# Patient Record
Sex: MALE | Race: WHITE | NOT HISPANIC OR LATINO | Employment: OTHER | URBAN - METROPOLITAN AREA
[De-identification: names, ages, dates, MRNs, and addresses within clinical notes are randomized per-mention and may not be internally consistent; named-entity substitution may affect disease eponyms.]

---

## 2023-03-24 DIAGNOSIS — K21.9 GASTROESOPHAGEAL REFLUX DISEASE WITHOUT ESOPHAGITIS: Primary | ICD-10-CM

## 2023-03-24 RX ORDER — FUROSEMIDE 20 MG/1
1 TABLET ORAL DAILY
COMMUNITY
End: 2023-08-28 | Stop reason: SDUPTHER

## 2023-03-24 RX ORDER — ASPIRIN 81 MG/1
1 TABLET ORAL DAILY
COMMUNITY
Start: 2021-02-15

## 2023-03-24 RX ORDER — FENOFIBRATE 48 MG/1
1 TABLET, FILM COATED ORAL DAILY
COMMUNITY
Start: 2016-11-08 | End: 2023-07-17

## 2023-03-24 RX ORDER — AMIODARONE HYDROCHLORIDE 200 MG/1
TABLET ORAL EVERY 24 HOURS
COMMUNITY
Start: 2022-01-25 | End: 2024-01-17 | Stop reason: SDUPTHER

## 2023-03-24 RX ORDER — WARFARIN 2.5 MG/1
TABLET ORAL
COMMUNITY
End: 2023-10-16 | Stop reason: WASHOUT

## 2023-03-24 RX ORDER — METOPROLOL SUCCINATE 50 MG/1
1 TABLET, EXTENDED RELEASE ORAL DAILY
Qty: 30 TABLET | Refills: 3 | COMMUNITY
Start: 2023-01-20 | End: 2023-12-18

## 2023-03-24 RX ORDER — PANTOPRAZOLE SODIUM 40 MG/1
1 TABLET, DELAYED RELEASE ORAL DAILY
COMMUNITY
Start: 2021-06-02 | End: 2023-05-30 | Stop reason: ALTCHOICE

## 2023-03-24 RX ORDER — FOLIC ACID 1 MG/1
1 TABLET ORAL DAILY
COMMUNITY
Start: 2022-09-15 | End: 2023-06-19

## 2023-03-24 RX ORDER — POTASSIUM CHLORIDE 20 MEQ/1
TABLET, EXTENDED RELEASE ORAL
Qty: 180 TABLET | Refills: 1 | Status: SHIPPED | OUTPATIENT
Start: 2023-03-24 | End: 2023-06-22

## 2023-03-24 RX ORDER — FLUDROCORTISONE ACETATE 0.1 MG/1
1 TABLET ORAL DAILY
COMMUNITY
Start: 2022-02-17 | End: 2023-05-30 | Stop reason: WASHOUT

## 2023-03-24 RX ORDER — GABAPENTIN 100 MG/1
1 CAPSULE ORAL NIGHTLY
COMMUNITY
Start: 2015-03-02 | End: 2023-04-23

## 2023-03-24 RX ORDER — MUPIROCIN 20 MG/G
OINTMENT TOPICAL
COMMUNITY
Start: 2022-10-25 | End: 2023-10-24 | Stop reason: WASHOUT

## 2023-03-24 RX ORDER — POTASSIUM CHLORIDE 1500 MG/1
1 TABLET, EXTENDED RELEASE ORAL DAILY
COMMUNITY
Start: 2022-04-05 | End: 2024-03-08 | Stop reason: HOSPADM

## 2023-03-24 RX ORDER — HYDROCORTISONE 25 MG/G
CREAM TOPICAL
COMMUNITY
Start: 2022-11-30 | End: 2023-10-24 | Stop reason: WASHOUT

## 2023-03-24 RX ORDER — LEVOFLOXACIN 500 MG/1
1 TABLET, FILM COATED ORAL DAILY
COMMUNITY
Start: 2022-11-16 | End: 2023-05-30 | Stop reason: ALTCHOICE

## 2023-03-24 RX ORDER — TAMSULOSIN HYDROCHLORIDE 0.4 MG/1
CAPSULE ORAL
COMMUNITY
Start: 2022-06-14 | End: 2023-05-22

## 2023-03-24 RX ORDER — ISOSORBIDE MONONITRATE 30 MG/1
TABLET, EXTENDED RELEASE ORAL
COMMUNITY
Start: 2022-02-17 | End: 2023-05-30 | Stop reason: WASHOUT

## 2023-03-24 RX ORDER — UBIDECARENONE 75 MG
1 CAPSULE ORAL DAILY
COMMUNITY
Start: 2022-01-25

## 2023-03-24 RX ORDER — FINASTERIDE 5 MG/1
5 TABLET, FILM COATED ORAL DAILY
COMMUNITY
End: 2023-10-07

## 2023-03-24 RX ORDER — CEPHALEXIN 500 MG/1
1 CAPSULE ORAL
COMMUNITY
Start: 2023-01-20 | End: 2023-05-30 | Stop reason: WASHOUT

## 2023-03-24 RX ORDER — NITROGLYCERIN 0.4 MG/1
TABLET SUBLINGUAL EVERY 5 MIN PRN
COMMUNITY
Start: 2022-02-17 | End: 2023-10-16 | Stop reason: WASHOUT

## 2023-03-24 RX ORDER — PANTOPRAZOLE SODIUM 40 MG/1
TABLET, DELAYED RELEASE ORAL
Qty: 90 TABLET | Refills: 1 | Status: SHIPPED | OUTPATIENT
Start: 2023-03-24 | End: 2023-12-15

## 2023-03-24 RX ORDER — PAROXETINE HYDROCHLORIDE 20 MG/1
1 TABLET, FILM COATED ORAL DAILY
COMMUNITY
Start: 2015-09-21 | End: 2023-06-19

## 2023-04-22 DIAGNOSIS — M19.90 ARTHRITIS: Primary | ICD-10-CM

## 2023-04-23 RX ORDER — GABAPENTIN 100 MG/1
CAPSULE ORAL
Qty: 90 CAPSULE | Refills: 1 | Status: SHIPPED | OUTPATIENT
Start: 2023-04-23 | End: 2024-01-16

## 2023-04-24 ENCOUNTER — APPOINTMENT (OUTPATIENT)
Dept: LAB | Facility: LAB | Age: 78
End: 2023-04-24
Payer: MEDICARE

## 2023-04-25 LAB
ANION GAP IN SER/PLAS: 17 MMOL/L (ref 10–20)
CALCIUM (MG/DL) IN SER/PLAS: 9.6 MG/DL (ref 8.6–10.6)
CARBON DIOXIDE, TOTAL (MMOL/L) IN SER/PLAS: 30 MMOL/L (ref 21–32)
CHLORIDE (MMOL/L) IN SER/PLAS: 103 MMOL/L (ref 98–107)
CREATININE (MG/DL) IN SER/PLAS: 2.35 MG/DL (ref 0.5–1.3)
ERYTHROCYTE DISTRIBUTION WIDTH (RATIO) BY AUTOMATED COUNT: 16.7 % (ref 11.5–14.5)
ERYTHROCYTE MEAN CORPUSCULAR HEMOGLOBIN CONCENTRATION (G/DL) BY AUTOMATED: 29.6 G/DL (ref 32–36)
ERYTHROCYTE MEAN CORPUSCULAR VOLUME (FL) BY AUTOMATED COUNT: 93 FL (ref 80–100)
ERYTHROCYTES (10*6/UL) IN BLOOD BY AUTOMATED COUNT: 4.37 X10E12/L (ref 4.5–5.9)
GFR MALE: 27 ML/MIN/1.73M2
GLUCOSE (MG/DL) IN SER/PLAS: 80 MG/DL (ref 74–99)
HEMATOCRIT (%) IN BLOOD BY AUTOMATED COUNT: 40.5 % (ref 41–52)
HEMOGLOBIN (G/DL) IN BLOOD: 12 G/DL (ref 13.5–17.5)
INR IN PPP BY COAGULATION ASSAY: NORMAL
LEUKOCYTES (10*3/UL) IN BLOOD BY AUTOMATED COUNT: 4.2 X10E9/L (ref 4.4–11.3)
NRBC (PER 100 WBCS) BY AUTOMATED COUNT: 0 /100 WBC (ref 0–0)
PLATELETS (10*3/UL) IN BLOOD AUTOMATED COUNT: 234 X10E9/L (ref 150–450)
POTASSIUM (MMOL/L) IN SER/PLAS: 4.8 MMOL/L (ref 3.5–5.3)
PROTHROMBIN TIME (PT) IN PPP BY COAGULATION ASSAY: NORMAL
SODIUM (MMOL/L) IN SER/PLAS: 145 MMOL/L (ref 136–145)
UREA NITROGEN (MG/DL) IN SER/PLAS: 38 MG/DL (ref 6–23)

## 2023-05-19 DIAGNOSIS — N40.1 BENIGN PROSTATIC HYPERPLASIA WITH LOWER URINARY TRACT SYMPTOMS, SYMPTOM DETAILS UNSPECIFIED: Primary | ICD-10-CM

## 2023-05-22 RX ORDER — LANOLIN ALCOHOL/MO/W.PET/CERES
CREAM (GRAM) TOPICAL
Qty: 180 TABLET | Refills: 1 | Status: SHIPPED | OUTPATIENT
Start: 2023-05-22 | End: 2023-08-20

## 2023-05-22 RX ORDER — TAMSULOSIN HYDROCHLORIDE 0.4 MG/1
CAPSULE ORAL
Qty: 90 CAPSULE | Refills: 0 | Status: SHIPPED | OUTPATIENT
Start: 2023-05-22 | End: 2023-08-20

## 2023-05-30 ENCOUNTER — LAB (OUTPATIENT)
Dept: LAB | Facility: LAB | Age: 78
End: 2023-05-30
Payer: MEDICARE

## 2023-05-30 ENCOUNTER — OFFICE VISIT (OUTPATIENT)
Dept: PRIMARY CARE | Facility: CLINIC | Age: 78
End: 2023-05-30
Payer: MEDICARE

## 2023-05-30 VITALS
OXYGEN SATURATION: 94 % | WEIGHT: 239 LBS | SYSTOLIC BLOOD PRESSURE: 122 MMHG | BODY MASS INDEX: 32.37 KG/M2 | DIASTOLIC BLOOD PRESSURE: 70 MMHG | HEART RATE: 60 BPM | HEIGHT: 72 IN | TEMPERATURE: 96.3 F

## 2023-05-30 DIAGNOSIS — I63.349: Primary | ICD-10-CM

## 2023-05-30 DIAGNOSIS — R30.0 DYSURIA: ICD-10-CM

## 2023-05-30 DIAGNOSIS — I25.10 ARTERIOSCLEROTIC CARDIOVASCULAR DISEASE (ASCVD): ICD-10-CM

## 2023-05-30 DIAGNOSIS — N18.32 STAGE 3B CHRONIC KIDNEY DISEASE (MULTI): ICD-10-CM

## 2023-05-30 DIAGNOSIS — I15.9 SECONDARY HYPERTENSION: ICD-10-CM

## 2023-05-30 PROBLEM — I10 HYPERTENSION: Status: ACTIVE | Noted: 2023-05-30

## 2023-05-30 PROBLEM — F32.A DEPRESSION: Status: ACTIVE | Noted: 2023-05-30

## 2023-05-30 PROBLEM — N21.0 BLADDER STONE: Status: ACTIVE | Noted: 2023-05-30

## 2023-05-30 PROBLEM — G47.33 OBSTRUCTIVE SLEEP APNEA: Status: ACTIVE | Noted: 2023-05-30

## 2023-05-30 PROBLEM — D64.9 ANEMIA: Status: ACTIVE | Noted: 2023-05-30

## 2023-05-30 PROBLEM — I63.231: Status: ACTIVE | Noted: 2023-05-30

## 2023-05-30 PROBLEM — I48.20 CHRONIC ATRIAL FIBRILLATION (MULTI): Status: ACTIVE | Noted: 2023-05-30

## 2023-05-30 PROBLEM — N40.0 BPH WITHOUT OBSTRUCTION/LOWER URINARY TRACT SYMPTOMS: Status: ACTIVE | Noted: 2023-05-30

## 2023-05-30 PROBLEM — E78.5 HYPERLIPIDEMIA: Status: ACTIVE | Noted: 2023-05-30

## 2023-05-30 PROBLEM — E55.9 VITAMIN D DEFICIENCY: Status: ACTIVE | Noted: 2023-05-30

## 2023-05-30 PROBLEM — I63.9 CEREBRAL INFARCTION, UNSPECIFIED (MULTI): Status: ACTIVE | Noted: 2023-05-30

## 2023-05-30 PROBLEM — I63.9: Status: ACTIVE | Noted: 2023-05-30

## 2023-05-30 LAB
APPEARANCE, URINE: ABNORMAL
BACTERIA, URINE: ABNORMAL /HPF
BILIRUBIN, URINE: NEGATIVE
BLOOD, URINE: ABNORMAL
COLOR, URINE: ABNORMAL
GLUCOSE, URINE: NEGATIVE MG/DL
HYALINE CASTS, URINE: ABNORMAL /LPF
KETONES, URINE: NEGATIVE MG/DL
LEUKOCYTE ESTERASE, URINE: ABNORMAL
NITRITE, URINE: NEGATIVE
PH, URINE: 8 (ref 5–8)
PROTEIN, URINE: ABNORMAL MG/DL
RBC, URINE: 5 /HPF (ref 0–5)
SPECIFIC GRAVITY, URINE: 1.01 (ref 1–1.03)
SQUAMOUS EPITHELIAL CELLS, URINE: <1 /HPF
UROBILINOGEN, URINE: <2 MG/DL (ref 0–1.9)
WBC CLUMPS, URINE: ABNORMAL /HPF
WBC, URINE: 17 /HPF (ref 0–5)

## 2023-05-30 PROCEDURE — 81001 URINALYSIS AUTO W/SCOPE: CPT

## 2023-05-30 PROCEDURE — 1159F MED LIST DOCD IN RCRD: CPT | Performed by: INTERNAL MEDICINE

## 2023-05-30 PROCEDURE — 3074F SYST BP LT 130 MM HG: CPT | Performed by: INTERNAL MEDICINE

## 2023-05-30 PROCEDURE — 87077 CULTURE AEROBIC IDENTIFY: CPT

## 2023-05-30 PROCEDURE — 1157F ADVNC CARE PLAN IN RCRD: CPT | Performed by: INTERNAL MEDICINE

## 2023-05-30 PROCEDURE — 3078F DIAST BP <80 MM HG: CPT | Performed by: INTERNAL MEDICINE

## 2023-05-30 PROCEDURE — 99213 OFFICE O/P EST LOW 20 MIN: CPT | Performed by: INTERNAL MEDICINE

## 2023-05-30 PROCEDURE — 87086 URINE CULTURE/COLONY COUNT: CPT

## 2023-05-30 PROCEDURE — 1160F RVW MEDS BY RX/DR IN RCRD: CPT | Performed by: INTERNAL MEDICINE

## 2023-05-30 ASSESSMENT — PATIENT HEALTH QUESTIONNAIRE - PHQ9
SUM OF ALL RESPONSES TO PHQ9 QUESTIONS 1 & 2: 0
1. LITTLE INTEREST OR PLEASURE IN DOING THINGS: NOT AT ALL
2. FEELING DOWN, DEPRESSED OR HOPELESS: NOT AT ALL

## 2023-05-30 ASSESSMENT — LIFESTYLE VARIABLES
SKIP TO QUESTIONS 9-10: 1
HOW OFTEN DO YOU HAVE SIX OR MORE DRINKS ON ONE OCCASION: NEVER
HOW OFTEN DO YOU HAVE A DRINK CONTAINING ALCOHOL: NEVER
HOW MANY STANDARD DRINKS CONTAINING ALCOHOL DO YOU HAVE ON A TYPICAL DAY: PATIENT DOES NOT DRINK
AUDIT-C TOTAL SCORE: 0

## 2023-05-30 ASSESSMENT — PAIN SCALES - GENERAL: PAINLEVEL: 2

## 2023-05-30 ASSESSMENT — COLUMBIA-SUICIDE SEVERITY RATING SCALE - C-SSRS: 1. IN THE PAST MONTH, HAVE YOU WISHED YOU WERE DEAD OR WISHED YOU COULD GO TO SLEEP AND NOT WAKE UP?: NO

## 2023-05-30 NOTE — PROGRESS NOTES
Subjective   Patient ID: Angel Carroll is a 78 y.o. male who presents for UTI and Flank Pain.    HPI patient presents to clinic complaining of burning on urination for the past 3 days.  His symptoms are associated with mild confusion.  He denies any fever, chills, nausea, vomiting, flank pain and diaphoresis.  He is accompanied by his family today.  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  Laboratory studies done last month showed serum creatinine of 2.35 BUN of 38 GFR of 27 mL/min, hemoglobin of 12.0 hematocrit of 40.5 and other studies have been reviewed and discussed with him.    Review of Systems   Constitutional: Negative.    HENT: Negative.     Eyes: Negative.    Respiratory: Negative.     Cardiovascular: Negative.    Gastrointestinal: Negative.    Endocrine: Negative.    Genitourinary:  Positive for dysuria.   Musculoskeletal: Negative.    Skin: Negative.    Allergic/Immunologic: Negative.    Neurological: Negative.    Hematological: Negative.    Psychiatric/Behavioral: Negative.         Objective   /70   Pulse 60   Temp 35.7 °C (96.3 °F)   Ht 1.829 m (6')   Wt 108 kg (239 lb)   SpO2 94%   BMI 32.41 kg/m²     Physical Exam  Constitutional:       Appearance: Normal appearance. He is obese.   HENT:      Right Ear: Tympanic membrane normal.      Left Ear: Tympanic membrane and ear canal normal.      Nose:  Nose normal.   Neck:      Vascular: No carotid bruit.   Cardiovascular:      Rate and Rhythm: Normal rate.   Pulmonary:      Effort: No respiratory distress.      Breath sounds: No stridor. No wheezing.   Abdominal:      Palpations: Abdomen is soft.      Tenderness: There is no abdominal tenderness. There is no guarding or rebound.   Skin:     Coloration: Skin is not jaundiced.   Neurological:      General: No focal deficit present.      Mental Status: He is alert and oriented to person, place, and time.   Psychiatric:         Mood and Affect: Mood normal.         Assessment/Plan    patient will be scheduled for urinalysis in view of his symptoms of dysuria.  He is encouraged drink fluids and will be notified about his urinalysis result.  He will continue to follow-up with neurology, cardiology and nephrology clinic regarding history of atrial fibrillation, CVA and stage IV chronic kidney disease.  He will return to clinic in 3 months for follow-up visit.

## 2023-05-30 NOTE — PROGRESS NOTES
Subjective   Patient ID: Angel Carroll is a 78 y.o. male who presents for UTI and Flank Pain.    HPI     Review of Systems    Objective   /70   Pulse 60   Temp 35.7 °C (96.3 °F)   Ht 1.829 m (6')   Wt 108 kg (239 lb)   SpO2 94%   BMI 32.41 kg/m²     Physical Exam    Assessment/Plan

## 2023-05-31 RX ORDER — CIPROFLOXACIN 250 MG/1
250 TABLET, FILM COATED ORAL 2 TIMES DAILY
Qty: 14 TABLET | Refills: 0 | Status: SHIPPED | OUTPATIENT
Start: 2023-05-31 | End: 2023-06-07

## 2023-05-31 ASSESSMENT — ENCOUNTER SYMPTOMS
GASTROINTESTINAL NEGATIVE: 1
RESPIRATORY NEGATIVE: 1
NEUROLOGICAL NEGATIVE: 1
DYSURIA: 1
MUSCULOSKELETAL NEGATIVE: 1
EYES NEGATIVE: 1
CARDIOVASCULAR NEGATIVE: 1
ENDOCRINE NEGATIVE: 1
PSYCHIATRIC NEGATIVE: 1
HEMATOLOGIC/LYMPHATIC NEGATIVE: 1
ALLERGIC/IMMUNOLOGIC NEGATIVE: 1
CONSTITUTIONAL NEGATIVE: 1

## 2023-06-01 ENCOUNTER — TELEPHONE (OUTPATIENT)
Dept: PRIMARY CARE | Facility: CLINIC | Age: 78
End: 2023-06-01
Payer: MEDICARE

## 2023-06-01 DIAGNOSIS — N30.00 ACUTE CYSTITIS WITHOUT HEMATURIA: Primary | ICD-10-CM

## 2023-06-01 RX ORDER — CEPHALEXIN 500 MG/1
500 CAPSULE ORAL 2 TIMES DAILY
Qty: 20 CAPSULE | Refills: 0 | Status: SHIPPED | OUTPATIENT
Start: 2023-06-01 | End: 2023-06-11

## 2023-06-01 NOTE — TELEPHONE ENCOUNTER
PT CURRENTLY TAKING AMIODARONE AND WAS SENT CIPROFLOXACIN YESTERDAY, PHARM CALLED TO STATE THE MEDS CONFLICT WITH EACH OTHER,, ASKING TO SEND NEW RX.... ALLERGIES IN CHART

## 2023-06-05 LAB — URINE CULTURE: ABNORMAL

## 2023-06-17 DIAGNOSIS — I25.10 ARTERIOSCLEROTIC CARDIOVASCULAR DISEASE (ASCVD): Primary | ICD-10-CM

## 2023-06-19 RX ORDER — PAROXETINE HYDROCHLORIDE 20 MG/1
TABLET, FILM COATED ORAL
Qty: 90 TABLET | Refills: 0 | Status: SHIPPED | OUTPATIENT
Start: 2023-06-19 | End: 2023-12-15

## 2023-06-19 RX ORDER — ATORVASTATIN CALCIUM 20 MG/1
TABLET, FILM COATED ORAL
Qty: 90 TABLET | Refills: 0 | Status: SHIPPED | OUTPATIENT
Start: 2023-06-19 | End: 2023-12-15

## 2023-06-19 RX ORDER — FOLIC ACID 1 MG/1
TABLET ORAL
Qty: 90 TABLET | Refills: 3 | Status: SHIPPED | OUTPATIENT
Start: 2023-06-19

## 2023-07-14 DIAGNOSIS — I25.10 ARTERIOSCLEROTIC CARDIOVASCULAR DISEASE (ASCVD): Primary | ICD-10-CM

## 2023-07-17 RX ORDER — FENOFIBRATE 48 MG/1
48 TABLET, FILM COATED ORAL DAILY
Qty: 90 TABLET | Refills: 1 | Status: SHIPPED | OUTPATIENT
Start: 2023-07-17 | End: 2024-07-16

## 2023-08-14 ENCOUNTER — TELEPHONE (OUTPATIENT)
Dept: PRIMARY CARE | Facility: CLINIC | Age: 78
End: 2023-08-14
Payer: MEDICARE

## 2023-08-28 DIAGNOSIS — I15.9 SECONDARY HYPERTENSION: Primary | ICD-10-CM

## 2023-08-28 RX ORDER — FUROSEMIDE 20 MG/1
20 TABLET ORAL DAILY
Qty: 90 TABLET | Refills: 1 | Status: SHIPPED | OUTPATIENT
Start: 2023-08-28 | End: 2024-03-08 | Stop reason: HOSPADM

## 2023-09-01 ENCOUNTER — LAB (OUTPATIENT)
Dept: LAB | Facility: LAB | Age: 78
End: 2023-09-01
Payer: MEDICARE

## 2023-09-01 DIAGNOSIS — N18.32 STAGE 3B CHRONIC KIDNEY DISEASE (MULTI): ICD-10-CM

## 2023-09-01 DIAGNOSIS — I15.9 SECONDARY HYPERTENSION: ICD-10-CM

## 2023-09-01 DIAGNOSIS — I63.349: ICD-10-CM

## 2023-09-01 LAB
ANION GAP IN SER/PLAS: 14 MMOL/L (ref 10–20)
BASOPHILS (10*3/UL) IN BLOOD BY AUTOMATED COUNT: 0.04 X10E9/L (ref 0–0.1)
BASOPHILS/100 LEUKOCYTES IN BLOOD BY AUTOMATED COUNT: 0.9 % (ref 0–2)
CALCIDIOL (25 OH VITAMIN D3) (NG/ML) IN SER/PLAS: 24 NG/ML
CALCIUM (MG/DL) IN SER/PLAS: 9.1 MG/DL (ref 8.6–10.6)
CARBON DIOXIDE, TOTAL (MMOL/L) IN SER/PLAS: 32 MMOL/L (ref 21–32)
CHLORIDE (MMOL/L) IN SER/PLAS: 106 MMOL/L (ref 98–107)
CREATININE (MG/DL) IN SER/PLAS: 2.28 MG/DL (ref 0.5–1.3)
EOSINOPHILS (10*3/UL) IN BLOOD BY AUTOMATED COUNT: 0.14 X10E9/L (ref 0–0.4)
EOSINOPHILS/100 LEUKOCYTES IN BLOOD BY AUTOMATED COUNT: 3.3 % (ref 0–6)
ERYTHROCYTE DISTRIBUTION WIDTH (RATIO) BY AUTOMATED COUNT: 15.5 % (ref 11.5–14.5)
ERYTHROCYTE MEAN CORPUSCULAR HEMOGLOBIN CONCENTRATION (G/DL) BY AUTOMATED: 29.8 G/DL (ref 32–36)
ERYTHROCYTE MEAN CORPUSCULAR VOLUME (FL) BY AUTOMATED COUNT: 96 FL (ref 80–100)
ERYTHROCYTES (10*6/UL) IN BLOOD BY AUTOMATED COUNT: 4.03 X10E12/L (ref 4.5–5.9)
GFR MALE: 28 ML/MIN/1.73M2
GLUCOSE (MG/DL) IN SER/PLAS: 98 MG/DL (ref 74–99)
HEMATOCRIT (%) IN BLOOD BY AUTOMATED COUNT: 38.6 % (ref 41–52)
HEMOGLOBIN (G/DL) IN BLOOD: 11.5 G/DL (ref 13.5–17.5)
IMMATURE GRANULOCYTES/100 LEUKOCYTES IN BLOOD BY AUTOMATED COUNT: 0.5 % (ref 0–0.9)
LEUKOCYTES (10*3/UL) IN BLOOD BY AUTOMATED COUNT: 4.2 X10E9/L (ref 4.4–11.3)
LYMPHOCYTES (10*3/UL) IN BLOOD BY AUTOMATED COUNT: 0.94 X10E9/L (ref 0.8–3)
LYMPHOCYTES/100 LEUKOCYTES IN BLOOD BY AUTOMATED COUNT: 22.2 % (ref 13–44)
MAGNESIUM (MG/DL) IN SER/PLAS: 2.38 MG/DL (ref 1.6–2.4)
MONOCYTES (10*3/UL) IN BLOOD BY AUTOMATED COUNT: 0.64 X10E9/L (ref 0.05–0.8)
MONOCYTES/100 LEUKOCYTES IN BLOOD BY AUTOMATED COUNT: 15.1 % (ref 2–10)
NEUTROPHILS (10*3/UL) IN BLOOD BY AUTOMATED COUNT: 2.46 X10E9/L (ref 1.6–5.5)
NEUTROPHILS/100 LEUKOCYTES IN BLOOD BY AUTOMATED COUNT: 58 % (ref 40–80)
NRBC (PER 100 WBCS) BY AUTOMATED COUNT: 0 /100 WBC (ref 0–0)
PLATELETS (10*3/UL) IN BLOOD AUTOMATED COUNT: 242 X10E9/L (ref 150–450)
POTASSIUM (MMOL/L) IN SER/PLAS: 4.7 MMOL/L (ref 3.5–5.3)
SODIUM (MMOL/L) IN SER/PLAS: 147 MMOL/L (ref 136–145)
UREA NITROGEN (MG/DL) IN SER/PLAS: 30 MG/DL (ref 6–23)

## 2023-09-01 PROCEDURE — 82306 VITAMIN D 25 HYDROXY: CPT

## 2023-09-01 PROCEDURE — 83735 ASSAY OF MAGNESIUM: CPT

## 2023-09-01 PROCEDURE — 80048 BASIC METABOLIC PNL TOTAL CA: CPT

## 2023-09-01 PROCEDURE — 85025 COMPLETE CBC W/AUTO DIFF WBC: CPT

## 2023-09-01 PROCEDURE — 36415 COLL VENOUS BLD VENIPUNCTURE: CPT

## 2023-09-06 ENCOUNTER — APPOINTMENT (OUTPATIENT)
Dept: PRIMARY CARE | Facility: CLINIC | Age: 78
End: 2023-09-06
Payer: MEDICARE

## 2023-09-12 PROBLEM — E04.2 GOITER, NONTOXIC, MULTINODULAR: Status: ACTIVE | Noted: 2023-09-12

## 2023-09-12 PROBLEM — R53.1 GENERALIZED WEAKNESS: Status: ACTIVE | Noted: 2023-09-12

## 2023-09-12 PROBLEM — K63.5 BENIGN COLON POLYP: Status: ACTIVE | Noted: 2023-09-12

## 2023-09-12 PROBLEM — I50.9 HEART FAILURE (MULTI): Status: ACTIVE | Noted: 2023-09-12

## 2023-09-12 PROBLEM — R29.898 TRANSIENT WEAKNESS OF LEFT LOWER EXTREMITY: Status: ACTIVE | Noted: 2023-09-12

## 2023-09-12 PROBLEM — M17.11 OSTEOARTHRITIS OF RIGHT KNEE: Status: ACTIVE | Noted: 2023-09-12

## 2023-09-12 PROBLEM — R09.02 HYPOXIA: Status: ACTIVE | Noted: 2023-09-12

## 2023-09-12 PROBLEM — I48.92 ATRIAL FLUTTER (MULTI): Status: ACTIVE | Noted: 2023-09-12

## 2023-09-12 PROBLEM — B02.9 HERPES ZOSTER: Status: ACTIVE | Noted: 2023-09-12

## 2023-09-12 PROBLEM — R97.20 ELEVATED PROSTATE SPECIFIC ANTIGEN (PSA): Status: ACTIVE | Noted: 2023-09-12

## 2023-09-12 PROBLEM — I25.10 ATHEROSCLEROSIS OF CORONARY ARTERY: Status: ACTIVE | Noted: 2023-09-12

## 2023-09-12 PROBLEM — D48.5 NEOPLASM OF UNCERTAIN BEHAVIOR OF SKIN: Status: ACTIVE | Noted: 2023-01-31

## 2023-09-12 PROBLEM — N52.9 MALE ERECTILE DISORDER OF ORGANIC ORIGIN: Status: ACTIVE | Noted: 2023-09-12

## 2023-09-12 PROBLEM — R53.1 RIGHT SIDED WEAKNESS: Status: ACTIVE | Noted: 2023-09-12

## 2023-09-12 PROBLEM — L57.0 ACTINIC KERATOSIS: Status: ACTIVE | Noted: 2023-01-31

## 2023-09-12 PROBLEM — H60.90: Status: ACTIVE | Noted: 2023-09-12

## 2023-09-12 PROBLEM — G56.00 CARPAL TUNNEL SYNDROME: Status: ACTIVE | Noted: 2023-09-12

## 2023-09-12 PROBLEM — R20.2 PARESTHESIA OF SKIN: Status: ACTIVE | Noted: 2023-01-31

## 2023-09-12 PROBLEM — N17.9 ACUTE RENAL FAILURE (CMS-HCC): Status: ACTIVE | Noted: 2023-09-12

## 2023-09-12 PROBLEM — D49.6 BRAIN TUMOR (MULTI): Status: ACTIVE | Noted: 2023-09-12

## 2023-09-12 PROBLEM — Z98.890 STATUS POST CARDIAC CATHETERIZATION: Status: ACTIVE | Noted: 2023-09-12

## 2023-09-12 PROBLEM — I51.9 LV DYSFUNCTION: Status: ACTIVE | Noted: 2023-09-12

## 2023-09-12 PROBLEM — F41.9 ANXIETY: Status: ACTIVE | Noted: 2023-09-12

## 2023-09-12 PROBLEM — N20.0 CALCULUS OF KIDNEY: Status: ACTIVE | Noted: 2023-09-12

## 2023-09-12 PROBLEM — I63.40 CEREBRAL INFARCTION DUE TO EMBOLISM OF CEREBRAL ARTERY (MULTI): Status: ACTIVE | Noted: 2023-05-30

## 2023-09-12 PROBLEM — M96.1 LUMBAR POSTLAMINECTOMY SYNDROME: Status: ACTIVE | Noted: 2023-09-12

## 2023-09-12 PROBLEM — H52.03 HYPEROPIA OF BOTH EYES: Status: ACTIVE | Noted: 2023-09-12

## 2023-09-12 PROBLEM — D18.01 HEMANGIOMA OF SKIN AND SUBCUTANEOUS TISSUE: Status: ACTIVE | Noted: 2023-01-31

## 2023-09-12 PROBLEM — L60.3 ONYCHODYSTROPHY: Status: ACTIVE | Noted: 2023-09-12

## 2023-09-12 PROBLEM — R31.0 GROSS HEMATURIA: Status: ACTIVE | Noted: 2023-09-12

## 2023-09-12 PROBLEM — F51.04 CHRONIC INSOMNIA: Status: ACTIVE | Noted: 2023-09-12

## 2023-09-12 PROBLEM — R42 ORTHOSTATIC DIZZINESS: Status: ACTIVE | Noted: 2023-09-12

## 2023-09-12 PROBLEM — R07.9 CHEST PAIN: Status: ACTIVE | Noted: 2023-09-12

## 2023-09-12 PROBLEM — Z95.0 PACEMAKER: Status: ACTIVE | Noted: 2023-09-12

## 2023-09-12 PROBLEM — R31.9 HEMATURIA: Status: ACTIVE | Noted: 2023-09-12

## 2023-09-12 PROBLEM — S09.90XA INJURY OF HEAD: Status: ACTIVE | Noted: 2023-09-12

## 2023-09-12 PROBLEM — D49.9 PRECANCEROUS LESION: Status: ACTIVE | Noted: 2023-09-12

## 2023-09-12 PROBLEM — R33.9 BLADDER RETENTION: Status: ACTIVE | Noted: 2023-09-12

## 2023-09-12 PROBLEM — H54.40 BLINDNESS OF LEFT EYE: Status: ACTIVE | Noted: 2023-09-12

## 2023-09-12 PROBLEM — E04.1 THYROID NODULE: Status: ACTIVE | Noted: 2023-09-12

## 2023-09-12 PROBLEM — M54.50 LUMBAR PAIN: Status: ACTIVE | Noted: 2023-09-12

## 2023-09-12 PROBLEM — I10 ORTHOSTATIC HYPERTENSION: Status: ACTIVE | Noted: 2023-09-12

## 2023-09-12 PROBLEM — Z96.60 HISTORY OF JOINT REPLACEMENT: Status: ACTIVE | Noted: 2023-09-12

## 2023-09-12 PROBLEM — N20.9 URINARY CALCULUS: Status: ACTIVE | Noted: 2023-09-12

## 2023-09-12 PROBLEM — G60.9 IDIOPATHIC PERIPHERAL NEUROPATHY: Status: ACTIVE | Noted: 2023-09-12

## 2023-09-12 PROBLEM — J96.11 CHRONIC HYPOXEMIC RESPIRATORY FAILURE (MULTI): Status: ACTIVE | Noted: 2023-09-12

## 2023-09-12 PROBLEM — N28.9 RENAL DISORDER: Status: ACTIVE | Noted: 2023-09-12

## 2023-09-12 PROBLEM — Z98.61 HISTORY OF PERCUTANEOUS TRANSLUMINAL CORONARY ANGIOPLASTY: Status: ACTIVE | Noted: 2023-09-12

## 2023-09-12 PROBLEM — L81.4 OTHER MELANIN HYPERPIGMENTATION: Status: ACTIVE | Noted: 2023-01-31

## 2023-09-12 PROBLEM — R21 EXANTHEM: Status: ACTIVE | Noted: 2023-09-12

## 2023-09-12 PROBLEM — R27.0 ATAXIA: Status: ACTIVE | Noted: 2023-09-12

## 2023-09-12 PROBLEM — L91.8 OTHER HYPERTROPHIC DISORDERS OF THE SKIN: Status: ACTIVE | Noted: 2023-01-31

## 2023-09-12 PROBLEM — I77.9 DISORDER OF CAROTID ARTERY (CMS-HCC): Status: ACTIVE | Noted: 2023-09-12

## 2023-09-12 PROBLEM — M25.461 EFFUSION OF RIGHT KNEE: Status: ACTIVE | Noted: 2023-09-12

## 2023-09-12 PROBLEM — H25.813 MIXED TYPE AGE-RELATED CATARACT, BOTH EYES: Status: ACTIVE | Noted: 2023-09-12

## 2023-09-12 PROBLEM — R55 NEAR SYNCOPE: Status: ACTIVE | Noted: 2023-09-12

## 2023-09-12 PROBLEM — B35.1 ONYCHOMYCOSIS OF TOENAIL: Status: ACTIVE | Noted: 2023-09-12

## 2023-09-12 PROBLEM — Z79.01 ANTICOAGULATED ON COUMADIN: Status: ACTIVE | Noted: 2023-09-12

## 2023-09-12 PROBLEM — I63.511: Status: ACTIVE | Noted: 2023-05-30

## 2023-09-12 PROBLEM — S41.119A ARM LACERATION: Status: ACTIVE | Noted: 2023-09-12

## 2023-09-12 PROBLEM — R06.09 DYSPNEA ON EXERTION: Status: ACTIVE | Noted: 2023-09-12

## 2023-09-12 PROBLEM — D22.5 MELANOCYTIC NEVI OF TRUNK: Status: ACTIVE | Noted: 2023-01-31

## 2023-09-12 PROBLEM — I69.322 DYSARTHRIA DUE TO RECENT STROKE: Status: ACTIVE | Noted: 2023-09-12

## 2023-09-12 PROBLEM — H35.82 OCULAR ISCHEMIC SYNDROME: Status: ACTIVE | Noted: 2023-09-12

## 2023-09-12 PROBLEM — H52.4 BILATERAL PRESBYOPIA: Status: ACTIVE | Noted: 2023-09-12

## 2023-09-12 PROBLEM — D50.9 IRON DEFICIENCY ANEMIA: Status: ACTIVE | Noted: 2023-09-12

## 2023-09-12 PROBLEM — D36.9 TUBULAR ADENOMA: Status: ACTIVE | Noted: 2023-09-12

## 2023-09-12 PROBLEM — F43.9 SITUATIONAL STRESS: Status: ACTIVE | Noted: 2023-09-12

## 2023-09-12 PROBLEM — R00.1 SINUS BRADYCARDIA: Status: ACTIVE | Noted: 2023-09-12

## 2023-09-12 PROBLEM — R29.898 WEAKNESS OF LEFT UPPER EXTREMITY: Status: ACTIVE | Noted: 2023-09-12

## 2023-09-12 PROBLEM — H53.9 ABNORMAL VISION: Status: ACTIVE | Noted: 2023-09-12

## 2023-09-12 PROBLEM — N20.1 CALCULUS OF URETER: Status: ACTIVE | Noted: 2023-09-12

## 2023-09-12 PROBLEM — R94.09 ABNORMAL TILT TABLE TEST: Status: ACTIVE | Noted: 2023-09-12

## 2023-09-12 PROBLEM — H25.9 SENILE CATARACT: Status: ACTIVE | Noted: 2023-09-12

## 2023-09-12 PROBLEM — H53.001 AMBLYOPIA OF RIGHT EYE: Status: ACTIVE | Noted: 2023-09-12

## 2023-09-12 PROBLEM — M54.16 LUMBAR RADICULITIS: Status: ACTIVE | Noted: 2023-09-12

## 2023-09-12 PROBLEM — R26.89 BALANCE PROBLEM: Status: ACTIVE | Noted: 2023-09-12

## 2023-09-12 PROBLEM — E66.9 OBESITY WITH BODY MASS INDEX 30 OR GREATER: Status: ACTIVE | Noted: 2023-09-12

## 2023-09-12 PROBLEM — Z97.3 WEARS GLASSES: Status: ACTIVE | Noted: 2023-09-12

## 2023-09-12 PROBLEM — R13.11 ORAL PHASE DYSPHAGIA: Status: ACTIVE | Noted: 2023-09-12

## 2023-09-12 PROBLEM — I10 BENIGN ESSENTIAL HYPERTENSION: Status: ACTIVE | Noted: 2023-09-12

## 2023-09-12 PROBLEM — R60.0 BILATERAL LEG EDEMA: Status: ACTIVE | Noted: 2023-09-12

## 2023-09-12 PROBLEM — E87.6 HYPOKALEMIA: Status: ACTIVE | Noted: 2023-09-12

## 2023-09-12 PROBLEM — Z96.651 STATUS POST TOTAL RIGHT KNEE REPLACEMENT: Status: ACTIVE | Noted: 2023-09-12

## 2023-09-12 PROBLEM — L82.0 INFLAMED SEBORRHEIC KERATOSIS: Status: ACTIVE | Noted: 2023-01-31

## 2023-09-12 PROBLEM — E29.1 HYPOGONADISM MALE: Status: ACTIVE | Noted: 2023-09-12

## 2023-09-12 PROBLEM — H53.041 AMBLYOPIA SUSPECT, RIGHT EYE: Status: ACTIVE | Noted: 2023-09-12

## 2023-09-12 PROBLEM — L60.0 INGROWN RIGHT BIG TOENAIL: Status: ACTIVE | Noted: 2023-09-12

## 2023-09-12 PROBLEM — R41.3 MEMORY DIFFICULTY: Status: ACTIVE | Noted: 2023-09-12

## 2023-09-12 PROBLEM — M48.062 LUMBAR STENOSIS WITH NEUROGENIC CLAUDICATION: Status: ACTIVE | Noted: 2023-09-12

## 2023-09-12 PROBLEM — R55 SYNCOPE AND COLLAPSE: Status: ACTIVE | Noted: 2023-09-12

## 2023-09-12 PROBLEM — M16.11 PRIMARY OSTEOARTHRITIS OF RIGHT HIP: Status: ACTIVE | Noted: 2023-09-12

## 2023-09-12 PROBLEM — M19.049 PRIMARY LOCALIZED OSTEOARTHROSIS, HAND: Status: ACTIVE | Noted: 2023-09-12

## 2023-09-12 PROBLEM — M54.9 BACKACHE: Status: ACTIVE | Noted: 2023-09-12

## 2023-09-12 PROBLEM — R41.82 ALTERED MENTAL STATUS: Status: ACTIVE | Noted: 2023-09-12

## 2023-09-12 PROBLEM — R41.89 SUBJECTIVE MEMORY COMPLAINTS: Status: ACTIVE | Noted: 2023-09-12

## 2023-09-12 PROBLEM — I95.9 HYPOTENSION: Status: ACTIVE | Noted: 2023-09-12

## 2023-09-12 PROBLEM — M17.12 PRIMARY OSTEOARTHRITIS OF LEFT KNEE: Status: ACTIVE | Noted: 2023-09-12

## 2023-09-12 PROBLEM — I25.5 ISCHEMIC CARDIOMYOPATHY: Status: ACTIVE | Noted: 2023-09-12

## 2023-09-12 RX ORDER — CALCIUM CARBONATE 300MG(750)
400 TABLET,CHEWABLE ORAL DAILY
COMMUNITY
End: 2023-09-18 | Stop reason: SDUPTHER

## 2023-09-12 RX ORDER — TAMSULOSIN HYDROCHLORIDE 0.4 MG/1
CAPSULE ORAL
COMMUNITY
Start: 2023-08-26 | End: 2023-11-08

## 2023-09-12 RX ORDER — KETOCONAZOLE 20 MG/G
CREAM TOPICAL
COMMUNITY
Start: 2019-07-30 | End: 2023-10-24 | Stop reason: WASHOUT

## 2023-09-12 RX ORDER — WARFARIN 4 MG/1
2.5 TABLET ORAL ONCE
COMMUNITY
Start: 2023-07-27 | End: 2024-05-24 | Stop reason: SDUPTHER

## 2023-09-12 RX ORDER — FLUOROURACIL 50 MG/G
CREAM TOPICAL
COMMUNITY
Start: 2019-07-30 | End: 2023-10-24 | Stop reason: WASHOUT

## 2023-09-18 ENCOUNTER — OFFICE VISIT (OUTPATIENT)
Dept: PRIMARY CARE | Facility: CLINIC | Age: 78
End: 2023-09-18
Payer: MEDICARE

## 2023-09-18 VITALS
TEMPERATURE: 97.7 F | BODY MASS INDEX: 33.59 KG/M2 | HEIGHT: 72 IN | HEART RATE: 61 BPM | WEIGHT: 248 LBS | OXYGEN SATURATION: 95 % | DIASTOLIC BLOOD PRESSURE: 72 MMHG | SYSTOLIC BLOOD PRESSURE: 150 MMHG

## 2023-09-18 DIAGNOSIS — Z95.0 PACEMAKER: ICD-10-CM

## 2023-09-18 DIAGNOSIS — I63.40 CEREBRAL INFARCTION DUE TO EMBOLISM OF CEREBRAL ARTERY (MULTI): ICD-10-CM

## 2023-09-18 DIAGNOSIS — J96.11 CHRONIC HYPOXEMIC RESPIRATORY FAILURE (MULTI): ICD-10-CM

## 2023-09-18 DIAGNOSIS — E78.49 OTHER HYPERLIPIDEMIA: ICD-10-CM

## 2023-09-18 DIAGNOSIS — G47.33 OBSTRUCTIVE SLEEP APNEA: ICD-10-CM

## 2023-09-18 DIAGNOSIS — Z12.5 SCREENING FOR PROSTATE CANCER: ICD-10-CM

## 2023-09-18 DIAGNOSIS — I48.20 CHRONIC ATRIAL FIBRILLATION (MULTI): ICD-10-CM

## 2023-09-18 DIAGNOSIS — N18.32 STAGE 3B CHRONIC KIDNEY DISEASE (MULTI): ICD-10-CM

## 2023-09-18 DIAGNOSIS — I25.5 ISCHEMIC CARDIOMYOPATHY: ICD-10-CM

## 2023-09-18 DIAGNOSIS — Z23 NEED FOR VACCINATION: ICD-10-CM

## 2023-09-18 DIAGNOSIS — N40.0 BPH WITHOUT OBSTRUCTION/LOWER URINARY TRACT SYMPTOMS: ICD-10-CM

## 2023-09-18 DIAGNOSIS — Z00.00 ROUTINE GENERAL MEDICAL EXAMINATION AT HEALTH CARE FACILITY: Primary | ICD-10-CM

## 2023-09-18 PROCEDURE — 90662 IIV NO PRSV INCREASED AG IM: CPT | Performed by: INTERNAL MEDICINE

## 2023-09-18 PROCEDURE — 3077F SYST BP >= 140 MM HG: CPT | Performed by: INTERNAL MEDICINE

## 2023-09-18 PROCEDURE — 1125F AMNT PAIN NOTED PAIN PRSNT: CPT | Performed by: INTERNAL MEDICINE

## 2023-09-18 PROCEDURE — 1036F TOBACCO NON-USER: CPT | Performed by: INTERNAL MEDICINE

## 2023-09-18 PROCEDURE — 99214 OFFICE O/P EST MOD 30 MIN: CPT | Performed by: INTERNAL MEDICINE

## 2023-09-18 PROCEDURE — G0008 ADMIN INFLUENZA VIRUS VAC: HCPCS | Performed by: INTERNAL MEDICINE

## 2023-09-18 PROCEDURE — 1170F FXNL STATUS ASSESSED: CPT | Performed by: INTERNAL MEDICINE

## 2023-09-18 PROCEDURE — 1160F RVW MEDS BY RX/DR IN RCRD: CPT | Performed by: INTERNAL MEDICINE

## 2023-09-18 PROCEDURE — 3078F DIAST BP <80 MM HG: CPT | Performed by: INTERNAL MEDICINE

## 2023-09-18 PROCEDURE — G0439 PPPS, SUBSEQ VISIT: HCPCS | Performed by: INTERNAL MEDICINE

## 2023-09-18 PROCEDURE — 1159F MED LIST DOCD IN RCRD: CPT | Performed by: INTERNAL MEDICINE

## 2023-09-18 RX ORDER — RESPIRATORY SYNCYTIAL VISUS VACCINE RECOMBINANT, ADJUVANTED 120MCG/0.5
0.5 KIT INTRAMUSCULAR ONCE
Qty: 0.5 ML | Refills: 0 | Status: SHIPPED | OUTPATIENT
Start: 2023-09-18 | End: 2023-09-18

## 2023-09-18 RX ORDER — CALCIUM CARBONATE 300MG(750)
400 TABLET,CHEWABLE ORAL 2 TIMES DAILY
Qty: 60 TABLET | Refills: 1 | Status: SHIPPED | OUTPATIENT
Start: 2023-09-18 | End: 2023-11-17

## 2023-09-18 RX ORDER — ERGOCALCIFEROL 1.25 MG/1
50000 CAPSULE ORAL
Qty: 8 CAPSULE | Refills: 0 | Status: SHIPPED | OUTPATIENT
Start: 2023-09-18 | End: 2023-11-14

## 2023-09-18 ASSESSMENT — PATIENT HEALTH QUESTIONNAIRE - PHQ9
1. LITTLE INTEREST OR PLEASURE IN DOING THINGS: NOT AT ALL
2. FEELING DOWN, DEPRESSED OR HOPELESS: NOT AT ALL
SUM OF ALL RESPONSES TO PHQ9 QUESTIONS 1 AND 2: 0
1. LITTLE INTEREST OR PLEASURE IN DOING THINGS: NOT AT ALL
2. FEELING DOWN, DEPRESSED OR HOPELESS: NOT AT ALL
SUM OF ALL RESPONSES TO PHQ9 QUESTIONS 1 AND 2: 0
SUM OF ALL RESPONSES TO PHQ9 QUESTIONS 1 AND 2: 0
1. LITTLE INTEREST OR PLEASURE IN DOING THINGS: NOT AT ALL
2. FEELING DOWN, DEPRESSED OR HOPELESS: NOT AT ALL

## 2023-09-18 ASSESSMENT — ACTIVITIES OF DAILY LIVING (ADL)
DRESSING: INDEPENDENT
GROCERY_SHOPPING: NEEDS ASSISTANCE
MANAGING_FINANCES: NEEDS ASSISTANCE
BATHING: INDEPENDENT
DOING_HOUSEWORK: NEEDS ASSISTANCE
TAKING_MEDICATION: NEEDS ASSISTANCE

## 2023-09-18 ASSESSMENT — COLUMBIA-SUICIDE SEVERITY RATING SCALE - C-SSRS: 1. IN THE PAST MONTH, HAVE YOU WISHED YOU WERE DEAD OR WISHED YOU COULD GO TO SLEEP AND NOT WAKE UP?: NO

## 2023-09-18 NOTE — PROGRESS NOTES
Subjective   Reason for Visit: Angel Carroll is an 78 y.o. male here for a Medicare Wellness visit.     Past Medical, Surgical, and Family History reviewed and updated in chart.         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  He is doing well and offers no new complaints.Laboratory studies done shows hemoglobin of 11.5, cr of 2.28,bun of 30,GFR of  28 ml/min,mg of 1.56 and other studies are unremarkable.He is also here for medicare annual wellness exam.    Patient Care Team:  Jung Tao MD as PCP - General (Internal Medicine)  Tasneem Sandoval MD (Internal Medicine)     Review of Systems   Constitutional: Negative.    HENT: Negative.     Eyes: Negative.    Respiratory: Negative.     Cardiovascular: Negative.    Gastrointestinal: Negative.    Endocrine: Negative.    Genitourinary: Negative.    Musculoskeletal: Negative.    Skin: Negative.    Allergic/Immunologic: Negative.    Neurological: Negative.    Hematological: Negative.    Psychiatric/Behavioral: Negative.         Objective   Vitals:  /72   Pulse 61   Temp 36.5 °C (97.7 °F)   Ht 1.829 m (6')   Wt 112 kg (248 lb)   SpO2 95%   BMI 33.63 kg/m²       Physical Exam  Constitutional:       Appearance: Normal appearance. He is obese.   HENT:      Right Ear: Tympanic membrane normal.      Left Ear: Tympanic membrane  and ear canal normal.      Nose: Nose normal.   Neck:      Vascular: No carotid bruit.   Cardiovascular:      Rate and Rhythm: Normal rate.   Pulmonary:      Effort: No respiratory distress.      Breath sounds: No stridor. No wheezing.   Abdominal:      Palpations: Abdomen is soft.      Tenderness: There is no guarding or rebound.   Skin:     Coloration: Skin is not jaundiced.   Neurological:      General: No focal deficit present.      Mental Status: He is alert and oriented to person, place, and time.   Psychiatric:         Mood and Affect: Mood normal.         Assessment/Plan   Problem List Items Addressed This Visit    None    Patient is advised to in crease magnesium to one tab bid in view of low magnesium level.He will given flu vaccination for health maintenance.He will continue to follow up with cardiology and nephrology clinic regarding atrial fibrillation and chronic kidney disease. He will continue current medications and will return to clinic in 3 months for follow up visit.

## 2023-09-18 NOTE — PROGRESS NOTES
Subjective   Patient ID: Angel Carroll is a 78 y.o. male who presents for Medicare Annual Wellness Visit Initial and Abrasion.    HPI     Review of Systems    Objective   /72   Pulse 61   Temp 36.5 °C (97.7 °F)   Ht 1.829 m (6')   Wt 112 kg (248 lb)   SpO2 95%   BMI 33.63 kg/m²     Physical Exam    Assessment/Plan

## 2023-09-19 ENCOUNTER — TELEPHONE (OUTPATIENT)
Dept: PRIMARY CARE | Facility: CLINIC | Age: 78
End: 2023-09-19
Payer: MEDICARE

## 2023-09-22 ASSESSMENT — ENCOUNTER SYMPTOMS
NEUROLOGICAL NEGATIVE: 1
MUSCULOSKELETAL NEGATIVE: 1
PSYCHIATRIC NEGATIVE: 1
HEMATOLOGIC/LYMPHATIC NEGATIVE: 1
ENDOCRINE NEGATIVE: 1
RESPIRATORY NEGATIVE: 1
CONSTITUTIONAL NEGATIVE: 1
GASTROINTESTINAL NEGATIVE: 1
CARDIOVASCULAR NEGATIVE: 1
EYES NEGATIVE: 1
ALLERGIC/IMMUNOLOGIC NEGATIVE: 1

## 2023-09-27 DIAGNOSIS — Z79.01 LONG TERM (CURRENT) USE OF ANTICOAGULANTS: Primary | ICD-10-CM

## 2023-09-27 DIAGNOSIS — I48.0 PAROXYSMAL ATRIAL FIBRILLATION (MULTI): ICD-10-CM

## 2023-09-27 PROBLEM — I48.91 ATRIAL FIBRILLATION (MULTI): Status: ACTIVE | Noted: 2023-09-27

## 2023-09-27 LAB
INR IN PPP BY COAGULATION ASSAY EXTERNAL: 2.1
PROTHROMBIN TIME (PT) IN PPP BY COAGULATION ASSAY EXTERNAL: NORMAL SECONDS

## 2023-10-04 ENCOUNTER — ANTICOAGULATION - WARFARIN VISIT (OUTPATIENT)
Dept: CARDIOLOGY | Facility: CLINIC | Age: 78
End: 2023-10-04
Payer: MEDICARE

## 2023-10-04 DIAGNOSIS — Z79.01 LONG TERM (CURRENT) USE OF ANTICOAGULANTS: Primary | ICD-10-CM

## 2023-10-04 DIAGNOSIS — I48.0 PAROXYSMAL ATRIAL FIBRILLATION (MULTI): ICD-10-CM

## 2023-10-04 LAB
INR IN PPP BY COAGULATION ASSAY EXTERNAL: 3.3
PROTHROMBIN TIME (PT) IN PPP BY COAGULATION ASSAY EXTERNAL: NORMAL SECONDS

## 2023-10-04 NOTE — PROGRESS NOTES
Patient identification verified with 2 identifiers.    Location: [facility] Patient Self-Testing Program [phone]    Referring Physician: Doc Martin MD  Enrollment/ Re-enrollment date:  02/24/2024  INR Goal: 2.0-3.0  INR monitoring is per [facility] protocol.  Anticoagulation Medication: warfarin  Indication: atrial fibrillation    Subjective   Bleeding signs/symptoms:      Bruising:     Major bleeding event:    Thrombosis signs/symptoms:    Thromboembolic event:    Missed doses:    Extra doses:    Medication changes:    Dietary changes:    Change in health:    Change in activity:    Alcohol:    Other concerns:      Upcoming Surgeries:  Does the Patient Have any upcoming surgeries that require interruption in anticoagulation therapy? no  Does the patient require bridging? no      Anticoagulation Summary  As of 10/4/2023      INR goal:  2.0-3.0   TTR:  --   INR used for dosing:  3.30 (10/4/2023)   Weekly warfarin total:  19.5 mg               Assessment/Plan   Supratherapeutic     1. New dose:        2. Next INR: 1 week      Education provided to patient during the visit:  Patient instructed to call in interim with questions, concerns and changes.   Patient educated on interactions between medications and warfarin.   Patient educated on dietary consistency in vitamin k consumption.   Patient educated on affects of alcohol consumption while taking warfarin.   Patient educated on signs of bleeding/clotting.   Patient educated on compliance with dosing, follow up appointments, and prescribed plan of care.

## 2023-10-06 DIAGNOSIS — N40.0 BPH WITHOUT OBSTRUCTION/LOWER URINARY TRACT SYMPTOMS: Primary | ICD-10-CM

## 2023-10-06 DIAGNOSIS — N18.32 STAGE 3B CHRONIC KIDNEY DISEASE (MULTI): ICD-10-CM

## 2023-10-07 RX ORDER — FINASTERIDE 5 MG/1
5 TABLET, FILM COATED ORAL DAILY
Qty: 90 TABLET | Refills: 10 | Status: SHIPPED | OUTPATIENT
Start: 2023-10-07

## 2023-10-07 RX ORDER — FUROSEMIDE 40 MG/1
80 TABLET ORAL DAILY
Qty: 180 TABLET | Refills: 10 | Status: SHIPPED | OUTPATIENT
Start: 2023-10-07 | End: 2023-10-16 | Stop reason: WASHOUT

## 2023-10-11 ENCOUNTER — ANTICOAGULATION - WARFARIN VISIT (OUTPATIENT)
Dept: CARDIOLOGY | Facility: CLINIC | Age: 78
End: 2023-10-11
Payer: MEDICARE

## 2023-10-11 DIAGNOSIS — Z79.01 LONG TERM (CURRENT) USE OF ANTICOAGULANTS: ICD-10-CM

## 2023-10-11 DIAGNOSIS — I48.0 PAROXYSMAL ATRIAL FIBRILLATION (MULTI): ICD-10-CM

## 2023-10-11 LAB
INR IN PPP BY COAGULATION ASSAY EXTERNAL: 3.1
PROTHROMBIN TIME (PT) IN PPP BY COAGULATION ASSAY EXTERNAL: NORMAL SECONDS

## 2023-10-11 NOTE — PROGRESS NOTES
Patient identification verified with 2 identifiers.    Location: [facility] Patient Self-Testing Program [phone]    Referring Physician: BANDAR MATTHEWS  Enrollment/ Re-enrollment date: 2/20/2024   INR Goal: 2.0-3.0  INR monitoring is per [facility] protocol.  Anticoagulation Medication: warfarin  Indication: atrial fibrillation    Subjective   Bleeding signs/symptoms: No    Bruising: No   Major bleeding event: No  Thrombosis signs/symptoms: No  Thromboembolic event: No  Missed doses: No  Extra doses: No  Medication changes: No  Dietary changes: No  Change in health: No  Change in activity: No  Alcohol: No  Other concerns: No    Upcoming Surgeries:  Does the Patient Have any upcoming surgeries that require interruption in anticoagulation therapy? no  Does the patient require bridging? no      Anticoagulation Summary  As of 10/11/2023      INR goal:  2.0-3.0   TTR:  0.0 % (4 d)   INR used for dosing:  3.10 (10/11/2023)   Weekly warfarin total:  16.5 mg               Assessment/Plan   Supratherapeutic     1. New dose: no change  WILL EAT MORE VIT K  2. Next INR: 1 week      Education provided to patient during the visit:  Patient instructed to call in interim with questions, concerns and changes.   Patient educated on interactions between medications and warfarin.   Patient educated on dietary consistency in vitamin k consumption.   Patient educated on affects of alcohol consumption while taking warfarin.   Patient educated on signs of bleeding/clotting.   Patient educated on compliance with dosing, follow up appointments, and prescribed plan of care.

## 2023-10-16 ENCOUNTER — OFFICE VISIT (OUTPATIENT)
Dept: CARDIOLOGY | Facility: CLINIC | Age: 78
End: 2023-10-16
Payer: MEDICARE

## 2023-10-16 ENCOUNTER — HOSPITAL ENCOUNTER (OUTPATIENT)
Dept: CARDIOLOGY | Facility: CLINIC | Age: 78
Discharge: HOME | End: 2023-10-16
Payer: MEDICARE

## 2023-10-16 VITALS
SYSTOLIC BLOOD PRESSURE: 110 MMHG | HEIGHT: 72 IN | DIASTOLIC BLOOD PRESSURE: 58 MMHG | BODY MASS INDEX: 33.36 KG/M2 | WEIGHT: 246.3 LBS

## 2023-10-16 DIAGNOSIS — I49.5 SICK SINUS SYNDROME (MULTI): ICD-10-CM

## 2023-10-16 DIAGNOSIS — Z95.0 PACEMAKER: ICD-10-CM

## 2023-10-16 DIAGNOSIS — I10 ORTHOSTATIC HYPERTENSION: ICD-10-CM

## 2023-10-16 DIAGNOSIS — I95.1 ORTHOSTATIC HYPOTENSION: ICD-10-CM

## 2023-10-16 DIAGNOSIS — Z79.01 ANTICOAGULATED ON COUMADIN: ICD-10-CM

## 2023-10-16 DIAGNOSIS — E78.01 FAMILIAL HYPERCHOLESTEROLEMIA: ICD-10-CM

## 2023-10-16 DIAGNOSIS — R00.1 SINUS BRADYCARDIA: ICD-10-CM

## 2023-10-16 DIAGNOSIS — Z95.0 PRESENCE OF CARDIAC PACEMAKER: ICD-10-CM

## 2023-10-16 DIAGNOSIS — Z79.01 LONG TERM (CURRENT) USE OF ANTICOAGULANTS: ICD-10-CM

## 2023-10-16 DIAGNOSIS — I25.10 ARTERIOSCLEROTIC CARDIOVASCULAR DISEASE (ASCVD): Primary | ICD-10-CM

## 2023-10-16 DIAGNOSIS — I48.20 CHRONIC ATRIAL FIBRILLATION (MULTI): ICD-10-CM

## 2023-10-16 PROCEDURE — 3074F SYST BP LT 130 MM HG: CPT | Performed by: NURSE PRACTITIONER

## 2023-10-16 PROCEDURE — 99214 OFFICE O/P EST MOD 30 MIN: CPT | Performed by: NURSE PRACTITIONER

## 2023-10-16 PROCEDURE — 3078F DIAST BP <80 MM HG: CPT | Performed by: NURSE PRACTITIONER

## 2023-10-16 PROCEDURE — 1036F TOBACCO NON-USER: CPT | Performed by: NURSE PRACTITIONER

## 2023-10-16 PROCEDURE — 1160F RVW MEDS BY RX/DR IN RCRD: CPT | Performed by: NURSE PRACTITIONER

## 2023-10-16 PROCEDURE — 1159F MED LIST DOCD IN RCRD: CPT | Performed by: NURSE PRACTITIONER

## 2023-10-16 PROCEDURE — 93294 REM INTERROG EVL PM/LDLS PM: CPT | Performed by: INTERNAL MEDICINE

## 2023-10-16 PROCEDURE — 93296 REM INTERROG EVL PM/IDS: CPT

## 2023-10-16 PROCEDURE — 1125F AMNT PAIN NOTED PAIN PRSNT: CPT | Performed by: NURSE PRACTITIONER

## 2023-10-16 PROCEDURE — 99214 OFFICE O/P EST MOD 30 MIN: CPT | Mod: PO | Performed by: NURSE PRACTITIONER

## 2023-10-16 NOTE — PROGRESS NOTES
Subjective   Angel Carroll is a 78 y.o. male.    Chief Complaint:  NONE    HPI  Mr. Carroll presents for a 6 month follow-up appointment.  He is accompanied by his wife and daughter.  Seen in collaboration with Dr. Martin.  Glen indicates that he is feeling quite well.  He continues to have an occasional lightheaded spell however nothing as significant as prior to his pacemaker placement.  He has been able to resume a lot of his activities and feels that he is doing well.  He is compliant with his medication.  He has no symptoms referable to angina.  He does have some easy bruisability likely due to his chronic anticoagulation and aging thin skin.    Review of Systems   Constitutional: Negative.   Cardiovascular: Negative.    Hematologic/Lymphatic: Bruises/bleeds easily.   Neurological:  Positive for light-headedness.   All other systems reviewed and are negative.      Objective   Constitutional:       Appearance: Healthy appearance. Not in distress.   Eyes:      Pupils: Pupils are equal, round, and reactive to light.   Neck:      Vascular: No JVR. JVD normal.   Pulmonary:      Effort: Pulmonary effort is normal.      Breath sounds: Normal breath sounds. No wheezing. No rhonchi. No rales.   Chest:      Chest wall: Not tender to palpatation.   Cardiovascular:      Normal rate. Regular rhythm. Normal S1. Normal S2.       Murmurs: There is no murmur.      No gallop.  No click. No rub.   Pulses:     Intact distal pulses.   Edema:     Peripheral edema absent.   Abdominal:      General: Bowel sounds are normal.      Palpations: Abdomen is soft.      Tenderness: There is no abdominal tenderness.   Musculoskeletal: Normal range of motion.         General: No tenderness.      Cervical back: Normal range of motion. Skin:     General: Skin is warm and dry.   Neurological:      General: No focal deficit present.      Mental Status: Alert and oriented to person, place and time.   Psychiatric:         Attention and Perception:  Attention normal.         Behavior: Behavior is cooperative.         Lab Review:   Lab Results   Component Value Date     (H) 09/01/2023    K 4.7 09/01/2023     09/01/2023    CO2 32 09/01/2023    BUN 30 (H) 09/01/2023    CREATININE 2.28 (H) 09/01/2023    GLUCOSE 98 09/01/2023    CALCIUM 9.1 09/01/2023     Lab Results   Component Value Date    WBC 4.2 (L) 09/01/2023    HGB 11.5 (L) 09/01/2023    HCT 38.6 (L) 09/01/2023    MCV 96 09/01/2023     09/01/2023     Lab Results   Component Value Date    CHOL 117 (L) 11/14/2022    TRIG 81 11/14/2022    HDL 31 (L) 11/14/2022       Assessment/Plan   In summary, Mr. Carroll is a pleasant 78-year-old male with a past medical history significant for hypertension, hyperlipidemia, coronary artery disease s/p multiple PCI a low normal LVEF of 50 to 55% by echo 11/2022 patent stents by repeat cath 12/2022, atrial fibrillation with RVR s/p ablation, PPM secondary to sick sinus syndrome 1/2023, CVA in the setting of subtherapeutic INR with a near subtotal occlusion of the right ICA and carotid endarterectomy s/p bovine patch angioplasty 3/2021.  He presents for 6-month follow-up feeling quite well.  He has only an occasional lightheaded episode.  He has resumed some previous activity.  Vital signs are stable.  He has been able to obtain and perform home INR monitoring.  Although he is having some easy bruisability he understands the need for chronic anticoagulation.  We reviewed recent labs as well as diagnostic testing.  His medications will remain unchanged.  We will plan on seeing him in another 6 months.  Both he and his family members know to call in the interim with any concerns or questions.

## 2023-10-17 ASSESSMENT — ENCOUNTER SYMPTOMS
BRUISES/BLEEDS EASILY: 1
CONSTITUTIONAL NEGATIVE: 1
LIGHT-HEADEDNESS: 1
CARDIOVASCULAR NEGATIVE: 1

## 2023-10-18 ENCOUNTER — ANTICOAGULATION - WARFARIN VISIT (OUTPATIENT)
Dept: CARDIOLOGY | Facility: CLINIC | Age: 78
End: 2023-10-18
Payer: MEDICARE

## 2023-10-18 DIAGNOSIS — Z79.01 LONG TERM (CURRENT) USE OF ANTICOAGULANTS: ICD-10-CM

## 2023-10-18 DIAGNOSIS — I48.0 PAROXYSMAL ATRIAL FIBRILLATION (MULTI): ICD-10-CM

## 2023-10-18 LAB
INR IN PPP BY COAGULATION ASSAY EXTERNAL: 2.3
PROTHROMBIN TIME (PT) IN PPP BY COAGULATION ASSAY EXTERNAL: NORMAL SECONDS

## 2023-10-18 NOTE — PROGRESS NOTES
Patient identification verified with 2 identifiers.    Location: [facility] Patient Self-Testing Program [phone]    Referring Physician: CASSIE  Enrollment/ Re-enrollment date: 24   INR Goal: 2.0-3.0  INR monitoring is per [facility] protocol.  Anticoagulation Medication: warfarin  Indication: atrial fibrillation    Subjective   Bleeding signs/symptoms: No    Bruising: No   Major bleeding event: No  Thrombosis signs/symptoms: No  Thromboembolic event: No  Missed doses: No  Extra doses: No  Medication changes: No  Dietary changes: No  Change in health: No  Change in activity: No  Alcohol: No  Other concerns: No    Upcoming Surgeries:  Does the Patient Have any upcoming surgeries that require interruption in anticoagulation therapy? no  Does the patient require bridging? no      Anticoagulation Summary  As of 10/18/2023      INR goal:  2.0-3.0   TTR:  55.7 % (1.6 wk)   INR used for dosin.30 (10/18/2023)   Weekly warfarin total:  16.5 mg               Assessment/Plan   Therapeutic     1. New dose: no change    2. Next INR: 1 week      Education provided to patient during the visit:  Patient instructed to call in interim with questions, concerns and changes.

## 2023-10-23 PROBLEM — D31.31 CHOROIDAL NEVUS, RIGHT EYE: Status: ACTIVE | Noted: 2023-10-23

## 2023-10-23 NOTE — PROGRESS NOTES
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 choroidal lesion at ST ARC - no visible on OCT (observe)  - OCT show area of INL thinning both eyes denoting previous episodes of ischemia (patient is cardiac and hypertensive on treatment)  - Observe  - RTC 6 month    Choroidal nevus, right eye  Flat pigmented lesion superior to ST ARC - no druse, OP or SRF  Stable  Observe    Amblyopia suspect, right eyeH53.041  -patient reports hx of strab sx 2 seperate times as a child (1 after trauma to the eye)  -reports his vision has been reduced OD longstanding  -BCVA overall stable OD 20/70 OS 20/25

## 2023-10-24 ENCOUNTER — OFFICE VISIT (OUTPATIENT)
Dept: OPHTHALMOLOGY | Facility: CLINIC | Age: 78
End: 2023-10-24
Payer: MEDICARE

## 2023-10-24 DIAGNOSIS — H53.001 AMBLYOPIA OF RIGHT EYE: ICD-10-CM

## 2023-10-24 DIAGNOSIS — H35.82 OCULAR ISCHEMIC SYNDROME: Primary | ICD-10-CM

## 2023-10-24 DIAGNOSIS — I25.5 ISCHEMIC CARDIOMYOPATHY: Primary | ICD-10-CM

## 2023-10-24 DIAGNOSIS — D31.31 CHOROIDAL NEVUS, RIGHT EYE: ICD-10-CM

## 2023-10-24 PROCEDURE — 92134 CPTRZ OPH DX IMG PST SGM RTA: CPT | Mod: BILATERAL PROCEDURE

## 2023-10-24 PROCEDURE — 99213 OFFICE O/P EST LOW 20 MIN: CPT

## 2023-10-24 ASSESSMENT — ENCOUNTER SYMPTOMS
GASTROINTESTINAL NEGATIVE: 0
EYES NEGATIVE: 0
PSYCHIATRIC NEGATIVE: 0
ALLERGIC/IMMUNOLOGIC NEGATIVE: 0
NEUROLOGICAL NEGATIVE: 0
MUSCULOSKELETAL NEGATIVE: 0
HEMATOLOGIC/LYMPHATIC NEGATIVE: 0
CARDIOVASCULAR NEGATIVE: 0
CONSTITUTIONAL NEGATIVE: 0
RESPIRATORY NEGATIVE: 0
ENDOCRINE NEGATIVE: 0

## 2023-10-24 ASSESSMENT — CONF VISUAL FIELD
OS_NORMAL: 1
OS_INFERIOR_TEMPORAL_RESTRICTION: 0
OD_INFERIOR_NASAL_RESTRICTION: 0
OD_INFERIOR_TEMPORAL_RESTRICTION: 0
OS_INFERIOR_NASAL_RESTRICTION: 0
OS_SUPERIOR_TEMPORAL_RESTRICTION: 0
OS_SUPERIOR_NASAL_RESTRICTION: 0
OD_SUPERIOR_NASAL_RESTRICTION: 0
OD_NORMAL: 1
OD_SUPERIOR_TEMPORAL_RESTRICTION: 0

## 2023-10-24 ASSESSMENT — EXTERNAL EXAM - LEFT EYE: OS_EXAM: NORMAL

## 2023-10-24 ASSESSMENT — VISUAL ACUITY
OS_CC: 20/30
OD_CC: 20/80
METHOD: SNELLEN - LINEAR
OD_PH_CC: 20/70

## 2023-10-24 ASSESSMENT — EXTERNAL EXAM - RIGHT EYE: OD_EXAM: NORMAL

## 2023-10-24 ASSESSMENT — SLIT LAMP EXAM - LIDS
COMMENTS: NORMAL
COMMENTS: NORMAL

## 2023-10-24 ASSESSMENT — CUP TO DISC RATIO
OS_RATIO: 0.3
OD_RATIO: 0.3

## 2023-10-24 ASSESSMENT — TONOMETRY
OS_IOP_MMHG: 7
OD_IOP_MMHG: 8
IOP_METHOD: GOLDMANN APPLANATION

## 2023-10-25 ENCOUNTER — ANTICOAGULATION - WARFARIN VISIT (OUTPATIENT)
Dept: CARDIOLOGY | Facility: CLINIC | Age: 78
End: 2023-10-25
Payer: MEDICARE

## 2023-10-25 DIAGNOSIS — I48.0 PAROXYSMAL ATRIAL FIBRILLATION (MULTI): ICD-10-CM

## 2023-10-25 DIAGNOSIS — Z79.01 LONG TERM (CURRENT) USE OF ANTICOAGULANTS: Primary | ICD-10-CM

## 2023-10-27 ENCOUNTER — ANTICOAGULATION - WARFARIN VISIT (OUTPATIENT)
Dept: CARDIOLOGY | Facility: CLINIC | Age: 78
End: 2023-10-27
Payer: MEDICARE

## 2023-10-27 DIAGNOSIS — Z79.01 LONG TERM (CURRENT) USE OF ANTICOAGULANTS: Primary | ICD-10-CM

## 2023-10-27 DIAGNOSIS — I48.0 PAROXYSMAL ATRIAL FIBRILLATION (MULTI): ICD-10-CM

## 2023-10-27 LAB
INR IN PPP BY COAGULATION ASSAY EXTERNAL: 1.7 (ref 2–3)
PROTHROMBIN TIME (PT) IN PPP BY COAGULATION ASSAY EXTERNAL: ABNORMAL SECONDS

## 2023-10-27 NOTE — PROGRESS NOTES
Patient identification verified with 2 identifiers.    Location: [facility] Patient Self-Testing Program [phone]    Referring Physician: CASSIE  Enrollment/ Re-enrollment date: 24   INR Goal: 2.0-3.0  INR monitoring is per [facility] protocol.  Anticoagulation Medication: warfarin  Indication: atrial fibrillation    Subjective   Bleeding signs/symptoms: No    Bruising: No   Major bleeding event: No  Thrombosis signs/symptoms: No  Thromboembolic event: No  Missed doses: No  Extra doses: No  Medication changes: No  Dietary changes: No  Change in health: No  Change in activity: No  Alcohol: No  Other concerns: No    Upcoming Surgeries:  Does the Patient Have any upcoming surgeries that require interruption in anticoagulation therapy? no  Does the patient require bridging? no      Anticoagulation Summary  As of 10/27/2023      INR goal:  2.0-3.0   TTR:  53.1 % (2.9 wk)   INR used for dosin.70 (10/27/2023)   Weekly warfarin total:  18 mg               Assessment/Plan   Subtherapeutic     1. New dose:  Increase total weekly dose by approximately 10%     2. Next INR: 1 week      Education provided to patient during the visit:  Patient instructed to call in interim with questions, concerns and changes.   Patient educated on dietary consistency in vitamin k consumption.   Patient educated on signs of bleeding/clotting.   Patient educated on compliance with dosing, follow up appointments, and prescribed plan of care.

## 2023-11-03 ENCOUNTER — ANTICOAGULATION - WARFARIN VISIT (OUTPATIENT)
Dept: CARDIOLOGY | Facility: CLINIC | Age: 78
End: 2023-11-03
Payer: MEDICARE

## 2023-11-03 DIAGNOSIS — Z79.01 LONG TERM (CURRENT) USE OF ANTICOAGULANTS: Primary | ICD-10-CM

## 2023-11-03 DIAGNOSIS — I48.0 PAROXYSMAL ATRIAL FIBRILLATION (MULTI): ICD-10-CM

## 2023-11-03 LAB
INR IN PPP BY COAGULATION ASSAY EXTERNAL: 2.7
PROTHROMBIN TIME (PT) IN PPP BY COAGULATION ASSAY EXTERNAL: NORMAL SECONDS

## 2023-11-03 NOTE — PROGRESS NOTES
Patient identification verified with 2 identifiers.    Location: [facility] Patient Self-Testing Program [phone]    Referring Physician: DR. BANDAR MATTHEWS   Enrollment/ Re-enrollment date: 24   INR Goal: 2.0-3.0  INR monitoring is per [facility] protocol.  Anticoagulation Medication: warfarin  Indication: atrial fibrillation    Subjective   Bleeding signs/symptoms: No    Bruising: No   Major bleeding event: No  Thrombosis signs/symptoms: No  Thromboembolic event: No  Missed doses: No  Extra doses: No  Medication changes: No  Dietary changes: No  Change in health: No  Change in activity: No  Alcohol: No  Other concerns: No    Upcoming Surgeries:  Does the Patient Have any upcoming surgeries that require interruption in anticoagulation therapy? no  Does the patient require bridging? no      Anticoagulation Summary  As of 11/3/2023      INR goal:  2.0-3.0   TTR:  57.5 % (3.9 wk)   INR used for dosin.70 (11/3/2023)   Weekly warfarin total:  18 mg               Assessment/Plan   Therapeutic     1. New dose: no change    2. Next INR: 1 week    Received faxed INR self-test results and called patient. Pt identification verified with 2 pt identifiers. Current dose schedule reviewed with patient, patient verbalized understanding. Pt instructed to call in interim with questions, concerns or changes.  TEA Valadez RN    Education provided to patient during the visit:  Patient instructed to call in interim with questions, concerns and changes.

## 2023-11-08 DIAGNOSIS — N40.0 BPH WITHOUT OBSTRUCTION/LOWER URINARY TRACT SYMPTOMS: Primary | ICD-10-CM

## 2023-11-08 RX ORDER — TAMSULOSIN HYDROCHLORIDE 0.4 MG/1
0.4 CAPSULE ORAL NIGHTLY
Qty: 90 CAPSULE | Refills: 10 | Status: SHIPPED | OUTPATIENT
Start: 2023-11-08 | End: 2024-03-18 | Stop reason: SDUPTHER

## 2023-11-10 ENCOUNTER — ANTICOAGULATION - WARFARIN VISIT (OUTPATIENT)
Dept: CARDIOLOGY | Facility: CLINIC | Age: 78
End: 2023-11-10
Payer: MEDICARE

## 2023-11-10 DIAGNOSIS — Z79.01 LONG TERM (CURRENT) USE OF ANTICOAGULANTS: Primary | ICD-10-CM

## 2023-11-10 LAB
INR IN PPP BY COAGULATION ASSAY EXTERNAL: 3.7
PROTHROMBIN TIME (PT) IN PPP BY COAGULATION ASSAY EXTERNAL: NORMAL SECONDS

## 2023-11-10 NOTE — PROGRESS NOTES
Patient identification verified with 2 identifiers.    Location: [facility] Patient Self-Testing Program [phone]    Referring Physician: DR. BANDAR MATTHEWS   Enrollment/ Re-enrollment date: 2/20/24   INR Goal: 2.0-3.0  INR monitoring is per [facility] protocol.  Anticoagulation Medication: warfarin  Indication: atrial fibrillation    Subjective   Bleeding signs/symptoms: No    Bruising: No   Major bleeding event: No  Thrombosis signs/symptoms: No  Thromboembolic event: No  Missed doses: No  Extra doses: No  Medication changes: No  Dietary changes: Yes  Change in health: No  Change in activity: No  Alcohol: No  Other concerns: No    Upcoming Surgeries:  Does the Patient Have any upcoming surgeries that require interruption in anticoagulation therapy? no  Does the patient require bridging? no      Anticoagulation Summary  As of 11/10/2023      INR goal:  2.0-3.0   TTR:  57.5 % (3.9 wk)   INR used for dosing:     Weekly warfarin total:  18 mg               Assessment/Plan   Supra Therapeutic  inr 3.7 today     1. New dose:  dose decreased , hold dose today 11/10 only  2. Next INR: 1 week    Received faxed INR self-test results and called patient. Current dose schedule reviewed with patients daughter  and read back correctly. Daughter instructed to call in interim with questions, concerns or changes.      Education provided to patient during the visit:  Patient instructed to call in interim with questions, concerns and changes.   Patient educated on dietary consistency in vitamin k consumption.   Patient educated on signs of bleeding/clotting.   Patient educated on compliance with dosing, follow up appointments, and prescribed plan of care.

## 2023-11-14 ENCOUNTER — OFFICE VISIT (OUTPATIENT)
Dept: UROLOGY | Facility: CLINIC | Age: 78
End: 2023-11-14
Payer: MEDICARE

## 2023-11-14 VITALS — HEIGHT: 72 IN | WEIGHT: 238 LBS | BODY MASS INDEX: 32.23 KG/M2 | TEMPERATURE: 97.3 F

## 2023-11-14 DIAGNOSIS — N21.0 BLADDER STONE: ICD-10-CM

## 2023-11-14 DIAGNOSIS — N21.0 BLADDER STONE: Primary | ICD-10-CM

## 2023-11-14 DIAGNOSIS — N20.0 KIDNEY STONE: Primary | ICD-10-CM

## 2023-11-14 PROCEDURE — 87086 URINE CULTURE/COLONY COUNT: CPT

## 2023-11-14 PROCEDURE — 1159F MED LIST DOCD IN RCRD: CPT | Performed by: UROLOGY

## 2023-11-14 PROCEDURE — 3074F SYST BP LT 130 MM HG: CPT | Performed by: UROLOGY

## 2023-11-14 PROCEDURE — 1036F TOBACCO NON-USER: CPT | Performed by: UROLOGY

## 2023-11-14 PROCEDURE — 1125F AMNT PAIN NOTED PAIN PRSNT: CPT | Performed by: UROLOGY

## 2023-11-14 PROCEDURE — 1160F RVW MEDS BY RX/DR IN RCRD: CPT | Performed by: UROLOGY

## 2023-11-14 PROCEDURE — 99213 OFFICE O/P EST LOW 20 MIN: CPT | Performed by: UROLOGY

## 2023-11-14 PROCEDURE — 3078F DIAST BP <80 MM HG: CPT | Performed by: UROLOGY

## 2023-11-14 ASSESSMENT — PAIN SCALES - GENERAL: PAINLEVEL: 6

## 2023-11-14 NOTE — PROGRESS NOTES
Subjective   Angel Carroll is a 78 y.o. male with history of nephrolithiasis s/p right ULLS on 02/11/2016, gross hematuria with nephrolithiasis found on workup in 03/2021, and BPH. Patient presents today due to episode of gross hematuria a couple of days ago associated with right flank pain. He notes his flank pain is well controlled with tylenol. He believes he has a stone. He denies any dysuria, nausea, vomiting, fevers or chills.     Objective   Past Medical History:   Diagnosis Date    Atherosclerotic heart disease of native coronary artery without angina pectoris 12/14/2022    Arteriosclerotic cardiovascular disease (ASCVD)    Benign neoplasm of meninges, unspecified (CMS/HCC)     Meningioma    Benign prostatic hyperplasia without lower urinary tract symptoms 02/28/2022    BPH without obstruction/lower urinary tract symptoms    Body mass index (BMI) 33.0-33.9, adult 07/02/2021    BMI 33.0-33.9,adult    Body mass index (BMI) 34.0-34.9, adult 11/09/2021    BMI 34.0-34.9,adult    Body mass index (BMI) 34.0-34.9, adult 09/08/2021    BMI 34.0-34.9,adult    Body mass index (BMI) 35.0-35.9, adult 12/29/2021    BMI 35.0-35.9,adult    Body mass index (BMI) 35.0-35.9, adult 02/28/2022    BMI 35.0-35.9,adult    Elevated blood-pressure reading, without diagnosis of hypertension     Prehypertension    Encounter for immunization 10/27/2015    Need for prophylactic vaccination and inoculation against influenza    Hyperlipidemia, unspecified 11/15/2021    Hyperlipidemia    Other conditions influencing health status     Nephrolithiasis    Personal history of other diseases of the musculoskeletal system and connective tissue     History of tendinitis    Personal history of other drug therapy     History of influenza vaccination    Personal history of other specified conditions 10/25/2022    History of bradycardia    Vitamin D deficiency, unspecified 10/08/2020    Vitamin D deficiency     Past Surgical History:   Procedure  Laterality Date    BACK SURGERY  04/18/2016    Back Surgery    COLONOSCOPY  07/03/2013    Complete Colonoscopy    CORONARY ANGIOPLASTY WITH STENT PLACEMENT  08/18/2020    Cath Stent Placement    CT ANGIO NECK  9/25/2021    CT NECK ANGIO W AND WO IV CONTRAST 9/25/2021 [facility]    CT HEAD ANGIO W AND WO IV CONTRAST  9/25/2021    CT HEAD ANGIO W AND WO IV CONTRAST 9/25/2021 [facility]    EYE SURGERY  04/03/2013    Eye Surgery    KNEE ARTHROSCOPY W/ DEBRIDEMENT  07/03/2013    Arthroscopy Knee Right    KNEE ARTHROSCOPY W/ DEBRIDEMENT  07/03/2013    Arthroscopy Knee Left    LITHOTRIPSY  07/22/2013    Renal Lithotripsy    MR HEAD ANGIO WO IV CONTRAST  3/23/2021    MR HEAD ANGIO WO IV CONTRAST [facility]    MR NECK ANGIO WO IV CONTRAST  3/23/2021    MR NECK ANGIO WO IV CONTRAST [facility]    OTHER SURGICAL HISTORY  04/03/2013    Cardiac Cath Procedure Outcome: Successful    OTHER SURGICAL HISTORY  04/03/2013    Neuroplasty With Transposition Of Ulnar Nerve - At Elbow    OTHER SURGICAL HISTORY  04/03/2013    General Surgery    OTHER SURGICAL HISTORY  06/11/2019    Knee replacement    OTHER SURGICAL HISTORY  07/22/2013    Cystoscopy With Insertion Of Ureteral Stent Bilateral    OTHER SURGICAL HISTORY  07/03/2013    Wrist Carpectomy    OTHER SURGICAL HISTORY  07/03/2013    Brain Surgery    OTHER SURGICAL HISTORY  07/03/2013    Ulnar Osteotomy    TONSILLECTOMY  04/03/2013    Tonsillectomy    TOTAL KNEE ARTHROPLASTY  08/03/2016    Total Knee Arthroplasty     Current Outpatient Medications on File Prior to Visit   Medication Sig Dispense Refill    amiodarone (Pacerone) 200 mg tablet Take by mouth once every 24 hours.      aspirin 81 mg EC tablet Take 1 tablet (81 mg) by mouth once daily.      atorvastatin (Lipitor) 20 mg tablet TAKE 1 TABLET EVERY DAY 90 tablet 0    cyanocobalamin (Vitamin B-12) 500 mcg tablet Take 1 tablet (500 mcg) by mouth once daily.      ergocalciferol (Vitamin D-2)  1.25 MG (14899 UT) capsule Take 1 capsule (50,000 Units) by mouth 1 (one) time per week. 8 capsule 0    fenofibrate (Tricor) 48 mg tablet Take 1 tablet (48 mg) by mouth once daily. 90 tablet 1    finasteride (Proscar) 5 mg tablet TAKE 1 TABLET EVERY DAY 90 tablet 10    folic acid (Folvite) 1 mg tablet TAKE 1 TABLET EVERY DAY 90 tablet 3    furosemide (Lasix) 20 mg tablet Take 1 tablet (20 mg) by mouth once daily. (Patient taking differently: Take 1 tablet (20 mg) by mouth once daily. 20MG TWICE A DAY) 90 tablet 1    gabapentin (Neurontin) 100 mg capsule TAKE 1 CAPSULE AT BEDTIME 90 capsule 1    Klor-Con M20 20 mEq ER tablet Take 1 tablet (20 mEq) by mouth once daily. 1/2 TAB TWICE A DAY      magnesium oxide (Mag-Ox) 400 mg tablet Take 1 tablet (400 mg) by mouth 2 times a day. 60 tablet 1    metoprolol succinate XL (Toprol-XL) 50 mg 24 hr tablet Take 1 tablet (50 mg) by mouth once daily. 30 tablet 3    pantoprazole (ProtoNix) 40 mg EC tablet TAKE 1 TABLET EVERY DAY 90 tablet 1    PARoxetine (Paxil) 20 mg tablet TAKE 1 TABLET EVERY DAY 90 tablet 0    tamsulosin (Flomax) 0.4 mg 24 hr capsule TAKE 1 CAPSULE AT BEDTIME 90 capsule 10    warfarin (Coumadin) 3 mg tablet       [DISCONTINUED] tamsulosin (Flomax) 0.4 mg 24 hr capsule        No current facility-administered medications on file prior to visit.     Allergies   Allergen Reactions    Other Unknown     Anti-Inflammatories    Ranolazine Unknown    Cyclobenzaprine Hallucinations, Unknown and Psychosis       Temp 36.3 °C (97.3 °F)   Ht 1.829 m (6')   Wt 108 kg (238 lb)   BMI 32.28 kg/m²       Assessment/Plan     Diagnoses and all orders for this visit:  Kidney stone  -     Creatinine, Serum; Future  -     CT kidney w and wo IV contrast; Future    History of renal stone  Right flank pain and gross hematuria    We will obtain a stat CT A&P.     Follow up virtually to review.     All questions were answered to the patient's satisfaction. Patient agrees with the plan  and wishes to proceed. Follow-up will be scheduled appropriately.     Scribed for Dr. Moe by Kathleen Barrera. I , Dr Moe, have personally reviewed and agreed with the information entered by the Virtual Scribe.

## 2023-11-15 ENCOUNTER — HOSPITAL ENCOUNTER (OUTPATIENT)
Dept: RADIOLOGY | Facility: HOSPITAL | Age: 78
Discharge: HOME | End: 2023-11-15
Payer: MEDICARE

## 2023-11-15 DIAGNOSIS — N20.0 KIDNEY STONE: ICD-10-CM

## 2023-11-15 PROCEDURE — 74176 CT ABD & PELVIS W/O CONTRAST: CPT

## 2023-11-16 LAB — BACTERIA UR CULT: ABNORMAL

## 2023-11-17 ENCOUNTER — ANTICOAGULATION - WARFARIN VISIT (OUTPATIENT)
Dept: CARDIOLOGY | Facility: CLINIC | Age: 78
End: 2023-11-17
Payer: MEDICARE

## 2023-11-17 DIAGNOSIS — Z79.01 LONG TERM (CURRENT) USE OF ANTICOAGULANTS: Primary | ICD-10-CM

## 2023-11-17 DIAGNOSIS — N21.0 BLADDER STONE: ICD-10-CM

## 2023-11-17 DIAGNOSIS — I48.0 PAROXYSMAL ATRIAL FIBRILLATION (MULTI): ICD-10-CM

## 2023-11-17 LAB
INR IN PPP BY COAGULATION ASSAY EXTERNAL: 4 (ref 2–3)
PROTHROMBIN TIME (PT) IN PPP BY COAGULATION ASSAY EXTERNAL: ABNORMAL SECONDS

## 2023-11-17 NOTE — PROGRESS NOTES
Patient identification verified with 2 identifiers.    Location: [facility] Patient Self-Testing Program [phone]    Referring Physician: DR. BANDAR MATTHEWS   Enrollment/ Re-enrollment date: 24   INR Goal: 2.0-3.0  INR monitoring is per [facility] protocol.  Anticoagulation Medication: warfarin  Indication: atrial fibrillation    Subjective   Bleeding signs/symptoms: No    Bruising: No   Major bleeding event: No  Thrombosis signs/symptoms: No  Thromboembolic event: No  Missed doses: No  Extra doses: No  Medication changes: No  Dietary changes: Yes  Change in health: No  Change in activity: No  Alcohol: No  Other concerns: No    Upcoming Surgeries:  Does the Patient Have any upcoming surgeries that require interruption in anticoagulation therapy? no  Does the patient require bridging? no      Anticoagulation Summary  As of 2023      INR goal:  2.0-3.0   TTR:  43.0 % (1.4 mo)   INR used for dosin.00 (2023)   Weekly warfarin total:  13.5 mg               Assessment/Plan   Supra Therapeutic  inr 4.0 today     1. New dose:  dose decreased , hold dose x 2 days  and .  2. Next INR:  3 days    Pt states understanding of dosing change.  Will hold x 2 days and retest on Monday.      Education provided to patient during the visit:  Patient instructed to call in interim with questions, concerns and changes.   Patient educated on dietary consistency in vitamin k consumption.   Patient educated on signs of bleeding/clotting.   Patient educated on compliance with dosing, follow up appointments, and prescribed plan of care.

## 2023-11-20 ENCOUNTER — ANTICOAGULATION - WARFARIN VISIT (OUTPATIENT)
Dept: CARDIOLOGY | Facility: CLINIC | Age: 78
End: 2023-11-20
Payer: MEDICARE

## 2023-11-20 DIAGNOSIS — Z79.01 LONG TERM (CURRENT) USE OF ANTICOAGULANTS: ICD-10-CM

## 2023-11-20 DIAGNOSIS — I48.0 PAROXYSMAL ATRIAL FIBRILLATION (MULTI): ICD-10-CM

## 2023-11-20 LAB
INR IN PPP BY COAGULATION ASSAY EXTERNAL: 2.4
PROTHROMBIN TIME (PT) IN PPP BY COAGULATION ASSAY EXTERNAL: NORMAL SECONDS

## 2023-11-20 NOTE — PROGRESS NOTES
Patient identification verified with 2 identifiers.    Location: [facility] Patient Self-Testing Program [phone]    Referring Physician: DR. MATTHEWS  Enrollment/ Re-enrollment date: 2024   INR Goal: 2.0-3.0  INR monitoring is per [facility] protocol.  Anticoagulation Medication: warfarin  Indication: Atrial Fibrillation/Atrial Flutter    Subjective   Bleeding signs/symptoms: No    Bruising: No   Major bleeding event: No  Thrombosis signs/symptoms: No  Thromboembolic event: No  Missed doses: No  Extra doses: No  Medication changes: No  Dietary changes: No  Change in health: No  Change in activity: No  Alcohol: No  Other concerns: No    Upcoming Surgeries:  Does the Patient Have any upcoming surgeries that require interruption in anticoagulation therapy? yes  Does the patient require bridging? no      Anticoagulation Summary  As of 2023      INR goal:  2.0-3.0   TTR:  42.6 % (1.5 mo)   INR used for dosin.40 (2023)   Weekly warfarin total:  12 mg               Assessment/Plan   Therapeutic     1. New dose DECREASED BY 11% DUE TO INR 4.0 AT LAST APPT  2. Next INR: 1 week      Education provided to patient during the visit:  Patient instructed to call in interim with questions, concerns and changes.   Patient educated on interactions between medications and warfarin.   Patient educated on dietary consistency in vitamin k consumption.   Patient educated on affects of alcohol consumption while taking warfarin.   Patient educated on signs of bleeding/clotting.   Patient educated on compliance with dosing, follow up appointments, and prescribed plan of care.

## 2023-11-21 ENCOUNTER — TELEMEDICINE (OUTPATIENT)
Dept: UROLOGY | Facility: CLINIC | Age: 78
End: 2023-11-21
Payer: MEDICARE

## 2023-11-21 DIAGNOSIS — N20.0 CALCULUS OF KIDNEY: Primary | ICD-10-CM

## 2023-11-21 PROCEDURE — 99214 OFFICE O/P EST MOD 30 MIN: CPT | Performed by: UROLOGY

## 2023-11-21 NOTE — PROGRESS NOTES
Subjective     This visit was completed via telemedicine. All issues as below were discussed and addressed but no physical exam was performed unless allowed by visual confirmation. If it was felt that the patient should be evaluated in clinic, then they were directed there. Patient verbally consented to visit.    Angel Carroll is a 78 y.o. male with history of nephrolithiasis s/p right ULLS on 02/11/2016, gross hematuria with nephrolithiasis found on workup in 03/2021, and BPH. During his last visit he had complaints of gross hematuria associated with right flank pain. He notes his flank pain is well controlled with tylenol. CT A&P was obtained. He presents today to review. He denies any dysuria, nausea, vomiting, fevers or chills.          Past Medical History:   Diagnosis Date    Atherosclerotic heart disease of native coronary artery without angina pectoris 12/14/2022    Arteriosclerotic cardiovascular disease (ASCVD)    Benign neoplasm of meninges, unspecified (CMS/HCC)     Meningioma    Benign prostatic hyperplasia without lower urinary tract symptoms 02/28/2022    BPH without obstruction/lower urinary tract symptoms    Body mass index (BMI) 33.0-33.9, adult 07/02/2021    BMI 33.0-33.9,adult    Body mass index (BMI) 34.0-34.9, adult 11/09/2021    BMI 34.0-34.9,adult    Body mass index (BMI) 34.0-34.9, adult 09/08/2021    BMI 34.0-34.9,adult    Body mass index (BMI) 35.0-35.9, adult 12/29/2021    BMI 35.0-35.9,adult    Body mass index (BMI) 35.0-35.9, adult 02/28/2022    BMI 35.0-35.9,adult    Elevated blood-pressure reading, without diagnosis of hypertension     Prehypertension    Encounter for immunization 10/27/2015    Need for prophylactic vaccination and inoculation against influenza    Hyperlipidemia, unspecified 11/15/2021    Hyperlipidemia    Other conditions influencing health status     Nephrolithiasis    Personal history of other diseases of the musculoskeletal system and connective tissue     History of  tendinitis    Personal history of other drug therapy     History of influenza vaccination    Personal history of other specified conditions 10/25/2022    History of bradycardia    Vitamin D deficiency, unspecified 10/08/2020    Vitamin D deficiency     Past Surgical History:   Procedure Laterality Date    BACK SURGERY  04/18/2016    Back Surgery    COLONOSCOPY  07/03/2013    Complete Colonoscopy    CORONARY ANGIOPLASTY WITH STENT PLACEMENT  08/18/2020    Cath Stent Placement    CT ANGIO NECK  9/25/2021    CT NECK ANGIO W AND WO IV CONTRAST 9/25/2021 [facility]    CT HEAD ANGIO W AND WO IV CONTRAST  9/25/2021    CT HEAD ANGIO W AND WO IV CONTRAST 9/25/2021 [facility]    EYE SURGERY  04/03/2013    Eye Surgery    KNEE ARTHROSCOPY W/ DEBRIDEMENT  07/03/2013    Arthroscopy Knee Right    KNEE ARTHROSCOPY W/ DEBRIDEMENT  07/03/2013    Arthroscopy Knee Left    LITHOTRIPSY  07/22/2013    Renal Lithotripsy    MR HEAD ANGIO WO IV CONTRAST  3/23/2021    MR HEAD ANGIO WO IV CONTRAST [facility]    MR NECK ANGIO WO IV CONTRAST  3/23/2021    MR NECK ANGIO WO IV CONTRAST [facility]    OTHER SURGICAL HISTORY  04/03/2013    Cardiac Cath Procedure Outcome: Successful    OTHER SURGICAL HISTORY  04/03/2013    Neuroplasty With Transposition Of Ulnar Nerve - At Elbow    OTHER SURGICAL HISTORY  04/03/2013    General Surgery    OTHER SURGICAL HISTORY  06/11/2019    Knee replacement    OTHER SURGICAL HISTORY  07/22/2013    Cystoscopy With Insertion Of Ureteral Stent Bilateral    OTHER SURGICAL HISTORY  07/03/2013    Wrist Carpectomy    OTHER SURGICAL HISTORY  07/03/2013    Brain Surgery    OTHER SURGICAL HISTORY  07/03/2013    Ulnar Osteotomy    TONSILLECTOMY  04/03/2013    Tonsillectomy    TOTAL KNEE ARTHROPLASTY  08/03/2016    Total Knee Arthroplasty     Family History   Problem Relation Name Age of Onset    Diabetes Mother      Emphysema Father      Heart disease Father       Current Outpatient  Medications   Medication Sig Dispense Refill    amiodarone (Pacerone) 200 mg tablet Take by mouth once every 24 hours.      aspirin 81 mg EC tablet Take 1 tablet (81 mg) by mouth once daily.      atorvastatin (Lipitor) 20 mg tablet TAKE 1 TABLET EVERY DAY 90 tablet 0    cyanocobalamin (Vitamin B-12) 500 mcg tablet Take 1 tablet (500 mcg) by mouth once daily.      fenofibrate (Tricor) 48 mg tablet Take 1 tablet (48 mg) by mouth once daily. 90 tablet 1    finasteride (Proscar) 5 mg tablet TAKE 1 TABLET EVERY DAY 90 tablet 10    folic acid (Folvite) 1 mg tablet TAKE 1 TABLET EVERY DAY 90 tablet 3    furosemide (Lasix) 20 mg tablet Take 1 tablet (20 mg) by mouth once daily. (Patient taking differently: Take 1 tablet (20 mg) by mouth once daily. 20MG TWICE A DAY) 90 tablet 1    gabapentin (Neurontin) 100 mg capsule TAKE 1 CAPSULE AT BEDTIME 90 capsule 1    Klor-Con M20 20 mEq ER tablet Take 1 tablet (20 mEq) by mouth once daily. 1/2 TAB TWICE A DAY      metoprolol succinate XL (Toprol-XL) 50 mg 24 hr tablet Take 1 tablet (50 mg) by mouth once daily. 30 tablet 3    pantoprazole (ProtoNix) 40 mg EC tablet TAKE 1 TABLET EVERY DAY 90 tablet 1    PARoxetine (Paxil) 20 mg tablet TAKE 1 TABLET EVERY DAY 90 tablet 0    tamsulosin (Flomax) 0.4 mg 24 hr capsule TAKE 1 CAPSULE AT BEDTIME 90 capsule 10    warfarin (Coumadin) 3 mg tablet        No current facility-administered medications for this visit.     Allergies   Allergen Reactions    Other Unknown     Anti-Inflammatories    Ranolazine Unknown    Cyclobenzaprine Hallucinations, Unknown and Psychosis     Social History     Socioeconomic History    Marital status:      Spouse name: Not on file    Number of children: Not on file    Years of education: Not on file    Highest education level: Not on file   Occupational History    Not on file   Tobacco Use    Smoking status: Former     Types: Cigarettes     Quit date:      Years since quittin.9    Smokeless tobacco:  "Never   Substance and Sexual Activity    Alcohol use: Not Currently    Drug use: Never    Sexual activity: Not Currently   Other Topics Concern    Not on file   Social History Narrative    Not on file     Social Determinants of Health     Financial Resource Strain: Not on file   Food Insecurity: Not on file   Transportation Needs: Not on file   Physical Activity: Not on file   Stress: Not on file   Social Connections: Not on file   Intimate Partner Violence: Not on file   Housing Stability: Not on file       Review of Systems  Pertinent items are noted in HPI.    Objective     Lab Review  Lab Results   Component Value Date    WBC 4.2 (L) 09/01/2023    RBC 4.03 (L) 09/01/2023    HGB 11.5 (L) 09/01/2023    HCT 38.6 (L) 09/01/2023     09/01/2023      Lab Results   Component Value Date    BUN 30 (H) 09/01/2023    CREATININE 2.28 (H) 09/01/2023      No results found for: \"PSA\"  Urine analysis shows negative  U/A:UA is reviewed and is normal    Assessment/Plan   There are no diagnoses linked to this encounter.    Gross hematuria   Nephrolithiasis     I reviewed CT A&P from 11/15/2023 which shows generalized wall thickening and stranding of the perivesical fat with underlying cystitis of concern. Right kidney demonstrates nonobstructing nephrolithiasis in the inferior pole measuring 9 mm. Left kidney demonstrates nonobstructing nephrolithiasis with a few  stones demonstrated largest measuring 4 mm in the midpole posteriorly. No hydronephrosis.     We will proceed with cystoscopy to evaluate gross hematuria.     The risks of cystoscopy were discussed with the patient in great detail, including risk of hematuria, UTI and discomfort. Antibiotic will be prescribed to be taken 1 hour prior to the procedure. Patient agrees to proceed.     Kathleen Barrera"

## 2023-11-27 ENCOUNTER — ANTICOAGULATION - WARFARIN VISIT (OUTPATIENT)
Dept: CARDIOLOGY | Facility: CLINIC | Age: 78
End: 2023-11-27
Payer: MEDICARE

## 2023-11-27 NOTE — PROGRESS NOTES
Patient identification verified with 2 identifiers.    Location: [facility] Patient Self-Testing Program [phone]    Referring Physician: Dr Martin  Enrollment/ Re-enrollment date: 24   INR Goal: 2.0-3.0  INR monitoring is per [facility] protocol.  Anticoagulation Medication: warfarin  Indication: Atrial Fibrillation/Atrial Flutter    Subjective   Bleeding signs/symptoms: No    Bruising: No   Major bleeding event: No  Thrombosis signs/symptoms: No  Thromboembolic event: No  Missed doses: No  Extra doses: No  Medication changes: No  Dietary changes: No  Change in health: No  Change in activity: No  Alcohol: No  Other concerns: No    Upcoming Surgeries:  Does the Patient Have any upcoming surgeries that require interruption in anticoagulation therapy? no  Does the patient require bridging? no      Anticoagulation Summary  As of 2023      INR goal:  2.0-3.0   TTR:  50.5 % (1.7 mo)   INR used for dosin.30 (2023)   Weekly warfarin total:  12 mg               Assessment/Plan   Therapeutic     1. New dose: no change    2. Next INR: 1 week      Education provided to patient during the visit:  Patient instructed to call in interim with questions, concerns and changes.   Patient educated on compliance with dosing, follow up appointments, and prescribed plan of care.

## 2023-11-29 ENCOUNTER — APPOINTMENT (OUTPATIENT)
Dept: UROLOGY | Facility: CLINIC | Age: 78
End: 2023-11-29
Payer: MEDICARE

## 2023-12-04 ENCOUNTER — ANTICOAGULATION - WARFARIN VISIT (OUTPATIENT)
Dept: CARDIOLOGY | Facility: CLINIC | Age: 78
End: 2023-12-04
Payer: MEDICARE

## 2023-12-04 DIAGNOSIS — I48.0 PAROXYSMAL ATRIAL FIBRILLATION (MULTI): ICD-10-CM

## 2023-12-04 NOTE — PROGRESS NOTES
Patient identification verified with 2 identifiers.    Location: [facility] Patient Self-Testing Program [phone]    Referring Physician: BANDAR MATTHEWS  Enrollment/ Re-enrollment date: 2/20/2024   INR Goal: 2.0-3.0  INR monitoring is per [facility] protocol.  Anticoagulation Medication: warfarin  Indication: Atrial Fibrillation/Atrial Flutter    Subjective   Bleeding signs/symptoms: No    Bruising: No   Major bleeding event: No  Thrombosis signs/symptoms: No  Thromboembolic event: No  Missed doses: No  Extra doses: No  Medication changes: No  Dietary changes: No  PT ATE LESS VIT K THIS WEEK  Change in health: No  Change in activity: No  Alcohol: No  Other concerns: No    Upcoming Surgeries:  Does the Patient Have any upcoming surgeries that require interruption in anticoagulation therapy? no  Does the patient require bridging? no      Anticoagulation Summary  As of 12/4/2023      INR goal:  2.0-3.0   TTR:  55.0 % (1.9 mo)   INR used for dosing:  3.10 (12/4/2023)   Weekly warfarin total:  12 mg               Assessment/Plan   Supratherapeutic     1. New dose: no change--PT WILL EAT SLIGHTLY MORE VIT K THIS WEEK  2. Next INR: 1 week      Education provided to patient during the visit:  Patient instructed to call in interim with questions, concerns and changes.   Patient educated on interactions between medications and warfarin.   Patient educated on dietary consistency in vitamin k consumption.   Patient educated on affects of alcohol consumption while taking warfarin.   Patient educated on signs of bleeding/clotting.   Patient educated on compliance with dosing, follow up appointments, and prescribed plan of care.

## 2023-12-11 ENCOUNTER — ANTICOAGULATION - WARFARIN VISIT (OUTPATIENT)
Dept: CARDIOLOGY | Facility: CLINIC | Age: 78
End: 2023-12-11
Payer: MEDICARE

## 2023-12-11 DIAGNOSIS — I48.0 PAROXYSMAL ATRIAL FIBRILLATION (MULTI): ICD-10-CM

## 2023-12-11 LAB
INR IN PPP BY COAGULATION ASSAY EXTERNAL: 4.8
PROTHROMBIN TIME (PT) IN PPP BY COAGULATION ASSAY EXTERNAL: NORMAL SECONDS

## 2023-12-11 NOTE — PROGRESS NOTES
Patient identification verified with 2 identifiers.    Location: [facility] Patient Self-Testing Program [phone]    Referring Physician: BANDAR MATTHEWS  Enrollment/ Re-enrollment date: 2024   INR Goal: 2.0-3.0  INR monitoring is per [facility] protocol.  Anticoagulation Medication: warfarin  Indication: Atrial Fibrillation/Atrial Flutter    Subjective   Bleeding signs/symptoms: No    Bruising: No   Major bleeding event: No  Thrombosis signs/symptoms: No  Thromboembolic event: No  Missed doses: No  Extra doses: No  Medication changes: No  Dietary changes: No  Change in health: No  Change in activity: No  Alcohol: No  Other concerns: No    Upcoming Surgeries:  Does the Patient Have any upcoming surgeries that require interruption in anticoagulation therapy? no  Does the patient require bridging? no      Anticoagulation Summary  As of 2023      INR goal:  2.0-3.0   TTR:  49.0 % (2.2 mo)   INR used for dosin.80 (2023)   Weekly warfarin total:  10.5 mg               Assessment/Plan   Supratherapeutic     1. New dose: HOLD WARFARIN TODAY AND TOMORROW AND DECREASE WEEKLY DOSE.  2. Next INR: 1 week      Education provided to patient during the visit:  Patient instructed to call in interim with questions, concerns and changes.   Patient educated on interactions between medications and warfarin.   Patient educated on dietary consistency in vitamin k consumption.   Patient educated on affects of alcohol consumption while taking warfarin.   Patient educated on signs of bleeding/clotting.   Patient educated on compliance with dosing, follow up appointments, and prescribed plan of care.

## 2023-12-13 ENCOUNTER — OFFICE VISIT (OUTPATIENT)
Dept: PRIMARY CARE | Facility: CLINIC | Age: 78
End: 2023-12-13
Payer: MEDICARE

## 2023-12-13 VITALS
HEIGHT: 72 IN | TEMPERATURE: 98.2 F | BODY MASS INDEX: 32.23 KG/M2 | DIASTOLIC BLOOD PRESSURE: 56 MMHG | WEIGHT: 238 LBS | SYSTOLIC BLOOD PRESSURE: 119 MMHG | OXYGEN SATURATION: 97 % | HEART RATE: 96 BPM

## 2023-12-13 DIAGNOSIS — I10 BENIGN ESSENTIAL HYPERTENSION: ICD-10-CM

## 2023-12-13 DIAGNOSIS — N20.0 CALCULUS OF KIDNEY: ICD-10-CM

## 2023-12-13 DIAGNOSIS — N18.32 STAGE 3B CHRONIC KIDNEY DISEASE (MULTI): ICD-10-CM

## 2023-12-13 DIAGNOSIS — Z79.01 LONG TERM (CURRENT) USE OF ANTICOAGULANTS: ICD-10-CM

## 2023-12-13 DIAGNOSIS — I48.0 PAROXYSMAL ATRIAL FIBRILLATION (MULTI): Primary | ICD-10-CM

## 2023-12-13 DIAGNOSIS — I63.511: ICD-10-CM

## 2023-12-13 PROCEDURE — 99214 OFFICE O/P EST MOD 30 MIN: CPT | Performed by: INTERNAL MEDICINE

## 2023-12-13 PROCEDURE — 1036F TOBACCO NON-USER: CPT | Performed by: INTERNAL MEDICINE

## 2023-12-13 PROCEDURE — 3078F DIAST BP <80 MM HG: CPT | Performed by: INTERNAL MEDICINE

## 2023-12-13 PROCEDURE — 1159F MED LIST DOCD IN RCRD: CPT | Performed by: INTERNAL MEDICINE

## 2023-12-13 PROCEDURE — 1160F RVW MEDS BY RX/DR IN RCRD: CPT | Performed by: INTERNAL MEDICINE

## 2023-12-13 PROCEDURE — 3074F SYST BP LT 130 MM HG: CPT | Performed by: INTERNAL MEDICINE

## 2023-12-13 PROCEDURE — 1125F AMNT PAIN NOTED PAIN PRSNT: CPT | Performed by: INTERNAL MEDICINE

## 2023-12-13 RX ORDER — ERGOCALCIFEROL 1.25 MG/1
50000 CAPSULE ORAL
Qty: 8 CAPSULE | Refills: 0 | Status: SHIPPED | OUTPATIENT
Start: 2023-12-13 | End: 2024-01-28

## 2023-12-13 ASSESSMENT — COLUMBIA-SUICIDE SEVERITY RATING SCALE - C-SSRS: 1. IN THE PAST MONTH, HAVE YOU WISHED YOU WERE DEAD OR WISHED YOU COULD GO TO SLEEP AND NOT WAKE UP?: NO

## 2023-12-13 ASSESSMENT — PATIENT HEALTH QUESTIONNAIRE - PHQ9
SUM OF ALL RESPONSES TO PHQ9 QUESTIONS 1 AND 2: 0
2. FEELING DOWN, DEPRESSED OR HOPELESS: NOT AT ALL
1. LITTLE INTEREST OR PLEASURE IN DOING THINGS: NOT AT ALL

## 2023-12-13 NOTE — PROGRESS NOTES
Subjective   Patient ID: Angel Carroll is a 78 y.o. male who presents for Follow-up.    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  He is complaining of ataxia over the past few weeks.  He is also being followed by Dr. Moe with urology clinic regarding nonobstructive renal stones and enlarged prostate.  His last INR done at home was 3.70.  He denies any abdominal pain, headache, slurring of the speech, fever chills, palpitation and swelling of legs.     Review of Systems   Constitutional: Negative.    HENT: Negative.     Eyes: Negative.    Respiratory: Negative.     Cardiovascular: Negative.    Gastrointestinal: Negative.    Endocrine: Negative.    Genitourinary: Negative.    Musculoskeletal: Negative.    Skin: Negative.    Allergic/Immunologic: Negative.    Neurological: Negative.         With ataxia   Hematological: Negative.    Psychiatric/Behavioral: Negative.         Objective   /56   Pulse 96   Temp 36.8 °C (98.2 °F)   Ht 1.829 m (6')   Wt 108 kg (238 lb)   SpO2 97%   BMI 32.28 kg/m²     Physical Exam  Constitutional:       Appearance: Normal appearance. He is normal weight.   HENT:      Right Ear: Tympanic membrane normal.      Left Ear: Tympanic membrane and ear canal normal.      Nose: Nose normal.   Neck:      Vascular: No carotid bruit.    Cardiovascular:      Rate and Rhythm: Normal rate.   Pulmonary:      Effort: No respiratory distress.      Breath sounds: No stridor. No wheezing.   Abdominal:      Palpations: Abdomen is soft.      Tenderness: There is no guarding or rebound.   Skin:     Coloration: Skin is not jaundiced.      Findings: No bruising.   Neurological:      General: No focal deficit present.      Mental Status: He is alert and oriented to person, place, and time.   Psychiatric:         Mood and Affect: Mood normal.         Assessment/Plan    patient is advised to consider CT of the head if his ataxia persist.  He will continue current medication.  He will continue to follow-up with her specialist with his other comorbid conditions.  He will return to clinic in 3 months for follow-up visit.

## 2023-12-13 NOTE — PROGRESS NOTES
Subjective   Patient ID: Angel Carroll is a 78 y.o. male who presents for Follow-up.    HPI     Review of Systems    Objective   /56   Pulse 96   Temp 36.8 °C (98.2 °F)   Ht 1.829 m (6')   Wt 108 kg (238 lb)   SpO2 97%   BMI 32.28 kg/m²     Physical Exam    Assessment/Plan

## 2023-12-15 DIAGNOSIS — I25.10 ARTERIOSCLEROTIC CARDIOVASCULAR DISEASE (ASCVD): Primary | ICD-10-CM

## 2023-12-15 DIAGNOSIS — K21.9 GASTROESOPHAGEAL REFLUX DISEASE WITHOUT ESOPHAGITIS: ICD-10-CM

## 2023-12-15 DIAGNOSIS — I25.10 ARTERIOSCLEROTIC CARDIOVASCULAR DISEASE (ASCVD): ICD-10-CM

## 2023-12-15 RX ORDER — PAROXETINE HYDROCHLORIDE 20 MG/1
TABLET, FILM COATED ORAL
Qty: 90 TABLET | Refills: 3 | Status: SHIPPED | OUTPATIENT
Start: 2023-12-15

## 2023-12-15 RX ORDER — PANTOPRAZOLE SODIUM 40 MG/1
TABLET, DELAYED RELEASE ORAL
Qty: 90 TABLET | Refills: 3 | Status: SHIPPED | OUTPATIENT
Start: 2023-12-15 | End: 2024-05-21

## 2023-12-15 RX ORDER — ATORVASTATIN CALCIUM 20 MG/1
TABLET, FILM COATED ORAL
Qty: 90 TABLET | Refills: 3 | Status: SHIPPED | OUTPATIENT
Start: 2023-12-15

## 2023-12-15 ASSESSMENT — ENCOUNTER SYMPTOMS
ENDOCRINE NEGATIVE: 1
CARDIOVASCULAR NEGATIVE: 1
PSYCHIATRIC NEGATIVE: 1
HEMATOLOGIC/LYMPHATIC NEGATIVE: 1
RESPIRATORY NEGATIVE: 1
NEUROLOGICAL NEGATIVE: 1
EYES NEGATIVE: 1
CONSTITUTIONAL NEGATIVE: 1
ALLERGIC/IMMUNOLOGIC NEGATIVE: 1
GASTROINTESTINAL NEGATIVE: 1
MUSCULOSKELETAL NEGATIVE: 1

## 2023-12-18 ENCOUNTER — APPOINTMENT (OUTPATIENT)
Dept: UROLOGY | Facility: CLINIC | Age: 78
End: 2023-12-18
Payer: MEDICARE

## 2023-12-18 ENCOUNTER — ANTICOAGULATION - WARFARIN VISIT (OUTPATIENT)
Dept: CARDIOLOGY | Facility: CLINIC | Age: 78
End: 2023-12-18

## 2023-12-18 LAB
INR IN PPP BY COAGULATION ASSAY EXTERNAL: 2.2
PROTHROMBIN TIME (PT) IN PPP BY COAGULATION ASSAY EXTERNAL: NORMAL SECONDS

## 2023-12-18 RX ORDER — METOPROLOL SUCCINATE 50 MG/1
TABLET, EXTENDED RELEASE ORAL
Qty: 90 TABLET | Refills: 3 | Status: SHIPPED | OUTPATIENT
Start: 2023-12-18 | End: 2024-03-08 | Stop reason: HOSPADM

## 2023-12-18 NOTE — PROGRESS NOTES
Patient identification verified with 2 identifiers.    Location: [facility] Patient Self-Testing Program [phone]     Referring Physician: BANDAR MATTHEWS  Enrollment/ Re-enrollment date: 2024   INR Goal: 2.0-3.0  INR monitoring is per [facility] protocol.  Anticoagulation Medication: warfarin  Indication: Atrial Fibrillation/Atrial Flutter    Subjective   Bleeding signs/symptoms: No    Bruising: No   Major bleeding event: No  Thrombosis signs/symptoms: No  Thromboembolic event: No  Missed doses: No  Extra doses: No  Medication changes: No  Dietary changes: No  Change in health: No  Change in activity: No  Alcohol: No  Other concerns: No    Upcoming Surgeries:  Does the Patient Have any upcoming surgeries that require interruption in anticoagulation therapy? no  Does the patient require bridging? no      Anticoagulation Summary  As of 2023      INR goal:  2.0-3.0   TTR:  47.3 % (2.4 mo)   INR used for dosin.20 (2023)   Weekly warfarin total:  10.5 mg               Assessment/Plan   Therapeutic     1. New dose: no change    2. Next INR: 1 week      Education provided to patient during the visit:  Patient instructed to call in interim with questions, concerns and changes.   Patient educated on compliance with dosing, follow up appointments, and prescribed plan of care.

## 2023-12-26 ENCOUNTER — ANTICOAGULATION - WARFARIN VISIT (OUTPATIENT)
Dept: CARDIOLOGY | Facility: CLINIC | Age: 78
End: 2023-12-26
Payer: MEDICARE

## 2023-12-26 DIAGNOSIS — I48.0 PAROXYSMAL ATRIAL FIBRILLATION (MULTI): ICD-10-CM

## 2023-12-26 LAB
INR IN PPP BY COAGULATION ASSAY EXTERNAL: 1.9
PROTHROMBIN TIME (PT) IN PPP BY COAGULATION ASSAY EXTERNAL: NORMAL SECONDS

## 2023-12-26 NOTE — PROGRESS NOTES
Patient identification verified with 2 identifiers.    Location: [facility] Patient Self-Testing Program [phone]     Referring Physician: BANDAR MATTHEWS  Enrollment/ Re-enrollment date: 2024   INR Goal: 2.0-3.0  INR monitoring is per [facility] protocol.  Anticoagulation Medication: warfarin  Indication: Atrial Fibrillation/Atrial Flutter    Subjective   Bleeding signs/symptoms:      Bruising:     Major bleeding event:    Thrombosis signs/symptoms:    Thromboembolic event:    Missed doses:    Extra doses:    Medication changes:    Dietary changes:    Change in health:    Change in activity:    Alcohol:    Other concerns:      Upcoming Surgeries:  Does the Patient Have any upcoming surgeries that require interruption in anticoagulation therapy? no  Does the patient require bridging? no      Anticoagulation Summary  As of 2023      INR goal:  2.0-3.0   TTR:  49.2 % (2.7 mo)   INR used for dosin.90 (2023)   Weekly warfarin total:  10.5 mg               Assessment/Plan     Received faxed INR self test results and called patient. Pt identification verified with 2 pt identifiers.  Self test from today reveals INR of 1.9 which is sub therapeutic.Pt denies complications related to ACT therapy. Pt denies changes in medications, social habits or general health. Current dose schedule reviewed with pt and read back correctly to me. Will maintain current dose and retest in 1 week. Pt instructed to call in interim with questions, concerns or changes.    Education provided to patient during the visit:  Patient instructed to call in interim with questions, concerns and changes.   Patient educated on compliance with dosing, follow up appointments, and prescribed plan of care.

## 2024-01-02 ENCOUNTER — ANTICOAGULATION - WARFARIN VISIT (OUTPATIENT)
Dept: CARDIOLOGY | Facility: CLINIC | Age: 79
End: 2024-01-02
Payer: MEDICARE

## 2024-01-02 DIAGNOSIS — I48.0 PAROXYSMAL ATRIAL FIBRILLATION (MULTI): Primary | ICD-10-CM

## 2024-01-02 LAB
INR IN PPP BY COAGULATION ASSAY EXTERNAL: 1.8
PROTHROMBIN TIME (PT) IN PPP BY COAGULATION ASSAY EXTERNAL: NORMAL SECONDS

## 2024-01-02 NOTE — PROGRESS NOTES
Patient identification verified with 2 identifiers.    Patient identification verified with 2 identifiers.    Location: [facility] Patient Self-Testing Program [phone]     Referring Physician: BANDAR MATTHEWS  Enrollment/ Re-enrollment date: 2024   INR Goal: 2.0-3.0  INR monitoring is per [facility] protocol.  Anticoagulation Medication: warfarin  Indication: Atrial Fibrillation/Atrial Flutter    Subjective   Bleeding signs/symptoms: No    Bruising: No   Major bleeding event: No  Thrombosis signs/symptoms: No  Thromboembolic event: No  Missed doses: No  Extra doses: No  Medication changes: No  Dietary changes: No  Change in health: No  Change in activity: No  Alcohol: No  Other concerns: No    Upcoming Surgeries:  Does the Patient Have any upcoming surgeries that require interruption in anticoagulation therapy? no  Does the patient require bridging? no      Anticoagulation Summary  As of 2024      INR goal:  2.0-3.0   TTR:  45.2 % (2.9 mo)   INR used for dosin.80 (2024)   Weekly warfarin total:  12 mg               Assessment/Plan     Received faxed INR self test results and called patient. Pt identification verified with 2 pt identifiers.  Self test from today reveals INR of 1.9 which is sub therapeutic.Pt denies complications related to ACT therapy. Pt denies changes in medications, social habits or general health. Current dose schedule reviewed with pt and read back correctly to me. Will maintain current dose and retest in 1 week. Pt instructed to call in interim with questions, concerns or changes.    Education provided to patient during the visit:  Patient instructed to call in interim with questions, concerns and changes.   Patient educated on compliance with dosing, follow up appointments, and prescribed plan of care.      Subjective   Bleeding signs/symptoms: No    Bruising: No   Major bleeding event: No  Thrombosis signs/symptoms: No  Thromboembolic event: No  Missed doses: No  Extra doses:  No  Medication changes: No  Dietary changes: No  Change in health: No  Change in activity: No  Alcohol: No  Other concerns: No    Upcoming Surgeries:  Does the Patient Have any upcoming surgeries that require interruption in anticoagulation therapy? no  Does the patient require bridging? no      Anticoagulation Summary  As of 2024      INR goal:  2.0-3.0   TTR:  45.2 % (2.9 mo)   INR used for dosin.80 (2024)   Weekly warfarin total:  12 mg               Assessment/Plan   Subtherapeutic     1. New dose:  Will increase weekly dose.      2. Next INR: 1 week      Education provided to patient during the visit:  Patient instructed to call in interim with questions, concerns and changes.   Patient educated on dietary consistency in vitamin k consumption.

## 2024-01-09 ENCOUNTER — ANTICOAGULATION - WARFARIN VISIT (OUTPATIENT)
Dept: CARDIOLOGY | Facility: CLINIC | Age: 79
End: 2024-01-09
Payer: MEDICARE

## 2024-01-09 DIAGNOSIS — I48.0 PAROXYSMAL ATRIAL FIBRILLATION (MULTI): ICD-10-CM

## 2024-01-09 NOTE — PROGRESS NOTES
Patient identification verified with 2 identifiers.    Patient identification verified with 2 identifiers.    Location: [facility] Patient Self-Testing Program [phone]     Referring Physician: BANDAR MATTHEWS  Enrollment/ Re-enrollment date: 2024   INR Goal: 2.0-3.0  INR monitoring is per [facility] protocol.  Anticoagulation Medication: warfarin  Indication: Atrial Fibrillation/Atrial Flutter    Subjective   Bleeding signs/symptoms: No    Bruising: No   Major bleeding event: No  Thrombosis signs/symptoms: No  Thromboembolic event: No  Missed doses: No  Extra doses: No  Medication changes: No  Dietary changes: No  Change in health: No  Change in activity: No  Alcohol: No  Other concerns: No    Upcoming Surgeries:  Does the Patient Have any upcoming surgeries that require interruption in anticoagulation therapy? no  Does the patient require bridging? no      Anticoagulation Summary  As of 2024      INR goal:  2.0-3.0   TTR:  47.7 % (3.1 mo)   INR used for dosin.70 (2024)   Weekly warfarin total:  10.5 mg               Assessment/Plan     Received faxed INR self test results and called patient. Pt identification verified with 2 pt identifiers.  Self test from today reveals INR of 1.9 which is sub therapeutic.Pt denies complications related to ACT therapy. Pt denies changes in medications, social habits or general health. Current dose schedule reviewed with pt and read back correctly to me. Will maintain current dose and retest in 1 week. Pt instructed to call in interim with questions, concerns or changes.    Education provided to patient during the visit:  Patient instructed to call in interim with questions, concerns and changes.   Patient educated on compliance with dosing, follow up appointments, and prescribed plan of care.      Subjective   Bleeding signs/symptoms: No    Bruising: No   Major bleeding event: No  Thrombosis signs/symptoms: No  Thromboembolic event: No  Missed doses: No  Extra doses:  No  Medication changes: No  Dietary changes: No  Change in health: No  Change in activity: No  Alcohol: No  Other concerns: No    Upcoming Surgeries:  Does the Patient Have any upcoming surgeries that require interruption in anticoagulation therapy? no  Does the patient require bridging? no      Anticoagulation Summary  As of 2024      INR goal:  2.0-3.0   TTR:  47.7 % (3.1 mo)   INR used for dosin.70 (2024)   Weekly warfarin total:  10.5 mg               Assessment/Plan   RECEIVED FAXED INR AND CALLED PT.  LM WITH CURRENT DOSE SCHEDULE AND INSTRUCTIONS TO TEST INR IN 2 WEEKS. DATE PROVIDED IN VM.      THERAPEUTIC  1. New dose:  Will maintain weekly dose.      2. Next INR: 1 week      Education provided to patient during the visit:  Patient instructed to call in interim with questions, concerns and changes.   Patient educated on dietary consistency in vitamin k consumption.

## 2024-01-16 DIAGNOSIS — M19.90 ARTHRITIS: ICD-10-CM

## 2024-01-16 RX ORDER — GABAPENTIN 100 MG/1
CAPSULE ORAL
Qty: 90 CAPSULE | Refills: 0 | Status: SHIPPED | OUTPATIENT
Start: 2024-01-16 | End: 2024-05-21

## 2024-01-17 DIAGNOSIS — Z79.899 ON AMIODARONE THERAPY: Primary | ICD-10-CM

## 2024-01-17 DIAGNOSIS — I48.11 LONGSTANDING PERSISTENT ATRIAL FIBRILLATION (MULTI): ICD-10-CM

## 2024-01-17 RX ORDER — AMIODARONE HYDROCHLORIDE 200 MG/1
200 TABLET ORAL DAILY
Qty: 90 TABLET | Refills: 3 | Status: SHIPPED | OUTPATIENT
Start: 2024-01-17 | End: 2025-01-16

## 2024-01-23 ENCOUNTER — ANTICOAGULATION - WARFARIN VISIT (OUTPATIENT)
Dept: CARDIOLOGY | Facility: CLINIC | Age: 79
End: 2024-01-23
Payer: MEDICARE

## 2024-01-23 DIAGNOSIS — I48.0 PAROXYSMAL ATRIAL FIBRILLATION (MULTI): ICD-10-CM

## 2024-01-23 NOTE — PROGRESS NOTES
Patient identification verified with 2 identifiers.    Patient identification verified with 2 identifiers.    Location: [facility] Patient Self-Testing Program [phone]     Referring Physician: BANDAR MATTHEWS  Enrollment/ Re-enrollment date: 2024   INR Goal: 2.0-3.0  INR monitoring is per [facility] protocol.  Anticoagulation Medication: warfarin  Indication: Atrial Fibrillation/Atrial Flutter    Subjective   Bleeding signs/symptoms: No    Bruising: No   Major bleeding event: No  Thrombosis signs/symptoms: No  Thromboembolic event: No  Missed doses: No  Extra doses: No  Medication changes: No  Dietary changes: Yes  INCREASED CONSUMPTION OF GREENS BUT DOES NOT PLAN TO CONTINUE WITH THIS  Change in health: No  Change in activity: No  Alcohol: No  Other concerns: No    Upcoming Surgeries:  Does the Patient Have any upcoming surgeries that require interruption in anticoagulation therapy? no  Does the patient require bridging? no      Anticoagulation Summary  As of 2024      INR goal:  2.0-3.0   TTR:  52.8 % (3.6 mo)   INR used for dosin.90 (2024)   Weekly warfarin total:  10.5 mg               Assessment/Plan     Received faxed INR self test results and called patient. Pt identification verified with 2 pt identifiers.  Self test from today reveals INR of 1.9 which is sub therapeutic.Pt denies complications related to ACT therapy. Pt denies changes in medications, social habits or general health. Current dose schedule reviewed with pt and read back correctly to me. Will maintain current dose and retest in 1 week. Pt instructed to call in interim with questions, concerns or changes.    Education provided to patient during the visit:  Patient instructed to call in interim with questions, concerns and changes.   Patient educated on compliance with dosing, follow up appointments, and prescribed plan of care.      Subjective   Bleeding signs/symptoms: No    Bruising: No   Major bleeding event: No  Thrombosis  signs/symptoms: No  Thromboembolic event: No  Missed doses: No  Extra doses: No  Medication changes: No  Dietary changes: Yes  INCREASED CONSUMPTION OF GREENS BUT DOES NOT PLAN TO CONTINUE WITH THIS  Change in health: No  Change in activity: No  Alcohol: No  Other concerns: No    Upcoming Surgeries:  Does the Patient Have any upcoming surgeries that require interruption in anticoagulation therapy? no  Does the patient require bridging? no      Anticoagulation Summary  As of 2024      INR goal:  2.0-3.0   TTR:  52.8 % (3.6 mo)   INR used for dosin.90 (2024)   Weekly warfarin total:  10.5 mg               Assessment/Plan   RECEIVED FAXED INR AND CALLED PT.       SUB THERAPEUTIC  1. New dose:  Will maintain weekly dose.      2. Next INR: 1 week      Education provided to patient during the visit:  Patient instructed to call in interim with questions, concerns and changes.   Patient educated on dietary consistency in vitamin k consumption.

## 2024-01-27 DIAGNOSIS — N18.32 STAGE 3B CHRONIC KIDNEY DISEASE (MULTI): ICD-10-CM

## 2024-01-28 RX ORDER — ERGOCALCIFEROL 1.25 MG/1
50000 CAPSULE ORAL
Qty: 12 CAPSULE | Refills: 1 | Status: SHIPPED | OUTPATIENT
Start: 2024-01-28 | End: 2025-01-27

## 2024-01-30 ENCOUNTER — ANTICOAGULATION - WARFARIN VISIT (OUTPATIENT)
Dept: CARDIOLOGY | Facility: CLINIC | Age: 79
End: 2024-01-30
Payer: MEDICARE

## 2024-01-30 DIAGNOSIS — I48.0 PAROXYSMAL ATRIAL FIBRILLATION (MULTI): Primary | ICD-10-CM

## 2024-01-30 NOTE — PROGRESS NOTES
Patient identification verified with 2 identifiers.    Location: [facility] Patient Self-Testing Program [phone]     Referring Physician: BANDAR MATTHEWS  Enrollment/ Re-enrollment date: 2024   INR Goal: 2.0-3.0  INR monitoring is per [facility] protocol.  Anticoagulation Medication: warfarin  Indication: Atrial Fibrillation/Atrial Flutter    Subjective   Bleeding signs/symptoms: No    Bruising: No   Major bleeding event: No  Thrombosis signs/symptoms: No  Thromboembolic event: No  Missed doses: No  Extra doses: No  Medication changes: No  Dietary changes: No  Change in health: No  Change in activity: No  Alcohol: No  Other concerns: No    Upcoming Surgeries:  Does the Patient Have any upcoming surgeries that require interruption in anticoagulation therapy? no  Does the patient require bridging? no      Anticoagulation Summary  As of 2024      INR goal:  2.0-3.0   TTR:  49.6 % (3.8 mo)   INR used for dosin.70 (2024)   Weekly warfarin total:  12 mg               Assessment/Plan   Subtherapeutic     1. New dose:  Will increase weekly dose by approximately 10% per protocol.    Called and spoke with pt who states understanding of dosing schedule.  Also called pt's daughter Rhea who prepares his pills.  2. Next INR: 1 week      Education provided to patient during the visit:  Patient instructed to call in interim with questions, concerns and changes.   Patient educated on interactions between medications and warfarin.   Patient educated on dietary consistency in vitamin k consumption.   Patient educated on signs of bleeding/clotting.   Patient educated on compliance with dosing, follow up appointments, and prescribed plan of care.

## 2024-02-06 ENCOUNTER — ANTICOAGULATION - WARFARIN VISIT (OUTPATIENT)
Dept: CARDIOLOGY | Facility: CLINIC | Age: 79
End: 2024-02-06
Payer: MEDICARE

## 2024-02-06 DIAGNOSIS — I48.0 PAROXYSMAL ATRIAL FIBRILLATION (MULTI): ICD-10-CM

## 2024-02-06 NOTE — PROGRESS NOTES
Patient identification verified with 2 identifiers.    [place] [facility] [place] [facility] [phone]     Referring Physician: BANDAR MATTEHWS  Enrollment/ Re-enrollment date: 2024   INR Goal: 2.0-3.0  INR monitoring is per [facility] protocol.  Anticoagulation Medication: warfarin  Indication: Atrial Fibrillation/Atrial Flutter    Subjective   Bleeding signs/symptoms: No    Bruising: No   Major bleeding event: No  Thrombosis signs/symptoms: No  Thromboembolic event: No  Missed doses: No  Extra doses: No  Medication changes: No  Dietary changes: No  Change in health: No  Change in activity: No  Alcohol: No  Other concerns: No    Upcoming Surgeries:  Does the Patient Have any upcoming surgeries that require interruption in anticoagulation therapy? no  Does the patient require bridging? no      Anticoagulation Summary  As of 2024      INR goal:  2.0-3.0   TTR:  46.8 % (4.1 mo)   INR used for dosin.90 (2024)   Weekly warfarin total:  13.5 mg               Assessment/Plan   Subtherapeutic     1. New dose:  Will increase weekly dose by approximately 10% per protocol.    Called and spoke with pt who states understanding of dosing schedule.  Also called pt's daughter Rhea who prepares his pills.  2. Next INR: 1 week      Education provided to patient during the visit:  Patient instructed to call in interim with questions, concerns and changes.   Patient educated on interactions between medications and warfarin.   Patient educated on dietary consistency in vitamin k consumption.   Patient educated on signs of bleeding/clotting.   Patient educated on compliance with dosing, follow up appointments, and prescribed plan of care.

## 2024-02-07 ENCOUNTER — LAB (OUTPATIENT)
Dept: LAB | Facility: LAB | Age: 79
End: 2024-02-07
Payer: MEDICARE

## 2024-02-07 ENCOUNTER — OFFICE VISIT (OUTPATIENT)
Dept: NEPHROLOGY | Facility: CLINIC | Age: 79
End: 2024-02-07
Payer: MEDICARE

## 2024-02-07 VITALS
WEIGHT: 237 LBS | BODY MASS INDEX: 32.14 KG/M2 | HEART RATE: 66 BPM | SYSTOLIC BLOOD PRESSURE: 113 MMHG | DIASTOLIC BLOOD PRESSURE: 63 MMHG | TEMPERATURE: 97.3 F

## 2024-02-07 DIAGNOSIS — N18.32 ANEMIA DUE TO STAGE 3B CHRONIC KIDNEY DISEASE (MULTI): ICD-10-CM

## 2024-02-07 DIAGNOSIS — N18.32 STAGE 3B CHRONIC KIDNEY DISEASE (MULTI): ICD-10-CM

## 2024-02-07 DIAGNOSIS — N18.32 STAGE 3B CHRONIC KIDNEY DISEASE (MULTI): Primary | ICD-10-CM

## 2024-02-07 DIAGNOSIS — D63.1 ANEMIA DUE TO STAGE 3B CHRONIC KIDNEY DISEASE (MULTI): ICD-10-CM

## 2024-02-07 LAB
25(OH)D3 SERPL-MCNC: 69 NG/ML (ref 30–100)
ALBUMIN SERPL BCP-MCNC: 3.8 G/DL (ref 3.4–5)
ANION GAP SERPL CALC-SCNC: 16 MMOL/L (ref 10–20)
BUN SERPL-MCNC: 39 MG/DL (ref 6–23)
CALCIUM SERPL-MCNC: 9.2 MG/DL (ref 8.6–10.6)
CHLORIDE SERPL-SCNC: 104 MMOL/L (ref 98–107)
CO2 SERPL-SCNC: 27 MMOL/L (ref 21–32)
CREAT SERPL-MCNC: 2.67 MG/DL (ref 0.5–1.3)
EGFRCR SERPLBLD CKD-EPI 2021: 23 ML/MIN/1.73M*2
GLUCOSE SERPL-MCNC: 88 MG/DL (ref 74–99)
PHOSPHATE SERPL-MCNC: 3.5 MG/DL (ref 2.5–4.9)
POTASSIUM SERPL-SCNC: 4.7 MMOL/L (ref 3.5–5.3)
PTH-INTACT SERPL-MCNC: 38.7 PG/ML (ref 18.5–88)
SODIUM SERPL-SCNC: 142 MMOL/L (ref 136–145)

## 2024-02-07 PROCEDURE — 99213 OFFICE O/P EST LOW 20 MIN: CPT | Performed by: INTERNAL MEDICINE

## 2024-02-07 PROCEDURE — 82306 VITAMIN D 25 HYDROXY: CPT

## 2024-02-07 PROCEDURE — 3074F SYST BP LT 130 MM HG: CPT | Performed by: INTERNAL MEDICINE

## 2024-02-07 PROCEDURE — 3078F DIAST BP <80 MM HG: CPT | Performed by: INTERNAL MEDICINE

## 2024-02-07 PROCEDURE — 1159F MED LIST DOCD IN RCRD: CPT | Performed by: INTERNAL MEDICINE

## 2024-02-07 PROCEDURE — 82043 UR ALBUMIN QUANTITATIVE: CPT

## 2024-02-07 PROCEDURE — 84156 ASSAY OF PROTEIN URINE: CPT

## 2024-02-07 PROCEDURE — 1125F AMNT PAIN NOTED PAIN PRSNT: CPT | Performed by: INTERNAL MEDICINE

## 2024-02-07 PROCEDURE — 82570 ASSAY OF URINE CREATININE: CPT

## 2024-02-07 PROCEDURE — 36415 COLL VENOUS BLD VENIPUNCTURE: CPT

## 2024-02-07 PROCEDURE — 1157F ADVNC CARE PLAN IN RCRD: CPT | Performed by: INTERNAL MEDICINE

## 2024-02-07 PROCEDURE — 82728 ASSAY OF FERRITIN: CPT

## 2024-02-07 PROCEDURE — 81001 URINALYSIS AUTO W/SCOPE: CPT

## 2024-02-07 PROCEDURE — 80069 RENAL FUNCTION PANEL: CPT

## 2024-02-07 PROCEDURE — 1160F RVW MEDS BY RX/DR IN RCRD: CPT | Performed by: INTERNAL MEDICINE

## 2024-02-07 PROCEDURE — 85027 COMPLETE CBC AUTOMATED: CPT

## 2024-02-07 PROCEDURE — 83550 IRON BINDING TEST: CPT

## 2024-02-07 PROCEDURE — 83970 ASSAY OF PARATHORMONE: CPT

## 2024-02-07 PROCEDURE — 83540 ASSAY OF IRON: CPT

## 2024-02-07 PROCEDURE — 1036F TOBACCO NON-USER: CPT | Performed by: INTERNAL MEDICINE

## 2024-02-07 NOTE — PROGRESS NOTES
Chief Complaint: Follow up CKD    Being evaluated by urology for nephrolithiasis/hematuria  Dr. Moe, cystoscopy scheduled for 2/20  Denies nausea, vomiting, change in chronic chest pain, dyspnea; props head up 30 degrees  No urinary symptoms currently, hematuria clearing; last stone 5 years ago    NAD, O2 per NC 2L  Sclera AI s inj  MMM, no sores  Deferred secondary to COVID  No edema  No tremor  No rash  Walking in with walker     CKD stage 3b  HTN well controlled  Proteinuria not recently checked     Labs today  Reached out to cards regarding SGLT2 Inhibitor  Labs and follow up in 3 months

## 2024-02-08 DIAGNOSIS — D63.1 ANEMIA DUE TO STAGE 3B CHRONIC KIDNEY DISEASE (MULTI): Primary | ICD-10-CM

## 2024-02-08 DIAGNOSIS — N18.32 ANEMIA DUE TO STAGE 3B CHRONIC KIDNEY DISEASE (MULTI): Primary | ICD-10-CM

## 2024-02-08 LAB
APPEARANCE UR: ABNORMAL
BACTERIA #/AREA URNS AUTO: ABNORMAL /HPF
BILIRUB UR STRIP.AUTO-MCNC: NEGATIVE MG/DL
COLOR UR: ABNORMAL
CREAT UR-MCNC: 106.1 MG/DL (ref 20–370)
CREAT UR-MCNC: 106.1 MG/DL (ref 20–370)
ERYTHROCYTE [DISTWIDTH] IN BLOOD BY AUTOMATED COUNT: 15.4 % (ref 11.5–14.5)
FERRITIN SERPL-MCNC: 47 NG/ML (ref 20–300)
GLUCOSE UR STRIP.AUTO-MCNC: NORMAL MG/DL
HCT VFR BLD AUTO: 32 % (ref 41–52)
HGB BLD-MCNC: 9.1 G/DL (ref 13.5–17.5)
IRON SATN MFR SERPL: ABNORMAL %
IRON SERPL-MCNC: 38 UG/DL (ref 35–150)
KETONES UR STRIP.AUTO-MCNC: ABNORMAL MG/DL
LEUKOCYTE ESTERASE UR QL STRIP.AUTO: ABNORMAL
MCH RBC QN AUTO: 25.1 PG (ref 26–34)
MCHC RBC AUTO-ENTMCNC: 28.4 G/DL (ref 32–36)
MCV RBC AUTO: 88 FL (ref 80–100)
MICROALBUMIN UR-MCNC: 561.5 MG/L
MICROALBUMIN/CREAT UR: 529.2 UG/MG CREAT
MUCOUS THREADS #/AREA URNS AUTO: ABNORMAL /LPF
NITRITE UR QL STRIP.AUTO: NEGATIVE
NRBC BLD-RTO: 0 /100 WBCS (ref 0–0)
PH UR STRIP.AUTO: 6 [PH]
PLATELET # BLD AUTO: 229 X10*3/UL (ref 150–450)
PROT UR STRIP.AUTO-MCNC: ABNORMAL MG/DL
PROT UR-ACNC: 144 MG/DL (ref 5–25)
PROT/CREAT UR: 1.36 MG/MG CREAT (ref 0–0.17)
RBC # BLD AUTO: 3.63 X10*6/UL (ref 4.5–5.9)
RBC # UR STRIP.AUTO: ABNORMAL /UL
RBC #/AREA URNS AUTO: >20 /HPF
SP GR UR STRIP.AUTO: 1.02
SQUAMOUS #/AREA URNS AUTO: ABNORMAL /HPF
TIBC SERPL-MCNC: ABNORMAL UG/DL
UIBC SERPL-MCNC: >450 UG/DL (ref 110–370)
UROBILINOGEN UR STRIP.AUTO-MCNC: ABNORMAL MG/DL
WBC # BLD AUTO: 3 X10*3/UL (ref 4.4–11.3)
WBC #/AREA URNS AUTO: >50 /HPF
WBC CLUMPS #/AREA URNS AUTO: ABNORMAL /HPF

## 2024-02-09 DIAGNOSIS — N18.4 ANEMIA DUE TO STAGE 4 CHRONIC KIDNEY DISEASE (MULTI): Primary | ICD-10-CM

## 2024-02-09 DIAGNOSIS — D63.1 ANEMIA DUE TO STAGE 4 CHRONIC KIDNEY DISEASE (MULTI): Primary | ICD-10-CM

## 2024-02-13 ENCOUNTER — ANTICOAGULATION - WARFARIN VISIT (OUTPATIENT)
Dept: CARDIOLOGY | Facility: CLINIC | Age: 79
End: 2024-02-13
Payer: MEDICARE

## 2024-02-13 DIAGNOSIS — I48.0 PAROXYSMAL ATRIAL FIBRILLATION (MULTI): Primary | ICD-10-CM

## 2024-02-13 NOTE — PROGRESS NOTES
Patient identification verified with 2 identifiers.    Location: [facility] Patient Self-Testing Program [phone]     Referring Physician: BANDAR MATTHEWS  Enrollment/ Re-enrollment date: 2024   INR Goal: 2.0-3.0  INR monitoring is per [facility] protocol.  Anticoagulation Medication: warfarin  Indication: Atrial Fibrillation/Atrial Flutter    Subjective   Bleeding signs/symptoms: No    Bruising: No   Major bleeding event: No  Thrombosis signs/symptoms: No  Thromboembolic event: No  Missed doses: No  Extra doses: No  Medication changes: No  Dietary changes: No  Change in health: No  Change in activity: No  Alcohol: No  Other concerns: No    Upcoming Procedures:  Does the Patient Have any upcoming procedures that require interruption in anticoagulation therapy? no  Does the patient require bridging? no      Anticoagulation Summary  As of 2024      INR goal:  2.0-3.0   TTR:  44.2 % (4.3 mo)   INR used for dosin.90 (2024)   Weekly warfarin total:  15 mg               Assessment/Plan   Subtherapeutic     1. New dose: Will increase dose and patient will retest in 1 week.    2. Next INR: 1 week      Education provided to patient during the visit:  Patient instructed to call in interim with questions, concerns and changes.

## 2024-02-19 ENCOUNTER — TELEPHONE (OUTPATIENT)
Dept: PRIMARY CARE | Facility: CLINIC | Age: 79
End: 2024-02-19
Payer: MEDICARE

## 2024-02-20 ENCOUNTER — PROCEDURE VISIT (OUTPATIENT)
Dept: UROLOGY | Facility: CLINIC | Age: 79
End: 2024-02-20
Payer: MEDICARE

## 2024-02-20 ENCOUNTER — ANTICOAGULATION - WARFARIN VISIT (OUTPATIENT)
Dept: CARDIOLOGY | Facility: CLINIC | Age: 79
End: 2024-02-20

## 2024-02-20 ENCOUNTER — TELEPHONE (OUTPATIENT)
Dept: NEPHROLOGY | Facility: CLINIC | Age: 79
End: 2024-02-20

## 2024-02-20 VITALS
WEIGHT: 237 LBS | HEART RATE: 60 BPM | SYSTOLIC BLOOD PRESSURE: 101 MMHG | HEIGHT: 72 IN | TEMPERATURE: 97.4 F | BODY MASS INDEX: 32.1 KG/M2 | DIASTOLIC BLOOD PRESSURE: 49 MMHG

## 2024-02-20 DIAGNOSIS — I48.0 PAROXYSMAL ATRIAL FIBRILLATION (MULTI): Primary | ICD-10-CM

## 2024-02-20 DIAGNOSIS — R31.0 GROSS HEMATURIA: Primary | ICD-10-CM

## 2024-02-20 LAB
INR IN PPP BY COAGULATION ASSAY EXTERNAL: 3.2
POC APPEARANCE, URINE: ABNORMAL
POC BILIRUBIN, URINE: NEGATIVE
POC BLOOD, URINE: NEGATIVE
POC COLOR, URINE: ABNORMAL
POC GLUCOSE, URINE: NEGATIVE MG/DL
POC KETONES, URINE: NEGATIVE MG/DL
POC LEUKOCYTES, URINE: ABNORMAL
POC NITRITE,URINE: POSITIVE
POC PH, URINE: 7 PH
POC PROTEIN, URINE: ABNORMAL MG/DL
POC SPECIFIC GRAVITY, URINE: 1.02
POC UROBILINOGEN, URINE: 1 EU/DL
PROTHROMBIN TIME (PT) IN PPP BY COAGULATION ASSAY EXTERNAL: NORMAL SECONDS

## 2024-02-20 PROCEDURE — 81002 URINALYSIS NONAUTO W/O SCOPE: CPT | Performed by: UROLOGY

## 2024-02-20 PROCEDURE — 87086 URINE CULTURE/COLONY COUNT: CPT

## 2024-02-20 PROCEDURE — 87181 SC STD AGAR DILUTION PER AGT: CPT

## 2024-02-20 PROCEDURE — 99212 OFFICE O/P EST SF 10 MIN: CPT | Performed by: UROLOGY

## 2024-02-20 RX ORDER — LEVOFLOXACIN 250 MG/1
250 TABLET ORAL DAILY
Qty: 7 TABLET | Refills: 0 | Status: SHIPPED | OUTPATIENT
Start: 2024-02-20 | End: 2024-03-08 | Stop reason: HOSPADM

## 2024-02-20 NOTE — PROGRESS NOTES
Subjective   Angel Carroll is a 78 y.o. male with history of nephrolithiasis s/p right ULLS on 02/11/2016, and BPH. He presents today for cystoscopy. Patient has complaints of urinary frequency and dysuria.       Past Medical History:   Diagnosis Date    Atherosclerotic heart disease of native coronary artery without angina pectoris 12/14/2022    Arteriosclerotic cardiovascular disease (ASCVD)    Benign neoplasm of meninges, unspecified (CMS/HCC)     Meningioma    Benign prostatic hyperplasia without lower urinary tract symptoms 02/28/2022    BPH without obstruction/lower urinary tract symptoms    Body mass index (BMI) 33.0-33.9, adult 07/02/2021    BMI 33.0-33.9,adult    Body mass index (BMI) 34.0-34.9, adult 11/09/2021    BMI 34.0-34.9,adult    Body mass index (BMI) 34.0-34.9, adult 09/08/2021    BMI 34.0-34.9,adult    Body mass index (BMI) 35.0-35.9, adult 12/29/2021    BMI 35.0-35.9,adult    Body mass index (BMI) 35.0-35.9, adult 02/28/2022    BMI 35.0-35.9,adult    Elevated blood-pressure reading, without diagnosis of hypertension     Prehypertension    Encounter for immunization 10/27/2015    Need for prophylactic vaccination and inoculation against influenza    Hyperlipidemia, unspecified 11/15/2021    Hyperlipidemia    Other conditions influencing health status     Nephrolithiasis    Personal history of other diseases of the musculoskeletal system and connective tissue     History of tendinitis    Personal history of other drug therapy     History of influenza vaccination    Personal history of other specified conditions 10/25/2022    History of bradycardia    Vitamin D deficiency, unspecified 10/08/2020    Vitamin D deficiency     Past Surgical History:   Procedure Laterality Date    BACK SURGERY  04/18/2016    Back Surgery    COLONOSCOPY  07/03/2013    Complete Colonoscopy    CORONARY ANGIOPLASTY WITH STENT PLACEMENT  08/18/2020    Cath Stent Placement    CT ANGIO NECK  9/25/2021    CT NECK ANGIO W AND WO IV  CONTRAST 9/25/2021 [facility]    CT HEAD ANGIO W AND WO IV CONTRAST  9/25/2021    CT HEAD ANGIO W AND WO IV CONTRAST 9/25/2021 [facility]    EYE SURGERY  04/03/2013    Eye Surgery    KNEE ARTHROSCOPY W/ DEBRIDEMENT  07/03/2013    Arthroscopy Knee Right    KNEE ARTHROSCOPY W/ DEBRIDEMENT  07/03/2013    Arthroscopy Knee Left    LITHOTRIPSY  07/22/2013    Renal Lithotripsy    MR HEAD ANGIO WO IV CONTRAST  3/23/2021    MR HEAD ANGIO WO IV CONTRAST [facility]    MR NECK ANGIO WO IV CONTRAST  3/23/2021    MR NECK ANGIO WO IV CONTRAST [facility]    OTHER SURGICAL HISTORY  04/03/2013    Cardiac Cath Procedure Outcome: Successful    OTHER SURGICAL HISTORY  04/03/2013    Neuroplasty With Transposition Of Ulnar Nerve - At Elbow    OTHER SURGICAL HISTORY  04/03/2013    General Surgery    OTHER SURGICAL HISTORY  06/11/2019    Knee replacement    OTHER SURGICAL HISTORY  07/22/2013    Cystoscopy With Insertion Of Ureteral Stent Bilateral    OTHER SURGICAL HISTORY  07/03/2013    Wrist Carpectomy    OTHER SURGICAL HISTORY  07/03/2013    Brain Surgery    OTHER SURGICAL HISTORY  07/03/2013    Ulnar Osteotomy    TONSILLECTOMY  04/03/2013    Tonsillectomy    TOTAL KNEE ARTHROPLASTY  08/03/2016    Total Knee Arthroplasty     Family History   Problem Relation Name Age of Onset    Diabetes Mother      Emphysema Father      Heart disease Father       Current Outpatient Medications   Medication Sig Dispense Refill    amiodarone (Pacerone) 200 mg tablet Take 1 tablet (200 mg) by mouth once daily. 90 tablet 3    aspirin 81 mg EC tablet Take 1 tablet (81 mg) by mouth once daily.      atorvastatin (Lipitor) 20 mg tablet TAKE 1 TABLET EVERY DAY 90 tablet 3    cyanocobalamin (Vitamin B-12) 500 mcg tablet Take 1 tablet (500 mcg) by mouth once daily.      ergocalciferol (Vitamin D-2) 1.25 MG (02902 UT) capsule Take 1 capsule (50,000 Units) by mouth 1 (one) time per week. 12 capsule 1    fenofibrate (Tricor) 48  mg tablet Take 1 tablet (48 mg) by mouth once daily. 90 tablet 1    ferrous sulfate 250 mg (50 mg iron) tablet extended release Take 1 tablet (250 mg) by mouth 2 times a day. 180 tablet 3    finasteride (Proscar) 5 mg tablet TAKE 1 TABLET EVERY DAY 90 tablet 10    folic acid (Folvite) 1 mg tablet TAKE 1 TABLET EVERY DAY 90 tablet 3    furosemide (Lasix) 20 mg tablet Take 1 tablet (20 mg) by mouth once daily. (Patient taking differently: Take 1 tablet (20 mg) by mouth once daily. 20MG TWICE A DAY) 90 tablet 1    gabapentin (Neurontin) 100 mg capsule TAKE 1 CAPSULE AT BEDTIME 90 capsule 0    Klor-Con M20 20 mEq ER tablet Take 1 tablet (20 mEq) by mouth once daily. 1/2 TAB TWICE A DAY      metoprolol succinate XL (Toprol-XL) 50 mg 24 hr tablet TAKE 1 TABLET EVERY DAY **DOSE CHANGE** 90 tablet 3    pantoprazole (ProtoNix) 40 mg EC tablet TAKE 1 TABLET EVERY DAY 90 tablet 3    PARoxetine (Paxil) 20 mg tablet TAKE 1 TABLET EVERY DAY 90 tablet 3    tamsulosin (Flomax) 0.4 mg 24 hr capsule TAKE 1 CAPSULE AT BEDTIME 90 capsule 10    warfarin (Coumadin) 3 mg tablet        No current facility-administered medications for this visit.     Allergies   Allergen Reactions    Other Unknown     Anti-Inflammatories    Ranolazine Unknown    Cyclobenzaprine Hallucinations, Unknown and Psychosis     Social History     Socioeconomic History    Marital status:      Spouse name: Not on file    Number of children: Not on file    Years of education: Not on file    Highest education level: Not on file   Occupational History    Not on file   Tobacco Use    Smoking status: Former     Types: Cigarettes     Quit date:      Years since quittin.1    Smokeless tobacco: Never   Substance and Sexual Activity    Alcohol use: Not Currently    Drug use: Never    Sexual activity: Not Currently   Other Topics Concern    Not on file   Social History Narrative    Not on file     Social Determinants of Health     Financial Resource Strain: Not  on file   Food Insecurity: Not on file   Transportation Needs: Not on file   Physical Activity: Not on file   Stress: Not on file   Social Connections: Not on file   Intimate Partner Violence: Not on file   Housing Stability: Not on file       Review of Systems  Pertinent items are noted in HPI.    Objective     Lab Review  Lab Results   Component Value Date    WBC 3.0 (L) 02/07/2024    RBC 3.63 (L) 02/07/2024    HGB 9.1 (L) 02/07/2024    HCT 32.0 (L) 02/07/2024     02/07/2024      Lab Results   Component Value Date    BUN 39 (H) 02/07/2024    CREATININE 2.67 (H) 02/07/2024        Urine analysis shows +leukocytes, +nitrites, +protein     Assessment/Plan   Diagnoses and all orders for this visit:  Gross hematuria  -     Cystourethroscopy  -     POCT UA (nonautomated) manually resulted    Gross hematuria   UTI    Patient is symptomatic for a UTI today and IO UA is consistent with a UTI.     We will start patient on antibiotics and send urine culture.    We will reschedule cystoscopy when UTI resolves.    All questions were answered to the patient's satisfaction. Patient agrees with the plan and wishes to proceed. Follow-up will be scheduled appropriately.     Scribed for Dr. Moe by Kathleen Barrera. I , Dr Moe, have personally reviewed and agreed with the information entered by the Virtual Scribe.

## 2024-02-20 NOTE — PROGRESS NOTES
Patient identification verified with 2 identifiers.    Location: [facility] Patient Self-Testing Program [phone]     Referring Physician: BANDAR MATTHEWS  Enrollment/ Re-enrollment date: 2/20/2024, RENEWAL SENT TO MD MATTHEWS 2/20/24  INR Goal: 2.0-3.0  INR monitoring is per [facility] protocol.  Anticoagulation Medication: warfarin  Indication: Atrial Fibrillation/Atrial Flutter    Subjective   Bleeding signs/symptoms: No    Bruising: No   Major bleeding event: No  Thrombosis signs/symptoms: No  Thromboembolic event: No  Missed doses: No  Extra doses: No  Medication changes: No  Dietary changes: No  Change in health: No  Change in activity: No  Alcohol: No  Other concerns: No    Upcoming Procedures:  Does the Patient Have any upcoming procedures that require interruption in anticoagulation therapy? no  Does the patient require bridging? no      Anticoagulation Summary  As of 2/20/2024      INR goal:  2.0-3.0   TTR:  45.9 % (4.5 mo)   INR used for dosing:  3.20 (2/20/2024)   Weekly warfarin total:  15 mg               Assessment/Plan   Supratherapeutic     1. New dose: WILL MAINTAIN TWD, BUT WILL GIVE ONE TIME TEMPORARY DOSE DECREASE FOR TODAY DUE TO ANTIBIOTICS  2. Next INR: 3 DAYS      Education provided to patient during the visit:  Patient instructed to call in interim with questions, concerns and changes.   Patient educated on interactions between medications and warfarin.   Patient educated on dietary consistency in vitamin k consumption.   Patient educated on signs of bleeding/clotting.   Patient educated on compliance with dosing, follow up appointments, and prescribed plan of care.

## 2024-02-21 PROBLEM — I48.0 PAROXYSMAL ATRIAL FIBRILLATION (MULTI): Status: ACTIVE | Noted: 2024-02-21

## 2024-02-23 ENCOUNTER — ANTICOAGULATION - WARFARIN VISIT (OUTPATIENT)
Dept: CARDIOLOGY | Facility: CLINIC | Age: 79
End: 2024-02-23
Payer: MEDICARE

## 2024-02-23 DIAGNOSIS — I48.0 PAROXYSMAL ATRIAL FIBRILLATION (MULTI): Primary | ICD-10-CM

## 2024-02-23 LAB
INR IN PPP BY COAGULATION ASSAY EXTERNAL: 2.5
PROTHROMBIN TIME (PT) IN PPP BY COAGULATION ASSAY EXTERNAL: NORMAL SECONDS

## 2024-02-23 NOTE — PROGRESS NOTES
Patient identification verified with 2 identifiers.    Location: [facility] Patient Self-Testing Program [phone]    Referring Physician: YAZ Martin MD  Enrollment/ Re-enrollment date: 2025   INR Goal: 2.0-3.0  INR monitoring is per [facility] protocol.  Anticoagulation Medication: warfarin  Indication: Atrial Fibrillation/Atrial Flutter    Subjective   Bleeding signs/symptoms: No    Bruising: No   Major bleeding event: No  Thrombosis signs/symptoms: No  Thromboembolic event: No  Missed doses: No  Extra doses: No  Medication changes: No  Levaquin for seven days finishing on 24  Dietary changes: No  Change in health: No  Change in activity: No  Alcohol: No  Other concerns: No    Upcoming Procedures:  Does the Patient Have any upcoming procedures that require interruption in anticoagulation therapy? no  Does the patient require bridging? no      Anticoagulation Summary  As of 2024      INR goal:  2.0-3.0   TTR:  46.5 % (4.6 mo)   INR used for dosin.50 (2024)   Weekly warfarin total:  15 mg               Assessment/Plan   Therapeutic     1. New dose: no change    2. Next INR:  24      Education provided to patient during the visit:  Patient instructed to call in interim with questions, concerns and changes.

## 2024-02-24 LAB — BACTERIA UR CULT: ABNORMAL

## 2024-02-26 DIAGNOSIS — N39.0 URINARY TRACT INFECTION WITHOUT HEMATURIA, SITE UNSPECIFIED: ICD-10-CM

## 2024-02-26 RX ORDER — LEVOFLOXACIN 250 MG/1
250 TABLET ORAL DAILY
Qty: 5 TABLET | Refills: 0 | Status: SHIPPED | OUTPATIENT
Start: 2024-03-15 | End: 2024-03-08 | Stop reason: HOSPADM

## 2024-02-27 ENCOUNTER — ANTICOAGULATION - WARFARIN VISIT (OUTPATIENT)
Dept: CARDIOLOGY | Facility: CLINIC | Age: 79
End: 2024-02-27
Payer: MEDICARE

## 2024-02-27 DIAGNOSIS — I48.0 PAROXYSMAL ATRIAL FIBRILLATION (MULTI): ICD-10-CM

## 2024-02-27 NOTE — PROGRESS NOTES
Patient identification verified with 2 identifiers.    Location: [facility] Patient Self-Testing Program [phone]    Referring Physician: YAZ Martin MD  Enrollment/ Re-enrollment date: 2/20/2025   INR Goal: 2.0-3.0  INR monitoring is per [facility] protocol.  Anticoagulation Medication: warfarin  Indication: Atrial Fibrillation/Atrial Flutter    Subjective   Bleeding signs/symptoms: No    Bruising: No   Major bleeding event: No  Thrombosis signs/symptoms: No  Thromboembolic event: No  Missed doses: No  Extra doses: No  Medication changes: Yes  INCREASED USE OF TYLENOL. JUST FINISHED LEVAQUIN.  Dietary changes: No  Change in health: No  Change in activity: No  Alcohol: No  Other concerns: No    Upcoming Procedures:  Does the Patient Have any upcoming procedures that require interruption in anticoagulation therapy? no  Does the patient require bridging? no      Anticoagulation Summary  As of 2/27/2024      INR goal:  2.0-3.0   TTR:  46.3 % (4.8 mo)   INR used for dosing:  3.70 (2/27/2024)   Weekly warfarin total:  13.5 mg                   1. New dose: Hold today's dose and decrease weekly dose   2. Next INR: 1 week    Education provided to patient during the visit:  Patient instructed to call in interim with questions, concerns and changes.

## 2024-02-29 ENCOUNTER — APPOINTMENT (OUTPATIENT)
Dept: RADIOLOGY | Facility: HOSPITAL | Age: 79
DRG: 378 | End: 2024-02-29
Payer: MEDICARE

## 2024-02-29 ENCOUNTER — APPOINTMENT (OUTPATIENT)
Dept: CARDIOLOGY | Facility: HOSPITAL | Age: 79
DRG: 378 | End: 2024-02-29
Payer: MEDICARE

## 2024-02-29 ENCOUNTER — HOSPITAL ENCOUNTER (INPATIENT)
Facility: HOSPITAL | Age: 79
LOS: 8 days | Discharge: NURSING, REHAB OR LONG-TERM CARE | DRG: 378 | End: 2024-03-08
Attending: INTERNAL MEDICINE | Admitting: INTERNAL MEDICINE
Payer: MEDICARE

## 2024-02-29 DIAGNOSIS — I48.20 CHRONIC ATRIAL FIBRILLATION (MULTI): ICD-10-CM

## 2024-02-29 DIAGNOSIS — Z12.11 COLON CANCER SCREENING: ICD-10-CM

## 2024-02-29 DIAGNOSIS — I48.0 PAROXYSMAL ATRIAL FIBRILLATION (MULTI): ICD-10-CM

## 2024-02-29 DIAGNOSIS — D50.0 BLOOD LOSS ANEMIA: Primary | ICD-10-CM

## 2024-02-29 DIAGNOSIS — J96.11 CHRONIC RESPIRATORY FAILURE WITH HYPOXIA (MULTI): ICD-10-CM

## 2024-02-29 DIAGNOSIS — N17.9 ACUTE RENAL FAILURE, UNSPECIFIED ACUTE RENAL FAILURE TYPE (CMS-HCC): ICD-10-CM

## 2024-02-29 DIAGNOSIS — K92.2 GI BLEED: ICD-10-CM

## 2024-02-29 DIAGNOSIS — K92.2 GASTROINTESTINAL HEMORRHAGE, UNSPECIFIED GASTROINTESTINAL HEMORRHAGE TYPE: ICD-10-CM

## 2024-02-29 DIAGNOSIS — I48.11 LONGSTANDING PERSISTENT ATRIAL FIBRILLATION (MULTI): ICD-10-CM

## 2024-02-29 DIAGNOSIS — I48.91 ATRIAL FIBRILLATION, UNSPECIFIED TYPE (MULTI): ICD-10-CM

## 2024-02-29 LAB
ABO GROUP (TYPE) IN BLOOD: NORMAL
ABO GROUP (TYPE) IN BLOOD: NORMAL
ALBUMIN SERPL-MCNC: 3.3 G/DL (ref 3.5–5)
ALP BLD-CCNC: 72 U/L (ref 35–125)
ALT SERPL-CCNC: 13 U/L (ref 5–40)
ANION GAP SERPL CALC-SCNC: >19 MMOL/L
ANTIBODY SCREEN: NORMAL
AORTIC VALVE PEAK VELOCITY: 1.25 M/S
APTT PPP: 30.8 SECONDS (ref 22–32.5)
AST SERPL-CCNC: 36 U/L (ref 5–40)
AV PEAK GRADIENT: 6.3 MMHG
AVA (PEAK VEL): 1.81 CM2
BASOPHILS # BLD AUTO: 0.01 X10*3/UL (ref 0–0.1)
BASOPHILS # BLD AUTO: 0.02 X10*3/UL (ref 0–0.1)
BASOPHILS NFR BLD AUTO: 0.1 %
BASOPHILS NFR BLD AUTO: 0.3 %
BILIRUB SERPL-MCNC: 0.5 MG/DL (ref 0.1–1.2)
BLOOD EXPIRATION DATE: NORMAL
BUN SERPL-MCNC: 62 MG/DL (ref 8–25)
CALCIUM SERPL-MCNC: 8.5 MG/DL (ref 8.5–10.4)
CHLORIDE SERPL-SCNC: 101 MMOL/L (ref 97–107)
CO2 SERPL-SCNC: 15 MMOL/L (ref 24–31)
CREAT SERPL-MCNC: 3.8 MG/DL (ref 0.4–1.6)
DISPENSE STATUS: NORMAL
EGFRCR SERPLBLD CKD-EPI 2021: 15 ML/MIN/1.73M*2
EOSINOPHIL # BLD AUTO: 0 X10*3/UL (ref 0–0.4)
EOSINOPHIL # BLD AUTO: 0 X10*3/UL (ref 0–0.4)
EOSINOPHIL NFR BLD AUTO: 0 %
EOSINOPHIL NFR BLD AUTO: 0 %
ERYTHROCYTE [DISTWIDTH] IN BLOOD BY AUTOMATED COUNT: 15.9 % (ref 11.5–14.5)
ERYTHROCYTE [DISTWIDTH] IN BLOOD BY AUTOMATED COUNT: 17 % (ref 11.5–14.5)
GLUCOSE BLD MANUAL STRIP-MCNC: 116 MG/DL (ref 74–99)
GLUCOSE BLD MANUAL STRIP-MCNC: 153 MG/DL (ref 74–99)
GLUCOSE BLD MANUAL STRIP-MCNC: 97 MG/DL (ref 74–99)
GLUCOSE SERPL-MCNC: 139 MG/DL (ref 65–99)
HCT VFR BLD AUTO: 18 % (ref 41–52)
HCT VFR BLD AUTO: 20.9 % (ref 41–52)
HGB BLD-MCNC: 4.9 G/DL (ref 13.5–17.5)
HGB BLD-MCNC: 6.4 G/DL (ref 13.5–17.5)
HGB BLD-MCNC: 7.1 G/DL (ref 13.5–17.5)
HYPOCHROMIA BLD QL SMEAR: NORMAL
IMM GRANULOCYTES # BLD AUTO: 0.1 X10*3/UL (ref 0–0.5)
IMM GRANULOCYTES # BLD AUTO: 0.2 X10*3/UL (ref 0–0.5)
IMM GRANULOCYTES NFR BLD AUTO: 1.5 % (ref 0–0.9)
IMM GRANULOCYTES NFR BLD AUTO: 2.3 % (ref 0–0.9)
INR PPP: 2.3 (ref 0.9–1.2)
INR PPP: 2.9 (ref 0.9–1.2)
LEFT ATRIUM VOLUME AREA LENGTH INDEX BSA: 34.7 ML/M2
LEFT VENTRICULAR OUTFLOW TRACT DIAMETER: 2.1 CM
LYMPHOCYTES # BLD AUTO: 0.76 X10*3/UL (ref 0.8–3)
LYMPHOCYTES # BLD AUTO: 0.94 X10*3/UL (ref 0.8–3)
LYMPHOCYTES NFR BLD AUTO: 11 %
LYMPHOCYTES NFR BLD AUTO: 11.5 %
MCH RBC QN AUTO: 24.1 PG (ref 26–34)
MCH RBC QN AUTO: 25.2 PG (ref 26–34)
MCHC RBC AUTO-ENTMCNC: 27.2 G/DL (ref 32–36)
MCHC RBC AUTO-ENTMCNC: 30.6 G/DL (ref 32–36)
MCV RBC AUTO: 82 FL (ref 80–100)
MCV RBC AUTO: 89 FL (ref 80–100)
MITRAL VALVE E/A RATIO: 2.86
MITRAL VALVE E/E' RATIO: 20.69
MONOCYTES # BLD AUTO: 0.65 X10*3/UL (ref 0.05–0.8)
MONOCYTES # BLD AUTO: 0.66 X10*3/UL (ref 0.05–0.8)
MONOCYTES NFR BLD AUTO: 10 %
MONOCYTES NFR BLD AUTO: 7.6 %
NEUTROPHILS # BLD AUTO: 5.09 X10*3/UL (ref 1.6–5.5)
NEUTROPHILS # BLD AUTO: 6.75 X10*3/UL (ref 1.6–5.5)
NEUTROPHILS NFR BLD AUTO: 76.7 %
NEUTROPHILS NFR BLD AUTO: 79 %
NRBC BLD-RTO: 0 /100 WBCS (ref 0–0)
NRBC BLD-RTO: 0.2 /100 WBCS (ref 0–0)
OVALOCYTES BLD QL SMEAR: NORMAL
PLATELET # BLD AUTO: 197 X10*3/UL (ref 150–450)
PLATELET # BLD AUTO: 256 X10*3/UL (ref 150–450)
POLYCHROMASIA BLD QL SMEAR: NORMAL
POTASSIUM SERPL-SCNC: 5.1 MMOL/L (ref 3.4–5.1)
PRODUCT BLOOD TYPE: 1700
PRODUCT BLOOD TYPE: 1700
PRODUCT BLOOD TYPE: 5100
PRODUCT CODE: NORMAL
PROT SERPL-MCNC: 5.6 G/DL (ref 5.9–7.9)
PROTHROMBIN TIME: 23.5 SECONDS (ref 9.3–12.7)
PROTHROMBIN TIME: 28.5 SECONDS (ref 9.3–12.7)
RBC # BLD AUTO: 2.03 X10*6/UL (ref 4.5–5.9)
RBC # BLD AUTO: 2.54 X10*6/UL (ref 4.5–5.9)
RBC MORPH BLD: NORMAL
RH FACTOR (ANTIGEN D): NORMAL
RH FACTOR (ANTIGEN D): NORMAL
RIGHT VENTRICLE FREE WALL PEAK S': 6.4 CM/S
RIGHT VENTRICLE PEAK SYSTOLIC PRESSURE: 39.1 MMHG
SODIUM SERPL-SCNC: 140 MMOL/L (ref 133–145)
TARGETS BLD QL SMEAR: NORMAL
TRICUSPID ANNULAR PLANE SYSTOLIC EXCURSION: 1.7 CM
UNIT ABO: NORMAL
UNIT NUMBER: NORMAL
UNIT RH: NORMAL
UNIT VOLUME: 350
WBC # BLD AUTO: 6.6 X10*3/UL (ref 4.4–11.3)
WBC # BLD AUTO: 8.6 X10*3/UL (ref 4.4–11.3)
XM INTEP: NORMAL

## 2024-02-29 PROCEDURE — 2500000002 HC RX 250 W HCPCS SELF ADMINISTERED DRUGS (ALT 637 FOR MEDICARE OP, ALT 636 FOR OP/ED): Performed by: INTERNAL MEDICINE

## 2024-02-29 PROCEDURE — 0DJ08ZZ INSPECTION OF UPPER INTESTINAL TRACT, VIA NATURAL OR ARTIFICIAL OPENING ENDOSCOPIC: ICD-10-PCS | Performed by: INTERNAL MEDICINE

## 2024-02-29 PROCEDURE — 80053 COMPREHEN METABOLIC PANEL: CPT | Performed by: EMERGENCY MEDICINE

## 2024-02-29 PROCEDURE — 99291 CRITICAL CARE FIRST HOUR: CPT

## 2024-02-29 PROCEDURE — C9113 INJ PANTOPRAZOLE SODIUM, VIA: HCPCS | Performed by: NURSE PRACTITIONER

## 2024-02-29 PROCEDURE — 36415 COLL VENOUS BLD VENIPUNCTURE: CPT | Performed by: EMERGENCY MEDICINE

## 2024-02-29 PROCEDURE — 2500000004 HC RX 250 GENERAL PHARMACY W/ HCPCS (ALT 636 FOR OP/ED): Performed by: EMERGENCY MEDICINE

## 2024-02-29 PROCEDURE — P9016 RBC LEUKOCYTES REDUCED: HCPCS

## 2024-02-29 PROCEDURE — 93010 ELECTROCARDIOGRAM REPORT: CPT | Performed by: INTERNAL MEDICINE

## 2024-02-29 PROCEDURE — 2500000004 HC RX 250 GENERAL PHARMACY W/ HCPCS (ALT 636 FOR OP/ED): Performed by: INTERNAL MEDICINE

## 2024-02-29 PROCEDURE — 82947 ASSAY GLUCOSE BLOOD QUANT: CPT

## 2024-02-29 PROCEDURE — 2060000001 HC INTERMEDIATE ICU ROOM DAILY

## 2024-02-29 PROCEDURE — 71045 X-RAY EXAM CHEST 1 VIEW: CPT | Performed by: RADIOLOGY

## 2024-02-29 PROCEDURE — 36430 TRANSFUSION BLD/BLD COMPNT: CPT

## 2024-02-29 PROCEDURE — 76770 US EXAM ABDO BACK WALL COMP: CPT

## 2024-02-29 PROCEDURE — 93306 TTE W/DOPPLER COMPLETE: CPT

## 2024-02-29 PROCEDURE — 2500000004 HC RX 250 GENERAL PHARMACY W/ HCPCS (ALT 636 FOR OP/ED): Performed by: NURSE PRACTITIONER

## 2024-02-29 PROCEDURE — 76770 US EXAM ABDO BACK WALL COMP: CPT | Performed by: RADIOLOGY

## 2024-02-29 PROCEDURE — C9113 INJ PANTOPRAZOLE SODIUM, VIA: HCPCS | Performed by: INTERNAL MEDICINE

## 2024-02-29 PROCEDURE — 86920 COMPATIBILITY TEST SPIN: CPT

## 2024-02-29 PROCEDURE — 93306 TTE W/DOPPLER COMPLETE: CPT | Performed by: INTERNAL MEDICINE

## 2024-02-29 PROCEDURE — 2500000004 HC RX 250 GENERAL PHARMACY W/ HCPCS (ALT 636 FOR OP/ED)

## 2024-02-29 PROCEDURE — 85025 COMPLETE CBC W/AUTO DIFF WBC: CPT | Performed by: INTERNAL MEDICINE

## 2024-02-29 PROCEDURE — 2500000001 HC RX 250 WO HCPCS SELF ADMINISTERED DRUGS (ALT 637 FOR MEDICARE OP): Performed by: INTERNAL MEDICINE

## 2024-02-29 PROCEDURE — 96374 THER/PROPH/DIAG INJ IV PUSH: CPT

## 2024-02-29 PROCEDURE — 73552 X-RAY EXAM OF FEMUR 2/>: CPT | Mod: LEFT SIDE | Performed by: RADIOLOGY

## 2024-02-29 PROCEDURE — 73552 X-RAY EXAM OF FEMUR 2/>: CPT | Mod: LT

## 2024-02-29 PROCEDURE — 85025 COMPLETE CBC W/AUTO DIFF WBC: CPT | Performed by: EMERGENCY MEDICINE

## 2024-02-29 PROCEDURE — 85610 PROTHROMBIN TIME: CPT | Performed by: EMERGENCY MEDICINE

## 2024-02-29 PROCEDURE — 71045 X-RAY EXAM CHEST 1 VIEW: CPT

## 2024-02-29 PROCEDURE — 85730 THROMBOPLASTIN TIME PARTIAL: CPT | Performed by: EMERGENCY MEDICINE

## 2024-02-29 PROCEDURE — 85610 PROTHROMBIN TIME: CPT | Performed by: INTERNAL MEDICINE

## 2024-02-29 PROCEDURE — 85018 HEMOGLOBIN: CPT | Performed by: INTERNAL MEDICINE

## 2024-02-29 PROCEDURE — 86901 BLOOD TYPING SEROLOGIC RH(D): CPT | Performed by: EMERGENCY MEDICINE

## 2024-02-29 PROCEDURE — 93005 ELECTROCARDIOGRAM TRACING: CPT

## 2024-02-29 RX ORDER — AMIODARONE HYDROCHLORIDE 200 MG/1
200 TABLET ORAL DAILY
Status: DISCONTINUED | OUTPATIENT
Start: 2024-02-29 | End: 2024-03-08 | Stop reason: HOSPADM

## 2024-02-29 RX ORDER — INSULIN LISPRO 100 [IU]/ML
0-10 INJECTION, SOLUTION INTRAVENOUS; SUBCUTANEOUS
Status: DISCONTINUED | OUTPATIENT
Start: 2024-02-29 | End: 2024-03-02

## 2024-02-29 RX ORDER — TAMSULOSIN HYDROCHLORIDE 0.4 MG/1
0.4 CAPSULE ORAL NIGHTLY
Status: DISCONTINUED | OUTPATIENT
Start: 2024-02-29 | End: 2024-03-08 | Stop reason: HOSPADM

## 2024-02-29 RX ORDER — ONDANSETRON HYDROCHLORIDE 2 MG/ML
4 INJECTION, SOLUTION INTRAVENOUS ONCE
Status: COMPLETED | OUTPATIENT
Start: 2024-02-29 | End: 2024-02-29

## 2024-02-29 RX ORDER — ONDANSETRON HYDROCHLORIDE 2 MG/ML
INJECTION, SOLUTION INTRAVENOUS
Status: COMPLETED
Start: 2024-02-29 | End: 2024-02-29

## 2024-02-29 RX ORDER — PAROXETINE HYDROCHLORIDE 20 MG/1
20 TABLET, FILM COATED ORAL DAILY
Status: DISCONTINUED | OUTPATIENT
Start: 2024-02-29 | End: 2024-03-08 | Stop reason: HOSPADM

## 2024-02-29 RX ORDER — DEXTROSE MONOHYDRATE 100 MG/ML
0.3 INJECTION, SOLUTION INTRAVENOUS ONCE AS NEEDED
Status: DISCONTINUED | OUTPATIENT
Start: 2024-02-29 | End: 2024-03-02

## 2024-02-29 RX ORDER — PANTOPRAZOLE SODIUM 40 MG/10ML
40 INJECTION, POWDER, LYOPHILIZED, FOR SOLUTION INTRAVENOUS 2 TIMES DAILY
Status: DISCONTINUED | OUTPATIENT
Start: 2024-02-29 | End: 2024-03-01

## 2024-02-29 RX ORDER — SODIUM CHLORIDE 9 MG/ML
125 INJECTION, SOLUTION INTRAVENOUS CONTINUOUS
Status: DISCONTINUED | OUTPATIENT
Start: 2024-02-29 | End: 2024-02-29

## 2024-02-29 RX ORDER — PANTOPRAZOLE SODIUM 40 MG/10ML
40 INJECTION, POWDER, LYOPHILIZED, FOR SOLUTION INTRAVENOUS 2 TIMES DAILY
Status: DISCONTINUED | OUTPATIENT
Start: 2024-02-29 | End: 2024-02-29

## 2024-02-29 RX ORDER — FINASTERIDE 5 MG/1
5 TABLET, FILM COATED ORAL DAILY
Status: DISCONTINUED | OUTPATIENT
Start: 2024-02-29 | End: 2024-03-08 | Stop reason: HOSPADM

## 2024-02-29 RX ORDER — METOPROLOL TARTRATE 25 MG/1
12.5 TABLET, FILM COATED ORAL EVERY 6 HOURS
Status: DISCONTINUED | OUTPATIENT
Start: 2024-02-29 | End: 2024-03-01

## 2024-02-29 RX ORDER — SODIUM CHLORIDE 9 MG/ML
75 INJECTION, SOLUTION INTRAVENOUS CONTINUOUS
Status: DISCONTINUED | OUTPATIENT
Start: 2024-02-29 | End: 2024-03-02

## 2024-02-29 RX ORDER — ONDANSETRON HYDROCHLORIDE 2 MG/ML
4 INJECTION, SOLUTION INTRAVENOUS EVERY 4 HOURS PRN
Status: DISCONTINUED | OUTPATIENT
Start: 2024-02-29 | End: 2024-03-08 | Stop reason: HOSPADM

## 2024-02-29 RX ORDER — DEXTROSE 50 % IN WATER (D50W) INTRAVENOUS SYRINGE
25
Status: DISCONTINUED | OUTPATIENT
Start: 2024-02-29 | End: 2024-03-02

## 2024-02-29 RX ORDER — ATORVASTATIN CALCIUM 20 MG/1
20 TABLET, FILM COATED ORAL NIGHTLY
Status: DISCONTINUED | OUTPATIENT
Start: 2024-02-29 | End: 2024-03-08 | Stop reason: HOSPADM

## 2024-02-29 RX ADMIN — SODIUM CHLORIDE 1000 ML: 9 INJECTION, SOLUTION INTRAVENOUS at 04:55

## 2024-02-29 RX ADMIN — PANTOPRAZOLE SODIUM 40 MG: 40 INJECTION, POWDER, FOR SOLUTION INTRAVENOUS at 21:27

## 2024-02-29 RX ADMIN — TAMSULOSIN HYDROCHLORIDE 0.4 MG: 0.4 CAPSULE ORAL at 21:27

## 2024-02-29 RX ADMIN — PAROXETINE HYDROCHLORIDE 20 MG: 20 TABLET, FILM COATED ORAL at 14:37

## 2024-02-29 RX ADMIN — ATORVASTATIN CALCIUM 20 MG: 20 TABLET, FILM COATED ORAL at 21:27

## 2024-02-29 RX ADMIN — ONDANSETRON 4 MG: 2 INJECTION INTRAMUSCULAR; INTRAVENOUS at 08:41

## 2024-02-29 RX ADMIN — DEXTROSE MONOHYDRATE 5 MG: 50 INJECTION, SOLUTION INTRAVENOUS at 07:40

## 2024-02-29 RX ADMIN — METOPROLOL TARTRATE 12.5 MG: 25 TABLET, FILM COATED ORAL at 21:26

## 2024-02-29 RX ADMIN — SODIUM CHLORIDE 75 ML/HR: 900 INJECTION, SOLUTION INTRAVENOUS at 19:09

## 2024-02-29 RX ADMIN — AMIODARONE HYDROCHLORIDE 200 MG: 200 TABLET ORAL at 08:41

## 2024-02-29 RX ADMIN — ONDANSETRON 4 MG: 2 INJECTION INTRAMUSCULAR; INTRAVENOUS at 05:08

## 2024-02-29 RX ADMIN — FINASTERIDE 5 MG: 5 TABLET, FILM COATED ORAL at 14:37

## 2024-02-29 RX ADMIN — METOPROLOL TARTRATE 12.5 MG: 25 TABLET, FILM COATED ORAL at 14:37

## 2024-02-29 RX ADMIN — ONDANSETRON HYDROCHLORIDE 4 MG: 2 INJECTION, SOLUTION INTRAVENOUS at 05:08

## 2024-02-29 RX ADMIN — PANTOPRAZOLE SODIUM 40 MG: 40 INJECTION, POWDER, FOR SOLUTION INTRAVENOUS at 09:23

## 2024-02-29 SDOH — SOCIAL STABILITY: SOCIAL INSECURITY: ARE YOU OR HAVE YOU BEEN THREATENED OR ABUSED PHYSICALLY, EMOTIONALLY, OR SEXUALLY BY ANYONE?: NO

## 2024-02-29 SDOH — SOCIAL STABILITY: SOCIAL INSECURITY: ABUSE: ADULT

## 2024-02-29 SDOH — SOCIAL STABILITY: SOCIAL INSECURITY: DOES ANYONE TRY TO KEEP YOU FROM HAVING/CONTACTING OTHER FRIENDS OR DOING THINGS OUTSIDE YOUR HOME?: NO

## 2024-02-29 SDOH — SOCIAL STABILITY: SOCIAL INSECURITY: DO YOU FEEL UNSAFE GOING BACK TO THE PLACE WHERE YOU ARE LIVING?: NO

## 2024-02-29 SDOH — SOCIAL STABILITY: SOCIAL INSECURITY: DO YOU FEEL ANYONE HAS EXPLOITED OR TAKEN ADVANTAGE OF YOU FINANCIALLY OR OF YOUR PERSONAL PROPERTY?: NO

## 2024-02-29 SDOH — SOCIAL STABILITY: SOCIAL INSECURITY: WERE YOU ABLE TO COMPLETE ALL THE BEHAVIORAL HEALTH SCREENINGS?: YES

## 2024-02-29 SDOH — SOCIAL STABILITY: SOCIAL INSECURITY: HAVE YOU HAD THOUGHTS OF HARMING ANYONE ELSE?: NO

## 2024-02-29 SDOH — SOCIAL STABILITY: SOCIAL INSECURITY: ARE THERE ANY APPARENT SIGNS OF INJURIES/BEHAVIORS THAT COULD BE RELATED TO ABUSE/NEGLECT?: NO

## 2024-02-29 SDOH — SOCIAL STABILITY: SOCIAL INSECURITY: HAS ANYONE EVER THREATENED TO HURT YOUR FAMILY OR YOUR PETS?: NO

## 2024-02-29 ASSESSMENT — ENCOUNTER SYMPTOMS
VOMITING: 0
DIARRHEA: 1
BLOOD IN STOOL: 1
CONSTIPATION: 0
WEAKNESS: 1
ABDOMINAL PAIN: 0
FATIGUE: 1
SHORTNESS OF BREATH: 1
NAUSEA: 1

## 2024-02-29 ASSESSMENT — COGNITIVE AND FUNCTIONAL STATUS - GENERAL
EATING MEALS: A LOT
MOVING TO AND FROM BED TO CHAIR: A LOT
PERSONAL GROOMING: A LOT
DAILY ACTIVITIY SCORE: 12
TURNING FROM BACK TO SIDE WHILE IN FLAT BAD: A LITTLE
HELP NEEDED FOR BATHING: A LOT
CLIMB 3 TO 5 STEPS WITH RAILING: A LOT
MOBILITY SCORE: 14
DRESSING REGULAR LOWER BODY CLOTHING: A LOT
MOVING FROM LYING ON BACK TO SITTING ON SIDE OF FLAT BED WITH BEDRAILS: A LITTLE
STANDING UP FROM CHAIR USING ARMS: A LOT
PATIENT BASELINE BEDBOUND: NO
DRESSING REGULAR UPPER BODY CLOTHING: A LOT
WALKING IN HOSPITAL ROOM: A LOT
TOILETING: A LOT

## 2024-02-29 ASSESSMENT — LIFESTYLE VARIABLES
AUDIT-C TOTAL SCORE: 0
AUDIT-C TOTAL SCORE: 0
HOW OFTEN DO YOU HAVE A DRINK CONTAINING ALCOHOL: NEVER
HOW OFTEN DO YOU HAVE 6 OR MORE DRINKS ON ONE OCCASION: NEVER
HOW MANY STANDARD DRINKS CONTAINING ALCOHOL DO YOU HAVE ON A TYPICAL DAY: PATIENT DOES NOT DRINK
SKIP TO QUESTIONS 9-10: 1

## 2024-02-29 ASSESSMENT — PAIN - FUNCTIONAL ASSESSMENT
PAIN_FUNCTIONAL_ASSESSMENT: 0-10

## 2024-02-29 ASSESSMENT — ACTIVITIES OF DAILY LIVING (ADL)
HEARING - LEFT EAR: HEARING AID
TOILETING: NEEDS ASSISTANCE
GROOMING: NEEDS ASSISTANCE
ASSISTIVE_DEVICE: WALKER
LACK_OF_TRANSPORTATION: NO
PATIENT'S MEMORY ADEQUATE TO SAFELY COMPLETE DAILY ACTIVITIES?: YES
JUDGMENT_ADEQUATE_SAFELY_COMPLETE_DAILY_ACTIVITIES: YES
BATHING: NEEDS ASSISTANCE
ADEQUATE_TO_COMPLETE_ADL: YES
HEARING - RIGHT EAR: HEARING AID
WALKS IN HOME: NEEDS ASSISTANCE
DRESSING YOURSELF: NEEDS ASSISTANCE
FEEDING YOURSELF: NEEDS ASSISTANCE

## 2024-02-29 ASSESSMENT — PAIN DESCRIPTION - PAIN TYPE: TYPE: ACUTE PAIN

## 2024-02-29 ASSESSMENT — PAIN SCALES - GENERAL
PAINLEVEL_OUTOF10: 0 - NO PAIN
PAINLEVEL_OUTOF10: 0 - NO PAIN
PAINLEVEL_OUTOF10: 5 - MODERATE PAIN
PAINLEVEL_OUTOF10: 0 - NO PAIN

## 2024-02-29 ASSESSMENT — PAIN DESCRIPTION - ORIENTATION: ORIENTATION: MID

## 2024-02-29 ASSESSMENT — PATIENT HEALTH QUESTIONNAIRE - PHQ9
1. LITTLE INTEREST OR PLEASURE IN DOING THINGS: NOT AT ALL
SUM OF ALL RESPONSES TO PHQ9 QUESTIONS 1 & 2: 0
2. FEELING DOWN, DEPRESSED OR HOPELESS: NOT AT ALL

## 2024-02-29 ASSESSMENT — PAIN DESCRIPTION - DESCRIPTORS: DESCRIPTORS: ACHING

## 2024-02-29 ASSESSMENT — PAIN DESCRIPTION - LOCATION: LOCATION: NECK

## 2024-02-29 ASSESSMENT — COLUMBIA-SUICIDE SEVERITY RATING SCALE - C-SSRS
1. IN THE PAST MONTH, HAVE YOU WISHED YOU WERE DEAD OR WISHED YOU COULD GO TO SLEEP AND NOT WAKE UP?: NO
2. HAVE YOU ACTUALLY HAD ANY THOUGHTS OF KILLING YOURSELF?: NO
6. HAVE YOU EVER DONE ANYTHING, STARTED TO DO ANYTHING, OR PREPARED TO DO ANYTHING TO END YOUR LIFE?: NO

## 2024-02-29 NOTE — ED PROVIDER NOTES
HPI   Chief Complaint   Patient presents with   • Rectal Bleeding   • Fall       HPI  78-year-old male history of atrial fibrillation on Coumadin presents with complaint of rectal bleeding.  The patient states has been bleeding for the last 4 days worse today.  He got up to go to the bathroom and felt weak.  States that he slid down to the floor he did not fall.  No chest pain or shortness of breath or dizziness or lightheadedness or nausea or vomiting.  Is been off his Coumadin for the last 2 days as his INR was 3.7 on February 27.  No fevers or chills.  No history in recent illness.  No other complaints.                  [place] Coma Scale Score: 15                     Patient History   Past Medical History:   Diagnosis Date   • Atherosclerotic heart disease of native coronary artery without angina pectoris 12/14/2022    Arteriosclerotic cardiovascular disease (ASCVD)   • Benign neoplasm of meninges, unspecified (CMS/HCC)     Meningioma   • Benign prostatic hyperplasia without lower urinary tract symptoms 02/28/2022    BPH without obstruction/lower urinary tract symptoms   • Body mass index (BMI) 33.0-33.9, adult 07/02/2021    BMI 33.0-33.9,adult   • Body mass index (BMI) 34.0-34.9, adult 11/09/2021    BMI 34.0-34.9,adult   • Body mass index (BMI) 34.0-34.9, adult 09/08/2021    BMI 34.0-34.9,adult   • Body mass index (BMI) 35.0-35.9, adult 12/29/2021    BMI 35.0-35.9,adult   • Body mass index (BMI) 35.0-35.9, adult 02/28/2022    BMI 35.0-35.9,adult   • Elevated blood-pressure reading, without diagnosis of hypertension     Prehypertension   • Encounter for immunization 10/27/2015    Need for prophylactic vaccination and inoculation against influenza   • Hyperlipidemia, unspecified 11/15/2021    Hyperlipidemia   • Other conditions influencing health status     Nephrolithiasis   • Personal history of other diseases of the musculoskeletal system and connective tissue     History of tendinitis   • Personal history of  other drug therapy     History of influenza vaccination   • Personal history of other specified conditions 10/25/2022    History of bradycardia   • Vitamin D deficiency, unspecified 10/08/2020    Vitamin D deficiency     Past Surgical History:   Procedure Laterality Date   • BACK SURGERY  04/18/2016    Back Surgery   • COLONOSCOPY  07/03/2013    Complete Colonoscopy   • CORONARY ANGIOPLASTY WITH STENT PLACEMENT  08/18/2020    Cath Stent Placement   • CT ANGIO NECK  9/25/2021    CT NECK ANGIO W AND WO IV CONTRAST 9/25/2021 [facility]   • CT HEAD ANGIO W AND WO IV CONTRAST  9/25/2021    CT HEAD ANGIO W AND WO IV CONTRAST 9/25/2021 [facility]   • EYE SURGERY  04/03/2013    Eye Surgery   • KNEE ARTHROSCOPY W/ DEBRIDEMENT  07/03/2013    Arthroscopy Knee Right   • KNEE ARTHROSCOPY W/ DEBRIDEMENT  07/03/2013    Arthroscopy Knee Left   • LITHOTRIPSY  07/22/2013    Renal Lithotripsy   • MR HEAD ANGIO WO IV CONTRAST  3/23/2021    MR HEAD ANGIO WO IV CONTRAST [facility]   • MR NECK ANGIO WO IV CONTRAST  3/23/2021    MR NECK ANGIO WO IV CONTRAST [facility]   • OTHER SURGICAL HISTORY  04/03/2013    Cardiac Cath Procedure Outcome: Successful   • OTHER SURGICAL HISTORY  04/03/2013    Neuroplasty With Transposition Of Ulnar Nerve - At Elbow   • OTHER SURGICAL HISTORY  04/03/2013    General Surgery   • OTHER SURGICAL HISTORY  06/11/2019    Knee replacement   • OTHER SURGICAL HISTORY  07/22/2013    Cystoscopy With Insertion Of Ureteral Stent Bilateral   • OTHER SURGICAL HISTORY  07/03/2013    Wrist Carpectomy   • OTHER SURGICAL HISTORY  07/03/2013    Brain Surgery   • OTHER SURGICAL HISTORY  07/03/2013    Ulnar Osteotomy   • TONSILLECTOMY  04/03/2013    Tonsillectomy   • TOTAL KNEE ARTHROPLASTY  08/03/2016    Total Knee Arthroplasty     Family History   Problem Relation Name Age of Onset   • Diabetes Mother     • Emphysema Father     • Heart disease Father       Social History     Tobacco Use    • Smoking status: Former     Types: Cigarettes     Quit date:      Years since quittin.1   • Smokeless tobacco: Never   Substance Use Topics   • Alcohol use: Not Currently   • Drug use: Never       Physical Exam   ED Triage Vitals   Temp Heart Rate Respirations BP   -- 24    60 16 (!) 99/39      SpO2 Temp src Heart Rate Source Patient Position   -- -- -- 24      Lying      BP Location FiO2 (%)     24 --     Right arm        Physical Exam  General:  Awake, alert, no acute distress.  Head: Normocephalic, Atraumatic  Neck: Supple, trachea midline, no stridor  Skin: Warm and dry, no rashes, pale  Lungs: Clear to auscultation bilaterally no acute respiratory distress, speaking in full sentences without difficulty  CV: Regular Rate Rhythm with no obvious murmurs gallops rubs noted, no jugular venous distention, no pedal edema   Abdomen: Soft, nontender, nondistended, positive bowel sounds, no peritoneal signs  Rectum: Good rectal tone, no external hemorrhoids or fissures, reddish stool  Neuro:  No gross focal neurologic deficits, NIH is 0  Musculoskeletal:  Full range of motion in all 4 extremities  Psychiatric:  Alert oriented x 3, Good insight into condition.  ED Course & MDM   ED Course as of 24   Thu 2024   0504 Patient is grossly Hemoccult positive with reddish stool. [KW]   0532 Patient was ordered 2 units packed red blood cells to be transfused over 4 hours [KW]   0546 EKG:  Paced rhythm 60 bpm QTc 596 no ectopy or acute ischemic changes noted [KW]      ED Course User Index  [KW] Alexei Fagan DO         Diagnoses as of 24   GI bleed   Gastrointestinal hemorrhage, unspecified gastrointestinal hemorrhage type       Medical Decision Making  Patient's hemoglobin is 4.9 he was typed and crossed for 2 units packed red blood cells.  His blood pressure is slightly low at 99/53 he was administered 1 L of normal saline  and 4 mg of Zofran.  His RR is 2.9 which is improved from his INR of 3.9 on February 27.  Patient obviously requires admission to the hospital.  I contacted the hospitalist for admission.    32 minutes of total critical care time includes review of laboratory data, radiology results, discussion with consultants, and monitoring for potential decompensation.  Interventions performed as documented above.  Total care time is exclusive of any separately billable procedures    Procedure  Procedures     Alexei Fagan,   02/29/24 0518       Alexei Fagan, DO  02/29/24 2152       Alexei Fagan, DO  03/08/24 2226

## 2024-02-29 NOTE — SIGNIFICANT EVENT
Clinical update to history and physical done by my partner this morning.    Patient has received 2 units of packed red blood cells, hemoglobin up to 6.4, another has been ordered.  Overall he feels improved, though not back to baseline.  EGD is planned for tomorrow; lower GI scoping possible but pending attending recommendation.    He does have a noted JASBIR on his CKD, likely worsened by the anemia and hypoperfusion, and ask for nephrology input.  In the meantime I believe the best support is the transfusions.

## 2024-02-29 NOTE — H&P
History Of Present Illness  Angel Carroll is a 78 y.o. male presenting with weakness.  This man has a history of A-fib and is on Coumadin.  He has had recent issues with UTIs and kidney stones and just ministered course of Levaquin.  He has had some black tarry stools but he is also had some bright red blood per rectum.  No vomiting no abdominal pain.  No worsening shortness of breath or chest pain.  No fevers or chills.  In the ER he has been found to have a hemoglobin of 4.9 and 2 units of packed red cells have been ordered.     Past Medical History  Past Medical History:   Diagnosis Date    Atherosclerotic heart disease of native coronary artery without angina pectoris 12/14/2022    Arteriosclerotic cardiovascular disease (ASCVD)    Benign neoplasm of meninges, unspecified (CMS/HCC)     Meningioma    Benign prostatic hyperplasia without lower urinary tract symptoms 02/28/2022    BPH without obstruction/lower urinary tract symptoms    Body mass index (BMI) 33.0-33.9, adult 07/02/2021    BMI 33.0-33.9,adult    Body mass index (BMI) 34.0-34.9, adult 11/09/2021    BMI 34.0-34.9,adult    Body mass index (BMI) 34.0-34.9, adult 09/08/2021    BMI 34.0-34.9,adult    Body mass index (BMI) 35.0-35.9, adult 12/29/2021    BMI 35.0-35.9,adult    Body mass index (BMI) 35.0-35.9, adult 02/28/2022    BMI 35.0-35.9,adult    Elevated blood-pressure reading, without diagnosis of hypertension     Prehypertension    Encounter for immunization 10/27/2015    Need for prophylactic vaccination and inoculation against influenza    Hyperlipidemia, unspecified 11/15/2021    Hyperlipidemia    Other conditions influencing health status     Nephrolithiasis    Personal history of other diseases of the musculoskeletal system and connective tissue     History of tendinitis    Personal history of other drug therapy     History of influenza vaccination    Personal history of other specified conditions 10/25/2022    History of bradycardia    Vitamin D  deficiency, unspecified 10/08/2020    Vitamin D deficiency       Surgical History  Past Surgical History:   Procedure Laterality Date    BACK SURGERY  04/18/2016    Back Surgery    COLONOSCOPY  07/03/2013    Complete Colonoscopy    CORONARY ANGIOPLASTY WITH STENT PLACEMENT  08/18/2020    Cath Stent Placement    CT ANGIO NECK  9/25/2021    CT NECK ANGIO W AND WO IV CONTRAST 9/25/2021 [facility]    CT HEAD ANGIO W AND WO IV CONTRAST  9/25/2021    CT HEAD ANGIO W AND WO IV CONTRAST 9/25/2021 [facility]    EYE SURGERY  04/03/2013    Eye Surgery    KNEE ARTHROSCOPY W/ DEBRIDEMENT  07/03/2013    Arthroscopy Knee Right    KNEE ARTHROSCOPY W/ DEBRIDEMENT  07/03/2013    Arthroscopy Knee Left    LITHOTRIPSY  07/22/2013    Renal Lithotripsy    MR HEAD ANGIO WO IV CONTRAST  3/23/2021    MR HEAD ANGIO WO IV CONTRAST [facility]    MR NECK ANGIO WO IV CONTRAST  3/23/2021    MR NECK ANGIO WO IV CONTRAST [facility]    OTHER SURGICAL HISTORY  04/03/2013    Cardiac Cath Procedure Outcome: Successful    OTHER SURGICAL HISTORY  04/03/2013    Neuroplasty With Transposition Of Ulnar Nerve - At Elbow    OTHER SURGICAL HISTORY  04/03/2013    General Surgery    OTHER SURGICAL HISTORY  06/11/2019    Knee replacement    OTHER SURGICAL HISTORY  07/22/2013    Cystoscopy With Insertion Of Ureteral Stent Bilateral    OTHER SURGICAL HISTORY  07/03/2013    Wrist Carpectomy    OTHER SURGICAL HISTORY  07/03/2013    Brain Surgery    OTHER SURGICAL HISTORY  07/03/2013    Ulnar Osteotomy    TONSILLECTOMY  04/03/2013    Tonsillectomy    TOTAL KNEE ARTHROPLASTY  08/03/2016    Total Knee Arthroplasty        Social History  He reports that he quit smoking about 30 years ago. His smoking use included cigarettes. He has never used smokeless tobacco. He reports that he does not currently use alcohol. He reports that he does not use drugs.    Family History  Family History   Problem Relation Name Age of Onset    Diabetes  Mother      Emphysema Father      Heart disease Father          Allergies  Other, Ranolazine, and Cyclobenzaprine    Review of Systems see HPI for pertinent positives and negatives     Physical Exam  Very hard of hearing but he is alert oriented nondistressed  Head atraumatic normocephalic  Pupils equal round reactive light extraocular motion intact  Mouth normal-appearing tongue and oropharynx  Neck supple without thyromegaly  Lymph nodes no cervical or axillary lymphadenopathy  Heart regular rate and rhythm  Lungs clear to auscultation  Abdomen soft nontender nondistended  Extremities 2+ pitting bilateral leg edema  Neuro cranial nerves intact with good tone strength arms and legs  Skin no rashes or ulcerations  Musculoskeletal normal passive range of motion shoulders elbows hips and knees     Last Recorded Vitals  Blood pressure 81/57, pulse 68, temperature 36.3 °C (97.4 °F), temperature source Oral, resp. rate 19, height 1.829 m (6'), weight 108 kg (237 lb), SpO2 100 %.    Relevant Results  Creatinine acutely elevated 3.8 baseline is 2.67 hemoglobin 4.9 PT/INR 2.9  Assessment/Plan   #1 GI bleed-2 units packed red cells are ordered.  Will repeat labs at noon.  PT/INR 2.9.  I am ordering vitamin K right now.  Clear liquid diet.  IV Protonix.  No more Coumadin.  GI consult.  2-A-fib and CHF-he is on home O2 and according to his daughter this is because of congestive heart failure?  Will get a chest x-ray she shows evidence of gross fluid overload but his blood pressure is low and he is got acute kidney failure so I we will for now we will hold off on diuretics.  He takes amiodarone for A-fib which I will order.  He takes metoprolol 50 mg a day.  I will order this is 12.5 mg every 6.  3-2 kidney failure.  Check renal ultrasound.  Hopefully by supporting his hemodynamics and transfusing him we can get his kidney function to return to baseline    Kwaku Ramírez MD

## 2024-02-29 NOTE — CONSULTS
CONSULT: Nephrology SERVICE    SERVICE DATE: 2/29/2024   SERVICE TIME:  4:33 PM    REASON FOR CONSULT: Acute renal failure on top of chronic kidney disease  REQUESTING PHYSICIAN: Dr. Parekh  PRIMARY CARE PHYSICIAN: Jung Tao MD    ASSESSMENT AND PLAN  78-year-old man with atrial fibrillation on anticoagulation admitted with acute on chronic renal failure in the context of profound anemia.  1.  Acute renal failure  2.  Acute metabolic acidosis  3.  Hyperkalemia  4.  Hypotension  5.  Acute blood loss anemia  6.  Chronic kidney disease stage IV  7.  Kidney stones    Acute renal failure on top of advanced chronic kidney disease stage III in the context of severe anemia requiring multiple rounds of blood transfusion.  He needs continued blood transfusion to keep his hemoglobin above 7.  I also think he needs a short course of IV fluids.  He was normal saline at 75 mL an hour for now.  He is acidotic, but I would defer bicarbonate supplementation at present.  No potassium supplements, no diuretics.  The potassium is borderline high.  Blood pressure remains slightly low, hopefully this will respond to volume in the form of crystalloids and colloids.  GI investigation is ongoing, sounds like he will get an EGD tomorrow.  Baseline creatinine had been in the low twos, he may reach a new baseline creatinine.  Kidney stones are asymptomatic at present.  Case discussed with Dr. Parekh.  Thank you for the consultation.  I will follow this patient.       SUBJECTIVE  Mr. Carroll is a 78 y.o. man with a history of atrial fibrillation, coronary artery disease, and advanced CKD admitted with rectal bleeding and weakness, consulted for acute on chronic renal failure.  This patient follows with Levine, Susan. [facility]"" nephrology.  He has a baseline serum creatinine that had been in the low twos range, but most recent outpatient reading was 2.7.  He was admitted with a creatinine well above this in the context of a  hemoglobin of 5.  He has received numerous rounds of blood transfusions.  He describes recent melenic stools, hematochezia, profound weakness.  Blood pressure has been slightly low.  He regularly maintains on furosemide.  Also takes potassium supplementation.  He is undergoing GI evaluation.  He feels a lot better today, no major complaints during my evaluation.  He is tired and weak.    PAST MEDICAL HISTORY:   Past Medical History:   Diagnosis Date    Atherosclerotic heart disease of native coronary artery without angina pectoris 12/14/2022    Arteriosclerotic cardiovascular disease (ASCVD)    Benign neoplasm of meninges, unspecified (CMS/HCC)     Meningioma    Benign prostatic hyperplasia without lower urinary tract symptoms 02/28/2022    BPH without obstruction/lower urinary tract symptoms    Body mass index (BMI) 33.0-33.9, adult 07/02/2021    BMI 33.0-33.9,adult    Body mass index (BMI) 34.0-34.9, adult 11/09/2021    BMI 34.0-34.9,adult    Body mass index (BMI) 34.0-34.9, adult 09/08/2021    BMI 34.0-34.9,adult    Body mass index (BMI) 35.0-35.9, adult 12/29/2021    BMI 35.0-35.9,adult    Body mass index (BMI) 35.0-35.9, adult 02/28/2022    BMI 35.0-35.9,adult    Elevated blood-pressure reading, without diagnosis of hypertension     Prehypertension    Encounter for immunization 10/27/2015    Need for prophylactic vaccination and inoculation against influenza    Hyperlipidemia, unspecified 11/15/2021    Hyperlipidemia    Other conditions influencing health status     Nephrolithiasis    Personal history of other diseases of the musculoskeletal system and connective tissue     History of tendinitis    Personal history of other drug therapy     History of influenza vaccination    Personal history of other specified conditions 10/25/2022    History of bradycardia    Vitamin D deficiency, unspecified 10/08/2020    Vitamin D deficiency     PAST SURGICAL HISTORY:   Past Surgical History:   Procedure Laterality Date     BACK SURGERY  2016    Back Surgery    COLONOSCOPY  2013    Complete Colonoscopy    CORONARY ANGIOPLASTY WITH STENT PLACEMENT  2020    Cath Stent Placement    CT ANGIO NECK  2021    CT NECK ANGIO W AND WO IV CONTRAST 2021 [facility]    CT HEAD ANGIO W AND WO IV CONTRAST  2021    CT HEAD ANGIO W AND WO IV CONTRAST 2021 [facility]    EYE SURGERY  2013    Eye Surgery    KNEE ARTHROSCOPY W/ DEBRIDEMENT  2013    Arthroscopy Knee Right    KNEE ARTHROSCOPY W/ DEBRIDEMENT  2013    Arthroscopy Knee Left    LITHOTRIPSY  2013    Renal Lithotripsy    MR HEAD ANGIO WO IV CONTRAST  3/23/2021    MR HEAD ANGIO WO IV CONTRAST [facility]    MR NECK ANGIO WO IV CONTRAST  3/23/2021    MR NECK ANGIO WO IV CONTRAST [facility]    OTHER SURGICAL HISTORY  2013    Cardiac Cath Procedure Outcome: Successful    OTHER SURGICAL HISTORY  2013    Neuroplasty With Transposition Of Ulnar Nerve - At Elbow    OTHER SURGICAL HISTORY  2013    General Surgery    OTHER SURGICAL HISTORY  2019    Knee replacement    OTHER SURGICAL HISTORY  2013    Cystoscopy With Insertion Of Ureteral Stent Bilateral    OTHER SURGICAL HISTORY  2013    Wrist Carpectomy    OTHER SURGICAL HISTORY  2013    Brain Surgery    OTHER SURGICAL HISTORY  2013    Ulnar Osteotomy    TONSILLECTOMY  2013    Tonsillectomy    TOTAL KNEE ARTHROPLASTY  2016    Total Knee Arthroplasty     FAMILY HISTORY:   Family History   Problem Relation Name Age of Onset    Diabetes Mother      Emphysema Father      Heart disease Father       SOCIAL HISTORY:   Social History     Tobacco Use    Smoking status: Former     Types: Cigarettes     Quit date:      Years since quittin.1    Smokeless tobacco: Never   Substance Use Topics    Alcohol use: Not Currently    Drug use: Never     MEDICATIONS:  amiodarone, 200 mg, oral, Daily  atorvastatin,  20 mg, oral, Nightly  finasteride, 5 mg, oral, Daily  insulin lispro, 0-10 Units, subcutaneous, TID with meals  metoprolol tartrate, 12.5 mg, oral, q6h  pantoprazole, 40 mg, intravenous, BID  PARoxetine, 20 mg, oral, Daily  tamsulosin, 0.4 mg, oral, Nightly       sodium chloride 0.9%, 75 mL/hr       PRN medications: dextrose 10 % in water (D10W), dextrose, glucagon, ondansetron   CURRENT ALLERGIES:   Allergies as of 02/29/2024 - Reviewed 02/29/2024   Allergen Reaction Noted    Other Unknown 09/12/2023    Ranolazine Unknown 09/12/2023    Cyclobenzaprine Hallucinations, Unknown, and Psychosis 07/30/2018       COMPLETE REVIEW OF SYSTEMS:    Full ROS was negative unless mentioned above    OBJECTIVE  PHYSICAL EXAM  Heart Rate:  []   Temp:  [35.9 °C (96.6 °F)-36.3 °C (97.4 °F)]   Resp:  [13-22]   BP: ()/(39-65)   Height:  [182.9 cm (6')]   Weight:  [108 kg (237 lb)]   SpO2:  [84 %-100 %]    Body mass index is 32.14 kg/m².  This is a chronically ill-appearing mildly toxic elderly white man  Very pale skin  Hearing intact  Phonation intact  Moist mucosa  Normal S1/normal S2  Lungs are clear to auscultation bilaterally  Abdomen is soft, nondistended, nontender, positive bowel sounds  External urinary catheter in place, no suprapubic tenderness to palpation  No extremity edema  Moves 4 limbs spontaneously  No obvious joint deformities  No lymphadenopathy    DATA:   Labs:  Results for orders placed or performed during the hospital encounter of 02/29/24 (from the past 96 hour(s))   CBC and Auto Differential   Result Value Ref Range    WBC 8.6 4.4 - 11.3 x10*3/uL    nRBC 0.2 (H) 0.0 - 0.0 /100 WBCs    RBC 2.03 (L) 4.50 - 5.90 x10*6/uL    Hemoglobin 4.9 (LL) 13.5 - 17.5 g/dL    Hematocrit 18.0 (L) 41.0 - 52.0 %    MCV 89 80 - 100 fL    MCH 24.1 (L) 26.0 - 34.0 pg    MCHC 27.2 (L) 32.0 - 36.0 g/dL    RDW 17.0 (H) 11.5 - 14.5 %    Platelets 256 150 - 450 x10*3/uL    Neutrophils % 79.0 40.0 - 80.0 %    Immature  Granulocytes %, Automated 2.3 (H) 0.0 - 0.9 %    Lymphocytes % 11.0 13.0 - 44.0 %    Monocytes % 7.6 2.0 - 10.0 %    Eosinophils % 0.0 0.0 - 6.0 %    Basophils % 0.1 0.0 - 2.0 %    Neutrophils Absolute 6.75 (H) 1.60 - 5.50 x10*3/uL    Immature Granulocytes Absolute, Automated 0.20 0.00 - 0.50 x10*3/uL    Lymphocytes Absolute 0.94 0.80 - 3.00 x10*3/uL    Monocytes Absolute 0.65 0.05 - 0.80 x10*3/uL    Eosinophils Absolute 0.00 0.00 - 0.40 x10*3/uL    Basophils Absolute 0.01 0.00 - 0.10 x10*3/uL   Comprehensive metabolic panel   Result Value Ref Range    Glucose 139 (H) 65 - 99 mg/dL    Sodium 140 133 - 145 mmol/L    Potassium 5.1 3.4 - 5.1 mmol/L    Chloride 101 97 - 107 mmol/L    Bicarbonate 15 (L) 24 - 31 mmol/L    Urea Nitrogen 62 (H) 8 - 25 mg/dL    Creatinine 3.80 (H) 0.40 - 1.60 mg/dL    eGFR 15 (L) >60 mL/min/1.73m*2    Calcium 8.5 8.5 - 10.4 mg/dL    Albumin 3.3 (L) 3.5 - 5.0 g/dL    Alkaline Phosphatase 72 35 - 125 U/L    Total Protein 5.6 (L) 5.9 - 7.9 g/dL    AST 36 5 - 40 U/L    Bilirubin, Total 0.5 0.1 - 1.2 mg/dL    ALT 13 5 - 40 U/L    Anion Gap >19 (H) <=19 mmol/L   Protime-INR   Result Value Ref Range    Protime 28.5 (H) 9.3 - 12.7 seconds    INR 2.9 (H) 0.9 - 1.2   APTT   Result Value Ref Range    aPTT 30.8 22.0 - 32.5 seconds   Type And Screen   Result Value Ref Range    ABO TYPE B     Rh TYPE POS     ANTIBODY SCREEN NEG    Morphology   Result Value Ref Range    RBC Morphology See Below     Polychromasia Mild     Hypochromia Mild     Target Cells Few     Ovalocytes Few    Prepare RBC: 2 Units   Result Value Ref Range    PRODUCT CODE S7807S37     Unit Number [id number]     Unit ABO B     Unit RH NEG     XM INTEP COMP     Dispense Status TR     Blood Expiration Date March 26, 2024 23:59 EDT     PRODUCT BLOOD TYPE 1700     UNIT VOLUME 350     PRODUCT CODE A5067R07     Unit Number [id number]     Unit ABO O     Unit RH POS     XM INTEP COMP     Dispense Status TR     Blood Expiration Date  March 26, 2024 23:59 EDT     PRODUCT BLOOD TYPE 5100     UNIT VOLUME 350    VERIFY ABO/Rh Group Test   Result Value Ref Range    ABO TYPE B     Rh TYPE POS    CBC and Auto Differential   Result Value Ref Range    WBC 6.6 4.4 - 11.3 x10*3/uL    nRBC 0.0 0.0 - 0.0 /100 WBCs    RBC 2.54 (L) 4.50 - 5.90 x10*6/uL    Hemoglobin 6.4 (LL) 13.5 - 17.5 g/dL    Hematocrit 20.9 (L) 41.0 - 52.0 %    MCV 82 80 - 100 fL    MCH 25.2 (L) 26.0 - 34.0 pg    MCHC 30.6 (L) 32.0 - 36.0 g/dL    RDW 15.9 (H) 11.5 - 14.5 %    Platelets 197 150 - 450 x10*3/uL    Neutrophils % 76.7 40.0 - 80.0 %    Immature Granulocytes %, Automated 1.5 (H) 0.0 - 0.9 %    Lymphocytes % 11.5 13.0 - 44.0 %    Monocytes % 10.0 2.0 - 10.0 %    Eosinophils % 0.0 0.0 - 6.0 %    Basophils % 0.3 0.0 - 2.0 %    Neutrophils Absolute 5.09 1.60 - 5.50 x10*3/uL    Immature Granulocytes Absolute, Automated 0.10 0.00 - 0.50 x10*3/uL    Lymphocytes Absolute 0.76 (L) 0.80 - 3.00 x10*3/uL    Monocytes Absolute 0.66 0.05 - 0.80 x10*3/uL    Eosinophils Absolute 0.00 0.00 - 0.40 x10*3/uL    Basophils Absolute 0.02 0.00 - 0.10 x10*3/uL   Protime-INR   Result Value Ref Range    Protime 23.5 (H) 9.3 - 12.7 seconds    INR 2.3 (H) 0.9 - 1.2   POCT GLUCOSE   Result Value Ref Range    POCT Glucose 116 (H) 74 - 99 mg/dL   Prepare RBC: 1 Units   Result Value Ref Range    PRODUCT CODE L1882L99     Unit Number [id number]     Unit ABO B     Unit RH NEG     XM INTEP COMP     Dispense Status IS     Blood Expiration Date March 27, 2024 23:59 EDT     PRODUCT BLOOD TYPE 1700     UNIT VOLUME 350    Transthoracic Echo (TTE) Complete   Result Value Ref Range    BSA 2.34 m2     *Note: Due to a large number of results and/or encounters for the requested time period, some results have not been displayed. A complete set of results can be found in Results Review.       SIGNATURE: Hiram Capellan MD PATIENT NAME: Angel Carroll   DATE: February 29, 2024 MRN: [id number]   TIME: 4:33 PM PAGER: [phone]

## 2024-02-29 NOTE — ED TRIAGE NOTES
Pt presents via EMS with c/o rectal bleeding and a fall. Pt states the rectal bleeding started last Friday. He saw his PCP on Monday. He was taken off his coumadin at that time. Pt states the rectal bleeding has gotten worse. Pt is a poor historian and is not sure how he fell and does not remember if he hit his head. Per EMS his wife witnessed the fall and she denied LOC.

## 2024-02-29 NOTE — CONSULTS
"Consults    Reason For Consult  GI Bleed    History Of Present Illness  Angel Carroll is a 78 y.o. male presenting with weakness. Found evidence normocytic anemia with hgb 4.9. On 2/7/24 he was 9.1. He mentions black stools starting last week but recently started iron supplement. He started having some red bloody stools on Tuesday. Takes Coumadin for A Fib. INR 2.9. He reports some intermittent epigastric discomfort and nausea. Had a prior ulcer \"a really long time ago.\" Prior colonoscopy per Dr Oakley 5 years ago      Past Medical History  He has a past medical history of Atherosclerotic heart disease of native coronary artery without angina pectoris (12/14/2022), Benign neoplasm of meninges, unspecified (CMS/HCC), Benign prostatic hyperplasia without lower urinary tract symptoms (02/28/2022), Body mass index (BMI) 33.0-33.9, adult (07/02/2021), Body mass index (BMI) 34.0-34.9, adult (11/09/2021), Body mass index (BMI) 34.0-34.9, adult (09/08/2021), Body mass index (BMI) 35.0-35.9, adult (12/29/2021), Body mass index (BMI) 35.0-35.9, adult (02/28/2022), Elevated blood-pressure reading, without diagnosis of hypertension, Encounter for immunization (10/27/2015), Hyperlipidemia, unspecified (11/15/2021), Other conditions influencing health status, Personal history of other diseases of the musculoskeletal system and connective tissue, Personal history of other drug therapy, Personal history of other specified conditions (10/25/2022), and Vitamin D deficiency, unspecified (10/08/2020).    Surgical History  He has a past surgical history that includes Other surgical history (04/03/2013); Other surgical history (04/03/2013); Other surgical history (04/03/2013); Eye surgery (04/03/2013); Tonsillectomy (04/03/2013); Total knee arthroplasty (08/03/2016); Other surgical history (06/11/2019); Back surgery (04/18/2016); Lithotripsy (07/22/2013); Other surgical history (07/22/2013); Colonoscopy (07/03/2013); Other surgical history " (07/03/2013); Knee arthroscopy w/ debridement (07/03/2013); Knee arthroscopy w/ debridement (07/03/2013); Other surgical history (07/03/2013); Other surgical history (07/03/2013); Coronary angioplasty with stent (08/18/2020); CT angio head w and wo IV contrast (9/25/2021); CT angio neck (9/25/2021); MR angio head wo IV contrast (3/23/2021); and MR angio neck wo IV contrast (3/23/2021).     Social History  He reports that he quit smoking about 30 years ago. His smoking use included cigarettes. He has never used smokeless tobacco. He reports that he does not currently use alcohol. He reports that he does not use drugs.    Family History  Family History   Problem Relation Name Age of Onset    Diabetes Mother      Emphysema Father      Heart disease Father          Allergies  Other, Ranolazine, and Cyclobenzaprine    Review of Systems   Constitutional:  Positive for fatigue.   Respiratory:  Positive for shortness of breath.    Gastrointestinal:  Positive for blood in stool, diarrhea and nausea. Negative for abdominal pain, constipation and vomiting.   Neurological:  Positive for weakness.        Physical Exam  Vitals reviewed.   Constitutional:       General: He is awake.      Appearance: Normal appearance.   HENT:      Head: Normocephalic and atraumatic.      Mouth/Throat:      Mouth: Mucous membranes are moist.   Cardiovascular:      Rate and Rhythm: Normal rate and regular rhythm.   Pulmonary:      Effort: Pulmonary effort is normal.      Breath sounds: Normal breath sounds.   Abdominal:      General: There is no distension.      Palpations: Abdomen is soft.      Tenderness: There is abdominal tenderness. There is no guarding.   Musculoskeletal:      Cervical back: Normal range of motion and neck supple.   Skin:     General: Skin is warm and dry.      Coloration: Skin is pale.   Neurological:      General: No focal deficit present.      Mental Status: He is alert and oriented to person, place, and time. Mental status  is at baseline.   Psychiatric:         Attention and Perception: Attention and perception normal.         Mood and Affect: Mood normal.         Behavior: Behavior normal.          Last Recorded Vitals  Blood pressure 99/63, pulse 90, temperature 36.3 °C (97.3 °F), temperature source Oral, resp. rate 18, height 1.829 m (6'), weight 108 kg (237 lb), SpO2 (!) 92 %.    Relevant Results  Results for orders placed or performed during the hospital encounter of 02/29/24 (from the past 24 hour(s))   CBC and Auto Differential   Result Value Ref Range    WBC 8.6 4.4 - 11.3 x10*3/uL    nRBC 0.2 (H) 0.0 - 0.0 /100 WBCs    RBC 2.03 (L) 4.50 - 5.90 x10*6/uL    Hemoglobin 4.9 (LL) 13.5 - 17.5 g/dL    Hematocrit 18.0 (L) 41.0 - 52.0 %    MCV 89 80 - 100 fL    MCH 24.1 (L) 26.0 - 34.0 pg    MCHC 27.2 (L) 32.0 - 36.0 g/dL    RDW 17.0 (H) 11.5 - 14.5 %    Platelets 256 150 - 450 x10*3/uL    Neutrophils % 79.0 40.0 - 80.0 %    Immature Granulocytes %, Automated 2.3 (H) 0.0 - 0.9 %    Lymphocytes % 11.0 13.0 - 44.0 %    Monocytes % 7.6 2.0 - 10.0 %    Eosinophils % 0.0 0.0 - 6.0 %    Basophils % 0.1 0.0 - 2.0 %    Neutrophils Absolute 6.75 (H) 1.60 - 5.50 x10*3/uL    Immature Granulocytes Absolute, Automated 0.20 0.00 - 0.50 x10*3/uL    Lymphocytes Absolute 0.94 0.80 - 3.00 x10*3/uL    Monocytes Absolute 0.65 0.05 - 0.80 x10*3/uL    Eosinophils Absolute 0.00 0.00 - 0.40 x10*3/uL    Basophils Absolute 0.01 0.00 - 0.10 x10*3/uL   Comprehensive metabolic panel   Result Value Ref Range    Glucose 139 (H) 65 - 99 mg/dL    Sodium 140 133 - 145 mmol/L    Potassium 5.1 3.4 - 5.1 mmol/L    Chloride 101 97 - 107 mmol/L    Bicarbonate 15 (L) 24 - 31 mmol/L    Urea Nitrogen 62 (H) 8 - 25 mg/dL    Creatinine 3.80 (H) 0.40 - 1.60 mg/dL    eGFR 15 (L) >60 mL/min/1.73m*2    Calcium 8.5 8.5 - 10.4 mg/dL    Albumin 3.3 (L) 3.5 - 5.0 g/dL    Alkaline Phosphatase 72 35 - 125 U/L    Total Protein 5.6 (L) 5.9 - 7.9 g/dL    AST 36 5 - 40 U/L    Bilirubin,  Total 0.5 0.1 - 1.2 mg/dL    ALT 13 5 - 40 U/L    Anion Gap >19 (H) <=19 mmol/L   Protime-INR   Result Value Ref Range    Protime 28.5 (H) 9.3 - 12.7 seconds    INR 2.9 (H) 0.9 - 1.2   APTT   Result Value Ref Range    aPTT 30.8 22.0 - 32.5 seconds   Type And Screen   Result Value Ref Range    ABO TYPE B     Rh TYPE POS     ANTIBODY SCREEN NEG    Morphology   Result Value Ref Range    RBC Morphology See Below     Polychromasia Mild     Hypochromia Mild     Target Cells Few     Ovalocytes Few    Prepare RBC: 2 Units   Result Value Ref Range    PRODUCT CODE X7560V77     Unit Number [id number]     Unit ABO B     Unit RH NEG     XM INTEP COMP     Dispense Status TR     Blood Expiration Date March 26, 2024 23:59 EDT     PRODUCT BLOOD TYPE 1700     UNIT VOLUME 350     PRODUCT CODE N8461P89     Unit Number [id number]     Unit ABO O     Unit RH POS     XM INTEP COMP     Dispense Status XM     Blood Expiration Date March 26, 2024 23:59 EDT     PRODUCT BLOOD TYPE 5100     UNIT VOLUME 350    VERIFY ABO/Rh Group Test   Result Value Ref Range    ABO TYPE B     Rh TYPE POS      *Note: Due to a large number of results and/or encounters for the requested time period, some results have not been displayed. A complete set of results can be found in Results Review.     XR chest 1 view    Result Date: 2/29/2024  Interpreted By:  Malik Alonso, STUDY: XR CHEST 1 VIEW; 2/29/2024 6:48 am   INDICATION: Signs/Symptoms:chf   COMPARISON: None   ACCESSION NUMBER(S): [id number]   ORDERING CLINICIAN: KO SINGER   FINDINGS: The study is limited due to rotation. A pacemaker is again noted, with the leads overlying the right atrium and right ventricle. The cardiac silhouette appears mildly prominent, exaggerated by the technique. Calcifications involve the aortic arch. There is no pneumothorax, confluent infiltrates or significant effusion. The osseous structures are unchanged.       Allowing for the aforementioned limitation, no acute  cardiopulmonary disease.   Signed by: Malik Alonso 2/29/2024 8:07 AM Dictation workstation:   [id number]        Assessment/Plan     Anemia, Melena (Normocytic 4.9, +IRENE)   -He had hgb 9.1 two weeks ago, new black and red bloody stools over the past week. He will need an EGD    -Plan for EGD tomorrow once INR decreases and HH increased    -PPI BID    -Full liquid diet    -NPO after midnight    -Colonoscopy vs flex sig timing per attending     Anticoagulation (Coumadin INR 2.9)    -Vit K given    -Repeat INR in AM    -Hold pending endoscopy     I spent 30 minutes in the professional and overall care of this patient.

## 2024-03-01 ENCOUNTER — APPOINTMENT (OUTPATIENT)
Dept: GASTROENTEROLOGY | Facility: HOSPITAL | Age: 79
DRG: 378 | End: 2024-03-01
Payer: MEDICARE

## 2024-03-01 ENCOUNTER — ANESTHESIA EVENT (OUTPATIENT)
Dept: GASTROENTEROLOGY | Facility: HOSPITAL | Age: 79
DRG: 378 | End: 2024-03-01
Payer: MEDICARE

## 2024-03-01 ENCOUNTER — ANESTHESIA (OUTPATIENT)
Dept: GASTROENTEROLOGY | Facility: HOSPITAL | Age: 79
DRG: 378 | End: 2024-03-01
Payer: MEDICARE

## 2024-03-01 LAB
ALBUMIN SERPL-MCNC: 3.1 G/DL (ref 3.5–5)
ANION GAP SERPL CALC-SCNC: 11 MMOL/L
APPEARANCE UR: CLEAR
BACTERIA #/AREA URNS AUTO: ABNORMAL /HPF
BILIRUB UR STRIP.AUTO-MCNC: NEGATIVE MG/DL
BLOOD EXPIRATION DATE: NORMAL
BUN SERPL-MCNC: 66 MG/DL (ref 8–25)
CALCIUM SERPL-MCNC: 8 MG/DL (ref 8.5–10.4)
CHLORIDE SERPL-SCNC: 106 MMOL/L (ref 97–107)
CO2 SERPL-SCNC: 23 MMOL/L (ref 24–31)
COLOR UR: YELLOW
CREAT SERPL-MCNC: 3.5 MG/DL (ref 0.4–1.6)
CREAT UR-MCNC: 161.5 MG/DL
DISPENSE STATUS: NORMAL
EGFRCR SERPLBLD CKD-EPI 2021: 17 ML/MIN/1.73M*2
ERYTHROCYTE [DISTWIDTH] IN BLOOD BY AUTOMATED COUNT: 16.9 % (ref 11.5–14.5)
GLUCOSE BLD MANUAL STRIP-MCNC: 106 MG/DL (ref 74–99)
GLUCOSE BLD MANUAL STRIP-MCNC: 115 MG/DL (ref 74–99)
GLUCOSE BLD MANUAL STRIP-MCNC: 140 MG/DL (ref 74–99)
GLUCOSE BLD MANUAL STRIP-MCNC: 94 MG/DL (ref 74–99)
GLUCOSE SERPL-MCNC: 93 MG/DL (ref 65–99)
GLUCOSE UR STRIP.AUTO-MCNC: NORMAL MG/DL
HCT VFR BLD AUTO: 21.3 % (ref 41–52)
HGB BLD-MCNC: 6.8 G/DL (ref 13.5–17.5)
INR PPP: 1.2 (ref 0.9–1.2)
KETONES UR STRIP.AUTO-MCNC: NEGATIVE MG/DL
LEUKOCYTE ESTERASE UR QL STRIP.AUTO: ABNORMAL
MCH RBC QN AUTO: 26.4 PG (ref 26–34)
MCHC RBC AUTO-ENTMCNC: 31.9 G/DL (ref 32–36)
MCV RBC AUTO: 83 FL (ref 80–100)
MUCOUS THREADS #/AREA URNS AUTO: ABNORMAL /LPF
NITRITE UR QL STRIP.AUTO: NEGATIVE
NRBC BLD-RTO: 0 /100 WBCS (ref 0–0)
PH UR STRIP.AUTO: 5 [PH]
PHOSPHATE SERPL-MCNC: 3.3 MG/DL (ref 2.5–4.5)
PLATELET # BLD AUTO: 187 X10*3/UL (ref 150–450)
POTASSIUM SERPL-SCNC: 4.5 MMOL/L (ref 3.4–5.1)
PRODUCT BLOOD TYPE: 1700
PRODUCT CODE: NORMAL
PROT UR STRIP.AUTO-MCNC: ABNORMAL MG/DL
PROTHROMBIN TIME: 12.7 SECONDS (ref 9.3–12.7)
RBC # BLD AUTO: 2.58 X10*6/UL (ref 4.5–5.9)
RBC # UR STRIP.AUTO: NEGATIVE /UL
RBC #/AREA URNS AUTO: ABNORMAL /HPF
SODIUM SERPL-SCNC: 140 MMOL/L (ref 133–145)
SODIUM UR-SCNC: <20 MMOL/L
SODIUM/CREAT UR-RTO: NORMAL
SP GR UR STRIP.AUTO: 1.02
UNIT ABO: NORMAL
UNIT NUMBER: NORMAL
UNIT RH: NORMAL
UNIT VOLUME: 400
UROBILINOGEN UR STRIP.AUTO-MCNC: NORMAL MG/DL
WBC # BLD AUTO: 6.1 X10*3/UL (ref 4.4–11.3)
WBC #/AREA URNS AUTO: ABNORMAL /HPF
XM INTEP: NORMAL

## 2024-03-01 PROCEDURE — 36430 TRANSFUSION BLD/BLD COMPNT: CPT

## 2024-03-01 PROCEDURE — 7100000002 HC RECOVERY ROOM TIME - EACH INCREMENTAL 1 MINUTE

## 2024-03-01 PROCEDURE — 80069 RENAL FUNCTION PANEL: CPT | Performed by: INTERNAL MEDICINE

## 2024-03-01 PROCEDURE — 85027 COMPLETE CBC AUTOMATED: CPT | Performed by: INTERNAL MEDICINE

## 2024-03-01 PROCEDURE — 85610 PROTHROMBIN TIME: CPT | Performed by: INTERNAL MEDICINE

## 2024-03-01 PROCEDURE — 7100000001 HC RECOVERY ROOM TIME - INITIAL BASE CHARGE

## 2024-03-01 PROCEDURE — 36415 COLL VENOUS BLD VENIPUNCTURE: CPT | Performed by: INTERNAL MEDICINE

## 2024-03-01 PROCEDURE — 2500000001 HC RX 250 WO HCPCS SELF ADMINISTERED DRUGS (ALT 637 FOR MEDICARE OP): Performed by: INTERNAL MEDICINE

## 2024-03-01 PROCEDURE — 3700000001 HC GENERAL ANESTHESIA TIME - INITIAL BASE CHARGE

## 2024-03-01 PROCEDURE — 43235 EGD DIAGNOSTIC BRUSH WASH: CPT | Performed by: INTERNAL MEDICINE

## 2024-03-01 PROCEDURE — A43235 PR ESOPHAGOGASTRODUODENOSCOPY TRANSORAL DIAGNOSTIC: Performed by: ANESTHESIOLOGY

## 2024-03-01 PROCEDURE — 82947 ASSAY GLUCOSE BLOOD QUANT: CPT

## 2024-03-01 PROCEDURE — 81001 URINALYSIS AUTO W/SCOPE: CPT | Performed by: INTERNAL MEDICINE

## 2024-03-01 PROCEDURE — A43235 PR ESOPHAGOGASTRODUODENOSCOPY TRANSORAL DIAGNOSTIC: Performed by: ANESTHESIOLOGIST ASSISTANT

## 2024-03-01 PROCEDURE — 2500000004 HC RX 250 GENERAL PHARMACY W/ HCPCS (ALT 636 FOR OP/ED): Performed by: ANESTHESIOLOGIST ASSISTANT

## 2024-03-01 PROCEDURE — 2060000001 HC INTERMEDIATE ICU ROOM DAILY

## 2024-03-01 PROCEDURE — 2500000002 HC RX 250 W HCPCS SELF ADMINISTERED DRUGS (ALT 637 FOR MEDICARE OP, ALT 636 FOR OP/ED): Performed by: INTERNAL MEDICINE

## 2024-03-01 PROCEDURE — 3700000002 HC GENERAL ANESTHESIA TIME - EACH INCREMENTAL 1 MINUTE

## 2024-03-01 PROCEDURE — 99100 ANES PT EXTEME AGE<1 YR&>70: CPT | Performed by: ANESTHESIOLOGY

## 2024-03-01 PROCEDURE — 97162 PT EVAL MOD COMPLEX 30 MIN: CPT | Mod: GP

## 2024-03-01 PROCEDURE — 82570 ASSAY OF URINE CREATININE: CPT | Performed by: INTERNAL MEDICINE

## 2024-03-01 PROCEDURE — P9016 RBC LEUKOCYTES REDUCED: HCPCS

## 2024-03-01 RX ORDER — LIDOCAINE HYDROCHLORIDE 10 MG/ML
0.1 INJECTION INFILTRATION; PERINEURAL ONCE
Status: DISCONTINUED | OUTPATIENT
Start: 2024-03-01 | End: 2024-03-08 | Stop reason: HOSPADM

## 2024-03-01 RX ORDER — PANTOPRAZOLE SODIUM 40 MG/1
40 TABLET, DELAYED RELEASE ORAL DAILY
Status: DISCONTINUED | OUTPATIENT
Start: 2024-03-01 | End: 2024-03-08 | Stop reason: HOSPADM

## 2024-03-01 RX ORDER — PROPOFOL 10 MG/ML
INJECTION, EMULSION INTRAVENOUS AS NEEDED
Status: DISCONTINUED | OUTPATIENT
Start: 2024-03-01 | End: 2024-03-01

## 2024-03-01 RX ORDER — ONDANSETRON HYDROCHLORIDE 2 MG/ML
4 INJECTION, SOLUTION INTRAVENOUS ONCE AS NEEDED
Status: DISCONTINUED | OUTPATIENT
Start: 2024-03-01 | End: 2024-03-08 | Stop reason: HOSPADM

## 2024-03-01 RX ORDER — LABETALOL HYDROCHLORIDE 5 MG/ML
5 INJECTION, SOLUTION INTRAVENOUS ONCE AS NEEDED
Status: DISCONTINUED | OUTPATIENT
Start: 2024-03-01 | End: 2024-03-08 | Stop reason: HOSPADM

## 2024-03-01 RX ORDER — FENTANYL CITRATE 50 UG/ML
25 INJECTION, SOLUTION INTRAMUSCULAR; INTRAVENOUS EVERY 5 MIN PRN
Status: ACTIVE | OUTPATIENT
Start: 2024-03-01 | End: 2024-03-01

## 2024-03-01 RX ORDER — SODIUM CHLORIDE, SODIUM LACTATE, POTASSIUM CHLORIDE, CALCIUM CHLORIDE 600; 310; 30; 20 MG/100ML; MG/100ML; MG/100ML; MG/100ML
100 INJECTION, SOLUTION INTRAVENOUS CONTINUOUS
Status: DISCONTINUED | OUTPATIENT
Start: 2024-03-01 | End: 2024-03-01

## 2024-03-01 RX ORDER — METOPROLOL TARTRATE 25 MG/1
12.5 TABLET, FILM COATED ORAL 2 TIMES DAILY
Status: DISCONTINUED | OUTPATIENT
Start: 2024-03-01 | End: 2024-03-08 | Stop reason: HOSPADM

## 2024-03-01 RX ORDER — OXYCODONE HYDROCHLORIDE 5 MG/1
5 TABLET ORAL EVERY 4 HOURS PRN
Status: ACTIVE | OUTPATIENT
Start: 2024-03-01 | End: 2024-03-01

## 2024-03-01 RX ORDER — FENTANYL CITRATE 50 UG/ML
50 INJECTION, SOLUTION INTRAMUSCULAR; INTRAVENOUS EVERY 5 MIN PRN
Status: ACTIVE | OUTPATIENT
Start: 2024-03-01 | End: 2024-03-01

## 2024-03-01 RX ADMIN — FINASTERIDE 5 MG: 5 TABLET, FILM COATED ORAL at 09:43

## 2024-03-01 RX ADMIN — PANTOPRAZOLE SODIUM 40 MG: 40 TABLET, DELAYED RELEASE ORAL at 09:43

## 2024-03-01 RX ADMIN — PROPOFOL 20 MG: 10 INJECTION, EMULSION INTRAVENOUS at 07:47

## 2024-03-01 RX ADMIN — AMIODARONE HYDROCHLORIDE 200 MG: 200 TABLET ORAL at 09:43

## 2024-03-01 RX ADMIN — PROPOFOL 50 MG: 10 INJECTION, EMULSION INTRAVENOUS at 07:45

## 2024-03-01 RX ADMIN — METOPROLOL TARTRATE 12.5 MG: 25 TABLET, FILM COATED ORAL at 09:46

## 2024-03-01 RX ADMIN — TAMSULOSIN HYDROCHLORIDE 0.4 MG: 0.4 CAPSULE ORAL at 20:55

## 2024-03-01 RX ADMIN — METOPROLOL TARTRATE 12.5 MG: 25 TABLET, FILM COATED ORAL at 20:55

## 2024-03-01 RX ADMIN — ATORVASTATIN CALCIUM 20 MG: 20 TABLET, FILM COATED ORAL at 20:54

## 2024-03-01 RX ADMIN — PAROXETINE HYDROCHLORIDE 20 MG: 20 TABLET, FILM COATED ORAL at 09:43

## 2024-03-01 RX ADMIN — METOPROLOL TARTRATE 12.5 MG: 25 TABLET, FILM COATED ORAL at 01:51

## 2024-03-01 SDOH — SOCIAL STABILITY: SOCIAL NETWORK: HOW OFTEN DO YOU GET TOGETHER WITH FRIENDS OR RELATIVES?: THREE TIMES A WEEK

## 2024-03-01 SDOH — HEALTH STABILITY: MENTAL HEALTH
STRESS IS WHEN SOMEONE FEELS TENSE, NERVOUS, ANXIOUS, OR CAN'T SLEEP AT NIGHT BECAUSE THEIR MIND IS TROUBLED. HOW STRESSED ARE YOU?: ONLY A LITTLE

## 2024-03-01 SDOH — ECONOMIC STABILITY: HOUSING INSECURITY: IN THE LAST 12 MONTHS, HOW MANY PLACES HAVE YOU LIVED?: 1

## 2024-03-01 SDOH — HEALTH STABILITY: PHYSICAL HEALTH: ON AVERAGE, HOW MANY MINUTES DO YOU ENGAGE IN EXERCISE AT THIS LEVEL?: 0 MIN

## 2024-03-01 SDOH — SOCIAL STABILITY: SOCIAL INSECURITY
WITHIN THE LAST YEAR, HAVE YOU BEEN KICKED, HIT, SLAPPED, OR OTHERWISE PHYSICALLY HURT BY YOUR PARTNER OR EX-PARTNER?: NO

## 2024-03-01 SDOH — HEALTH STABILITY: MENTAL HEALTH: HOW OFTEN DO YOU HAVE A DRINK CONTAINING ALCOHOL?: NEVER

## 2024-03-01 SDOH — ECONOMIC STABILITY: TRANSPORTATION INSECURITY
IN THE PAST 12 MONTHS, HAS LACK OF TRANSPORTATION KEPT YOU FROM MEETINGS, WORK, OR FROM GETTING THINGS NEEDED FOR DAILY LIVING?: NO

## 2024-03-01 SDOH — HEALTH STABILITY: PHYSICAL HEALTH: ON AVERAGE, HOW MANY DAYS PER WEEK DO YOU ENGAGE IN MODERATE TO STRENUOUS EXERCISE (LIKE A BRISK WALK)?: 0 DAYS

## 2024-03-01 SDOH — ECONOMIC STABILITY: FOOD INSECURITY: HOW HARD IS IT FOR YOU TO PAY FOR THE VERY BASICS LIKE FOOD, HOUSING, MEDICAL CARE, AND HEATING?: NOT HARD AT ALL

## 2024-03-01 SDOH — ECONOMIC STABILITY: INCOME INSECURITY: IN THE LAST 12 MONTHS, WAS THERE A TIME WHEN YOU WERE NOT ABLE TO PAY THE MORTGAGE OR RENT ON TIME?: NO

## 2024-03-01 SDOH — SOCIAL STABILITY: SOCIAL INSECURITY: ARE YOU MARRIED, WIDOWED, DIVORCED, SEPARATED, NEVER MARRIED, OR LIVING WITH A PARTNER?: MARRIED

## 2024-03-01 SDOH — ECONOMIC STABILITY: TRANSPORTATION INSECURITY
IN THE PAST 12 MONTHS, HAS THE LACK OF TRANSPORTATION KEPT YOU FROM MEDICAL APPOINTMENTS OR FROM GETTING MEDICATIONS?: NO

## 2024-03-01 SDOH — ECONOMIC STABILITY: FOOD INSECURITY: WITHIN THE PAST 12 MONTHS, YOU WORRIED THAT YOUR FOOD WOULD RUN OUT BEFORE YOU GOT MONEY TO BUY MORE.: NEVER TRUE

## 2024-03-01 SDOH — HEALTH STABILITY: MENTAL HEALTH: HOW OFTEN DO YOU HAVE SIX OR MORE DRINKS ON ONE OCCASION?: NEVER

## 2024-03-01 SDOH — ECONOMIC STABILITY: FOOD INSECURITY: WITHIN THE PAST 12 MONTHS, THE FOOD YOU BOUGHT JUST DIDN'T LAST AND YOU DIDN'T HAVE MONEY TO GET MORE.: NEVER TRUE

## 2024-03-01 SDOH — SOCIAL STABILITY: SOCIAL NETWORK: HOW OFTEN DO YOU ATTEND CHURCH OR RELIGIOUS SERVICES?: NEVER

## 2024-03-01 SDOH — SOCIAL STABILITY: SOCIAL INSECURITY
WITHIN THE LAST YEAR, HAVE TO BEEN RAPED OR FORCED TO HAVE ANY KIND OF SEXUAL ACTIVITY BY YOUR PARTNER OR EX-PARTNER?: NO

## 2024-03-01 SDOH — SOCIAL STABILITY: SOCIAL INSECURITY: WITHIN THE LAST YEAR, HAVE YOU BEEN HUMILIATED OR EMOTIONALLY ABUSED IN OTHER WAYS BY YOUR PARTNER OR EX-PARTNER?: NO

## 2024-03-01 SDOH — HEALTH STABILITY: MENTAL HEALTH
DO YOU FEEL STRESS - TENSE, RESTLESS, NERVOUS, OR ANXIOUS, OR UNABLE TO SLEEP AT NIGHT BECAUSE YOUR MIND IS TROUBLED ALL THE TIME - THESE DAYS?: ONLY A LITTLE

## 2024-03-01 SDOH — SOCIAL STABILITY: SOCIAL NETWORK
DO YOU BELONG TO ANY CLUBS OR ORGANIZATIONS SUCH AS CHURCH GROUPS UNIONS, FRATERNAL OR ATHLETIC GROUPS, OR SCHOOL GROUPS?: NO

## 2024-03-01 SDOH — SOCIAL STABILITY: SOCIAL NETWORK: IN A TYPICAL WEEK, HOW MANY TIMES DO YOU TALK ON THE PHONE WITH FAMILY, FRIENDS, OR NEIGHBORS?: THREE TIMES A WEEK

## 2024-03-01 SDOH — ECONOMIC STABILITY: INCOME INSECURITY: IN THE PAST 12 MONTHS HAS THE ELECTRIC, GAS, OIL, OR WATER COMPANY THREATENED TO SHUT OFF SERVICES IN YOUR HOME?: NO

## 2024-03-01 SDOH — SOCIAL STABILITY: SOCIAL INSECURITY: WITHIN THE LAST YEAR, HAVE YOU BEEN AFRAID OF YOUR PARTNER OR EX-PARTNER?: NO

## 2024-03-01 SDOH — SOCIAL STABILITY: SOCIAL NETWORK
DO YOU BELONG TO ANY CLUBS OR ORGANIZATIONS SUCH AS CHURCH GROUPS, UNIONS, FRATERNAL OR ATHLETIC GROUPS, OR SCHOOL GROUPS?: NO

## 2024-03-01 SDOH — SOCIAL STABILITY: SOCIAL INSECURITY
WITHIN THE LAST YEAR, HAVE YOU BEEN RAPED OR FORCED TO HAVE ANY KIND OF SEXUAL ACTIVITY BY YOUR PARTNER OR EX-PARTNER?: NO

## 2024-03-01 SDOH — ECONOMIC STABILITY: FOOD INSECURITY: WITHIN THE PAST 12 MONTHS, YOU WORRIED THAT YOUR FOOD WOULD RUN OUT BEFORE YOU GOT THE MONEY TO BUY MORE.: NEVER TRUE

## 2024-03-01 SDOH — ECONOMIC STABILITY: INCOME INSECURITY: IN THE PAST 12 MONTHS, HAS THE ELECTRIC, GAS, OIL, OR WATER COMPANY THREATENED TO SHUT OFF SERVICE IN YOUR HOME?: NO

## 2024-03-01 SDOH — ECONOMIC STABILITY: HOUSING INSECURITY
IN THE LAST 12 MONTHS, WAS THERE A TIME WHEN YOU DID NOT HAVE A STEADY PLACE TO SLEEP OR SLEPT IN A SHELTER (INCLUDING NOW)?: NO

## 2024-03-01 SDOH — SOCIAL STABILITY: SOCIAL NETWORK: ARE YOU MARRIED, WIDOWED, DIVORCED, SEPARATED, NEVER MARRIED, OR LIVING WITH A PARTNER?: MARRIED

## 2024-03-01 SDOH — HEALTH STABILITY: MENTAL HEALTH: HOW MANY DRINKS CONTAINING ALCOHOL DO YOU HAVE ON A TYPICAL DAY WHEN YOU ARE DRINKING?: PATIENT DOES NOT DRINK

## 2024-03-01 SDOH — HEALTH STABILITY: MENTAL HEALTH: HOW OFTEN DO YOU HAVE 6 OR MORE DRINKS ON ONE OCCASION?: NEVER

## 2024-03-01 SDOH — ECONOMIC STABILITY: HOUSING INSECURITY: IN THE LAST 12 MONTHS, WAS THERE A TIME WHEN YOU WERE NOT ABLE TO PAY THE MORTGAGE OR RENT ON TIME?: NO

## 2024-03-01 SDOH — ECONOMIC STABILITY: TRANSPORTATION INSECURITY: IN THE PAST 12 MONTHS, HAS LACK OF TRANSPORTATION KEPT YOU FROM MEDICAL APPOINTMENTS OR FROM GETTING MEDICATIONS?: NO

## 2024-03-01 SDOH — SOCIAL STABILITY: SOCIAL NETWORK: HOW OFTEN DO YOU ATTEND MEETINGS OF THE CLUBS OR ORGANIZATIONS YOU BELONG TO?: NEVER

## 2024-03-01 SDOH — SOCIAL STABILITY: SOCIAL NETWORK: HOW OFTEN DO YOU ATTENT MEETINGS OF THE CLUB OR ORGANIZATION YOU BELONG TO?: NEVER

## 2024-03-01 SDOH — HEALTH STABILITY: MENTAL HEALTH: HOW MANY STANDARD DRINKS CONTAINING ALCOHOL DO YOU HAVE ON A TYPICAL DAY?: PATIENT DOES NOT DRINK

## 2024-03-01 SDOH — ECONOMIC STABILITY: INCOME INSECURITY: HOW HARD IS IT FOR YOU TO PAY FOR THE VERY BASICS LIKE FOOD, HOUSING, MEDICAL CARE, AND HEATING?: NOT HARD AT ALL

## 2024-03-01 ASSESSMENT — COGNITIVE AND FUNCTIONAL STATUS - GENERAL
CLIMB 3 TO 5 STEPS WITH RAILING: A LOT
PERSONAL GROOMING: A LITTLE
WALKING IN HOSPITAL ROOM: A LOT
DRESSING REGULAR UPPER BODY CLOTHING: A LITTLE
MOVING FROM LYING ON BACK TO SITTING ON SIDE OF FLAT BED WITH BEDRAILS: A LITTLE
DRESSING REGULAR LOWER BODY CLOTHING: A LOT
EATING MEALS: A LOT
MOVING TO AND FROM BED TO CHAIR: A LITTLE
WALKING IN HOSPITAL ROOM: A LOT
DAILY ACTIVITIY SCORE: 14
TURNING FROM BACK TO SIDE WHILE IN FLAT BAD: A LITTLE
MOBILITY SCORE: 15
MOBILITY SCORE: 16
HELP NEEDED FOR BATHING: A LOT
CLIMB 3 TO 5 STEPS WITH RAILING: A LOT
TOILETING: A LOT
STANDING UP FROM CHAIR USING ARMS: A LITTLE
MOVING FROM LYING ON BACK TO SITTING ON SIDE OF FLAT BED WITH BEDRAILS: A LITTLE
STANDING UP FROM CHAIR USING ARMS: A LOT
TURNING FROM BACK TO SIDE WHILE IN FLAT BAD: A LITTLE
MOVING TO AND FROM BED TO CHAIR: A LITTLE

## 2024-03-01 ASSESSMENT — PAIN SCALES - GENERAL
PAINLEVEL_OUTOF10: 0 - NO PAIN

## 2024-03-01 ASSESSMENT — PAIN - FUNCTIONAL ASSESSMENT
PAIN_FUNCTIONAL_ASSESSMENT: CPOT (CRITICAL CARE PAIN OBSERVATION TOOL)
PAIN_FUNCTIONAL_ASSESSMENT: CPOT (CRITICAL CARE PAIN OBSERVATION TOOL)
PAIN_FUNCTIONAL_ASSESSMENT: 0-10
PAIN_FUNCTIONAL_ASSESSMENT: CPOT (CRITICAL CARE PAIN OBSERVATION TOOL)
PAIN_FUNCTIONAL_ASSESSMENT: CPOT (CRITICAL CARE PAIN OBSERVATION TOOL)
PAIN_FUNCTIONAL_ASSESSMENT: 0-10

## 2024-03-01 ASSESSMENT — ACTIVITIES OF DAILY LIVING (ADL)
LACK_OF_TRANSPORTATION: NO
ADL_ASSISTANCE: INDEPENDENT
LACK_OF_TRANSPORTATION: NO

## 2024-03-01 ASSESSMENT — LIFESTYLE VARIABLES
SKIP TO QUESTIONS 9-10: 1
AUDIT-C TOTAL SCORE: 0

## 2024-03-01 NOTE — CARE PLAN
The patient's goals for the shift include      The clinical goals for the shift include no bloody stools    Over the shift, the patient did not make progress toward the following goals. Barriers to progression include awaiting testing. Recommendations to address these barriers include waiting for testing results.

## 2024-03-01 NOTE — ANESTHESIA POSTPROCEDURE EVALUATION
Patient: Angel Carroll    Procedure Summary       Date: 03/01/24 Room / Location: [facility]    Anesthesia Start: 0740 Anesthesia Stop: 0759    Procedure: EGD Diagnosis:       GI bleed      Blood loss anemia    Scheduled Providers: Radha Traylor MD; INES Sheppard; Marvel Muñoz MD Responsible Provider: Marvel Muñoz MD    Anesthesia Type: MAC ASA Status: 3            Anesthesia Type: MAC    Vitals Value Taken Time   /50 03/01/24 0831   Temp 36.1 °C (97 °F) 03/01/24 0815   Pulse 61 03/01/24 0831   Resp 18 03/01/24 0831   SpO2 93 % 03/01/24 0831   Vitals shown include unvalidated device data.    Anesthesia Post Evaluation    Patient location during evaluation: PACU  Patient participation: complete - patient participated  Level of consciousness: awake  Pain management: adequate  Airway patency: patent  Cardiovascular status: acceptable  Respiratory status: acceptable  Hydration status: acceptable  Postoperative Nausea and Vomiting: none        There were no known notable events for this encounter.

## 2024-03-01 NOTE — NURSING NOTE
Patient resting comfortably in bed.  No confusion. VS stable.  Patient verbalized understanding that he will not get any food or drink until after the scheduled EGD today.

## 2024-03-01 NOTE — CARE PLAN
Problem: Pain  Goal: My pain/discomfort is manageable  Outcome: Met   The patient's goals for the shift include      The clinical goals for the shift include no bloody stools    Over the shift, the patient did not make progress toward the following goals. Barriers to progression include. Recommendations to address these barriers include .

## 2024-03-01 NOTE — PROGRESS NOTES
Patient has grab bars in shower, shower bench and raised toilet seat in the home. Patient has oxygen set up through [employer] 2-3L as baseline and uses a cpap at . Family reports several falls at home. States he recently fell and scrapped arm, but usually he will slide down the wall or out of bed. States that he normally falls when he is trying to move too fast. Patient has a wheelchair that he uses for convenience but can walk with his cane or rolator. Daughters transport to appointments. Patient and wife have meals on wheels set up for the home. No reports of smoking or alcohol use at this time. Patient has POA and LW established at this time. [facility] in [place] is pharmacy of choice for short term meds, [facility] mail order is used for others. Daughter Reed Beltran manages meds for patient and spouse. Patient would prefer to return home but would like to be reevaluated by therapy and make decision. If home with homecare they would like [facility] (request Richard HORVATH for PT)      03/01/24 1110   Discharge Planning   Living Arrangements Spouse/significant other   Support Systems Spouse/significant other;Children;Family members   Assistance Needed TBD   Type of Residence Private residence   Number of Stairs to Enter Residence 4   Number of Stairs Within Residence 0   Do you have animals or pets at home? No   Who is requesting discharge planning? Provider   Patient expects to be discharged to: HHC vs SNF   Does the patient need discharge transport arranged? Yes   Financial Resource Strain   How hard is it for you to pay for the very basics like food, housing, medical care, and heating? Not hard   Housing Stability   In the last 12 months, was there a time when you were not able to pay the mortgage or rent on time? N   In the last 12 months, how many places have you lived? 1   In the last 12 months, was there a time when you did not have a steady place to sleep or slept in a shelter (including now)? N    Transportation Needs   In the past 12 months, has lack of transportation kept you from medical appointments or from getting medications? no   In the past 12 months, has lack of transportation kept you from meetings, work, or from getting things needed for daily living? No

## 2024-03-01 NOTE — PROGRESS NOTES
CONSULT PROGRESS NOTES    SERVICE DATE: 3/1/2024   SERVICE TIME: 11:36 AM    CONSULTING SERVICE: Nephrology    ASSESSMENT AND PLAN   78-year-old man with atrial fibrillation on anticoagulation admitted with acute on chronic renal failure in the context of profound anemia.  1.  Acute renal failure  2.  Acute metabolic acidosis  3.  Hyperkalemia  4.  Hypotension  5.  Acute blood loss anemia  6.  Chronic kidney disease stage IV  7.  Kidney stones     Acute renal failure on top of advanced chronic kidney disease stage 4 in the context of severe anemia requiring multiple rounds of blood transfusion.  He needs continued blood transfusion to keep his hemoglobin above 7.  For now, continue IV fluids.  He has improved acidosis, but I would defer bicarbonate supplementation at present.  No potassium supplements, no diuretics.    Blood pressure improved.  GI investigation is ongoing, sounds like he will get a colonoscopy on Monday.  No objection to standard preparation for this.  Baseline creatinine had been in the low twos, he may reach a new baseline creatinine.  Kidney stones are asymptomatic at present.  Case discussed with Dr. Parekh.  Dr. Orellana is available this weekend.    SUBJECTIVE  INTERVAL HPI: He had an EGD showing some gastritis, otherwise no major overnight events.  He awaits another blood transfusion.  Numerous family members at bedside.    MEDICATIONS:  amiodarone, 200 mg, oral, Daily  atorvastatin, 20 mg, oral, Nightly  finasteride, 5 mg, oral, Daily  insulin lispro, 0-10 Units, subcutaneous, TID with meals  lidocaine, 0.1 mL, subcutaneous, Once  metoprolol tartrate, 12.5 mg, oral, BID  pantoprazole, 40 mg, oral, Daily  PARoxetine, 20 mg, oral, Daily  tamsulosin, 0.4 mg, oral, Nightly       sodium chloride 0.9%, 75 mL/hr, Last Rate: 75 mL/hr (03/01/24 0834)       PRN medications: dextrose 10 % in water (D10W), dextrose, fentaNYL PF, fentaNYL PF, glucagon, labetaloL, meperidine, ondansetron, ondansetron,  oxyCODONE, promethazine (Phenergan) 6.25 mg in sodium chloride 0.9% 50 mL IV     OBJECTIVE  PHYSICAL EXAM:   Heart Rate:  [59-66]   Temp:  [35.9 °C (96.6 °F)-36.5 °C (97.7 °F)]   Resp:  [15-21]   BP: ()/(38-58)   Weight:  [107 kg (236 lb 4.8 oz)]   SpO2:  [93 %-100 %]   Body mass index is 32.05 kg/m².  This is a chronically ill-appearing mildly toxic elderly white man  Very pale skin  Hearing intact  Phonation intact  Moist mucosa  Normal S1/normal S2  Lungs are clear to auscultation bilaterally  Abdomen is soft, nondistended, nontender, positive bowel sounds  External urinary catheter in place, no suprapubic tenderness to palpation  No extremity edema  Moves 4 limbs spontaneously  No obvious joint deformities  No lymphadenopathy    DATA:   Labs:  Results for orders placed or performed during the hospital encounter of 02/29/24 (from the past 96 hour(s))   CBC and Auto Differential   Result Value Ref Range    WBC 8.6 4.4 - 11.3 x10*3/uL    nRBC 0.2 (H) 0.0 - 0.0 /100 WBCs    RBC 2.03 (L) 4.50 - 5.90 x10*6/uL    Hemoglobin 4.9 (LL) 13.5 - 17.5 g/dL    Hematocrit 18.0 (L) 41.0 - 52.0 %    MCV 89 80 - 100 fL    MCH 24.1 (L) 26.0 - 34.0 pg    MCHC 27.2 (L) 32.0 - 36.0 g/dL    RDW 17.0 (H) 11.5 - 14.5 %    Platelets 256 150 - 450 x10*3/uL    Neutrophils % 79.0 40.0 - 80.0 %    Immature Granulocytes %, Automated 2.3 (H) 0.0 - 0.9 %    Lymphocytes % 11.0 13.0 - 44.0 %    Monocytes % 7.6 2.0 - 10.0 %    Eosinophils % 0.0 0.0 - 6.0 %    Basophils % 0.1 0.0 - 2.0 %    Neutrophils Absolute 6.75 (H) 1.60 - 5.50 x10*3/uL    Immature Granulocytes Absolute, Automated 0.20 0.00 - 0.50 x10*3/uL    Lymphocytes Absolute 0.94 0.80 - 3.00 x10*3/uL    Monocytes Absolute 0.65 0.05 - 0.80 x10*3/uL    Eosinophils Absolute 0.00 0.00 - 0.40 x10*3/uL    Basophils Absolute 0.01 0.00 - 0.10 x10*3/uL   Comprehensive metabolic panel   Result Value Ref Range    Glucose 139 (H) 65 - 99 mg/dL    Sodium 140 133 - 145 mmol/L    Potassium 5.1 3.4 -  5.1 mmol/L    Chloride 101 97 - 107 mmol/L    Bicarbonate 15 (L) 24 - 31 mmol/L    Urea Nitrogen 62 (H) 8 - 25 mg/dL    Creatinine 3.80 (H) 0.40 - 1.60 mg/dL    eGFR 15 (L) >60 mL/min/1.73m*2    Calcium 8.5 8.5 - 10.4 mg/dL    Albumin 3.3 (L) 3.5 - 5.0 g/dL    Alkaline Phosphatase 72 35 - 125 U/L    Total Protein 5.6 (L) 5.9 - 7.9 g/dL    AST 36 5 - 40 U/L    Bilirubin, Total 0.5 0.1 - 1.2 mg/dL    ALT 13 5 - 40 U/L    Anion Gap >19 (H) <=19 mmol/L   Protime-INR   Result Value Ref Range    Protime 28.5 (H) 9.3 - 12.7 seconds    INR 2.9 (H) 0.9 - 1.2   APTT   Result Value Ref Range    aPTT 30.8 22.0 - 32.5 seconds   Type And Screen   Result Value Ref Range    ABO TYPE B     Rh TYPE POS     ANTIBODY SCREEN NEG    Morphology   Result Value Ref Range    RBC Morphology See Below     Polychromasia Mild     Hypochromia Mild     Target Cells Few     Ovalocytes Few    Prepare RBC: 2 Units   Result Value Ref Range    PRODUCT CODE J3250M96     Unit Number [id number]     Unit ABO B     Unit RH NEG     XM INTEP COMP     Dispense Status TR     Blood Expiration Date March 26, 2024 23:59 EDT     PRODUCT BLOOD TYPE 1700     UNIT VOLUME 350     PRODUCT CODE M1750Q26     Unit Number [id number]     Unit ABO O     Unit RH POS     XM INTEP COMP     Dispense Status TR     Blood Expiration Date March 26, 2024 23:59 EDT     PRODUCT BLOOD TYPE 5100     UNIT VOLUME 350    VERIFY ABO/Rh Group Test   Result Value Ref Range    ABO TYPE B     Rh TYPE POS    CBC and Auto Differential   Result Value Ref Range    WBC 6.6 4.4 - 11.3 x10*3/uL    nRBC 0.0 0.0 - 0.0 /100 WBCs    RBC 2.54 (L) 4.50 - 5.90 x10*6/uL    Hemoglobin 6.4 (LL) 13.5 - 17.5 g/dL    Hematocrit 20.9 (L) 41.0 - 52.0 %    MCV 82 80 - 100 fL    MCH 25.2 (L) 26.0 - 34.0 pg    MCHC 30.6 (L) 32.0 - 36.0 g/dL    RDW 15.9 (H) 11.5 - 14.5 %    Platelets 197 150 - 450 x10*3/uL    Neutrophils % 76.7 40.0 - 80.0 %    Immature Granulocytes %, Automated 1.5 (H) 0.0 - 0.9 %     Lymphocytes % 11.5 13.0 - 44.0 %    Monocytes % 10.0 2.0 - 10.0 %    Eosinophils % 0.0 0.0 - 6.0 %    Basophils % 0.3 0.0 - 2.0 %    Neutrophils Absolute 5.09 1.60 - 5.50 x10*3/uL    Immature Granulocytes Absolute, Automated 0.10 0.00 - 0.50 x10*3/uL    Lymphocytes Absolute 0.76 (L) 0.80 - 3.00 x10*3/uL    Monocytes Absolute 0.66 0.05 - 0.80 x10*3/uL    Eosinophils Absolute 0.00 0.00 - 0.40 x10*3/uL    Basophils Absolute 0.02 0.00 - 0.10 x10*3/uL   Protime-INR   Result Value Ref Range    Protime 23.5 (H) 9.3 - 12.7 seconds    INR 2.3 (H) 0.9 - 1.2   POCT GLUCOSE   Result Value Ref Range    POCT Glucose 116 (H) 74 - 99 mg/dL   Prepare RBC: 1 Units   Result Value Ref Range    PRODUCT CODE M0713W50     Unit Number [id number]     Unit ABO B     Unit RH NEG     XM INTEP COMP     Dispense Status TR     Blood Expiration Date March 27, 2024 23:59 EDT     PRODUCT BLOOD TYPE 1700     UNIT VOLUME 350    Transthoracic Echo (TTE) Complete   Result Value Ref Range    AV pk carlos 1.25 m/s    LVOT diam 2.10 cm    MV E/A ratio 2.86     Tricuspid annular plane systolic excursion 1.7 cm    LA vol index A/L 34.7 ml/m2    MV avg E/e' ratio 20.69     RV free wall pk S' 6.40 cm/s    RVSP 39.1 mmHg    AV pk grad 6.3 mmHg    Aortic Valve Area by Continuity of Peak Velocity 1.81 cm2   Hemoglobin   Result Value Ref Range    Hemoglobin 7.1 (L) 13.5 - 17.5 g/dL   POCT GLUCOSE   Result Value Ref Range    POCT Glucose 97 74 - 99 mg/dL   POCT GLUCOSE   Result Value Ref Range    POCT Glucose 153 (H) 74 - 99 mg/dL   Urinalysis with Reflex Microscopic   Result Value Ref Range    Color, Urine Yellow Light-Yellow, Yellow, Dark-Yellow    Appearance, Urine Clear Clear    Specific Gravity, Urine 1.020 1.005 - 1.035    pH, Urine 5.0 5.0, 5.5, 6.0, 6.5, 7.0, 7.5, 8.0    Protein, Urine 20 (TRACE) NEGATIVE, 10 (TRACE), 20 (TRACE) mg/dL    Glucose, Urine Normal Normal mg/dL    Blood, Urine NEGATIVE NEGATIVE    Ketones, Urine NEGATIVE NEGATIVE mg/dL     Bilirubin, Urine NEGATIVE NEGATIVE    Urobilinogen, Urine Normal Normal mg/dL    Nitrite, Urine NEGATIVE NEGATIVE    Leukocyte Esterase, Urine 75 Ruth/µL (A) NEGATIVE   Sodium, Urine Random   Result Value Ref Range    Sodium, Urine Random <20 NOT ESTABLISHED mmol/L    Creatinine, Urine Random 161.5 NOT ESTABLISHED mg/dL    Sodium/Creatinine Ratio     Microscopic Only, Urine   Result Value Ref Range    WBC, Urine 1-5 1-5, NONE /HPF    RBC, Urine 1-2 NONE, 1-2, 3-5 /HPF    Bacteria, Urine 1+ (A) NONE SEEN /HPF    Mucus, Urine FEW Reference range not established. /LPF   POCT GLUCOSE   Result Value Ref Range    POCT Glucose 140 (H) 74 - 99 mg/dL   Renal Function Panel   Result Value Ref Range    Glucose 93 65 - 99 mg/dL    Sodium 140 133 - 145 mmol/L    Potassium 4.5 3.4 - 5.1 mmol/L    Chloride 106 97 - 107 mmol/L    Bicarbonate 23 (L) 24 - 31 mmol/L    Urea Nitrogen 66 (H) 8 - 25 mg/dL    Creatinine 3.50 (H) 0.40 - 1.60 mg/dL    eGFR 17 (L) >60 mL/min/1.73m*2    Calcium 8.0 (L) 8.5 - 10.4 mg/dL    Phosphorus 3.3 2.5 - 4.5 mg/dL    Albumin 3.1 (L) 3.5 - 5.0 g/dL    Anion Gap 11 <=19 mmol/L   CBC   Result Value Ref Range    WBC 6.1 4.4 - 11.3 x10*3/uL    nRBC 0.0 0.0 - 0.0 /100 WBCs    RBC 2.58 (L) 4.50 - 5.90 x10*6/uL    Hemoglobin 6.8 (L) 13.5 - 17.5 g/dL    Hematocrit 21.3 (L) 41.0 - 52.0 %    MCV 83 80 - 100 fL    MCH 26.4 26.0 - 34.0 pg    MCHC 31.9 (L) 32.0 - 36.0 g/dL    RDW 16.9 (H) 11.5 - 14.5 %    Platelets 187 150 - 450 x10*3/uL   Prepare RBC: 1 Units   Result Value Ref Range    PRODUCT CODE H1176C23     Unit Number [id number]     Unit ABO B     Unit RH NEG     XM INTEP COMP     Dispense Status XM     Blood Expiration Date April 02, 2024 23:59 EDT     PRODUCT BLOOD TYPE 1700     UNIT VOLUME 400      *Note: Due to a large number of results and/or encounters for the requested time period, some results have not been displayed. A complete set of results can be found in Results Review.         SIGNATURE:  Hiram Capellan MD PATIENT NAME: Angel Carroll   DATE: March 1, 2024 MRN: [id number]   TIME: 11:36 AM PAGER: [phone]

## 2024-03-01 NOTE — PROGRESS NOTES
Physical Therapy    Physical Therapy Evaluation    Patient Name: Angel Carroll  MRN: [id number]  Today's Date: 3/1/2024   Time Calculation  Start Time: 0902  Stop Time: 0932  Time Calculation (min): 30 min    Assessment/Plan   PT Assessment  PT Assessment Results: Decreased strength, Decreased endurance, Impaired balance, Decreased mobility, Impaired hearing, Obesity, Decreased skin integrity  Rehab Prognosis: Good  Evaluation/Treatment Tolerance: Patient limited by fatigue, Treatment limited secondary to medical complications (Comment) (low H&H, hypotension)  Medical Staff Made Aware: Yes  Strengths: Ability to acquire knowledge, Attitude of self, Housing layout, Support of Caregivers  Barriers to Participation: Comorbidities  End of Session Communication: Bedside nurse  Assessment Comment: Pt presents with low tolerance to activity d/t hyptension and fatigue. Therapist monitors vitals throughout session in order to maintain pt safety. Lowest BP 98/46 mmHg. Pt reports mild dizziness upon sitting edge of bed which worsens slightly in standing. D/t impairments listed above, pt will benefit from ongoing PT services in acute setting to progress toward goals of IND and may benefit from moderate intensity rehab services upon discharge to reduce fall risk and maximize function once returned home.  End of Session Patient Position: Bed, 2 rail up, Alarm off, not on at start of session (on 2.5 L O2, call light in reach, RN aware of pt status.)  IP OR SWING BED PT PLAN  Inpatient or Swing Bed: Inpatient  PT Plan  Treatment/Interventions: Bed mobility, Transfer training, Gait training, Balance training, Stair training, Neuromuscular re-education, Strengthening, Endurance training, Range of motion, Therapeutic exercise, Therapeutic activity, Home exercise program, Positioning, Postural re-education  PT Plan: Skilled PT  PT Frequency: 5 times per week  PT Discharge Recommendations: Moderate intensity level of continued  care  Equipment Recommended upon Discharge: Wheeled walker, Wheelchair  PT Recommended Transfer Status: Assist x1 (as symptoms and vitals allow. Monitor dizziness.)  PT - OK to Discharge: Yes      Subjective   General Visit Information:  General  Reason for Referral: impaired mobility  Referred By: Dr. Josafat Cole  Past Medical History Relevant to Rehab: chronic renal failure stage 3, anemia, a-fib, CHF, CGA, meningioma  Missed Visit: No  Family/Caregiver Present: Yes (wife and daughter)  Prior to Session Communication: Bedside nurse (RN clears pt for PT eval)  Patient Position Received: Bed, 2 rail up  Preferred Learning Style: verbal  General Comment:  (Pt is an 79 y/o male admitted d/t weakness. Found to have low H&H and underwent multiple blood transfusions. Pt evaluated this date by PT s/p EGD this a.m. Pt on 2.5 L wall O2.)  Home Living:  Home Living  Type of Home: House  Lives With: Spouse  Home Adaptive Equipment: Walker rolling or standard, Cane, Wheelchair-manual  Home Layout: Laundry in basement, One level  Home Access: Stairs to enter with rails  Entrance Stairs-Rails: Both  Entrance Stairs-Number of Steps: 3  Bathroom Shower/Tub: Tub only  Bathroom Toilet: Handicapped height  Bathroom Equipment: Grab bars in shower, Tub transfer bench  Prior Level of Function:  Prior Function Per Pt/Caregiver Report  Level of [place]: Independent with ADLs and functional transfers, Needs assistance with homemaking  Receives Help From: Family (daughters assist with IADL's)  ADL Assistance: Independent  Homemaking Assistance: Needs assistance  Ambulatory Assistance: Independent  Vocational: Retired  Prior Function Comments: Pt reports ambulating household distances using rollator or cane on days that he feels strong enough, however, most days he has been getting around the house using a manual w/c. History of multiple falls.  Precautions:  Precautions  Medical Precautions: Fall precautions, Oxygen therapy device  and L/min (2.5 L/min)  Vital Signs:  Vital Signs  Heart Rate: 60  Heart Rate Source: Monitor  Resp: 17  SpO2: 98 %  BP: (!) 100/44  MAP (mmHg): 60  BP Location: Right arm  BP Method: Automatic  Patient Position: Lying    Objective   Pain:  Pain Assessment  Pain Assessment: 0-10  Pain Score: 0 - No pain  Cognition:  Cognition  Overall Cognitive Status: Within Functional Limits  Orientation Level: Oriented X4    General Assessments:     Activity Tolerance  Endurance: Decreased tolerance for upright activites  Early Mobility/Exercise Safety Screen: Proceed with mobilization - No exclusion criteria met  Activity Tolerance Comments:  (standing tolerance 30 seconds d/t hypotension with dizziness)    Sensation  Sensation Comment: denies parasthesias    Strength  Strength Comments: BLE grossly 4/5, BUE AROM WNL  Strength  Strength Comments: BLE grossly 4/5, BUE AROM WNL    Coordination  Movements are Fluid and Coordinated: Yes  Coordination Comment: assessed via function    Postural Control  Posture Comment: sits with forward head and rounded shoulders    Static Sitting Balance  Static Sitting-Balance Support: No upper extremity supported, Feet supported  Static Sitting-Level of Assistance: Contact guard (d/t hypotension and reporst of mild dizziness)  Static Sitting-Comment/Number of Minutes: 5    Static Standing Balance  Static Standing-Balance Support: Bilateral upper extremity supported (FWW)  Static Standing-Level of Assistance: Minimum assistance  Static Standing-Comment/Number of Minutes: 30 seconds  Functional Assessments:    Bed Mobility  Bed Mobility: Yes  Bed Mobility 1  Bed Mobility 1: Supine to sitting  Level of Assistance 1: Minimum assistance  Bed Mobility Comments 1:  (Halle to upright trunk using bed rails, HOB elevated)  Bed Mobility 2  Bed Mobility  2: Sitting to supine  Level of Assistance 2: Contact guard  Bed Mobility Comments 2: HOB flat, increased time required to lift BLE's onto bed, CGA for  safety.    Transfers  Transfer: Yes  Transfer 1  Transfer From 1: Bed to, Stand to, Sit to  Transfer to 1: Bed, Stand  Technique 1: Stand to sit, Sit to stand  Transfer Device 1: Walker  Transfer Level of Assistance 1: Minimum assistance  Trials/Comments 1:  (Halle to elevate, pt reports mild dizziness upon standing which worsens as duration of stand progresses.)    Ambulation/Gait Training  Ambulation/Gait Training Performed: No (deferred d/t safety concerns (symptomatic hypotension and low H&H))    Stairs  Stairs: No    Outcome Measures:  [facility] Basic Mobility  Turning from your back to your side while in a flat bed without using bedrails: A little  Moving from lying on your back to sitting on the side of a flat bed without using bedrails: A little  Moving to and from bed to chair (including a wheelchair): A little  Standing up from a chair using your arms (e.g. wheelchair or bedside chair): A little  To walk in hospital room: A lot  Climbing 3-5 steps with railing: A lot  Basic Mobility - Total Score: 16    Encounter Problems       Encounter Problems (Active)       Balance       STG - Maintains static standing balance with upper extremity support mod I (Progressing)       Start:  03/01/24    Expected End:  03/29/24       INTERVENTIONS:  1. Practice standing with minimal support.  2. Educate patient about standing tolerance.  3. Educate patient about independence with gait, transfers, and ADL's.  4. Educate patient about use of assistive device.  5. Educate patient about self-directed care.            Mobility       LTG - Patient will ambulate household distance using FWW with Mod I (Progressing)       Start:  03/01/24            STG - Patient will ascend and descend four to six stairs with b/l handrails and CGA (Progressing)       Start:  03/01/24                   Education Documentation  No documentation found.  Education Comments  No comments found.

## 2024-03-01 NOTE — CARE PLAN
The patient's goals for the shift include      The clinical goals for the shift include no bloody stools    Over the shift, the patient did not make progress toward the following goals. Barriers to progression include . Recommendations to address these barriers include .

## 2024-03-01 NOTE — PROGRESS NOTES
Occupational Therapy                 Therapy Communication Note    Patient Name: Angel Carroll  MRN: [id number]  Today's Date: 3/1/2024     Discipline: Occupational Therapy    Missed Visit Reason: Missed Visit Reason: Patient in a medical procedure (colnoscopy vs. flex sig)    Missed Time: Cancel    Comment:

## 2024-03-01 NOTE — PROGRESS NOTES
EGD today per Dr Traylor with some small erosions but no source of bleeding. He is recommending colonoscopy on Monday    OK for soft diet today

## 2024-03-01 NOTE — PROGRESS NOTES
Angel Carroll is a 78 y.o. male on day 1 of admission presenting with GI bleed.      Subjective   Patient back from EGDs, sleeping, but easily awakes.  No new complaints.  Family at bedside.       Objective     Last Recorded Vitals  /50 (BP Location: Left arm, Patient Position: Lying)   Pulse 60   Temp 36.1 °C (97 °F) (Temporal)   Resp 18   Wt 107 kg (236 lb 4.8 oz)   SpO2 94%   Intake/Output last 3 Shifts:    Intake/Output Summary (Last 24 hours) at 3/1/2024 1032  Last data filed at 3/1/2024 0751  Gross per 24 hour   Intake 2512.5 ml   Output 700 ml   Net 1812.5 ml       Admission Weight  Weight: 108 kg (237 lb) (02/29/24 0441)    Daily Weight  03/01/24 : 107 kg (236 lb 4.8 oz)    Image Results  EGD  Table formatting from the original result was not included.  Impression  The upper third of the esophagus, middle third of the esophagus, lower   third of the esophagus and GE junction appeared normal.  Multiple subcentimeter fundic gland polyps in the fundus of the stomach   and body of the stomach  Mild abnormal mucosa, consistent with gastritis in the cardia, fundus of   the stomach and body of the stomach  The duodenal bulb, 1st part of the duodenum and 2nd part of the duodenum   appeared normal.    Findings  The upper third of the esophagus, middle third of the esophagus, lower   third of the esophagus and GE junction appeared normal. Z-line is 44 cm   from the incisors.  Multiple sessile fundic gland polyps measuring smaller than 5 mm in the   fundus of the stomach and body of the stomach  Mild, localized abnormal mucosa with erosion in the cardia, fundus of the   stomach and body of the stomach, consistent with gastritis;  The duodenal bulb, 1st part of the duodenum and 2nd part of the duodenum   appeared normal.    Recommendation   Avoid NSAID and anticoagulation for now.  Check PT/INR today.  Change Protonix IV to 40 mg p.o. daily.  Full liquid diet today.   Blood transfusion to keep hemoglobin above  7.5.   Colonoscopy on Monday or sooner if needed.  Bleeding seems to be   diverticular but other causes need to be excluded.       Indication  Blood loss anemia, GI bleed    Staff  Staff Role   Radha Traylor MD Proceduralist     Medications  See Anesthesia Record.     Preprocedure  A history and physical has been performed, and patient medication   allergies have been reviewed. The patient's tolerance of previous   anesthesia has been reviewed. The risks and benefits of the procedure and   the sedation options and risks were discussed with the patient. All   questions were answered and informed consent obtained.    Details of the Procedure  The patient underwent monitored anesthesia care, which was administered by   an anesthesia professional. The patient's level of consciousness, heart   rate, ETCO2, ECG, respirations, oxygen and blood pressure were monitored   throughout the procedure. The scope was introduced through the mouth and   advanced to the second part of the duodenum. Retroflexion was performed in   the cardia, fundus and incisura. Prior to the procedure, the patient's H.   Pylori status was unknown. The patient experienced no blood loss. The   procedure was not difficult. The patient tolerated the procedure well.   There were no apparent adverse events.     Events  No data recorded    Specimens    Procedure Location  [address] [id number] [phone]    Referring Provider  No referring provider defined for this encounter.    Procedure Provider  No name on file      Physical Exam  Constitutional:       General: He is not in acute distress.     Appearance: Normal appearance.   HENT:      Head: Normocephalic.      Right Ear: External ear normal.      Left Ear: External ear normal.   Eyes:      Conjunctiva/sclera: Conjunctivae normal.   Cardiovascular:      Rate and Rhythm: Normal rate.   Pulmonary:      Effort: Pulmonary  effort is normal.      Breath sounds: Normal breath sounds.   Abdominal:      General: Bowel sounds are normal.   Skin:     General: Skin is warm.   Neurological:      General: No focal deficit present.   Psychiatric:         Mood and Affect: Mood normal.         Relevant Results               Assessment/Plan                  Principal Problem:    GI bleed    Anemia  -Secondary to gastrointestinal bleed  -With CAD, and for hemoglobin of 7.5 or above.  -Transfuse 1 unit today, repeat hemoglobin after.    Gastrointestinal hemorrhage  -EGD showing some gastritis but no obvious source  -No active bleeding at this point, and there is a colonoscopy planned for after the weekend  -Continue Protonix per gastroenterology  -If evidence of continued bleeding, will contact gastroenterology sooner.    Acute kidney injury on chronic kidney disease  -Worsening likely secondary to volume depletion, and is improving after IV fluids and blood  -Appreciate input from Dr. Ramirez.     Atrial fibrillation  -Heart rate control, even slightly bradycardic.  -Will continue to hold off of the anticoagulation given the GI bleed  -Will decrease metoprolol to 12.5 mg twice a day given his borderline bradycardia.    Bacteriuria  -Urinalysis is not plainly indicative of UTI, no leukocytosis, no symptoms, patient has grown Aerococcus in the past and suspect this is asymptomatic bacteriuria.   -Monitor off of antibiotics.    Physical therapy Occupational Therapy    Discussed with family                      Josafat Cole DO

## 2024-03-01 NOTE — CARE PLAN
The patient's goals for the shift include      The clinical goals for the shift include no bloody stools    Over the shift, the patient did not make progress toward the following goals. Barriers to progression include awaiting testing. Recommendations to address these barriers include wait for test results before planning with the patient.

## 2024-03-01 NOTE — ANESTHESIA PREPROCEDURE EVALUATION
Patient: Angel Carroll    Procedure Information       Date/Time: 03/01/24 0730    Scheduled providers: Radha Traylor MD; INES Sheppard; Marvel Muñoz MD    Procedure: EGD    Location: [facility]            Relevant Problems   Cardiovascular   (+) Atherosclerosis of coronary artery   (+) Atrial fibrillation (CMS/HCC)   (+) Atrial flutter (CMS/HCC)   (+) Benign essential hypertension   (+) Chest pain   (+) Chronic atrial fibrillation (CMS/HCC)   (+) Disorder of carotid artery (CMS/HCC)   (+) Heart failure (CMS/HCC)   (+) Hyperlipidemia   (+) Hypertension   (+) Pacemaker   (+) Paroxysmal atrial fibrillation (CMS/HCC)   (+) Sinus bradycardia   (+) 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)      Endocrine   (+) Goiter, nontoxic, multinodular      GI   (+) GI bleed      /Renal   (+) Acute renal failure (CMS/HCC)   (+) Calculus of kidney   (+) Renal disorder   (+) Stage 3b chronic kidney disease (CMS/HCC)      Neuro/Psych   (+) Anxiety   (+) Carpal tunnel syndrome   (+) 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 (CMS/HCC)   (+) Depression   (+) Disorder of carotid artery (CMS/HCC)   (+) Idiopathic peripheral neuropathy   (+) Lumbar radiculitis   (+) 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)      Pulmonary   (+) Dyspnea on exertion   (+) Obstructive sleep apnea      Hematology   (+) Anemia   (+) Iron deficiency anemia   (+) Long term (current) use of anticoagulants      Musculoskeletal   (+) Carpal tunnel syndrome   (+) Lumbar stenosis with neurogenic claudication   (+) Osteoarthritis of right knee   (+) Primary localized osteoarthrosis, hand   (+) Primary osteoarthritis of left knee   (+) Primary osteoarthritis of right hip      Infectious Disease   (+) Herpes zoster   (+) Onychomycosis of toenail     EF 50% per echo 2022    Clinical information reviewed:   Tobacco  Allergies  Meds    Med Hx  Surg Hx   Fam Hx  Soc Hx        NPO Detail:  No data recorded     Physical Exam    Airway  Mallampati: III  TM distance: >3 FB  Neck ROM: full     Cardiovascular    Dental    Pulmonary    Abdominal            Anesthesia Plan    History of general anesthesia?: yes  History of complications of general anesthesia?: no    ASA 3     MAC     Anesthetic plan and risks discussed with patient.    Plan discussed with CAA.

## 2024-03-02 LAB
ALBUMIN SERPL-MCNC: 3.1 G/DL (ref 3.5–5)
ANION GAP SERPL CALC-SCNC: 9 MMOL/L
BUN SERPL-MCNC: 55 MG/DL (ref 8–25)
CALCIUM SERPL-MCNC: 8.3 MG/DL (ref 8.5–10.4)
CHLORIDE SERPL-SCNC: 107 MMOL/L (ref 97–107)
CO2 SERPL-SCNC: 23 MMOL/L (ref 24–31)
CREAT SERPL-MCNC: 2.8 MG/DL (ref 0.4–1.6)
EGFRCR SERPLBLD CKD-EPI 2021: 22 ML/MIN/1.73M*2
ERYTHROCYTE [DISTWIDTH] IN BLOOD BY AUTOMATED COUNT: 17.6 % (ref 11.5–14.5)
GLUCOSE SERPL-MCNC: 100 MG/DL (ref 65–99)
HCT VFR BLD AUTO: 22.9 % (ref 41–52)
HCT VFR BLD AUTO: 23.7 % (ref 41–52)
HGB BLD-MCNC: 7.1 G/DL (ref 13.5–17.5)
HGB BLD-MCNC: 7.3 G/DL (ref 13.5–17.5)
MCH RBC QN AUTO: 26.3 PG (ref 26–34)
MCHC RBC AUTO-ENTMCNC: 30 G/DL (ref 32–36)
MCV RBC AUTO: 88 FL (ref 80–100)
NRBC BLD-RTO: 1.1 /100 WBCS (ref 0–0)
PHOSPHATE SERPL-MCNC: 3.1 MG/DL (ref 2.5–4.5)
PLATELET # BLD AUTO: 177 X10*3/UL (ref 150–450)
POTASSIUM SERPL-SCNC: 4.5 MMOL/L (ref 3.4–5.1)
RBC # BLD AUTO: 2.7 X10*6/UL (ref 4.5–5.9)
SODIUM SERPL-SCNC: 139 MMOL/L (ref 133–145)
WBC # BLD AUTO: 5.6 X10*3/UL (ref 4.4–11.3)

## 2024-03-02 PROCEDURE — 36415 COLL VENOUS BLD VENIPUNCTURE: CPT | Performed by: STUDENT IN AN ORGANIZED HEALTH CARE EDUCATION/TRAINING PROGRAM

## 2024-03-02 PROCEDURE — 80069 RENAL FUNCTION PANEL: CPT | Performed by: INTERNAL MEDICINE

## 2024-03-02 PROCEDURE — 36415 COLL VENOUS BLD VENIPUNCTURE: CPT | Performed by: INTERNAL MEDICINE

## 2024-03-02 PROCEDURE — 2500000001 HC RX 250 WO HCPCS SELF ADMINISTERED DRUGS (ALT 637 FOR MEDICARE OP): Performed by: INTERNAL MEDICINE

## 2024-03-02 PROCEDURE — 2500000004 HC RX 250 GENERAL PHARMACY W/ HCPCS (ALT 636 FOR OP/ED): Performed by: INTERNAL MEDICINE

## 2024-03-02 PROCEDURE — 2500000002 HC RX 250 W HCPCS SELF ADMINISTERED DRUGS (ALT 637 FOR MEDICARE OP, ALT 636 FOR OP/ED): Performed by: INTERNAL MEDICINE

## 2024-03-02 PROCEDURE — 97166 OT EVAL MOD COMPLEX 45 MIN: CPT | Mod: GO

## 2024-03-02 PROCEDURE — 2060000001 HC INTERMEDIATE ICU ROOM DAILY

## 2024-03-02 PROCEDURE — 85027 COMPLETE CBC AUTOMATED: CPT | Performed by: INTERNAL MEDICINE

## 2024-03-02 PROCEDURE — 97530 THERAPEUTIC ACTIVITIES: CPT | Mod: GO

## 2024-03-02 PROCEDURE — 85014 HEMATOCRIT: CPT | Performed by: STUDENT IN AN ORGANIZED HEALTH CARE EDUCATION/TRAINING PROGRAM

## 2024-03-02 RX ADMIN — ATORVASTATIN CALCIUM 20 MG: 20 TABLET, FILM COATED ORAL at 20:22

## 2024-03-02 RX ADMIN — IRON SUCROSE 200 MG: 20 INJECTION, SOLUTION INTRAVENOUS at 17:35

## 2024-03-02 RX ADMIN — METOPROLOL TARTRATE 12.5 MG: 25 TABLET, FILM COATED ORAL at 20:22

## 2024-03-02 RX ADMIN — PANTOPRAZOLE SODIUM 40 MG: 40 TABLET, DELAYED RELEASE ORAL at 09:09

## 2024-03-02 RX ADMIN — SODIUM CHLORIDE 75 ML/HR: 900 INJECTION, SOLUTION INTRAVENOUS at 00:41

## 2024-03-02 RX ADMIN — FINASTERIDE 5 MG: 5 TABLET, FILM COATED ORAL at 09:09

## 2024-03-02 RX ADMIN — PAROXETINE HYDROCHLORIDE 20 MG: 20 TABLET, FILM COATED ORAL at 09:12

## 2024-03-02 RX ADMIN — TAMSULOSIN HYDROCHLORIDE 0.4 MG: 0.4 CAPSULE ORAL at 20:22

## 2024-03-02 RX ADMIN — METOPROLOL TARTRATE 12.5 MG: 25 TABLET, FILM COATED ORAL at 09:09

## 2024-03-02 RX ADMIN — AMIODARONE HYDROCHLORIDE 200 MG: 200 TABLET ORAL at 09:09

## 2024-03-02 ASSESSMENT — COGNITIVE AND FUNCTIONAL STATUS - GENERAL
HELP NEEDED FOR BATHING: A LOT
MOBILITY SCORE: 15
WALKING IN HOSPITAL ROOM: A LOT
TOILETING: TOTAL
DRESSING REGULAR UPPER BODY CLOTHING: A LITTLE
PERSONAL GROOMING: A LITTLE
TOILETING: TOTAL
DAILY ACTIVITIY SCORE: 15
DRESSING REGULAR LOWER BODY CLOTHING: A LOT
HELP NEEDED FOR BATHING: A LOT
DRESSING REGULAR UPPER BODY CLOTHING: A LITTLE
DAILY ACTIVITIY SCORE: 15
MOVING FROM LYING ON BACK TO SITTING ON SIDE OF FLAT BED WITH BEDRAILS: A LITTLE
STANDING UP FROM CHAIR USING ARMS: A LOT
CLIMB 3 TO 5 STEPS WITH RAILING: A LOT
MOVING TO AND FROM BED TO CHAIR: A LITTLE
TURNING FROM BACK TO SIDE WHILE IN FLAT BAD: A LITTLE
PERSONAL GROOMING: A LITTLE
DRESSING REGULAR LOWER BODY CLOTHING: A LOT

## 2024-03-02 ASSESSMENT — PAIN - FUNCTIONAL ASSESSMENT
PAIN_FUNCTIONAL_ASSESSMENT: 0-10

## 2024-03-02 ASSESSMENT — PAIN SCALES - GENERAL
PAINLEVEL_OUTOF10: 0 - NO PAIN
PAINLEVEL_OUTOF10: 0 - NO PAIN
PAINLEVEL_OUTOF10: 4
PAINLEVEL_OUTOF10: 0 - NO PAIN

## 2024-03-02 ASSESSMENT — ACTIVITIES OF DAILY LIVING (ADL)
BATHING_ASSISTANCE: MODERATE
ADL_ASSISTANCE: INDEPENDENT

## 2024-03-02 NOTE — CARE PLAN
Problem: Pain  Goal: My pain/discomfort is manageable  Outcome: Progressing     Problem: Safety  Goal: Patient will be injury free during hospitalization  Outcome: Progressing  Goal: I will remain free of falls  Outcome: Progressing     Problem: Daily Care  Goal: Daily care needs are met  Outcome: Progressing     Problem: Psychosocial Needs  Goal: Demonstrates ability to cope with hospitalization/illness  Outcome: Progressing  Goal: Collaborate with me, my family, and caregiver to identify my specific goals  Outcome: Progressing     Problem: Discharge Barriers  Goal: My discharge needs are met  Outcome: Progressing     Problem: Skin  Goal: Decreased wound size/increased tissue granulation at next dressing change  Outcome: Progressing  Goal: Participates in plan/prevention/treatment measures  Outcome: Progressing  Goal: Prevent/manage excess moisture  Outcome: Progressing  Goal: Prevent/minimize sheer/friction injuries  Outcome: Progressing  Goal: Promote/optimize nutrition  Outcome: Progressing  Goal: Promote skin healing  Outcome: Progressing   The patient's goals for the shift include      The clinical goals for the shift include no bloody stools    Over the shift, the patient did not make progress toward the following goals. Barriers to progression include . Recommendations to address these barriers include .

## 2024-03-02 NOTE — PROGRESS NOTES
Angel Carroll is a 78 y.o. male on day 2 of admission presenting with GI bleed.      Subjective   No acute events overnight.  Hemoglobin low this morning; did receive 1 unit packed red blood cells yesterday.  No acute complaints per patient.  When specifically questioned, the patient does endorse occasional lightheadedness.  His blood pressure has been stable, but on the low side.  He states he has not moved his bowels today, but yesterday was told that he was still passing blood in his stool. EGD did not show any source for his bleeding.  Colonoscopy planned for Monday.       Objective     Last Recorded Vitals  /53 (BP Location: Right arm, Patient Position: Lying)   Pulse 62   Temp 36.6 °C (97.9 °F) (Oral)   Resp 16   Wt 112 kg (246 lb 14.6 oz)   SpO2 98%   Intake/Output last 3 Shifts:    Intake/Output Summary (Last 24 hours) at 3/2/2024 1122  Last data filed at 3/2/2024 1000  Gross per 24 hour   Intake 470 ml   Output 1956 ml   Net -1486 ml       Admission Weight  Weight: 108 kg (237 lb) (02/29/24 0441)    Daily Weight  03/02/24 : 112 kg (246 lb 14.6 oz)    Image Results  EGD  Table formatting from the original result was not included.  Impression  The upper third of the esophagus, middle third of the esophagus, lower   third of the esophagus and GE junction appeared normal.  Multiple subcentimeter fundic gland polyps in the fundus of the stomach   and body of the stomach  Mild abnormal mucosa, consistent with gastritis in the cardia, fundus of   the stomach and body of the stomach  The duodenal bulb, 1st part of the duodenum and 2nd part of the duodenum   appeared normal.    Findings  The upper third of the esophagus, middle third of the esophagus, lower   third of the esophagus and GE junction appeared normal. Z-line is 44 cm   from the incisors.  Multiple sessile fundic gland polyps measuring smaller than 5 mm in the   fundus of the stomach and body of the stomach  Mild, localized abnormal mucosa with  erosion in the cardia, fundus of the   stomach and body of the stomach, consistent with gastritis;  The duodenal bulb, 1st part of the duodenum and 2nd part of the duodenum   appeared normal.    Recommendation   Avoid NSAID and anticoagulation for now.  Check PT/INR today.  Change Protonix IV to 40 mg p.o. daily.  Full liquid diet today.   Blood transfusion to keep hemoglobin above 7.5.   Colonoscopy on Monday or sooner if needed.  Bleeding seems to be   diverticular but other causes need to be excluded.       Indication  Blood loss anemia, GI bleed    Staff  Staff Role   Radha Traylor MD Proceduralist     Medications  See Anesthesia Record.     Preprocedure  A history and physical has been performed, and patient medication   allergies have been reviewed. The patient's tolerance of previous   anesthesia has been reviewed. The risks and benefits of the procedure and   the sedation options and risks were discussed with the patient. All   questions were answered and informed consent obtained.    Details of the Procedure  The patient underwent monitored anesthesia care, which was administered by   an anesthesia professional. The patient's level of consciousness, heart   rate, ETCO2, ECG, respirations, oxygen and blood pressure were monitored   throughout the procedure. The scope was introduced through the mouth and   advanced to the second part of the duodenum. Retroflexion was performed in   the cardia, fundus and incisura. Prior to the procedure, the patient's H.   Pylori status was unknown. The patient experienced no blood loss. The   procedure was not difficult. The patient tolerated the procedure well.   There were no apparent adverse events.     Events  No data recorded    Specimens    Procedure Location  [address] [address] [phone]    Referring Provider  No referring provider defined for this encounter.    Procedure  Provider  No name on file      Physical Exam  Vitals and nursing note reviewed.   Constitutional:       General: He is not in acute distress.     Appearance: Normal appearance.   HENT:      Head: Normocephalic and atraumatic.      Nose: Nose normal.      Mouth/Throat:      Mouth: Mucous membranes are moist.      Pharynx: Oropharynx is clear.   Cardiovascular:      Rate and Rhythm: Normal rate and regular rhythm.   Pulmonary:      Effort: Pulmonary effort is normal. No respiratory distress.      Breath sounds: Normal breath sounds.   Abdominal:      General: Bowel sounds are normal.      Palpations: Abdomen is soft.      Tenderness: There is abdominal tenderness in the epigastric area. There is no guarding or rebound.   Musculoskeletal:         General: No swelling, deformity or signs of injury.      Cervical back: Normal range of motion and neck supple.      Right lower leg: No edema.      Left lower leg: No edema.   Skin:     General: Skin is warm and dry.   Neurological:      General: No focal deficit present.      Mental Status: He is alert and oriented to person, place, and time. Mental status is at baseline.   Psychiatric:         Attention and Perception: Attention and perception normal.         Mood and Affect: Mood normal.         Behavior: Behavior normal.         Thought Content: Thought content normal.         Judgment: Judgment normal.         Relevant Results             Results for orders placed or performed during the hospital encounter of 02/29/24 (from the past 24 hour(s))   POCT GLUCOSE   Result Value Ref Range    POCT Glucose 94 74 - 99 mg/dL   POCT GLUCOSE   Result Value Ref Range    POCT Glucose 106 (H) 74 - 99 mg/dL   Protime-INR   Result Value Ref Range    Protime 12.7 9.3 - 12.7 seconds    INR 1.2 0.9 - 1.2   POCT GLUCOSE   Result Value Ref Range    POCT Glucose 115 (H) 74 - 99 mg/dL   Renal Function Panel   Result Value Ref Range    Glucose 100 (H) 65 - 99 mg/dL    Sodium 139 133 - 145  mmol/L    Potassium 4.5 3.4 - 5.1 mmol/L    Chloride 107 97 - 107 mmol/L    Bicarbonate 23 (L) 24 - 31 mmol/L    Urea Nitrogen 55 (H) 8 - 25 mg/dL    Creatinine 2.80 (H) 0.40 - 1.60 mg/dL    eGFR 22 (L) >60 mL/min/1.73m*2    Calcium 8.3 (L) 8.5 - 10.4 mg/dL    Phosphorus 3.1 2.5 - 4.5 mg/dL    Albumin 3.1 (L) 3.5 - 5.0 g/dL    Anion Gap 9 <=19 mmol/L   CBC   Result Value Ref Range    WBC 5.6 4.4 - 11.3 x10*3/uL    nRBC 1.1 (H) 0.0 - 0.0 /100 WBCs    RBC 2.70 (L) 4.50 - 5.90 x10*6/uL    Hemoglobin 7.1 (L) 13.5 - 17.5 g/dL    Hematocrit 23.7 (L) 41.0 - 52.0 %    MCV 88 80 - 100 fL    MCH 26.3 26.0 - 34.0 pg    MCHC 30.0 (L) 32.0 - 36.0 g/dL    RDW 17.6 (H) 11.5 - 14.5 %    Platelets 177 150 - 450 x10*3/uL     *Note: Due to a large number of results and/or encounters for the requested time period, some results have not been displayed. A complete set of results can be found in Results Review.         Assessment/Plan          Principal Problem:    GI bleed    Acute Blood Loss Anemia  -Secondary to gastrointestinal bleed  -Threshold for transfusion is less than 7.  -Status post 4 units packed red blood cells, most recent was 1 unit yesterday.  -Hemoglobin 7.1 this morning.  Will repeat this afternoon and transfuse per threshold above.     Gastrointestinal hemorrhage  -EGD showing some gastritis but no obvious source of bleeding.  -Colonoscopy planned for 3/4/24.  -Continue Protonix per gastroenterology.     Acute kidney injury on chronic kidney disease  -Improving with IV fluids and blood product transfusions.  -Nephrology has been consulted.  Patient improved with IV fluids and these will now be discontinued.  Continue to monitor.  -Appreciate input from Dr. Ramirez.      Atrial fibrillation  -Rate-controlled even with lower metoprolol dose.  -Will continue to hold off of the anticoagulation given the GI bleed.  -Continue reduced dose metoprolol 12.5 mg BID.     Asymptomatic Bacteriuria  -Continue to monitor off of  antibiotics.     Physical Therapy/Occupational Therapy                Salome Terry MD

## 2024-03-02 NOTE — PROGRESS NOTES
Angel Carroll is a 78 y.o. male on day 2 of admission presenting with GI bleed.    Subjective   Denies abdominal pain. Reports bloody stool yesterday        Objective     Physical Exam  Vitals reviewed.   Constitutional:       General: He is awake.      Appearance: Normal appearance.   HENT:      Head: Normocephalic and atraumatic.      Mouth/Throat:      Mouth: Mucous membranes are moist.   Cardiovascular:      Rate and Rhythm: Normal rate and regular rhythm.   Pulmonary:      Effort: Pulmonary effort is normal.      Breath sounds: Normal breath sounds.   Abdominal:      General: There is no distension.      Palpations: Abdomen is soft.      Tenderness: There is no abdominal tenderness. There is no guarding.   Musculoskeletal:      Cervical back: Normal range of motion and neck supple.   Skin:     General: Skin is warm and dry.   Neurological:      General: No focal deficit present.      Mental Status: He is alert and oriented to person, place, and time. Mental status is at baseline.   Psychiatric:         Attention and Perception: Attention and perception normal.         Mood and Affect: Mood normal.         Behavior: Behavior normal.         Last Recorded Vitals  Blood pressure 108/53, pulse 62, temperature 36.6 °C (97.9 °F), temperature source Oral, resp. rate 16, height 1.829 m (6'), weight 112 kg (246 lb 14.6 oz), SpO2 98 %.  Intake/Output last 3 Shifts:  I/O last 3 completed shifts:  In: 1472.5 (13.1 mL/kg) [P.O.:270; I.V.:952.5 (8.5 mL/kg); Blood:250]  Out: 2151 (19.2 mL/kg) [Urine:2150 (0.5 mL/kg/hr); Stool:1]  Weight: 112 kg     Relevant Results                Results for orders placed or performed during the hospital encounter of 02/29/24 (from the past 24 hour(s))   Prepare RBC: 1 Units   Result Value Ref Range    PRODUCT CODE A5645Z87     Unit Number [id number]     Unit ABO B     Unit RH NEG     XM INTEP COMP     Dispense Status TR     Blood Expiration Date April 02, 2024 23:59 EDT     PRODUCT BLOOD  TYPE 1700     UNIT VOLUME 400    POCT GLUCOSE   Result Value Ref Range    POCT Glucose 94 74 - 99 mg/dL   POCT GLUCOSE   Result Value Ref Range    POCT Glucose 106 (H) 74 - 99 mg/dL   Protime-INR   Result Value Ref Range    Protime 12.7 9.3 - 12.7 seconds    INR 1.2 0.9 - 1.2   POCT GLUCOSE   Result Value Ref Range    POCT Glucose 115 (H) 74 - 99 mg/dL   Renal Function Panel   Result Value Ref Range    Glucose 100 (H) 65 - 99 mg/dL    Sodium 139 133 - 145 mmol/L    Potassium 4.5 3.4 - 5.1 mmol/L    Chloride 107 97 - 107 mmol/L    Bicarbonate 23 (L) 24 - 31 mmol/L    Urea Nitrogen 55 (H) 8 - 25 mg/dL    Creatinine 2.80 (H) 0.40 - 1.60 mg/dL    eGFR 22 (L) >60 mL/min/1.73m*2    Calcium 8.3 (L) 8.5 - 10.4 mg/dL    Phosphorus 3.1 2.5 - 4.5 mg/dL    Albumin 3.1 (L) 3.5 - 5.0 g/dL    Anion Gap 9 <=19 mmol/L   CBC   Result Value Ref Range    WBC 5.6 4.4 - 11.3 x10*3/uL    nRBC 1.1 (H) 0.0 - 0.0 /100 WBCs    RBC 2.70 (L) 4.50 - 5.90 x10*6/uL    Hemoglobin 7.1 (L) 13.5 - 17.5 g/dL    Hematocrit 23.7 (L) 41.0 - 52.0 %    MCV 88 80 - 100 fL    MCH 26.3 26.0 - 34.0 pg    MCHC 30.0 (L) 32.0 - 36.0 g/dL    RDW 17.6 (H) 11.5 - 14.5 %    Platelets 177 150 - 450 x10*3/uL     *Note: Due to a large number of results and/or encounters for the requested time period, some results have not been displayed. A complete set of results can be found in Results Review.     EGD    Result Date: 3/1/2024  Table formatting from the original result was not included. Impression The upper third of the esophagus, middle third of the esophagus, lower third of the esophagus and GE junction appeared normal. Multiple subcentimeter fundic gland polyps in the fundus of the stomach and body of the stomach Mild abnormal mucosa, consistent with gastritis in the cardia, fundus of the stomach and body of the stomach The duodenal bulb, 1st part of the duodenum and 2nd part of the duodenum appeared normal. Findings The upper third of the esophagus, middle third of  the esophagus, lower third of the esophagus and GE junction appeared normal. Z-line is 44 cm from the incisors. Multiple sessile fundic gland polyps measuring smaller than 5 mm in the fundus of the stomach and body of the stomach Mild, localized abnormal mucosa with erosion in the cardia, fundus of the stomach and body of the stomach, consistent with gastritis; The duodenal bulb, 1st part of the duodenum and 2nd part of the duodenum appeared normal. Recommendation  Avoid NSAID and anticoagulation for now. Check PT/INR today. Change Protonix IV to 40 mg p.o. daily. Full liquid diet today. Blood transfusion to keep hemoglobin above 7.5.  Colonoscopy on Monday or sooner if needed.  Bleeding seems to be diverticular but other causes need to be excluded.  Indication Blood loss anemia, GI bleed Staff Staff Role Radha Traylor MD Proceduralist Medications See Anesthesia Record. Preprocedure A history and physical has been performed, and patient medication allergies have been reviewed. The patient's tolerance of previous anesthesia has been reviewed. The risks and benefits of the procedure and the sedation options and risks were discussed with the patient. All questions were answered and informed consent obtained. Details of the Procedure The patient underwent monitored anesthesia care, which was administered by an anesthesia professional. The patient's level of consciousness, heart rate, ETCO2, ECG, respirations, oxygen and blood pressure were monitored throughout the procedure. The scope was introduced through the mouth and advanced to the second part of the duodenum. Retroflexion was performed in the cardia, fundus and incisura. Prior to the procedure, the patient's H. Pylori status was unknown. The patient experienced no blood loss. The procedure was not difficult. The patient tolerated the procedure well. There were no apparent adverse events. Events No data recorded Specimens Procedure Location [facility]  [address] [id number] [phone] Referring Provider No referring provider defined for this encounter. Procedure Provider No name on file    XR femur left 2+ views    Result Date: 2/29/2024  Interpreted By:  Sohail Alonzo, STUDY: XR FEMUR LEFT 2+ VIEWS; ;  2/29/2024 6:14 pm   INDICATION: Signs/Symptoms:fall, thigh pain.   COMPARISON: None.   ACCESSION NUMBER(S): [id number]   ORDERING CLINICIAN: KO SINGER   FINDINGS: Five views of the left femur were obtained. No evidence for acute fracture or acute abnormality. The left total knee prosthesis appears intact. The left hip joint is intact without dislocation. The proximal left femur is adequately maintained. The hip joint space is adequately maintained. No acute osseous abnormality.       No evidence for acute osseous abnormality.   MACRO: None   Signed by: Sohail Alonzo 2/29/2024 7:02 PM Dictation workstation:   [id number]    Transthoracic Echo (TTE) Complete    Result Date: 2/29/2024           [place] [id number]            Phone [phone] TRANSTHORACIC ECHOCARDIOGRAM REPORT  Patient Name:     MICHEL VIANCA Sosa Physician:  [id number] Deacon Cullen MD Study Date:       2/29/2024            Ordering Provider:  [id number] KO SINGER MRN/PID:          [id number]             Fellow: Accession#:       [id number]         Nurse: Date of           1945 / 78 years Sonographer:        Monisha Rios RDCS Birth/Age: Gender:           M                    Additional Staff: Height:           182.88 cm            Admit Date: Weight:           107.50 kg            Admission Status:   Inpatient - Routine BSA / BMI:        2.29 m2 / 32.14      Department          [facility] Step Down                   kg/m2                Location: Blood Pressure: 118 /49 mmHg Study Type:    TRANSTHORACIC ECHO (TTE)  COMPLETE Diagnosis/ICD: Longstanding persistent AFib-I48.11 Indication:    CHF/AFIB CPT Codes:     Echo Complete w Full [id number] Patient History: Pertinent History: HTN, Hyperlipidemia, CHF, A-Fib and Sinus Bradycardia. Study Detail: The following Echo studies were performed: 2D, M-Mode, Doppler and               color flow. Patient has a pacemaker.  PHYSICIAN INTERPRETATION: Left Ventricle: Left ventricular systolic function is low normal, with an estimated ejection fraction of 50-55%. There are no regional wall motion abnormalities. The left ventricular cavity size is normal. There is mild concentric left ventricular hypertrophy. Abnormal (paradoxical) septal motion, consistent with RV pacemaker. Spectral Doppler shows a normal pattern of left ventricular diastolic filling. Left Atrium: The left atrium is mildly dilated. Right Ventricle: The right ventricle is upper limits of normal in size. There is normal right ventriclar wall thickness. There is normal right ventricular global systolic function. A device is visualized in the right ventricle. Right Atrium: The right atrium is mildly dilated. There is a device visualized in the right atrium. Aortic Valve: The aortic valve is trileaflet. The aortic valve appears tricuspid and non-restricted. There is minimal aortic valve cusp calcification. There is evidence of mildly elevated transaortic gradients consistent with sclerosis of the aortic valve. There is no evidence of aortic valve regurgitation. The peak instantaneous gradient of the aortic valve is 6.2 mmHg. Mitral Valve: The mitral valve is normal in structure. There is normal mitral valve leaflet mobility. There is trace mitral valve regurgitation. Tricuspid Valve: The tricuspid valve is structurally normal. There is normal tricuspid valve leaflet mobility. There is moderate tricuspid regurgitation. The Doppler estimated RVSP is slightly elevated at 39.1 mmHg. Pulmonic Valve: The pulmonic valve is  structurally normal. There is moderate pulmonic valve regurgitation. Pericardium: There is no pericardial effusion noted. Aorta: The aortic root is normal. There is no dilatation of the aortic arch. There is no dilatation of the ascending aorta. There is no dilatation of the aortic root. Pulmonary Artery: The pulmonary artery is normal in size. The tricuspid regurgitant velocity is 2.79 m/s, and with an estimated right atrial pressure of 8 mmHg, the estimated pulmonary artery pressure is borderline elevated with the RVSP at 39.1 mmHg. The estimated PASP is 39 mmHg. Systemic Veins: The inferior vena cava appears mildly dilated. There is IVC inspiratory collapse greater than 50%.  CONCLUSIONS:  1. Left ventricular systolic function is low normal with a 50-55% estimated ejection fraction.  2. Abnormal septal motion consistent with RV pacemaker.  3. Moderate tricuspid regurgitation.  4. Slightly elevated RVSP.  5. Aortic valve sclerosis.  6. Moderate pulmonic valve regurgitation.  7. The estimated PASP is 39 mmHg. QUANTITATIVE DATA SUMMARY: 2D MEASUREMENTS:              Normal Ranges: LAs: 4.00 cm (2.7-4.0cm) LA VOLUME:                               Normal Ranges: LA Vol A4C:        61.8 ml    (22+/-6mL/m2) LA Vol A2C:        99.2 ml LA Vol BP:         79.5 ml LA Vol Index A4C:  27.0ml/m2 LA Vol Index A2C:  43.3 ml/m2 LA Vol Index BP:   34.7 ml/m2 LA Area A4C:       21.4 cm2 LA Area A2C:       26.7 cm2 LA Major Axis A4C: 6.3 cm LA Major Axis A2C: 6.1 cm LA Volume Index:   31.9 ml/m2 LA Vol A4C:        58.1 ml LA Vol A2C:        90.1 ml RA VOLUME BY A/L METHOD:                       Normal Ranges: RA Area A4C: 31.5 cm2 M-MODE MEASUREMENTS:                  Normal Ranges: Ao Root: 3.10 cm (2.0-3.7cm) AORTA MEASUREMENTS:                    Normal Ranges: Asc Ao, d: 2.60 cm (2.1-3.4cm) LV DIASTOLIC FUNCTION:                        Normal Ranges: MV Peak E:    0.93 m/s (0.7-1.2 m/s) MV Peak A:    0.32 m/s (0.42-0.7 m/s) E/A  Ratio:    2.86     (1.0-2.2) MV e'         0.04 m/s (>8.0) MV lateral e' 0.04 m/s MV medial e'  0.05 m/s E/e' Ratio:   20.69    (<8.0) MITRAL VALVE:                 Normal Ranges: MV DT: 140 msec (150-240msec) AORTIC VALVE:                         Normal Ranges: AoV Vmax:      1.25 m/s (<=1.7m/s) AoV Peak P.2 mmHg (<20mmHg) LVOT Max Ross:  0.66 m/s (<=1.1m/s) LVOT Diameter: 2.10 cm  (1.8-2.4cm) AoV Area,Vmax: 1.81 cm2 (2.5-4.5cm2)  RIGHT VENTRICLE: RV Basal 3.76 cm RV Mid   2.47 cm RV Major 8.3 cm TAPSE:   17.3 mm RV s'    0.06 m/s TRICUSPID VALVE/RVSP:                             Normal Ranges: Peak TR Velocity: 2.79 m/s RV Syst Pressure: 39.1 mmHg (< 30mmHg) IVC Diam:         2.03 cm  [id number] Deacon Cullen MD Electronically signed on 2024 at 5:56:41 PM  ** Final **     US renal complete    Result Date: 2024  Interpreted By:  Carmen Young, STUDY: US RENAL COMPLETE; 2024 8:28 am   INDICATION: Signs/Symptoms:acute kidney failure.   COMPARISON: None.   ACCESSION NUMBER(S): [id number]   ORDERING CLINICIAN: KO SINGER   TECHNIQUE: Grayscale and color Doppler imaging of the kidneys.   FINDINGS: The right kidney measures 9.6 x 4.3 cm. There is a anechoic cyst within the midpole medially measuring 1.8 cm with posterior acoustic enhancement demonstrated. Of note, there is an echogenic focus measured in the inferior pole measuring 11 mm suggesting stone. Also, a cyst within the inferior pole medially measuring 1.8 cm. 7 mm stone also demonstrated in the inferior pole The left kidney measures 10.6 x 5.3 cm. There is no cortical thinning. No hydronephrosis demonstrated. There is nonobstructive nephrolithiasis in left kidney with 7 mm stone identified with a adjacent stone also noted.     Bladder visualized portions demonstrates no intraluminal filling defect. There is mild mass effect upon the base the bladder by the prostate.       1. Nonobstructing nephrolithiasis bilateral kidneys. No hydronephrosis.    2. Cysts demonstrated within the right kidney.   MACRO: None.   Signed by: Carmen Young 2/29/2024 9:14 AM Dictation workstation:   [id number]    XR chest 1 view    Result Date: 2/29/2024  Interpreted By:  Malik Alonso, STUDY: XR CHEST 1 VIEW; 2/29/2024 6:48 am   INDICATION: Signs/Symptoms:chf   COMPARISON: None   ACCESSION NUMBER(S): [id number]   ORDERING CLINICIAN: KO SINGER   FINDINGS: The study is limited due to rotation. A pacemaker is again noted, with the leads overlying the right atrium and right ventricle. The cardiac silhouette appears mildly prominent, exaggerated by the technique. Calcifications involve the aortic arch. There is no pneumothorax, confluent infiltrates or significant effusion. The osseous structures are unchanged.       Allowing for the aforementioned limitation, no acute cardiopulmonary disease.   Signed by: Malik Alonso 2/29/2024 8:07 AM Dictation workstation:   [id number]                Assessment/Plan   Principal Problem:    GI bleed    Anemia, Melena (Normocytic initial hgb 4.9, +IRENE)              -EGD 3/1 per Dr Traylor with gastric polyp, mild erosion and gastritis but no clear source for acute bleeding/anemia   -Colonoscopy planned for Monday given need for anticoagulation. Possible diverticular bleeding   -OK for diet today    -Clear liquid plus prep tomorrow      Anticoagulation (Coumadin INR today 1.2)              -Hold pending endoscopy          I spent 20 minutes in the professional and overall care of this patient.      Mily Ken, RON-CNP

## 2024-03-02 NOTE — CARE PLAN
Problem: Pain  Goal: My pain/discomfort is manageable  3/1/2024 2155 by June Lange RN  Outcome: Progressing  3/1/2024 2155 by June Lange RN  Outcome: Progressing     Problem: Safety  Goal: Patient will be injury free during hospitalization  3/1/2024 2155 by June Lange RN  Outcome: Progressing  3/1/2024 2155 by June Lange RN  Outcome: Progressing  Goal: I will remain free of falls  3/1/2024 2155 by June Lange RN  Outcome: Progressing  3/1/2024 2155 by June Lange RN  Outcome: Progressing     Problem: Daily Care  Goal: Daily care needs are met  3/1/2024 2155 by June Lange RN  Outcome: Progressing  3/1/2024 2155 by June Lange RN  Outcome: Progressing     Problem: Psychosocial Needs  Goal: Demonstrates ability to cope with hospitalization/illness  3/1/2024 2155 by June Lange RN  Outcome: Progressing  3/1/2024 2155 by June Lange RN  Outcome: Progressing  Goal: Collaborate with me, my family, and caregiver to identify my specific goals  3/1/2024 2155 by June Lange RN  Outcome: Progressing  3/1/2024 2155 by June Lange RN  Outcome: Progressing     Problem: Discharge Barriers  Goal: My discharge needs are met  3/1/2024 2155 by June Lange RN  Outcome: Progressing  3/1/2024 2155 by June Lange RN  Outcome: Progressing     Problem: Skin  Goal: Decreased wound size/increased tissue granulation at next dressing change  3/1/2024 2155 by June Lange RN  Outcome: Progressing  Flowsheets (Taken 3/1/2024 1930)  Decreased wound size/increased tissue granulation at next dressing change:   Promote sleep for wound healing   Protective dressings over bony prominences  3/1/2024 2155 by June Lange RN  Outcome: Progressing  Flowsheets (Taken 3/1/2024 1930)  Decreased wound size/increased tissue granulation at next dressing change:    Promote sleep for wound healing   Protective dressings over bony prominences  Goal: Participates in plan/prevention/treatment measures  3/1/2024 2155 by June Lange RN  Outcome: Progressing  Flowsheets (Taken 3/1/2024 1930)  Participates in plan/prevention/treatment measures: Discuss with provider PT/OT consult  3/1/2024 2155 by June Lange RN  Outcome: Progressing  Flowsheets (Taken 3/1/2024 1930)  Participates in plan/prevention/treatment measures: Discuss with provider PT/OT consult  Goal: Prevent/manage excess moisture  3/1/2024 2155 by June Lange RN  Outcome: Progressing  Flowsheets (Taken 3/1/2024 1930)  Prevent/manage excess moisture:   Cleanse incontinence/protect with barrier cream   Monitor for/manage infection if present  3/1/2024 2155 by June Lange RN  Outcome: Progressing  Flowsheets (Taken 3/1/2024 1930)  Prevent/manage excess moisture:   Cleanse incontinence/protect with barrier cream   Monitor for/manage infection if present  Goal: Prevent/minimize sheer/friction injuries  3/1/2024 2155 by June Lange RN  Outcome: Progressing  Flowsheets (Taken 3/1/2024 1930)  Prevent/minimize sheer/friction injuries:   HOB 30 degrees or less   Turn/reposition every 2 hours/use positioning/transfer devices   Use pull sheet  3/1/2024 2155 by June Lange RN  Outcome: Progressing  Flowsheets (Taken 3/1/2024 1930)  Prevent/minimize sheer/friction injuries:   HOB 30 degrees or less   Turn/reposition every 2 hours/use positioning/transfer devices   Use pull sheet  Goal: Promote/optimize nutrition  3/1/2024 2155 by June Lange RN  Outcome: Progressing  Flowsheets (Taken 3/1/2024 1930)  Promote/optimize nutrition: Monitor/record intake including meals  3/1/2024 2155 by June Lange RN  Outcome: Progressing  Flowsheets (Taken 3/1/2024 1930)  Promote/optimize nutrition: Monitor/record intake including meals  Goal: Promote skin  healing  3/1/2024 2155 by June Lange RN  Outcome: Progressing  Flowsheets (Taken 3/1/2024 1930)  Promote skin healing:   Assess skin/pad under line(s)/device(s)   Protective dressings over bony prominences   Turn/reposition every 2 hours/use positioning/transfer devices  3/1/2024 2155 by June Lange RN  Outcome: Progressing  Flowsheets (Taken 3/1/2024 1930)  Promote skin healing:   Assess skin/pad under line(s)/device(s)   Protective dressings over bony prominences   Turn/reposition every 2 hours/use positioning/transfer devices   The patient's goals for the shift include      The clinical goals for the shift include no bloody stools    Over the shift, the patient did not make progress toward the following goals. Barriers to progression include . Recommendations to address these barriers include .

## 2024-03-02 NOTE — PROGRESS NOTES
Angel Carroll is a 78 y.o. male on day 2 of admission presenting with GI bleed.      Subjective   No new overnight events.  Stable BP.  No dyspnea at rest.  Decent urine volumes.       Scheduled medications  amiodarone, 200 mg, oral, Daily  atorvastatin, 20 mg, oral, Nightly  finasteride, 5 mg, oral, Daily  insulin lispro, 0-10 Units, subcutaneous, TID with meals  lidocaine, 0.1 mL, subcutaneous, Once  metoprolol tartrate, 12.5 mg, oral, BID  pantoprazole, 40 mg, oral, Daily  PARoxetine, 20 mg, oral, Daily  tamsulosin, 0.4 mg, oral, Nightly      Continuous medications  sodium chloride 0.9%, 75 mL/hr, Last Rate: 75 mL/hr (03/02/24 0041)      PRN medications  PRN medications: dextrose 10 % in water (D10W), dextrose, glucagon, labetaloL, ondansetron, ondansetron, promethazine (Phenergan) 6.25 mg in sodium chloride 0.9% 50 mL IV      Objective     Vitals 24HR  Heart Rate:  [62-64]   Temp:  [35.9 °C (96.6 °F)-36.9 °C (98.4 °F)]   Resp:  [16-21]   BP: ()/(50-53)   Weight:  [112 kg (246 lb 14.6 oz)]   SpO2:  [94 %-99 %]     General: Elderly man, not in distress  Eyes: Pale, anicteric  Lungs: Clear bilaterally  Heart: S1 and S2, regular  Abdomen: Soft, nontender  Extremities: No pedal edema  Neuro: Awake and interactive    Intake/Output last 3 Shifts:    Intake/Output Summary (Last 24 hours) at 3/2/2024 1115  Last data filed at 3/2/2024 1000  Gross per 24 hour   Intake 470 ml   Output 1956 ml   Net -1486 ml       Relevant Results    Results for orders placed or performed during the hospital encounter of 02/29/24 (from the past 24 hour(s))   POCT GLUCOSE   Result Value Ref Range    POCT Glucose 94 74 - 99 mg/dL   POCT GLUCOSE   Result Value Ref Range    POCT Glucose 106 (H) 74 - 99 mg/dL   Protime-INR   Result Value Ref Range    Protime 12.7 9.3 - 12.7 seconds    INR 1.2 0.9 - 1.2   POCT GLUCOSE   Result Value Ref Range    POCT Glucose 115 (H) 74 - 99 mg/dL   Renal Function Panel   Result Value Ref Range    Glucose 100  (H) 65 - 99 mg/dL    Sodium 139 133 - 145 mmol/L    Potassium 4.5 3.4 - 5.1 mmol/L    Chloride 107 97 - 107 mmol/L    Bicarbonate 23 (L) 24 - 31 mmol/L    Urea Nitrogen 55 (H) 8 - 25 mg/dL    Creatinine 2.80 (H) 0.40 - 1.60 mg/dL    eGFR 22 (L) >60 mL/min/1.73m*2    Calcium 8.3 (L) 8.5 - 10.4 mg/dL    Phosphorus 3.1 2.5 - 4.5 mg/dL    Albumin 3.1 (L) 3.5 - 5.0 g/dL    Anion Gap 9 <=19 mmol/L   CBC   Result Value Ref Range    WBC 5.6 4.4 - 11.3 x10*3/uL    nRBC 1.1 (H) 0.0 - 0.0 /100 WBCs    RBC 2.70 (L) 4.50 - 5.90 x10*6/uL    Hemoglobin 7.1 (L) 13.5 - 17.5 g/dL    Hematocrit 23.7 (L) 41.0 - 52.0 %    MCV 88 80 - 100 fL    MCH 26.3 26.0 - 34.0 pg    MCHC 30.0 (L) 32.0 - 36.0 g/dL    RDW 17.6 (H) 11.5 - 14.5 %    Platelets 177 150 - 450 x10*3/uL     *Note: Due to a large number of results and/or encounters for the requested time period, some results have not been displayed. A complete set of results can be found in Results Review.         Assessment/Plan      JASBIR on stage IV CKD  Hyperkalemia, resolved  Metabolic acidosis, at goal  Anemia    Glad to see sustained renal recovery with serum creatinine back around his previous baseline.  Lytes are controlled and he is hemodynamically stable.  I will discontinue IV fluids and monitor.    Defer to the primary service regarding PRBC transfusion threshold.    Continue other care.      Celestino Orellana MD

## 2024-03-02 NOTE — PROGRESS NOTES
Occupational Therapy    Evaluation/Treatment    Patient Name: Angel Carroll  MRN: [id number]  : 1945  Today's Date: 24  Time Calculation  Start Time: 1230  Stop Time: 1250  Time Calculation (min): 20 min       Assessment:  OT Assessment: Pt presents on eval with increased overall weakness, decreased activity tolerance, and impaired standing balance affecting his self-care and functional transfers/mobility. Pt will benefit from continued skilled OT to address these deficits.  Prognosis: Fair  Evaluation/Treatment Tolerance: Patient limited by fatigue  End of Session Communication: Bedside nurse  End of Session Patient Position: Up in chair, Alarm off, not on at start of session (Needs in reach and nurse stated no need for a chair alarm when asked.)  OT Assessment Results: Decreased ADL status, Decreased upper extremity strength, Decreased endurance, Decreased functional mobility, Decreased trunk control for functional activities    Plan:  Treatment Interventions: ADL retraining, Functional transfer training, UE strengthening/ROM, Endurance training, Cognitive reorientation, Patient/family training, Equipment evaluation/education, Neuromuscular reeducation, Compensatory technique education  OT Frequency: 4 times per week  OT Discharge Recommendations: Moderate intensity level of continued care  Equipment Recommended upon Discharge: Wheeled walker, Wheelchair  OT Recommended Transfer Status: Moderate assist, Minimal assist  OT - OK to Discharge: Yes      Subjective     General:   OT Received On: 24  General  Reason for Referral: weakness, anemia, impaired ADL's/mobility  Referred By: Dr. Josafat Cole  Past Medical History Relevant to Rehab: chronic renal failure stage 3, anemia, a-fib, CHF, CGA, meningioma  Family/Caregiver Present: No  Prior to Session Communication: Bedside nurse  Patient Position Received: Bed, 3 rail up, Alarm off, not on at start of session  General Comment: Pt is an 79 y/o male  admitted d/t weakness. Found to have low H&H and underwent multiple blood transfusions.    Precautions:  Hearing/Visual Limitations: glasses, bilateral hearing aids  Medical Precautions: Fall precautions, Oxygen therapy device and L/min (2L 02)    Vital Signs:  Heart Rate: 60  Heart Rate Source: Monitor  SpO2: 94 %  BP: 148/80    Pain:  Pain Assessment  Pain Assessment: 0-10  Pain Score: 0 - No pain    Objective     Cognition:  Overall Cognitive Status: Within Functional Limits  Orientation Level: Disoriented to time (did not state the correct month)  Cognition Comments: Mild deficits noted.    Home Living:  Type of Home: House  Lives With: Spouse  Home Adaptive Equipment: Walker rolling or standard, Cane, Wheelchair-manual  Home Layout: Laundry in basement, One level  Home Access: Stairs to enter with rails  Entrance Stairs-Rails: Both  Entrance Stairs-Number of Steps: 3  Bathroom Shower/Tub: Tub/shower unit  Bathroom Toilet: Handicapped height  Bathroom Equipment: Grab bars in shower, Tub transfer bench    Prior Function:  Level of [place]: Independent with ADLs and functional transfers, Needs assistance with homemaking  Receives Help From: Family (daughters assist with IADL's)  ADL Assistance: Independent  Homemaking Assistance: Needs assistance  Ambulatory Assistance: Independent  Vocational: Retired  Hand Dominance: Left  Prior Function Comments: Pt reports ambulating household distances using rollator or cane on days that he feels strong enough, however, most days he has been getting around the house using a manual w/c. History of multiple falls.    ADL:  Eating Assistance: Independent  Grooming Assistance: Minimal  Grooming Deficit:  (in standing)  Bathing Assistance: Moderate  UE Dressing Assistance: Minimal  LE Dressing Assistance: Maximal  Toileting Assistance with Device: Not performed  Toileting Deficit: Other (Comment) (purewick in place)    Activity Tolerance:  Activity Tolerance Comments:  impaired due to recent anemia, overall weakness, and limited activity since admit    Bed Mobility/Transfers: Bed Mobility  Bed Mobility:  (Pt completed supine to sit in bed with min A for trunk up and verbal cues as needed.)    Transfers  Transfer:  (Pt completed sit to stand from the bed with min A for balance safety. Pt then required mod A to take a few steps to the chair using no device due to BLE weakness and impaired balance.)    Sitting Balance:  Static Sitting Balance  Static Sitting-Balance Support: Bilateral upper extremity supported  Static Sitting-Level of Assistance: Contact guard, Close supervision  Static Sitting-Comment/Number of Minutes: 5 minutes sitting EOB    Standing Balance:  Static Standing Balance  Static Standing-Balance Support: Right upper extremity supported  Static Standing-Level of Assistance: Minimum assistance  Static Standing-Comment/Number of Minutes: <1 minute with arm in arm support    Therapy/Activity: Therapeutic Activity  Therapeutic Activity Performed:  (See bed mobility, transfers, static sitting balance, and static standing balance.)    Sensation:  Sensation Comment: PATEL'qiana RINCON    Strength:  Strength Comments: PATEL's 3+/5    Coordination:  Coordination Comment: PATEL's MCKENZIE     Hand Function:  Hand Function  Gross Grasp: Functional  Coordination: Functional      Outcome Measures: [facility] Daily Activity  Putting on and taking off regular lower body clothing: A lot  Bathing (including washing, rinsing, drying): A lot  Putting on and taking off regular upper body clothing: A little  Toileting, which includes using toilet, bedpan or urinal: Total  Taking care of personal grooming such as brushing teeth: A little  Eating Meals: None  Daily Activity - Total Score: 15      Education Documentation  ADL Training, taught by Kaushal Light Jr., OT at 3/2/2024  1:49 PM.  Learner: Patient  Readiness: Acceptance  Method: Explanation  Response: Needs Reinforcement    Education Comments  No  comments found.         Goals:  Problem: OT Goals  Goal: Pt will complete all grooming tasks with setup in standing and/or sitting.  Outcome: Progressing  Goal: Pt will complete UB dressing/bathing with setup and LB dressing/bathing with close S using AE as needed.   Outcome: Progressing  Goal: Pt will complete all toileting tasks with close S.  Outcome: Progressing  Goal: Pt will complete all functional transfers and mobility with close S using a RW.  Outcome: Progressing

## 2024-03-03 LAB
ABO GROUP (TYPE) IN BLOOD: NORMAL
ALBUMIN SERPL-MCNC: 3 G/DL (ref 3.5–5)
ANION GAP SERPL CALC-SCNC: 11 MMOL/L
ANTIBODY SCREEN: NORMAL
BLOOD EXPIRATION DATE: NORMAL
BUN SERPL-MCNC: 43 MG/DL (ref 8–25)
CALCIUM SERPL-MCNC: 8.5 MG/DL (ref 8.5–10.4)
CHLORIDE SERPL-SCNC: 109 MMOL/L (ref 97–107)
CO2 SERPL-SCNC: 22 MMOL/L (ref 24–31)
CREAT SERPL-MCNC: 2.2 MG/DL (ref 0.4–1.6)
DISPENSE STATUS: NORMAL
EGFRCR SERPLBLD CKD-EPI 2021: 30 ML/MIN/1.73M*2
ERYTHROCYTE [DISTWIDTH] IN BLOOD BY AUTOMATED COUNT: 17.7 % (ref 11.5–14.5)
GLUCOSE SERPL-MCNC: 99 MG/DL (ref 65–99)
HCT VFR BLD AUTO: 22.9 % (ref 41–52)
HGB BLD-MCNC: 6.9 G/DL (ref 13.5–17.5)
MCH RBC QN AUTO: 26.6 PG (ref 26–34)
MCHC RBC AUTO-ENTMCNC: 30.1 G/DL (ref 32–36)
MCV RBC AUTO: 88 FL (ref 80–100)
NRBC BLD-RTO: 2.2 /100 WBCS (ref 0–0)
PHOSPHATE SERPL-MCNC: 2.8 MG/DL (ref 2.5–4.5)
PLATELET # BLD AUTO: 181 X10*3/UL (ref 150–450)
POTASSIUM SERPL-SCNC: 4.2 MMOL/L (ref 3.4–5.1)
PRODUCT BLOOD TYPE: 1700
PRODUCT CODE: NORMAL
RBC # BLD AUTO: 2.59 X10*6/UL (ref 4.5–5.9)
RH FACTOR (ANTIGEN D): NORMAL
SODIUM SERPL-SCNC: 142 MMOL/L (ref 133–145)
UNIT ABO: NORMAL
UNIT NUMBER: NORMAL
UNIT RH: NORMAL
UNIT VOLUME: 400
WBC # BLD AUTO: 4.1 X10*3/UL (ref 4.4–11.3)
XM INTEP: NORMAL

## 2024-03-03 PROCEDURE — 2500000002 HC RX 250 W HCPCS SELF ADMINISTERED DRUGS (ALT 637 FOR MEDICARE OP, ALT 636 FOR OP/ED): Performed by: INTERNAL MEDICINE

## 2024-03-03 PROCEDURE — 2500000001 HC RX 250 WO HCPCS SELF ADMINISTERED DRUGS (ALT 637 FOR MEDICARE OP): Performed by: INTERNAL MEDICINE

## 2024-03-03 PROCEDURE — 2060000001 HC INTERMEDIATE ICU ROOM DAILY

## 2024-03-03 PROCEDURE — 36430 TRANSFUSION BLD/BLD COMPNT: CPT

## 2024-03-03 PROCEDURE — 36415 COLL VENOUS BLD VENIPUNCTURE: CPT | Performed by: STUDENT IN AN ORGANIZED HEALTH CARE EDUCATION/TRAINING PROGRAM

## 2024-03-03 PROCEDURE — 84100 ASSAY OF PHOSPHORUS: CPT | Performed by: INTERNAL MEDICINE

## 2024-03-03 PROCEDURE — 36415 COLL VENOUS BLD VENIPUNCTURE: CPT | Performed by: INTERNAL MEDICINE

## 2024-03-03 PROCEDURE — 85027 COMPLETE CBC AUTOMATED: CPT | Performed by: INTERNAL MEDICINE

## 2024-03-03 PROCEDURE — P9016 RBC LEUKOCYTES REDUCED: HCPCS

## 2024-03-03 PROCEDURE — 2500000004 HC RX 250 GENERAL PHARMACY W/ HCPCS (ALT 636 FOR OP/ED): Performed by: NURSE PRACTITIONER

## 2024-03-03 PROCEDURE — 86901 BLOOD TYPING SEROLOGIC RH(D): CPT | Performed by: STUDENT IN AN ORGANIZED HEALTH CARE EDUCATION/TRAINING PROGRAM

## 2024-03-03 PROCEDURE — 2500000001 HC RX 250 WO HCPCS SELF ADMINISTERED DRUGS (ALT 637 FOR MEDICARE OP): Performed by: NURSE PRACTITIONER

## 2024-03-03 RX ORDER — POLYETHYLENE GLYCOL 3350 17 G/17G
238 POWDER, FOR SOLUTION ORAL ONCE
Status: COMPLETED | OUTPATIENT
Start: 2024-03-03 | End: 2024-03-03

## 2024-03-03 RX ORDER — BISACODYL 5 MG
20 TABLET, DELAYED RELEASE (ENTERIC COATED) ORAL ONCE
Status: COMPLETED | OUTPATIENT
Start: 2024-03-03 | End: 2024-03-03

## 2024-03-03 RX ADMIN — POLYETHYLENE GLYCOL 3350 238 G: 17 POWDER, FOR SOLUTION ORAL at 14:30

## 2024-03-03 RX ADMIN — PANTOPRAZOLE SODIUM 40 MG: 40 TABLET, DELAYED RELEASE ORAL at 09:32

## 2024-03-03 RX ADMIN — AMIODARONE HYDROCHLORIDE 200 MG: 200 TABLET ORAL at 09:32

## 2024-03-03 RX ADMIN — PAROXETINE HYDROCHLORIDE 20 MG: 20 TABLET, FILM COATED ORAL at 09:32

## 2024-03-03 RX ADMIN — BISACODYL 20 MG: 5 TABLET, COATED ORAL at 09:32

## 2024-03-03 RX ADMIN — TAMSULOSIN HYDROCHLORIDE 0.4 MG: 0.4 CAPSULE ORAL at 20:55

## 2024-03-03 RX ADMIN — METOPROLOL TARTRATE 12.5 MG: 25 TABLET, FILM COATED ORAL at 20:55

## 2024-03-03 RX ADMIN — ATORVASTATIN CALCIUM 20 MG: 20 TABLET, FILM COATED ORAL at 20:55

## 2024-03-03 RX ADMIN — METOPROLOL TARTRATE 12.5 MG: 25 TABLET, FILM COATED ORAL at 09:32

## 2024-03-03 RX ADMIN — FINASTERIDE 5 MG: 5 TABLET, FILM COATED ORAL at 09:32

## 2024-03-03 ASSESSMENT — PAIN SCALES - GENERAL
PAINLEVEL_OUTOF10: 0 - NO PAIN

## 2024-03-03 ASSESSMENT — COGNITIVE AND FUNCTIONAL STATUS - GENERAL
MOBILITY SCORE: 15
HELP NEEDED FOR BATHING: A LOT
TURNING FROM BACK TO SIDE WHILE IN FLAT BAD: A LITTLE
MOVING FROM LYING ON BACK TO SITTING ON SIDE OF FLAT BED WITH BEDRAILS: A LITTLE
DAILY ACTIVITIY SCORE: 15
DRESSING REGULAR UPPER BODY CLOTHING: A LITTLE
MOVING TO AND FROM BED TO CHAIR: A LITTLE
MOBILITY SCORE: 15
HELP NEEDED FOR BATHING: A LOT
DRESSING REGULAR LOWER BODY CLOTHING: A LOT
TURNING FROM BACK TO SIDE WHILE IN FLAT BAD: A LITTLE
CLIMB 3 TO 5 STEPS WITH RAILING: A LOT
DRESSING REGULAR UPPER BODY CLOTHING: A LITTLE
DAILY ACTIVITIY SCORE: 15
STANDING UP FROM CHAIR USING ARMS: A LOT
PERSONAL GROOMING: A LITTLE
TOILETING: TOTAL
WALKING IN HOSPITAL ROOM: A LOT
TOILETING: TOTAL
STANDING UP FROM CHAIR USING ARMS: A LOT
DRESSING REGULAR LOWER BODY CLOTHING: A LOT
MOVING FROM LYING ON BACK TO SITTING ON SIDE OF FLAT BED WITH BEDRAILS: A LITTLE
PERSONAL GROOMING: A LITTLE
MOVING TO AND FROM BED TO CHAIR: A LITTLE
WALKING IN HOSPITAL ROOM: A LOT
CLIMB 3 TO 5 STEPS WITH RAILING: A LOT

## 2024-03-03 ASSESSMENT — PAIN - FUNCTIONAL ASSESSMENT
PAIN_FUNCTIONAL_ASSESSMENT: 0-10

## 2024-03-03 NOTE — PROGRESS NOTES
Angel Carroll is a 78 y.o. male on day 3 of admission presenting with GI bleed.      Subjective   No acute events overnight.  Hemoglobin this morning dropped to 6.9 from 7.3 yesterday afternoon; patient has been ordered 1 unit packed red blood cells.  He continues to complain of mild shortness of breath and lightheadedness when specifically questioned, but denies any other complaints.  Denies noticing any blood in his stool over the past day.  Colonoscopy is planned for tomorrow and patient is currently on clear liquids and will be receiving bowel prep.       Objective     Last Recorded Vitals  /82   Pulse 60   Temp 36.4 °C (97.5 °F) (Tympanic)   Resp 18   Wt 114 kg (250 lb 7.1 oz)   SpO2 100%   Intake/Output last 3 Shifts:    Intake/Output Summary (Last 24 hours) at 3/3/2024 1332  Last data filed at 3/3/2024 0036  Gross per 24 hour   Intake 10 ml   Output 1200 ml   Net -1190 ml       Admission Weight  Weight: 108 kg (237 lb) (02/29/24 0441)    Daily Weight  03/03/24 : 114 kg (250 lb 7.1 oz)    Image Results  EGD  Table formatting from the original result was not included.  Impression  The upper third of the esophagus, middle third of the esophagus, lower   third of the esophagus and GE junction appeared normal.  Multiple subcentimeter fundic gland polyps in the fundus of the stomach   and body of the stomach  Mild abnormal mucosa, consistent with gastritis in the cardia, fundus of   the stomach and body of the stomach  The duodenal bulb, 1st part of the duodenum and 2nd part of the duodenum   appeared normal.    Findings  The upper third of the esophagus, middle third of the esophagus, lower   third of the esophagus and GE junction appeared normal. Z-line is 44 cm   from the incisors.  Multiple sessile fundic gland polyps measuring smaller than 5 mm in the   fundus of the stomach and body of the stomach  Mild, localized abnormal mucosa with erosion in the cardia, fundus of the   stomach and body of the  stomach, consistent with gastritis;  The duodenal bulb, 1st part of the duodenum and 2nd part of the duodenum   appeared normal.    Recommendation   Avoid NSAID and anticoagulation for now.  Check PT/INR today.  Change Protonix IV to 40 mg p.o. daily.  Full liquid diet today.   Blood transfusion to keep hemoglobin above 7.5.   Colonoscopy on Monday or sooner if needed.  Bleeding seems to be   diverticular but other causes need to be excluded.       Indication  Blood loss anemia, GI bleed    Staff  Staff Role   Radha Traylor MD Proceduralist     Medications  See Anesthesia Record.     Preprocedure  A history and physical has been performed, and patient medication   allergies have been reviewed. The patient's tolerance of previous   anesthesia has been reviewed. The risks and benefits of the procedure and   the sedation options and risks were discussed with the patient. All   questions were answered and informed consent obtained.    Details of the Procedure  The patient underwent monitored anesthesia care, which was administered by   an anesthesia professional. The patient's level of consciousness, heart   rate, ETCO2, ECG, respirations, oxygen and blood pressure were monitored   throughout the procedure. The scope was introduced through the mouth and   advanced to the second part of the duodenum. Retroflexion was performed in   the cardia, fundus and incisura. Prior to the procedure, the patient's H.   Pylori status was unknown. The patient experienced no blood loss. The   procedure was not difficult. The patient tolerated the procedure well.   There were no apparent adverse events.     Events  No data recorded    Specimens    Procedure Location  [address] [id number] [phone]    Referring Provider  No referring provider defined for this encounter.    Procedure Provider  No name on file      Physical Exam  Vitals and nursing note  reviewed.   Constitutional:       General: He is not in acute distress.     Appearance: Normal appearance.   HENT:      Head: Normocephalic and atraumatic.      Nose: Nose normal.      Mouth/Throat:      Mouth: Mucous membranes are moist.      Pharynx: Oropharynx is clear.   Cardiovascular:      Rate and Rhythm: Normal rate and regular rhythm.   Pulmonary:      Effort: Pulmonary effort is normal. No respiratory distress.      Breath sounds: Normal breath sounds.   Abdominal:      General: Bowel sounds are normal.      Palpations: Abdomen is soft.      Tenderness: There is no abdominal tenderness. There is no rebound.   Musculoskeletal:         General: No swelling, deformity or signs of injury.      Cervical back: Normal range of motion and neck supple.   Skin:     General: Skin is warm and dry.   Neurological:      General: No focal deficit present.      Mental Status: He is alert and oriented to person, place, and time. Mental status is at baseline.   Psychiatric:         Attention and Perception: Attention and perception normal.         Mood and Affect: Mood normal.         Behavior: Behavior normal.         Judgment: Judgment normal.         Relevant Results             Results for orders placed or performed during the hospital encounter of 02/29/24 (from the past 24 hour(s))   Hemoglobin and hematocrit, blood   Result Value Ref Range    Hemoglobin 7.3 (L) 13.5 - 17.5 g/dL    Hematocrit 22.9 (L) 41.0 - 52.0 %   Renal Function Panel   Result Value Ref Range    Glucose 99 65 - 99 mg/dL    Sodium 142 133 - 145 mmol/L    Potassium 4.2 3.4 - 5.1 mmol/L    Chloride 109 (H) 97 - 107 mmol/L    Bicarbonate 22 (L) 24 - 31 mmol/L    Urea Nitrogen 43 (H) 8 - 25 mg/dL    Creatinine 2.20 (H) 0.40 - 1.60 mg/dL    eGFR 30 (L) >60 mL/min/1.73m*2    Calcium 8.5 8.5 - 10.4 mg/dL    Phosphorus 2.8 2.5 - 4.5 mg/dL    Albumin 3.0 (L) 3.5 - 5.0 g/dL    Anion Gap 11 <=19 mmol/L   CBC   Result Value Ref Range    WBC 4.1 (L) 4.4 - 11.3  x10*3/uL    nRBC 2.2 (H) 0.0 - 0.0 /100 WBCs    RBC 2.59 (L) 4.50 - 5.90 x10*6/uL    Hemoglobin 6.9 (L) 13.5 - 17.5 g/dL    Hematocrit 22.9 (L) 41.0 - 52.0 %    MCV 88 80 - 100 fL    MCH 26.6 26.0 - 34.0 pg    MCHC 30.1 (L) 32.0 - 36.0 g/dL    RDW 17.7 (H) 11.5 - 14.5 %    Platelets 181 150 - 450 x10*3/uL   Prepare RBC: 1 Units   Result Value Ref Range    PRODUCT CODE T8530E19     Unit Number [id number]     Unit ABO B     Unit RH NEG     XM INTEP COMP     Dispense Status IS     Blood Expiration Date April 02, 2024 23:59 EDT     PRODUCT BLOOD TYPE 1700     UNIT VOLUME 400    Type and screen   Result Value Ref Range    ABO TYPE B     Rh TYPE POS     ANTIBODY SCREEN NEG      *Note: Due to a large number of results and/or encounters for the requested time period, some results have not been displayed. A complete set of results can be found in Results Review.       Assessment/Plan                  Principal Problem:    GI bleed    Acute Blood Loss Anemia  -Secondary to gastrointestinal bleed.  -Threshold for transfusion is <7.  -Hemoglobin 6.9 this morning; 1 unit PRBC ordered. Check H&H post-transfusion.  -Status post 4 units packed red blood cells so far; will receive 5th unit today.     Gastrointestinal hemorrhage  -EGD showing some gastritis but no obvious source of bleeding.  -Colonoscopy planned for 3/4/24.  -Continue Protonix per gastroenterology.     Acute kidney injury on chronic kidney disease  -Improving with IV fluids and blood product transfusions; now back to baseline Cr of 2.2.  -Nephrology has been consulted.  IV fluids were discontinued yesterday.  -Appreciate input from Dr. Ramirez.      Atrial fibrillation  -Rate-controlled even with lower metoprolol dose.  -Will continue to hold off of the anticoagulation given the GI bleed.  -Continue reduced dose metoprolol 12.5 mg BID.     Asymptomatic Bacteriuria  -Continue to monitor off of antibiotics.     Physical Therapy/Occupational Therapy               Salome Terry MD

## 2024-03-03 NOTE — PROGRESS NOTES
Plans for colonoscopy tomorrow   Clear liquid plus prep today   NPO after midnight   Hgb 6.9 today, transfuse for goal hgb 7  Repeat INR tomorrow morning (last was 1.2 so much better)

## 2024-03-03 NOTE — NURSING NOTE
Assumed care of patient at this time. At bedside report, patient had no c/o pain. vss and afebrile. Patient is currently on 2L nasal canula. Bed low and locked, call button within reach and bed alarm on. Patient has no needs that require immediate nursing interventions.

## 2024-03-03 NOTE — PROGRESS NOTES
Angel Carroll is a 78 y.o. male on day 3 of admission presenting with GI bleed.      Subjective   No new overnight events.  Getting PRBC transfusion this afternoon.  Stable BP.  No dyspnea at rest.       Scheduled medications  amiodarone, 200 mg, oral, Daily  atorvastatin, 20 mg, oral, Nightly  finasteride, 5 mg, oral, Daily  lidocaine, 0.1 mL, subcutaneous, Once  metoprolol tartrate, 12.5 mg, oral, BID  pantoprazole, 40 mg, oral, Daily  PARoxetine, 20 mg, oral, Daily  polyethylene glycol, 238 g, oral, Once  tamsulosin, 0.4 mg, oral, Nightly      Continuous medications     PRN medications  PRN medications: labetaloL, ondansetron, ondansetron, promethazine (Phenergan) 6.25 mg in sodium chloride 0.9% 50 mL IV      Objective     Vitals 24HR  Heart Rate:  [60-68]   Temp:  [36.1 °C (97 °F)-36.8 °C (98.2 °F)]   Resp:  [15-19]   BP: (101-125)/(50-82)   Weight:  [114 kg (250 lb 7.1 oz)]   SpO2:  [95 %-100 %]     Physical Exam    General: Elderly man, not in distress  Eyes: Pale, anicteric  Lungs: Clear bilaterally  Heart: S1 and S2, regular  Abdomen: Soft, nontender  Extremities: No pedal edema  Neuro: Awake and interactive      Intake/Output last 3 Shifts:    Intake/Output Summary (Last 24 hours) at 3/3/2024 1424  Last data filed at 3/3/2024 0036  Gross per 24 hour   Intake 10 ml   Output 1200 ml   Net -1190 ml       Relevant Results    Results for orders placed or performed during the hospital encounter of 02/29/24 (from the past 24 hour(s))   Hemoglobin and hematocrit, blood   Result Value Ref Range    Hemoglobin 7.3 (L) 13.5 - 17.5 g/dL    Hematocrit 22.9 (L) 41.0 - 52.0 %   Renal Function Panel   Result Value Ref Range    Glucose 99 65 - 99 mg/dL    Sodium 142 133 - 145 mmol/L    Potassium 4.2 3.4 - 5.1 mmol/L    Chloride 109 (H) 97 - 107 mmol/L    Bicarbonate 22 (L) 24 - 31 mmol/L    Urea Nitrogen 43 (H) 8 - 25 mg/dL    Creatinine 2.20 (H) 0.40 - 1.60 mg/dL    eGFR 30 (L) >60 mL/min/1.73m*2    Calcium 8.5 8.5 - 10.4  mg/dL    Phosphorus 2.8 2.5 - 4.5 mg/dL    Albumin 3.0 (L) 3.5 - 5.0 g/dL    Anion Gap 11 <=19 mmol/L   CBC   Result Value Ref Range    WBC 4.1 (L) 4.4 - 11.3 x10*3/uL    nRBC 2.2 (H) 0.0 - 0.0 /100 WBCs    RBC 2.59 (L) 4.50 - 5.90 x10*6/uL    Hemoglobin 6.9 (L) 13.5 - 17.5 g/dL    Hematocrit 22.9 (L) 41.0 - 52.0 %    MCV 88 80 - 100 fL    MCH 26.6 26.0 - 34.0 pg    MCHC 30.1 (L) 32.0 - 36.0 g/dL    RDW 17.7 (H) 11.5 - 14.5 %    Platelets 181 150 - 450 x10*3/uL   Prepare RBC: 1 Units   Result Value Ref Range    PRODUCT CODE I3319K90     Unit Number [id number]     Unit ABO B     Unit RH NEG     XM INTEP COMP     Dispense Status IS     Blood Expiration Date April 02, 2024 23:59 EDT     PRODUCT BLOOD TYPE 1700     UNIT VOLUME 400    Type and screen   Result Value Ref Range    ABO TYPE B     Rh TYPE POS     ANTIBODY SCREEN NEG      *Note: Due to a large number of results and/or encounters for the requested time period, some results have not been displayed. A complete set of results can be found in Results Review.         Assessment/Plan      Nonoliguric JASBIR on stage IV CKD  Hypokalemia, resolved  Metabolic acidosis  Anemia    Serum creatinine is back to his baseline and lytes are controlled.    Continue current care.    Dr. Capellan will resume service tomorrow.      Celestino Orellana MD

## 2024-03-03 NOTE — CARE PLAN
The clinical goals for the shift include Remain hemodynamically stable and get adequate sleep    Over the shift, the patient did make progress toward the following goals.       Problem: Pain  Goal: My pain/discomfort is manageable  Outcome: Progressing     Problem: Safety  Goal: I will remain free of falls  Outcome: Progressing     Problem: Daily Care  Goal: Daily care needs are met  Outcome: Progressing     Problem: Skin  Goal: Decreased wound size/increased tissue granulation at next dressing change  Outcome: Progressing  Flowsheets (Taken 3/3/2024 0004)  Decreased wound size/increased tissue granulation at next dressing change:   Promote sleep for wound healing   Protective dressings over bony prominences  Goal: Participates in plan/prevention/treatment measures  Outcome: Progressing  Flowsheets (Taken 3/3/2024 0004)  Participates in plan/prevention/treatment measures:   Elevate heels   Discuss with provider PT/OT consult   Increase activity/out of bed for meals  Goal: Prevent/manage excess moisture  Outcome: Progressing  Flowsheets (Taken 3/3/2024 0004)  Prevent/manage excess moisture:   Moisturize dry skin   Cleanse incontinence/protect with barrier cream   Monitor for/manage infection if present  Goal: Prevent/minimize sheer/friction injuries  Outcome: Progressing  Flowsheets (Taken 3/3/2024 0004)  Prevent/minimize sheer/friction injuries:   Complete micro-shifts as needed if patient unable. Adjust patient position to relieve pressure points, not a full turn   HOB 30 degrees or less   Increase activity/out of bed for meals   Turn/reposition every 2 hours/use positioning/transfer devices   Use pull sheet  Goal: Promote/optimize nutrition  Outcome: Progressing  Flowsheets (Taken 3/3/2024 0004)  Promote/optimize nutrition:   Monitor/record intake including meals   Consume > 50% meals/supplements   Offer water/supplements/favorite foods  Goal: Promote skin healing  Outcome: Progressing  Flowsheets (Taken 3/3/2024  0004)  Promote skin healing:   Assess skin/pad under line(s)/device(s)   Ensure correct size (line/device) and apply per  instructions   Protective dressings over bony prominences   Rotate device position/do not position patient on device   Turn/reposition every 2 hours/use positioning/transfer devices

## 2024-03-03 NOTE — NURSING NOTE
0730 assumed care for the pt. Resting in the with eyes closed. Easily awaken. Denies pain or discomfort. Discussed plan of care or plan for the day and scope tomorrow. Pt states is understanding.. will continue to monitor.

## 2024-03-03 NOTE — CARE PLAN
The patient's goals for the shift include      The clinical goals for the shift include Remain hemodynamically stable and get adequate sleep    Over the shift, the patient did not make progress toward the following goals. Barriers to progression include monitor vital signs. Recommendations to address these barriers include continue to monitor vital signs.

## 2024-03-03 NOTE — NURSING NOTE
Waffle mattress placed, and preventative mepilex placed on sacrum and heels due to Phillip score of 16.

## 2024-03-04 ENCOUNTER — APPOINTMENT (OUTPATIENT)
Dept: GASTROENTEROLOGY | Facility: HOSPITAL | Age: 79
DRG: 378 | End: 2024-03-04
Payer: MEDICARE

## 2024-03-04 ENCOUNTER — APPOINTMENT (OUTPATIENT)
Dept: RADIOLOGY | Facility: HOSPITAL | Age: 79
DRG: 378 | End: 2024-03-04
Payer: MEDICARE

## 2024-03-04 LAB
ALBUMIN SERPL-MCNC: 3 G/DL (ref 3.5–5)
ANION GAP SERPL CALC-SCNC: 10 MMOL/L
BUN SERPL-MCNC: 30 MG/DL (ref 8–25)
CALCIUM SERPL-MCNC: 8.1 MG/DL (ref 8.5–10.4)
CHLORIDE SERPL-SCNC: 105 MMOL/L (ref 97–107)
CO2 SERPL-SCNC: 24 MMOL/L (ref 24–31)
CREAT SERPL-MCNC: 1.8 MG/DL (ref 0.4–1.6)
EGFRCR SERPLBLD CKD-EPI 2021: 38 ML/MIN/1.73M*2
ERYTHROCYTE [DISTWIDTH] IN BLOOD BY AUTOMATED COUNT: 18 % (ref 11.5–14.5)
GLUCOSE SERPL-MCNC: 85 MG/DL (ref 65–99)
HCT VFR BLD AUTO: 26.9 % (ref 41–52)
HCT VFR BLD AUTO: 28.3 % (ref 41–52)
HGB BLD-MCNC: 7.8 G/DL (ref 13.5–17.5)
HGB BLD-MCNC: 8.4 G/DL (ref 13.5–17.5)
MCH RBC QN AUTO: 25.7 PG (ref 26–34)
MCHC RBC AUTO-ENTMCNC: 29 G/DL (ref 32–36)
MCV RBC AUTO: 89 FL (ref 80–100)
NRBC BLD-RTO: 0.9 /100 WBCS (ref 0–0)
PHOSPHATE SERPL-MCNC: 2.5 MG/DL (ref 2.5–4.5)
PLATELET # BLD AUTO: 188 X10*3/UL (ref 150–450)
POTASSIUM SERPL-SCNC: 4.5 MMOL/L (ref 3.4–5.1)
RBC # BLD AUTO: 3.04 X10*6/UL (ref 4.5–5.9)
SODIUM SERPL-SCNC: 139 MMOL/L (ref 133–145)
WBC # BLD AUTO: 4.6 X10*3/UL (ref 4.4–11.3)

## 2024-03-04 PROCEDURE — 2500000001 HC RX 250 WO HCPCS SELF ADMINISTERED DRUGS (ALT 637 FOR MEDICARE OP): Performed by: INTERNAL MEDICINE

## 2024-03-04 PROCEDURE — 74018 RADEX ABDOMEN 1 VIEW: CPT

## 2024-03-04 PROCEDURE — 36415 COLL VENOUS BLD VENIPUNCTURE: CPT | Performed by: INTERNAL MEDICINE

## 2024-03-04 PROCEDURE — 2500000001 HC RX 250 WO HCPCS SELF ADMINISTERED DRUGS (ALT 637 FOR MEDICARE OP): Performed by: NURSE PRACTITIONER

## 2024-03-04 PROCEDURE — 97110 THERAPEUTIC EXERCISES: CPT | Mod: GP

## 2024-03-04 PROCEDURE — 97110 THERAPEUTIC EXERCISES: CPT | Mod: GO,CO

## 2024-03-04 PROCEDURE — 80069 RENAL FUNCTION PANEL: CPT | Performed by: INTERNAL MEDICINE

## 2024-03-04 PROCEDURE — 85027 COMPLETE CBC AUTOMATED: CPT | Performed by: INTERNAL MEDICINE

## 2024-03-04 PROCEDURE — 2500000002 HC RX 250 W HCPCS SELF ADMINISTERED DRUGS (ALT 637 FOR MEDICARE OP, ALT 636 FOR OP/ED): Performed by: INTERNAL MEDICINE

## 2024-03-04 PROCEDURE — 85014 HEMATOCRIT: CPT | Performed by: INTERNAL MEDICINE

## 2024-03-04 PROCEDURE — 74018 RADEX ABDOMEN 1 VIEW: CPT | Performed by: RADIOLOGY

## 2024-03-04 PROCEDURE — 97530 THERAPEUTIC ACTIVITIES: CPT | Mod: GP

## 2024-03-04 PROCEDURE — 2060000001 HC INTERMEDIATE ICU ROOM DAILY

## 2024-03-04 RX ORDER — POLYETHYLENE GLYCOL 3350, SODIUM CHLORIDE, SODIUM BICARBONATE, POTASSIUM CHLORIDE 420; 11.2; 5.72; 1.48 G/4L; G/4L; G/4L; G/4L
4000 POWDER, FOR SOLUTION ORAL ONCE
Status: COMPLETED | OUTPATIENT
Start: 2024-03-04 | End: 2024-03-04

## 2024-03-04 RX ADMIN — METOPROLOL TARTRATE 12.5 MG: 25 TABLET, FILM COATED ORAL at 21:07

## 2024-03-04 RX ADMIN — METOPROLOL TARTRATE 12.5 MG: 25 TABLET, FILM COATED ORAL at 09:47

## 2024-03-04 RX ADMIN — ATORVASTATIN CALCIUM 20 MG: 20 TABLET, FILM COATED ORAL at 21:07

## 2024-03-04 RX ADMIN — PANTOPRAZOLE SODIUM 40 MG: 40 TABLET, DELAYED RELEASE ORAL at 09:47

## 2024-03-04 RX ADMIN — TAMSULOSIN HYDROCHLORIDE 0.4 MG: 0.4 CAPSULE ORAL at 21:07

## 2024-03-04 RX ADMIN — POLYETHYLENE GLYCOL 3350, SODIUM SULFATE ANHYDROUS, SODIUM BICARBONATE, SODIUM CHLORIDE, POTASSIUM CHLORIDE 4000 ML: 236; 22.74; 6.74; 5.86; 2.97 POWDER, FOR SOLUTION ORAL at 17:47

## 2024-03-04 RX ADMIN — FINASTERIDE 5 MG: 5 TABLET, FILM COATED ORAL at 09:47

## 2024-03-04 RX ADMIN — PAROXETINE HYDROCHLORIDE 20 MG: 20 TABLET, FILM COATED ORAL at 09:47

## 2024-03-04 RX ADMIN — AMIODARONE HYDROCHLORIDE 200 MG: 200 TABLET ORAL at 09:47

## 2024-03-04 ASSESSMENT — PAIN - FUNCTIONAL ASSESSMENT
PAIN_FUNCTIONAL_ASSESSMENT: 0-10

## 2024-03-04 ASSESSMENT — COGNITIVE AND FUNCTIONAL STATUS - GENERAL
EATING MEALS: A LITTLE
PERSONAL GROOMING: A LITTLE
TOILETING: TOTAL
DRESSING REGULAR UPPER BODY CLOTHING: A LOT
DRESSING REGULAR UPPER BODY CLOTHING: A LITTLE
MOBILITY SCORE: 13
DRESSING REGULAR LOWER BODY CLOTHING: A LOT
HELP NEEDED FOR BATHING: A LOT
TURNING FROM BACK TO SIDE WHILE IN FLAT BAD: A LITTLE
MOBILITY SCORE: 14
MOVING FROM LYING ON BACK TO SITTING ON SIDE OF FLAT BED WITH BEDRAILS: A LITTLE
HELP NEEDED FOR BATHING: A LOT
STANDING UP FROM CHAIR USING ARMS: A LOT
MOVING FROM LYING ON BACK TO SITTING ON SIDE OF FLAT BED WITH BEDRAILS: A LITTLE
DRESSING REGULAR LOWER BODY CLOTHING: A LOT
TOILETING: A LOT
TURNING FROM BACK TO SIDE WHILE IN FLAT BAD: A LITTLE
STANDING UP FROM CHAIR USING ARMS: A LOT
DAILY ACTIVITIY SCORE: 14
CLIMB 3 TO 5 STEPS WITH RAILING: TOTAL
MOVING TO AND FROM BED TO CHAIR: A LOT
STANDING UP FROM CHAIR USING ARMS: A LOT
MOVING FROM LYING ON BACK TO SITTING ON SIDE OF FLAT BED WITH BEDRAILS: A LITTLE
MOVING TO AND FROM BED TO CHAIR: A LITTLE
WALKING IN HOSPITAL ROOM: A LOT
TOILETING: A LOT
PERSONAL GROOMING: A LITTLE
DAILY ACTIVITIY SCORE: 14
MOVING TO AND FROM BED TO CHAIR: A LOT
HELP NEEDED FOR BATHING: A LOT
TURNING FROM BACK TO SIDE WHILE IN FLAT BAD: A LITTLE
CLIMB 3 TO 5 STEPS WITH RAILING: A LOT
DAILY ACTIVITIY SCORE: 15
WALKING IN HOSPITAL ROOM: A LOT
MOBILITY SCORE: 15
WALKING IN HOSPITAL ROOM: A LOT
DRESSING REGULAR LOWER BODY CLOTHING: A LOT
EATING MEALS: A LITTLE
DRESSING REGULAR UPPER BODY CLOTHING: A LOT
PERSONAL GROOMING: A LITTLE
CLIMB 3 TO 5 STEPS WITH RAILING: A LOT

## 2024-03-04 ASSESSMENT — PAIN SCALES - GENERAL
PAINLEVEL_OUTOF10: 0 - NO PAIN
PAINLEVEL_OUTOF10: 2
PAINLEVEL_OUTOF10: 0 - NO PAIN

## 2024-03-04 NOTE — PROGRESS NOTES
Physical Therapy    Physical Therapy Treatment    Patient Name: Angel Carroll  MRN: [id number]  Today's Date: 3/4/2024  Time Calculation  Start Time: 1327  Stop Time: 1350  Time Calculation (min): 23 min     Assessment/Plan   PT Assessment  PT Assessment Results: Decreased strength, Decreased range of motion, Decreased endurance, Impaired balance, Decreased mobility, Impaired judgement, Decreased safety awareness  Rehab Prognosis: Good  Evaluation/Treatment Tolerance: Patient tolerated treatment well, Patient limited by fatigue  Medical Staff Made Aware: Yes  Strengths: Ability to acquire knowledge, Support of extended family/friends  Barriers to Participation: Comorbidities  End of Session Communication: Bedside nurse  Assessment Comment: Slow progress towards therapy goals, activity tolerance limited d/t significant fatigue; remains deconditioned and a falls risk at this time.  PT Plan  Inpatient/Swing Bed or Outpatient: Inpatient  PT Plan  Treatment/Interventions: Bed mobility, Transfer training, Gait training, Stair training, Balance training, Neuromuscular re-education, Strengthening, Endurance training, Range of motion, Therapeutic activity, Therapeutic exercise  PT Plan: Skilled PT  PT Frequency: 5 times per week  PT Discharge Recommendations: Moderate intensity level of continued care  Equipment Recommended upon Discharge: Wheeled walker  PT Recommended Transfer Status: Assist x1  PT - OK to Discharge: Yes    General Visit Information:   PT  Visit  PT Received On: 03/04/24  Response to Previous Treatment: Patient with no complaints from previous session., Patient reporting fatigue but able to participate.  General  Reason for Referral: impaired functional mobility  Referred By: Dr. Josafat Cole  Past Medical History Relevant to Rehab: chronic renal failure stage 3, anemia, a-fib, CHF, CGA, meningioma  Missed Visit: No  Missed Visit Reason: Other (Comment)  Family/Caregiver Present: Yes  Caregiver Feedback:  daughter present  Prior to Session Communication: Bedside nurse  Patient Position Received: Bed, 3 rail up, Alarm off, not on at start of session  Preferred Learning Style: verbal  General Comment: Pt cleared for therapy via RN, received in supine, NAD, agreeable to participate in therapy. (+) 2.5L O2 via NC, telemetry    Subjective   Precautions:  Precautions  Hearing/Visual Limitations: glasses, bilateral hearing aids  Medical Precautions: Fall precautions, Oxygen therapy device and L/min (2.5L O2 via NC)  Vital Signs:  Vital Signs  SpO2: 100 %    Objective   Pain:  Pain Assessment  Pain Assessment: 0-10  Pain Score: 0 - No pain  Cognition:  Cognition  Overall Cognitive Status: Within Functional Limits  Orientation Level: Oriented X4  Insight: Mild  Processing Speed: Delayed  Postural Control:  Postural Control  Postural Control: Impaired  Posture Comment: forward head, rounded shoulders; obesity  Static Sitting Balance  Static Sitting-Balance Support: Bilateral upper extremity supported, Feet supported  Static Sitting-Level of Assistance: Close supervision  Static Standing Balance  Static Standing-Balance Support: Bilateral upper extremity supported  Static Standing-Level of Assistance: Moderate assistance  Extremity/Trunk Assessments:  RLE   RLE :  (AROM R knee limited at end range)  LLE   LLE :  (AROM L knee limited at end range)  Activity Tolerance:  Activity Tolerance  Endurance: Decreased tolerance for upright activites  Treatments:  Therapeutic Exercise  Therapeutic Exercise Performed: Yes  Therapeutic Exercise Activity 1: B ankle pumps x 10  Therapeutic Exercise Activity 2: B seated knee ext x 10  Therapeutic Exercise Activity 3: B seated hip flex x 10  Therapeutic Exercise Activity 4: chair push-ups x 5    Bed Mobility  Bed Mobility: Yes  Bed Mobility 1  Bed Mobility 1: Supine to sitting  Level of Assistance 1: Minimum assistance  Bed Mobility Comments 1: assist for trunk elevation    Ambulation/Gait  Training  Ambulation/Gait Training Performed: Yes  Ambulation/Gait Training 1  Surface 1: Level tile  Device 1: Rolling walker  Assistance 1: Moderate assistance  Quality of Gait 1: Narrow base of support, Diminished heel strike, Decreased step length, Forward flexed posture  Comments/Distance (ft) 1: 3-4 steps to bedside chair (pt declined further ambulation d/t c/o fatigue)  Transfers  Transfer: Yes  Transfer 1  Transfer From 1: Sit to  Transfer to 1: Stand  Technique 1: Sit to stand  Transfer Device 1: Walker  Transfer Level of Assistance 1: Moderate assistance  Trials/Comments 1: assist for forward weight shift; cueing for sequencing  Transfers 2  Transfer From 2: Stand to  Transfer to 2: Sit  Technique 2: Stand to sit  Transfer Device 2: Walker  Transfer Level of Assistance 2: Moderate assistance  Trials/Comments 2: assist for safe eccentric lowering into chair    Stairs  Stairs: No    Outcome Measures:  [facility] Basic Mobility  Turning from your back to your side while in a flat bed without using bedrails: A little  Moving from lying on your back to sitting on the side of a flat bed without using bedrails: A little  Moving to and from bed to chair (including a wheelchair): A lot  Standing up from a chair using your arms (e.g. wheelchair or bedside chair): A lot  To walk in hospital room: A lot  Climbing 3-5 steps with railing: A lot  Basic Mobility - Total Score: 14    Education Documentation  Handouts, taught by Keri Hathaway, PT at 3/4/2024  1:59 PM.  Learner: Patient  Readiness: Acceptance  Method: Explanation, Demonstration  Response: Needs Reinforcement    Precautions, taught by Keri Hathaway, PT at 3/4/2024  1:59 PM.  Learner: Patient  Readiness: Acceptance  Method: Explanation, Demonstration  Response: Needs Reinforcement    Body Mechanics, taught by Keri Hathaway, PT at 3/4/2024  1:59 PM.  Learner: Patient  Readiness: Acceptance  Method: Explanation, Demonstration  Response: Needs  Reinforcement    Home Exercise Program, taught by Keri Hathaway, PT at 3/4/2024  1:59 PM.  Learner: Patient  Readiness: Acceptance  Method: Explanation, Demonstration  Response: Needs Reinforcement    Mobility Training, taught by Keri Hathaway, PT at 3/4/2024  1:59 PM.  Learner: Patient  Readiness: Acceptance  Method: Explanation, Demonstration  Response: Needs Reinforcement    Education Comments  No comments found.        OP EDUCATION:  Outpatient Education  Individual(s) Educated: Patient, Child  Education Provided: Fall Risk, POC  Risk and Benefits Discussed with Patient/Caregiver/Other: yes  Patient/Caregiver Demonstrated Understanding: yes  Plan of Care Discussed and Agreed Upon: yes  Patient Response to Education: Patient/Caregiver Verbalized Understanding of Information    Encounter Problems       Encounter Problems (Active)       Balance       STG - Maintains static standing balance with upper extremity support mod I (Progressing)       Start:  03/01/24    Expected End:  03/29/24       INTERVENTIONS:  1. Practice standing with minimal support.  2. Educate patient about standing tolerance.  3. Educate patient about independence with gait, transfers, and ADL's.  4. Educate patient about use of assistive device.  5. Educate patient about self-directed care.            Mobility       LTG - Patient will ambulate household distance using FWW with Mod I (Progressing)       Start:  03/01/24            STG - Patient will ascend and descend four to six stairs with b/l handrails and CGA (Not Progressing)       Start:  03/01/24               Pain - Adult

## 2024-03-04 NOTE — NURSING NOTE
Assumed care of patient at this time. At bedside report, patient had no c/o pain. vss and afebrile. Patient currently on 2L nasal canula. Bed low and locked, call button within reach and bed alarm on. Patient has no needs that require immediate nursing interventions.

## 2024-03-04 NOTE — PROGRESS NOTES
Angel Carroll is a 78 y.o. male on day 4 of admission presenting with GI bleed.    Subjective   Patient drank entire bowel prep without any resulting stools       Objective     Physical Exam  Vitals reviewed.   Constitutional:       General: He is awake.      Appearance: Normal appearance.   HENT:      Head: Normocephalic and atraumatic.      Mouth/Throat:      Mouth: Mucous membranes are moist.   Cardiovascular:      Rate and Rhythm: Normal rate and regular rhythm.   Pulmonary:      Effort: Pulmonary effort is normal.      Breath sounds: Normal breath sounds.   Abdominal:      General: There is no distension.      Palpations: Abdomen is soft.      Tenderness: There is no abdominal tenderness. There is no guarding.   Musculoskeletal:      Cervical back: Normal range of motion and neck supple.   Skin:     General: Skin is warm and dry.   Neurological:      General: No focal deficit present.      Mental Status: He is alert and oriented to person, place, and time. Mental status is at baseline.   Psychiatric:         Attention and Perception: Attention and perception normal.         Mood and Affect: Mood normal.         Behavior: Behavior normal.         Last Recorded Vitals  Blood pressure 115/57, pulse 63, temperature 36 °C (96.8 °F), temperature source Temporal, resp. rate 17, height 1.829 m (6'), weight 116 kg (256 lb 9.9 oz), SpO2 100 %.  Intake/Output last 3 Shifts:  I/O last 3 completed shifts:  In: 825 (7.1 mL/kg) [Blood:825]  Out: 2080 (17.9 mL/kg) [Urine:2080 (0.5 mL/kg/hr)]  Weight: 116.4 kg     Relevant Results                  Results for orders placed or performed during the hospital encounter of 02/29/24 (from the past 24 hour(s))   Type and screen   Result Value Ref Range    ABO TYPE B     Rh TYPE POS     ANTIBODY SCREEN NEG    Renal Function Panel   Result Value Ref Range    Glucose 85 65 - 99 mg/dL    Sodium 139 133 - 145 mmol/L    Potassium 4.5 3.4 - 5.1 mmol/L    Chloride 105 97 - 107 mmol/L     Bicarbonate 24 24 - 31 mmol/L    Urea Nitrogen 30 (H) 8 - 25 mg/dL    Creatinine 1.80 (H) 0.40 - 1.60 mg/dL    eGFR 38 (L) >60 mL/min/1.73m*2    Calcium 8.1 (L) 8.5 - 10.4 mg/dL    Phosphorus 2.5 2.5 - 4.5 mg/dL    Albumin 3.0 (L) 3.5 - 5.0 g/dL    Anion Gap 10 <=19 mmol/L   CBC   Result Value Ref Range    WBC 4.6 4.4 - 11.3 x10*3/uL    nRBC 0.9 (H) 0.0 - 0.0 /100 WBCs    RBC 3.04 (L) 4.50 - 5.90 x10*6/uL    Hemoglobin 7.8 (L) 13.5 - 17.5 g/dL    Hematocrit 26.9 (L) 41.0 - 52.0 %    MCV 89 80 - 100 fL    MCH 25.7 (L) 26.0 - 34.0 pg    MCHC 29.0 (L) 32.0 - 36.0 g/dL    RDW 18.0 (H) 11.5 - 14.5 %    Platelets 188 150 - 450 x10*3/uL     *Note: Due to a large number of results and/or encounters for the requested time period, some results have not been displayed. A complete set of results can be found in Results Review.     EGD    Result Date: 3/1/2024  Table formatting from the original result was not included. Impression The upper third of the esophagus, middle third of the esophagus, lower third of the esophagus and GE junction appeared normal. Multiple subcentimeter fundic gland polyps in the fundus of the stomach and body of the stomach Mild abnormal mucosa, consistent with gastritis in the cardia, fundus of the stomach and body of the stomach The duodenal bulb, 1st part of the duodenum and 2nd part of the duodenum appeared normal. Findings The upper third of the esophagus, middle third of the esophagus, lower third of the esophagus and GE junction appeared normal. Z-line is 44 cm from the incisors. Multiple sessile fundic gland polyps measuring smaller than 5 mm in the fundus of the stomach and body of the stomach Mild, localized abnormal mucosa with erosion in the cardia, fundus of the stomach and body of the stomach, consistent with gastritis; The duodenal bulb, 1st part of the duodenum and 2nd part of the duodenum appeared normal. Recommendation  Avoid NSAID and anticoagulation for now. Check PT/INR today.  Change Protonix IV to 40 mg p.o. daily. Full liquid diet today. Blood transfusion to keep hemoglobin above 7.5.  Colonoscopy on Monday or sooner if needed.  Bleeding seems to be diverticular but other causes need to be excluded.  Indication Blood loss anemia, GI bleed Staff Staff Role Radha Traylor MD Proceduralist Medications See Anesthesia Record. Preprocedure A history and physical has been performed, and patient medication allergies have been reviewed. The patient's tolerance of previous anesthesia has been reviewed. The risks and benefits of the procedure and the sedation options and risks were discussed with the patient. All questions were answered and informed consent obtained. Details of the Procedure The patient underwent monitored anesthesia care, which was administered by an anesthesia professional. The patient's level of consciousness, heart rate, ETCO2, ECG, respirations, oxygen and blood pressure were monitored throughout the procedure. The scope was introduced through the mouth and advanced to the second part of the duodenum. Retroflexion was performed in the cardia, fundus and incisura. Prior to the procedure, the patient's H. Pylori status was unknown. The patient experienced no blood loss. The procedure was not difficult. The patient tolerated the procedure well. There were no apparent adverse events. Events No data recorded Specimens Procedure Location [address] [address] [phone] Referring Provider No referring provider defined for this encounter. Procedure Provider No name on file    XR femur left 2+ views    Result Date: 2/29/2024  Interpreted By:  Sohail Alonzo, STUDY: XR FEMUR LEFT 2+ VIEWS; ;  2/29/2024 6:14 pm   INDICATION: Signs/Symptoms:fall, thigh pain.   COMPARISON: None.   ACCESSION NUMBER(S): [id number]   ORDERING CLINICIAN: KO SINGER   FINDINGS: Five views of the left femur were obtained. No  evidence for acute fracture or acute abnormality. The left total knee prosthesis appears intact. The left hip joint is intact without dislocation. The proximal left femur is adequately maintained. The hip joint space is adequately maintained. No acute osseous abnormality.       No evidence for acute osseous abnormality.   MACRO: None   Signed by: Sohail Alonzo 2/29/2024 7:02 PM Dictation workstation:   [id number]    Transthoracic Echo (TTE) Complete    Result Date: 2/29/2024           [place] [id number]            Phone [phone] TRANSTHORACIC ECHOCARDIOGRAM REPORT  Patient Name:     MICHEL COLEY           Reading Physician:  [id number] Deacon Cullen MD Study Date:       2/29/2024            Ordering Provider:  [id number] KO SINGER MRN/PID:          [id number]             Fellow: Accession#:       [id number]         Nurse: Date of           1945 / 78 years Sonographer:        Monisha Rios RDCS Birth/Age: Gender:           M                    Additional Staff: Height:           182.88 cm            Admit Date: Weight:           107.50 kg            Admission Status:   Inpatient - Routine BSA / BMI:        2.29 m2 / 32.14      Department          [facility]                   kg/m2                Location: Blood Pressure: 118 /49 mmHg Study Type:    TRANSTHORACIC ECHO (TTE) COMPLETE Diagnosis/ICD: Longstanding persistent AFib-I48.11 Indication:    CHF/AFIB CPT Codes:     Echo Complete w Full [id number] Patient History: Pertinent History: HTN, Hyperlipidemia, CHF, A-Fib and Sinus Bradycardia. Study Detail: The following Echo studies were performed: 2D, M-Mode, Doppler and               color flow. Patient has a pacemaker.  PHYSICIAN INTERPRETATION: Left Ventricle: Left ventricular systolic function is low normal, with an estimated ejection fraction of 50-55%. There are no regional wall motion  abnormalities. The left ventricular cavity size is normal. There is mild concentric left ventricular hypertrophy. Abnormal (paradoxical) septal motion, consistent with RV pacemaker. Spectral Doppler shows a normal pattern of left ventricular diastolic filling. Left Atrium: The left atrium is mildly dilated. Right Ventricle: The right ventricle is upper limits of normal in size. There is normal right ventriclar wall thickness. There is normal right ventricular global systolic function. A device is visualized in the right ventricle. Right Atrium: The right atrium is mildly dilated. There is a device visualized in the right atrium. Aortic Valve: The aortic valve is trileaflet. The aortic valve appears tricuspid and non-restricted. There is minimal aortic valve cusp calcification. There is evidence of mildly elevated transaortic gradients consistent with sclerosis of the aortic valve. There is no evidence of aortic valve regurgitation. The peak instantaneous gradient of the aortic valve is 6.2 mmHg. Mitral Valve: The mitral valve is normal in structure. There is normal mitral valve leaflet mobility. There is trace mitral valve regurgitation. Tricuspid Valve: The tricuspid valve is structurally normal. There is normal tricuspid valve leaflet mobility. There is moderate tricuspid regurgitation. The Doppler estimated RVSP is slightly elevated at 39.1 mmHg. Pulmonic Valve: The pulmonic valve is structurally normal. There is moderate pulmonic valve regurgitation. Pericardium: There is no pericardial effusion noted. Aorta: The aortic root is normal. There is no dilatation of the aortic arch. There is no dilatation of the ascending aorta. There is no dilatation of the aortic root. Pulmonary Artery: The pulmonary artery is normal in size. The tricuspid regurgitant velocity is 2.79 m/s, and with an estimated right atrial pressure of 8 mmHg, the estimated pulmonary artery pressure is borderline elevated with the RVSP at 39.1  mmHg. The estimated PASP is 39 mmHg. Systemic Veins: The inferior vena cava appears mildly dilated. There is IVC inspiratory collapse greater than 50%.  CONCLUSIONS:  1. Left ventricular systolic function is low normal with a 50-55% estimated ejection fraction.  2. Abnormal septal motion consistent with RV pacemaker.  3. Moderate tricuspid regurgitation.  4. Slightly elevated RVSP.  5. Aortic valve sclerosis.  6. Moderate pulmonic valve regurgitation.  7. The estimated PASP is 39 mmHg. QUANTITATIVE DATA SUMMARY: 2D MEASUREMENTS:              Normal Ranges: LAs: 4.00 cm (2.7-4.0cm) LA VOLUME:                               Normal Ranges: LA Vol A4C:        61.8 ml    (22+/-6mL/m2) LA Vol A2C:        99.2 ml LA Vol BP:         79.5 ml LA Vol Index A4C:  27.0ml/m2 LA Vol Index A2C:  43.3 ml/m2 LA Vol Index BP:   34.7 ml/m2 LA Area A4C:       21.4 cm2 LA Area A2C:       26.7 cm2 LA Major Axis A4C: 6.3 cm LA Major Axis A2C: 6.1 cm LA Volume Index:   31.9 ml/m2 LA Vol A4C:        58.1 ml LA Vol A2C:        90.1 ml RA VOLUME BY A/L METHOD:                       Normal Ranges: RA Area A4C: 31.5 cm2 M-MODE MEASUREMENTS:                  Normal Ranges: Ao Root: 3.10 cm (2.0-3.7cm) AORTA MEASUREMENTS:                    Normal Ranges: Asc Ao, d: 2.60 cm (2.1-3.4cm) LV DIASTOLIC FUNCTION:                        Normal Ranges: MV Peak E:    0.93 m/s (0.7-1.2 m/s) MV Peak A:    0.32 m/s (0.42-0.7 m/s) E/A Ratio:    2.86     (1.0-2.2) MV e'         0.04 m/s (>8.0) MV lateral e' 0.04 m/s MV medial e'  0.05 m/s E/e' Ratio:   20.69    (<8.0) MITRAL VALVE:                 Normal Ranges: MV DT: 140 msec (150-240msec) AORTIC VALVE:                         Normal Ranges: AoV Vmax:      1.25 m/s (<=1.7m/s) AoV Peak P.2 mmHg (<20mmHg) LVOT Max Ross:  0.66 m/s (<=1.1m/s) LVOT Diameter: 2.10 cm  (1.8-2.4cm) AoV Area,Vmax: 1.81 cm2 (2.5-4.5cm2)  RIGHT VENTRICLE: RV Basal 3.76 cm RV Mid   2.47 cm RV Major 8.3 cm TAPSE:   17.3 mm RV s'     0.06 m/s TRICUSPID VALVE/RVSP:                             Normal Ranges: Peak TR Velocity: 2.79 m/s RV Syst Pressure: 39.1 mmHg (< 30mmHg) IVC Diam:         2.03 cm  [id number] Deacon Cullen MD Electronically signed on 2/29/2024 at 5:56:41 PM  ** Final **     US renal complete    Result Date: 2/29/2024  Interpreted By:  Carmen Young, STUDY: US RENAL COMPLETE; 2/29/2024 8:28 am   INDICATION: Signs/Symptoms:acute kidney failure.   COMPARISON: None.   ACCESSION NUMBER(S): [id number]   ORDERING CLINICIAN: KO SINGER   TECHNIQUE: Grayscale and color Doppler imaging of the kidneys.   FINDINGS: The right kidney measures 9.6 x 4.3 cm. There is a anechoic cyst within the midpole medially measuring 1.8 cm with posterior acoustic enhancement demonstrated. Of note, there is an echogenic focus measured in the inferior pole measuring 11 mm suggesting stone. Also, a cyst within the inferior pole medially measuring 1.8 cm. 7 mm stone also demonstrated in the inferior pole The left kidney measures 10.6 x 5.3 cm. There is no cortical thinning. No hydronephrosis demonstrated. There is nonobstructive nephrolithiasis in left kidney with 7 mm stone identified with a adjacent stone also noted.     Bladder visualized portions demonstrates no intraluminal filling defect. There is mild mass effect upon the base the bladder by the prostate.       1. Nonobstructing nephrolithiasis bilateral kidneys. No hydronephrosis.   2. Cysts demonstrated within the right kidney.   MACRO: None.   Signed by: Carmen Young 2/29/2024 9:14 AM Dictation workstation:   [id number]    XR chest 1 view    Result Date: 2/29/2024  Interpreted By:  Malik Alonso, STUDY: XR CHEST 1 VIEW; 2/29/2024 6:48 am   INDICATION: Signs/Symptoms:chf   COMPARISON: None   ACCESSION NUMBER(S): [id number]   ORDERING CLINICIAN: KO SINGER   FINDINGS: The study is limited due to rotation. A pacemaker is again noted, with the leads overlying the right atrium and right ventricle.  The cardiac silhouette appears mildly prominent, exaggerated by the technique. Calcifications involve the aortic arch. There is no pneumothorax, confluent infiltrates or significant effusion. The osseous structures are unchanged.       Allowing for the aforementioned limitation, no acute cardiopulmonary disease.   Signed by: Malik Alonso 2/29/2024 8:07 AM Dictation workstation:   [id number]              Assessment/Plan   Principal Problem:    GI bleed    Anemia, Melena (Normocytic initial hgb 4.9, +IRENE)              -EGD 3/1 per Dr Traylor with gastric polyp, mild erosion and gastritis but no clear source for acute bleeding/anemia              -We had plans for colonoscopy today; however, patient reportedly drank an entire bowel prep without result   -He denies NV. Denies abdominal pain. Will discuss with attending regarding timing of procedure ?flex sig vs repeat prep   Anticoagulation (Coumadin INR today 1.2)              -Hold pending endoscopy       I spent 20 minutes in the professional and overall care of this patient.      Mily Ken, RON-CNP

## 2024-03-04 NOTE — PROGRESS NOTES
Angel Carroll is a 78 y.o. male on day 4 of admission presenting with GI bleed.      Subjective   He denied chest pain shortness of breath.  Patient stated he did not have bowel movement   Patient denied abdominal pain.      Objective     Last Recorded Vitals  /57 (BP Location: Right arm, Patient Position: Lying)   Pulse 63   Temp 36 °C (96.8 °F) (Temporal)   Resp 17   Wt 116 kg (256 lb 9.9 oz)   SpO2 100%   Intake/Output last 3 Shifts:    Intake/Output Summary (Last 24 hours) at 3/4/2024 1005  Last data filed at 3/4/2024 0532  Gross per 24 hour   Intake 825 ml   Output 1580 ml   Net -755 ml       Admission Weight  Weight: 108 kg (237 lb) (02/29/24 0441)    Daily Weight  03/04/24 : 116 kg (256 lb 9.9 oz)    Image Results  EGD  Table formatting from the original result was not included.  Impression  The upper third of the esophagus, middle third of the esophagus, lower   third of the esophagus and GE junction appeared normal.  Multiple subcentimeter fundic gland polyps in the fundus of the stomach   and body of the stomach  Mild abnormal mucosa, consistent with gastritis in the cardia, fundus of   the stomach and body of the stomach  The duodenal bulb, 1st part of the duodenum and 2nd part of the duodenum   appeared normal.    Findings  The upper third of the esophagus, middle third of the esophagus, lower   third of the esophagus and GE junction appeared normal. Z-line is 44 cm   from the incisors.  Multiple sessile fundic gland polyps measuring smaller than 5 mm in the   fundus of the stomach and body of the stomach  Mild, localized abnormal mucosa with erosion in the cardia, fundus of the   stomach and body of the stomach, consistent with gastritis;  The duodenal bulb, 1st part of the duodenum and 2nd part of the duodenum   appeared normal.    Recommendation   Avoid NSAID and anticoagulation for now.  Check PT/INR today.  Change Protonix IV to 40 mg p.o. daily.  Full liquid diet today.   Blood transfusion  to keep hemoglobin above 7.5.   Colonoscopy on Monday or sooner if needed.  Bleeding seems to be   diverticular but other causes need to be excluded.       Indication  Blood loss anemia, GI bleed    Staff  Staff Role   Radha Traylor MD Proceduralist     Medications  See Anesthesia Record.     Preprocedure  A history and physical has been performed, and patient medication   allergies have been reviewed. The patient's tolerance of previous   anesthesia has been reviewed. The risks and benefits of the procedure and   the sedation options and risks were discussed with the patient. All   questions were answered and informed consent obtained.    Details of the Procedure  The patient underwent monitored anesthesia care, which was administered by   an anesthesia professional. The patient's level of consciousness, heart   rate, ETCO2, ECG, respirations, oxygen and blood pressure were monitored   throughout the procedure. The scope was introduced through the mouth and   advanced to the second part of the duodenum. Retroflexion was performed in   the cardia, fundus and incisura. Prior to the procedure, the patient's H.   Pylori status was unknown. The patient experienced no blood loss. The   procedure was not difficult. The patient tolerated the procedure well.   There were no apparent adverse events.     Events  No data recorded    Specimens    Procedure Location  [address] [address] [phone]    Referring Provider  No referring provider defined for this encounter.    Procedure Provider  No name on file      Physical Exam    General:  pleasant, cooperating during physical exam, on oxygen therapy.  HEENT: Pupils are equal and reactive to light and commendation , oral mucosa moist, no JVD   Cardiovascular: Normal sinus rhythm, no MRG.  Lungs: Clear to auscultation bilaterally, no wheezing, no crackles, no dullness to percussion.  Abdomen: No  hepatosplenomegaly appreciated, soft , not tender, positive bowel sounds, positive bowel movement.  Neuro: Alert and oriented x2, strength in upper and lower extremities , sensation intact.  Psych: Patient had great insight was going on  Musculoskeletal:  Trace Edema in both lower extremities.  Vascular: Pulses are intact in upper and lower extremities  Skin: No petechiae, ecchymosis or other stigmata for dermatology disease.     Assessment/Plan        Acute Blood Loss Anemia  Secondary to GI bleed  Patient has been transfused during the hospital stay.   Status post EGD  EGD revealed gastritis but no obvious source of bleeding  There was plan for colonoscopy today.   Patient did have bowel movement despite the prep with [employer]  GI on the case  Monitor hemoglobin and hematocrit.  Continue with Protonix     Acute kidney injury on chronic kidney disease  Secondary to GI bleed  Creatinine proved after IV fluid and blood transfusion  Dr. Capellan on the case  Monitor close  Discussed in detail with nephrology on the case    Atrial fibrillation  -Rate-controlled even with lower metoprolol dose.  Anticoagulation on hold   continue metoprolol and amiodarone.  Patient and the family aware of risk and benefits not being on anticoagulation    Asymptomatic Bacteriuria  -Continue to monitor off of antibiotics.     Physical Therapy/Occupational Therapy  Discussed in detail with his wife and his daughter present in the room.  Patient will need colonoscopy prior to discharge.  CBC and BMP in a.m.   Time Spent the patient 35 minutes.        Principal Problem:    GI bleed                  Lola Leyva MD

## 2024-03-04 NOTE — PROGRESS NOTES
CONSULT PROGRESS NOTES    SERVICE DATE: 3/4/2024   SERVICE TIME: 10:05 AM    CONSULTING SERVICE: Nephrology    ASSESSMENT AND PLAN   78-year-old man with atrial fibrillation on anticoagulation admitted with acute on chronic renal failure in the context of profound anemia.  1.  Acute renal failure  2.  Acute metabolic acidosis  3.  Hyperkalemia  4.  Hypotension  5.  Acute blood loss anemia  6.  Chronic kidney disease stage IV  7.  Kidney stones     Acute renal failure on top of advanced chronic kidney disease stage 4 in the context of severe anemia requiring multiple rounds of blood transfusion.  He needs continued blood transfusion to keep his hemoglobin above 7.  No major need for IV fluids.  He has resolved acidosis with no bicarbonate therapy.  No potassium supplements, no diuretics.    Blood pressure improved.  GI investigation is ongoing, looks like he was supposed to get a colonoscopy on Monday, but inadequate bowel prep.    I have no objection to what ever bowel preparation is needed by GI team.  Baseline creatinine had been in the low twos, he is now below this.  Kidney stones are asymptomatic at present.  Case discussed with Dr. Leyva.  I will follow him while here.    SUBJECTIVE  INTERVAL HPI: He drank the bowel prep last night with no resulting bowel movement.  His last bowel movement was yesterday morning.  He was supposed to have a colonoscopy today.  He is no major abdominal pain.  He does have an appetite.  Patient urine production.  Stable vital signs.    MEDICATIONS:  amiodarone, 200 mg, oral, Daily  atorvastatin, 20 mg, oral, Nightly  finasteride, 5 mg, oral, Daily  lidocaine, 0.1 mL, subcutaneous, Once  metoprolol tartrate, 12.5 mg, oral, BID  pantoprazole, 40 mg, oral, Daily  PARoxetine, 20 mg, oral, Daily  tamsulosin, 0.4 mg, oral, Nightly           PRN medications: labetaloL, ondansetron, ondansetron, promethazine (Phenergan) 6.25 mg in sodium chloride 0.9% 50 mL IV     OBJECTIVE  PHYSICAL  EXAM:   Heart Rate:  [60-68]   Temp:  [35.8 °C (96.4 °F)-36.7 °C (98.1 °F)]   Resp:  [16-20]   BP: (101-119)/(50-82)   Weight:  [116 kg (256 lb 9.9 oz)]   SpO2:  [97 %-100 %]   Body mass index is 34.8 kg/m².  This is a chronically ill-appearing elderly white man  Very pale skin  Hearing intact  Phonation intact  Moist mucosa  Normal S1/normal S2  Lungs are clear to auscultation bilaterally  Abdomen is soft, nondistended, nontender, positive bowel sounds  No Long  No extremity edema  Moves 4 limbs spontaneously  No obvious joint deformities  No lymphadenopathy    DATA:   Labs:  Results for orders placed or performed during the hospital encounter of 02/29/24 (from the past 96 hour(s))   CBC and Auto Differential   Result Value Ref Range    WBC 6.6 4.4 - 11.3 x10*3/uL    nRBC 0.0 0.0 - 0.0 /100 WBCs    RBC 2.54 (L) 4.50 - 5.90 x10*6/uL    Hemoglobin 6.4 (LL) 13.5 - 17.5 g/dL    Hematocrit 20.9 (L) 41.0 - 52.0 %    MCV 82 80 - 100 fL    MCH 25.2 (L) 26.0 - 34.0 pg    MCHC 30.6 (L) 32.0 - 36.0 g/dL    RDW 15.9 (H) 11.5 - 14.5 %    Platelets 197 150 - 450 x10*3/uL    Neutrophils % 76.7 40.0 - 80.0 %    Immature Granulocytes %, Automated 1.5 (H) 0.0 - 0.9 %    Lymphocytes % 11.5 13.0 - 44.0 %    Monocytes % 10.0 2.0 - 10.0 %    Eosinophils % 0.0 0.0 - 6.0 %    Basophils % 0.3 0.0 - 2.0 %    Neutrophils Absolute 5.09 1.60 - 5.50 x10*3/uL    Immature Granulocytes Absolute, Automated 0.10 0.00 - 0.50 x10*3/uL    Lymphocytes Absolute 0.76 (L) 0.80 - 3.00 x10*3/uL    Monocytes Absolute 0.66 0.05 - 0.80 x10*3/uL    Eosinophils Absolute 0.00 0.00 - 0.40 x10*3/uL    Basophils Absolute 0.02 0.00 - 0.10 x10*3/uL   Protime-INR   Result Value Ref Range    Protime 23.5 (H) 9.3 - 12.7 seconds    INR 2.3 (H) 0.9 - 1.2   POCT GLUCOSE   Result Value Ref Range    POCT Glucose 116 (H) 74 - 99 mg/dL   Prepare RBC: 1 Units   Result Value Ref Range    PRODUCT CODE G4577I19     Unit Number [id number]     Unit ABO B     Unit RH NEG     XM  INTEP COMP     Dispense Status TR     Blood Expiration Date March 27, 2024 23:59 EDT     PRODUCT BLOOD TYPE 1700     UNIT VOLUME 350    Transthoracic Echo (TTE) Complete   Result Value Ref Range    AV pk carlos 1.25 m/s    LVOT diam 2.10 cm    MV E/A ratio 2.86     Tricuspid annular plane systolic excursion 1.7 cm    LA vol index A/L 34.7 ml/m2    MV avg E/e' ratio 20.69     RV free wall pk S' 6.40 cm/s    RVSP 39.1 mmHg    AV pk grad 6.3 mmHg    Aortic Valve Area by Continuity of Peak Velocity 1.81 cm2   Hemoglobin   Result Value Ref Range    Hemoglobin 7.1 (L) 13.5 - 17.5 g/dL   POCT GLUCOSE   Result Value Ref Range    POCT Glucose 97 74 - 99 mg/dL   POCT GLUCOSE   Result Value Ref Range    POCT Glucose 153 (H) 74 - 99 mg/dL   Urinalysis with Reflex Microscopic   Result Value Ref Range    Color, Urine Yellow Light-Yellow, Yellow, Dark-Yellow    Appearance, Urine Clear Clear    Specific Gravity, Urine 1.020 1.005 - 1.035    pH, Urine 5.0 5.0, 5.5, 6.0, 6.5, 7.0, 7.5, 8.0    Protein, Urine 20 (TRACE) NEGATIVE, 10 (TRACE), 20 (TRACE) mg/dL    Glucose, Urine Normal Normal mg/dL    Blood, Urine NEGATIVE NEGATIVE    Ketones, Urine NEGATIVE NEGATIVE mg/dL    Bilirubin, Urine NEGATIVE NEGATIVE    Urobilinogen, Urine Normal Normal mg/dL    Nitrite, Urine NEGATIVE NEGATIVE    Leukocyte Esterase, Urine 75 Ruth/µL (A) NEGATIVE   Sodium, Urine Random   Result Value Ref Range    Sodium, Urine Random <20 NOT ESTABLISHED mmol/L    Creatinine, Urine Random 161.5 NOT ESTABLISHED mg/dL    Sodium/Creatinine Ratio     Microscopic Only, Urine   Result Value Ref Range    WBC, Urine 1-5 1-5, NONE /HPF    RBC, Urine 1-2 NONE, 1-2, 3-5 /HPF    Bacteria, Urine 1+ (A) NONE SEEN /HPF    Mucus, Urine FEW Reference range not established. /LPF   POCT GLUCOSE   Result Value Ref Range    POCT Glucose 140 (H) 74 - 99 mg/dL   Renal Function Panel   Result Value Ref Range    Glucose 93 65 - 99 mg/dL    Sodium 140 133 - 145 mmol/L    Potassium 4.5 3.4 -  5.1 mmol/L    Chloride 106 97 - 107 mmol/L    Bicarbonate 23 (L) 24 - 31 mmol/L    Urea Nitrogen 66 (H) 8 - 25 mg/dL    Creatinine 3.50 (H) 0.40 - 1.60 mg/dL    eGFR 17 (L) >60 mL/min/1.73m*2    Calcium 8.0 (L) 8.5 - 10.4 mg/dL    Phosphorus 3.3 2.5 - 4.5 mg/dL    Albumin 3.1 (L) 3.5 - 5.0 g/dL    Anion Gap 11 <=19 mmol/L   CBC   Result Value Ref Range    WBC 6.1 4.4 - 11.3 x10*3/uL    nRBC 0.0 0.0 - 0.0 /100 WBCs    RBC 2.58 (L) 4.50 - 5.90 x10*6/uL    Hemoglobin 6.8 (L) 13.5 - 17.5 g/dL    Hematocrit 21.3 (L) 41.0 - 52.0 %    MCV 83 80 - 100 fL    MCH 26.4 26.0 - 34.0 pg    MCHC 31.9 (L) 32.0 - 36.0 g/dL    RDW 16.9 (H) 11.5 - 14.5 %    Platelets 187 150 - 450 x10*3/uL   Prepare RBC: 1 Units   Result Value Ref Range    PRODUCT CODE T3060T54     Unit Number [id number]     Unit ABO B     Unit RH NEG     XM INTEP COMP     Dispense Status TR     Blood Expiration Date April 02, 2024 23:59 EDT     PRODUCT BLOOD TYPE 1700     UNIT VOLUME 400    POCT GLUCOSE   Result Value Ref Range    POCT Glucose 94 74 - 99 mg/dL   POCT GLUCOSE   Result Value Ref Range    POCT Glucose 106 (H) 74 - 99 mg/dL   Protime-INR   Result Value Ref Range    Protime 12.7 9.3 - 12.7 seconds    INR 1.2 0.9 - 1.2   POCT GLUCOSE   Result Value Ref Range    POCT Glucose 115 (H) 74 - 99 mg/dL   Renal Function Panel   Result Value Ref Range    Glucose 100 (H) 65 - 99 mg/dL    Sodium 139 133 - 145 mmol/L    Potassium 4.5 3.4 - 5.1 mmol/L    Chloride 107 97 - 107 mmol/L    Bicarbonate 23 (L) 24 - 31 mmol/L    Urea Nitrogen 55 (H) 8 - 25 mg/dL    Creatinine 2.80 (H) 0.40 - 1.60 mg/dL    eGFR 22 (L) >60 mL/min/1.73m*2    Calcium 8.3 (L) 8.5 - 10.4 mg/dL    Phosphorus 3.1 2.5 - 4.5 mg/dL    Albumin 3.1 (L) 3.5 - 5.0 g/dL    Anion Gap 9 <=19 mmol/L   CBC   Result Value Ref Range    WBC 5.6 4.4 - 11.3 x10*3/uL    nRBC 1.1 (H) 0.0 - 0.0 /100 WBCs    RBC 2.70 (L) 4.50 - 5.90 x10*6/uL    Hemoglobin 7.1 (L) 13.5 - 17.5 g/dL    Hematocrit 23.7 (L) 41.0 - 52.0 %     MCV 88 80 - 100 fL    MCH 26.3 26.0 - 34.0 pg    MCHC 30.0 (L) 32.0 - 36.0 g/dL    RDW 17.6 (H) 11.5 - 14.5 %    Platelets 177 150 - 450 x10*3/uL   Hemoglobin and hematocrit, blood   Result Value Ref Range    Hemoglobin 7.3 (L) 13.5 - 17.5 g/dL    Hematocrit 22.9 (L) 41.0 - 52.0 %   Renal Function Panel   Result Value Ref Range    Glucose 99 65 - 99 mg/dL    Sodium 142 133 - 145 mmol/L    Potassium 4.2 3.4 - 5.1 mmol/L    Chloride 109 (H) 97 - 107 mmol/L    Bicarbonate 22 (L) 24 - 31 mmol/L    Urea Nitrogen 43 (H) 8 - 25 mg/dL    Creatinine 2.20 (H) 0.40 - 1.60 mg/dL    eGFR 30 (L) >60 mL/min/1.73m*2    Calcium 8.5 8.5 - 10.4 mg/dL    Phosphorus 2.8 2.5 - 4.5 mg/dL    Albumin 3.0 (L) 3.5 - 5.0 g/dL    Anion Gap 11 <=19 mmol/L   CBC   Result Value Ref Range    WBC 4.1 (L) 4.4 - 11.3 x10*3/uL    nRBC 2.2 (H) 0.0 - 0.0 /100 WBCs    RBC 2.59 (L) 4.50 - 5.90 x10*6/uL    Hemoglobin 6.9 (L) 13.5 - 17.5 g/dL    Hematocrit 22.9 (L) 41.0 - 52.0 %    MCV 88 80 - 100 fL    MCH 26.6 26.0 - 34.0 pg    MCHC 30.1 (L) 32.0 - 36.0 g/dL    RDW 17.7 (H) 11.5 - 14.5 %    Platelets 181 150 - 450 x10*3/uL   Prepare RBC: 1 Units   Result Value Ref Range    PRODUCT CODE S4171Y33     Unit Number [id number]     Unit ABO B     Unit RH NEG     XM INTEP COMP     Dispense Status TR     Blood Expiration Date April 02, 2024 23:59 EDT     PRODUCT BLOOD TYPE 1700     UNIT VOLUME 400    Type and screen   Result Value Ref Range    ABO TYPE B     Rh TYPE POS     ANTIBODY SCREEN NEG    Renal Function Panel   Result Value Ref Range    Glucose 85 65 - 99 mg/dL    Sodium 139 133 - 145 mmol/L    Potassium 4.5 3.4 - 5.1 mmol/L    Chloride 105 97 - 107 mmol/L    Bicarbonate 24 24 - 31 mmol/L    Urea Nitrogen 30 (H) 8 - 25 mg/dL    Creatinine 1.80 (H) 0.40 - 1.60 mg/dL    eGFR 38 (L) >60 mL/min/1.73m*2    Calcium 8.1 (L) 8.5 - 10.4 mg/dL    Phosphorus 2.5 2.5 - 4.5 mg/dL    Albumin 3.0 (L) 3.5 - 5.0 g/dL    Anion Gap 10 <=19 mmol/L   CBC   Result Value  Ref Range    WBC 4.6 4.4 - 11.3 x10*3/uL    nRBC 0.9 (H) 0.0 - 0.0 /100 WBCs    RBC 3.04 (L) 4.50 - 5.90 x10*6/uL    Hemoglobin 7.8 (L) 13.5 - 17.5 g/dL    Hematocrit 26.9 (L) 41.0 - 52.0 %    MCV 89 80 - 100 fL    MCH 25.7 (L) 26.0 - 34.0 pg    MCHC 29.0 (L) 32.0 - 36.0 g/dL    RDW 18.0 (H) 11.5 - 14.5 %    Platelets 188 150 - 450 x10*3/uL     *Note: Due to a large number of results and/or encounters for the requested time period, some results have not been displayed. A complete set of results can be found in Results Review.         SIGNATURE: Hiram Capellan MD PATIENT NAME: Angel Carroll   DATE: March 4, 2024 MRN: [id number]   TIME: 10:05 AM PAGER: [phone]

## 2024-03-04 NOTE — CARE PLAN
The clinical goals for the shift include Get adequate sleep    Over the shift, the patient did make progress toward the following goals.      Problem: Pain  Goal: My pain/discomfort is manageable  Outcome: Progressing     Problem: Safety  Goal: I will remain free of falls  Outcome: Progressing     Problem: Daily Care  Goal: Daily care needs are met  Outcome: Progressing     Problem: Skin  Goal: Decreased wound size/increased tissue granulation at next dressing change  3/4/2024 0019 by Magali Ramirez RN  Flowsheets (Taken 3/4/2024 0019)  Decreased wound size/increased tissue granulation at next dressing change:   Promote sleep for wound healing   Protective dressings over bony prominences  3/4/2024 0018 by Magali Ramirez RN  Outcome: Progressing  Goal: Participates in plan/prevention/treatment measures  3/4/2024 0019 by Magali Ramirez RN  Flowsheets (Taken 3/4/2024 0019)  Participates in plan/prevention/treatment measures:   Discuss with provider PT/OT consult   Elevate heels   Increase activity/out of bed for meals  3/4/2024 0018 by Magali Ramirez RN  Outcome: Progressing  Goal: Prevent/manage excess moisture  3/4/2024 0019 by Magali Ramirez RN  Flowsheets (Taken 3/4/2024 0019)  Prevent/manage excess moisture:   Cleanse incontinence/protect with barrier cream   Moisturize dry skin  3/4/2024 0018 by Magali Ramirez RN  Outcome: Progressing  Goal: Prevent/minimize sheer/friction injuries  3/4/2024 0019 by Magali Ramirez RN  Flowsheets (Taken 3/4/2024 0019)  Prevent/minimize sheer/friction injuries:   Complete micro-shifts as needed if patient unable. Adjust patient position to relieve pressure points, not a full turn   HOB 30 degrees or less   Increase activity/out of bed for meals   Turn/reposition every 2 hours/use positioning/transfer devices   Use pull sheet  3/4/2024 0018 by Magali Ramirez RN  Outcome: Progressing  Goal: Promote/optimize nutrition  3/4/2024 0019 by Magali Ramirez RN  Flowsheets (Taken 3/4/2024  0019)  Promote/optimize nutrition:   Monitor/record intake including meals   Consume > 50% meals/supplements   Offer water/supplements/favorite foods  3/4/2024 0018 by Magali Ramirez RN  Outcome: Progressing  Goal: Promote skin healing  3/4/2024 0019 by Magali Ramirez RN  Flowsheets (Taken 3/4/2024 0019)  Promote skin healing:   Assess skin/pad under line(s)/device(s)   Ensure correct size (line/device) and apply per  instructions   Protective dressings over bony prominences   Rotate device position/do not position patient on device   Turn/reposition every 2 hours/use positioning/transfer devices  3/4/2024 0018 by Magali Ramirez RN  Outcome: Progressing

## 2024-03-04 NOTE — PROGRESS NOTES
Occupational Therapy    OT Treatment    Patient Name: Angel Carroll  MRN: [id number]  Today's Date: 3/4/2024  Time Calculation  Start Time: 1601  Stop Time: 1615  Time Calculation (min): 14 min         Assessment:  OT Assessment: Minimal progress made towards OT goals. Continue with current OT POC to increase strength, balance and functional tolerance for safety and independence during ADLs.  Evaluation/Treatment Tolerance: Patient limited by fatigue  End of Session Communication: Bedside nurse  End of Session Patient Position: Bed, 3 rail up, Alarm off, not on at start of session (all needs in reach)  OT Assessment Results: Decreased ADL status, Decreased upper extremity strength, Decreased endurance, Decreased functional mobility, Decreased trunk control for functional activities  Evaluation/Treatment Tolerance: Patient limited by fatigue  Plan:  Treatment Interventions: ADL retraining, Functional transfer training, UE strengthening/ROM, Endurance training, Cognitive reorientation, Patient/family training, Equipment evaluation/education, Neuromuscular reeducation, Compensatory technique education  OT Frequency: 4 times per week  OT Discharge Recommendations: Moderate intensity level of continued care  Equipment Recommended upon Discharge: Wheeled walker, Wheelchair  OT Recommended Transfer Status: Moderate assist, Minimal assist  OT - OK to Discharge: Yes  Treatment Interventions: ADL retraining, Functional transfer training, UE strengthening/ROM, Endurance training, Cognitive reorientation, Patient/family training, Equipment evaluation/education, Neuromuscular reeducation, Compensatory technique education    Subjective   Previous Visit Info:  OT Last Visit  OT Received On: 03/04/24  General:  General  Reason for Referral: impaired ADLs  Past Medical History Relevant to Rehab: chronic renal failure stage 3, anemia, a-fib, CHF, CGA, meningioma  Prior to Session Communication: Bedside nurse  Patient Position Received:  Bed, 3 rail up, Alarm off, not on at start of session  General Comment: Pt cleared for OT session per nursing, agreeable to bed level activity.  Precautions:  Hearing/Visual Limitations: glasses, bilateral hearing aids  Medical Precautions: Fall precautions, Oxygen therapy device and L/min (2L O2 via NC)  Vital Signs:     Pain:  Pain Assessment  Pain Assessment: 0-10  Pain Score: 2  Pain Type: Chronic pain  Pain Location: Generalized  Clinical Progression: Not changed    Objective    Cognition:  Cognition  Overall Cognitive Status: Within Functional Limits  Orientation Level: Oriented X4  Safety/Judgement: Exceptions to WFL  Insight: Mild  Impulsive: Mildly  Task Initiation: Initiates with cues  Processing Speed: Delayed  Coordination:  Movements are Fluid and Coordinated: No  Upper Body Coordination: slower rate of movement  Lower Body Coordination: slower rate of movement  Activities of Daily Living: Grooming  Grooming Comments: pt pleasantly declined participation in ADLs despite education and encouragement d/t reports of completing prior to OT session this date.  Functional Standing Tolerance:     Bed Mobility/Transfers: Bed Mobility  Bed Mobility: No (pt declined OOB activity despite education and encouragement citing fatigue.)    Transfers  Transfer: No    Therapy/Activity: Therapeutic Exercise  Therapeutic Exercise Performed: Yes  Therapeutic Exercise Activity 1: BUE exercises completed 1x15 in all planes against gravity to increase strength and functional tolerance to maximize safety and ease of ADL/ADL transfer completion. Verbal instruction and demonstration provided to ensure proper muscle recruitment.      Outcome Measures:[facility] Daily Activity  Putting on and taking off regular lower body clothing: A lot  Bathing (including washing, rinsing, drying): A lot  Putting on and taking off regular upper body clothing: A lot  Toileting, which includes using toilet, bedpan or urinal: A lot  Taking care of  personal grooming such as brushing teeth: A little  Eating Meals: A little  Daily Activity - Total Score: 14        Education Documentation  ADL Training, taught by GODFREY Marquez at 3/4/2024  5:11 PM.  Learner: Patient  Readiness: Acceptance  Method: Explanation, Demonstration  Response: Needs Reinforcement    Education Comments  Education provided on role of OT/POC, safety awareness throughout functional tasks/transfers, importance of activity/ rest routine, and use of call light for assistance. Questions, comments and concerns addressed regarding OT.      Goals:  Encounter Problems       Encounter Problems (Active)       Balance       STG - Maintains static standing balance with upper extremity support mod I (Progressing)       Start:  03/01/24    Expected End:  03/29/24       INTERVENTIONS:  1. Practice standing with minimal support.  2. Educate patient about standing tolerance.  3. Educate patient about independence with gait, transfers, and ADL's.  4. Educate patient about use of assistive device.  5. Educate patient about self-directed care.            Mobility       LTG - Patient will ambulate household distance using FWW with Mod I (Progressing)       Start:  03/01/24            STG - Patient will ascend and descend four to six stairs with b/l handrails and CGA (Not Progressing)       Start:  03/01/24               OT Goals       Pt will complete all grooming tasks with setup in standing and/or sitting. (Progressing)       Start:  03/02/24    Expected End:  03/30/24            Pt will complete UB dressing/bathing with setup and LB dressing/bathing with close S using AE as needed.  (Progressing)       Start:  03/02/24    Expected End:  03/30/24            Pt will complete all toileting tasks with close S. (Progressing)       Start:  03/02/24    Expected End:  03/30/24            Pt will complete all functional transfers and mobility with close S using a RW. (Progressing)       Start:  03/02/24     Expected End:  03/30/24

## 2024-03-05 ENCOUNTER — APPOINTMENT (OUTPATIENT)
Dept: GASTROENTEROLOGY | Facility: HOSPITAL | Age: 79
DRG: 378 | End: 2024-03-05
Payer: MEDICARE

## 2024-03-05 LAB
ALBUMIN SERPL-MCNC: 3.1 G/DL (ref 3.5–5)
ANION GAP SERPL CALC-SCNC: 10 MMOL/L
BUN SERPL-MCNC: 22 MG/DL (ref 8–25)
CALCIUM SERPL-MCNC: 8.2 MG/DL (ref 8.5–10.4)
CHLORIDE SERPL-SCNC: 106 MMOL/L (ref 97–107)
CO2 SERPL-SCNC: 25 MMOL/L (ref 24–31)
CREAT SERPL-MCNC: 1.5 MG/DL (ref 0.4–1.6)
EGFRCR SERPLBLD CKD-EPI 2021: 47 ML/MIN/1.73M*2
ERYTHROCYTE [DISTWIDTH] IN BLOOD BY AUTOMATED COUNT: 17.9 % (ref 11.5–14.5)
GLUCOSE SERPL-MCNC: 89 MG/DL (ref 65–99)
HCT VFR BLD AUTO: 26.1 % (ref 41–52)
HGB BLD-MCNC: 7.7 G/DL (ref 13.5–17.5)
INR PPP: 1.2 (ref 0.9–1.2)
MCH RBC QN AUTO: 25.9 PG (ref 26–34)
MCHC RBC AUTO-ENTMCNC: 29.5 G/DL (ref 32–36)
MCV RBC AUTO: 88 FL (ref 80–100)
NRBC BLD-RTO: 1 /100 WBCS (ref 0–0)
PHOSPHATE SERPL-MCNC: 2.7 MG/DL (ref 2.5–4.5)
PLATELET # BLD AUTO: 196 X10*3/UL (ref 150–450)
POTASSIUM SERPL-SCNC: 4.3 MMOL/L (ref 3.4–5.1)
PROTHROMBIN TIME: 12.1 SECONDS (ref 9.3–12.7)
RBC # BLD AUTO: 2.97 X10*6/UL (ref 4.5–5.9)
SODIUM SERPL-SCNC: 141 MMOL/L (ref 133–145)
WBC # BLD AUTO: 4.1 X10*3/UL (ref 4.4–11.3)

## 2024-03-05 PROCEDURE — 2500000004 HC RX 250 GENERAL PHARMACY W/ HCPCS (ALT 636 FOR OP/ED): Performed by: INTERNAL MEDICINE

## 2024-03-05 PROCEDURE — 36415 COLL VENOUS BLD VENIPUNCTURE: CPT | Performed by: INTERNAL MEDICINE

## 2024-03-05 PROCEDURE — 0DBH8ZZ EXCISION OF CECUM, VIA NATURAL OR ARTIFICIAL OPENING ENDOSCOPIC: ICD-10-PCS | Performed by: INTERNAL MEDICINE

## 2024-03-05 PROCEDURE — 45385 COLONOSCOPY W/LESION REMOVAL: CPT | Performed by: INTERNAL MEDICINE

## 2024-03-05 PROCEDURE — 85610 PROTHROMBIN TIME: CPT | Performed by: INTERNAL MEDICINE

## 2024-03-05 PROCEDURE — 2500000002 HC RX 250 W HCPCS SELF ADMINISTERED DRUGS (ALT 637 FOR MEDICARE OP, ALT 636 FOR OP/ED): Performed by: INTERNAL MEDICINE

## 2024-03-05 PROCEDURE — 2500000001 HC RX 250 WO HCPCS SELF ADMINISTERED DRUGS (ALT 637 FOR MEDICARE OP): Performed by: INTERNAL MEDICINE

## 2024-03-05 PROCEDURE — 3700000012 HC SEDATION LEVEL 5+ TIME - INITIAL 15 MINUTES 5/> YEARS

## 2024-03-05 PROCEDURE — 85027 COMPLETE CBC AUTOMATED: CPT | Performed by: INTERNAL MEDICINE

## 2024-03-05 PROCEDURE — 2060000001 HC INTERMEDIATE ICU ROOM DAILY

## 2024-03-05 PROCEDURE — 88305 TISSUE EXAM BY PATHOLOGIST: CPT | Mod: TC | Performed by: INTERNAL MEDICINE

## 2024-03-05 PROCEDURE — 0DBL8ZX EXCISION OF TRANSVERSE COLON, VIA NATURAL OR ARTIFICIAL OPENING ENDOSCOPIC, DIAGNOSTIC: ICD-10-PCS | Performed by: INTERNAL MEDICINE

## 2024-03-05 PROCEDURE — 3700000013 HC SEDATION LEVEL 5+ TIME - EACH ADDITIONAL 15 MINUTES

## 2024-03-05 PROCEDURE — 88305 TISSUE EXAM BY PATHOLOGIST: CPT | Performed by: PATHOLOGY

## 2024-03-05 PROCEDURE — 80069 RENAL FUNCTION PANEL: CPT | Performed by: INTERNAL MEDICINE

## 2024-03-05 RX ORDER — FENTANYL CITRATE 50 UG/ML
INJECTION, SOLUTION INTRAMUSCULAR; INTRAVENOUS AS NEEDED
Status: COMPLETED | OUTPATIENT
Start: 2024-03-05 | End: 2024-03-05

## 2024-03-05 RX ORDER — MIDAZOLAM HYDROCHLORIDE 1 MG/ML
INJECTION, SOLUTION INTRAMUSCULAR; INTRAVENOUS AS NEEDED
Status: COMPLETED | OUTPATIENT
Start: 2024-03-05 | End: 2024-03-05

## 2024-03-05 RX ADMIN — MIDAZOLAM 1 MG: 1 INJECTION INTRAMUSCULAR; INTRAVENOUS at 08:15

## 2024-03-05 RX ADMIN — MIDAZOLAM 1 MG: 1 INJECTION INTRAMUSCULAR; INTRAVENOUS at 08:24

## 2024-03-05 RX ADMIN — FENTANYL CITRATE 25 MCG: 0.05 INJECTION, SOLUTION INTRAMUSCULAR; INTRAVENOUS at 08:17

## 2024-03-05 RX ADMIN — AMIODARONE HYDROCHLORIDE 200 MG: 200 TABLET ORAL at 11:17

## 2024-03-05 RX ADMIN — FENTANYL CITRATE 25 MCG: 0.05 INJECTION, SOLUTION INTRAMUSCULAR; INTRAVENOUS at 08:31

## 2024-03-05 RX ADMIN — PANTOPRAZOLE SODIUM 40 MG: 40 TABLET, DELAYED RELEASE ORAL at 11:17

## 2024-03-05 RX ADMIN — MIDAZOLAM 1 MG: 1 INJECTION INTRAMUSCULAR; INTRAVENOUS at 08:19

## 2024-03-05 RX ADMIN — METOPROLOL TARTRATE 12.5 MG: 25 TABLET, FILM COATED ORAL at 11:17

## 2024-03-05 RX ADMIN — ATORVASTATIN CALCIUM 20 MG: 20 TABLET, FILM COATED ORAL at 20:40

## 2024-03-05 RX ADMIN — FENTANYL CITRATE 25 MCG: 0.05 INJECTION, SOLUTION INTRAMUSCULAR; INTRAVENOUS at 08:22

## 2024-03-05 RX ADMIN — PAROXETINE HYDROCHLORIDE 20 MG: 20 TABLET, FILM COATED ORAL at 11:17

## 2024-03-05 RX ADMIN — METOPROLOL TARTRATE 12.5 MG: 25 TABLET, FILM COATED ORAL at 20:40

## 2024-03-05 RX ADMIN — FENTANYL CITRATE 25 MCG: 0.05 INJECTION, SOLUTION INTRAMUSCULAR; INTRAVENOUS at 08:14

## 2024-03-05 RX ADMIN — FINASTERIDE 5 MG: 5 TABLET, FILM COATED ORAL at 11:17

## 2024-03-05 RX ADMIN — TAMSULOSIN HYDROCHLORIDE 0.4 MG: 0.4 CAPSULE ORAL at 20:40

## 2024-03-05 ASSESSMENT — COGNITIVE AND FUNCTIONAL STATUS - GENERAL
CLIMB 3 TO 5 STEPS WITH RAILING: TOTAL
MOVING TO AND FROM BED TO CHAIR: A LOT
MOBILITY SCORE: 13
WALKING IN HOSPITAL ROOM: A LOT
MOVING TO AND FROM BED TO CHAIR: A LOT
PERSONAL GROOMING: A LITTLE
TOILETING: A LOT
DRESSING REGULAR UPPER BODY CLOTHING: A LOT
DRESSING REGULAR LOWER BODY CLOTHING: A LOT
TURNING FROM BACK TO SIDE WHILE IN FLAT BAD: A LITTLE
PERSONAL GROOMING: A LITTLE
TURNING FROM BACK TO SIDE WHILE IN FLAT BAD: A LITTLE
MOVING FROM LYING ON BACK TO SITTING ON SIDE OF FLAT BED WITH BEDRAILS: A LITTLE
DRESSING REGULAR UPPER BODY CLOTHING: A LOT
EATING MEALS: A LITTLE
HELP NEEDED FOR BATHING: A LOT
CLIMB 3 TO 5 STEPS WITH RAILING: TOTAL
TOILETING: A LOT
HELP NEEDED FOR BATHING: A LOT
DAILY ACTIVITIY SCORE: 14
WALKING IN HOSPITAL ROOM: A LOT
MOBILITY SCORE: 13
DRESSING REGULAR LOWER BODY CLOTHING: A LOT
DAILY ACTIVITIY SCORE: 14
STANDING UP FROM CHAIR USING ARMS: A LOT
MOVING FROM LYING ON BACK TO SITTING ON SIDE OF FLAT BED WITH BEDRAILS: A LITTLE
STANDING UP FROM CHAIR USING ARMS: A LOT
EATING MEALS: A LITTLE

## 2024-03-05 ASSESSMENT — PAIN SCALES - GENERAL
PAINLEVEL_OUTOF10: 0 - NO PAIN
PAINLEVEL_OUTOF10: 0 - NO PAIN

## 2024-03-05 ASSESSMENT — PAIN - FUNCTIONAL ASSESSMENT: PAIN_FUNCTIONAL_ASSESSMENT: 0-10

## 2024-03-05 NOTE — NURSING NOTE
Assumed care of patient at this time. At bedside report, patient had no c/o pain. vss and afebrile. Patient on continuous oximetry per order. Patient currently on 2L nasal canula. Patient is slightly confused but easily reoriented. Patient is currently refusing to drink any more bowel prep. Approximately 900ml of the prep has been consumed. Will continue to offer bowel prep throughout the night. Bed low and locked, call button within reach and bed alarm on. Patient has no needs that require immediate nursing interventions.

## 2024-03-05 NOTE — PROGRESS NOTES
CONSULT PROGRESS NOTES    SERVICE DATE: 3/5/2024   SERVICE TIME: 11:16 AM    CONSULTING SERVICE: Nephrology    ASSESSMENT AND PLAN   78-year-old man with atrial fibrillation on anticoagulation admitted with acute on chronic renal failure in the context of profound anemia.  1.  Acute renal failure  2.  Acute metabolic acidosis  3.  Hyperkalemia  4.  Hypotension  5.  Acute blood loss anemia  6.  Chronic kidney disease stage IV  7.  Kidney stones     Acute renal failure on top of advanced chronic kidney disease stage 4 in the context of severe anemia requiring multiple rounds of blood transfusion.  He needs continued blood transfusion to keep his hemoglobin above 7 probably does not need a transfusion today.  No major need for IV fluids.  He has resolved acidosis with no bicarbonate therapy.  No potassium supplements, no diuretics.    Blood pressure remains stable and we are holding his furosemide.  Baseline creatinine had been in the low twos, he is now below this.  Kidney stones are asymptomatic at present.  I will follow him while here.  He has outpatient follow[facility]up with [facility]  nephrology.    SUBJECTIVE  INTERVAL HPI: He had a colonoscopy showing large hemorrhoids, otherwise unremarkable.  He was somnolent during my evaluation today, family members present at bedside.    MEDICATIONS:  amiodarone, 200 mg, oral, Daily  atorvastatin, 20 mg, oral, Nightly  finasteride, 5 mg, oral, Daily  lidocaine, 0.1 mL, subcutaneous, Once  metoprolol tartrate, 12.5 mg, oral, BID  pantoprazole, 40 mg, oral, Daily  PARoxetine, 20 mg, oral, Daily  tamsulosin, 0.4 mg, oral, Nightly           PRN medications: labetaloL, ondansetron, ondansetron, promethazine (Phenergan) 6.25 mg in sodium chloride 0.9% 50 mL IV     OBJECTIVE  PHYSICAL EXAM:   Heart Rate:  [60-70]   Temp:  [35.8 °C (96.4 °F)-36.4 °C (97.5 °F)]   Resp:  [10-22]   BP: (102-142)/(50-92)   SpO2:  [92 %-100 %]   Body mass index is 34.8 kg/m².  This is a chronically  ill-appearing elderly white man  Very pale skin  Hearing intact  Phonation intact  Moist mucosa  Normal S1/normal S2  Lungs are clear to auscultation bilaterally  Abdomen is soft, nondistended, nontender, positive bowel sounds  No Long  No extremity edema  Moves 4 limbs spontaneously  No obvious joint deformities  No lymphadenopathy    DATA:   Labs:  Results for orders placed or performed during the hospital encounter of 02/29/24 (from the past 96 hour(s))   POCT GLUCOSE   Result Value Ref Range    POCT Glucose 94 74 - 99 mg/dL   POCT GLUCOSE   Result Value Ref Range    POCT Glucose 106 (H) 74 - 99 mg/dL   Protime-INR   Result Value Ref Range    Protime 12.7 9.3 - 12.7 seconds    INR 1.2 0.9 - 1.2   POCT GLUCOSE   Result Value Ref Range    POCT Glucose 115 (H) 74 - 99 mg/dL   Renal Function Panel   Result Value Ref Range    Glucose 100 (H) 65 - 99 mg/dL    Sodium 139 133 - 145 mmol/L    Potassium 4.5 3.4 - 5.1 mmol/L    Chloride 107 97 - 107 mmol/L    Bicarbonate 23 (L) 24 - 31 mmol/L    Urea Nitrogen 55 (H) 8 - 25 mg/dL    Creatinine 2.80 (H) 0.40 - 1.60 mg/dL    eGFR 22 (L) >60 mL/min/1.73m*2    Calcium 8.3 (L) 8.5 - 10.4 mg/dL    Phosphorus 3.1 2.5 - 4.5 mg/dL    Albumin 3.1 (L) 3.5 - 5.0 g/dL    Anion Gap 9 <=19 mmol/L   CBC   Result Value Ref Range    WBC 5.6 4.4 - 11.3 x10*3/uL    nRBC 1.1 (H) 0.0 - 0.0 /100 WBCs    RBC 2.70 (L) 4.50 - 5.90 x10*6/uL    Hemoglobin 7.1 (L) 13.5 - 17.5 g/dL    Hematocrit 23.7 (L) 41.0 - 52.0 %    MCV 88 80 - 100 fL    MCH 26.3 26.0 - 34.0 pg    MCHC 30.0 (L) 32.0 - 36.0 g/dL    RDW 17.6 (H) 11.5 - 14.5 %    Platelets 177 150 - 450 x10*3/uL   Hemoglobin and hematocrit, blood   Result Value Ref Range    Hemoglobin 7.3 (L) 13.5 - 17.5 g/dL    Hematocrit 22.9 (L) 41.0 - 52.0 %   Renal Function Panel   Result Value Ref Range    Glucose 99 65 - 99 mg/dL    Sodium 142 133 - 145 mmol/L    Potassium 4.2 3.4 - 5.1 mmol/L    Chloride 109 (H) 97 - 107 mmol/L    Bicarbonate 22 (L) 24 - 31  mmol/L    Urea Nitrogen 43 (H) 8 - 25 mg/dL    Creatinine 2.20 (H) 0.40 - 1.60 mg/dL    eGFR 30 (L) >60 mL/min/1.73m*2    Calcium 8.5 8.5 - 10.4 mg/dL    Phosphorus 2.8 2.5 - 4.5 mg/dL    Albumin 3.0 (L) 3.5 - 5.0 g/dL    Anion Gap 11 <=19 mmol/L   CBC   Result Value Ref Range    WBC 4.1 (L) 4.4 - 11.3 x10*3/uL    nRBC 2.2 (H) 0.0 - 0.0 /100 WBCs    RBC 2.59 (L) 4.50 - 5.90 x10*6/uL    Hemoglobin 6.9 (L) 13.5 - 17.5 g/dL    Hematocrit 22.9 (L) 41.0 - 52.0 %    MCV 88 80 - 100 fL    MCH 26.6 26.0 - 34.0 pg    MCHC 30.1 (L) 32.0 - 36.0 g/dL    RDW 17.7 (H) 11.5 - 14.5 %    Platelets 181 150 - 450 x10*3/uL   Prepare RBC: 1 Units   Result Value Ref Range    PRODUCT CODE Z7427J25     Unit Number [id number]     Unit ABO B     Unit RH NEG     XM INTEP COMP     Dispense Status TR     Blood Expiration Date April 02, 2024 23:59 EDT     PRODUCT BLOOD TYPE 1700     UNIT VOLUME 400    Type and screen   Result Value Ref Range    ABO TYPE B     Rh TYPE POS     ANTIBODY SCREEN NEG    Renal Function Panel   Result Value Ref Range    Glucose 85 65 - 99 mg/dL    Sodium 139 133 - 145 mmol/L    Potassium 4.5 3.4 - 5.1 mmol/L    Chloride 105 97 - 107 mmol/L    Bicarbonate 24 24 - 31 mmol/L    Urea Nitrogen 30 (H) 8 - 25 mg/dL    Creatinine 1.80 (H) 0.40 - 1.60 mg/dL    eGFR 38 (L) >60 mL/min/1.73m*2    Calcium 8.1 (L) 8.5 - 10.4 mg/dL    Phosphorus 2.5 2.5 - 4.5 mg/dL    Albumin 3.0 (L) 3.5 - 5.0 g/dL    Anion Gap 10 <=19 mmol/L   CBC   Result Value Ref Range    WBC 4.6 4.4 - 11.3 x10*3/uL    nRBC 0.9 (H) 0.0 - 0.0 /100 WBCs    RBC 3.04 (L) 4.50 - 5.90 x10*6/uL    Hemoglobin 7.8 (L) 13.5 - 17.5 g/dL    Hematocrit 26.9 (L) 41.0 - 52.0 %    MCV 89 80 - 100 fL    MCH 25.7 (L) 26.0 - 34.0 pg    MCHC 29.0 (L) 32.0 - 36.0 g/dL    RDW 18.0 (H) 11.5 - 14.5 %    Platelets 188 150 - 450 x10*3/uL   Hemoglobin and hematocrit, blood   Result Value Ref Range    Hemoglobin 8.4 (L) 13.5 - 17.5 g/dL    Hematocrit 28.3 (L) 41.0 - 52.0 %   Renal  Function Panel   Result Value Ref Range    Glucose 89 65 - 99 mg/dL    Sodium 141 133 - 145 mmol/L    Potassium 4.3 3.4 - 5.1 mmol/L    Chloride 106 97 - 107 mmol/L    Bicarbonate 25 24 - 31 mmol/L    Urea Nitrogen 22 8 - 25 mg/dL    Creatinine 1.50 0.40 - 1.60 mg/dL    eGFR 47 (L) >60 mL/min/1.73m*2    Calcium 8.2 (L) 8.5 - 10.4 mg/dL    Phosphorus 2.7 2.5 - 4.5 mg/dL    Albumin 3.1 (L) 3.5 - 5.0 g/dL    Anion Gap 10 <=19 mmol/L   CBC   Result Value Ref Range    WBC 4.1 (L) 4.4 - 11.3 x10*3/uL    nRBC 1.0 (H) 0.0 - 0.0 /100 WBCs    RBC 2.97 (L) 4.50 - 5.90 x10*6/uL    Hemoglobin 7.7 (L) 13.5 - 17.5 g/dL    Hematocrit 26.1 (L) 41.0 - 52.0 %    MCV 88 80 - 100 fL    MCH 25.9 (L) 26.0 - 34.0 pg    MCHC 29.5 (L) 32.0 - 36.0 g/dL    RDW 17.9 (H) 11.5 - 14.5 %    Platelets 196 150 - 450 x10*3/uL   Protime-INR   Result Value Ref Range    Protime 12.1 9.3 - 12.7 seconds    INR 1.2 0.9 - 1.2     *Note: Due to a large number of results and/or encounters for the requested time period, some results have not been displayed. A complete set of results can be found in Results Review.         SIGNATURE: Hiram Capellan MD PATIENT NAME: Angel Carroll   DATE: March 5, 2024 MRN: [id number]   TIME: 11:16 AM PAGER: [phone]

## 2024-03-05 NOTE — NURSING NOTE
Prepared the last 2L of bowel prep for patient at this time. Patient sleeping at the moment and is not drinking bowel prep at this time. Will continue to offer bowel prep.

## 2024-03-05 NOTE — CARE PLAN
The clinical goals for the shift include Remain free from falls for the duration of the shift    Over the shift, the patient did make progress toward the following goals.      Problem: Pain  Goal: My pain/discomfort is manageable  Outcome: Progressing     Problem: Safety  Goal: I will remain free of falls  Outcome: Progressing     Problem: Daily Care  Goal: Daily care needs are met  Outcome: Progressing     Problem: Skin  Goal: Decreased wound size/increased tissue granulation at next dressing change  Outcome: Progressing  Flowsheets (Taken 3/5/2024 0356)  Decreased wound size/increased tissue granulation at next dressing change:   Protective dressings over bony prominences   Promote sleep for wound healing  Goal: Participates in plan/prevention/treatment measures  Outcome: Progressing  Flowsheets (Taken 3/5/2024 0356)  Participates in plan/prevention/treatment measures:   Discuss with provider PT/OT consult   Elevate heels   Increase activity/out of bed for meals  Goal: Prevent/manage excess moisture  Outcome: Progressing  Flowsheets (Taken 3/5/2024 0356)  Prevent/manage excess moisture:   Cleanse incontinence/protect with barrier cream   Moisturize dry skin  Goal: Prevent/minimize sheer/friction injuries  Outcome: Progressing  Flowsheets (Taken 3/5/2024 0356)  Prevent/minimize sheer/friction injuries:   Complete micro-shifts as needed if patient unable. Adjust patient position to relieve pressure points, not a full turn   HOB 30 degrees or less   Increase activity/out of bed for meals   Turn/reposition every 2 hours/use positioning/transfer devices   Use pull sheet  Goal: Promote/optimize nutrition  Outcome: Progressing  Flowsheets (Taken 3/5/2024 0356)  Promote/optimize nutrition:   Consume > 50% meals/supplements   Monitor/record intake including meals   Offer water/supplements/favorite foods  Goal: Promote skin healing  Outcome: Progressing  Flowsheets (Taken 3/5/2024 0356)  Promote skin healing:   Assess  skin/pad under line(s)/device(s)   Ensure correct size (line/device) and apply per  instructions   Protective dressings over bony prominences   Rotate device position/do not position patient on device   Turn/reposition every 2 hours/use positioning/transfer devices

## 2024-03-05 NOTE — PROGRESS NOTES
Physical Therapy                 Therapy Communication Note    Patient Name: Angel Carroll  MRN: [id number]  Today's Date: 3/5/2024     Discipline: Physical Therapy    Missed Visit Reason: Missed Visit Reason: Cancel (Attempted to see pt for PT f/u, pt s/p colonoscopy earlier this AM; currently lethargic and unable to safely participate in PT at this time.)    Missed Time: Cancel    Comment:

## 2024-03-06 LAB
ALBUMIN SERPL-MCNC: 3 G/DL (ref 3.5–5)
ANION GAP SERPL CALC-SCNC: 8 MMOL/L
BUN SERPL-MCNC: 17 MG/DL (ref 8–25)
CALCIUM SERPL-MCNC: 8.1 MG/DL (ref 8.5–10.4)
CHLORIDE SERPL-SCNC: 106 MMOL/L (ref 97–107)
CO2 SERPL-SCNC: 26 MMOL/L (ref 24–31)
CREAT SERPL-MCNC: 1.6 MG/DL (ref 0.4–1.6)
EGFRCR SERPLBLD CKD-EPI 2021: 43 ML/MIN/1.73M*2
ERYTHROCYTE [DISTWIDTH] IN BLOOD BY AUTOMATED COUNT: 18.5 % (ref 11.5–14.5)
GLUCOSE SERPL-MCNC: 92 MG/DL (ref 65–99)
HCT VFR BLD AUTO: 26.9 % (ref 41–52)
HGB BLD-MCNC: 7.9 G/DL (ref 13.5–17.5)
INR PPP: 1.2 (ref 0.9–1.2)
LABORATORY COMMENT REPORT: NORMAL
MCH RBC QN AUTO: 25.8 PG (ref 26–34)
MCHC RBC AUTO-ENTMCNC: 29.4 G/DL (ref 32–36)
MCV RBC AUTO: 88 FL (ref 80–100)
NRBC BLD-RTO: 0.5 /100 WBCS (ref 0–0)
PATH REPORT.FINAL DX SPEC: NORMAL
PATH REPORT.GROSS SPEC: NORMAL
PATH REPORT.RELEVANT HX SPEC: NORMAL
PATH REPORT.TOTAL CANCER: NORMAL
PHOSPHATE SERPL-MCNC: 2.7 MG/DL (ref 2.5–4.5)
PLATELET # BLD AUTO: 186 X10*3/UL (ref 150–450)
POTASSIUM SERPL-SCNC: 4.4 MMOL/L (ref 3.4–5.1)
PROTHROMBIN TIME: 12.1 SECONDS (ref 9.3–12.7)
RBC # BLD AUTO: 3.06 X10*6/UL (ref 4.5–5.9)
SODIUM SERPL-SCNC: 140 MMOL/L (ref 133–145)
WBC # BLD AUTO: 4 X10*3/UL (ref 4.4–11.3)

## 2024-03-06 PROCEDURE — 97530 THERAPEUTIC ACTIVITIES: CPT | Mod: GP

## 2024-03-06 PROCEDURE — 2500000001 HC RX 250 WO HCPCS SELF ADMINISTERED DRUGS (ALT 637 FOR MEDICARE OP): Performed by: INTERNAL MEDICINE

## 2024-03-06 PROCEDURE — 82565 ASSAY OF CREATININE: CPT | Performed by: INTERNAL MEDICINE

## 2024-03-06 PROCEDURE — 2500000002 HC RX 250 W HCPCS SELF ADMINISTERED DRUGS (ALT 637 FOR MEDICARE OP, ALT 636 FOR OP/ED): Performed by: INTERNAL MEDICINE

## 2024-03-06 PROCEDURE — 85027 COMPLETE CBC AUTOMATED: CPT | Performed by: INTERNAL MEDICINE

## 2024-03-06 PROCEDURE — 97110 THERAPEUTIC EXERCISES: CPT | Mod: GP

## 2024-03-06 PROCEDURE — 2060000001 HC INTERMEDIATE ICU ROOM DAILY

## 2024-03-06 PROCEDURE — 85610 PROTHROMBIN TIME: CPT | Performed by: INTERNAL MEDICINE

## 2024-03-06 PROCEDURE — 36415 COLL VENOUS BLD VENIPUNCTURE: CPT | Performed by: INTERNAL MEDICINE

## 2024-03-06 RX ADMIN — AMIODARONE HYDROCHLORIDE 200 MG: 200 TABLET ORAL at 09:22

## 2024-03-06 RX ADMIN — PANTOPRAZOLE SODIUM 40 MG: 40 TABLET, DELAYED RELEASE ORAL at 09:22

## 2024-03-06 RX ADMIN — METOPROLOL TARTRATE 12.5 MG: 25 TABLET, FILM COATED ORAL at 22:09

## 2024-03-06 RX ADMIN — METOPROLOL TARTRATE 12.5 MG: 25 TABLET, FILM COATED ORAL at 09:22

## 2024-03-06 RX ADMIN — PAROXETINE HYDROCHLORIDE 20 MG: 20 TABLET, FILM COATED ORAL at 09:22

## 2024-03-06 RX ADMIN — TAMSULOSIN HYDROCHLORIDE 0.4 MG: 0.4 CAPSULE ORAL at 22:09

## 2024-03-06 RX ADMIN — ATORVASTATIN CALCIUM 20 MG: 20 TABLET, FILM COATED ORAL at 22:09

## 2024-03-06 RX ADMIN — FINASTERIDE 5 MG: 5 TABLET, FILM COATED ORAL at 09:22

## 2024-03-06 ASSESSMENT — COGNITIVE AND FUNCTIONAL STATUS - GENERAL
MOVING FROM LYING ON BACK TO SITTING ON SIDE OF FLAT BED WITH BEDRAILS: A LITTLE
HELP NEEDED FOR BATHING: A LOT
MOVING FROM LYING ON BACK TO SITTING ON SIDE OF FLAT BED WITH BEDRAILS: A LITTLE
TURNING FROM BACK TO SIDE WHILE IN FLAT BAD: A LITTLE
TOILETING: A LOT
MOBILITY SCORE: 13
MOVING FROM LYING ON BACK TO SITTING ON SIDE OF FLAT BED WITH BEDRAILS: A LITTLE
CLIMB 3 TO 5 STEPS WITH RAILING: TOTAL
MOVING TO AND FROM BED TO CHAIR: A LOT
STANDING UP FROM CHAIR USING ARMS: A LOT
DAILY ACTIVITIY SCORE: 13
PERSONAL GROOMING: A LOT
STANDING UP FROM CHAIR USING ARMS: A LITTLE
MOVING TO AND FROM BED TO CHAIR: A LITTLE
TURNING FROM BACK TO SIDE WHILE IN FLAT BAD: A LITTLE
TOILETING: A LOT
MOBILITY SCORE: 15
CLIMB 3 TO 5 STEPS WITH RAILING: TOTAL
WALKING IN HOSPITAL ROOM: A LOT
DRESSING REGULAR UPPER BODY CLOTHING: A LOT
MOBILITY SCORE: 15
DRESSING REGULAR LOWER BODY CLOTHING: A LOT
PERSONAL GROOMING: A LOT
EATING MEALS: A LITTLE
TURNING FROM BACK TO SIDE WHILE IN FLAT BAD: A LITTLE
CLIMB 3 TO 5 STEPS WITH RAILING: TOTAL
DAILY ACTIVITIY SCORE: 13
WALKING IN HOSPITAL ROOM: A LOT
MOVING TO AND FROM BED TO CHAIR: A LITTLE
STANDING UP FROM CHAIR USING ARMS: A LITTLE
HELP NEEDED FOR BATHING: A LOT
DRESSING REGULAR UPPER BODY CLOTHING: A LOT
WALKING IN HOSPITAL ROOM: A LOT
DRESSING REGULAR LOWER BODY CLOTHING: A LOT
EATING MEALS: A LITTLE

## 2024-03-06 ASSESSMENT — PAIN - FUNCTIONAL ASSESSMENT
PAIN_FUNCTIONAL_ASSESSMENT: 0-10

## 2024-03-06 ASSESSMENT — PAIN SCALES - GENERAL
PAINLEVEL_OUTOF10: 0 - NO PAIN

## 2024-03-06 NOTE — PROGRESS NOTES
Physical Therapy    Physical Therapy Treatment    Patient Name: Angel Carroll  MRN: [id number]  Today's Date: 3/6/2024  Time Calculation  Start Time: 1021  Stop Time: 1044  Time Calculation (min): 23 min     Assessment/Plan   PT Assessment  PT Assessment Results: Decreased strength, Decreased range of motion, Decreased endurance, Impaired balance, Decreased mobility, Impaired judgement, Decreased safety awareness, Obesity  Rehab Prognosis: Good  Evaluation/Treatment Tolerance: Patient tolerated treatment well  Medical Staff Made Aware: Yes  Strengths: Ability to acquire knowledge, Attitude of self, Support of Caregivers  Barriers to Participation: Comorbidities  End of Session Communication: Bedside nurse  Assessment Comment: Slow progress towards therapy goals, remains deconditioned with BLE strength deficits and impaired balance; remains a falls risk and appropriate for mod intensity therapy needs upon d/c.  End of Session Patient Position: Up in chair, Alarm on  PT Plan  Inpatient/Swing Bed or Outpatient: Inpatient  PT Plan  Treatment/Interventions: Bed mobility, Transfer training, Gait training, Stair training, Balance training, Neuromuscular re-education, Strengthening, Endurance training, Range of motion, Therapeutic exercise, Therapeutic activity, Home exercise program  PT Plan: Skilled PT  PT Frequency: 5 times per week  PT Discharge Recommendations: Moderate intensity level of continued care  Equipment Recommended upon Discharge: Wheeled walker  PT Recommended Transfer Status: Assist x1, Assistive device  PT - OK to Discharge: Yes    General Visit Information:   PT  Visit  PT Received On: 03/06/24  Response to Previous Treatment: Patient with no complaints from previous session.  General  Reason for Referral: impaired functional mobility  Referred By: Dr. Josafat Cole  Past Medical History Relevant to Rehab: chronic renal failure stage 3, anemia, a-fib, CHF, CGA, meningioma  Missed Visit: No  Missed Visit  Reason: Other (Comment)  Family/Caregiver Present: No  Prior to Session Communication: Bedside nurse  Patient Position Received: Bed, 3 rail up, Alarm on  Preferred Learning Style: verbal  General Comment: Pt cleared for therapy via RN, received in supine, NAD, agreeable to participate in therapy. (+) telemetry, 2L O2 via NC, male Colby    Subjective   Precautions:  Precautions  Hearing/Visual Limitations: glasses, bilateral hearing aids  Medical Precautions: Fall precautions, Oxygen therapy device and L/min (2L O2 via NC)  Vital Signs:  Vital Signs  SpO2: 100 %    Objective   Pain:  Pain Assessment  Pain Assessment: 0-10  Pain Score: 0 - No pain  Cognition:  Cognition  Overall Cognitive Status: Within Functional Limits  Orientation Level: Oriented X4  Insight: Mild  Postural Control:  Postural Control  Postural Control: Impaired  Posture Comment: forward head, rounded shoulders; obesity  Static Sitting Balance  Static Sitting-Balance Support: Bilateral upper extremity supported, Feet supported  Static Sitting-Level of Assistance: Close supervision  Static Standing Balance  Static Standing-Balance Support: Bilateral upper extremity supported  Static Standing-Level of Assistance: Minimum assistance  Extremity/Trunk Assessments:  RLE   RLE :  (AROM R knee limited at end range)  LLE   LLE :  (AROM L knee limited at end range)  Activity Tolerance:  Activity Tolerance  Endurance: Tolerates 10 - 20 min exercise with multiple rests  Treatments:  Therapeutic Exercise  Therapeutic Exercise Performed: Yes  Therapeutic Exercise Activity 1: B ankle pumps x 10  Therapeutic Exercise Activity 2: B seated knee ext x 10  Therapeutic Exercise Activity 3: B seated hip flex x 10  Therapeutic Exercise Activity 4: B seated isometric hip adduction x 10    Bed Mobility  Bed Mobility: Yes  Bed Mobility 1  Bed Mobility 1: Supine to sitting  Level of Assistance 1: Minimum assistance  Bed Mobility Comments 1: assist for safety and line  management    Ambulation/Gait Training  Ambulation/Gait Training Performed: Yes  Ambulation/Gait Training 1  Surface 1: Level tile  Device 1: Rolling walker  Assistance 1: Moderate assistance  Quality of Gait 1: Narrow base of support, Diminished heel strike, Decreased step length, Forward flexed posture  Comments/Distance (ft) 1: 3-4 steps to bedside chair; further distance limited d/t c/o fatigue  Transfers  Transfer: Yes  Transfer 1  Transfer From 1: Sit to  Transfer to 1: Stand  Technique 1: Sit to stand  Transfer Device 1: Walker  Transfer Level of Assistance 1: Minimum assistance  Trials/Comments 1: assist for forward weight shift and line management  Transfers 2  Transfer From 2: Stand to  Transfer to 2: Sit  Technique 2: Stand to sit  Transfer Device 2: Walker  Transfer Level of Assistance 2: Minimum assistance  Trials/Comments 2: cueing for sequencing; assist for safe eccentric lowering into chair    Stairs  Stairs: No    Outcome Measures:  [facility] Basic Mobility  Turning from your back to your side while in a flat bed without using bedrails: A little  Moving from lying on your back to sitting on the side of a flat bed without using bedrails: A little  Moving to and from bed to chair (including a wheelchair): A little  Standing up from a chair using your arms (e.g. wheelchair or bedside chair): A little  To walk in hospital room: A lot  Climbing 3-5 steps with railing: Total  Basic Mobility - Total Score: 15    Education Documentation  Handouts, taught by Keri Hathaway, PT at 3/6/2024 11:00 AM.  Learner: Patient  Readiness: Acceptance  Method: Explanation, Demonstration  Response: Needs Reinforcement    Precautions, taught by Keri Hathaway PT at 3/6/2024 11:00 AM.  Learner: Patient  Readiness: Acceptance  Method: Explanation, Demonstration  Response: Needs Reinforcement    Body Mechanics, taught by Keri Hathaway, PT at 3/6/2024 11:00 AM.  Learner: Patient  Readiness: Acceptance  Method: Explanation,  Demonstration  Response: Needs Reinforcement    Home Exercise Program, taught by Keri Hathaway, PT at 3/6/2024 11:00 AM.  Learner: Patient  Readiness: Acceptance  Method: Explanation, Demonstration  Response: Needs Reinforcement    Mobility Training, taught by Keri Hathaway, PT at 3/6/2024 11:00 AM.  Learner: Patient  Readiness: Acceptance  Method: Explanation, Demonstration  Response: Needs Reinforcement    Education Comments  No comments found.        OP EDUCATION:  Outpatient Education  Individual(s) Educated: Patient  Education Provided: Fall Risk, POC  Risk and Benefits Discussed with Patient/Caregiver/Other: yes  Patient/Caregiver Demonstrated Understanding: yes  Plan of Care Discussed and Agreed Upon: yes  Patient Response to Education: Patient/Caregiver Verbalized Understanding of Information    Encounter Problems       Encounter Problems (Active)       Balance       STG - Maintains static standing balance with upper extremity support mod I (Progressing)       Start:  03/01/24    Expected End:  03/06/24       INTERVENTIONS:  1. Practice standing with minimal support.  2. Educate patient about standing tolerance.  3. Educate patient about independence with gait, transfers, and ADL's.  4. Educate patient about use of assistive device.  5. Educate patient about self-directed care.            Mobility       LTG - Patient will ambulate household distance using FWW with Mod I (Progressing)       Start:  03/01/24    Expected End:  03/06/24            STG - Patient will ascend and descend four to six stairs with b/l handrails and CGA (Not Progressing)       Start:  03/01/24    Expected End:  03/06/24               Pain - Adult

## 2024-03-06 NOTE — PROGRESS NOTES
Angel Carroll is a 78 y.o. male on day 6 of admission presenting with GI bleed.      Subjective   Patient denied chest pain or shortness of breath.  Patient had colonoscopy yesterday.  He denied abdominal pain.    Objective     Last Recorded Vitals  /53 (BP Location: Right arm, Patient Position: Lying)   Pulse 64   Temp 36.3 °C (97.3 °F) (Oral)   Resp 16   Wt 113 kg (249 lb 9 oz)   SpO2 98%   Intake/Output last 3 Shifts:    Intake/Output Summary (Last 24 hours) at 3/6/2024 0940  Last data filed at 3/5/2024 1553  Gross per 24 hour   Intake --   Output 1900 ml   Net -1900 ml         Admission Weight  Weight: 108 kg (237 lb) (02/29/24 0441)    Daily Weight  03/06/24 : 113 kg (249 lb 9 oz)    Image Results  Colonoscopy Screening; Average Risk Patient  Table formatting from the original result was not included.  Impression  Large hemorrhoids - given size and no other sources of bleeding   identified, this is the likely etiology of blood loss.   One 10 mm polyp in the cecum; performed cold snare removal  2 subcentimeter polyps were removed with cold snare    Findings  Large hemorrhoids observed during retroflexion; no bleeding was identified  One 10 mm sessile, adenomatous-appearing polyp in the cecum; performed   cold snare with complete removal and retrieved specimen  One flat polyp measuring smaller than 5 mm in the cecum; performed cold   snare with complete removal and retrieved specimen  One 6 mm sessile, adenomatous-appearing polyp in the transverse colon;   performed cold snare with complete removal and retrieved specimen    Recommendation   Await pathology results    No further screening colonoscopies necessary  Resume anticoagulation in 2 days.   Discussed findings with patient's family.   Consider general surgery evaluation for banding of large hemorrhoids. This   can be done as outpatient.   Okay to resume diet.      Indication  Colon cancer screening    Staff  Staff Role   Josh Nassar MD  Proceduralist     Medications  fentaNYL PF (Sublimaze) injection 100 mcg   midazolam (Versed) injection 3 mg   (Totals for administrations occurring from 0759 to 0859 on 03/05/24)     Preprocedure  A history and physical has been performed, and patient medication   allergies have been reviewed. The patient's tolerance of previous   anesthesia has been reviewed. The risks and benefits of the procedure and   the sedation options and risks were discussed with the patient. All   questions were answered and informed consent obtained.    Details of the Procedure  The patient underwent moderate sedation, which was administered by the   procedural nurse. The patient's blood pressure, ECG, ETCO2, heart rate,   level of consciousness, oxygen and respirations were monitored throughout   the procedure. A digital rectal exam was performed. A perianal exam was   performed. The scope was introduced through the anus and advanced to the   cecum. Retroflexion was performed in the rectum. The quality of bowel   preparation was evaluated using the [place] Bowel Preparation Scale with   scores of: right colon = 3, transverse colon = 3, left colon = 2. The   total BBPS score was 8. Bowel prep was adequate. The patient's estimated   blood loss was minimal (<5 mL). The procedure was not difficult. The   patient tolerated the procedure well. There were no apparent adverse   events. Total intra-service moderate sedation time is 43 minutes.   Medications given by procedural nurse - Fentanyl 100mcg IV, Versed 3mg IV.     Events  Procedure Events   Event Event Time   ENDO SCOPE IN TIME 3/5/2024  8:17 AM   ENDO CECUM REACHED 3/5/2024  8:35 AM   ENDO SCOPE OUT TIME 3/5/2024  8:57 AM     Specimens  ID Type Source Tests Collected by Time   1 :  Tissue COLON -CECUM POLYP SURGICAL PATHOLOGY EXAM Bonnie Lima RN 3/5/2024 0839   2 :  Tissue COLON - TRANSVERSE POLYP SURGICAL PATHOLOGY EXAM Bonnie Lima RN 3/5/2024 0852     Procedure  Location  [place] [address] [place] [address] [phone]    Referring Provider  No referring provider defined for this encounter.    Procedure Provider  Josh Nassar MD      Physical Exam    General:  pleasant, cooperating during physical exam, on oxygen therapy  HEENT: Pupils are equal and reactive to light and commendation , oral mucosa moist, no JVD   Cardiovascular: Normal sinus rhythm, no MRG.  Lungs: Clear to auscultation bilaterally, no wheezing, no crackles, no dullness to percussion.  Abdomen: No hepatosplenomegaly appreciated, soft , not tender, positive bowel sounds, positive bowel movement.  Neuro: Alert and oriented x2, strength in upper and lower extremities , sensation intact.  Psych: Patient had great insight was going on  Musculoskeletal:  Trace Edema in both lower extremities.  Vascular: Pulses are intact in upper and lower extremities  Skin: No petechiae, ecchymosis or other stigmata for dermatology disease.     Assessment/Plan        Acute Blood Loss Anemia  Secondary to GI bleed  Patient has been transfused during the hospital stay.   Status post EGD,EGD revealed gastritis but no obvious source of bleeding  Colonoscopy revealed small polyp which was narrowed, evidence of hemorrhoids Monitor hemoglobin and hematocrit.  Continue with Protonix  Dr. Nassar on the case  Transfuse for hemoglobin less than 7.  Plan to resume anticoagulation in a.m.   discussed in detail with GI     Acute kidney injury on chronic kidney disease  Secondary to GI bleed  Creatinine proved after IV fluid and blood transfusion  Dr. Capellan on the case  Monitor close  Discussed in detail with nephrology on the case    Atrial fibrillation  -Rate-controlled even with lower metoprolol dose.  Anticoagulation on hold   continue metoprolol and amiodarone.  Patient and the family aware of risk and benefits not being on anticoagulation    Asymptomatic  Bacteriuria  -Continue to monitor off of antibiotics.     Physical Therapy/Occupational Therapy  Discussed in detail with his wife and his daughter present in the room.  Physical therapy patient is moderate intensity.  Discussed with care coordination on the case.      Expect the patient to get discharged in 24 hours    Principal Problem:    ELIZABETH Leyva MD

## 2024-03-06 NOTE — PROGRESS NOTES
Angel Carroll is a 78 y.o. male on day 6 of admission presenting with GI bleed.    Subjective      He denies NV. Denies abdominal pain.    Objective     Physical Exam    Last Recorded Vitals  Blood pressure 114/53, pulse 64, temperature 36.3 °C (97.3 °F), temperature source Oral, resp. rate 16, height 1.829 m (6'), weight 113 kg (249 lb 9 oz), SpO2 98 %.  Intake/Output last 3 Shifts:  I/O last 3 completed shifts:  In: - (0 mL/kg)   Out: 2700 (24.3 mL/kg) [Urine:2700 (0.7 mL/kg/hr)]  Weight: 111.3 kg     Relevant Results      Scheduled medications  amiodarone, 200 mg, oral, Daily  atorvastatin, 20 mg, oral, Nightly  finasteride, 5 mg, oral, Daily  lidocaine, 0.1 mL, subcutaneous, Once  metoprolol tartrate, 12.5 mg, oral, BID  pantoprazole, 40 mg, oral, Daily  PARoxetine, 20 mg, oral, Daily  tamsulosin, 0.4 mg, oral, Nightly      Continuous medications     PRN medications  PRN medications: labetaloL, ondansetron, ondansetron, promethazine (Phenergan) 6.25 mg in sodium chloride 0.9% 50 mL IV  Results for orders placed or performed during the hospital encounter of 02/29/24 (from the past 24 hour(s))   Renal Function Panel   Result Value Ref Range    Glucose 92 65 - 99 mg/dL    Sodium 140 133 - 145 mmol/L    Potassium 4.4 3.4 - 5.1 mmol/L    Chloride 106 97 - 107 mmol/L    Bicarbonate 26 24 - 31 mmol/L    Urea Nitrogen 17 8 - 25 mg/dL    Creatinine 1.60 0.40 - 1.60 mg/dL    eGFR 43 (L) >60 mL/min/1.73m*2    Calcium 8.1 (L) 8.5 - 10.4 mg/dL    Phosphorus 2.7 2.5 - 4.5 mg/dL    Albumin 3.0 (L) 3.5 - 5.0 g/dL    Anion Gap 8 <=19 mmol/L   CBC   Result Value Ref Range    WBC 4.0 (L) 4.4 - 11.3 x10*3/uL    nRBC 0.5 (H) 0.0 - 0.0 /100 WBCs    RBC 3.06 (L) 4.50 - 5.90 x10*6/uL    Hemoglobin 7.9 (L) 13.5 - 17.5 g/dL    Hematocrit 26.9 (L) 41.0 - 52.0 %    MCV 88 80 - 100 fL    MCH 25.8 (L) 26.0 - 34.0 pg    MCHC 29.4 (L) 32.0 - 36.0 g/dL    RDW 18.5 (H) 11.5 - 14.5 %    Platelets 186 150 - 450 x10*3/uL   Protime-INR   Result Value  Ref Range    Protime 12.1 9.3 - 12.7 seconds    INR 1.2 0.9 - 1.2     *Note: Due to a large number of results and/or encounters for the requested time period, some results have not been displayed. A complete set of results can be found in Results Review.             Assessment/Plan   Principal Problem:    GI bleed      Anemia, Melena    -Hemoglobin remained stable at 7.9              -EGD 3/1 per Dr Traylor with gastric polyp, mild erosion and gastritis but no clear source for acute bleeding/anemia              - Colon 3/5/24 [id number] mm sessile, adenomatous-appearing polyp in the cecum. 2 other small polyps removed. Large hemorrhoids given size and no other sources of bleeding identified, this is the likely etiology of blood loss.    No further screening colonoscopies necessary  Resume anticoagulation in 2 days.   Consider general surgery evaluation for banding of large hemorrhoids, which can be done as outpatient .    No further recommendations from GI standpoint.  GI will sign off at this time    Stacie Sawant, RON-CNP

## 2024-03-06 NOTE — NURSING NOTE
Assumed care of patient at this time. At bedside report, patient had no c/o pain. vss and afebrile. Patient currently on 2L nasal canula. Continuous oximetry order, no telemetry order. Bed low and locked, call button within reach and bed alarm on. Patient has no needs that require immediate nursing interventions.

## 2024-03-06 NOTE — PROGRESS NOTES
CONSULT PROGRESS NOTES    SERVICE DATE: 3/6/2024   SERVICE TIME: 11:24 AM    CONSULTING SERVICE: Nephrology    ASSESSMENT AND PLAN   78-year-old man with atrial fibrillation on anticoagulation admitted with acute on chronic renal failure in the context of profound anemia.  1.  Acute renal failure  2.  Acute metabolic acidosis  3.  Hyperkalemia  4.  Hypotension  5.  Acute blood loss anemia  6.  Chronic kidney disease stage IV  7.  Kidney stones     Acute renal failure on top of advanced chronic kidney disease stage 4 in the context of severe anemia requiring multiple rounds of blood transfusion.  His hemoglobin has been stable and he does not need a transfusion today.  No need for IV fluids.  He has resolved acidosis with no bicarbonate therapy.  We discussed gradual reintroduction of his loop diuretic in the outpatient setting.  Blood pressure remains stable.  Baseline creatinine had been in the low twos, he is now below this.  Kidney stones are asymptomatic at present.  I will follow him while here.  He has outpatient follow-up with [facility] nephrology.  No specific objection to discharge from my perspective.    SUBJECTIVE  INTERVAL HPI: He was more alert, more oriented today.  He has no abdominal pain.  He did have a bowel movement today, but could not state whether it was bloody or melenic.    MEDICATIONS:  amiodarone, 200 mg, oral, Daily  atorvastatin, 20 mg, oral, Nightly  finasteride, 5 mg, oral, Daily  lidocaine, 0.1 mL, subcutaneous, Once  metoprolol tartrate, 12.5 mg, oral, BID  pantoprazole, 40 mg, oral, Daily  PARoxetine, 20 mg, oral, Daily  tamsulosin, 0.4 mg, oral, Nightly           PRN medications: labetaloL, ondansetron, ondansetron, promethazine (Phenergan) 6.25 mg in sodium chloride 0.9% 50 mL IV     OBJECTIVE  PHYSICAL EXAM:   Heart Rate:  [60-66]   Temp:  [36 °C (96.8 °F)-36.6 °C (97.9 °F)]   Resp:  [15-16]   BP: (114-133)/(53-92)   Weight:  [111 kg (245 lb 5.9 oz)-113 kg (249 lb 9 oz)]   SpO2:   [95 %-100 %]   Body mass index is 33.85 kg/m².  This is a chronically ill-appearing elderly white man  Very pale skin  Hearing intact  Phonation intact  Moist mucosa  Normal S1/normal S2  Lungs are clear to auscultation bilaterally  Abdomen is soft, nondistended, nontender, positive bowel sounds  No Long  No extremity edema  Moves 4 limbs spontaneously  No obvious joint deformities  No lymphadenopathy    DATA:   Labs:  Results for orders placed or performed during the hospital encounter of 02/29/24 (from the past 96 hour(s))   Hemoglobin and hematocrit, blood   Result Value Ref Range    Hemoglobin 7.3 (L) 13.5 - 17.5 g/dL    Hematocrit 22.9 (L) 41.0 - 52.0 %   Renal Function Panel   Result Value Ref Range    Glucose 99 65 - 99 mg/dL    Sodium 142 133 - 145 mmol/L    Potassium 4.2 3.4 - 5.1 mmol/L    Chloride 109 (H) 97 - 107 mmol/L    Bicarbonate 22 (L) 24 - 31 mmol/L    Urea Nitrogen 43 (H) 8 - 25 mg/dL    Creatinine 2.20 (H) 0.40 - 1.60 mg/dL    eGFR 30 (L) >60 mL/min/1.73m*2    Calcium 8.5 8.5 - 10.4 mg/dL    Phosphorus 2.8 2.5 - 4.5 mg/dL    Albumin 3.0 (L) 3.5 - 5.0 g/dL    Anion Gap 11 <=19 mmol/L   CBC   Result Value Ref Range    WBC 4.1 (L) 4.4 - 11.3 x10*3/uL    nRBC 2.2 (H) 0.0 - 0.0 /100 WBCs    RBC 2.59 (L) 4.50 - 5.90 x10*6/uL    Hemoglobin 6.9 (L) 13.5 - 17.5 g/dL    Hematocrit 22.9 (L) 41.0 - 52.0 %    MCV 88 80 - 100 fL    MCH 26.6 26.0 - 34.0 pg    MCHC 30.1 (L) 32.0 - 36.0 g/dL    RDW 17.7 (H) 11.5 - 14.5 %    Platelets 181 150 - 450 x10*3/uL   Prepare RBC: 1 Units   Result Value Ref Range    PRODUCT CODE H0726Q00     Unit Number [id number]     Unit ABO B     Unit RH NEG     XM INTEP COMP     Dispense Status TR     Blood Expiration Date April 02, 2024 23:59 EDT     PRODUCT BLOOD TYPE 1700     UNIT VOLUME 400    Type and screen   Result Value Ref Range    ABO TYPE B     Rh TYPE POS     ANTIBODY SCREEN NEG    Renal Function Panel   Result Value Ref Range    Glucose 85 65 - 99 mg/dL    Sodium  139 133 - 145 mmol/L    Potassium 4.5 3.4 - 5.1 mmol/L    Chloride 105 97 - 107 mmol/L    Bicarbonate 24 24 - 31 mmol/L    Urea Nitrogen 30 (H) 8 - 25 mg/dL    Creatinine 1.80 (H) 0.40 - 1.60 mg/dL    eGFR 38 (L) >60 mL/min/1.73m*2    Calcium 8.1 (L) 8.5 - 10.4 mg/dL    Phosphorus 2.5 2.5 - 4.5 mg/dL    Albumin 3.0 (L) 3.5 - 5.0 g/dL    Anion Gap 10 <=19 mmol/L   CBC   Result Value Ref Range    WBC 4.6 4.4 - 11.3 x10*3/uL    nRBC 0.9 (H) 0.0 - 0.0 /100 WBCs    RBC 3.04 (L) 4.50 - 5.90 x10*6/uL    Hemoglobin 7.8 (L) 13.5 - 17.5 g/dL    Hematocrit 26.9 (L) 41.0 - 52.0 %    MCV 89 80 - 100 fL    MCH 25.7 (L) 26.0 - 34.0 pg    MCHC 29.0 (L) 32.0 - 36.0 g/dL    RDW 18.0 (H) 11.5 - 14.5 %    Platelets 188 150 - 450 x10*3/uL   Hemoglobin and hematocrit, blood   Result Value Ref Range    Hemoglobin 8.4 (L) 13.5 - 17.5 g/dL    Hematocrit 28.3 (L) 41.0 - 52.0 %   Renal Function Panel   Result Value Ref Range    Glucose 89 65 - 99 mg/dL    Sodium 141 133 - 145 mmol/L    Potassium 4.3 3.4 - 5.1 mmol/L    Chloride 106 97 - 107 mmol/L    Bicarbonate 25 24 - 31 mmol/L    Urea Nitrogen 22 8 - 25 mg/dL    Creatinine 1.50 0.40 - 1.60 mg/dL    eGFR 47 (L) >60 mL/min/1.73m*2    Calcium 8.2 (L) 8.5 - 10.4 mg/dL    Phosphorus 2.7 2.5 - 4.5 mg/dL    Albumin 3.1 (L) 3.5 - 5.0 g/dL    Anion Gap 10 <=19 mmol/L   CBC   Result Value Ref Range    WBC 4.1 (L) 4.4 - 11.3 x10*3/uL    nRBC 1.0 (H) 0.0 - 0.0 /100 WBCs    RBC 2.97 (L) 4.50 - 5.90 x10*6/uL    Hemoglobin 7.7 (L) 13.5 - 17.5 g/dL    Hematocrit 26.1 (L) 41.0 - 52.0 %    MCV 88 80 - 100 fL    MCH 25.9 (L) 26.0 - 34.0 pg    MCHC 29.5 (L) 32.0 - 36.0 g/dL    RDW 17.9 (H) 11.5 - 14.5 %    Platelets 196 150 - 450 x10*3/uL   Protime-INR   Result Value Ref Range    Protime 12.1 9.3 - 12.7 seconds    INR 1.2 0.9 - 1.2   Renal Function Panel   Result Value Ref Range    Glucose 92 65 - 99 mg/dL    Sodium 140 133 - 145 mmol/L    Potassium 4.4 3.4 - 5.1 mmol/L    Chloride 106 97 - 107 mmol/L     Bicarbonate 26 24 - 31 mmol/L    Urea Nitrogen 17 8 - 25 mg/dL    Creatinine 1.60 0.40 - 1.60 mg/dL    eGFR 43 (L) >60 mL/min/1.73m*2    Calcium 8.1 (L) 8.5 - 10.4 mg/dL    Phosphorus 2.7 2.5 - 4.5 mg/dL    Albumin 3.0 (L) 3.5 - 5.0 g/dL    Anion Gap 8 <=19 mmol/L   CBC   Result Value Ref Range    WBC 4.0 (L) 4.4 - 11.3 x10*3/uL    nRBC 0.5 (H) 0.0 - 0.0 /100 WBCs    RBC 3.06 (L) 4.50 - 5.90 x10*6/uL    Hemoglobin 7.9 (L) 13.5 - 17.5 g/dL    Hematocrit 26.9 (L) 41.0 - 52.0 %    MCV 88 80 - 100 fL    MCH 25.8 (L) 26.0 - 34.0 pg    MCHC 29.4 (L) 32.0 - 36.0 g/dL    RDW 18.5 (H) 11.5 - 14.5 %    Platelets 186 150 - 450 x10*3/uL   Protime-INR   Result Value Ref Range    Protime 12.1 9.3 - 12.7 seconds    INR 1.2 0.9 - 1.2     *Note: Due to a large number of results and/or encounters for the requested time period, some results have not been displayed. A complete set of results can be found in Results Review.         SIGNATURE: Hiram Capellan MD PATIENT NAME: Angel Carroll   DATE: March 6, 2024 MRN: [id number]   TIME: 11:24 AM PAGER: [phone]

## 2024-03-06 NOTE — CARE PLAN
The clinical goals for the shift include Remain free from falls for the duration of the shift    Over the shift, the patient did make progress toward the following goals.       Problem: Pain  Goal: My pain/discomfort is manageable  Outcome: Progressing     Problem: Safety  Goal: I will remain free of falls  Outcome: Progressing     Problem: Daily Care  Goal: Daily care needs are met  Outcome: Progressing     Problem: Skin  Goal: Decreased wound size/increased tissue granulation at next dressing change  Outcome: Progressing  Flowsheets (Taken 3/6/2024 0505)  Decreased wound size/increased tissue granulation at next dressing change:   Promote sleep for wound healing   Protective dressings over bony prominences  Goal: Participates in plan/prevention/treatment measures  Outcome: Progressing  Flowsheets (Taken 3/6/2024 0505)  Participates in plan/prevention/treatment measures:   Discuss with provider PT/OT consult   Elevate heels   Increase activity/out of bed for meals  Goal: Prevent/manage excess moisture  Outcome: Progressing  Flowsheets (Taken 3/6/2024 0505)  Prevent/manage excess moisture:   Cleanse incontinence/protect with barrier cream   Moisturize dry skin  Goal: Prevent/minimize sheer/friction injuries  Outcome: Progressing  Flowsheets (Taken 3/6/2024 0505)  Prevent/minimize sheer/friction injuries:   HOB 30 degrees or less   Complete micro-shifts as needed if patient unable. Adjust patient position to relieve pressure points, not a full turn   Increase activity/out of bed for meals   Turn/reposition every 2 hours/use positioning/transfer devices   Use pull sheet  Goal: Promote/optimize nutrition  Outcome: Progressing  Flowsheets (Taken 3/6/2024 0505)  Promote/optimize nutrition:   Consume > 50% meals/supplements   Monitor/record intake including meals   Offer water/supplements/favorite foods  Goal: Promote skin healing  Outcome: Progressing  Flowsheets (Taken 3/6/2024 0505)  Promote skin healing:   Assess  skin/pad under line(s)/device(s)   Ensure correct size (line/device) and apply per  instructions   Protective dressings over bony prominences   Rotate device position/do not position patient on device   Turn/reposition every 2 hours/use positioning/transfer devices

## 2024-03-06 NOTE — PROGRESS NOTES
TCC spoke to patient, updated regarding therapy recommendations. Patient agreeable to SNF at this time. List provide to review and make choices. TCC will follow for discharge planning.    [facility]  Referrals sent at this time     Marilin Muhammad RN

## 2024-03-07 LAB
ALBUMIN SERPL-MCNC: 3.1 G/DL (ref 3.5–5)
ANION GAP SERPL CALC-SCNC: 9 MMOL/L
BUN SERPL-MCNC: 19 MG/DL (ref 8–25)
CALCIUM SERPL-MCNC: 8.5 MG/DL (ref 8.5–10.4)
CHLORIDE SERPL-SCNC: 107 MMOL/L (ref 97–107)
CO2 SERPL-SCNC: 26 MMOL/L (ref 24–31)
CREAT SERPL-MCNC: 1.5 MG/DL (ref 0.4–1.6)
EGFRCR SERPLBLD CKD-EPI 2021: 47 ML/MIN/1.73M*2
ERYTHROCYTE [DISTWIDTH] IN BLOOD BY AUTOMATED COUNT: 18.2 % (ref 11.5–14.5)
GLUCOSE BLD MANUAL STRIP-MCNC: 93 MG/DL (ref 74–99)
GLUCOSE SERPL-MCNC: 106 MG/DL (ref 65–99)
HCT VFR BLD AUTO: 27.1 % (ref 41–52)
HGB BLD-MCNC: 7.8 G/DL (ref 13.5–17.5)
INR PPP: 1.1 (ref 0.9–1.2)
INR PPP: 1.1 (ref 0.9–1.2)
MCH RBC QN AUTO: 25.7 PG (ref 26–34)
MCHC RBC AUTO-ENTMCNC: 28.8 G/DL (ref 32–36)
MCV RBC AUTO: 89 FL (ref 80–100)
NRBC BLD-RTO: 0.5 /100 WBCS (ref 0–0)
PHOSPHATE SERPL-MCNC: 2.8 MG/DL (ref 2.5–4.5)
PLATELET # BLD AUTO: 187 X10*3/UL (ref 150–450)
POTASSIUM SERPL-SCNC: 4.2 MMOL/L (ref 3.4–5.1)
PROTHROMBIN TIME: 11.4 SECONDS (ref 9.3–12.7)
PROTHROMBIN TIME: 11.6 SECONDS (ref 9.3–12.7)
RBC # BLD AUTO: 3.04 X10*6/UL (ref 4.5–5.9)
SODIUM SERPL-SCNC: 142 MMOL/L (ref 133–145)
WBC # BLD AUTO: 3.8 X10*3/UL (ref 4.4–11.3)

## 2024-03-07 PROCEDURE — 2500000001 HC RX 250 WO HCPCS SELF ADMINISTERED DRUGS (ALT 637 FOR MEDICARE OP): Performed by: INTERNAL MEDICINE

## 2024-03-07 PROCEDURE — 97110 THERAPEUTIC EXERCISES: CPT | Mod: GP

## 2024-03-07 PROCEDURE — 97110 THERAPEUTIC EXERCISES: CPT | Mod: GO,CO

## 2024-03-07 PROCEDURE — 85610 PROTHROMBIN TIME: CPT | Performed by: INTERNAL MEDICINE

## 2024-03-07 PROCEDURE — 2500000002 HC RX 250 W HCPCS SELF ADMINISTERED DRUGS (ALT 637 FOR MEDICARE OP, ALT 636 FOR OP/ED): Performed by: INTERNAL MEDICINE

## 2024-03-07 PROCEDURE — 85027 COMPLETE CBC AUTOMATED: CPT | Performed by: INTERNAL MEDICINE

## 2024-03-07 PROCEDURE — 97530 THERAPEUTIC ACTIVITIES: CPT | Mod: GP

## 2024-03-07 PROCEDURE — 36415 COLL VENOUS BLD VENIPUNCTURE: CPT | Performed by: INTERNAL MEDICINE

## 2024-03-07 PROCEDURE — 80069 RENAL FUNCTION PANEL: CPT | Performed by: INTERNAL MEDICINE

## 2024-03-07 PROCEDURE — 82947 ASSAY GLUCOSE BLOOD QUANT: CPT

## 2024-03-07 PROCEDURE — 97530 THERAPEUTIC ACTIVITIES: CPT | Mod: GO,CO

## 2024-03-07 PROCEDURE — 2060000001 HC INTERMEDIATE ICU ROOM DAILY

## 2024-03-07 RX ORDER — WARFARIN 3 MG/1
3 TABLET ORAL DAILY
Status: DISCONTINUED | OUTPATIENT
Start: 2024-03-07 | End: 2024-03-08 | Stop reason: HOSPADM

## 2024-03-07 RX ADMIN — WARFARIN SODIUM 3 MG: 3 TABLET ORAL at 17:32

## 2024-03-07 RX ADMIN — METOPROLOL TARTRATE 12.5 MG: 25 TABLET, FILM COATED ORAL at 10:41

## 2024-03-07 RX ADMIN — ATORVASTATIN CALCIUM 20 MG: 20 TABLET, FILM COATED ORAL at 20:10

## 2024-03-07 RX ADMIN — AMIODARONE HYDROCHLORIDE 200 MG: 200 TABLET ORAL at 10:41

## 2024-03-07 RX ADMIN — FINASTERIDE 5 MG: 5 TABLET, FILM COATED ORAL at 10:41

## 2024-03-07 RX ADMIN — TAMSULOSIN HYDROCHLORIDE 0.4 MG: 0.4 CAPSULE ORAL at 20:08

## 2024-03-07 RX ADMIN — PANTOPRAZOLE SODIUM 40 MG: 40 TABLET, DELAYED RELEASE ORAL at 10:41

## 2024-03-07 RX ADMIN — PAROXETINE HYDROCHLORIDE 20 MG: 20 TABLET, FILM COATED ORAL at 10:41

## 2024-03-07 ASSESSMENT — COGNITIVE AND FUNCTIONAL STATUS - GENERAL
TOILETING: A LOT
DRESSING REGULAR UPPER BODY CLOTHING: A LITTLE
TURNING FROM BACK TO SIDE WHILE IN FLAT BAD: A LITTLE
DRESSING REGULAR LOWER BODY CLOTHING: A LOT
EATING MEALS: A LITTLE
DAILY ACTIVITIY SCORE: 15
DRESSING REGULAR LOWER BODY CLOTHING: A LOT
TOILETING: A LOT
PERSONAL GROOMING: A LITTLE
STANDING UP FROM CHAIR USING ARMS: A LITTLE
MOVING TO AND FROM BED TO CHAIR: A LITTLE
HELP NEEDED FOR BATHING: A LOT
PERSONAL GROOMING: A LOT
EATING MEALS: A LITTLE
MOVING TO AND FROM BED TO CHAIR: A LITTLE
WALKING IN HOSPITAL ROOM: A LOT
EATING MEALS: A LITTLE
STANDING UP FROM CHAIR USING ARMS: A LITTLE
STANDING UP FROM CHAIR USING ARMS: A LITTLE
CLIMB 3 TO 5 STEPS WITH RAILING: TOTAL
DRESSING REGULAR LOWER BODY CLOTHING: A LOT
HELP NEEDED FOR BATHING: A LOT
DRESSING REGULAR UPPER BODY CLOTHING: A LITTLE
DRESSING REGULAR UPPER BODY CLOTHING: A LOT
MOBILITY SCORE: 16
MOBILITY SCORE: 16
HELP NEEDED FOR BATHING: A LOT
MOBILITY SCORE: 15
CLIMB 3 TO 5 STEPS WITH RAILING: TOTAL
PERSONAL GROOMING: A LITTLE
TURNING FROM BACK TO SIDE WHILE IN FLAT BAD: A LITTLE
DRESSING REGULAR LOWER BODY CLOTHING: A LOT
WALKING IN HOSPITAL ROOM: A LOT
TURNING FROM BACK TO SIDE WHILE IN FLAT BAD: A LITTLE
TOILETING: A LOT
DRESSING REGULAR UPPER BODY CLOTHING: A LITTLE
DAILY ACTIVITIY SCORE: 13
EATING MEALS: A LITTLE
MOVING TO AND FROM BED TO CHAIR: A LITTLE
MOVING FROM LYING ON BACK TO SITTING ON SIDE OF FLAT BED WITH BEDRAILS: A LITTLE
CLIMB 3 TO 5 STEPS WITH RAILING: TOTAL
HELP NEEDED FOR BATHING: A LOT
DAILY ACTIVITIY SCORE: 15
PERSONAL GROOMING: A LITTLE
WALKING IN HOSPITAL ROOM: A LOT
DAILY ACTIVITIY SCORE: 15
TOILETING: A LOT
TURNING FROM BACK TO SIDE WHILE IN FLAT BAD: A LITTLE
MOVING TO AND FROM BED TO CHAIR: A LITTLE
STANDING UP FROM CHAIR USING ARMS: A LITTLE
CLIMB 3 TO 5 STEPS WITH RAILING: TOTAL
MOBILITY SCORE: 16
WALKING IN HOSPITAL ROOM: A LOT

## 2024-03-07 ASSESSMENT — PAIN - FUNCTIONAL ASSESSMENT
PAIN_FUNCTIONAL_ASSESSMENT: 0-10

## 2024-03-07 ASSESSMENT — PAIN DESCRIPTION - DESCRIPTORS
DESCRIPTORS: ACHING
DESCRIPTORS: ACHING

## 2024-03-07 ASSESSMENT — PAIN SCALES - GENERAL
PAINLEVEL_OUTOF10: 0 - NO PAIN
PAINLEVEL_OUTOF10: 3
PAINLEVEL_OUTOF10: 3
PAINLEVEL_OUTOF10: 0 - NO PAIN
PAINLEVEL_OUTOF10: 0 - NO PAIN
PAINLEVEL_OUTOF10: 3

## 2024-03-07 NOTE — CARE PLAN
The clinical goals for the shift include Get adequate sleep    Over the shift, the patient did make progress toward the following goals.       Problem: Pain  Goal: My pain/discomfort is manageable  Outcome: Progressing     Problem: Safety  Goal: I will remain free of falls  Outcome: Progressing     Problem: Daily Care  Goal: Daily care needs are met  Outcome: Progressing     Problem: Skin  Goal: Decreased wound size/increased tissue granulation at next dressing change  Outcome: Progressing  Flowsheets (Taken 3/7/2024 0330)  Decreased wound size/increased tissue granulation at next dressing change:   Promote sleep for wound healing   Protective dressings over bony prominences  Goal: Participates in plan/prevention/treatment measures  Outcome: Progressing  Flowsheets (Taken 3/7/2024 0330)  Participates in plan/prevention/treatment measures:   Discuss with provider PT/OT consult   Elevate heels   Increase activity/out of bed for meals  Goal: Prevent/manage excess moisture  Outcome: Progressing  Flowsheets (Taken 3/7/2024 0330)  Prevent/manage excess moisture:   Cleanse incontinence/protect with barrier cream   Moisturize dry skin  Goal: Prevent/minimize sheer/friction injuries  Outcome: Progressing  Flowsheets (Taken 3/7/2024 0330)  Prevent/minimize sheer/friction injuries:   Complete micro-shifts as needed if patient unable. Adjust patient position to relieve pressure points, not a full turn   HOB 30 degrees or less   Increase activity/out of bed for meals   Turn/reposition every 2 hours/use positioning/transfer devices   Use pull sheet  Goal: Promote/optimize nutrition  Outcome: Progressing  Flowsheets (Taken 3/7/2024 0330)  Promote/optimize nutrition:   Consume > 50% meals/supplements   Monitor/record intake including meals   Offer water/supplements/favorite foods  Goal: Promote skin healing  Outcome: Progressing  Flowsheets (Taken 3/7/2024 0330)  Promote skin healing:   Assess skin/pad under line(s)/device(s)    Ensure correct size (line/device) and apply per  instructions   Protective dressings over bony prominences   Turn/reposition every 2 hours/use positioning/transfer devices   Rotate device position/do not position patient on device

## 2024-03-07 NOTE — PROGRESS NOTES
03/07/24 1034   Discharge Planning   Home or Post Acute Services Post acute facilities (Rehab/SNF/etc)   Type of Post Acute Facility Services Rehab   [facility]: [facility]   Does the patient need discharge transport arranged? Yes     Daughter updated at this time

## 2024-03-07 NOTE — PROGRESS NOTES
Spoke to patients daughter they no longer want [facility] at this time, updated them that [facility] is still reviewing and they may be able to accept patient. Updates sent and will keep patient and family updated   ADOD tomorrow   ** do not discharge without speaking to care coordination**  MD WILL NEED TO SIGN/CERTIFY G[facility] AT THE TIME OF DISCHARGE FOR SNF.         Marilin Muhammad RN

## 2024-03-07 NOTE — NURSING NOTE
Assumed care of patient at this time. At bedside report, patient had no c/o pain. vss and afebrile. Patient on continuous oximetry. Bed low and locked, call button within reach and bed alarm on. Patient has no needs that require immediate nursing interventions.

## 2024-03-07 NOTE — PROGRESS NOTES
Occupational Therapy    OT Treatment    Patient Name: Angel Carroll  MRN: [id number]  Today's Date: 3/7/2024  Time Calculation  Start Time: 1304  Stop Time: 1328  Time Calculation (min): 24 min         Assessment:  OT Assessment: tolerated session fairly with limited functional tolerance d/t fatigue and dizziness, continuing to progress towards POC  End of Session Communication: Bedside nurse  End of Session Patient Position: Up in chair, Alarm on  OT Assessment Results: Decreased ADL status, Decreased upper extremity strength, Decreased endurance, Decreased functional mobility, Decreased trunk control for functional activities  Plan:  Treatment Interventions: ADL retraining, Functional transfer training, UE strengthening/ROM, Endurance training, Cognitive reorientation, Patient/family training, Equipment evaluation/education, Neuromuscular reeducation, Compensatory technique education  OT Frequency: 4 times per week  OT Discharge Recommendations: Moderate intensity level of continued care  Equipment Recommended upon Discharge: Wheeled walker, Wheelchair  OT Recommended Transfer Status: Moderate assist, Minimal assist  OT - OK to Discharge: Yes  Treatment Interventions: ADL retraining, Functional transfer training, UE strengthening/ROM, Endurance training, Cognitive reorientation, Patient/family training, Equipment evaluation/education, Neuromuscular reeducation, Compensatory technique education    Subjective   Previous Visit Info:  OT Last Visit  OT Received On: 03/07/24  General:  General  Prior to Session Communication: Bedside nurse  Patient Position Received: Up in chair, Alarm on  General Comment: cleared for therapy per RN. Pt seated in chair upon arrival and agreeable to tx  Precautions:  Hearing/Visual Limitations: glasses, bilateral hearing aids  Medical Precautions: Fall precautions, Oxygen therapy device and L/min (2L O2 nc)  Vital Signs:  Vital Signs  Heart Rate: 60  SpO2: 100 %  Pain:  Pain Assessment  Pain  Assessment: 0-10  Pain Score: 0 - No pain    Objective    Cognition:  Cognition  Overall Cognitive Status: Within Functional Limits    Bed Mobility/Transfers:    Transfers  Transfer: Yes  Transfer 1  Technique 1: Sit to stand  Transfer Device 1: Walker  Transfer Level of Assistance 1: Minimum assistance  Trials/Comments 1: cues for proper hand placement and postural alignment in standing  Transfers 2  Technique 2: Stand to sit  Transfer Device 2: Walker  Transfer Level of Assistance 2: Minimum assistance  Trials/Comments 2: cues for proper hand placement and eccentric lowering to chair    Toilet Transfers  Toilet Transfer Type: To and from  Toilet Transfer to: Standard toilet  Toilet Transfers: Minimal assistance  Toilet Transfers Comments: cues for use of grab bar for UE support    Standing Balance:  Static Standing Balance  Static Standing-Level of Assistance: Contact guard  Static Standing-Comment/Number of Minutes: fair- at [facility]    Therapy/Activity: Therapeutic Exercise  Therapeutic Exercise Activity 1: participated in AROM BUE exercises 4x10 in all planes to improve strength and functional tolerance to increase independence with transfer tasks with cues provided for proper muscle recruitement    Therapeutic Activity  Therapeutic Activity Performed: Yes  Therapeutic Activity 1: participated in functional mobility a short household distance at [facility] with CGA with cues to pace task to increase safety awareness with pt requiring rest break x2 d/t fatigue and dizziness with cues for use of pursed lip breathing    Outcome Measures:[facility] Daily Activity  Putting on and taking off regular lower body clothing: A lot  Bathing (including washing, rinsing, drying): A lot  Putting on and taking off regular upper body clothing: A little  Toileting, which includes using toilet, bedpan or urinal: A lot  Taking care of personal grooming such as brushing teeth: A little  Eating Meals: A little  Daily Activity - Total Score:  15    Education Documentation  ADL Training, taught by GODFREY Leal at 3/7/2024  2:06 PM.  Learner: Patient  Readiness: Acceptance  Method: Explanation  Response: Verbalizes Understanding    Education Comments  No comments found.    Problem: OT Goals  Goal: Pt will complete all grooming tasks with setup in standing and/or sitting.  Outcome: Progressing  Goal: Pt will complete UB dressing/bathing with setup and LB dressing/bathing with close S using AE as needed.   Outcome: Progressing  Goal: Pt will complete all toileting tasks with close S.  Outcome: Progressing  Goal: Pt will complete all functional transfers and mobility with close S using a RW.  Outcome: Progressing

## 2024-03-07 NOTE — PROGRESS NOTES
Angel Carroll is a 78 y.o. male on day 7 of admission presenting with GI bleed.      Subjective   Patient denied to have pain.  He denied nausea or vomiting.  Patient is tolerating diet well    Objective     Last Recorded Vitals  /55 (BP Location: Right arm, Patient Position: Lying)   Pulse 98   Temp 36 °C (96.8 °F) (Temporal)   Resp 17   Wt 113 kg (249 lb 9 oz)   SpO2 98%   Intake/Output last 3 Shifts:    Intake/Output Summary (Last 24 hours) at 3/7/2024 0909  Last data filed at 3/6/2024 2130  Gross per 24 hour   Intake 100 ml   Output 1050 ml   Net -950 ml         Admission Weight  Weight: 108 kg (237 lb) (02/29/24 0441)    Daily Weight  03/06/24 : 113 kg (249 lb 9 oz)    Image Results  Colonoscopy Screening; Average Risk Patient  Table formatting from the original result was not included.  Impression  Large hemorrhoids - given size and no other sources of bleeding   identified, this is the likely etiology of blood loss.   One 10 mm polyp in the cecum; performed cold snare removal  2 subcentimeter polyps were removed with cold snare    Findings  Large hemorrhoids observed during retroflexion; no bleeding was identified  One 10 mm sessile, adenomatous-appearing polyp in the cecum; performed   cold snare with complete removal and retrieved specimen  One flat polyp measuring smaller than 5 mm in the cecum; performed cold   snare with complete removal and retrieved specimen  One 6 mm sessile, adenomatous-appearing polyp in the transverse colon;   performed cold snare with complete removal and retrieved specimen    Recommendation   Await pathology results    No further screening colonoscopies necessary  Resume anticoagulation in 2 days.   Discussed findings with patient's family.   Consider general surgery evaluation for banding of large hemorrhoids. This   can be done as outpatient.   Okay to resume diet.      Indication  Colon cancer screening    Staff  Staff Role   Josh Nassar MD Proceduralist      Medications  fentaNYL PF (Sublimaze) injection 100 mcg   midazolam (Versed) injection 3 mg   (Totals for administrations occurring from 0759 to 0859 on 03/05/24)     Preprocedure  A history and physical has been performed, and patient medication   allergies have been reviewed. The patient's tolerance of previous   anesthesia has been reviewed. The risks and benefits of the procedure and   the sedation options and risks were discussed with the patient. All   questions were answered and informed consent obtained.    Details of the Procedure  The patient underwent moderate sedation, which was administered by the   procedural nurse. The patient's blood pressure, ECG, ETCO2, heart rate,   level of consciousness, oxygen and respirations were monitored throughout   the procedure. A digital rectal exam was performed. A perianal exam was   performed. The scope was introduced through the anus and advanced to the   cecum. Retroflexion was performed in the rectum. The quality of bowel   preparation was evaluated using the Mar Lin Bowel Preparation Scale with   scores of: right colon = 3, transverse colon = 3, left colon = 2. The   total BBPS score was 8. Bowel prep was adequate. The patient's estimated   blood loss was minimal (<5 mL). The procedure was not difficult. The   patient tolerated the procedure well. There were no apparent adverse   events. Total intra-service moderate sedation time is 43 minutes.   Medications given by procedural nurse - Fentanyl 100mcg IV, Versed 3mg IV.     Events  Procedure Events   Event Event Time   ENDO SCOPE IN TIME 3/5/2024  8:17 AM   ENDO CECUM REACHED 3/5/2024  8:35 AM   ENDO SCOPE OUT TIME 3/5/2024  8:57 AM     Specimens  ID Type Source Tests Collected by Time   1 :  Tissue COLON -CECUM POLYP SURGICAL PATHOLOGY EXAM Bonnie Lima RN 3/5/2024 0839   2 :  Tissue COLON - TRANSVERSE POLYP SURGICAL PATHOLOGY EXAM Bonnie Lima RN 3/5/2024 0852     Procedure Location  [facility]  [facility]  [place] [address] [address]  [facility] [address] [phone]    Referring Provider  No referring provider defined for this encounter.    Procedure Provider  Josh Nassar MD      Physical Exam    General:  pleasant, cooperating during physical exam, on oxygen therapy  HEENT: Pupils are equal and reactive to light and commendation , oral mucosa moist, no JVD   Cardiovascular: PMI nondisplaced  Lungs: Clear to auscultation bilaterally, no wheezing, no crackles, no dullness to percussion.  Abdomen: No hepatosplenomegaly appreciated, soft , not tender, positive bowel sounds, positive bowel movement.  Neuro: Alert and oriented x2, strength in upper and lower extremities , sensation intact.  Psych: Patient had great insight was going on  Musculoskeletal:  Trace Edema in both lower extremities.  Vascular: Pulses are intact in upper and lower extremities  Skin: No petechiae, ecchymosis or other stigmata for dermatology disease.     Assessment/Plan        Acute Blood Loss Anemia  Secondary to GI bleed  Patient has been transfused during the hospital stay.   Status post EGD,EGD revealed gastritis but no obvious source of bleeding  Colonoscopy revealed small polyp which was narrowed, evidence of hemorrhoids Monitor hemoglobin and hematocrit.  Continue with Protonix  Dr. Nassar on the case  Transfuse for hemoglobin less than 7.  Plan to resume anticoagulation soon   discussed in detail with GI     Acute kidney injury on chronic kidney disease  Secondary to GI bleed  Creatinine proved after IV fluid and blood transfusion  Dr. Capellan on the case  Monitor close  Discussed in detail with nephrology on the case    Atrial fibrillation  -Rate-controlled even with lower metoprolol dose.  Anticoagulation on hold   continue metoprolol and amiodarone.  Patient and the family aware of risk and benefits not being on anticoagulation    Asymptomatic Bacteriuria  -Continue to monitor off of  antibiotics.     Physical Therapy/Occupational Therapy  Discussed in detail with his wife and his daughter present in the room.  Physical therapy patient is moderate intensity.  Discussed with care coordination on the case.      Plan to discharge to skilled nursing facility in a.m.    Principal Problem:    ELIZABETH Leyva MD

## 2024-03-07 NOTE — PROGRESS NOTES
CONSULT PROGRESS NOTES    SERVICE DATE: 3/7/2024   SERVICE TIME: 10:52 AM    CONSULTING SERVICE: Nephrology    ASSESSMENT AND PLAN   78-year-old man with atrial fibrillation on anticoagulation admitted with acute on chronic renal failure in the context of profound anemia.  1.  Acute renal failure  2.  Acute metabolic acidosis  3.  Hyperkalemia  4.  Hypotension  5.  Acute blood loss anemia  6.  Chronic kidney disease stage IV  7.  Kidney stones     Acute renal failure on top of advanced chronic kidney disease stage 4 in the context of severe anemia requiring multiple rounds of blood transfusion.  His hemoglobin has been stable and he does not need a transfusion today.  No need for IV fluids.  He has resolved acidosis with no bicarbonate therapy.  We should reintroduce his loop diuretic at discharge.  Okay to resume potassium supplementation as well.  Blood pressure remains stable.  Baseline creatinine had been in the low twos, he is now below this.  Kidney stones are asymptomatic at present.  I will follow him while here.  He has outpatient follow[facility]up with [facility]rology.  No specific objection to discharge from my perspective.      SUBJECTIVE  INTERVAL HPI: No abdominal pain, no diarrhea, no nausea, decent appetite.  Awaiting disposition.  He is on chronic oxygen therapy at home.    MEDICATIONS:  amiodarone, 200 mg, oral, Daily  atorvastatin, 20 mg, oral, Nightly  finasteride, 5 mg, oral, Daily  lidocaine, 0.1 mL, subcutaneous, Once  metoprolol tartrate, 12.5 mg, oral, BID  pantoprazole, 40 mg, oral, Daily  PARoxetine, 20 mg, oral, Daily  tamsulosin, 0.4 mg, oral, Nightly  warfarin, 3 mg, oral, Daily           PRN medications: labetaloL, ondansetron, ondansetron, promethazine (Phenergan) 6.25 mg in sodium chloride 0.9% 50 mL IV     OBJECTIVE  PHYSICAL EXAM:   Heart Rate:  [55-98]   Temp:  [36 °C (96.8 °F)-36.6 °C (97.9 °F)]   Resp:  [16-20]   BP: (100-126)/(52-81)   SpO2:  [98 %-100 %]   Body mass index is  33.85 kg/m².  This is a chronically ill-appearing elderly white man  Very pale skin  Hearing intact  Phonation intact  Moist mucosa  Normal S1/normal S2  Lungs are clear to auscultation bilaterally  Abdomen is soft, nondistended, nontender, positive bowel sounds  No Long  No extremity edema  Moves 4 limbs spontaneously  No obvious joint deformities  No lymphadenopathy    DATA:   Labs:  Results for orders placed or performed during the hospital encounter of 02/29/24 (from the past 96 hour(s))   Renal Function Panel   Result Value Ref Range    Glucose 85 65 - 99 mg/dL    Sodium 139 133 - 145 mmol/L    Potassium 4.5 3.4 - 5.1 mmol/L    Chloride 105 97 - 107 mmol/L    Bicarbonate 24 24 - 31 mmol/L    Urea Nitrogen 30 (H) 8 - 25 mg/dL    Creatinine 1.80 (H) 0.40 - 1.60 mg/dL    eGFR 38 (L) >60 mL/min/1.73m*2    Calcium 8.1 (L) 8.5 - 10.4 mg/dL    Phosphorus 2.5 2.5 - 4.5 mg/dL    Albumin 3.0 (L) 3.5 - 5.0 g/dL    Anion Gap 10 <=19 mmol/L   CBC   Result Value Ref Range    WBC 4.6 4.4 - 11.3 x10*3/uL    nRBC 0.9 (H) 0.0 - 0.0 /100 WBCs    RBC 3.04 (L) 4.50 - 5.90 x10*6/uL    Hemoglobin 7.8 (L) 13.5 - 17.5 g/dL    Hematocrit 26.9 (L) 41.0 - 52.0 %    MCV 89 80 - 100 fL    MCH 25.7 (L) 26.0 - 34.0 pg    MCHC 29.0 (L) 32.0 - 36.0 g/dL    RDW 18.0 (H) 11.5 - 14.5 %    Platelets 188 150 - 450 x10*3/uL   Hemoglobin and hematocrit, blood   Result Value Ref Range    Hemoglobin 8.4 (L) 13.5 - 17.5 g/dL    Hematocrit 28.3 (L) 41.0 - 52.0 %   Renal Function Panel   Result Value Ref Range    Glucose 89 65 - 99 mg/dL    Sodium 141 133 - 145 mmol/L    Potassium 4.3 3.4 - 5.1 mmol/L    Chloride 106 97 - 107 mmol/L    Bicarbonate 25 24 - 31 mmol/L    Urea Nitrogen 22 8 - 25 mg/dL    Creatinine 1.50 0.40 - 1.60 mg/dL    eGFR 47 (L) >60 mL/min/1.73m*2    Calcium 8.2 (L) 8.5 - 10.4 mg/dL    Phosphorus 2.7 2.5 - 4.5 mg/dL    Albumin 3.1 (L) 3.5 - 5.0 g/dL    Anion Gap 10 <=19 mmol/L   CBC   Result Value Ref Range    WBC 4.1 (L) 4.4 - 11.3  "x10*3/uL    nRBC 1.0 (H) 0.0 - 0.0 /100 WBCs    RBC 2.97 (L) 4.50 - 5.90 x10*6/uL    Hemoglobin 7.7 (L) 13.5 - 17.5 g/dL    Hematocrit 26.1 (L) 41.0 - 52.0 %    MCV 88 80 - 100 fL    MCH 25.9 (L) 26.0 - 34.0 pg    MCHC 29.5 (L) 32.0 - 36.0 g/dL    RDW 17.9 (H) 11.5 - 14.5 %    Platelets 196 150 - 450 x10*3/uL   Protime-INR   Result Value Ref Range    Protime 12.1 9.3 - 12.7 seconds    INR 1.2 0.9 - 1.2   Surgical Pathology Exam   Result Value Ref Range    Case Report       Surgical Pathology                                Case: [id number]                                  Authorizing Provider:  Johs Nassar MD         Collected:           03/05/2024 0839              Ordering Location:     [facility]       Received:            03/05/2024 [address] [place] [address]                                                                Pathologist:           Mario Foote MD                                                           Specimens:   A) - COLON -CECUM POLYP                                                                             B) - COLON - TRANSVERSE POLYP                                                              FINAL DIAGNOSIS       A. COLON, CECUM, POLYP:   -- Fragments of tubular adenoma.    B. COLON, TRANSVERSE, POLYP:   -- Tubular adenoma.                  By the signature on this report, the individual or group listed as making the Final Interpretation/Diagnosis certifies that they have reviewed this case.       Clinical History       GI bleed (K92.2); gastrointestinal hemorrhage, unspecified gastrointestinal hemorrhage type (K92.2); longstanding persistent atrial fibrillation (I48.11); blood loss anemia (D50.0); colon cancer screening (Z12.11)      Gross Description       A: Received in formalin labeled with the patient's name and hospital number and \"cecum polyp\", are 7 irregular and tan soft tissue fragments measuring 0.2-1.1 cm in greatest dimensions and " "1.1 x 0.8 x 0.3 cm in aggregate.  The largest piece shows a ragged area, inked blue, and is bisected.  The specimen is entirely submitted in 1 cassette.  [facility]  B: Received in formalin labeled with the patient's name and hospital number and \"transverse polyp\", is 1 irregular and tan soft tissue fragment measuring 0.5 x 0.3 x 0.2 cm.  There is a 0.5 x 0.3 x 0.1 cm aggregate of vegetative matter loosely in the specimen container.  The specimen is submitted in toto in 1 cassette.  [facility]    Gross dissection performed at:  [place] [address]  Phone: [phone]  Fax: [phone]     Renal Function Panel   Result Value Ref Range    Glucose 92 65 - 99 mg/dL    Sodium 140 133 - 145 mmol/L    Potassium 4.4 3.4 - 5.1 mmol/L    Chloride 106 97 - 107 mmol/L    Bicarbonate 26 24 - 31 mmol/L    Urea Nitrogen 17 8 - 25 mg/dL    Creatinine 1.60 0.40 - 1.60 mg/dL    eGFR 43 (L) >60 mL/min/1.73m*2    Calcium 8.1 (L) 8.5 - 10.4 mg/dL    Phosphorus 2.7 2.5 - 4.5 mg/dL    Albumin 3.0 (L) 3.5 - 5.0 g/dL    Anion Gap 8 <=19 mmol/L   CBC   Result Value Ref Range    WBC 4.0 (L) 4.4 - 11.3 x10*3/uL    nRBC 0.5 (H) 0.0 - 0.0 /100 WBCs    RBC 3.06 (L) 4.50 - 5.90 x10*6/uL    Hemoglobin 7.9 (L) 13.5 - 17.5 g/dL    Hematocrit 26.9 (L) 41.0 - 52.0 %    MCV 88 80 - 100 fL    MCH 25.8 (L) 26.0 - 34.0 pg    MCHC 29.4 (L) 32.0 - 36.0 g/dL    RDW 18.5 (H) 11.5 - 14.5 %    Platelets 186 150 - 450 x10*3/uL   Protime-INR   Result Value Ref Range    Protime 12.1 9.3 - 12.7 seconds    INR 1.2 0.9 - 1.2   Renal Function Panel   Result Value Ref Range    Glucose 106 (H) 65 - 99 mg/dL    Sodium 142 133 - 145 mmol/L    Potassium 4.2 3.4 - 5.1 mmol/L    Chloride 107 97 - 107 mmol/L    Bicarbonate 26 24 - 31 mmol/L    Urea Nitrogen 19 8 - 25 mg/dL    Creatinine 1.50 0.40 - 1.60 mg/dL    eGFR 47 (L) >60 mL/min/1.73m*2    Calcium 8.5 8.5 - 10.4 mg/dL    Phosphorus 2.8 2.5 - 4.5 mg/dL    " Albumin 3.1 (L) 3.5 - 5.0 g/dL    Anion Gap 9 <=19 mmol/L   CBC   Result Value Ref Range    WBC 3.8 (L) 4.4 - 11.3 x10*3/uL    nRBC 0.5 (H) 0.0 - 0.0 /100 WBCs    RBC 3.04 (L) 4.50 - 5.90 x10*6/uL    Hemoglobin 7.8 (L) 13.5 - 17.5 g/dL    Hematocrit 27.1 (L) 41.0 - 52.0 %    MCV 89 80 - 100 fL    MCH 25.7 (L) 26.0 - 34.0 pg    MCHC 28.8 (L) 32.0 - 36.0 g/dL    RDW 18.2 (H) 11.5 - 14.5 %    Platelets 187 150 - 450 x10*3/uL   Protime-INR   Result Value Ref Range    Protime 11.6 9.3 - 12.7 seconds    INR 1.1 0.9 - 1.2   Protime-INR   Result Value Ref Range    Protime 11.4 9.3 - 12.7 seconds    INR 1.1 0.9 - 1.2     *Note: Due to a large number of results and/or encounters for the requested time period, some results have not been displayed. A complete set of results can be found in Results Review.         SIGNATURE: Hiram Capellan MD PATIENT NAME: Angel Carroll   DATE: March 7, 2024 MRN: [id number]   TIME: 10:52 AM PAGER: [phone]

## 2024-03-07 NOTE — PROGRESS NOTES
Physical Therapy    Physical Therapy Treatment    Patient Name: Angel Carroll  MRN: [id number]  Today's Date: 3/7/2024  Time Calculation  Start Time: 1119  Stop Time: 1142  Time Calculation (min): 23 min     Assessment/Plan   PT Assessment  PT Assessment Results: Decreased strength, Decreased endurance, Decreased range of motion, Impaired balance, Decreased mobility, Impaired judgement, Decreased safety awareness, Obesity  Rehab Prognosis: Good  Evaluation/Treatment Tolerance: Patient tolerated treatment well, Other (Comment) (ambulation tolerance limited d/t c/o lightheadedness)  Medical Staff Made Aware: Yes  Strengths: Ability to acquire knowledge, Attitude of self, Housing layout, Support of Caregivers  Barriers to Participation: Comorbidities  End of Session Communication: Bedside nurse  Assessment Comment: Slow progress towards therapy goals with activity tolerance limited d/t c/o lightheadedness; pleasant/cooperative and motivated to participate; remains a falls risk.  End of Session Patient Position: Up in chair, Alarm on  PT Plan  Inpatient/Swing Bed or Outpatient: Inpatient  PT Plan  Treatment/Interventions: Transfer training, Bed mobility, Gait training, Stair training, Balance training, Neuromuscular re-education, Strengthening, Endurance training, Range of motion, Therapeutic exercise, Therapeutic activity  PT Plan: Skilled PT  PT Frequency: 5 times per week  PT Discharge Recommendations: Moderate intensity level of continued care  Equipment Recommended upon Discharge: Wheeled walker  PT Recommended Transfer Status: Assist x1  PT - OK to Discharge: Yes    General Visit Information:   PT  Visit  PT Received On: 03/07/24  Response to Previous Treatment: Patient with no complaints from previous session.  General  Reason for Referral: impaired functional mobility  Referred By: Dr. Josafat Cole  Past Medical History Relevant to Rehab: chronic renal failure stage 3, anemia, a-fib, CHF, CGA, meningioma  Missed  Visit: No  Missed Visit Reason: Other (Comment)  Family/Caregiver Present: No  Prior to Session Communication: Bedside nurse  Patient Position Received: Bed, 3 rail up, Alarm on  Preferred Learning Style: verbal  General Comment: Pt cleared for therapy via RN, received in supine, NAD, agreeable to participate in therapy. (+) telemetry, 2L O2 via NC, male Colby    Subjective   Precautions:  Precautions  Hearing/Visual Limitations: glasses, bilateral hearing aids  Medical Precautions: Fall precautions, Oxygen therapy device and L/min (2L O2 via NC)  Vital Signs:  Vital Signs  BP: 130/61 (c/o dizziness with ambulation, /61 mmHg)  MAP (mmHg): 79    Objective   Pain:  Pain Assessment  Pain Assessment: 0-10  Pain Score: 0 - No pain  Cognition:  Cognition  Overall Cognitive Status: Within Functional Limits  Orientation Level: Oriented X4  Insight: Mild  Processing Speed: Delayed  Postural Control:  Postural Control  Postural Control: Impaired  Posture Comment: forward head, rounded shoulders; obesity  Static Sitting Balance  Static Sitting-Balance Support: Bilateral upper extremity supported, Feet supported  Static Sitting-Level of Assistance: Close supervision  Static Standing Balance  Static Standing-Balance Support: Bilateral upper extremity supported  Static Standing-Level of Assistance: Minimum assistance  Extremity/Trunk Assessments:  RLE   RLE :  (AROM R knee limited at end range)  LLE   LLE :  (AROM L knee limited at end range)  Activity Tolerance:  Activity Tolerance  Endurance: Tolerates less than 10 min exercise, no significant change in vital signs  Treatments:  Therapeutic Exercise  Therapeutic Exercise Performed: Yes  Therapeutic Exercise Activity 1: B ankle pumps x 20  Therapeutic Exercise Activity 2: B seated knee ext 2 x 10  Therapeutic Exercise Activity 3: B seated hip flex 2 x 10  Therapeutic Exercise Activity 4: chair push-ups x 5    Bed Mobility  Bed Mobility: Yes  Bed Mobility 1  Bed Mobility  1: Supine to sitting  Level of Assistance 1: Close supervision  Bed Mobility Comments 1: use of bedrails    Ambulation/Gait Training  Ambulation/Gait Training Performed: Yes  Ambulation/Gait Training 1  Surface 1: Level tile  Device 1: Rolling walker  Assistance 1: Minimum assistance  Quality of Gait 1: Narrow base of support, Diminished heel strike, Decreased step length, Forward flexed posture  Comments/Distance (ft) 1: 5' forwards/backwards (distance limited d/t pt c/o lightheadedness; /61 mmHg)  Transfers  Transfer: Yes  Transfer 1  Transfer From 1: Sit to  Transfer to 1: Stand  Technique 1: Sit to stand  Transfer Device 1: Walker  Transfer Level of Assistance 1: Minimum assistance  Trials/Comments 1: assist to steady, cueing for proper hand placement  Transfers 2  Transfer From 2: Stand to  Transfer to 2: Sit  Technique 2: Stand to sit  Transfer Device 2: Walker  Transfer Level of Assistance 2: Minimum assistance  Trials/Comments 2: assist for safe eccentric lowering into chair, cueing for proper hand placement    Stairs  Stairs: No    Outcome Measures:  [facility] Basic Mobility  Turning from your back to your side while in a flat bed without using bedrails: None  Moving from lying on your back to sitting on the side of a flat bed without using bedrails: A little  Moving to and from bed to chair (including a wheelchair): A little  Standing up from a chair using your arms (e.g. wheelchair or bedside chair): A little  To walk in hospital room: A lot  Climbing 3-5 steps with railing: Total  Basic Mobility - Total Score: 16    Education Documentation  Handouts, taught by Keri Hathaway PT at 3/7/2024 12:02 PM.  Learner: Patient  Readiness: Acceptance  Method: Explanation, Demonstration  Response: Needs Reinforcement    Precautions, taught by Keri Hathaway, PT at 3/7/2024 12:02 PM.  Learner: Patient  Readiness: Acceptance  Method: Explanation, Demonstration  Response: Needs Reinforcement    Body Mechanics,  taught by Keri Hathaway PT at 3/7/2024 12:02 PM.  Learner: Patient  Readiness: Acceptance  Method: Explanation, Demonstration  Response: Needs Reinforcement    Home Exercise Program, taught by Keri Hathaway PT at 3/7/2024 12:02 PM.  Learner: Patient  Readiness: Acceptance  Method: Explanation, Demonstration  Response: Needs Reinforcement    Mobility Training, taught by Keri Hathaway, PT at 3/7/2024 12:02 PM.  Learner: Patient  Readiness: Acceptance  Method: Explanation, Demonstration  Response: Needs Reinforcement    Education Comments  No comments found.        OP EDUCATION:  Outpatient Education  Individual(s) Educated: Patient  Education Provided: Fall Risk, POC  Risk and Benefits Discussed with Patient/Caregiver/Other: yes  Patient/Caregiver Demonstrated Understanding: yes  Plan of Care Discussed and Agreed Upon: yes  Patient Response to Education: Patient/Caregiver Verbalized Understanding of Information    Encounter Problems       Encounter Problems (Active)       Balance       STG - Maintains static standing balance with upper extremity support mod I (Progressing)       Start:  03/01/24    Expected End:  03/06/24       INTERVENTIONS:  1. Practice standing with minimal support.  2. Educate patient about standing tolerance.  3. Educate patient about independence with gait, transfers, and ADL's.  4. Educate patient about use of assistive device.  5. Educate patient about self-directed care.            Mobility       LTG - Patient will ambulate household distance using FWW with Mod I (Progressing)       Start:  03/01/24    Expected End:  03/06/24            STG - Patient will ascend and descend four to six stairs with b/l handrails and CGA (Not Progressing)       Start:  03/01/24    Expected End:  03/06/24               Pain - Adult

## 2024-03-08 VITALS
OXYGEN SATURATION: 99 % | SYSTOLIC BLOOD PRESSURE: 146 MMHG | HEIGHT: 72 IN | RESPIRATION RATE: 18 BRPM | DIASTOLIC BLOOD PRESSURE: 62 MMHG | WEIGHT: 254.63 LBS | HEART RATE: 59 BPM | BODY MASS INDEX: 34.49 KG/M2 | TEMPERATURE: 98.2 F

## 2024-03-08 LAB
ALBUMIN SERPL-MCNC: 3.1 G/DL (ref 3.5–5)
ANION GAP SERPL CALC-SCNC: 10 MMOL/L
BUN SERPL-MCNC: 19 MG/DL (ref 8–25)
CALCIUM SERPL-MCNC: 8.5 MG/DL (ref 8.5–10.4)
CHLORIDE SERPL-SCNC: 104 MMOL/L (ref 97–107)
CO2 SERPL-SCNC: 24 MMOL/L (ref 24–31)
CREAT SERPL-MCNC: 1.4 MG/DL (ref 0.4–1.6)
EGFRCR SERPLBLD CKD-EPI 2021: 51 ML/MIN/1.73M*2
ERYTHROCYTE [DISTWIDTH] IN BLOOD BY AUTOMATED COUNT: 18 % (ref 11.5–14.5)
GLUCOSE BLD MANUAL STRIP-MCNC: 100 MG/DL (ref 74–99)
GLUCOSE SERPL-MCNC: 86 MG/DL (ref 65–99)
HCT VFR BLD AUTO: 27.7 % (ref 41–52)
HGB BLD-MCNC: 8 G/DL (ref 13.5–17.5)
INR PPP: 1.1 (ref 0.9–1.2)
MCH RBC QN AUTO: 25.5 PG (ref 26–34)
MCHC RBC AUTO-ENTMCNC: 28.9 G/DL (ref 32–36)
MCV RBC AUTO: 88 FL (ref 80–100)
NRBC BLD-RTO: 0 /100 WBCS (ref 0–0)
PHOSPHATE SERPL-MCNC: 3 MG/DL (ref 2.5–4.5)
PLATELET # BLD AUTO: 203 X10*3/UL (ref 150–450)
POTASSIUM SERPL-SCNC: 4.3 MMOL/L (ref 3.4–5.1)
PROTHROMBIN TIME: 11.5 SECONDS (ref 9.3–12.7)
RBC # BLD AUTO: 3.14 X10*6/UL (ref 4.5–5.9)
SODIUM SERPL-SCNC: 138 MMOL/L (ref 133–145)
WBC # BLD AUTO: 4.2 X10*3/UL (ref 4.4–11.3)

## 2024-03-08 PROCEDURE — 36415 COLL VENOUS BLD VENIPUNCTURE: CPT | Performed by: INTERNAL MEDICINE

## 2024-03-08 PROCEDURE — 2500000001 HC RX 250 WO HCPCS SELF ADMINISTERED DRUGS (ALT 637 FOR MEDICARE OP): Performed by: INTERNAL MEDICINE

## 2024-03-08 PROCEDURE — 85027 COMPLETE CBC AUTOMATED: CPT | Performed by: INTERNAL MEDICINE

## 2024-03-08 PROCEDURE — 82947 ASSAY GLUCOSE BLOOD QUANT: CPT

## 2024-03-08 PROCEDURE — 85610 PROTHROMBIN TIME: CPT | Performed by: INTERNAL MEDICINE

## 2024-03-08 PROCEDURE — 2500000002 HC RX 250 W HCPCS SELF ADMINISTERED DRUGS (ALT 637 FOR MEDICARE OP, ALT 636 FOR OP/ED): Performed by: INTERNAL MEDICINE

## 2024-03-08 PROCEDURE — 84100 ASSAY OF PHOSPHORUS: CPT | Performed by: INTERNAL MEDICINE

## 2024-03-08 RX ORDER — METOPROLOL TARTRATE 25 MG/1
12.5 TABLET, FILM COATED ORAL 2 TIMES DAILY
Qty: 30 TABLET | Refills: 0
Start: 2024-03-08 | End: 2024-05-21

## 2024-03-08 RX ADMIN — PAROXETINE HYDROCHLORIDE 20 MG: 20 TABLET, FILM COATED ORAL at 08:46

## 2024-03-08 RX ADMIN — AMIODARONE HYDROCHLORIDE 200 MG: 200 TABLET ORAL at 08:47

## 2024-03-08 RX ADMIN — METOPROLOL TARTRATE 12.5 MG: 25 TABLET, FILM COATED ORAL at 08:46

## 2024-03-08 RX ADMIN — PANTOPRAZOLE SODIUM 40 MG: 40 TABLET, DELAYED RELEASE ORAL at 08:47

## 2024-03-08 RX ADMIN — FINASTERIDE 5 MG: 5 TABLET, FILM COATED ORAL at 08:47

## 2024-03-08 ASSESSMENT — COGNITIVE AND FUNCTIONAL STATUS - GENERAL
MOVING TO AND FROM BED TO CHAIR: A LITTLE
TURNING FROM BACK TO SIDE WHILE IN FLAT BAD: A LITTLE
WALKING IN HOSPITAL ROOM: A LOT
CLIMB 3 TO 5 STEPS WITH RAILING: TOTAL
STANDING UP FROM CHAIR USING ARMS: A LITTLE
MOBILITY SCORE: 16

## 2024-03-08 ASSESSMENT — PAIN - FUNCTIONAL ASSESSMENT
PAIN_FUNCTIONAL_ASSESSMENT: 0-10
PAIN_FUNCTIONAL_ASSESSMENT: 0-10

## 2024-03-08 ASSESSMENT — PAIN SCALES - GENERAL
PAINLEVEL_OUTOF10: 0 - NO PAIN
PAINLEVEL_OUTOF10: 3

## 2024-03-08 ASSESSMENT — PAIN DESCRIPTION - DESCRIPTORS: DESCRIPTORS: ACHING

## 2024-03-08 NOTE — PROGRESS NOTES
03/08/24 1134   Discharge Planning   [facility] expects to be discharged to: [facility]   Does the patient need discharge transport arranged? Yes   Has discharge transport been arranged? Yes   What day is the transport expected? 03/08/24   What time is the transport expected? 1300

## 2024-03-08 NOTE — PROGRESS NOTES
IM explained, signed and sent to registration at this time.     03/08/24 1038   Discharge Planning   Patient expects to be discharged to: [facility]

## 2024-03-08 NOTE — DISCHARGE SUMMARY
Discharge Diagnosis  GI bleed    Issues Requiring Follow-Up  Colon polyps  Hemorrhoids  Chronic kidney disease  Atrial fibrillation      Discharge Meds     Your medication list        START taking these medications        Instructions Last Dose Given Next Dose Due   metoprolol tartrate 25 mg tablet  Commonly known as: Lopressor      Take 0.5 tablets (12.5 mg) by mouth 2 times a day.              CONTINUE taking these medications        Instructions Last Dose Given Next Dose Due   amiodarone 200 mg tablet  Commonly known as: Pacerone      Take 1 tablet (200 mg) by mouth once daily.       atorvastatin 20 mg tablet  Commonly known as: Lipitor      TAKE 1 TABLET EVERY DAY       cyanocobalamin 500 mcg tablet  Commonly known as: Vitamin B-12           ergocalciferol 1.25 MG (50652 UT) capsule  Commonly known as: Vitamin D-2      Take 1 capsule (50,000 Units) by mouth 1 (one) time per week.       fenofibrate 48 mg tablet  Commonly known as: Tricor      Take 1 tablet (48 mg) by mouth once daily.       ferrous sulfate 250 mg (50 mg iron) tablet extended release      Take 1 tablet (250 mg) by mouth 2 times a day.       finasteride 5 mg tablet  Commonly known as: Proscar      TAKE 1 TABLET EVERY DAY       folic acid 1 mg tablet  Commonly known as: Folvite      TAKE 1 TABLET EVERY DAY       gabapentin 100 mg capsule  Commonly known as: Neurontin      TAKE 1 CAPSULE AT BEDTIME       pantoprazole 40 mg EC tablet  Commonly known as: ProtoNix      TAKE 1 TABLET EVERY DAY       PARoxetine 20 mg tablet  Commonly known as: Paxil      TAKE 1 TABLET EVERY DAY       tamsulosin 0.4 mg 24 hr capsule  Commonly known as: Flomax      TAKE 1 CAPSULE AT BEDTIME       warfarin 3 mg tablet  Commonly known as: Coumadin      Take as directed. If you are unsure how to take this medication, talk to your nurse or doctor.              STOP taking these medications      aspirin 81 mg EC tablet        furosemide 20 mg tablet  Commonly known as: Lasix         Klor-Con M20 20 mEq ER tablet  Generic drug: potassium chloride CR        levoFLOXacin 250 mg tablet  Commonly known as: Levaquin        metoprolol succinate XL 50 mg 24 hr tablet  Commonly known as: Toprol-XL                  Where to Get Your Medications        Information about where to get these medications is not yet available    Ask your nurse or doctor about these medications  metoprolol tartrate 25 mg tablet         Test Results Pending At Discharge  Pending Labs       No current pending labs.            Hospital Course  Patient is an 78 years old  male with past medical history of atrial fibrillation, history of urinary tract infection.  Patient presented to [facility] ER complaining of feeling weak.  He stated he noticed black tarry stool.  Patient was monitored in hospital for further evaluation and treatment he has been on Coumadin PT/INR on admission was 2.9.  Patient got vitamin K.  He was transfused with 2 units of RBC.  Dr. Yun was consulted from GI services.  Patient had done.  EGD revealed no evidence of bleeding, evidence of gastritis.  Patient was prepped and had colonoscopy colonoscopy revealed colonic polyps, few polyps has been narrowed by Dr. Maday JOHNSON on the case.  Patient was found to have hemorrhoids.  On admission patient was found to have acute kidney injury metabolic acidosis and hyperkalemia.  Patient has chronic kidney disease.  Dr. Capellan was consulted from nephrology services.  After few days in hospital patient felt much better.  Patient was evaluated by physical therapy.  Physical therapy recommended skilled nursing facility  Hemoglobin and hematocrit has been stable.  I talked to GI on the case Coumadin was introduced on 3 /7.  Patient was advised to follow-up with PCP in 1 week, nephrology in 2 weeks and GI in 2 weeks.  Patient was advised to see his primary care physician and to get referred to general surgery as outpatient regarding hemorrhoids .   Patient will check PT/INR in 1 week.  Patient is clinically hemodynamically stable to get discharged today.  Patient was advised to follow-up with his regular cardiologist Dr. Martin  In 2 weeks      Pertinent Physical Exam At Time of Discharge  Physical Exam  General: In non acute distress, cooperating during physical exam.  HEENT: Pupils are equal and reactive to light and commendation oral mucosa moist, no JVD oral mucosa is moist.  Cardiovascular: Normal sinus rhythm, no MRG.  Lungs: Clear to auscultation bilaterally, no wheezing, no crackles, no dullness to percussion.  Abdomen: No hepatosplenomegaly appreciated, soft , not tender, positive bowel sounds, positive bowel movement.  Neuro: Alert and oriented x2, strength in upper and lower extremities , sensation intact.  Psych: Patient had great insight was going on  Musculoskeletal: No swelling in lower extremities, no limitation in range of motion.  Vascular: Pulses are intact in upper and lower extremities  Skin: No petechiae, ecchymosis or other stigmata for dermatology disease.   Outpatient Follow-Up  Future Appointments   Date Time Provider Department Council   3/18/2024 10:30 AM Hanny Moe MD [id number] [facility]   4/11/2024  2:10 PM Jung Tao MD [id number] [facility]   4/22/2024  1:00 PM Alysia Sheets APRN-[facility] [id number] [facility]   6/19/2024 11:40 AM Tiana Juarez MD [id number] [facility]   9/19/2024  2:10 PM Jung Tao MD [id number] [facility]   10/1/2024  1:40 PM Jose Mccormick MD [id number] [facility]     Follow-up with Dr. Capellan in 2 weeks  Follow-up with Dr. Yun in 2 weeks  Follow-up with PCP in 1 week.    Lola Leyva MD

## 2024-03-08 NOTE — PROGRESS NOTES
CONSULT PROGRESS NOTES    SERVICE DATE: 3/8/2024   SERVICE TIME: 11:06 AM    CONSULTING SERVICE: Nephrology    ASSESSMENT AND PLAN   78-year-old man with atrial fibrillation on anticoagulation admitted with acute on chronic renal failure in the context of profound anemia.  1.  Acute renal failure  2.  Acute metabolic acidosis  3.  Hyperkalemia  4.  Hypotension  5.  Acute blood loss anemia  6.  Chronic kidney disease stage IV  7.  Kidney stones     Acute renal failure on top of advanced chronic kidney disease stage 4 in the context of severe anemia requiring multiple rounds of blood transfusion.  His hemoglobin has been stable and he does not need a transfusion today.  No need for IV fluids.  He has resolved acidosis with no bicarbonate therapy.  We should reintroduce his loop diuretic at discharge.  Okay to resume potassium supplementation as well.  Blood pressure remains stable.  Baseline creatinine had been in the low twos, he is now below this.  Kidney stones are asymptomatic at present.  I will follow him while here.  He [facility] with [facility].  No specific objection to discharge from my perspective.      SUBJECTIVE  INTERVAL HPI: No abdominal pain, no diarrhea, no nausea, decent appetite.  Awaiting disposition.  He is on chronic oxygen therapy at home.    MEDICATIONS:  amiodarone, 200 mg, oral, Daily  atorvastatin, 20 mg, oral, Nightly  finasteride, 5 mg, oral, Daily  lidocaine, 0.1 mL, subcutaneous, Once  metoprolol tartrate, 12.5 mg, oral, BID  pantoprazole, 40 mg, oral, Daily  PARoxetine, 20 mg, oral, Daily  tamsulosin, 0.4 mg, oral, Nightly  warfarin, 3 mg, oral, Daily           PRN medications: labetaloL, ondansetron, ondansetron, promethazine (Phenergan) 6.25 mg in sodium chloride 0.9% 50 mL IV     OBJECTIVE  PHYSICAL EXAM:   Heart Rate:  [60-65]   Temp:  [36 °C (96.8 °F)-36.4 °C (97.5 °F)]   Resp:  [19-20]   BP: (115-165)/(51-67)   SpO2:  [99 %-100 %]   Body mass index is  33.85 kg/m².  This is a chronically ill-appearing elderly white man  Very pale skin  Hearing intact  Phonation intact  Moist mucosa  Normal S1/normal S2  Lungs are clear to auscultation bilaterally  Abdomen is soft, nondistended, nontender, positive bowel sounds  No Long  No extremity edema  Moves 4 limbs spontaneously  No obvious joint deformities  No lymphadenopathy    DATA:   Labs:  Results for orders placed or performed during the hospital encounter of 02/29/24 (from the past 96 hour(s))   Hemoglobin and hematocrit, blood   Result Value Ref Range    Hemoglobin 8.4 (L) 13.5 - 17.5 g/dL    Hematocrit 28.3 (L) 41.0 - 52.0 %   Renal Function Panel   Result Value Ref Range    Glucose 89 65 - 99 mg/dL    Sodium 141 133 - 145 mmol/L    Potassium 4.3 3.4 - 5.1 mmol/L    Chloride 106 97 - 107 mmol/L    Bicarbonate 25 24 - 31 mmol/L    Urea Nitrogen 22 8 - 25 mg/dL    Creatinine 1.50 0.40 - 1.60 mg/dL    eGFR 47 (L) >60 mL/min/1.73m*2    Calcium 8.2 (L) 8.5 - 10.4 mg/dL    Phosphorus 2.7 2.5 - 4.5 mg/dL    Albumin 3.1 (L) 3.5 - 5.0 g/dL    Anion Gap 10 <=19 mmol/L   CBC   Result Value Ref Range    WBC 4.1 (L) 4.4 - 11.3 x10*3/uL    nRBC 1.0 (H) 0.0 - 0.0 /100 WBCs    RBC 2.97 (L) 4.50 - 5.90 x10*6/uL    Hemoglobin 7.7 (L) 13.5 - 17.5 g/dL    Hematocrit 26.1 (L) 41.0 - 52.0 %    MCV 88 80 - 100 fL    MCH 25.9 (L) 26.0 - 34.0 pg    MCHC 29.5 (L) 32.0 - 36.0 g/dL    RDW 17.9 (H) 11.5 - 14.5 %    Platelets 196 150 - 450 x10*3/uL   Protime-INR   Result Value Ref Range    Protime 12.1 9.3 - 12.7 seconds    INR 1.2 0.9 - 1.2   Surgical Pathology Exam   Result Value Ref Range    Case Report       Surgical Pathology                                Case: [id number]                                  Authorizing Provider:  Josh Nassar MD         Collected:           03/05/2024 0839              Ordering Location:     [facility] [place]       Received:            03/05/2024 0944                                     [address]    "                                                             Pathologist:           Mario Foote MD                                                           Specimens:   A) - COLON -CECUM POLYP                                                                             B) - COLON - TRANSVERSE POLYP                                                              FINAL DIAGNOSIS       A. COLON, CECUM, POLYP:   -- Fragments of tubular adenoma.    B. COLON, TRANSVERSE, POLYP:   -- Tubular adenoma.                  By the signature on this report, the individual or group listed as making the Final Interpretation/Diagnosis certifies that they have reviewed this case.       Clinical History       GI bleed (K92.2); gastrointestinal hemorrhage, unspecified gastrointestinal hemorrhage type (K92.2); longstanding persistent atrial fibrillation (I48.11); blood loss anemia (D50.0); colon cancer screening (Z12.11)      Gross Description       A: Received in formalin labeled with the patient's name and hospital number and \"cecum polyp\", are 7 irregular and tan soft tissue fragments measuring 0.2-1.1 cm in greatest dimensions and 1.1 x 0.8 x 0.3 cm in aggregate.  The largest piece shows a ragged area, inked blue, and is bisected.  The specimen is entirely submitted in 1 cassette.  [facility]  B: Received in formalin labeled with the patient's name and hospital number and \"transverse polyp\", is 1 irregular and tan soft tissue fragment measuring 0.5 x 0.3 x 0.2 cm.  There is a 0.5 x 0.3 x 0.1 cm aggregate of vegetative matter loosely in the specimen container.  The specimen is submitted in toto in 1 cassette.  [facility]    Gross dissection performed at:  [address] [address]  Phone: [phone]  Fax: [phone]     Renal Function Panel   Result Value Ref Range    Glucose 92 65 - 99 mg/dL    Sodium 140 133 - 145 mmol/L    Potassium 4.4 3.4 - 5.1 mmol/L    Chloride 106 97 " - 107 mmol/L    Bicarbonate 26 24 - 31 mmol/L    Urea Nitrogen 17 8 - 25 mg/dL    Creatinine 1.60 0.40 - 1.60 mg/dL    eGFR 43 (L) >60 mL/min/1.73m*2    Calcium 8.1 (L) 8.5 - 10.4 mg/dL    Phosphorus 2.7 2.5 - 4.5 mg/dL    Albumin 3.0 (L) 3.5 - 5.0 g/dL    Anion Gap 8 <=19 mmol/L   CBC   Result Value Ref Range    WBC 4.0 (L) 4.4 - 11.3 x10*3/uL    nRBC 0.5 (H) 0.0 - 0.0 /100 WBCs    RBC 3.06 (L) 4.50 - 5.90 x10*6/uL    Hemoglobin 7.9 (L) 13.5 - 17.5 g/dL    Hematocrit 26.9 (L) 41.0 - 52.0 %    MCV 88 80 - 100 fL    MCH 25.8 (L) 26.0 - 34.0 pg    MCHC 29.4 (L) 32.0 - 36.0 g/dL    RDW 18.5 (H) 11.5 - 14.5 %    Platelets 186 150 - 450 x10*3/uL   Protime-INR   Result Value Ref Range    Protime 12.1 9.3 - 12.7 seconds    INR 1.2 0.9 - 1.2   Renal Function Panel   Result Value Ref Range    Glucose 106 (H) 65 - 99 mg/dL    Sodium 142 133 - 145 mmol/L    Potassium 4.2 3.4 - 5.1 mmol/L    Chloride 107 97 - 107 mmol/L    Bicarbonate 26 24 - 31 mmol/L    Urea Nitrogen 19 8 - 25 mg/dL    Creatinine 1.50 0.40 - 1.60 mg/dL    eGFR 47 (L) >60 mL/min/1.73m*2    Calcium 8.5 8.5 - 10.4 mg/dL    Phosphorus 2.8 2.5 - 4.5 mg/dL    Albumin 3.1 (L) 3.5 - 5.0 g/dL    Anion Gap 9 <=19 mmol/L   CBC   Result Value Ref Range    WBC 3.8 (L) 4.4 - 11.3 x10*3/uL    nRBC 0.5 (H) 0.0 - 0.0 /100 WBCs    RBC 3.04 (L) 4.50 - 5.90 x10*6/uL    Hemoglobin 7.8 (L) 13.5 - 17.5 g/dL    Hematocrit 27.1 (L) 41.0 - 52.0 %    MCV 89 80 - 100 fL    MCH 25.7 (L) 26.0 - 34.0 pg    MCHC 28.8 (L) 32.0 - 36.0 g/dL    RDW 18.2 (H) 11.5 - 14.5 %    Platelets 187 150 - 450 x10*3/uL   Protime-INR   Result Value Ref Range    Protime 11.6 9.3 - 12.7 seconds    INR 1.1 0.9 - 1.2   Protime-INR   Result Value Ref Range    Protime 11.4 9.3 - 12.7 seconds    INR 1.1 0.9 - 1.2   POCT GLUCOSE   Result Value Ref Range    POCT Glucose 93 74 - 99 mg/dL   Renal Function Panel   Result Value Ref Range    Glucose 86 65 - 99 mg/dL    Sodium 138 133 - 145 mmol/L    Potassium 4.3 3.4 -  5.1 mmol/L    Chloride 104 97 - 107 mmol/L    Bicarbonate 24 24 - 31 mmol/L    Urea Nitrogen 19 8 - 25 mg/dL    Creatinine 1.40 0.40 - 1.60 mg/dL    eGFR 51 (L) >60 mL/min/1.73m*2    Calcium 8.5 8.5 - 10.4 mg/dL    Phosphorus 3.0 2.5 - 4.5 mg/dL    Albumin 3.1 (L) 3.5 - 5.0 g/dL    Anion Gap 10 <=19 mmol/L   CBC   Result Value Ref Range    WBC 4.2 (L) 4.4 - 11.3 x10*3/uL    nRBC 0.0 0.0 - 0.0 /100 WBCs    RBC 3.14 (L) 4.50 - 5.90 x10*6/uL    Hemoglobin 8.0 (L) 13.5 - 17.5 g/dL    Hematocrit 27.7 (L) 41.0 - 52.0 %    MCV 88 80 - 100 fL    MCH 25.5 (L) 26.0 - 34.0 pg    MCHC 28.9 (L) 32.0 - 36.0 g/dL    RDW 18.0 (H) 11.5 - 14.5 %    Platelets 203 150 - 450 x10*3/uL   Protime-INR   Result Value Ref Range    Protime 11.5 9.3 - 12.7 seconds    INR 1.1 0.9 - 1.2     *Note: Due to a large number of results and/or encounters for the requested time period, some results have not been displayed. A complete set of results can be found in Results Review.         SIGNATURE: Hiram Capellan MD PATIENT NAME: Angel Carroll   DATE: March 8, 2024 MRN: [id number]   TIME: 11:06 AM PAGER: [phone]

## 2024-03-08 NOTE — NURSING NOTE
Assumed care of patient.  Patient in bed asleep.  Bedside shift report received.  % on 2L NC  Call light in reach.

## 2024-03-13 LAB
ATRIAL RATE: 60 BPM
Q ONSET: 189 MS
QRS COUNT: 10 BEATS
QRS DURATION: 190 MS
QT INTERVAL: 596 MS
QTC CALCULATION(BAZETT): 596 MS
QTC FREDERICIA: 596 MS
R AXIS: 269 DEGREES
T AXIS: 97 DEGREES
T OFFSET: 487 MS
VENTRICULAR RATE: 60 BPM

## 2024-03-14 ENCOUNTER — ANTICOAGULATION - WARFARIN VISIT (OUTPATIENT)
Dept: CARDIOLOGY | Facility: CLINIC | Age: 79
End: 2024-03-14
Payer: MEDICARE

## 2024-03-14 DIAGNOSIS — I48.0 PAROXYSMAL ATRIAL FIBRILLATION (MULTI): Primary | ICD-10-CM

## 2024-03-18 ENCOUNTER — PROCEDURE VISIT (OUTPATIENT)
Dept: UROLOGY | Facility: CLINIC | Age: 79
End: 2024-03-18
Payer: MEDICARE

## 2024-03-18 ENCOUNTER — TELEPHONE (OUTPATIENT)
Dept: CARDIOLOGY | Facility: CLINIC | Age: 79
End: 2024-03-18

## 2024-03-18 ENCOUNTER — DOCUMENTATION (OUTPATIENT)
Dept: CARDIOLOGY | Facility: CLINIC | Age: 79
End: 2024-03-18

## 2024-03-18 VITALS
BODY MASS INDEX: 34.53 KG/M2 | HEART RATE: 67 BPM | DIASTOLIC BLOOD PRESSURE: 64 MMHG | TEMPERATURE: 96.1 F | HEIGHT: 72 IN | RESPIRATION RATE: 17 BRPM | SYSTOLIC BLOOD PRESSURE: 117 MMHG

## 2024-03-18 DIAGNOSIS — N40.0 BPH WITHOUT OBSTRUCTION/LOWER URINARY TRACT SYMPTOMS: ICD-10-CM

## 2024-03-18 DIAGNOSIS — R21 RASH: ICD-10-CM

## 2024-03-18 DIAGNOSIS — R31.0 GROSS HEMATURIA: Primary | ICD-10-CM

## 2024-03-18 LAB
POC APPEARANCE, URINE: CLEAR
POC BILIRUBIN, URINE: NEGATIVE
POC BLOOD, URINE: NEGATIVE
POC COLOR, URINE: YELLOW
POC GLUCOSE, URINE: NEGATIVE MG/DL
POC KETONES, URINE: NEGATIVE MG/DL
POC LEUKOCYTES, URINE: ABNORMAL
POC NITRITE,URINE: NEGATIVE
POC PH, URINE: 7 PH
POC PROTEIN, URINE: ABNORMAL MG/DL
POC SPECIFIC GRAVITY, URINE: 1.02
POC UROBILINOGEN, URINE: 4 EU/DL

## 2024-03-18 PROCEDURE — 99214 OFFICE O/P EST MOD 30 MIN: CPT | Performed by: UROLOGY

## 2024-03-18 PROCEDURE — 81003 URINALYSIS AUTO W/O SCOPE: CPT | Performed by: UROLOGY

## 2024-03-18 PROCEDURE — 52000 CYSTOURETHROSCOPY: CPT | Performed by: UROLOGY

## 2024-03-18 RX ORDER — LEVOFLOXACIN 250 MG/1
250 TABLET ORAL DAILY
COMMUNITY

## 2024-03-18 RX ORDER — ACETAMINOPHEN 325 MG/1
325 TABLET ORAL EVERY 6 HOURS PRN
COMMUNITY

## 2024-03-18 RX ORDER — TAMSULOSIN HYDROCHLORIDE 0.4 MG/1
0.8 CAPSULE ORAL NIGHTLY
Qty: 180 CAPSULE | Refills: 3 | Status: SHIPPED | OUTPATIENT
Start: 2024-03-18 | End: 2025-03-18

## 2024-03-18 RX ORDER — NYSTATIN 100000 [USP'U]/G
1 POWDER TOPICAL 2 TIMES DAILY
Qty: 15 G | Refills: 1 | Status: SHIPPED | OUTPATIENT
Start: 2024-03-18 | End: 2025-03-18

## 2024-03-18 RX ORDER — FUROSEMIDE 20 MG/1
40 TABLET ORAL 2 TIMES DAILY
COMMUNITY
End: 2024-05-24 | Stop reason: SDUPTHER

## 2024-03-18 ASSESSMENT — PAIN SCALES - GENERAL: PAINLEVEL: 0-NO PAIN

## 2024-03-18 NOTE — TELEPHONE ENCOUNTER
Pt's daughter called to set up PST meter annual review for patient.  I scheduled patient for 4/4/24 at [place].  She will have patient bring meter and supplies for self test.  Nakia Arrington RN

## 2024-03-18 NOTE — PROGRESS NOTES
Luiz Carroll is a 78 y.o. male with history of nephrolithiasis s/p right ULLS on 02/11/2016, and BPH. Patient had an episode of gross hematuria, he was scheduled for cystoscopy on 2/20/24, however, this was cancelled due to patient being symptomatic for a UTI and was started on antibiotics. His UTI has since resolved and he presents today for cystoscopy to complete hematuria workup. He was recently hospitalized due to renal failure and blood in the stool. Patient has no new complaints.         Past Medical History:   Diagnosis Date    Atherosclerotic heart disease of native coronary artery without angina pectoris 12/14/2022    Arteriosclerotic cardiovascular disease (ASCVD)    Benign neoplasm of meninges, unspecified (CMS/HCC)     Meningioma    Benign prostatic hyperplasia without lower urinary tract symptoms 02/28/2022    BPH without obstruction/lower urinary tract symptoms    Body mass index (BMI) 33.0-33.9, adult 07/02/2021    BMI 33.0-33.9,adult    Body mass index (BMI) 34.0-34.9, adult 11/09/2021    BMI 34.0-34.9,adult    Body mass index (BMI) 34.0-34.9, adult 09/08/2021    BMI 34.0-34.9,adult    Body mass index (BMI) 35.0-35.9, adult 12/29/2021    BMI 35.0-35.9,adult    Body mass index (BMI) 35.0-35.9, adult 02/28/2022    BMI 35.0-35.9,adult    Elevated blood-pressure reading, without diagnosis of hypertension     Prehypertension    Encounter for immunization 10/27/2015    Need for prophylactic vaccination and inoculation against influenza    Hyperlipidemia, unspecified 11/15/2021    Hyperlipidemia    Other conditions influencing health status     Nephrolithiasis    Personal history of other diseases of the musculoskeletal system and connective tissue     History of tendinitis    Personal history of other drug therapy     History of influenza vaccination    Personal history of other specified conditions 10/25/2022    History of bradycardia    Vitamin D deficiency, unspecified 10/08/2020    Vitamin D  deficiency     Past Surgical History:   Procedure Laterality Date    BACK SURGERY  04/18/2016    Back Surgery    COLONOSCOPY  07/03/2013    Complete Colonoscopy    CORONARY ANGIOPLASTY WITH STENT PLACEMENT  08/18/2020    Cath Stent Placement    CT ANGIO NECK  9/25/2021    CT NECK ANGIO W AND WO IV CONTRAST 9/25/2021 [facility]    CT HEAD ANGIO W AND WO IV CONTRAST  9/25/2021    CT HEAD ANGIO W AND WO IV CONTRAST 9/25/2021 [facility]    EYE SURGERY  04/03/2013    Eye Surgery    KNEE ARTHROSCOPY W/ DEBRIDEMENT  07/03/2013    Arthroscopy Knee Right    KNEE ARTHROSCOPY W/ DEBRIDEMENT  07/03/2013    Arthroscopy Knee Left    LITHOTRIPSY  07/22/2013    Renal Lithotripsy    MR HEAD ANGIO WO IV CONTRAST  3/23/2021    MR HEAD ANGIO WO IV CONTRAST [facility]    MR NECK ANGIO WO IV CONTRAST  3/23/2021    MR NECK ANGIO WO IV CONTRAST [facility]    OTHER SURGICAL HISTORY  04/03/2013    Cardiac Cath Procedure Outcome: Successful    OTHER SURGICAL HISTORY  04/03/2013    Neuroplasty With Transposition Of Ulnar Nerve - At Elbow    OTHER SURGICAL HISTORY  04/03/2013    General Surgery    OTHER SURGICAL HISTORY  06/11/2019    Knee replacement    OTHER SURGICAL HISTORY  07/22/2013    Cystoscopy With Insertion Of Ureteral Stent Bilateral    OTHER SURGICAL HISTORY  07/03/2013    Wrist Carpectomy    OTHER SURGICAL HISTORY  07/03/2013    Brain Surgery    OTHER SURGICAL HISTORY  07/03/2013    Ulnar Osteotomy    TONSILLECTOMY  04/03/2013    Tonsillectomy    TOTAL KNEE ARTHROPLASTY  08/03/2016    Total Knee Arthroplasty     Family History   Problem Relation Name Age of Onset    Diabetes Mother      Emphysema Father      Heart disease Father       Current Outpatient Medications   Medication Sig Dispense Refill    acetaminophen (Tylenol) 325 mg tablet Take 1 tablet (325 mg) by mouth every 6 hours if needed for mild pain (1 - 3).      amiodarone (Pacerone) 200 mg tablet Take 1 tablet (200 mg) by mouth once  daily. 90 tablet 3    atorvastatin (Lipitor) 20 mg tablet TAKE 1 TABLET EVERY DAY 90 tablet 3    cyanocobalamin (Vitamin B-12) 500 mcg tablet Take 1 tablet (500 mcg) by mouth once daily.      ergocalciferol (Vitamin D-2) 1.25 MG (50193 UT) capsule Take 1 capsule (50,000 Units) by mouth 1 (one) time per week. 12 capsule 1    fenofibrate (Tricor) 48 mg tablet Take 1 tablet (48 mg) by mouth once daily. 90 tablet 1    ferrous sulfate 250 mg (50 mg iron) tablet extended release Take 1 tablet (250 mg) by mouth 2 times a day. 180 tablet 3    finasteride (Proscar) 5 mg tablet TAKE 1 TABLET EVERY DAY 90 tablet 10    folic acid (Folvite) 1 mg tablet TAKE 1 TABLET EVERY DAY 90 tablet 3    furosemide (Lasix) 20 mg tablet Take 1 tablet (20 mg) by mouth once daily.      gabapentin (Neurontin) 100 mg capsule TAKE 1 CAPSULE AT BEDTIME 90 capsule 0    metoprolol tartrate (Lopressor) 25 mg tablet Take 0.5 tablets (12.5 mg) by mouth 2 times a day. 30 tablet 0    pantoprazole (ProtoNix) 40 mg EC tablet TAKE 1 TABLET EVERY DAY 90 tablet 3    PARoxetine (Paxil) 20 mg tablet TAKE 1 TABLET EVERY DAY 90 tablet 3    tamsulosin (Flomax) 0.4 mg 24 hr capsule TAKE 1 CAPSULE AT BEDTIME 90 capsule 10    warfarin (Coumadin) 4 mg tablet Take by mouth. Give 1 tab at bedtime for A-fib      aspirin 81 mg EC tablet Take 1 tablet (81 mg) by mouth once daily.      levoFLOXacin (Levaquin) 250 mg tablet Take 1 tablet (250 mg) by mouth once daily. For 5 days. Stop 3/20/2024       No current facility-administered medications for this visit.     Allergies   Allergen Reactions    Other Unknown     Anti-Inflammatories    Ranolazine Unknown    Cyclobenzaprine Hallucinations, Unknown and Psychosis     Social History     Socioeconomic History    Marital status:      Spouse name: Not on file    Number of children: Not on file    Years of education: Not on file    Highest education level: Not on file   Occupational History    Not on file   Tobacco Use     Smoking status: Former     Types: Cigarettes     Quit date:      Years since quittin.2    Smokeless tobacco: Never   Substance and Sexual Activity    Alcohol use: Not Currently    Drug use: Never    Sexual activity: Not Currently   Other Topics Concern    Not on file   Social History Narrative    Not on file     Social Determinants of Health     Financial Resource Strain: Low Risk  (3/1/2024)    Overall Financial Resource Strain (CARDIA)     Difficulty of Paying Living Expenses: Not hard at all   Food Insecurity: No Food Insecurity (3/1/2024)    Hunger Vital Sign     Worried About Running Out of Food in the Last Year: Never true     Ran Out of Food in the Last Year: Never true   Transportation Needs: No Transportation Needs (3/1/2024)    PRAPARE - Transportation     Lack of Transportation (Medical): No     Lack of Transportation (Non-Medical): No   Physical Activity: Inactive (3/1/2024)    Exercise Vital Sign     Days of Exercise per Week: 0 days     Minutes of Exercise per Session: 0 min   Stress: No Stress Concern Present (3/1/2024)    [employer] [place] - Occupational Stress Questionnaire     Feeling of Stress : Only a little   Social Connections: Moderately Isolated (3/1/2024)    Social Connection and Isolation Panel [NHANES]     Frequency of Communication with Friends and Family: Three times a week     Frequency of Social Gatherings with Friends and Family: Three times a week     Attends [employer] Services: Never     Active Member of Clubs or Organizations: No     Attends Club or Organization Meetings: Never     Marital Status:    Intimate Partner Violence: Not At Risk (3/1/2024)    Humiliation, Afraid, Rape, and Kick questionnaire     Fear of Current or Ex-Partner: No     Emotionally Abused: No     Physically Abused: No     Sexually Abused: No   Housing Stability: Low Risk  (3/1/2024)    Housing Stability Vital Sign     Unable to Pay for Housing in the Last Year: No      Number of Places Lived in the Last Year: 1     Unstable Housing in the Last Year: No       Review of Systems  Pertinent items are noted in HPI.    Objective   PROCEDURE NOTE:    PREOPERATIVE DIAGNOSIS:  Gross hematuria    POSTOPERATIVE DIAGNOSIS:  Same    OPERATION:  Flexible Cystourethroscopy      ANESTHESIA:  2%  lidocaine jelly    COMPLICATIONS:  None    EBL: Minimal    DISPOSITION:  The patient was discharged home after the procedure, per routine.    INDICATIONS: :  Mr. Carroll is a 78 y.o. patient with a history of gross hematuria who presents today for Cystoscopy.     The indications, risks and benefits of this procedure were discussed with the patient, consent was obtained prior to the procedure, and to the best of my judgement the patient seemed to understand and agree to the procedure.    PROCEDURE:  The patient  was brought into the procedure suite and informed consent was reviewed and confirmed. Vital signs were obtained prior to the procedure: /64   Pulse 67   Temp 35.6 °C (96.1 °F)   Resp 17   Ht 1.829 m (6')   BMI 34.53 kg/m² .  The patient was escorted onto the stretcher, placed supine, prepped with betadine and draped in the usual standard surgical fashion.  Intraurethral 2% viscous lidocaine jelly was used for local analgesia.  A 16 [place] flexible cystourethroscope was inserted into the urethra.       The penile urethra was normal.    Bladder: Upon entering the bladder the entire bladder was surveyed in a 360 degree fashion.  The left and right ureteral orifices were in normal orthotopic position effluxing clear yellow urine, bilaterally.   There was no evidence of any bladder lesions, foreign objects, stones or evidence of any mucosal changes. The cystoscope was then retroflexed.  The bladder neck was then further examined without any evidence of lesions.     The scope was then removed and in an antegrade fashion, the urethra and bladder were again resurveyed with no evidence of  additional lesions.  The cystoscope was then fully removed.   The patient tolerated the procedure well.  Vitals were stable after the procedure.  The patient was able to void and was discharged home.  Verbal and written Post procedure instructions were reviewed with the patient.    IMPRESSION:  Large distended bladder, no mass or lesions. No obstructing median lobe, lateral hypertrophy and good sphincter coaptation.       Lab Review  Lab Results   Component Value Date    WBC 4.2 (L) 03/08/2024    RBC 3.14 (L) 03/08/2024    HGB 8.0 (L) 03/08/2024    HCT 27.7 (L) 03/08/2024     03/08/2024      Lab Results   Component Value Date    BUN 19 03/08/2024    CREATININE 1.40 03/08/2024        Urine analysis shows +leukocytes     Assessment/Plan   Diagnoses and all orders for this visit:  Gross hematuria  -     POCT UA Automated manually resulted  BPH without obstruction/lower urinary tract symptoms  Rash    Gross hematuria   BPH     Patients gross hematuria was likely due to UTI secondary to incomplete bladder emptying.     We will increase Tamsulosin to 0.4mg BID.     Patient will follow up with CNP annually.     3. Rash     Patient had a fungal rash in the inguinal region, we will prescribe Nystatin.       All questions were answered to the patient's satisfaction. Patient agrees with the plan and wishes to proceed. Follow-up will be scheduled appropriately.     I spent 30 minutes of dedicated E&M time, including preparation and review of records, notes, and data, time spent with patient/family, and documentation.     E&M visit today is associated with current or anticipated ongoing medical care services related to a patient's single, serious condition or a complex condition.    Scribed for Dr. Moe by Kathleen Barrera. I , Dr Moe, have personally reviewed and agreed with the information entered by the Virtual Scribe.

## 2024-03-19 ENCOUNTER — APPOINTMENT (OUTPATIENT)
Dept: PRIMARY CARE | Facility: CLINIC | Age: 79
End: 2024-03-19
Payer: MEDICARE

## 2024-03-19 ENCOUNTER — LAB (OUTPATIENT)
Dept: LAB | Facility: LAB | Age: 79
End: 2024-03-19
Payer: MEDICARE

## 2024-03-19 ENCOUNTER — TELEPHONE (OUTPATIENT)
Dept: CARDIOLOGY | Facility: CLINIC | Age: 79
End: 2024-03-19

## 2024-03-19 DIAGNOSIS — K92.1 MELENA: Primary | ICD-10-CM

## 2024-03-19 LAB
ALBUMIN SERPL BCP-MCNC: 3.6 G/DL (ref 3.4–5)
ALP SERPL-CCNC: 85 U/L (ref 33–136)
ALT SERPL W P-5'-P-CCNC: 8 U/L (ref 10–52)
ANION GAP SERPL CALC-SCNC: 10 MMOL/L (ref 10–20)
AST SERPL W P-5'-P-CCNC: 16 U/L (ref 9–39)
BILIRUB SERPL-MCNC: 0.6 MG/DL (ref 0–1.2)
BUN SERPL-MCNC: 18 MG/DL (ref 6–23)
CALCIUM SERPL-MCNC: 8.6 MG/DL (ref 8.6–10.6)
CHLORIDE SERPL-SCNC: 104 MMOL/L (ref 98–107)
CO2 SERPL-SCNC: 32 MMOL/L (ref 21–32)
CREAT SERPL-MCNC: 1.7 MG/DL (ref 0.5–1.3)
EGFRCR SERPLBLD CKD-EPI 2021: 40 ML/MIN/1.73M*2
ERYTHROCYTE [DISTWIDTH] IN BLOOD BY AUTOMATED COUNT: 19.1 % (ref 11.5–14.5)
GLUCOSE SERPL-MCNC: 93 MG/DL (ref 74–99)
HCT VFR BLD AUTO: 31.9 % (ref 41–52)
HGB BLD-MCNC: 9.1 G/DL (ref 13.5–17.5)
INR PPP: 3 (ref 0.9–1.1)
MCH RBC QN AUTO: 25.4 PG (ref 26–34)
MCHC RBC AUTO-ENTMCNC: 28.5 G/DL (ref 32–36)
MCV RBC AUTO: 89 FL (ref 80–100)
NRBC BLD-RTO: 0 /100 WBCS (ref 0–0)
PLATELET # BLD AUTO: 199 X10*3/UL (ref 150–450)
POTASSIUM SERPL-SCNC: 3.9 MMOL/L (ref 3.5–5.3)
PROT SERPL-MCNC: 6 G/DL (ref 6.4–8.2)
PROTHROMBIN TIME: 34.2 SECONDS (ref 9.8–12.8)
RBC # BLD AUTO: 3.58 X10*6/UL (ref 4.5–5.9)
SODIUM SERPL-SCNC: 142 MMOL/L (ref 136–145)
WBC # BLD AUTO: 2.5 X10*3/UL (ref 4.4–11.3)

## 2024-03-19 PROCEDURE — 85027 COMPLETE CBC AUTOMATED: CPT

## 2024-03-19 PROCEDURE — 85610 PROTHROMBIN TIME: CPT

## 2024-03-19 PROCEDURE — 80053 COMPREHEN METABOLIC PANEL: CPT

## 2024-03-19 PROCEDURE — 36415 COLL VENOUS BLD VENIPUNCTURE: CPT

## 2024-03-21 DIAGNOSIS — K92.1 MELENA: ICD-10-CM

## 2024-03-21 DIAGNOSIS — R19.7 DIARRHEA, UNSPECIFIED: Primary | ICD-10-CM

## 2024-04-01 ENCOUNTER — APPOINTMENT (OUTPATIENT)
Dept: NEPHROLOGY | Facility: CLINIC | Age: 79
End: 2024-04-01
Payer: MEDICARE

## 2024-04-04 ENCOUNTER — HOSPITAL ENCOUNTER (OUTPATIENT)
Dept: CARDIOLOGY | Facility: CLINIC | Age: 79
Discharge: HOME | End: 2024-04-04
Payer: MEDICARE

## 2024-04-04 ENCOUNTER — ANTICOAGULATION - WARFARIN VISIT (OUTPATIENT)
Dept: CARDIOLOGY | Facility: CLINIC | Age: 79
End: 2024-04-04
Payer: MEDICARE

## 2024-04-04 ENCOUNTER — ANTICOAGULATION - WARFARIN VISIT (OUTPATIENT)
Dept: CARDIOLOGY | Facility: CLINIC | Age: 79
End: 2024-04-04

## 2024-04-04 DIAGNOSIS — I49.5 SSS (SICK SINUS SYNDROME) (MULTI): ICD-10-CM

## 2024-04-04 DIAGNOSIS — I48.0 PAROXYSMAL ATRIAL FIBRILLATION (MULTI): Primary | ICD-10-CM

## 2024-04-04 LAB
POC INR: 2.69
POC PROTHROMBIN TIME: NORMAL

## 2024-04-04 PROCEDURE — 85610 PROTHROMBIN TIME: CPT | Mod: QW

## 2024-04-04 PROCEDURE — 93280 PM DEVICE PROGR EVAL DUAL: CPT

## 2024-04-04 PROCEDURE — 99211 OFF/OP EST MAY X REQ PHY/QHP: CPT | Mod: 25

## 2024-04-04 PROCEDURE — 93280 PM DEVICE PROGR EVAL DUAL: CPT | Performed by: INTERNAL MEDICINE

## 2024-04-04 NOTE — PROGRESS NOTES
"Speech Therapy      Visit Type: Treatment  -  Swallow and communication/cognition    Precautions     - Oxygen: room air (100% O2 saturations). - Cognition:         â¢ Expression is verbal.          â¢ Following commands impaired. â¢ Safety judgement impaired. â¢ Awareness of deficits impaired. - Affect/Behavior: anxious, sleepy and cooperative    Current Diet: puree/dysphagia 1 and thin liquids      - Dentition: intact    SUBJECTIVE                                                                                                             Pt awake, but fatigues easily and closes her eyes. Minimal verbal output. Minimal participation and following commands this session. Patient/family goals: unable to state   Pain at onset of session:     -  0/10     OBJECTIVE                                                                                                                 Communication/Cognition:  Orientation Level:  Oriented to person  Not oriented to place  Not oriented to month  Not oriented to year  Not oriented to date  Not oriented to day of week    Observation: Pt shook her head ""no\"" when asked if she knew where she was or why she was here. She was preservative on day of the week \""Saturday\"" for all other orientation questions despite y/n format. Behavior/Social Interaction:  cooperates with tasks mild impairment (75-89% accuracy) maintains eye contact moderate impairment (50-74% accuracy) pragmatics moderate impairment (50-74% accuracy) appropriate behavior moderate impairment (50-74% accuracy) affect severe impairment (25-49% accuracy)  Limited verbal output and poor initiation. Max cues required to speak. Expression-Verbal: Impairment, maximal cueing needed,    Pt verbalizing occasional single word level response. Attention:  sustained attention severe impairment (25-49% accuracy)  Pt sustaining attention for 3-5 min intervals before requiring restimulation or redirection.  " Duplicate note; disregard   Memory:   short term memory severe impairment (25-49% accuracy)  Effective techniques: spaced retrieval and rehearsal.  Interfering components: cognitive status, processing time and attention. Delayed recall 0/2 after 5 mins; 0/2 w/ mod cues and sentence completion. Swallowing   No temperature spike noted. Patient positioning: upright in bed  Pretrial vocal quality: weak    Consistencies: Thin Liquid (straw):     - Oral: intact   - Pharyngeal: intact  Dysphagia 1/Puree:     - Oral: intact   - Pharyngeal: intact  Mechanical Soft:    - Oral: impaired, slow mastication, bolus holding, incomplete mastication and slow oral transit   - Pharyngeal: impaired, suspect decreased hyolaryngeal elevation, delayed throat clear and suspect delayed swallow response  Pt frequently closing her eyes when trying to chew soft solid food. Her mandibular ROM and effort was poor. Pt in need of max cues to attend to solid food bolus and complete mastication safely. Oral residue and max cues to clear and swallow. Not at level for increase in diet texture at this time. ASSESSMENT                                                                                                   â¢ Impairments: swallowing, verbal expression, attention/concentration and memory  â¢ Functional Limitations: eating/drinking, communication/cognition and expression of ideas/needs  â¢ Skilled therapy is required to address these limitations in attempt to maximize the patient's independence. â¢   Patient was seen this date for treatment of dysphagia and cognitive-communicative deficits following hospitalization for acute UTI, L caudate ICH/IVH and ruptured aneurysm, and L parietal AVM. Per chart review: Pt underwent crani 12/23/2020 w/ EVD, which has now been d/c. PMH significant for migraines. [facility] 12/23/2020 showed:   1. Left frontal shunt in appropriate position with decompressed ventricles.    2. Postsurgical changes involving the left frontal lobe with no complication noted. 3. Overall there is no change compared to the prior exam    MRI of the brain 12/31/2020 indicated:   1. Multiple new acute infarcts. Most recent CXR 12/16/2020 stated:  1. Negative chest.    Pt was seen b/s upright just finished with OT. She was drowsy w/ limited verbal output and poor initiation. Swallowing:  Swallowing function for liquids and puree food was intact. Mastication of soft solid food was poor due to generalized weakness, impaired sustained attention, reduced oral awareness, and impaired cognition. Pt is unsafe for diet advancement at this time due to aspiration risk. Cognitive-Communicative:  Pt presented w/ moderate-severe deficits overall w/ improvement in ability to follow directions and answer yes/no questions. Pt's attention, orientation, memory, problem solving, initiation, and verbal output remain impaired. Pt evidenced continued acute decline in swallowing and cognitive-communicative skills due to new onset of neurological conditions. Pt remains appropriate for intensive daily skilled ST services, in order to maximize her level of functioning for daily living needs.        â¢ Rehab Potential: good  â¢ Potential barriers to progress:  Cognitive status, attention, current medical conditions, reduced endurance and severity of deficits  â¢ Progress: Slow progress, cognitive deficits and Slow progress, decreased activity tolerance    Patient at end of session:    - location: in chair    - safety measures: alarm system in place/re-engaged and call light within reach    - hand off to: nurse    PLAN                                                                                                                               Interventions:  Diet advancement trials and communication/cognition therapy    Plan/Goal Agreement:  Patient agrees with goals and treatment plan    RECOMMENDATIONS     -Diet:          *puree/dysphagia 1 and thin liquids -Medication Administration:         *crushed and with puree    -Feeding Guidelines:          *eat slowly only when alert, small bites/sips, constant supervision, sit fully upright for all po intake, allow extra time to swallow, periodic liquid wash, oral care after all meals and medications and distraction free environment    -Speech Reviewed Swallow:         *with patient/family, with clinical caregivers and feeding guidelines posted in room    -Communication Cognition:          *Patient continues to demonstrate acute communication and cognition deficits, will continue to follow. Patient will require 24/7 supervision at discharge. Patient would benefit from intensive rehab program.      GOALS                                                                                                                      Long Term Goals:   1. Pt to tolerate least restrictive PO diet without overt s/s aspiration to facilitate safety with PO intake-- PROGRESSING/ONGOING  2. Pt to complete min complex memory tasks given min cues with 80% accuracy to increase delayed recall-- PROGRESSING/ONGOING  3. Pt will complete min complex sustained attention tasks given mod cues with 80% accuracy to increase dynamic attention for therapy tasks-- PROGRESSING/ONGOING  4. Pt will provide response within 15 secs in 4/5 opportunities to improve her ability to communicate with others. - ONGOING      Documented in the chart in the following areas: Assessment.

## 2024-04-04 NOTE — PROGRESS NOTES
Patient identification verified with 2 identifiers.    Location: [facility] - [id number] [address] [place], [place] [address] [phone]     Referring Physician: Dr. Doc Troy  Enrollment/ Re-enrollment date: 2025   INR Goal: 2.0-3.0  INR monitoring is per [facility] protocol.  Anticoagulation Medication: warfarin  Indication: Atrial Fibrillation/Atrial Flutter    Subjective   Bleeding signs/symptoms: No    Bruising: No   Major bleeding event: No  Thrombosis signs/symptoms: No  Thromboembolic event: No  Missed doses: No  Extra doses: No  Medication changes: No  Dietary changes: No  Change in health: No  Change in activity: No  Alcohol: No  Other concerns: No    Upcoming Procedures:  Does the Patient Have any upcoming procedures that require interruption in anticoagulation therapy? no  Does the patient require bridging? no      Anticoagulation Summary  As of 2024      INR goal:  2.0-3.0   TTR:  51.2 % (5.4 mo)   INR used for dosin.69 (2024)   Weekly warfarin total:  13.5 mg               Assessment/Plan   Therapeutic     1. New dose: no change    2. Next INR: 1 week      Education provided to patient during the visit:  Patient instructed to call in interim with questions, concerns and changes.   Patient educated on interactions between medications and warfarin.   Patient educated on dietary consistency in vitamin k consumption.   Patient educated on affects of alcohol consumption while taking warfarin.   Patient educated on signs of bleeding/clotting.   Patient educated on compliance with dosing, follow up appointments, and prescribed plan of care.

## 2024-04-05 ENCOUNTER — HOSPITAL ENCOUNTER (EMERGENCY)
Facility: HOSPITAL | Age: 79
Discharge: HOME | End: 2024-04-05
Attending: EMERGENCY MEDICINE
Payer: MEDICARE

## 2024-04-05 ENCOUNTER — APPOINTMENT (OUTPATIENT)
Dept: RADIOLOGY | Facility: HOSPITAL | Age: 79
End: 2024-04-05
Payer: MEDICARE

## 2024-04-05 ENCOUNTER — TELEMEDICINE (OUTPATIENT)
Dept: NEPHROLOGY | Facility: CLINIC | Age: 79
End: 2024-04-05
Payer: MEDICARE

## 2024-04-05 VITALS
OXYGEN SATURATION: 95 % | DIASTOLIC BLOOD PRESSURE: 60 MMHG | WEIGHT: 254 LBS | HEIGHT: 72 IN | BODY MASS INDEX: 34.4 KG/M2 | TEMPERATURE: 98.2 F | RESPIRATION RATE: 26 BRPM | HEART RATE: 65 BPM | SYSTOLIC BLOOD PRESSURE: 142 MMHG

## 2024-04-05 DIAGNOSIS — N18.32 HYPERTENSIVE KIDNEY DISEASE WITH STAGE 3B CHRONIC KIDNEY DISEASE (MULTI): Primary | ICD-10-CM

## 2024-04-05 DIAGNOSIS — S09.90XA HEAD INJURY, INITIAL ENCOUNTER: Primary | ICD-10-CM

## 2024-04-05 DIAGNOSIS — I12.9 HYPERTENSIVE KIDNEY DISEASE WITH STAGE 3B CHRONIC KIDNEY DISEASE (MULTI): Primary | ICD-10-CM

## 2024-04-05 PROCEDURE — 99285 EMERGENCY DEPT VISIT HI MDM: CPT

## 2024-04-05 PROCEDURE — 72125 CT NECK SPINE W/O DYE: CPT

## 2024-04-05 PROCEDURE — 1159F MED LIST DOCD IN RCRD: CPT | Performed by: INTERNAL MEDICINE

## 2024-04-05 PROCEDURE — 70450 CT HEAD/BRAIN W/O DYE: CPT | Performed by: STUDENT IN AN ORGANIZED HEALTH CARE EDUCATION/TRAINING PROGRAM

## 2024-04-05 PROCEDURE — 99213 OFFICE O/P EST LOW 20 MIN: CPT | Performed by: INTERNAL MEDICINE

## 2024-04-05 PROCEDURE — 70450 CT HEAD/BRAIN W/O DYE: CPT

## 2024-04-05 PROCEDURE — G0390 TRAUMA RESPONS W/HOSP CRITI: HCPCS

## 2024-04-05 PROCEDURE — 1160F RVW MEDS BY RX/DR IN RCRD: CPT | Performed by: INTERNAL MEDICINE

## 2024-04-05 PROCEDURE — 72125 CT NECK SPINE W/O DYE: CPT | Performed by: STUDENT IN AN ORGANIZED HEALTH CARE EDUCATION/TRAINING PROGRAM

## 2024-04-05 PROCEDURE — 1111F DSCHRG MED/CURRENT MED MERGE: CPT | Performed by: INTERNAL MEDICINE

## 2024-04-05 PROCEDURE — 1157F ADVNC CARE PLAN IN RCRD: CPT | Performed by: INTERNAL MEDICINE

## 2024-04-05 RX ORDER — ACETAMINOPHEN 325 MG/1
975 TABLET ORAL ONCE
Status: COMPLETED | OUTPATIENT
Start: 2024-04-05 | End: 2024-04-05

## 2024-04-05 RX ADMIN — ACETAMINOPHEN 975 MG: 325 TABLET ORAL at 03:18

## 2024-04-05 ASSESSMENT — PAIN - FUNCTIONAL ASSESSMENT
PAIN_FUNCTIONAL_ASSESSMENT: 0-10

## 2024-04-05 ASSESSMENT — PAIN SCALES - GENERAL
PAINLEVEL_OUTOF10: 4
PAINLEVEL_OUTOF10: 4

## 2024-04-05 ASSESSMENT — PAIN DESCRIPTION - FREQUENCY: FREQUENCY: CONSTANT/CONTINUOUS

## 2024-04-05 ASSESSMENT — PAIN DESCRIPTION - ORIENTATION: ORIENTATION: POSTERIOR

## 2024-04-05 ASSESSMENT — PAIN DESCRIPTION - LOCATION: LOCATION: HEAD

## 2024-04-05 ASSESSMENT — PAIN DESCRIPTION - DESCRIPTORS
DESCRIPTORS: ACHING

## 2024-04-05 ASSESSMENT — PAIN DESCRIPTION - PAIN TYPE: TYPE: ACUTE PAIN

## 2024-04-05 NOTE — PROGRESS NOTES
Chief Complaint: Follow up CKD    Runny nose, fatigue, not feeling well  Denies nausea, vomiting, chest pain, dyspnea  No urinary symptoms    254 lbs 120-150s systolic  Sleepy   O2 per NC  Sclera AI s inj  MMM, no sores  3+ edema to knees, bandaged  No tremor  No rash    CKD stage 3b  HTN, volume overload  Proteinuria     Increase lasix to 20 mg twice daily  If no response, increase to 40 mg in morning and 20 at night  Labs next week  Follow up as scheduled

## 2024-04-05 NOTE — ED PROVIDER NOTES
HPI   Chief Complaint   Patient presents with    Fall     Pt fell while at a nursing facility around 2130. Pt was trying to get in his wheelchair and he missed. Pt stated he hit the back of his neck. Pt denies any loss conciuosness.       HPI  78-year-old male brought in from nursing facility for complaint of a head injury.  At approximately 9:30 PM he was trying to get in his wheelchair and he missed and hit the back of his head.  No loss conscious.  Has continued head and neck pain.  He is on Coumadin.  No chest pain or shortness of breath.  No dizziness lightheadedness.  No other pain.  No other complaints.                  Gladys Coma Scale Score: 15                     Patient History   Past Medical History:   Diagnosis Date    Atherosclerotic heart disease of native coronary artery without angina pectoris 12/14/2022    Arteriosclerotic cardiovascular disease (ASCVD)    Benign neoplasm of meninges, unspecified (CMS/HCC)     Meningioma    Benign prostatic hyperplasia without lower urinary tract symptoms 02/28/2022    BPH without obstruction/lower urinary tract symptoms    Body mass index (BMI) 33.0-33.9, adult 07/02/2021    BMI 33.0-33.9,adult    Body mass index (BMI) 34.0-34.9, adult 11/09/2021    BMI 34.0-34.9,adult    Body mass index (BMI) 34.0-34.9, adult 09/08/2021    BMI 34.0-34.9,adult    Body mass index (BMI) 35.0-35.9, adult 12/29/2021    BMI 35.0-35.9,adult    Body mass index (BMI) 35.0-35.9, adult 02/28/2022    BMI 35.0-35.9,adult    Elevated blood-pressure reading, without diagnosis of hypertension     Prehypertension    Encounter for immunization 10/27/2015    Need for prophylactic vaccination and inoculation against influenza    Hyperlipidemia, unspecified 11/15/2021    Hyperlipidemia    Other conditions influencing health status     Nephrolithiasis    Personal history of other diseases of the musculoskeletal system and connective tissue     History of tendinitis    Personal history of other drug  therapy     History of influenza vaccination    Personal history of other specified conditions 10/25/2022    History of bradycardia    Vitamin D deficiency, unspecified 10/08/2020    Vitamin D deficiency     Past Surgical History:   Procedure Laterality Date    BACK SURGERY  04/18/2016    Back Surgery    COLONOSCOPY  07/03/2013    Complete Colonoscopy    CORONARY ANGIOPLASTY WITH STENT PLACEMENT  08/18/2020    Cath Stent Placement    CT ANGIO NECK  9/25/2021    CT NECK ANGIO W AND WO IV CONTRAST 9/25/2021 [facility]ICAL [facility]GAC[facility]    CT HEAD ANGIO W AND WO IV CONTRAST  9/25/2021    CT HEAD ANGIO W AND WO IV CONTRAST 9/25/2021 [facility] CL[facility]GAC[facility]    EYE SURGERY  04/03/2013    Eye Surgery    KNEE ARTHROSCOPY W/ DEBRIDEMENT  07/03/2013    Arthroscopy Knee Right    KNEE ARTHROSCOPY W/ DEBRIDEMENT  07/03/2013    Arthroscopy Knee Left    LITHOTRIPSY  07/22/2013    Renal Lithotripsy    MR HEAD ANGIO WO IV CONTRAST  3/23/2021    MR HEAD ANGIO WO IV CONTRAST [facility]    MR NECK ANGIO WO IV CONTRAST  3/23/2021    MR NECK ANGIO WO IV CONTRAST [facility]    OTHER SURGICAL HISTORY  04/03/2013    Cardiac Cath Procedure Outcome: Successful    OTHER SURGICAL HISTORY  04/03/2013    Neuroplasty With Transposition Of Ulnar Nerve - At Elbow    OTHER SURGICAL HISTORY  04/03/2013    General Surgery    OTHER SURGICAL HISTORY  06/11/2019    Knee replacement    OTHER SURGICAL HISTORY  07/22/2013    Cystoscopy With Insertion Of Ureteral Stent Bilateral    OTHER SURGICAL HISTORY  07/03/2013    Wrist Carpectomy    OTHER SURGICAL HISTORY  07/03/2013    Brain Surgery    OTHER SURGICAL HISTORY  07/03/2013    Ulnar Osteotomy    TONSILLECTOMY  04/03/2013    Tonsillectomy    TOTAL KNEE ARTHROPLASTY  08/03/2016    Total Knee Arthroplasty     Family History   Problem Relation Name Age of Onset    Diabetes Mother      Emphysema Father      Heart disease Father       Social History     Tobacco Use    Smoking status: Former     Types:  Cigarettes     Quit date:      Years since quittin.2    Smokeless tobacco: Never   Substance Use Topics    Alcohol use: Not Currently    Drug use: Never       Physical Exam   ED Triage Vitals [24 0228]   Temperature Heart Rate Respirations BP   36.8 °C (98.2 °F) 67 16 147/63      Pulse Ox Temp Source Heart Rate Source Patient Position   95 % Oral -- --      BP Location FiO2 (%)     -- --       Physical Exam  General:  Awake, alert, no acute distress.  Head: Normocephalic, Atraumatic  Neck: Supple, trachea midline, no stridor.  Mild diffuse cervical tenderness palpation  Skin: Warm and dry, no rashes   Lungs:  No acute respiratory distress, speaking in full sentences without difficulty  Neuro:  No gross focal neurologic deficits, NIH is 0  Musculoskeletal:  Full range of motion in all 4 extremities  Psychiatric:  Alert oriented x 3, Good insight into condition.  ED Course & MDM   Diagnoses as of 24   Head injury, initial encounter       Medical Decision Making  Patient was made in HIA given the fact he is on Coumadin.  His head and C-spine CTs are negative.  He requires no further emergent intervention or treatment.  He is stable for discharge.    Procedure  Procedures     Alexei Fagan,   24 [id number]       Alexei Fagan,   24 0453

## 2024-04-05 NOTE — DISCHARGE INSTRUCTIONS
Thank you for choosing [facility]. It was my pleasure to be involved in your care today.         As of today's visit, based on reasonable likelihood, that it is safe for you to be discharged back to your residence to follow-up as an outpatient for ongoing management of your medical problem. You should follow-up with any referrals / primary provider as soon as possible. The contacts (number, addresses) are listed below.         *Return to us immediately, if you feel you are getting worse, not getting better, or any new symptoms develop.         Make sure your pharmacy and primary doctor is aware of any new medications prescribed today.          It is your responsibility to contact as soon as possible, and follow through with, any referrals you were given today. We do recommend you inform them you are a [facility] follow-up patient, as often they can better accommodate your need to be seen, provided their schedules allow. We will, and have, made every effort to ensure you have access to adequate follow-up specialists available.          All problems may not be able to be fixed in one ER visit. This is why timely ongoing care is important, and this is a responsibility you share in. Further, you are free to follow up with any provider you choose, and this is not limited to our suggestion.          If cultures were obtained today, you will be contacted should anything result that would require further treatment. Please contact the ED at the number provided with questions.          Having trouble affording medications? Try [email or web address]! (This is not a hospital endorsed website, merely a recommendation based on my own personal experiences with [employer])

## 2024-04-07 ENCOUNTER — HOME HEALTH ADMISSION (OUTPATIENT)
Dept: HOME HEALTH SERVICES | Facility: HOME HEALTH | Age: 79
End: 2024-04-07
Payer: MEDICARE

## 2024-04-07 ENCOUNTER — APPOINTMENT (OUTPATIENT)
Dept: RADIOLOGY | Facility: HOSPITAL | Age: 79
End: 2024-04-07
Payer: MEDICARE

## 2024-04-07 ENCOUNTER — HOSPITAL ENCOUNTER (EMERGENCY)
Facility: HOSPITAL | Age: 79
Discharge: HOME | End: 2024-04-08
Attending: EMERGENCY MEDICINE
Payer: MEDICARE

## 2024-04-07 ENCOUNTER — DOCUMENTATION (OUTPATIENT)
Dept: HOME HEALTH SERVICES | Facility: HOME HEALTH | Age: 79
End: 2024-04-07
Payer: MEDICARE

## 2024-04-07 DIAGNOSIS — K92.2 GASTROINTESTINAL HEMORRHAGE, UNSPECIFIED GASTROINTESTINAL HEMORRHAGE TYPE: Primary | ICD-10-CM

## 2024-04-07 DIAGNOSIS — J10.1 INFLUENZA A: ICD-10-CM

## 2024-04-07 DIAGNOSIS — N30.90 CYSTITIS: ICD-10-CM

## 2024-04-07 LAB
ABO GROUP (TYPE) IN BLOOD: NORMAL
ALBUMIN SERPL-MCNC: 3.7 G/DL (ref 3.5–5)
ALP BLD-CCNC: 81 U/L (ref 35–125)
ALT SERPL-CCNC: 10 U/L (ref 5–40)
ANION GAP SERPL CALC-SCNC: 13 MMOL/L
ANTIBODY SCREEN: NORMAL
APPEARANCE UR: CLEAR
APTT PPP: 45.5 SECONDS (ref 22–32.5)
AST SERPL-CCNC: 35 U/L (ref 5–40)
BACTERIA #/AREA URNS AUTO: ABNORMAL /HPF
BASOPHILS # BLD AUTO: 0.03 X10*3/UL (ref 0–0.1)
BASOPHILS NFR BLD AUTO: 0.9 %
BILIRUB SERPL-MCNC: 0.7 MG/DL (ref 0.1–1.2)
BILIRUB UR STRIP.AUTO-MCNC: NEGATIVE MG/DL
BUN SERPL-MCNC: 27 MG/DL (ref 8–25)
CALCIUM SERPL-MCNC: 8.7 MG/DL (ref 8.5–10.4)
CHLORIDE SERPL-SCNC: 99 MMOL/L (ref 97–107)
CO2 SERPL-SCNC: 28 MMOL/L (ref 24–31)
COLOR UR: ABNORMAL
CREAT SERPL-MCNC: 2 MG/DL (ref 0.4–1.6)
EGFRCR SERPLBLD CKD-EPI 2021: 33 ML/MIN/1.73M*2
EOSINOPHIL # BLD AUTO: 0.04 X10*3/UL (ref 0–0.4)
EOSINOPHIL NFR BLD AUTO: 1.1 %
ERYTHROCYTE [DISTWIDTH] IN BLOOD BY AUTOMATED COUNT: 20.4 % (ref 11.5–14.5)
FLUAV RNA RESP QL NAA+PROBE: DETECTED
FLUBV RNA RESP QL NAA+PROBE: NOT DETECTED
GLUCOSE SERPL-MCNC: 88 MG/DL (ref 65–99)
GLUCOSE UR STRIP.AUTO-MCNC: NORMAL MG/DL
HCT VFR BLD AUTO: 36.9 % (ref 41–52)
HEMOCCULT SP1 STL QL: NEGATIVE
HGB BLD-MCNC: 10.5 G/DL (ref 13.5–17.5)
HYALINE CASTS #/AREA URNS AUTO: ABNORMAL /LPF
IMM GRANULOCYTES # BLD AUTO: 0.02 X10*3/UL (ref 0–0.5)
IMM GRANULOCYTES NFR BLD AUTO: 0.6 % (ref 0–0.9)
INR PPP: 2.5 (ref 0.9–1.2)
KETONES UR STRIP.AUTO-MCNC: NEGATIVE MG/DL
LEUKOCYTE ESTERASE UR QL STRIP.AUTO: ABNORMAL
LYMPHOCYTES # BLD AUTO: 0.51 X10*3/UL (ref 0.8–3)
LYMPHOCYTES NFR BLD AUTO: 14.5 %
MCH RBC QN AUTO: 26.1 PG (ref 26–34)
MCHC RBC AUTO-ENTMCNC: 28.5 G/DL (ref 32–36)
MCV RBC AUTO: 92 FL (ref 80–100)
MONOCYTES # BLD AUTO: 0.47 X10*3/UL (ref 0.05–0.8)
MONOCYTES NFR BLD AUTO: 13.4 %
MUCOUS THREADS #/AREA URNS AUTO: ABNORMAL /LPF
NEUTROPHILS # BLD AUTO: 2.44 X10*3/UL (ref 1.6–5.5)
NEUTROPHILS NFR BLD AUTO: 69.5 %
NITRITE UR QL STRIP.AUTO: NEGATIVE
NRBC BLD-RTO: 0 /100 WBCS (ref 0–0)
PH UR STRIP.AUTO: 5.5 [PH]
PLATELET # BLD AUTO: 162 X10*3/UL (ref 150–450)
POTASSIUM SERPL-SCNC: 4.1 MMOL/L (ref 3.4–5.1)
PROT SERPL-MCNC: 6.7 G/DL (ref 5.9–7.9)
PROT UR STRIP.AUTO-MCNC: ABNORMAL MG/DL
PROTHROMBIN TIME: 24.8 SECONDS (ref 9.3–12.7)
RBC # BLD AUTO: 4.02 X10*6/UL (ref 4.5–5.9)
RBC # UR STRIP.AUTO: ABNORMAL /UL
RBC #/AREA URNS AUTO: >20 /HPF
RBC MORPH BLD: NORMAL
RH FACTOR (ANTIGEN D): NORMAL
SARS-COV-2 RNA RESP QL NAA+PROBE: NOT DETECTED
SODIUM SERPL-SCNC: 140 MMOL/L (ref 133–145)
SP GR UR STRIP.AUTO: 1.01
UROBILINOGEN UR STRIP.AUTO-MCNC: NORMAL MG/DL
WBC # BLD AUTO: 3.5 X10*3/UL (ref 4.4–11.3)
WBC #/AREA URNS AUTO: >50 /HPF

## 2024-04-07 PROCEDURE — 71046 X-RAY EXAM CHEST 2 VIEWS: CPT | Performed by: RADIOLOGY

## 2024-04-07 PROCEDURE — 82270 OCCULT BLOOD FECES: CPT

## 2024-04-07 PROCEDURE — 86901 BLOOD TYPING SEROLOGIC RH(D): CPT

## 2024-04-07 PROCEDURE — 2500000004 HC RX 250 GENERAL PHARMACY W/ HCPCS (ALT 636 FOR OP/ED)

## 2024-04-07 PROCEDURE — 85730 THROMBOPLASTIN TIME PARTIAL: CPT

## 2024-04-07 PROCEDURE — 99284 EMERGENCY DEPT VISIT MOD MDM: CPT | Mod: 25

## 2024-04-07 PROCEDURE — 87636 SARSCOV2 & INF A&B AMP PRB: CPT

## 2024-04-07 PROCEDURE — 85025 COMPLETE CBC W/AUTO DIFF WBC: CPT

## 2024-04-07 PROCEDURE — 36415 COLL VENOUS BLD VENIPUNCTURE: CPT

## 2024-04-07 PROCEDURE — 85610 PROTHROMBIN TIME: CPT

## 2024-04-07 PROCEDURE — 71046 X-RAY EXAM CHEST 2 VIEWS: CPT

## 2024-04-07 PROCEDURE — 96365 THER/PROPH/DIAG IV INF INIT: CPT

## 2024-04-07 PROCEDURE — 87086 URINE CULTURE/COLONY COUNT: CPT | Mod: WESLAB

## 2024-04-07 PROCEDURE — 84075 ASSAY ALKALINE PHOSPHATASE: CPT

## 2024-04-07 PROCEDURE — 81001 URINALYSIS AUTO W/SCOPE: CPT

## 2024-04-07 PROCEDURE — 2500000002 HC RX 250 W HCPCS SELF ADMINISTERED DRUGS (ALT 637 FOR MEDICARE OP, ALT 636 FOR OP/ED)

## 2024-04-07 RX ORDER — OSELTAMIVIR PHOSPHATE 30 MG/1
30 CAPSULE ORAL ONCE
Status: COMPLETED | OUTPATIENT
Start: 2024-04-07 | End: 2024-04-07

## 2024-04-07 RX ORDER — OSELTAMIVIR PHOSPHATE 75 MG/1
75 CAPSULE ORAL EVERY 12 HOURS
Qty: 10 CAPSULE | Refills: 0 | Status: SHIPPED | OUTPATIENT
Start: 2024-04-07 | End: 2024-04-12

## 2024-04-07 RX ORDER — CEPHALEXIN 500 MG/1
500 CAPSULE ORAL 2 TIMES DAILY
Qty: 14 CAPSULE | Refills: 0 | Status: SHIPPED | OUTPATIENT
Start: 2024-04-07 | End: 2024-04-14

## 2024-04-07 RX ORDER — CEFTRIAXONE 1 G/50ML
1 INJECTION, SOLUTION INTRAVENOUS ONCE
Status: COMPLETED | OUTPATIENT
Start: 2024-04-07 | End: 2024-04-07

## 2024-04-07 RX ADMIN — SODIUM CHLORIDE 1000 ML: 900 INJECTION, SOLUTION INTRAVENOUS at 22:26

## 2024-04-07 RX ADMIN — OSELTAMIVIR PHOSPHATE 30 MG: 30 CAPSULE ORAL at 22:41

## 2024-04-07 RX ADMIN — CEFTRIAXONE SODIUM 1 G: 1 INJECTION, SOLUTION INTRAVENOUS at 22:26

## 2024-04-07 ASSESSMENT — COLUMBIA-SUICIDE SEVERITY RATING SCALE - C-SSRS
2. HAVE YOU ACTUALLY HAD ANY THOUGHTS OF KILLING YOURSELF?: NO
6. HAVE YOU EVER DONE ANYTHING, STARTED TO DO ANYTHING, OR PREPARED TO DO ANYTHING TO END YOUR LIFE?: NO
1. IN THE PAST MONTH, HAVE YOU WISHED YOU WERE DEAD OR WISHED YOU COULD GO TO SLEEP AND NOT WAKE UP?: NO

## 2024-04-07 ASSESSMENT — PAIN SCALES - GENERAL: PAINLEVEL_OUTOF10: 0 - NO PAIN

## 2024-04-07 ASSESSMENT — PAIN - FUNCTIONAL ASSESSMENT: PAIN_FUNCTIONAL_ASSESSMENT: 0-10

## 2024-04-07 NOTE — ED PROVIDER NOTES
HPI   Chief Complaint   Patient presents with    Black or Bloody Stool     Dark tarry stools starting yesterday. On coumadin        Patient is an 78-year-old male with past medical history of rectal bleeding, COPD on oxygen, A-fib on Coumadin presenting with dark stools.  States that yesterday he had moderately dark stools that have worsened today.  Today his stool was dark black.  Has intermittent bouts of formed stools as well as diarrhea.  Says he has been feeling generally fatigued for roughly 2 weeks.  Endorses new onset cough.  Denies fevers, chills, sore throat, chest pain, new onset shortness of breath, abdominal pain, nausea, vomiting, urinary complaints.  Patient was recently discharged at the end of March for a GI bleed.  At that time, he needed blood transfusion.                          Gladys Coma Scale Score: 15                     Patient History   Past Medical History:   Diagnosis Date    Atherosclerotic heart disease of native coronary artery without angina pectoris 12/14/2022    Arteriosclerotic cardiovascular disease (ASCVD)    Benign neoplasm of meninges, unspecified (CMS/HCC)     Meningioma    Benign prostatic hyperplasia without lower urinary tract symptoms 02/28/2022    BPH without obstruction/lower urinary tract symptoms    Body mass index (BMI) 33.0-33.9, adult 07/02/2021    BMI 33.0-33.9,adult    Body mass index (BMI) 34.0-34.9, adult 11/09/2021    BMI 34.0-34.9,adult    Body mass index (BMI) 34.0-34.9, adult 09/08/2021    BMI 34.0-34.9,adult    Body mass index (BMI) 35.0-35.9, adult 12/29/2021    BMI 35.0-35.9,adult    Body mass index (BMI) 35.0-35.9, adult 02/28/2022    BMI 35.0-35.9,adult    Elevated blood-pressure reading, without diagnosis of hypertension     Prehypertension    Encounter for immunization 10/27/2015    Need for prophylactic vaccination and inoculation against influenza    Hyperlipidemia, unspecified 11/15/2021    Hyperlipidemia    Other conditions influencing health  status     Nephrolithiasis    Personal history of other diseases of the musculoskeletal system and connective tissue     History of tendinitis    Personal history of other drug therapy     History of influenza vaccination    Personal history of other specified conditions 10/25/2022    History of bradycardia    Vitamin D deficiency, unspecified 10/08/2020    Vitamin D deficiency     Past Surgical History:   Procedure Laterality Date    BACK SURGERY  04/18/2016    Back Surgery    COLONOSCOPY  07/03/2013    Complete Colonoscopy    CORONARY ANGIOPLASTY WITH STENT PLACEMENT  08/18/2020    Cath Stent Placement    CT ANGIO NECK  9/25/2021    CT NECK ANGIO W AND WO IV CONTRAST 9/25/2021 [facility]    CT HEAD ANGIO W AND WO IV CONTRAST  9/25/2021    CT HEAD ANGIO W AND WO IV CONTRAST 9/25/2021 [facility]    EYE SURGERY  04/03/2013    Eye Surgery    KNEE ARTHROSCOPY W/ DEBRIDEMENT  07/03/2013    Arthroscopy Knee Right    KNEE ARTHROSCOPY W/ DEBRIDEMENT  07/03/2013    Arthroscopy Knee Left    LITHOTRIPSY  07/22/2013    Renal Lithotripsy    MR HEAD ANGIO WO IV CONTRAST  3/23/2021    MR HEAD ANGIO WO IV CONTRAST [facility]    MR NECK ANGIO WO IV CONTRAST  3/23/2021    MR NECK ANGIO WO IV CONTRAST [facility]    OTHER SURGICAL HISTORY  04/03/2013    Cardiac Cath Procedure Outcome: Successful    OTHER SURGICAL HISTORY  04/03/2013    Neuroplasty With Transposition Of Ulnar Nerve - At Elbow    OTHER SURGICAL HISTORY  04/03/2013    General Surgery    OTHER SURGICAL HISTORY  06/11/2019    Knee replacement    OTHER SURGICAL HISTORY  07/22/2013    Cystoscopy With Insertion Of Ureteral Stent Bilateral    OTHER SURGICAL HISTORY  07/03/2013    Wrist Carpectomy    OTHER SURGICAL HISTORY  07/03/2013    Brain Surgery    OTHER SURGICAL HISTORY  07/03/2013    Ulnar Osteotomy    TONSILLECTOMY  04/03/2013    Tonsillectomy    TOTAL KNEE ARTHROPLASTY  08/03/2016    Total Knee Arthroplasty     Family History    Problem Relation Name Age of Onset    Diabetes Mother      Emphysema Father      Heart disease Father       Social History     Tobacco Use    Smoking status: Former     Types: Cigarettes     Quit date:      Years since quittin.2    Smokeless tobacco: Never   Substance Use Topics    Alcohol use: Not Currently    Drug use: Never       Physical Exam   ED Triage Vitals [24]   Temperature Heart Rate Respirations BP   36.7 °C (98.1 °F) 70 20 154/67      Pulse Ox Temp Source Heart Rate Source Patient Position   100 % Oral Radial --      BP Location FiO2 (%)     -- 29 %       Physical Exam  Constitutional:       Appearance: Normal appearance.      Comments: Awake, laying in examination bed, in no acute distress   HENT:      Head: Normocephalic and atraumatic.      Nose: Nose normal.      Mouth/Throat:      Mouth: Mucous membranes are moist.      Pharynx: Oropharynx is clear.   Eyes:      Extraocular Movements: Extraocular movements intact.      Pupils: Pupils are equal, round, and reactive to light.   Cardiovascular:      Rate and Rhythm: Normal rate and regular rhythm.      Pulses: Normal pulses.      Heart sounds: Normal heart sounds.   Pulmonary:      Effort: Pulmonary effort is normal.      Breath sounds: Normal breath sounds.   Abdominal:      General: Abdomen is flat.      Palpations: Abdomen is soft.   Genitourinary:     Rectum: Normal.      Comments: Hemorrhoids present on rectal exam.  Dark stool present during rectal exam  Musculoskeletal:         General: Normal range of motion.      Cervical back: Normal range of motion and neck supple.   Skin:     General: Skin is warm and dry.      Capillary Refill: Capillary refill takes less than 2 seconds.   Neurological:      General: No focal deficit present.      Mental Status: He is oriented to person, place, and time.   Psychiatric:         Mood and Affect: Mood normal.         Behavior: Behavior normal.         ED Course & MDM   Diagnoses as of  04/08/24 0000   Gastrointestinal hemorrhage, unspecified gastrointestinal hemorrhage type   Cystitis   Influenza A       Medical Decision Making  Patient is an 78-year-old male with past medical history of rectal bleeding, COPD on oxygen, A-fib on Coumadin presenting with dark stools.  CBC, CMP, PT, INR, type and screen, occult stool, viral swabs, chest x-ray ordered.  Conditions considered include but not limited to: GI bleed, iron use, viral illness, pneumonia.    I saw this patient in conjunction with Dr. Fagan.  CBC shows WBC is low at 3.5, and hemoglobin showing signs of anemia with results at 10.5.  When compared to prior, WBCs appear stable and hemoglobin seems to be significantly improved.  Patient's INR is within therapeutic levels.  CMP is without significant electrolyte abnormality but does show elements of chronic kidney disease.  Occult stool is negative.  Type and screen has been run.  Influenza A swab is positive.  Chest x-ray does show signs for possible viral pneumonia.  UA with micro is positive for UTI.  IV Rocephin started.    I believe this patient is at low risk for complication, and a disoposition of discharge is acceptable.  Return to the Emergency Department if new or worsening symptoms including headache, fever, chills, chest pain, shortness of breath, syncope, near syncope, abdominal pain, nausea, vomiting,  diarrhea, or worsening pain.  Discussed with patient to return to his nursing facility but to ensure that staff is monitoring him closely in case he needs to return for possible blood transfusion like he has required in the past.  Tamiflu ordered and started today.  Keflex ordered for outpatient and antibiotics were started today.  Patient is agreeable to disposition of discharge back to facility with follow-up with primary care and to take antibiotics until completion.    Portions of this note made with Dragon software, please be mindful of potential grammatical  errors.        Medications   sodium chloride 0.9 % bolus 1,000 mL (1,000 mL intravenous New Bag 4/7/24 2226)   cefTRIAXone (Rocephin) IVPB 1 g (0 g intravenous Stopped 4/7/24 2256)   oseltamivir (Tamiflu) capsule 30 mg (30 mg oral Given 4/7/24 2241)         Labs Reviewed   CBC WITH AUTO DIFFERENTIAL - Abnormal       Result Value    WBC 3.5 (*)     nRBC 0.0      RBC 4.02 (*)     Hemoglobin 10.5 (*)     Hematocrit 36.9 (*)     MCV 92      MCH 26.1      MCHC 28.5 (*)     RDW 20.4 (*)     Platelets 162      Neutrophils % 69.5      Immature Granulocytes %, Automated 0.6      Lymphocytes % 14.5      Monocytes % 13.4      Eosinophils % 1.1      Basophils % 0.9      Neutrophils Absolute 2.44      Immature Granulocytes Absolute, Automated 0.02      Lymphocytes Absolute 0.51 (*)     Monocytes Absolute 0.47      Eosinophils Absolute 0.04      Basophils Absolute 0.03     COMPREHENSIVE METABOLIC PANEL - Abnormal    Glucose 88      Sodium 140      Potassium 4.1      Chloride 99      Bicarbonate 28      Urea Nitrogen 27 (*)     Creatinine 2.00 (*)     eGFR 33 (*)     Calcium 8.7      Albumin 3.7      Alkaline Phosphatase 81      Total Protein 6.7      AST 35      Bilirubin, Total 0.7      ALT 10      Anion Gap 13     SARS-COV-2 AND INFLUENZA A/B PCR - Abnormal    Flu A Result Detected (*)     Flu B Result Not Detected      Coronavirus 2019, PCR Not Detected      Narrative:     This assay has received FDA Emergency Use Authorization (EUA) and  is only authorized for the duration of time that circumstances exist to justify the authorization of the emergency use of in vitro diagnostic tests for the detection of SARS-CoV-2 virus and/or diagnosis of COVID-19 infection under section 564(b)(1) of the Act, 21 U.S.C. 360bbb-3(b)(1). Testing for SARS-CoV-2 is only recommended for patients who meet current clinical and/or epidemiological criteria as defined by federal, state, or local public health directives. This assay is an in vitro  diagnostic nucleic acid amplification test for the qualitative detection of SARS-CoV-2, Influenza A, and Influenza B from nasopharyngeal specimens and has been validated for use at [facility]. Negative results do not preclude COVID-19 infections or Influenza A/B infections, and should not be used as the sole basis for diagnosis, treatment, or other management decisions. If Influenza A/B and RSV PCR results are negative, testing for Parainfluenza virus, Adenovirus and Metapneumovirus is routinely performed for [facility] pediatric oncology and intensive care inpatients, and is available on other patients by placing an add-on request.    APTT - Abnormal    aPTT 45.5 (*)    PROTIME-INR - Abnormal    Protime 24.8 (*)     INR 2.5 (*)     Narrative:     INR Therapeutic Range: 2.0-3.5   URINALYSIS WITH REFLEX CULTURE AND MICROSCOPIC - Abnormal    Color, Urine Light-Yellow      Appearance, Urine Clear      Specific Gravity, Urine 1.008      pH, Urine 5.5      Protein, Urine 10 (TRACE)      Glucose, Urine Normal      Blood, Urine 0.06 (1+) (*)     Ketones, Urine NEGATIVE      Bilirubin, Urine NEGATIVE      Urobilinogen, Urine Normal      Nitrite, Urine NEGATIVE      Leukocyte Esterase, Urine 500 Ruth/µL (*)    MICROSCOPIC ONLY, URINE - Abnormal    WBC, Urine >50 (*)     RBC, Urine >20 (*)     Bacteria, Urine 1+ (*)     Mucus, Urine FEW      Hyaline Casts, Urine 2+ (*)    OCCULT BLOOD X1, STOOL - Normal    Occult Blood, Stool X1 Negative     URINE CULTURE   TYPE AND SCREEN    ABO TYPE B      Rh TYPE POS      ANTIBODY SCREEN NEG     URINALYSIS WITH REFLEX CULTURE AND MICROSCOPIC    Narrative:     The following orders were created for panel order Urinalysis with Reflex Culture and Microscopic.  Procedure                               Abnormality         Status                     ---------                               -----------         ------                     Urinalysis with Reflex C...[[id number]]   Abnormal            Final result               Extra Urine Gray Tube[[id number]]                            In process                   Please view results for these tests on the individual orders.   EXTRA URINE GRAY TUBE   MORPHOLOGY    RBC Morphology See Below           XR chest 2 views   Final Result   1.  Increased lung markings bilaterally in a perihilar distribution.   Atypical/viral pneumonia is in the differential.                  MACRO:   None        Signed by: Xavier Hendrix 4/7/2024 8:56 PM   Dictation workstation:   [id number]            Procedure  Procedures     Getachew Márquez PA-C  04/08/24 0053

## 2024-04-07 NOTE — HH CARE COORDINATION
Home Care received a Referral for Nursing, Physical Therapy, Occupational Therapy, and Home Health Aide. We have processed the referral for a Start of Care on 4.11.24 to 4.12.24.     If you have any questions or concerns, please feel free to contact us at [phone]. Follow the prompts, enter your five digit zip code, and you will be directed to your care team on E[facility] 1.

## 2024-04-08 VITALS
HEART RATE: 60 BPM | BODY MASS INDEX: 34.4 KG/M2 | SYSTOLIC BLOOD PRESSURE: 144 MMHG | HEIGHT: 72 IN | RESPIRATION RATE: 20 BRPM | DIASTOLIC BLOOD PRESSURE: 75 MMHG | TEMPERATURE: 98.1 F | OXYGEN SATURATION: 97 % | WEIGHT: 254 LBS

## 2024-04-08 LAB — HOLD SPECIMEN: NORMAL

## 2024-04-08 NOTE — ED NOTES
To ED via EMS from [facility], pt c/o dark tarry stools and generalized weakness x 2 days. Pt is on Coumadin. Pt denies dizziness, vision changes, HA, CP, abd pain, N/V/D, f/c's. States he has been urinating what seems like every 10mins.     Pt is A&Ox3. Neuro intact.      Jaycob Castro RN  04/07/24 2350

## 2024-04-08 NOTE — ED NOTES
Pt's cleaned for urine, new brief applied. [facility] onsite. Report given. Pt back to [facility]. VSS.     Jaycob Castro RN  04/08/24 0053

## 2024-04-08 NOTE — ED NOTES
ELMER Nunn at [facility] called and notified of findings and returning back.      Jaycob Castro RN  04/08/24 0000

## 2024-04-11 ENCOUNTER — APPOINTMENT (OUTPATIENT)
Dept: PRIMARY CARE | Facility: CLINIC | Age: 79
End: 2024-04-11
Payer: MEDICARE

## 2024-04-11 ENCOUNTER — ANTICOAGULATION - WARFARIN VISIT (OUTPATIENT)
Dept: CARDIOLOGY | Facility: CLINIC | Age: 79
End: 2024-04-11

## 2024-04-11 DIAGNOSIS — I48.0 PAROXYSMAL ATRIAL FIBRILLATION (MULTI): Primary | ICD-10-CM

## 2024-04-11 LAB — BACTERIA UR CULT: ABNORMAL

## 2024-04-12 ENCOUNTER — TELEPHONE (OUTPATIENT)
Dept: PHARMACY | Facility: HOSPITAL | Age: 79
End: 2024-04-12

## 2024-04-12 ENCOUNTER — APPOINTMENT (OUTPATIENT)
Dept: SURGERY | Facility: CLINIC | Age: 79
End: 2024-04-12
Payer: MEDICARE

## 2024-04-12 NOTE — PROGRESS NOTES
EDPD Note: Lab/Chart Reviewed    Reviewed Mr. Angel Carroll 's chart regarding a positive Urine culture/result that was taken during their recent emergency room visit. The patient was transferred back to their rehab/LTC facility .Therefore, I have faxed this information to [facility] at fax number [phone] .    Susceptibility data from last 90 days.  Collected Specimen Info Organism Ceftriaxone Ciprofloxacin Levofloxacin Penicillin Tetracycline Vancomycin   04/07/24 Urine from Clean Catch/Voided Aerococcus urinae S R R S S S   02/20/24 Urine from Clean Catch/Voided Aerococcus urinae S S S S  S       No further follow up needed from EDPD Team.     Royce Purdy, PharmD

## 2024-04-22 ENCOUNTER — OFFICE VISIT (OUTPATIENT)
Dept: CARDIOLOGY | Facility: CLINIC | Age: 79
End: 2024-04-22
Payer: MEDICARE

## 2024-04-22 VITALS
SYSTOLIC BLOOD PRESSURE: 119 MMHG | WEIGHT: 253.2 LBS | BODY MASS INDEX: 34.29 KG/M2 | OXYGEN SATURATION: 98 % | HEIGHT: 72 IN | DIASTOLIC BLOOD PRESSURE: 61 MMHG | HEART RATE: 60 BPM

## 2024-04-22 DIAGNOSIS — I25.10 ARTERIOSCLEROTIC CARDIOVASCULAR DISEASE (ASCVD): Primary | ICD-10-CM

## 2024-04-22 DIAGNOSIS — E78.00 PURE HYPERCHOLESTEROLEMIA: ICD-10-CM

## 2024-04-22 DIAGNOSIS — R06.09 DYSPNEA ON EXERTION: ICD-10-CM

## 2024-04-22 DIAGNOSIS — I48.20 CHRONIC ATRIAL FIBRILLATION (MULTI): ICD-10-CM

## 2024-04-22 DIAGNOSIS — I10 PRIMARY HYPERTENSION: ICD-10-CM

## 2024-04-22 PROCEDURE — 3074F SYST BP LT 130 MM HG: CPT | Performed by: NURSE PRACTITIONER

## 2024-04-22 PROCEDURE — 3078F DIAST BP <80 MM HG: CPT | Performed by: NURSE PRACTITIONER

## 2024-04-22 PROCEDURE — 1160F RVW MEDS BY RX/DR IN RCRD: CPT | Performed by: NURSE PRACTITIONER

## 2024-04-22 PROCEDURE — 99214 OFFICE O/P EST MOD 30 MIN: CPT | Performed by: NURSE PRACTITIONER

## 2024-04-22 PROCEDURE — 1126F AMNT PAIN NOTED NONE PRSNT: CPT | Performed by: NURSE PRACTITIONER

## 2024-04-22 PROCEDURE — 1157F ADVNC CARE PLAN IN RCRD: CPT | Performed by: NURSE PRACTITIONER

## 2024-04-22 PROCEDURE — 1159F MED LIST DOCD IN RCRD: CPT | Performed by: NURSE PRACTITIONER

## 2024-04-22 ASSESSMENT — PATIENT HEALTH QUESTIONNAIRE - PHQ9
1. LITTLE INTEREST OR PLEASURE IN DOING THINGS: NOT AT ALL
2. FEELING DOWN, DEPRESSED OR HOPELESS: NOT AT ALL
SUM OF ALL RESPONSES TO PHQ9 QUESTIONS 1 AND 2: 0

## 2024-04-22 ASSESSMENT — ENCOUNTER SYMPTOMS
SHORTNESS OF BREATH: 1
COUGH: 1
CARDIOVASCULAR NEGATIVE: 1

## 2024-04-22 ASSESSMENT — PAIN SCALES - GENERAL: PAINLEVEL: 0-NO PAIN

## 2024-04-22 NOTE — PROGRESS NOTES
Subjective   Angel Carroll is a 79 y.o. male.    Chief Complaint:  Follow-up (6 mos)    HPI  Mr. Carroll presents for a 6-month follow-up appointment.  He is accompanied by his wife and daughter.  He is seen in collaboration with Dr. Martin.  He indicates being in the hospital 3 times since his last visit.  He was admitted for anemia requiring several units transfusion.  His second admission was for a fall and his last visit was for bleeding as well although secondary to a hemorrhoid.  He has resumed anticoagulation and has had no recurrent bleeding.  He is residing at a rehabilitation facility looking at going home in the near future.  Currently he is recovering from the flu.  He has some shortness of breath although is improved from earlier in the month.  He has a slight residual cough.  He denies any fevers or chills.  He remains on oxygen.  He is participating in physical therapy at the rehabilitation center and denies any symptoms of angina or recurrent A-fib.  Recent pacemaker interrogation was unremarkable.    Review of Systems   Constitutional: Positive for malaise/fatigue.   Cardiovascular: Negative.    Respiratory:  Positive for cough and shortness of breath.    All other systems reviewed and are negative.      Objective   Gen.: Well-developed, well-nourished in no acute distress.  Eyes: Conjunctivae are pink.  HEENT: Normocephalic, atraumatic. Oral pharynx is clear.  Neck: Supple, JVP is normal. 2+ carotid pulses   Pulmonary: Normal respiratory effort, clear to auscultation although diminished at bases but without gelacio crackles or wheezes  Cardiovascular: Regular rate, normal S1 and S2, no murmurs, rubs or gallops.  Abdomen: Soft, nontender, nondistended.  Extremities: Warm without recent ankle edema and bilateral wraps to knees. 2+ radial pulses bilaterally   Musculoskeletal: Unable to assess  Skin: No rash, multiple ecchymotic areas  Neurologic: Alert and oriented ×3.  Psychiatric: Normal mood and  affect      Lab Review:   Lab Results   Component Value Date     04/07/2024    K 4.1 04/07/2024    CL 99 04/07/2024    CO2 28 04/07/2024    BUN 27 (H) 04/07/2024    CREATININE 2.00 (H) 04/07/2024    GLUCOSE 88 04/07/2024    CALCIUM 8.7 04/07/2024     Lab Results   Component Value Date    WBC 3.5 (L) 04/07/2024    HGB 10.5 (L) 04/07/2024    HCT 36.9 (L) 04/07/2024    MCV 92 04/07/2024     04/07/2024     Lab Results   Component Value Date    CHOL 117 (L) 11/14/2022    TRIG 81 11/14/2022    HDL 31 (L) 11/14/2022       Assessment/Plan    Mr. Carroll is a pleasant 78-year-old male with a past medical history significant for hypertension, hyperlipidemia, coronary artery disease s/p multiple PCI a low normal LVEF of 50 to 55% by echo 11/2022 patent stents by repeat cath 12/2022, atrial fibrillation with RVR s/p ablation, PPM secondary to sick sinus syndrome 1/2023, CVA in the setting of subtherapeutic INR with a near subtotal occlusion of the right ICA and carotid endarterectomy s/p bovine patch angioplasty 3/2021.  He presents for a 6-month follow-up having had multiple hospitalizations in the interim.  He remains at a rehab facility currently with plans to go home in the near future.  Vital signs and ECG are stable.  Recent device interrogation is unremarkable.  He is recovering albeit slowly from her recent flu.  He has had no recurrence of bleeding and has resumed anticoagulation.  He will continue all medication unchanged.  I would like him to follow-up in 3 months.  He knows to call with any interim concerns.

## 2024-05-02 ENCOUNTER — TELEPHONE (OUTPATIENT)
Dept: CARDIOLOGY | Facility: CLINIC | Age: 79
End: 2024-05-02
Payer: MEDICARE

## 2024-05-07 ENCOUNTER — HOME HEALTH ADMISSION (OUTPATIENT)
Dept: HOME HEALTH SERVICES | Facility: HOME HEALTH | Age: 79
End: 2024-05-07
Payer: MEDICARE

## 2024-05-07 ENCOUNTER — DOCUMENTATION (OUTPATIENT)
Dept: HOME HEALTH SERVICES | Facility: HOME HEALTH | Age: 79
End: 2024-05-07

## 2024-05-07 NOTE — HH CARE COORDINATION
Home Care received a Referral for Nursing, Physical Therapy, and Occupational Therapy. We have processed the referral for a Start of Care on 5/11-5/12.     If you have any questions or concerns, please feel free to contact us at [phone]. Follow the prompts, enter your five digit zip code, and you will be directed to your care team on [facility] 1.

## 2024-05-08 NOTE — PROGRESS NOTES
I SPOKE TO PT'S WIFE AND SHE STATES THAT PT IS BEING DISCHARGED FROM REHAB ON 5/10/24.  HE WILL SELF TEST ON 05/13/24.    
normal

## 2024-05-10 ENCOUNTER — ANTICOAGULATION - WARFARIN VISIT (OUTPATIENT)
Dept: CARDIOLOGY | Facility: CLINIC | Age: 79
End: 2024-05-10
Payer: MEDICARE

## 2024-05-10 ENCOUNTER — APPOINTMENT (OUTPATIENT)
Dept: SURGERY | Facility: CLINIC | Age: 79
End: 2024-05-10
Payer: MEDICARE

## 2024-05-10 DIAGNOSIS — I48.0 PAROXYSMAL ATRIAL FIBRILLATION (MULTI): Primary | ICD-10-CM

## 2024-05-10 NOTE — PROGRESS NOTES
Patient identification verified with 2 identifiers.    Location: [facility] Patient Self-Testing Program [phone]    Referring Physician: Doc Martin MD   Enrollment/ Re-enrollment date: 2025   INR Goal: 2.0-3.0  INR monitoring is per [facility] protocol.  Anticoagulation Medication: warfarin  Indication: Atrial Fibrillation/Atrial Flutter    Subjective   Bleeding signs/symptoms: No    Bruising: No   Major bleeding event: No  Thrombosis signs/symptoms: No  Thromboembolic event: No  Missed doses: No  Extra doses: No  Medication changes:   Dietary changes: No  Change in health: No  Change in activity: No  Alcohol: No  Other concerns: No    Upcoming Procedures:  Does the Patient Have any upcoming procedures that require interruption in anticoagulation therapy? no  Does the patient require bridging? no      Anticoagulation Summary  As of 5/10/2024      INR goal:  2.0-3.0   TTR:  60.0% (6.5 mo)   INR used for dosin.30 (5/10/2024)   Weekly warfarin total:  17.5 mg               Assessment/Plan   Therapeutic     1. New dose: no change    2. Next INR: 2024      Education provided to patient during the visit:  Patient instructed to call in interim with questions, concerns and changes.

## 2024-05-15 ENCOUNTER — HOME CARE VISIT (OUTPATIENT)
Dept: HOME HEALTH SERVICES | Facility: HOME HEALTH | Age: 79
End: 2024-05-15
Payer: MEDICARE

## 2024-05-15 VITALS
OXYGEN SATURATION: 96 % | BODY MASS INDEX: 34.54 KG/M2 | RESPIRATION RATE: 18 BRPM | TEMPERATURE: 97.6 F | HEIGHT: 72 IN | WEIGHT: 255 LBS | HEART RATE: 60 BPM | SYSTOLIC BLOOD PRESSURE: 112 MMHG | DIASTOLIC BLOOD PRESSURE: 70 MMHG

## 2024-05-15 PROCEDURE — 1090000001 HH PPS REVENUE CREDIT

## 2024-05-15 PROCEDURE — 169592 NO-PAY CLAIM PROCEDURE

## 2024-05-15 PROCEDURE — 1090000002 HH PPS REVENUE DEBIT

## 2024-05-15 PROCEDURE — G0299 HHS/HOSPICE OF RN EA 15 MIN: HCPCS | Mod: HHH

## 2024-05-15 PROCEDURE — 0023 HH SOC

## 2024-05-16 PROCEDURE — 1090000001 HH PPS REVENUE CREDIT

## 2024-05-16 PROCEDURE — 1090000002 HH PPS REVENUE DEBIT

## 2024-05-17 ENCOUNTER — HOME CARE VISIT (OUTPATIENT)
Dept: HOME HEALTH SERVICES | Facility: HOME HEALTH | Age: 79
End: 2024-05-17
Payer: MEDICARE

## 2024-05-17 ENCOUNTER — ANTICOAGULATION - WARFARIN VISIT (OUTPATIENT)
Dept: CARDIOLOGY | Facility: CLINIC | Age: 79
End: 2024-05-17
Payer: MEDICARE

## 2024-05-17 VITALS
OXYGEN SATURATION: 94 % | TEMPERATURE: 98.2 F | SYSTOLIC BLOOD PRESSURE: 124 MMHG | DIASTOLIC BLOOD PRESSURE: 64 MMHG | HEART RATE: 60 BPM

## 2024-05-17 DIAGNOSIS — I48.0 PAROXYSMAL ATRIAL FIBRILLATION (MULTI): Primary | ICD-10-CM

## 2024-05-17 PROCEDURE — 1090000002 HH PPS REVENUE DEBIT

## 2024-05-17 PROCEDURE — G0151 HHCP-SERV OF PT,EA 15 MIN: HCPCS | Mod: HHH

## 2024-05-17 PROCEDURE — 1090000001 HH PPS REVENUE CREDIT

## 2024-05-17 SDOH — HEALTH STABILITY: PHYSICAL HEALTH: EXERCISE COMMENTS: SITTING MARCHING LAQ AP  INS TO STAND AT SINK TO GET UP ON FEET MORE OFTEN.

## 2024-05-17 ASSESSMENT — ENCOUNTER SYMPTOMS
PAIN: 1
HIGHEST PAIN SEVERITY IN PAST 24 HOURS: 5/10
PERSON REPORTING PAIN: PATIENT

## 2024-05-17 NOTE — PROGRESS NOTES
Patient identification verified with 2 identifiers.    Location: [facility] Patient Self-Testing Program [phone]    Referring Physician: Doc Martin MD   Enrollment/ Re-enrollment date: 2/2025   INR Goal: 2.0-3.0  INR monitoring is per [facility] protocol.  Anticoagulation Medication: warfarin  Indication: Atrial Fibrillation/Atrial Flutter    Subjective   Bleeding signs/symptoms: No    Bruising: No   Major bleeding event: No  Thrombosis signs/symptoms: No  Thromboembolic event: No  Missed doses: No  Extra doses: No  Medication changes: No  Dietary changes: No  Change in health: No  Change in activity: No  Alcohol: No  Other concerns: No    Upcoming Procedures:  Does the Patient Have any upcoming procedures that require interruption in anticoagulation therapy? no  Does the patient require bridging? no      Anticoagulation Summary  As of 5/17/2024      INR goal:  2.0-3.0   TTR:  60.9% (6.8 mo)   INR used for dosing:  3.10 (5/17/2024)   Weekly warfarin total:  16.25 mg               Assessment/Plan   Supratherapeutic     1. New dose:  dose reduced     2. Next INR: 1 week      Education provided to patient during the visit:  Patient instructed to call in interim with questions, concerns and changes.

## 2024-05-18 PROCEDURE — 1090000001 HH PPS REVENUE CREDIT

## 2024-05-18 PROCEDURE — 1090000002 HH PPS REVENUE DEBIT

## 2024-05-19 PROCEDURE — 1090000001 HH PPS REVENUE CREDIT

## 2024-05-19 PROCEDURE — 1090000002 HH PPS REVENUE DEBIT

## 2024-05-19 SDOH — ECONOMIC STABILITY: HOUSING INSECURITY: EVIDENCE OF SMOKING MATERIAL: 0

## 2024-05-19 SDOH — HEALTH STABILITY: MENTAL HEALTH: SMOKING IN HOME: 0

## 2024-05-19 ASSESSMENT — ENCOUNTER SYMPTOMS
CHANGE IN APPETITE: UNCHANGED
APPETITE LEVEL: GOOD
LOWER EXTREMITY EDEMA: 1
PAIN: 1
PERSON REPORTING PAIN: PATIENT
MUSCLE WEAKNESS: 1
PAIN LOCATION - PAIN SEVERITY: 5/10
PAIN LOCATION: GENERALIZED

## 2024-05-19 ASSESSMENT — ACTIVITIES OF DAILY LIVING (ADL)
OASIS_M1830: 05
ENTERING_EXITING_HOME: ONE PERSON

## 2024-05-20 PROCEDURE — 1090000001 HH PPS REVENUE CREDIT

## 2024-05-20 PROCEDURE — 1090000002 HH PPS REVENUE DEBIT

## 2024-05-21 ENCOUNTER — OFFICE VISIT (OUTPATIENT)
Dept: PRIMARY CARE | Facility: CLINIC | Age: 79
End: 2024-05-21
Payer: MEDICARE

## 2024-05-21 VITALS
SYSTOLIC BLOOD PRESSURE: 133 MMHG | HEART RATE: 59 BPM | HEIGHT: 72 IN | WEIGHT: 255 LBS | BODY MASS INDEX: 34.54 KG/M2 | DIASTOLIC BLOOD PRESSURE: 67 MMHG | OXYGEN SATURATION: 91 %

## 2024-05-21 DIAGNOSIS — K92.2 GASTROINTESTINAL HEMORRHAGE, UNSPECIFIED GASTROINTESTINAL HEMORRHAGE TYPE: ICD-10-CM

## 2024-05-21 DIAGNOSIS — D50.0 IRON DEFICIENCY ANEMIA DUE TO CHRONIC BLOOD LOSS: ICD-10-CM

## 2024-05-21 DIAGNOSIS — G47.33 OBSTRUCTIVE SLEEP APNEA: ICD-10-CM

## 2024-05-21 DIAGNOSIS — I48.20 CHRONIC ATRIAL FIBRILLATION (MULTI): ICD-10-CM

## 2024-05-21 DIAGNOSIS — I48.11 LONGSTANDING PERSISTENT ATRIAL FIBRILLATION (MULTI): ICD-10-CM

## 2024-05-21 DIAGNOSIS — N18.32 STAGE 3B CHRONIC KIDNEY DISEASE (MULTI): Primary | ICD-10-CM

## 2024-05-21 PROCEDURE — 1159F MED LIST DOCD IN RCRD: CPT | Performed by: INTERNAL MEDICINE

## 2024-05-21 PROCEDURE — 1160F RVW MEDS BY RX/DR IN RCRD: CPT | Performed by: INTERNAL MEDICINE

## 2024-05-21 PROCEDURE — 1090000001 HH PPS REVENUE CREDIT

## 2024-05-21 PROCEDURE — 3075F SYST BP GE 130 - 139MM HG: CPT | Performed by: INTERNAL MEDICINE

## 2024-05-21 PROCEDURE — 1157F ADVNC CARE PLAN IN RCRD: CPT | Performed by: INTERNAL MEDICINE

## 2024-05-21 PROCEDURE — 99214 OFFICE O/P EST MOD 30 MIN: CPT | Performed by: INTERNAL MEDICINE

## 2024-05-21 PROCEDURE — 1090000002 HH PPS REVENUE DEBIT

## 2024-05-21 PROCEDURE — 3078F DIAST BP <80 MM HG: CPT | Performed by: INTERNAL MEDICINE

## 2024-05-21 ASSESSMENT — COLUMBIA-SUICIDE SEVERITY RATING SCALE - C-SSRS
1. IN THE PAST MONTH, HAVE YOU WISHED YOU WERE DEAD OR WISHED YOU COULD GO TO SLEEP AND NOT WAKE UP?: NO
6. HAVE YOU EVER DONE ANYTHING, STARTED TO DO ANYTHING, OR PREPARED TO DO ANYTHING TO END YOUR LIFE?: NO
2. HAVE YOU ACTUALLY HAD ANY THOUGHTS OF KILLING YOURSELF?: NO

## 2024-05-21 ASSESSMENT — ENCOUNTER SYMPTOMS
LOSS OF SENSATION IN FEET: 0
OCCASIONAL FEELINGS OF UNSTEADINESS: 0
DEPRESSION: 0

## 2024-05-21 ASSESSMENT — PATIENT HEALTH QUESTIONNAIRE - PHQ9
2. FEELING DOWN, DEPRESSED OR HOPELESS: NOT AT ALL
1. LITTLE INTEREST OR PLEASURE IN DOING THINGS: NOT AT ALL
SUM OF ALL RESPONSES TO PHQ9 QUESTIONS 1 AND 2: 0

## 2024-05-21 NOTE — PROGRESS NOTES
History Of Present Illness  Angel Carroll is a 79 y.o. male presenting with hemorrhoids. Referral from Dr. Nassar.    Colonoscopy: 3/5/2024 Dr. Nassar Colored pictures in Procedure section      Result Text   Impression  Large hemorrhoids - given size and no other sources of bleeding identified, this is the likely etiology of blood loss.   One 10 mm polyp in the cecum; performed cold snare removal  2 subcentimeter polyps were removed with cold snare        Findings  Large hemorrhoids observed during retroflexion; no bleeding was identified  One 10 mm sessile, adenomatous-appearing polyp in the cecum; performed cold snare with complete removal and retrieved specimen  One flat polyp measuring smaller than 5 mm in the cecum; performed cold snare with complete removal and retrieved specimen  One 6 mm sessile, adenomatous-appearing polyp in the transverse colon; performed cold snare with complete removal and retrieved specimen     Pathology: 3/5/2024  FINAL DIAGNOSIS   A. COLON, CECUM, POLYP:   -- Fragments of tubular adenoma.     B. COLON, TRANSVERSE, POLYP:   -- Tubular adenoma.             Presents with daughter. 1 episode severe bleeding BRBPR that stopped spontaneously. No current bleeding or perianal discomfort. Dark stools from iron.      Past Medical History  Past Medical History:   Diagnosis Date    Atherosclerotic heart disease of native coronary artery without angina pectoris 12/14/2022    Arteriosclerotic cardiovascular disease (ASCVD)    Benign neoplasm of meninges, unspecified (Multi)     Meningioma    Benign prostatic hyperplasia without lower urinary tract symptoms 02/28/2022    BPH without obstruction/lower urinary tract symptoms    Body mass index (BMI) 33.0-33.9, adult 07/02/2021    BMI 33.0-33.9,adult    Body mass index (BMI) 34.0-34.9, adult 11/09/2021    BMI 34.0-34.9,adult    Body mass index (BMI) 34.0-34.9, adult 09/08/2021    BMI 34.0-34.9,adult    Body mass index (BMI) 35.0-35.9, adult 12/29/2021     BMI 35.0-35.9,adult    Body mass index (BMI) 35.0-35.9, adult 02/28/2022    BMI 35.0-35.9,adult    Elevated blood-pressure reading, without diagnosis of hypertension     Prehypertension    Encounter for immunization 10/27/2015    Need for prophylactic vaccination and inoculation against influenza    Hyperlipidemia, unspecified 11/15/2021    Hyperlipidemia    Other conditions influencing health status     Nephrolithiasis    Personal history of other diseases of the musculoskeletal system and connective tissue     History of tendinitis    Personal history of other drug therapy     History of influenza vaccination    Personal history of other specified conditions 10/25/2022    History of bradycardia    Vitamin D deficiency, unspecified 10/08/2020    Vitamin D deficiency       Surgical History  Past Surgical History:   Procedure Laterality Date    BACK SURGERY  04/18/2016    Back Surgery    COLONOSCOPY  07/03/2013    Complete Colonoscopy    CORONARY ANGIOPLASTY WITH STENT PLACEMENT  08/18/2020    Cath Stent Placement    CT ANGIO NECK  9/25/2021    CT NECK ANGIO W AND WO IV CONTRAST 9/25/2021 [facility]ICAL [facility]GAC[facility]    CT HEAD ANGIO W AND WO IV CONTRAST  9/25/2021    CT HEAD ANGIO W AND WO IV CONTRAST 9/25/2021 [facility] CLINICAL LEGACY    EYE SURGERY  04/03/2013    Eye Surgery    KNEE ARTHROSCOPY W/ DEBRIDEMENT  07/03/2013    Arthroscopy Knee Right    KNEE ARTHROSCOPY W/ DEBRIDEMENT  07/03/2013    Arthroscopy Knee Left    LITHOTRIPSY  07/22/2013    Renal Lithotripsy    MR HEAD ANGIO WO IV CONTRAST  3/23/2021    MR HEAD ANGIO WO IV CONTRAST [facility]    MR NECK ANGIO WO IV CONTRAST  3/23/2021    MR NECK ANGIO WO IV CONTRAST [facility]    OTHER SURGICAL HISTORY  04/03/2013    Cardiac Cath Procedure Outcome: Successful    OTHER SURGICAL HISTORY  04/03/2013    Neuroplasty With Transposition Of Ulnar Nerve - At Elbow    OTHER SURGICAL HISTORY  04/03/2013    General Surgery    OTHER SURGICAL HISTORY  06/11/2019     Knee replacement    OTHER SURGICAL HISTORY  07/22/2013    Cystoscopy With Insertion Of Ureteral Stent Bilateral    OTHER SURGICAL HISTORY  07/03/2013    Wrist Carpectomy    OTHER SURGICAL HISTORY  07/03/2013    Brain Surgery    OTHER SURGICAL HISTORY  07/03/2013    Ulnar Osteotomy    TONSILLECTOMY  04/03/2013    Tonsillectomy    TOTAL KNEE ARTHROPLASTY  08/03/2016    Total Knee Arthroplasty        Social History  He reports that he quit smoking about 30 years ago. His smoking use included cigarettes. He has never used smokeless tobacco. He reports that he does not currently use alcohol. He reports that he does not use drugs.    Family History  Family History   Problem Relation Name Age of Onset    Diabetes Mother      Emphysema Father      Heart disease Father          Allergies  Other, Ranolazine, and Cyclobenzaprine    Review of Systems   Constitutional: Negative.    HENT: Negative.     Eyes: Negative.    Respiratory: Negative.     Cardiovascular: Negative.    Gastrointestinal: Negative.    Genitourinary: Negative.    Skin: Negative.    All other systems reviewed and are negative.       Physical Exam  Constitutional:       Appearance: Normal appearance.   HENT:      Head: Normocephalic.   Eyes:      Pupils: Pupils are equal, round, and reactive to light.   Cardiovascular:      Rate and Rhythm: Normal rate.   Pulmonary:      Effort: Pulmonary effort is normal.   Abdominal:      General: Abdomen is flat. Bowel sounds are normal.      Palpations: Abdomen is soft.   Genitourinary:     Comments: Verbal consent was obtained and with chaperone present the patient was placed in prone Kraske position. Perianal skin was examined without abnormality.  No external hemorrhoids identified.  Digital rectal exam revealed normal tone with good squeeze.  No palpable masses appreciated.  Anoscope was inserted with grade 2 internal hemorrhoids  Skin:     General: Skin is warm.   Neurological:      General: No focal deficit  present.      Mental Status: He is alert.               Assessment/Plan   Problem List Items Addressed This Visit             ICD-10-CM       Gastrointestinal and Abdominal    Prolapsed internal hemorrhoids, grade 2 K64.1    Hematochezia - Primary K92.1     Bleeding internal hemorrhoids on anticoagulation  Will set up appointment for sclerotherapy in the future

## 2024-05-21 NOTE — LETTER
May 25, 2024     Patient: Angel Carroll   YOB: 1945   Date of Visit: 5/21/2024       [facility].    Angel Carroll was seen in my clinic on 5/21/2024 at 12:50 pm. Angel needs a battery operated portable   Oxygen unit due to health reasons ,multiple co-morbid conditions and weakness...    If you have any questions or concerns, please don't hesitate to call.         Sincerely,         Jung Tao MD        CC: No Recipients

## 2024-05-21 NOTE — PROGRESS NOTES
Subjective   Patient ID: Angel Carroll is a 79 y.o. male who presents for Follow-up.    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  He is also here after his discharge from [facility].  He was admitted there on 3/9/2024 and was treated for acute kidney injury, metabolic acidosis, severe anemia secondary to GI bleeding, hyperkalemia and  GI bleeding.  During hospitalization he was given 2 units of packed red blood cells and underwent an EGD which did not reveal any evidence of active bleeding and showed evidence of gastritis and also underwent colonoscopy which revealed colonic polyp which were removed and also showed hemorrhoids.  Repeat CBC revealed hemoglobin of 10.5. BUN of 27 creatinine of 2.0 GFR of 33 his last INR was 2.5.  Patient was placed at a rehab facility due to his debility after his discharge from the hospital.  He had 2 ER visits last month due to fall and recurrent GI hemorrhage, cystitis and influenza A.  He had a CT of the head and cervical spine which came back negative for intracranial bleeding or fracture.  Chest x-ray done revealed increased lung markings bilaterally without any pleural effusion or any mass lesion.  He seems to be doing fairly well except for generalized debility.  He denies any abdominal pain,  nausea vomiting fever chills and palpitation and diaphoresis.     Review of Systems   Constitutional: Negative.    HENT: Negative.     Eyes: Negative.    Respiratory: Negative.     Cardiovascular: Negative.    Gastrointestinal: Negative.    Endocrine: Negative.    Genitourinary: Negative.    Musculoskeletal: Negative.    Skin: Negative.    Allergic/Immunologic: Negative.    Neurological: Negative.    Hematological: Negative.    Psychiatric/Behavioral: Negative.         Objective   /67 (BP Location: Right arm, Patient Position: Sitting, BP Cuff Size: Adult)   Pulse 59   Ht 1.829 m (6')   Wt 116 kg (255 lb)   SpO2 91%   BMI 34.58 kg/m²     Physical Exam  Constitutional:       Appearance: Normal appearance. He is obese.   HENT:      Right Ear: Tympanic membrane normal.      Left Ear: Tympanic membrane and ear canal normal.      Nose: Nose normal.   Neck:      Vascular: No carotid bruit.   Cardiovascular:      Rate and Rhythm: Normal rate.   Pulmonary:      Effort: No respiratory distress.      Breath sounds: No stridor. No wheezing.   Abdominal:      Palpations: Abdomen is soft.      Tenderness: There is no abdominal tenderness. There is no guarding or rebound.   Skin:     Coloration: Skin is not jaundiced.      Findings: No bruising.   Neurological:      General: No focal deficit present.      Mental Status: He is alert and oriented to person, place, and time.   Psychiatric:         Mood and Affect: Mood normal.         Assessment/Plan    patient  will continue to follow-up with cardiology and nephrology clinic regarding history of atrial fibrillation and chronic kidney disease.  He will continue current medication and will return to clinic in 3 months for follow-up visit.

## 2024-05-22 ENCOUNTER — HOME CARE VISIT (OUTPATIENT)
Dept: HOME HEALTH SERVICES | Facility: HOME HEALTH | Age: 79
End: 2024-05-22
Payer: MEDICARE

## 2024-05-22 VITALS — DIASTOLIC BLOOD PRESSURE: 70 MMHG | TEMPERATURE: 98.7 F | SYSTOLIC BLOOD PRESSURE: 136 MMHG | HEART RATE: 60 BPM

## 2024-05-22 PROCEDURE — 1090000002 HH PPS REVENUE DEBIT

## 2024-05-22 PROCEDURE — 1090000001 HH PPS REVENUE CREDIT

## 2024-05-22 PROCEDURE — G0151 HHCP-SERV OF PT,EA 15 MIN: HCPCS | Mod: HHH

## 2024-05-22 ASSESSMENT — ENCOUNTER SYMPTOMS: DENIES PAIN: 1

## 2024-05-23 ENCOUNTER — OFFICE VISIT (OUTPATIENT)
Dept: SURGERY | Facility: CLINIC | Age: 79
End: 2024-05-23
Payer: MEDICARE

## 2024-05-23 VITALS
OXYGEN SATURATION: 91 % | SYSTOLIC BLOOD PRESSURE: 136 MMHG | TEMPERATURE: 98.7 F | HEART RATE: 59 BPM | WEIGHT: 255 LBS | DIASTOLIC BLOOD PRESSURE: 69 MMHG | HEIGHT: 72 IN | BODY MASS INDEX: 34.54 KG/M2

## 2024-05-23 DIAGNOSIS — K92.1 HEMATOCHEZIA: Primary | ICD-10-CM

## 2024-05-23 DIAGNOSIS — K64.1 PROLAPSED INTERNAL HEMORRHOIDS, GRADE 2: ICD-10-CM

## 2024-05-23 PROCEDURE — 99213 OFFICE O/P EST LOW 20 MIN: CPT | Performed by: SURGERY

## 2024-05-23 PROCEDURE — 1090000002 HH PPS REVENUE DEBIT

## 2024-05-23 PROCEDURE — 3078F DIAST BP <80 MM HG: CPT | Performed by: SURGERY

## 2024-05-23 PROCEDURE — 1157F ADVNC CARE PLAN IN RCRD: CPT | Performed by: SURGERY

## 2024-05-23 PROCEDURE — 46600 DIAGNOSTIC ANOSCOPY SPX: CPT | Performed by: SURGERY

## 2024-05-23 PROCEDURE — 1090000001 HH PPS REVENUE CREDIT

## 2024-05-23 PROCEDURE — 3075F SYST BP GE 130 - 139MM HG: CPT | Performed by: SURGERY

## 2024-05-23 PROCEDURE — 1036F TOBACCO NON-USER: CPT | Performed by: SURGERY

## 2024-05-23 PROCEDURE — 99203 OFFICE O/P NEW LOW 30 MIN: CPT | Performed by: SURGERY

## 2024-05-23 PROCEDURE — 1159F MED LIST DOCD IN RCRD: CPT | Performed by: SURGERY

## 2024-05-23 PROCEDURE — 1126F AMNT PAIN NOTED NONE PRSNT: CPT | Performed by: SURGERY

## 2024-05-23 PROCEDURE — 1160F RVW MEDS BY RX/DR IN RCRD: CPT | Performed by: SURGERY

## 2024-05-23 ASSESSMENT — ENCOUNTER SYMPTOMS
DEPRESSION: 0
EYES NEGATIVE: 1
RESPIRATORY NEGATIVE: 1
CARDIOVASCULAR NEGATIVE: 1
OCCASIONAL FEELINGS OF UNSTEADINESS: 1
GASTROINTESTINAL NEGATIVE: 1
CONSTITUTIONAL NEGATIVE: 1
LOSS OF SENSATION IN FEET: 0

## 2024-05-23 ASSESSMENT — COLUMBIA-SUICIDE SEVERITY RATING SCALE - C-SSRS
6. HAVE YOU EVER DONE ANYTHING, STARTED TO DO ANYTHING, OR PREPARED TO DO ANYTHING TO END YOUR LIFE?: NO
1. IN THE PAST MONTH, HAVE YOU WISHED YOU WERE DEAD OR WISHED YOU COULD GO TO SLEEP AND NOT WAKE UP?: NO
2. HAVE YOU ACTUALLY HAD ANY THOUGHTS OF KILLING YOURSELF?: NO

## 2024-05-23 ASSESSMENT — PATIENT HEALTH QUESTIONNAIRE - PHQ9
2. FEELING DOWN, DEPRESSED OR HOPELESS: NOT AT ALL
1. LITTLE INTEREST OR PLEASURE IN DOING THINGS: NOT AT ALL
SUM OF ALL RESPONSES TO PHQ9 QUESTIONS 1 & 2: 0

## 2024-05-23 ASSESSMENT — PAIN SCALES - GENERAL: PAINLEVEL: 0-NO PAIN

## 2024-05-24 ENCOUNTER — HOME CARE VISIT (OUTPATIENT)
Dept: HOME HEALTH SERVICES | Facility: HOME HEALTH | Age: 79
End: 2024-05-24
Payer: MEDICARE

## 2024-05-24 ENCOUNTER — ANTICOAGULATION - WARFARIN VISIT (OUTPATIENT)
Dept: CARDIOLOGY | Facility: CLINIC | Age: 79
End: 2024-05-24
Payer: MEDICARE

## 2024-05-24 ENCOUNTER — TELEPHONE (OUTPATIENT)
Dept: PRIMARY CARE | Facility: CLINIC | Age: 79
End: 2024-05-24
Payer: MEDICARE

## 2024-05-24 VITALS — OXYGEN SATURATION: 99 % | TEMPERATURE: 97.9 F

## 2024-05-24 DIAGNOSIS — I48.91 ATRIAL FIBRILLATION, UNSPECIFIED TYPE (MULTI): Primary | ICD-10-CM

## 2024-05-24 DIAGNOSIS — I48.20 CHRONIC ATRIAL FIBRILLATION (MULTI): Primary | ICD-10-CM

## 2024-05-24 DIAGNOSIS — I48.0 PAROXYSMAL ATRIAL FIBRILLATION (MULTI): ICD-10-CM

## 2024-05-24 LAB
INR IN PPP BY COAGULATION ASSAY EXTERNAL: 3.1 (ref 2–3)
PROTHROMBIN TIME (PT) IN PPP BY COAGULATION ASSAY EXTERNAL: ABNORMAL SECONDS

## 2024-05-24 PROCEDURE — G0151 HHCP-SERV OF PT,EA 15 MIN: HCPCS | Mod: HHH

## 2024-05-24 PROCEDURE — 1090000001 HH PPS REVENUE CREDIT

## 2024-05-24 PROCEDURE — 1090000002 HH PPS REVENUE DEBIT

## 2024-05-24 NOTE — PROGRESS NOTES
Patient identification verified with 2 identifiers.    Location: [facility] Patient Self-Testing Program [phone]    Referring Physician: Doc Martin MD   Enrollment/ Re-enrollment date: 2/2025   INR Goal: 2.0-3.0  INR monitoring is per [facility] protocol.  Anticoagulation Medication: warfarin  Indication: Atrial Fibrillation/Atrial Flutter    Subjective   Bleeding signs/symptoms:      Bruising:     Major bleeding event:    Thrombosis signs/symptoms:    Thromboembolic event:    Missed doses:    Extra doses:    Medication changes:    Dietary changes:    Change in health:    Change in activity:    Alcohol:    Other concerns:      Upcoming Procedures:  Does the Patient Have any upcoming procedures that require interruption in anticoagulation therapy? no  Does the patient require bridging? no      Anticoagulation Summary  As of 5/24/2024      INR goal:  2.0-3.0   TTR:  58.9% (7 mo)   INR used for dosing:  3.10 (5/24/2024)   Weekly warfarin total:  15 mg               Assessment/Plan   Supratherapeutic     1. New dose:  dose reduced approximately 5% per protocol   Spoke with pt who states understanding  2. Next INR: 1 week      Education provided to patient during the visit:  Patient instructed to call in interim with questions, concerns and changes.   Patient educated on interactions between medications and warfarin.   Patient educated on dietary consistency in vitamin k consumption.   Patient educated on compliance with dosing, follow up appointments, and prescribed plan of care.

## 2024-05-24 NOTE — TELEPHONE ENCOUNTER
Patient needs letter to [employer] stating he needs a battery operated portable oxygen unit due to health reasons and his weakness.    [phone]

## 2024-05-25 PROCEDURE — 1090000001 HH PPS REVENUE CREDIT

## 2024-05-25 PROCEDURE — 1090000002 HH PPS REVENUE DEBIT

## 2024-05-25 ASSESSMENT — ENCOUNTER SYMPTOMS
ALLERGIC/IMMUNOLOGIC NEGATIVE: 1
NEUROLOGICAL NEGATIVE: 1
MUSCULOSKELETAL NEGATIVE: 1
HEMATOLOGIC/LYMPHATIC NEGATIVE: 1
EYES NEGATIVE: 1
CONSTITUTIONAL NEGATIVE: 1
PSYCHIATRIC NEGATIVE: 1
GASTROINTESTINAL NEGATIVE: 1
RESPIRATORY NEGATIVE: 1
CARDIOVASCULAR NEGATIVE: 1
ENDOCRINE NEGATIVE: 1

## 2024-05-26 PROCEDURE — 1090000002 HH PPS REVENUE DEBIT

## 2024-05-26 PROCEDURE — 1090000001 HH PPS REVENUE CREDIT

## 2024-05-26 RX ORDER — WARFARIN 4 MG/1
TABLET ORAL
Qty: 90 TABLET | Refills: 2 | Status: SHIPPED | OUTPATIENT
Start: 2024-05-26 | End: 2024-05-31

## 2024-05-26 RX ORDER — FUROSEMIDE 20 MG/1
40 TABLET ORAL 2 TIMES DAILY
Qty: 360 TABLET | Refills: 2 | Status: SHIPPED | OUTPATIENT
Start: 2024-05-26 | End: 2025-02-20

## 2024-05-27 PROCEDURE — 1090000001 HH PPS REVENUE CREDIT

## 2024-05-27 PROCEDURE — 1090000002 HH PPS REVENUE DEBIT

## 2024-05-28 ENCOUNTER — HOME CARE VISIT (OUTPATIENT)
Dept: HOME HEALTH SERVICES | Facility: HOME HEALTH | Age: 79
End: 2024-05-28
Payer: MEDICARE

## 2024-05-28 VITALS — SYSTOLIC BLOOD PRESSURE: 124 MMHG | DIASTOLIC BLOOD PRESSURE: 70 MMHG | TEMPERATURE: 97.7 F | OXYGEN SATURATION: 95 %

## 2024-05-28 VITALS
TEMPERATURE: 98 F | HEART RATE: 60 BPM | DIASTOLIC BLOOD PRESSURE: 54 MMHG | SYSTOLIC BLOOD PRESSURE: 114 MMHG | OXYGEN SATURATION: 100 %

## 2024-05-28 PROCEDURE — 1090000002 HH PPS REVENUE DEBIT

## 2024-05-28 PROCEDURE — G0151 HHCP-SERV OF PT,EA 15 MIN: HCPCS | Mod: HHH

## 2024-05-28 PROCEDURE — 1090000001 HH PPS REVENUE CREDIT

## 2024-05-28 PROCEDURE — G0152 HHCP-SERV OF OT,EA 15 MIN: HCPCS | Mod: HHH

## 2024-05-28 ASSESSMENT — ENCOUNTER SYMPTOMS
DENIES PAIN: 1
PERSON REPORTING PAIN: PATIENT
DENIES PAIN: 1

## 2024-05-28 NOTE — PROGRESS NOTES
History Of Present Illness  Angel Carroll is a 79 y.o. male with bleeding internal hemorrhoids on anticoagulation.  Presents to office for sclerotherapy.    He has had 1 episode severe bleeding BRBPR that stopped spontaneously.    3/05/2024 Colonoscopy  Impression  Large hemorrhoids - given size and no other sources of bleeding identified, this is the likely etiology of blood loss.   One 10 mm polyp in the cecum; performed cold snare removal  2 subcentimeter polyps were removed with cold snare  Pathology:  A. COLON, CECUM, POLYP:   -- Fragments of tubular adenoma.     B. COLON, TRANSVERSE, POLYP:   -- Tubular adenoma.          Review of Systems  Constitutional: Negative for fever, chills, anorexia, weight loss, malaise     ENMT: Negative for nasal discharge, congestion, ear pain, mouth pain, throat pain     Respiratory: Negative for cough, hemoptysis, wheezing, shortness of breath, (+)COPD ON OXYGEN      Cardiac: Negative for chest pain, dyspnea on exertion, orthopnea, palpitations, syncope, (+)AFIB ON COUMADIN     Gastrointestinal: Negative for nausea, vomiting, diarrhea, constipation, abdominal pain, (+)RECTAL BLEEDING    Genitourinary: Negative for discharge, dysuria, flank pain, frequency, hematuria     Musculoskeletal: Negative for decreased ROM, pain, swelling, weakness     Neurological: Negative for dizziness, confusion, headache, seizures, syncope     Psychiatric: Negative for mood changes, anxiety, hallucinations, sleep changes, suicidal ideas     Skin: Negative for mass, pain, itching, rash, ulcer     Endocrine: Negative for heat intolerance, cold intolerance, excessive sweating, polyuria, excess thirst     Hematologic/Lymph: Negative for anemia, bruising, easy bleeding, night sweats, petechiae, history of DVT/PE or cancer     Allergic/Immunologic: Negative for anaphylaxis, itchy/ teary eyes, itching, sneezing, swelling    Physical Exam  Constitutional:       Appearance: Normal appearance.   HENT:      Head:  Normocephalic.   Eyes:      Pupils: Pupils are equal, round, and reactive to light.   Cardiovascular:      Rate and Rhythm: Normal rate.   Pulmonary:      Effort: Pulmonary effort is normal.   Abdominal:      General: Abdomen is flat. Bowel sounds are normal.      Palpations: Abdomen is soft.   Genitourinary:     Comments: Verbal consent was obtained and with chaperone present the patient was placed in prone Kraske position. Perianal skin was examined without abnormality.  No external hemorrhoids identified.  Digital rectal exam revealed normal tone with good squeeze.  No palpable masses appreciated.  Anoscope was inserted with grade 2 internal hemorrhoids primarily in the left and right posterior quadrants.  After consent was obtained hemorrhoid bases were injected with 10 cc of 50% dextrose solution in 3 cardinal locations.  Patient tolerated well.  Skin:     General: Skin is warm.   Neurological:      General: No focal deficit present.      Mental Status: He is alert.             Problem List Items Addressed This Visit             ICD-10-CM       Cardiac and Vasculature    Atrial fibrillation (Multi) - Primary I48.91       Gastrointestinal and Abdominal    GI bleed K92.2    Prolapsed internal hemorrhoids, grade 2 K64.1       Bleeding grade 2 internal hemorrhoids on anticoagulation  Sclerotherapy in the office today  Should symptoms persist could repeat sclerotherapy if needed.

## 2024-05-29 ENCOUNTER — HOME CARE VISIT (OUTPATIENT)
Dept: HOME HEALTH SERVICES | Facility: HOME HEALTH | Age: 79
End: 2024-05-29
Payer: MEDICARE

## 2024-05-29 PROCEDURE — G0180 MD CERTIFICATION HHA PATIENT: HCPCS | Performed by: INTERNAL MEDICINE

## 2024-05-29 PROCEDURE — 1090000002 HH PPS REVENUE DEBIT

## 2024-05-29 PROCEDURE — 1090000001 HH PPS REVENUE CREDIT

## 2024-05-30 ENCOUNTER — HOME CARE VISIT (OUTPATIENT)
Dept: HOME HEALTH SERVICES | Facility: HOME HEALTH | Age: 79
End: 2024-05-30
Payer: MEDICARE

## 2024-05-30 VITALS — TEMPERATURE: 98.2 F | OXYGEN SATURATION: 96 %

## 2024-05-30 PROCEDURE — G0151 HHCP-SERV OF PT,EA 15 MIN: HCPCS | Mod: HHH

## 2024-05-30 PROCEDURE — 1090000001 HH PPS REVENUE CREDIT

## 2024-05-30 PROCEDURE — 1090000002 HH PPS REVENUE DEBIT

## 2024-05-30 ASSESSMENT — ENCOUNTER SYMPTOMS
LOWEST PAIN SEVERITY IN PAST 24 HOURS: 0/10
PAIN: 1
PERSON REPORTING PAIN: PATIENT
PAIN LOCATION: RIGHT HIP
HIGHEST PAIN SEVERITY IN PAST 24 HOURS: 4/10

## 2024-05-31 ENCOUNTER — ANTICOAGULATION - WARFARIN VISIT (OUTPATIENT)
Dept: CARDIOLOGY | Facility: CLINIC | Age: 79
End: 2024-05-31
Payer: MEDICARE

## 2024-05-31 DIAGNOSIS — I48.0 PAROXYSMAL ATRIAL FIBRILLATION (MULTI): Primary | ICD-10-CM

## 2024-05-31 DIAGNOSIS — I48.20 CHRONIC ATRIAL FIBRILLATION (MULTI): ICD-10-CM

## 2024-05-31 PROCEDURE — 1090000002 HH PPS REVENUE DEBIT

## 2024-05-31 PROCEDURE — 1090000001 HH PPS REVENUE CREDIT

## 2024-05-31 RX ORDER — WARFARIN 2.5 MG/1
TABLET ORAL
Qty: 90 TABLET | Refills: 0 | Status: SHIPPED | OUTPATIENT
Start: 2024-05-31

## 2024-05-31 NOTE — PROGRESS NOTES
Patient identification verified with 2 identifiers.    Location: [facility] Patient Self-Testing Program [phone]    Referring Physician: Dr. Doc Martin  Enrollment/ Re-enrollment date: 2025   INR Goal: 2.0-3.0  INR monitoring is per [facility] protocol.  Anticoagulation Medication: warfarin  Indication: Atrial Fibrillation/Atrial Flutter    Subjective   Bleeding signs/symptoms: No    Bruising: No   Major bleeding event: No  Thrombosis signs/symptoms: No  Thromboembolic event: No  Missed doses: No  Extra doses: No  Medication changes: No  Dietary changes: No  Change in health: No  Change in activity: No  Alcohol: No  Other concerns: No    Upcoming Procedures:  Does the Patient Have any upcoming procedures that require interruption in anticoagulation therapy? no  Does the patient require bridging? no      Anticoagulation Summary  As of 2024      INR goal:  2.0-3.0   TTR:  59.4% (7.2 mo)   INR used for dosin.70 (2024)   Weekly warfarin total:  15 mg               Assessment/Plan   Therapeutic     1. New dose: no change    2. Next INR: 1 week      Education provided to patient during the visit:  Patient instructed to call in interim with questions, concerns and changes.   Patient educated on interactions between medications and warfarin.   Patient educated on dietary consistency in vitamin k consumption.   Patient educated on affects of alcohol consumption while taking warfarin.   Patient educated on signs of bleeding/clotting.   Patient educated on compliance with dosing, follow up appointments, and prescribed plan of care.

## 2024-06-01 PROCEDURE — 1090000002 HH PPS REVENUE DEBIT

## 2024-06-01 PROCEDURE — 1090000001 HH PPS REVENUE CREDIT

## 2024-06-02 PROCEDURE — 1090000002 HH PPS REVENUE DEBIT

## 2024-06-02 PROCEDURE — 1090000001 HH PPS REVENUE CREDIT

## 2024-06-03 ENCOUNTER — HOME CARE VISIT (OUTPATIENT)
Dept: HOME HEALTH SERVICES | Facility: HOME HEALTH | Age: 79
End: 2024-06-03
Payer: MEDICARE

## 2024-06-03 VITALS — OXYGEN SATURATION: 98 %

## 2024-06-03 PROCEDURE — 1090000001 HH PPS REVENUE CREDIT

## 2024-06-03 PROCEDURE — 1090000002 HH PPS REVENUE DEBIT

## 2024-06-03 PROCEDURE — G0151 HHCP-SERV OF PT,EA 15 MIN: HCPCS | Mod: HHH

## 2024-06-03 ASSESSMENT — ENCOUNTER SYMPTOMS
PAIN LOCATION - PAIN SEVERITY: 0/10
PAIN: 1
HIGHEST PAIN SEVERITY IN PAST 24 HOURS: 3/10
LOWEST PAIN SEVERITY IN PAST 24 HOURS: 0/10
PERSON REPORTING PAIN: PATIENT
PAIN LOCATION: RIGHT HIP

## 2024-06-04 PROCEDURE — 1090000001 HH PPS REVENUE CREDIT

## 2024-06-04 PROCEDURE — 1090000002 HH PPS REVENUE DEBIT

## 2024-06-05 PROCEDURE — 1090000002 HH PPS REVENUE DEBIT

## 2024-06-05 PROCEDURE — 1090000001 HH PPS REVENUE CREDIT

## 2024-06-06 ENCOUNTER — HOME CARE VISIT (OUTPATIENT)
Dept: HOME HEALTH SERVICES | Facility: HOME HEALTH | Age: 79
End: 2024-06-06
Payer: MEDICARE

## 2024-06-06 VITALS — DIASTOLIC BLOOD PRESSURE: 70 MMHG | SYSTOLIC BLOOD PRESSURE: 120 MMHG | TEMPERATURE: 98 F

## 2024-06-06 PROCEDURE — 1090000002 HH PPS REVENUE DEBIT

## 2024-06-06 PROCEDURE — 1090000001 HH PPS REVENUE CREDIT

## 2024-06-06 PROCEDURE — G0151 HHCP-SERV OF PT,EA 15 MIN: HCPCS | Mod: HHH

## 2024-06-06 ASSESSMENT — ENCOUNTER SYMPTOMS: DENIES PAIN: 1

## 2024-06-07 ENCOUNTER — ANTICOAGULATION - WARFARIN VISIT (OUTPATIENT)
Dept: CARDIOLOGY | Facility: CLINIC | Age: 79
End: 2024-06-07
Payer: MEDICARE

## 2024-06-07 DIAGNOSIS — I48.0 PAROXYSMAL ATRIAL FIBRILLATION (MULTI): Primary | ICD-10-CM

## 2024-06-07 PROCEDURE — 1090000002 HH PPS REVENUE DEBIT

## 2024-06-07 PROCEDURE — 1090000001 HH PPS REVENUE CREDIT

## 2024-06-07 NOTE — PROGRESS NOTES
Patient identification verified with 2 identifiers.    Location: [facility] Patient Self-Testing Program [phone]    Referring Physician: DR. MATTHEWS  Enrollment/ Re-enrollment date: 2025   INR Goal: 2.0-3.0  INR monitoring is per [facility] protocol.  Anticoagulation Medication: warfarin  Indication: Atrial Fibrillation/Atrial Flutter    Subjective   Bleeding signs/symptoms: No    Bruising: No   Major bleeding event: No  Thrombosis signs/symptoms: No  Thromboembolic event: No  Missed doses: No  Extra doses: No  Medication changes: No  Dietary changes: No  Change in health: No  Change in activity: No  Alcohol: No  Other concerns: No    Upcoming Procedures:  Does the Patient Have any upcoming procedures that require interruption in anticoagulation therapy? no  Does the patient require bridging? no      Anticoagulation Summary  As of 2024      INR goal:  2.0-3.0   TTR:  60.7% (7.5 mo)   INR used for dosin.30 (2024)   Weekly warfarin total:  15 mg               Assessment/Plan   Therapeutic     1. New dose: no change  SPOKE TO KIRSTEN. CONFIRMED CURRENT DOSING INSTRUCTIONS AND VERBALIZED CORRECTLY  2. Next INR: 2 weeks      Education provided to patient during the visit:  Patient instructed to call in interim with questions, concerns and changes.   Patient educated on compliance with dosing, follow up appointments, and prescribed plan of care.

## 2024-06-08 PROCEDURE — 1090000001 HH PPS REVENUE CREDIT

## 2024-06-08 PROCEDURE — 1090000002 HH PPS REVENUE DEBIT

## 2024-06-09 PROCEDURE — 1090000002 HH PPS REVENUE DEBIT

## 2024-06-09 PROCEDURE — 1090000001 HH PPS REVENUE CREDIT

## 2024-06-10 PROCEDURE — 1090000002 HH PPS REVENUE DEBIT

## 2024-06-10 PROCEDURE — 1090000001 HH PPS REVENUE CREDIT

## 2024-06-11 ENCOUNTER — OFFICE VISIT (OUTPATIENT)
Dept: SURGERY | Facility: CLINIC | Age: 79
End: 2024-06-11
Payer: MEDICARE

## 2024-06-11 ENCOUNTER — LAB (OUTPATIENT)
Dept: LAB | Facility: LAB | Age: 79
End: 2024-06-11
Payer: MEDICARE

## 2024-06-11 VITALS
TEMPERATURE: 97.9 F | WEIGHT: 213 LBS | OXYGEN SATURATION: 99 % | DIASTOLIC BLOOD PRESSURE: 59 MMHG | BODY MASS INDEX: 28.89 KG/M2 | HEART RATE: 59 BPM | SYSTOLIC BLOOD PRESSURE: 109 MMHG

## 2024-06-11 DIAGNOSIS — K64.1 PROLAPSED INTERNAL HEMORRHOIDS, GRADE 2: ICD-10-CM

## 2024-06-11 DIAGNOSIS — N18.32 STAGE 3B CHRONIC KIDNEY DISEASE (MULTI): Primary | ICD-10-CM

## 2024-06-11 DIAGNOSIS — N18.32 STAGE 3B CHRONIC KIDNEY DISEASE (MULTI): ICD-10-CM

## 2024-06-11 DIAGNOSIS — K62.5 GASTROINTESTINAL HEMORRHAGE ASSOCIATED WITH ANORECTAL SOURCE: ICD-10-CM

## 2024-06-11 DIAGNOSIS — I48.0 PAROXYSMAL ATRIAL FIBRILLATION (MULTI): Primary | ICD-10-CM

## 2024-06-11 LAB
25(OH)D3 SERPL-MCNC: 65 NG/ML (ref 30–100)
ALBUMIN SERPL BCP-MCNC: 4 G/DL (ref 3.4–5)
ANION GAP SERPL CALC-SCNC: 11 MMOL/L (ref 10–20)
APPEARANCE UR: ABNORMAL
BACTERIA #/AREA URNS AUTO: ABNORMAL /HPF
BILIRUB UR STRIP.AUTO-MCNC: NEGATIVE MG/DL
BUN SERPL-MCNC: 21 MG/DL (ref 6–23)
CALCIUM SERPL-MCNC: 9.1 MG/DL (ref 8.6–10.3)
CHLORIDE SERPL-SCNC: 99 MMOL/L (ref 98–107)
CO2 SERPL-SCNC: 36 MMOL/L (ref 21–32)
COLOR UR: YELLOW
CREAT SERPL-MCNC: 1.98 MG/DL (ref 0.5–1.3)
CREAT UR-MCNC: 82.8 MG/DL (ref 20–370)
CREAT UR-MCNC: 86.2 MG/DL (ref 20–370)
EGFRCR SERPLBLD CKD-EPI 2021: 33 ML/MIN/1.73M*2
ERYTHROCYTE [DISTWIDTH] IN BLOOD BY AUTOMATED COUNT: 15.9 % (ref 11.5–14.5)
GLUCOSE SERPL-MCNC: 102 MG/DL (ref 74–99)
GLUCOSE UR STRIP.AUTO-MCNC: NORMAL MG/DL
HCT VFR BLD AUTO: 43.6 % (ref 41–52)
HGB BLD-MCNC: 13.2 G/DL (ref 13.5–17.5)
KETONES UR STRIP.AUTO-MCNC: NEGATIVE MG/DL
LEUKOCYTE ESTERASE UR QL STRIP.AUTO: ABNORMAL
MCH RBC QN AUTO: 28 PG (ref 26–34)
MCHC RBC AUTO-ENTMCNC: 30.3 G/DL (ref 32–36)
MCV RBC AUTO: 92 FL (ref 80–100)
MICROALBUMIN UR-MCNC: 37.8 MG/L
MICROALBUMIN/CREAT UR: 45.7 UG/MG CREAT
NITRITE UR QL STRIP.AUTO: NEGATIVE
NRBC BLD-RTO: 0 /100 WBCS (ref 0–0)
PH UR STRIP.AUTO: 6 [PH]
PHOSPHATE SERPL-MCNC: 2.9 MG/DL (ref 2.5–4.9)
PLATELET # BLD AUTO: 185 X10*3/UL (ref 150–450)
POTASSIUM SERPL-SCNC: 4.4 MMOL/L (ref 3.5–5.3)
PROT UR STRIP.AUTO-MCNC: NEGATIVE MG/DL
PROT UR-ACNC: 19 MG/DL (ref 5–25)
PROT/CREAT UR: 0.22 MG/MG CREAT (ref 0–0.17)
PTH-INTACT SERPL-MCNC: 67.5 PG/ML (ref 18.5–88)
RBC # BLD AUTO: 4.72 X10*6/UL (ref 4.5–5.9)
RBC # UR STRIP.AUTO: NEGATIVE /UL
RBC #/AREA URNS AUTO: ABNORMAL /HPF
SODIUM SERPL-SCNC: 142 MMOL/L (ref 136–145)
SP GR UR STRIP.AUTO: 1.01
SQUAMOUS #/AREA URNS AUTO: ABNORMAL /HPF
UROBILINOGEN UR STRIP.AUTO-MCNC: ABNORMAL MG/DL
WBC # BLD AUTO: 3.3 X10*3/UL (ref 4.4–11.3)
WBC #/AREA URNS AUTO: >50 /HPF
WBC CLUMPS #/AREA URNS AUTO: ABNORMAL /HPF

## 2024-06-11 PROCEDURE — 36415 COLL VENOUS BLD VENIPUNCTURE: CPT

## 2024-06-11 PROCEDURE — 3074F SYST BP LT 130 MM HG: CPT | Performed by: SURGERY

## 2024-06-11 PROCEDURE — 1159F MED LIST DOCD IN RCRD: CPT | Performed by: SURGERY

## 2024-06-11 PROCEDURE — 82570 ASSAY OF URINE CREATININE: CPT

## 2024-06-11 PROCEDURE — 1126F AMNT PAIN NOTED NONE PRSNT: CPT | Performed by: SURGERY

## 2024-06-11 PROCEDURE — 1157F ADVNC CARE PLAN IN RCRD: CPT | Performed by: SURGERY

## 2024-06-11 PROCEDURE — 82043 UR ALBUMIN QUANTITATIVE: CPT

## 2024-06-11 PROCEDURE — 46500 INJECTION INTO HEMORRHOID(S): CPT | Performed by: SURGERY

## 2024-06-11 PROCEDURE — 84156 ASSAY OF PROTEIN URINE: CPT

## 2024-06-11 PROCEDURE — 85027 COMPLETE CBC AUTOMATED: CPT

## 2024-06-11 PROCEDURE — 82306 VITAMIN D 25 HYDROXY: CPT

## 2024-06-11 PROCEDURE — 99213 OFFICE O/P EST LOW 20 MIN: CPT | Mod: 25 | Performed by: SURGERY

## 2024-06-11 PROCEDURE — 3078F DIAST BP <80 MM HG: CPT | Performed by: SURGERY

## 2024-06-11 PROCEDURE — 83970 ASSAY OF PARATHORMONE: CPT

## 2024-06-11 PROCEDURE — 99213 OFFICE O/P EST LOW 20 MIN: CPT | Performed by: SURGERY

## 2024-06-11 PROCEDURE — 80069 RENAL FUNCTION PANEL: CPT

## 2024-06-11 PROCEDURE — 81001 URINALYSIS AUTO W/SCOPE: CPT

## 2024-06-11 ASSESSMENT — ENCOUNTER SYMPTOMS: DEPRESSION: 0

## 2024-06-11 ASSESSMENT — PAIN SCALES - GENERAL: PAINLEVEL: 0-NO PAIN

## 2024-06-12 ENCOUNTER — HOME CARE VISIT (OUTPATIENT)
Dept: HOME HEALTH SERVICES | Facility: HOME HEALTH | Age: 79
End: 2024-06-12
Payer: MEDICARE

## 2024-06-12 VITALS — HEART RATE: 60 BPM | DIASTOLIC BLOOD PRESSURE: 66 MMHG | SYSTOLIC BLOOD PRESSURE: 128 MMHG

## 2024-06-12 PROCEDURE — G0151 HHCP-SERV OF PT,EA 15 MIN: HCPCS | Mod: HHH

## 2024-06-12 ASSESSMENT — ENCOUNTER SYMPTOMS: DENIES PAIN: 1

## 2024-06-14 ENCOUNTER — HOME CARE VISIT (OUTPATIENT)
Dept: HOME HEALTH SERVICES | Facility: HOME HEALTH | Age: 79
End: 2024-06-14
Payer: MEDICARE

## 2024-06-14 VITALS — SYSTOLIC BLOOD PRESSURE: 96 MMHG | DIASTOLIC BLOOD PRESSURE: 56 MMHG

## 2024-06-14 PROCEDURE — G0151 HHCP-SERV OF PT,EA 15 MIN: HCPCS | Mod: HHH

## 2024-06-14 ASSESSMENT — ENCOUNTER SYMPTOMS: DENIES PAIN: 1

## 2024-06-16 SDOH — ECONOMIC STABILITY: HOUSING INSECURITY: EVIDENCE OF SMOKING MATERIAL: 0

## 2024-06-16 SDOH — HEALTH STABILITY: MENTAL HEALTH: SMOKING IN HOME: 0

## 2024-06-18 DIAGNOSIS — I25.10 ARTERIOSCLEROTIC CARDIOVASCULAR DISEASE (ASCVD): ICD-10-CM

## 2024-06-18 RX ORDER — FOLIC ACID 1 MG/1
TABLET ORAL
Qty: 90 TABLET | Refills: 3 | Status: SHIPPED | OUTPATIENT
Start: 2024-06-18

## 2024-06-19 ENCOUNTER — APPOINTMENT (OUTPATIENT)
Dept: NEPHROLOGY | Facility: CLINIC | Age: 79
End: 2024-06-19
Payer: MEDICARE

## 2024-06-19 VITALS
HEART RATE: 60 BPM | HEIGHT: 72 IN | DIASTOLIC BLOOD PRESSURE: 59 MMHG | BODY MASS INDEX: 29.36 KG/M2 | OXYGEN SATURATION: 96 % | TEMPERATURE: 97.4 F | WEIGHT: 216.8 LBS | SYSTOLIC BLOOD PRESSURE: 108 MMHG

## 2024-06-19 DIAGNOSIS — N18.32 HYPERTENSIVE KIDNEY DISEASE WITH STAGE 3B CHRONIC KIDNEY DISEASE (MULTI): Primary | ICD-10-CM

## 2024-06-19 DIAGNOSIS — I48.20 CHRONIC ATRIAL FIBRILLATION (MULTI): ICD-10-CM

## 2024-06-19 DIAGNOSIS — I25.10 ARTERIOSCLEROTIC CARDIOVASCULAR DISEASE (ASCVD): ICD-10-CM

## 2024-06-19 DIAGNOSIS — I12.9 HYPERTENSIVE KIDNEY DISEASE WITH STAGE 3B CHRONIC KIDNEY DISEASE (MULTI): Primary | ICD-10-CM

## 2024-06-19 DIAGNOSIS — N18.32 STAGE 3B CHRONIC KIDNEY DISEASE (MULTI): Primary | ICD-10-CM

## 2024-06-19 PROCEDURE — 1159F MED LIST DOCD IN RCRD: CPT | Performed by: INTERNAL MEDICINE

## 2024-06-19 PROCEDURE — 1036F TOBACCO NON-USER: CPT | Performed by: INTERNAL MEDICINE

## 2024-06-19 PROCEDURE — 3074F SYST BP LT 130 MM HG: CPT | Performed by: INTERNAL MEDICINE

## 2024-06-19 PROCEDURE — 1160F RVW MEDS BY RX/DR IN RCRD: CPT | Performed by: INTERNAL MEDICINE

## 2024-06-19 PROCEDURE — 3078F DIAST BP <80 MM HG: CPT | Performed by: INTERNAL MEDICINE

## 2024-06-19 PROCEDURE — 99213 OFFICE O/P EST LOW 20 MIN: CPT | Performed by: INTERNAL MEDICINE

## 2024-06-19 PROCEDURE — 1157F ADVNC CARE PLAN IN RCRD: CPT | Performed by: INTERNAL MEDICINE

## 2024-06-19 RX ORDER — FENOFIBRATE 48 MG/1
67 TABLET, FILM COATED ORAL DAILY
Status: SHIPPED
Start: 2024-06-19 | End: 2025-06-19

## 2024-06-19 RX ORDER — FUROSEMIDE 40 MG/1
40 TABLET ORAL 2 TIMES DAILY
Status: SHIPPED
Start: 2024-06-19 | End: 2025-06-19

## 2024-06-19 NOTE — PROGRESS NOTES
Chief Complaint: Follow up CKD    Feeling better after hospitalization for heart failure, 3 month stay [facility]  Recent UTI  Denies nausea, vomiting, chest pain, dyspnea  No urinary symptoms    NAD in wheelchair  O2 per NC  Sclera AI s inj  MMM, no sores  Swelling much improved, tr LE edema  No tremor  No rash    CKD stage 3b  HTN at goal  Proteinuria subnephrotic, much improved     Continue twice daily 40 mg lasix  Consider farxiga with next visit, when UTI free  Labs and follow up in 3 months

## 2024-06-21 ENCOUNTER — ANTICOAGULATION - WARFARIN VISIT (OUTPATIENT)
Dept: CARDIOLOGY | Facility: CLINIC | Age: 79
End: 2024-06-21
Payer: MEDICARE

## 2024-06-21 DIAGNOSIS — I48.0 PAROXYSMAL ATRIAL FIBRILLATION (MULTI): Primary | ICD-10-CM

## 2024-06-21 LAB
INR IN PPP BY COAGULATION ASSAY EXTERNAL: 1.5
PROTHROMBIN TIME (PT) IN PPP BY COAGULATION ASSAY EXTERNAL: NORMAL SECONDS

## 2024-06-21 NOTE — PROGRESS NOTES
Patient identification verified with 2 identifiers.    Location: [facility] Patient Self-Testing Program [phone]    Referring Physician: DR. MATTHEWS  Enrollment/ Re-enrollment date: 2025   INR Goal: 2.0-3.0  INR monitoring is per [facility] protocol.  Anticoagulation Medication: warfarin  Indication: Atrial Fibrillation/Atrial Flutter    Subjective   Bleeding signs/symptoms: No    Bruising: No   Major bleeding event: No  Thrombosis signs/symptoms: No  Thromboembolic event: No  Missed doses: No  Extra doses: No  Medication changes: No  Dietary changes: Yes  Patient states he increased his greens this past week.  Change in health: No  Change in activity: No  Alcohol: No  Other concerns: No    Upcoming Procedures:  Does the Patient Have any upcoming procedures that require interruption in anticoagulation therapy? no  Does the patient require bridging? no      Anticoagulation Summary  As of 2024      INR goal:  2.0-3.0   TTR:  59.3% (7.9 mo)   INR used for dosin.50 (2024)   Weekly warfarin total:  16.25 mg               Assessment/Plan   Subtherapeutic     1. New dose:  Will increase dose per protocol     2. Next INR: 1 week      Education provided to patient during the visit:  Patient instructed to call in interim with questions, concerns and changes.

## 2024-06-28 ENCOUNTER — ANTICOAGULATION - WARFARIN VISIT (OUTPATIENT)
Dept: CARDIOLOGY | Facility: CLINIC | Age: 79
End: 2024-06-28
Payer: MEDICARE

## 2024-06-28 DIAGNOSIS — I48.0 PAROXYSMAL ATRIAL FIBRILLATION (MULTI): Primary | ICD-10-CM

## 2024-06-28 NOTE — PROGRESS NOTES
Patient identification verified with 2 identifiers.    Location: [facility] Patient Self-Testing Program [phone]    Referring Physician: DR. MATTHEWS  Enrollment/ Re-enrollment date: 2025   INR Goal: 2.0-3.0  INR monitoring is per [facility] protocol.  Anticoagulation Medication: warfarin  Indication: Atrial Fibrillation/Atrial Flutter    Subjective   Bleeding signs/symptoms: No    Bruising: No   Major bleeding event: No  Thrombosis signs/symptoms: No  Thromboembolic event: No  Missed doses: No  Extra doses: No  Medication changes: No  Dietary changes: No  Change in health: No  Change in activity: No  Alcohol: No  Other concerns: No    Upcoming Procedures:  Does the Patient Have any upcoming procedures that require interruption in anticoagulation therapy? no  Does the patient require bridging? no      Anticoagulation Summary  As of 2024      INR goal:  2.0-3.0   TTR:  58.1% (8.2 mo)   INR used for dosin.10 (2024)   Weekly warfarin total:  16.25 mg               Assessment/Plan   Therapeutic     1. New dose: Left VM with dosing instructions and next testing date.    2. Next INR: 1 week      Education provided to patient during the visit:  Patient instructed to call in interim with questions, concerns and changes.

## 2024-06-30 ENCOUNTER — HOSPITAL ENCOUNTER (OUTPATIENT)
Facility: HOSPITAL | Age: 79
Setting detail: OBSERVATION
Discharge: HOME HEALTH | End: 2024-07-05
Attending: FAMILY MEDICINE | Admitting: FAMILY MEDICINE
Payer: MEDICARE

## 2024-06-30 ENCOUNTER — APPOINTMENT (OUTPATIENT)
Dept: RADIOLOGY | Facility: HOSPITAL | Age: 79
End: 2024-06-30
Payer: MEDICARE

## 2024-06-30 ENCOUNTER — APPOINTMENT (OUTPATIENT)
Dept: CARDIOLOGY | Facility: HOSPITAL | Age: 79
End: 2024-06-30
Payer: MEDICARE

## 2024-06-30 DIAGNOSIS — I50.9 ACUTE CONGESTIVE HEART FAILURE, UNSPECIFIED HEART FAILURE TYPE (MULTI): Primary | ICD-10-CM
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
## 2024-06-30 DIAGNOSIS — S70.01XA CONTUSION OF RIGHT HIP, INITIAL ENCOUNTER: ICD-10-CM

## 2024-06-30 DIAGNOSIS — R60.0 LOWER EXTREMITY EDEMA: ICD-10-CM

## 2024-06-30 DIAGNOSIS — Z74.09 IMPAIRED FUNCTIONAL MOBILITY, BALANCE, GAIT, AND ENDURANCE: ICD-10-CM

## 2024-06-30 DIAGNOSIS — S96.912A ANKLE STRAIN, LEFT, INITIAL ENCOUNTER: ICD-10-CM

## 2024-06-30 DIAGNOSIS — S52.135A CLOSED NONDISPLACED FRACTURE OF NECK OF LEFT RADIUS, INITIAL ENCOUNTER: ICD-10-CM

## 2024-06-30 LAB
ALBUMIN SERPL-MCNC: 3.7 G/DL (ref 3.5–5)
ALP BLD-CCNC: 83 U/L (ref 35–125)
ALT SERPL-CCNC: 14 U/L (ref 5–40)
ANION GAP SERPL CALC-SCNC: 13 MMOL/L
AST SERPL-CCNC: 25 U/L (ref 5–40)
BASOPHILS # BLD AUTO: 0.02 X10*3/UL (ref 0–0.1)
BASOPHILS NFR BLD AUTO: 0.4 %
BILIRUB SERPL-MCNC: 1 MG/DL (ref 0.1–1.2)
BUN SERPL-MCNC: 22 MG/DL (ref 8–25)
CALCIUM SERPL-MCNC: 8.8 MG/DL (ref 8.5–10.4)
CHLORIDE SERPL-SCNC: 97 MMOL/L (ref 97–107)
CO2 SERPL-SCNC: 29 MMOL/L (ref 24–31)
CREAT SERPL-MCNC: 1.7 MG/DL (ref 0.4–1.6)
EGFRCR SERPLBLD CKD-EPI 2021: 40 ML/MIN/1.73M*2
EOSINOPHIL # BLD AUTO: 0.05 X10*3/UL (ref 0–0.4)
EOSINOPHIL NFR BLD AUTO: 0.9 %
ERYTHROCYTE [DISTWIDTH] IN BLOOD BY AUTOMATED COUNT: 15.9 % (ref 11.5–14.5)
GLUCOSE SERPL-MCNC: 105 MG/DL (ref 65–99)
HCT VFR BLD AUTO: 38.6 % (ref 41–52)
HGB BLD-MCNC: 12.3 G/DL (ref 13.5–17.5)
IMM GRANULOCYTES # BLD AUTO: 0.03 X10*3/UL (ref 0–0.5)
IMM GRANULOCYTES NFR BLD AUTO: 0.6 % (ref 0–0.9)
LYMPHOCYTES # BLD AUTO: 0.74 X10*3/UL (ref 0.8–3)
LYMPHOCYTES NFR BLD AUTO: 14 %
MCH RBC QN AUTO: 28.8 PG (ref 26–34)
MCHC RBC AUTO-ENTMCNC: 31.9 G/DL (ref 32–36)
MCV RBC AUTO: 90 FL (ref 80–100)
MONOCYTES # BLD AUTO: 0.84 X10*3/UL (ref 0.05–0.8)
MONOCYTES NFR BLD AUTO: 15.9 %
NEUTROPHILS # BLD AUTO: 3.6 X10*3/UL (ref 1.6–5.5)
NEUTROPHILS NFR BLD AUTO: 68.2 %
NRBC BLD-RTO: 0 /100 WBCS (ref 0–0)
NT-PROBNP SERPL-MCNC: 4868 PG/ML (ref 0–852)
PLATELET # BLD AUTO: 181 X10*3/UL (ref 150–450)
POTASSIUM SERPL-SCNC: 3.5 MMOL/L (ref 3.4–5.1)
PROT SERPL-MCNC: 7 G/DL (ref 5.9–7.9)
RBC # BLD AUTO: 4.27 X10*6/UL (ref 4.5–5.9)
SODIUM SERPL-SCNC: 139 MMOL/L (ref 133–145)
TROPONIN T SERPL-MCNC: 43 NG/L
WBC # BLD AUTO: 5.3 X10*3/UL (ref 4.4–11.3)

## 2024-06-30 PROCEDURE — 93005 ELECTROCARDIOGRAM TRACING: CPT

## 2024-06-30 PROCEDURE — 72100 X-RAY EXAM L-S SPINE 2/3 VWS: CPT

## 2024-06-30 PROCEDURE — G0378 HOSPITAL OBSERVATION PER HR: HCPCS

## 2024-06-30 PROCEDURE — 83880 ASSAY OF NATRIURETIC PEPTIDE: CPT | Performed by: PHYSICIAN ASSISTANT

## 2024-06-30 PROCEDURE — 85025 COMPLETE CBC W/AUTO DIFF WBC: CPT | Performed by: PHYSICIAN ASSISTANT

## 2024-06-30 PROCEDURE — 73610 X-RAY EXAM OF ANKLE: CPT | Mod: LT

## 2024-06-30 PROCEDURE — 99285 EMERGENCY DEPT VISIT HI MDM: CPT

## 2024-06-30 PROCEDURE — 73502 X-RAY EXAM HIP UNI 2-3 VIEWS: CPT | Mod: RT

## 2024-06-30 PROCEDURE — 84484 ASSAY OF TROPONIN QUANT: CPT | Performed by: PHYSICIAN ASSISTANT

## 2024-06-30 PROCEDURE — 72100 X-RAY EXAM L-S SPINE 2/3 VWS: CPT | Performed by: RADIOLOGY

## 2024-06-30 PROCEDURE — 70450 CT HEAD/BRAIN W/O DYE: CPT

## 2024-06-30 PROCEDURE — 36415 COLL VENOUS BLD VENIPUNCTURE: CPT | Performed by: PHYSICIAN ASSISTANT

## 2024-06-30 PROCEDURE — 70450 CT HEAD/BRAIN W/O DYE: CPT | Performed by: RADIOLOGY

## 2024-06-30 PROCEDURE — 72125 CT NECK SPINE W/O DYE: CPT | Performed by: RADIOLOGY

## 2024-06-30 PROCEDURE — 71045 X-RAY EXAM CHEST 1 VIEW: CPT

## 2024-06-30 PROCEDURE — 72125 CT NECK SPINE W/O DYE: CPT

## 2024-06-30 PROCEDURE — 2500000001 HC RX 250 WO HCPCS SELF ADMINISTERED DRUGS (ALT 637 FOR MEDICARE OP): Performed by: FAMILY MEDICINE

## 2024-06-30 PROCEDURE — 73630 X-RAY EXAM OF FOOT: CPT | Mod: LT

## 2024-06-30 PROCEDURE — 2500000002 HC RX 250 W HCPCS SELF ADMINISTERED DRUGS (ALT 637 FOR MEDICARE OP, ALT 636 FOR OP/ED): Performed by: FAMILY MEDICINE

## 2024-06-30 PROCEDURE — 73502 X-RAY EXAM HIP UNI 2-3 VIEWS: CPT | Mod: RIGHT SIDE | Performed by: RADIOLOGY

## 2024-06-30 PROCEDURE — 71045 X-RAY EXAM CHEST 1 VIEW: CPT | Performed by: RADIOLOGY

## 2024-06-30 PROCEDURE — 80053 COMPREHEN METABOLIC PANEL: CPT | Performed by: PHYSICIAN ASSISTANT

## 2024-06-30 PROCEDURE — 2500000005 HC RX 250 GENERAL PHARMACY W/O HCPCS: Performed by: FAMILY MEDICINE

## 2024-06-30 RX ORDER — METOPROLOL TARTRATE 25 MG/1
12.5 TABLET, FILM COATED ORAL 2 TIMES DAILY
Status: DISCONTINUED | OUTPATIENT
Start: 2024-06-30 | End: 2024-07-05 | Stop reason: HOSPADM

## 2024-06-30 RX ORDER — TAMSULOSIN HYDROCHLORIDE 0.4 MG/1
0.8 CAPSULE ORAL NIGHTLY
Status: DISCONTINUED | OUTPATIENT
Start: 2024-06-30 | End: 2024-07-05 | Stop reason: HOSPADM

## 2024-06-30 RX ORDER — ACETAMINOPHEN 325 MG/1
325 TABLET ORAL EVERY 6 HOURS PRN
Status: DISCONTINUED | OUTPATIENT
Start: 2024-06-30 | End: 2024-07-05 | Stop reason: HOSPADM

## 2024-06-30 RX ORDER — FUROSEMIDE 40 MG/1
40 TABLET ORAL 2 TIMES DAILY
Status: DISCONTINUED | OUTPATIENT
Start: 2024-06-30 | End: 2024-07-05 | Stop reason: HOSPADM

## 2024-06-30 RX ORDER — FOLIC ACID 1 MG/1
1 TABLET ORAL DAILY
Status: DISCONTINUED | OUTPATIENT
Start: 2024-07-01 | End: 2024-07-05 | Stop reason: HOSPADM

## 2024-06-30 RX ORDER — AMIODARONE HYDROCHLORIDE 200 MG/1
200 TABLET ORAL DAILY
Status: DISCONTINUED | OUTPATIENT
Start: 2024-07-01 | End: 2024-07-05 | Stop reason: HOSPADM

## 2024-06-30 RX ORDER — ATORVASTATIN CALCIUM 20 MG/1
20 TABLET, FILM COATED ORAL DAILY
Status: DISCONTINUED | OUTPATIENT
Start: 2024-07-01 | End: 2024-07-05 | Stop reason: HOSPADM

## 2024-06-30 RX ORDER — PAROXETINE HYDROCHLORIDE 20 MG/1
20 TABLET, FILM COATED ORAL DAILY
Status: DISCONTINUED | OUTPATIENT
Start: 2024-07-01 | End: 2024-07-05 | Stop reason: HOSPADM

## 2024-06-30 RX ORDER — UBIDECARENONE 75 MG
500 CAPSULE ORAL DAILY
Status: DISCONTINUED | OUTPATIENT
Start: 2024-07-01 | End: 2024-07-05 | Stop reason: HOSPADM

## 2024-06-30 RX ORDER — FENOFIBRATE 54 MG/1
54 TABLET ORAL DAILY
Status: DISCONTINUED | OUTPATIENT
Start: 2024-07-01 | End: 2024-07-05 | Stop reason: HOSPADM

## 2024-06-30 RX ORDER — PANTOPRAZOLE SODIUM 40 MG/1
40 TABLET, DELAYED RELEASE ORAL
Status: DISCONTINUED | OUTPATIENT
Start: 2024-07-01 | End: 2024-07-05 | Stop reason: HOSPADM

## 2024-06-30 RX ORDER — ASPIRIN 81 MG/1
81 TABLET ORAL DAILY
Status: DISCONTINUED | OUTPATIENT
Start: 2024-07-01 | End: 2024-07-05 | Stop reason: HOSPADM

## 2024-06-30 RX ORDER — GABAPENTIN 100 MG/1
100 CAPSULE ORAL NIGHTLY
Status: DISCONTINUED | OUTPATIENT
Start: 2024-06-30 | End: 2024-07-05 | Stop reason: HOSPADM

## 2024-06-30 RX ORDER — WARFARIN 1 MG/1
1 TABLET ORAL DAILY
Status: DISCONTINUED | OUTPATIENT
Start: 2024-07-01 | End: 2024-07-05 | Stop reason: HOSPADM

## 2024-06-30 RX ORDER — FINASTERIDE 5 MG/1
5 TABLET, FILM COATED ORAL DAILY
Status: DISCONTINUED | OUTPATIENT
Start: 2024-07-01 | End: 2024-07-05 | Stop reason: HOSPADM

## 2024-06-30 ASSESSMENT — LIFESTYLE VARIABLES
TOTAL SCORE: 0
EVER HAD A DRINK FIRST THING IN THE MORNING TO STEADY YOUR NERVES TO GET RID OF A HANGOVER: NO
HAVE YOU EVER FELT YOU SHOULD CUT DOWN ON YOUR DRINKING: NO
EVER FELT BAD OR GUILTY ABOUT YOUR DRINKING: NO
HAVE PEOPLE ANNOYED YOU BY CRITICIZING YOUR DRINKING: NO

## 2024-06-30 ASSESSMENT — PAIN SCALES - GENERAL: PAINLEVEL_OUTOF10: 4

## 2024-06-30 ASSESSMENT — PAIN - FUNCTIONAL ASSESSMENT: PAIN_FUNCTIONAL_ASSESSMENT: 0-10

## 2024-06-30 ASSESSMENT — PAIN DESCRIPTION - LOCATION: LOCATION: ANKLE

## 2024-06-30 NOTE — ED PROVIDER NOTES
HPI   Chief Complaint   Patient presents with    Ankle Pain     Pain in left ankle. Sometimes it's severe. Right hip and lower back also has pain. Ankle since Friday morning. Back and hip since a couple weeks. Has a lot of falls, but can't remember hurting anything. Has a stability problem. Last fall this morning. Is on Warfrin. States he did not hit his head within the last week during a fall.  Pt has pacemaker.    Back Pain    Hip Pain       HPI  The patient is an 79-year-old male here for evaluation of left ankle pain, right hip pain low back pain.  Says that he has had multiple falls most recently this morning.  Says that he is not having any loss of consciousness or any other such symptoms as that.  States that he does not think the pains are directly related to falls but cannot be sure.  Says that he lives at home with his wife, not that long ago did some rehab which helped him.  He says he is not sure if he would need something like that again.                  [place] Coma Scale Score: 15                     Patient History   Past Medical History:   Diagnosis Date    Atherosclerotic heart disease of native coronary artery without angina pectoris 12/14/2022    Arteriosclerotic cardiovascular disease (ASCVD)    Benign neoplasm of meninges, unspecified (Multi)     Meningioma    Benign prostatic hyperplasia without lower urinary tract symptoms 02/28/2022    BPH without obstruction/lower urinary tract symptoms    Body mass index (BMI) 33.0-33.9, adult 07/02/2021    BMI 33.0-33.9,adult    Body mass index (BMI) 34.0-34.9, adult 11/09/2021    BMI 34.0-34.9,adult    Body mass index (BMI) 34.0-34.9, adult 09/08/2021    BMI 34.0-34.9,adult    Body mass index (BMI) 35.0-35.9, adult 12/29/2021    BMI 35.0-35.9,adult    Body mass index (BMI) 35.0-35.9, adult 02/28/2022    BMI 35.0-35.9,adult    Elevated blood-pressure reading, without diagnosis of hypertension     Prehypertension    Encounter for immunization 10/27/2015     Need for prophylactic vaccination and inoculation against influenza    Hyperlipidemia, unspecified 11/15/2021    Hyperlipidemia    Other conditions influencing health status     Nephrolithiasis    Pacemaker     Personal history of other diseases of the musculoskeletal system and connective tissue     History of tendinitis    Personal history of other drug therapy     History of influenza vaccination    Personal history of other specified conditions 10/25/2022    History of bradycardia    Rectal bleeding     Vitamin D deficiency, unspecified 10/08/2020    Vitamin D deficiency     Past Surgical History:   Procedure Laterality Date    BACK SURGERY  04/18/2016    Back Surgery    COLONOSCOPY  07/03/2013    Complete Colonoscopy    CORONARY ANGIOPLASTY WITH STENT PLACEMENT  08/18/2020    Cath Stent Placement    CT ANGIO NECK  9/25/2021    CT NECK ANGIO W AND WO IV CONTRAST 9/25/2021 [facility]ICAL [facility]    CT HEAD ANGIO W AND WO IV CONTRAST  9/25/2021    CT HEAD ANGIO W AND WO IV CONTRAST 9/25/2021 [facility] CLINICAL LEGAC[facility]    EYE SURGERY  04/03/2013    Eye Surgery    KNEE ARTHROSCOPY W/ DEBRIDEMENT  07/03/2013    Arthroscopy Knee Right    KNEE ARTHROSCOPY W/ DEBRIDEMENT  07/03/2013    Arthroscopy Knee Left    LITHOTRIPSY  07/22/2013    Renal Lithotripsy    MR HEAD ANGIO WO IV CONTRAST  3/23/2021    MR HEAD ANGIO WO IV CONTRAST [facility]    MR NECK ANGIO WO IV CONTRAST  3/23/2021    MR NECK ANGIO WO IV CONTRAST [facility]    OTHER SURGICAL HISTORY  04/03/2013    Cardiac Cath Procedure Outcome: Successful    OTHER SURGICAL HISTORY  04/03/2013    Neuroplasty With Transposition Of Ulnar Nerve - At Elbow    OTHER SURGICAL HISTORY  04/03/2013    General Surgery    OTHER SURGICAL HISTORY  06/11/2019    Knee replacement    OTHER SURGICAL HISTORY  07/22/2013    Cystoscopy With Insertion Of Ureteral Stent Bilateral    OTHER SURGICAL HISTORY  07/03/2013    Wrist Carpectomy    OTHER SURGICAL HISTORY  07/03/2013     Brain Surgery    OTHER SURGICAL HISTORY  2013    Ulnar Osteotomy    TONSILLECTOMY  2013    Tonsillectomy    TOTAL KNEE ARTHROPLASTY  2016    Total Knee Arthroplasty     Family History   Problem Relation Name Age of Onset    Diabetes Mother      Emphysema Father      Heart disease Father       Social History     Tobacco Use    Smoking status: Former     Current packs/day: 0.00     Types: Cigarettes     Quit date:      Years since quittin.5    Smokeless tobacco: Never   Vaping Use    Vaping status: Never Used   Substance Use Topics    Alcohol use: Not Currently    Drug use: Never       Physical Exam   ED Triage Vitals [24 1120]   Temperature Heart Rate Respirations BP   36.9 °C (98.4 °F) 59 18 (!) 127/49      Pulse Ox Temp Source Heart Rate Source Patient Position   98 % Tympanic -- Sitting      BP Location FiO2 (%)     Left arm --       Physical Exam  PHYSICAL EXAMINATION    GENERAL APPEARANCE: Awake and alert.     VITAL SIGNS: As per the nurses' triage record.     HEENT: Normocephalic, atraumatic. Extraocular muscles are intact. Pupils equal round and reactive to light. Conjunctiva are pink. Negative scleral icterus. Mucous membranes are moist. Tongue in the midline. Pharynx was without erythema or exudates, uvula midline    NECK: Soft Nontender and supple, full gross ROM, no meningeal signs.    CHEST: Nontender to palpation. Clear to auscultation bilaterally. No rales, rhonchi, or wheezing.     HEART: S1, S2. Regular rate and rhythm. No murmurs, gallops or rubs.  Strong and equal pulses in the extremities.     ABDOMEN: Soft, nontender, nondistended, positive bowel sounds, no palpable masses.    MUSCULOSKELETAL: 1+ lower extremity bilateral edema.  No calf pain or tenderness, says that this is chronic.  Distal sensation motor function is intact.  The patient's left ankle does not have any specific point tenderness and range of motion appears to be intact.  Right hip subjectively  uncomfortable but no direct pain and no reproducible pain on palpation.     NEUROLOGICAL: Awake, alert and oriented x 3. Power intact in the upper and lower extremities. Sensation is intact to light touch in the upper and lower extremities.     IMMUNOLOGICAL: No lymphatic streaking noted     DERM: No petechiae, rashes, or ecchymoses.  ED Course & MDM   ED Course as of 06/30/24 1315   Sun Jun 30, 2024   1156 EKG interpretation by me: AV dual paced rhythm, rate 60.  Sgarbossa criteria not met. [ML]   1222 Troponin T, High Sensitivity(!!): 43  Reviewed, acknowledged.  Will await second draw, EKG not showing signs of STEMI.  Additionally patient does have some renal failure which may be driving it up. [AP]   1222 PROBNP(!): 4,868  On Lasix, perhaps some of the reason for the elevated creatinine. [AP]   1248 XR ankle left 3+ views [AP]   1312 Requested  to contact admitting physician, Dr. Wade is apparently on call today, spoke to him and he graciously excepted the patient under his service [AP]      ED Course User Index  [AP] Walker Freeman PA-C  [ML] Daquan Sweeney MD         Diagnoses as of 06/30/24 1315   Acute congestive heart failure, unspecified heart failure type (Multi)   Lower extremity edema   Fall, initial encounter   Contusion of right hip, initial encounter   Ankle strain, left, initial encounter       Medical Decision Making  Parts of this chart have been completed using voice recognition software. Please excuse any errors of transcription.  My thought process and reason for plan has been formulated from the time that I saw the patient until the time of disposition and is not specific to one specific moment during their visit and furthermore my MDM encompasses this entire chart and not only this text box.      HPI: Detailed above.    Exam: A medically appropriate exam performed, outlined above, given the known history and presentation.    History Limited by: Nothing    History obtained  from: The patient    External/internal records reviewed: No external records reviewed    Social Determinants of Health considered during this visit: Lives at home with wife    Chronic conditions impacting care: Chronic falls    Medications given during visit:  Medications - No data to display     Diagnostic/tests  Labs Reviewed   CBC WITH AUTO DIFFERENTIAL - Abnormal       Result Value    WBC 5.3      nRBC 0.0      RBC 4.27 (*)     Hemoglobin 12.3 (*)     Hematocrit 38.6 (*)     MCV 90      MCH 28.8      MCHC 31.9 (*)     RDW 15.9 (*)     Platelets 181      Neutrophils % 68.2      Immature Granulocytes %, Automated 0.6      Lymphocytes % 14.0      Monocytes % 15.9      Eosinophils % 0.9      Basophils % 0.4      Neutrophils Absolute 3.60      Immature Granulocytes Absolute, Automated 0.03      Lymphocytes Absolute 0.74 (*)     Monocytes Absolute 0.84 (*)     Eosinophils Absolute 0.05      Basophils Absolute 0.02     COMPREHENSIVE METABOLIC PANEL - Abnormal    Glucose 105 (*)     Sodium 139      Potassium 3.5      Chloride 97      Bicarbonate 29      Urea Nitrogen 22      Creatinine 1.70 (*)     eGFR 40 (*)     Calcium 8.8      Albumin 3.7      Alkaline Phosphatase 83      Total Protein 7.0      AST 25      Bilirubin, Total 1.0      ALT 14      Anion Gap 13     N-TERMINAL PROBNP - Abnormal    PROBNP 4,868 (*)     Narrative:     Reference ranges are based on clinical submission data. These ranges represent the 95th percentile of normal cut-off points. As NT Pro- BNP values approach 1000 pg/ml, clinical symptoms are more likely associated with CHF.   SERIAL TROPONIN, INITIAL ([facility]) - Abnormal    Troponin T, High Sensitivity 43 (*)    TROPONIN T SERIES, HIGH SENSITIVITY (0, 2 HR, 6 HR)    Narrative:     The following orders were created for panel order Troponin T Series, High Sensitivity (0, 2HR, 6HR).  Procedure                               Abnormality         Status                     ---------                                -----------         ------                     Serial Troponin, Initial...[[id number]]  Abnormal            Final result                 Please view results for these tests on the individual orders.      XR ankle left 3+ views   Final Result   Small ossicle adjacent and inferior to the lateral malleolus   suggestive of sequela of remote injury. If there is concern for acute   injury, consider MRI or CT for further assessment.        Degenerative changes of the foot.        Calcaneal enthesopathy.             MACRO:   None        Signed by: Kilo Reynolds 6/30/2024 12:34 PM   Dictation workstation:   [id number]      XR foot left 3+ views   Final Result   Small ossicle adjacent and inferior to the lateral malleolus   suggestive of sequela of remote injury. If there is concern for acute   injury, consider MRI or CT for further assessment.        Degenerative changes of the foot.        Calcaneal enthesopathy.             MACRO:   None        Signed by: Kilo Reynolds 6/30/2024 12:34 PM   Dictation workstation:   [id number]      XR hip right with pelvis when performed 2 or 3 views   Final Result   Degenerative changes of the right hip without acute osseous   abnormality detected.             MACRO:   None        Signed by: Kilo Reynolds 6/30/2024 12:34 PM   Dictation workstation:   [id number]      XR lumbar spine 2-3 views   Final Result   Multilevel spondylosis without acute osseous abnormality of the   lumbar spine.             MACRO:   None        Signed by: Kilo Reynolds 6/30/2024 12:35 PM   Dictation workstation:   [id number]      XR chest 1 view   Final Result   1.  No evidence of acute cardiopulmonary process.             Signed by: Kilo Reynolds 6/30/2024 12:36 PM   Dictation workstation:   [id number]      CT head wo IV contrast   Final Result   No CT evidence of acute intracranial injury.        Stable intracranial findings since comparison head CT 04/05/2024.        MACRO:   None                   Signed by: Kilo Reynolds 6/30/2024 12:27 PM   Dictation workstation:   [id number]      CT cervical spine wo IV contrast   Final Result   No evidence for an acute fracture or subluxation of the cervical   spine.        MACRO:   None        Signed by: Kilo Reynolds 6/30/2024 12:28 PM   Dictation workstation:   [id number]           EKG: Tane read by attending physician reviewed myself and per my interpretation no sign of STEMI criteria    Case discussed with: dr zhu    Prescription medications considered: Started Lasix however with the renal function I also wanted to speak with the admitting.  I held off on ordering, admitting provider stated that he will take over orders and will further assist in determining appropriate medication reconciliation    Considerations/further MDM:  I spoke with Dr. zhu. We thoroughly discussed the history, physical exam, laboratory and imaging studies, as well as, emergency department course. Based upon that discussion, we've decided to admit for further observation and evaluation of their symptoms.  As I have deemed necessary from their history, physical, and studies, I have considered and evaluated for the following diagnoses: Acute coronary syndrome including MI, intracranial hemorrhage, malignant dysrhythmia, hypertension, considered pericardial tamponade, pneumothorax, hemothorax, blunt force injury or trauma, considered pulmonary embolism sepsis I also considered stroke pneumonia or respiratory distress or respiratory compromise, pericardial effusion and dissection    The patient has elevated troponin which may be a component of renal function however also elevated proBNP already on Lasix, some lower extremity edema present, recurrent falls with no outward sign of obvious injury or trauma.  The patient will be hospitalized for medication reconciliation and trending of cardiac enzymes and further monitoring of current symptoms.      Procedure  Procedures     Walker RODRÍGUEZ  STAR Freeman  06/30/24 0144

## 2024-07-01 ENCOUNTER — ANTICOAGULATION - WARFARIN VISIT (OUTPATIENT)
Dept: CARDIOLOGY | Facility: CLINIC | Age: 79
End: 2024-07-01
Payer: MEDICARE

## 2024-07-01 DIAGNOSIS — I48.0 PAROXYSMAL ATRIAL FIBRILLATION (MULTI): Primary | ICD-10-CM

## 2024-07-01 LAB
ANION GAP SERPL CALC-SCNC: 9 MMOL/L
ATRIAL RATE: 60 BPM
BUN SERPL-MCNC: 21 MG/DL (ref 8–25)
CALCIUM SERPL-MCNC: 8.7 MG/DL (ref 8.5–10.4)
CHLORIDE SERPL-SCNC: 99 MMOL/L (ref 97–107)
CO2 SERPL-SCNC: 30 MMOL/L (ref 24–31)
CREAT SERPL-MCNC: 1.6 MG/DL (ref 0.4–1.6)
EGFRCR SERPLBLD CKD-EPI 2021: 43 ML/MIN/1.73M*2
ERYTHROCYTE [DISTWIDTH] IN BLOOD BY AUTOMATED COUNT: 15.7 % (ref 11.5–14.5)
GLUCOSE SERPL-MCNC: 100 MG/DL (ref 65–99)
HCT VFR BLD AUTO: 36.1 % (ref 41–52)
HGB BLD-MCNC: 11.2 G/DL (ref 13.5–17.5)
INR PPP: 1.8 (ref 0.9–1.2)
MCH RBC QN AUTO: 28.2 PG (ref 26–34)
MCHC RBC AUTO-ENTMCNC: 31 G/DL (ref 32–36)
MCV RBC AUTO: 91 FL (ref 80–100)
NRBC BLD-RTO: 0 /100 WBCS (ref 0–0)
PLATELET # BLD AUTO: 163 X10*3/UL (ref 150–450)
POTASSIUM SERPL-SCNC: 3.1 MMOL/L (ref 3.4–5.1)
PR INTERVAL: 290 MS
PROTHROMBIN TIME: 18.6 SECONDS (ref 9.3–12.7)
Q ONSET: 193 MS
QRS COUNT: 10 BEATS
QRS DURATION: 154 MS
QT INTERVAL: 488 MS
QTC CALCULATION(BAZETT): 488 MS
QTC FREDERICIA: 488 MS
R AXIS: -82 DEGREES
RBC # BLD AUTO: 3.97 X10*6/UL (ref 4.5–5.9)
SODIUM SERPL-SCNC: 138 MMOL/L (ref 133–145)
T AXIS: 94 DEGREES
T OFFSET: 437 MS
VENTRICULAR RATE: 60 BPM
WBC # BLD AUTO: 4.1 X10*3/UL (ref 4.4–11.3)

## 2024-07-01 PROCEDURE — G0378 HOSPITAL OBSERVATION PER HR: HCPCS

## 2024-07-01 PROCEDURE — 2500000002 HC RX 250 W HCPCS SELF ADMINISTERED DRUGS (ALT 637 FOR MEDICARE OP, ALT 636 FOR OP/ED): Performed by: FAMILY MEDICINE

## 2024-07-01 PROCEDURE — 85027 COMPLETE CBC AUTOMATED: CPT | Performed by: FAMILY MEDICINE

## 2024-07-01 PROCEDURE — 36415 COLL VENOUS BLD VENIPUNCTURE: CPT | Performed by: FAMILY MEDICINE

## 2024-07-01 PROCEDURE — 85610 PROTHROMBIN TIME: CPT | Performed by: FAMILY MEDICINE

## 2024-07-01 PROCEDURE — 97161 PT EVAL LOW COMPLEX 20 MIN: CPT | Mod: GP

## 2024-07-01 PROCEDURE — 97166 OT EVAL MOD COMPLEX 45 MIN: CPT | Mod: GO

## 2024-07-01 PROCEDURE — 2500000001 HC RX 250 WO HCPCS SELF ADMINISTERED DRUGS (ALT 637 FOR MEDICARE OP): Performed by: FAMILY MEDICINE

## 2024-07-01 PROCEDURE — 97535 SELF CARE MNGMENT TRAINING: CPT | Mod: GO

## 2024-07-01 PROCEDURE — 82435 ASSAY OF BLOOD CHLORIDE: CPT | Performed by: FAMILY MEDICINE

## 2024-07-01 RX ORDER — POTASSIUM CHLORIDE 20 MEQ/1
40 TABLET, EXTENDED RELEASE ORAL 2 TIMES DAILY
Status: COMPLETED | OUTPATIENT
Start: 2024-07-01 | End: 2024-07-01

## 2024-07-01 SDOH — ECONOMIC STABILITY: FOOD INSECURITY: WITHIN THE PAST 12 MONTHS, YOU WORRIED THAT YOUR FOOD WOULD RUN OUT BEFORE YOU GOT MONEY TO BUY MORE.: NEVER TRUE

## 2024-07-01 SDOH — SOCIAL STABILITY: SOCIAL NETWORK: HOW OFTEN DO YOU ATTEND CHURCH OR RELIGIOUS SERVICES?: NEVER

## 2024-07-01 SDOH — HEALTH STABILITY: MENTAL HEALTH: HOW OFTEN DO YOU HAVE 6 OR MORE DRINKS ON ONE OCCASION?: NEVER

## 2024-07-01 SDOH — ECONOMIC STABILITY: FOOD INSECURITY: WITHIN THE PAST 12 MONTHS, THE FOOD YOU BOUGHT JUST DIDN'T LAST AND YOU DIDN'T HAVE MONEY TO GET MORE.: NEVER TRUE

## 2024-07-01 SDOH — SOCIAL STABILITY: SOCIAL INSECURITY: HAVE YOU HAD THOUGHTS OF HARMING ANYONE ELSE?: NO

## 2024-07-01 SDOH — HEALTH STABILITY: PHYSICAL HEALTH: ON AVERAGE, HOW MANY DAYS PER WEEK DO YOU ENGAGE IN MODERATE TO STRENUOUS EXERCISE (LIKE A BRISK WALK)?: 0 DAYS

## 2024-07-01 SDOH — ECONOMIC STABILITY: HOUSING INSECURITY: IN THE LAST 12 MONTHS, HOW MANY PLACES HAVE YOU LIVED?: 1

## 2024-07-01 SDOH — SOCIAL STABILITY: SOCIAL INSECURITY: WITHIN THE LAST YEAR, HAVE YOU BEEN HUMILIATED OR EMOTIONALLY ABUSED IN OTHER WAYS BY YOUR PARTNER OR EX-PARTNER?: NO

## 2024-07-01 SDOH — SOCIAL STABILITY: SOCIAL INSECURITY: HAVE YOU HAD ANY THOUGHTS OF HARMING ANYONE ELSE?: NO

## 2024-07-01 SDOH — SOCIAL STABILITY: SOCIAL INSECURITY: WITHIN THE LAST YEAR, HAVE YOU BEEN AFRAID OF YOUR PARTNER OR EX-PARTNER?: NO

## 2024-07-01 SDOH — SOCIAL STABILITY: SOCIAL NETWORK: HOW OFTEN DO YOU ATTENT MEETINGS OF THE CLUB OR ORGANIZATION YOU BELONG TO?: NEVER

## 2024-07-01 SDOH — ECONOMIC STABILITY: INCOME INSECURITY: HOW HARD IS IT FOR YOU TO PAY FOR THE VERY BASICS LIKE FOOD, HOUSING, MEDICAL CARE, AND HEATING?: NOT HARD AT ALL

## 2024-07-01 SDOH — SOCIAL STABILITY: SOCIAL NETWORK: ARE YOU MARRIED, WIDOWED, DIVORCED, SEPARATED, NEVER MARRIED, OR LIVING WITH A PARTNER?: MARRIED

## 2024-07-01 SDOH — SOCIAL STABILITY: SOCIAL INSECURITY: ARE THERE ANY APPARENT SIGNS OF INJURIES/BEHAVIORS THAT COULD BE RELATED TO ABUSE/NEGLECT?: NO

## 2024-07-01 SDOH — SOCIAL STABILITY: SOCIAL INSECURITY: DO YOU FEEL UNSAFE GOING BACK TO THE PLACE WHERE YOU ARE LIVING?: NO

## 2024-07-01 SDOH — ECONOMIC STABILITY: INCOME INSECURITY: IN THE PAST 12 MONTHS, HAS THE ELECTRIC, GAS, OIL, OR WATER COMPANY THREATENED TO SHUT OFF SERVICE IN YOUR HOME?: NO

## 2024-07-01 SDOH — HEALTH STABILITY: MENTAL HEALTH: HOW OFTEN DO YOU HAVE A DRINK CONTAINING ALCOHOL?: NEVER

## 2024-07-01 SDOH — HEALTH STABILITY: MENTAL HEALTH
HOW OFTEN DO YOU NEED TO HAVE SOMEONE HELP YOU WHEN YOU READ INSTRUCTIONS, PAMPHLETS, OR OTHER WRITTEN MATERIAL FROM YOUR DOCTOR OR PHARMACY?: SOMETIMES

## 2024-07-01 SDOH — HEALTH STABILITY: PHYSICAL HEALTH: ON AVERAGE, HOW MANY MINUTES DO YOU ENGAGE IN EXERCISE AT THIS LEVEL?: 0 MIN

## 2024-07-01 SDOH — ECONOMIC STABILITY: INCOME INSECURITY: IN THE LAST 12 MONTHS, WAS THERE A TIME WHEN YOU WERE NOT ABLE TO PAY THE MORTGAGE OR RENT ON TIME?: NO

## 2024-07-01 SDOH — SOCIAL STABILITY: SOCIAL NETWORK: IN A TYPICAL WEEK, HOW MANY TIMES DO YOU TALK ON THE PHONE WITH FAMILY, FRIENDS, OR NEIGHBORS?: THREE TIMES A WEEK

## 2024-07-01 SDOH — SOCIAL STABILITY: SOCIAL INSECURITY: ARE YOU OR HAVE YOU BEEN THREATENED OR ABUSED PHYSICALLY, EMOTIONALLY, OR SEXUALLY BY ANYONE?: NO

## 2024-07-01 SDOH — SOCIAL STABILITY: SOCIAL INSECURITY: ABUSE: ADULT

## 2024-07-01 SDOH — SOCIAL STABILITY: SOCIAL INSECURITY: DO YOU FEEL ANYONE HAS EXPLOITED OR TAKEN ADVANTAGE OF YOU FINANCIALLY OR OF YOUR PERSONAL PROPERTY?: NO

## 2024-07-01 SDOH — SOCIAL STABILITY: SOCIAL NETWORK: HOW OFTEN DO YOU GET TOGETHER WITH FRIENDS OR RELATIVES?: THREE TIMES A WEEK

## 2024-07-01 SDOH — SOCIAL STABILITY: SOCIAL INSECURITY: DOES ANYONE TRY TO KEEP YOU FROM HAVING/CONTACTING OTHER FRIENDS OR DOING THINGS OUTSIDE YOUR HOME?: NO

## 2024-07-01 SDOH — SOCIAL STABILITY: SOCIAL INSECURITY: WERE YOU ABLE TO COMPLETE ALL THE BEHAVIORAL HEALTH SCREENINGS?: YES

## 2024-07-01 SDOH — HEALTH STABILITY: MENTAL HEALTH: HOW MANY STANDARD DRINKS CONTAINING ALCOHOL DO YOU HAVE ON A TYPICAL DAY?: PATIENT DOES NOT DRINK

## 2024-07-01 SDOH — SOCIAL STABILITY: SOCIAL INSECURITY: HAS ANYONE EVER THREATENED TO HURT YOUR FAMILY OR YOUR PETS?: NO

## 2024-07-01 ASSESSMENT — ACTIVITIES OF DAILY LIVING (ADL)
HOME_MANAGEMENT_TIME_ENTRY: 10
JUDGMENT_ADEQUATE_SAFELY_COMPLETE_DAILY_ACTIVITIES: YES
GROOMING: NEEDS ASSISTANCE
ADEQUATE_TO_COMPLETE_ADL: YES
BATHING: NEEDS ASSISTANCE
LACK_OF_TRANSPORTATION: NO
PATIENT'S MEMORY ADEQUATE TO SAFELY COMPLETE DAILY ACTIVITIES?: YES
BATHING_ASSISTANCE: NOT PERFORMED
FEEDING YOURSELF: INDEPENDENT
DRESSING YOURSELF: NEEDS ASSISTANCE
WALKS IN HOME: NEEDS ASSISTANCE
ADL_ASSISTANCE: INDEPENDENT
TOILETING: NEEDS ASSISTANCE
LACK_OF_TRANSPORTATION: NO
ADL_ASSISTANCE: INDEPENDENT
HEARING - RIGHT EAR: HEARING AID
HEARING - LEFT EAR: HEARING AID

## 2024-07-01 ASSESSMENT — COGNITIVE AND FUNCTIONAL STATUS - GENERAL
MOVING FROM LYING ON BACK TO SITTING ON SIDE OF FLAT BED WITH BEDRAILS: A LITTLE
EATING MEALS: A LITTLE
TOILETING: A LITTLE
DRESSING REGULAR UPPER BODY CLOTHING: A LOT
MOVING TO AND FROM BED TO CHAIR: A LOT
HELP NEEDED FOR BATHING: A LOT
CLIMB 3 TO 5 STEPS WITH RAILING: TOTAL
STANDING UP FROM CHAIR USING ARMS: A LOT
TOILETING: A LOT
MOVING FROM LYING ON BACK TO SITTING ON SIDE OF FLAT BED WITH BEDRAILS: A LITTLE
DRESSING REGULAR LOWER BODY CLOTHING: A LOT
WALKING IN HOSPITAL ROOM: A LITTLE
PERSONAL GROOMING: A LITTLE
PERSONAL GROOMING: A LITTLE
DRESSING REGULAR LOWER BODY CLOTHING: A LITTLE
PATIENT BASELINE BEDBOUND: NO
TURNING FROM BACK TO SIDE WHILE IN FLAT BAD: A LITTLE
STANDING UP FROM CHAIR USING ARMS: A LITTLE
HELP NEEDED FOR BATHING: A LITTLE
MOVING TO AND FROM BED TO CHAIR: A LITTLE
DRESSING REGULAR UPPER BODY CLOTHING: A LITTLE
WALKING IN HOSPITAL ROOM: TOTAL
CLIMB 3 TO 5 STEPS WITH RAILING: TOTAL
TURNING FROM BACK TO SIDE WHILE IN FLAT BAD: A LITTLE
DAILY ACTIVITIY SCORE: 14
MOBILITY SCORE: 12
DAILY ACTIVITIY SCORE: 19
MOBILITY SCORE: 16

## 2024-07-01 ASSESSMENT — PAIN SCALES - GENERAL
PAINLEVEL_OUTOF10: 0 - NO PAIN
PAINLEVEL_OUTOF10: 0 - NO PAIN
PAINLEVEL_OUTOF10: 3

## 2024-07-01 ASSESSMENT — LIFESTYLE VARIABLES
AUDIT-C TOTAL SCORE: 0
PRESCIPTION_ABUSE_PAST_12_MONTHS: NO
HOW OFTEN DO YOU HAVE 6 OR MORE DRINKS ON ONE OCCASION: NEVER
SKIP TO QUESTIONS 9-10: 1
HOW OFTEN DO YOU HAVE A DRINK CONTAINING ALCOHOL: NEVER
SUBSTANCE_ABUSE_PAST_12_MONTHS: NO
HOW MANY STANDARD DRINKS CONTAINING ALCOHOL DO YOU HAVE ON A TYPICAL DAY: PATIENT DOES NOT DRINK
SKIP TO QUESTIONS 9-10: 1

## 2024-07-01 ASSESSMENT — PATIENT HEALTH QUESTIONNAIRE - PHQ9
2. FEELING DOWN, DEPRESSED OR HOPELESS: NOT AT ALL
SUM OF ALL RESPONSES TO PHQ9 QUESTIONS 1 & 2: 0
1. LITTLE INTEREST OR PLEASURE IN DOING THINGS: NOT AT ALL

## 2024-07-01 ASSESSMENT — PAIN - FUNCTIONAL ASSESSMENT
PAIN_FUNCTIONAL_ASSESSMENT: 0-10
PAIN_FUNCTIONAL_ASSESSMENT: 0-10

## 2024-07-01 NOTE — PROGRESS NOTES
07/01/24 1230   Physical Activity   On average, how many days per week do you engage in moderate to strenuous exercise (like a brisk walk)? 0 days   On average, how many minutes do you engage in exercise at this level? 0 min   Financial Resource Strain   How hard is it for you to pay for the very basics like food, housing, medical care, and heating? Not hard   Housing Stability   In the last 12 months, was there a time when you were not able to pay the mortgage or rent on time? N   In the last 12 months, how many places have you lived? 1   In the last 12 months, was there a time when you did not have a steady place to sleep or slept in a shelter (including now)? N   Transportation Needs   In the past 12 months, has lack of transportation kept you from medical appointments or from getting medications? no   In the past 12 months, has lack of transportation kept you from meetings, work, or from getting things needed for daily living? No   Food Insecurity   Within the past 12 months, you worried that your food would run out before you got the money to buy more. Never true   Within the past 12 months, the food you bought just didn't last and you didn't have money to get more. Never true   Stress   Do you feel stress - tense, restless, nervous, or anxious, or unable to sleep at night because your mind is troubled all the time - these days? Only a littl   Social Connections   In a typical week, how many times do you talk on the phone with family, friends, or neighbors? Three   How often do you get together with friends or relatives? Three times   How often do you attend Spiritism or Church services? Never   Do you belong to any clubs or organizations such as Spiritism groups, unions, fraternal or athletic groups, or school groups? No   How often do you attend meetings of the clubs or organizations you belong to? Never   Are you , , , , never , or living with a partner?     Intimate Partner Violence   Within the last year, have you been afraid of your partner or ex-partner? No   Within the last year, have you been humiliated or emotionally abused in other ways by your partner or ex-partner? No   Within the last year, have you been kicked, hit, slapped, or otherwise physically hurt by your partner or ex-partner? No   Within the last year, have you been raped or forced to have any kind of sexual activity by your partner or ex-partner? No   Alcohol Use   Q1: How often do you have a drink containing alcohol? Never   Q2: How many drinks containing alcohol do you have on a typical day when you are drinking? None   Q3: How often do you have six or more drinks on one occasion? Never   Utilities   In the past 12 months has the electric, gas, oil, or water company threatened to shut off services in your home? No   Health Literacy   How often do you need to have someone help you when you read instructions, pamphlets, or other written material from your doctor or pharmacy? Sometimes

## 2024-07-01 NOTE — PROGRESS NOTES
Spiritual Care Visit    Clinical Encounter Type  Visited With: Patient  Routine Visit: Introduction  Continue Visiting: Yes         Values/Beliefs  Spiritual Requests During Hospitalization: Anointing & Communion    Sacramental Encounters  Communion: Patient wants communion  Communion Given Indicator: Yes  Sacrament of Sick-Anointing: Anointed      to Madelyn next to him!  over 60 years!     Jacinto Grijalva

## 2024-07-01 NOTE — CARE PLAN
The patient's goals for the shift include safety    The clinical goals for the shift include safety

## 2024-07-01 NOTE — PROGRESS NOTES
[facility] met with patient and his spouse, who is also a patient. Assessment complete. Patient lives with his spouse. They reside in a house. Patient reports that he was at [facility] for 2.5 months and was discharged home about 3 weeks ago. Patient did have [facility]. Patient reports that he is typically independent. Patient did have a fall prior to this admission. Patient wears 2-3 liters of oxygen, supplier is [employer]. Patient uses a rollator. Patient no longer drives, his daughters assist with transport. Patient and spouse receive Meals on Wheels. His daughters also assist with shopping and cleaning. Patient follows Dr. Jung Tao. Patient fills prescriptions at [facility] in [place]. Patient agreeable to C and SNF, no decision made at this time. Patient is in observation status at this time. Will follow.      07/01/24 7594   Discharge Planning   Living Arrangements Spouse/significant other   Support Systems Spouse/significant other;Children   Type of Residence Private residence   Number of Stairs to Enter Residence 4   Home or Post Acute Services Other (Comment)  (TBD)   Patient expects to be discharged to: TBD   Financial Resource Strain   How hard is it for you to pay for the very basics like food, housing, medical care, and heating? Not hard   Housing Stability   In the last 12 months, was there a time when you were not able to pay the mortgage or rent on time? N   In the last 12 months, how many places have you lived? 1   In the last 12 months, was there a time when you did not have a steady place to sleep or slept in a shelter (including now)? N   Transportation Needs   In the past 12 months, has lack of transportation kept you from medical appointments or from getting medications? no   In the past 12 months, has lack of transportation kept you from meetings, work, or from getting things needed for daily living? No

## 2024-07-01 NOTE — CARE PLAN
Problem: Skin  Goal: Decreased wound size/increased tissue granulation at next dressing change  Outcome: Progressing  Goal: Participates in plan/prevention/treatment measures  Outcome: Progressing  Goal: Prevent/manage excess moisture  Outcome: Progressing  Goal: Prevent/minimize sheer/friction injuries  Outcome: Progressing  Goal: Promote/optimize nutrition  Outcome: Progressing  Goal: Promote skin healing  Outcome: Progressing   The patient's goals for the shift include safety    The clinical goals for the shift include safety

## 2024-07-01 NOTE — CONSULTS
Nutrition Assessement Note    Nutrition Assessment    Reason for Assessment: Admission nursing screening (MST 3)    Spoke with pt at bedside. Pt reported to be eating well and has a good appetite. Pt denied any nutrition related questions.     Reason for Hospital Admission:  Angel Carroll is a 79 y.o. male who is admitted for congestive heart failure. The patient is an 79-year-old male here for evaluation of left ankle pain, right hip pain low back pain. Says that he has had multiple falls most recently this morning.     Past Medical History:   Diagnosis Date    Atherosclerotic heart disease of native coronary artery without angina pectoris 12/14/2022    Arteriosclerotic cardiovascular disease (ASCVD)    Benign neoplasm of meninges, unspecified (Multi)     Meningioma    Benign prostatic hyperplasia without lower urinary tract symptoms 02/28/2022    BPH without obstruction/lower urinary tract symptoms    Body mass index (BMI) 33.0-33.9, adult 07/02/2021    BMI 33.0-33.9,adult    Body mass index (BMI) 34.0-34.9, adult 11/09/2021    BMI 34.0-34.9,adult    Body mass index (BMI) 34.0-34.9, adult 09/08/2021    BMI 34.0-34.9,adult    Body mass index (BMI) 35.0-35.9, adult 12/29/2021    BMI 35.0-35.9,adult    Body mass index (BMI) 35.0-35.9, adult 02/28/2022    BMI 35.0-35.9,adult    Elevated blood-pressure reading, without diagnosis of hypertension     Prehypertension    Encounter for immunization 10/27/2015    Need for prophylactic vaccination and inoculation against influenza    Hyperlipidemia, unspecified 11/15/2021    Hyperlipidemia    Other conditions influencing health status     Nephrolithiasis    Pacemaker     Personal history of other diseases of the musculoskeletal system and connective tissue     History of tendinitis    Personal history of other drug therapy     History of influenza vaccination    Personal history of other specified conditions 10/25/2022    History of bradycardia    Rectal bleeding     Vitamin D  deficiency, unspecified 10/08/2020    Vitamin D deficiency      Past Surgical History:   Procedure Laterality Date    BACK SURGERY  04/18/2016    Back Surgery    COLONOSCOPY  07/03/2013    Complete Colonoscopy    CORONARY ANGIOPLASTY WITH STENT PLACEMENT  08/18/2020    Cath Stent Placement    CT ANGIO NECK  9/25/2021    CT NECK ANGIO W AND WO IV CONTRAST 9/25/2021 [facility]    CT HEAD ANGIO W AND WO IV CONTRAST  9/25/2021    CT HEAD ANGIO W AND WO IV CONTRAST 9/25/2021 [facility]    EYE SURGERY  04/03/2013    Eye Surgery    KNEE ARTHROSCOPY W/ DEBRIDEMENT  07/03/2013    Arthroscopy Knee Right    KNEE ARTHROSCOPY W/ DEBRIDEMENT  07/03/2013    Arthroscopy Knee Left    LITHOTRIPSY  07/22/2013    Renal Lithotripsy    MR HEAD ANGIO WO IV CONTRAST  3/23/2021    MR HEAD ANGIO WO IV CONTRAST [facility]    MR NECK ANGIO WO IV CONTRAST  3/23/2021    MR NECK ANGIO WO IV CONTRAST [facility]    OTHER SURGICAL HISTORY  04/03/2013    Cardiac Cath Procedure Outcome: Successful    OTHER SURGICAL HISTORY  04/03/2013    Neuroplasty With Transposition Of Ulnar Nerve - At Elbow    OTHER SURGICAL HISTORY  04/03/2013    General Surgery    OTHER SURGICAL HISTORY  06/11/2019    Knee replacement    OTHER SURGICAL HISTORY  07/22/2013    Cystoscopy With Insertion Of Ureteral Stent Bilateral    OTHER SURGICAL HISTORY  07/03/2013    Wrist Carpectomy    OTHER SURGICAL HISTORY  07/03/2013    Brain Surgery    OTHER SURGICAL HISTORY  07/03/2013    Ulnar Osteotomy    TONSILLECTOMY  04/03/2013    Tonsillectomy    TOTAL KNEE ARTHROPLASTY  08/03/2016    Total Knee Arthroplasty       Nutrition History:  Food and Nutrient History: Pt reported good PO intake  Energy Intake: Good > 75 %  Food Allergies/Intolerances:  None  GI Symptoms: None  Oral Problems: None    Anthropometrics:  Ht: 182.9 cm (6'), Wt: 98.6 kg (217 lb 6 oz), BMI: 29.47  IBW/kg (Dietitian Calculated): 80.91 kg          Weight Change:  Daily  Weight  07/01/24 : 98.6 kg (217 lb 6 oz)  06/19/24 : 98.3 kg (216 lb 12.8 oz)  06/11/24 : 96.6 kg (213 lb)  05/23/24 : 116 kg (255 lb)  05/21/24 : 116 kg (255 lb)  05/15/24 : 116 kg (255 lb)  04/22/24 : 115 kg (253 lb 3.2 oz)  04/07/24 : 115 kg (254 lb)  04/05/24 : 115 kg (254 lb)  03/08/24 : 115 kg (254 lb 10.1 oz)     Weight History / % Weight Change: Pt reported UBW of 218# and denied weight changes. Per chart, pt has lost 42# (16.5%) over 1 month             Nutrition Focused Physical Exam Findings:                       Nutrition Significant Labs:  Lab Results   Component Value Date    WBC 4.1 (L) 07/01/2024    HGB 11.2 (L) 07/01/2024    HCT 36.1 (L) 07/01/2024     07/01/2024    CHOL 117 (L) 11/14/2022    TRIG 81 11/14/2022    HDL 31 (L) 11/14/2022    ALT 14 06/30/2024    AST 25 06/30/2024     07/01/2024    K 3.1 (L) 07/01/2024    CL 99 07/01/2024    CREATININE 1.60 07/01/2024    BUN 21 07/01/2024    CO2 30 07/01/2024    TSH 2.84 11/14/2022    INR 1.8 (H) 07/01/2024    HGBA1C 5.0 02/12/2022     Nutrition Specific Medications:  amiodarone, 200 mg, oral, Daily  aspirin, 81 mg, oral, Daily  atorvastatin, 20 mg, oral, Daily  cyanocobalamin, 500 mcg, oral, Daily  fenofibrate, 54 mg, oral, Daily  finasteride, 5 mg, oral, Daily  folic acid, 1 mg, oral, Daily  furosemide, 40 mg, oral, BID  gabapentin, 100 mg, oral, Nightly  metoprolol tartrate, 12.5 mg, oral, BID  pantoprazole, 40 mg, oral, Daily before breakfast  PARoxetine, 20 mg, oral, Daily  psyllium, 1 packet, oral, BID  tamsulosin, 0.8 mg, oral, Nightly  warfarin, 1 mg, oral, Daily      oxygen, 3 L/min      Dietary Orders (From admission, onward)       Start     Ordered    06/30/24 1811  Adult diet Regular  Diet effective now        Question:  Diet type  Answer:  Regular    06/30/24 1810                  Estimated Needs:   Estimated Energy Needs  Total Energy Estimated Needs (kCal): 2023 kCal  Total Estimated Energy Need per Day (kCal/kg): 25  kCal/kg  Method for Estimating Needs: IBW    Estimated Protein Needs  Total Protein Estimated Needs (g):  (65-97)  Total Protein Estimated Needs (g/kg):  (0.8-1.2)  Method for Estimating Needs: IBW    Estimated Fluid Needs  Method for Estimating Needs: < 2000 mL/d (chf)        Nutrition Diagnosis   Nutrition Diagnosis:       Nutrition Diagnosis  Patient has Nutrition Diagnosis: Yes  Diagnosis Status (1): New  Nutrition Diagnosis 1: Increased nutrient needs  Related to (1): increased demand for nutrient  As Evidenced by (1): chf       Nutrition Interventions/Recommendations   Nutrition Interventions and Recommendations:    Nutrition Prescription:  Individualized Nutrition Prescription Provided for : 2023 kcals, 65-97 g protein via diet    Nutrition Interventions:   Food and/or Nutrient Delivery Interventions  Interventions: Meals and snacks  Meals and Snacks: General healthful diet  Goal: provide diet as ordered    Education Documentation  No documentation found.           Nutrition Monitoring and Evaluation   Monitoring/Evaluation:   Food/Nutrient Related History Monitoring  Monitoring and Evaluation Plan: Energy intake  Energy Intake: Estimated energy intake  Criteria: pt to consume >/= 75% estimated needs         Time Spent/Follow-up:   Follow Up  Time Spent (min): 30 minutes  Last Date of Nutrition Visit: 07/01/24  Nutrition Follow-Up Needed?: 7-10 days  Follow up Comment: 7/9/24

## 2024-07-01 NOTE — CARE PLAN
Problem: Skin  Goal: Decreased wound size/increased tissue granulation at next dressing change  7/1/2024 1052 by Monique Taylor RN  Outcome: Progressing  7/1/2024 1016 by Monique Taylor RN  Outcome: Progressing  Goal: Participates in plan/prevention/treatment measures  7/1/2024 1052 by Monique Taylor RN  Outcome: Progressing  7/1/2024 1016 by Monique Taylor RN  Outcome: Progressing  Goal: Prevent/manage excess moisture  7/1/2024 1052 by Monique Taylor RN  Outcome: Progressing  7/1/2024 1016 by Monique Taylor RN  Outcome: Progressing  Goal: Prevent/minimize sheer/friction injuries  7/1/2024 1052 by Monique Taylor RN  Outcome: Progressing  7/1/2024 1016 by Monique Taylor RN  Outcome: Progressing  Goal: Promote/optimize nutrition  7/1/2024 1052 by Monique Taylor RN  Outcome: Progressing  7/1/2024 1016 by Monique Taylor RN  Outcome: Progressing  Goal: Promote skin healing  7/1/2024 1052 by Monique Taylor RN  Outcome: Progressing  7/1/2024 1016 by Monique Taylor RN  Outcome: Progressing   The patient's goals for the shift include safety    The clinical goals for the shift include safety

## 2024-07-01 NOTE — RESEARCH NOTES
Artificial Intelligence Monitoring in Nursing (AIMS Nursing) Study    Principle Investigator - Dr. Abhilash Woo  Research Coordinator - Efraín Davalos     Patient Name - Angel Carroll  Date - 7/1/2024 4:29 PM  Location - [facility]    Angel Carroll was not approached by Efraín Davalos to talk about participating in the AIMS Nursing Study. The patient was not able to be approached, a research coordinator will come back at a later time.     Efraín Davalos

## 2024-07-01 NOTE — CARE PLAN
Problem: Skin  Goal: Decreased wound size/increased tissue granulation at next dressing change  7/1/2024 1053 by Monique Taylor RN  Outcome: Progressing  7/1/2024 1052 by Monique Taylor RN  Outcome: Progressing  7/1/2024 1016 by Monique Taylor RN  Outcome: Progressing  Goal: Participates in plan/prevention/treatment measures  7/1/2024 1053 by Monique Taylor RN  Outcome: Progressing  7/1/2024 1052 by Monique Taylor RN  Outcome: Progressing  7/1/2024 1016 by Monique Taylor RN  Outcome: Progressing  Goal: Prevent/manage excess moisture  7/1/2024 1053 by Monique Taylor RN  Outcome: Progressing  7/1/2024 1052 by Monique Taylor RN  Outcome: Progressing  7/1/2024 1016 by Monique Taylor RN  Outcome: Progressing  Goal: Prevent/minimize sheer/friction injuries  7/1/2024 1053 by Monique Taylor RN  Outcome: Progressing  7/1/2024 1052 by Monique Taylor RN  Outcome: Progressing  7/1/2024 1016 by Monique Taylor RN  Outcome: Progressing  Goal: Promote/optimize nutrition  7/1/2024 1053 by Monique Taylor RN  Outcome: Progressing  7/1/2024 1052 by Monique Taylor, ELMER  Outcome: Progressing  7/1/2024 1016 by Monique Taylor, ELMER  Outcome: Progressing  Goal: Promote skin healing  7/1/2024 1053 by Monique Taylor RN  Outcome: Progressing  7/1/2024 1052 by Monique Taylor RN  Outcome: Progressing  7/1/2024 1016 by Monique Taylor, ELMER  Outcome: Progressing   The patient's goals for the shift include safety    The clinical goals for the shift include safety

## 2024-07-01 NOTE — PROGRESS NOTES
Occupational Therapy    Evaluation/Treatment    Patient Name: Angel Carroll  MRN: [id number]  : 1945  Today's Date: 24  Time Calculation  Start Time: 810  Stop Time: 830  Time Calculation (min): 20 min       Assessment:  OT Assessment: 79 year-old M admitted from home with c/o L ankle pain, R hip pain, low back pain, and multiple falls; radiology negative for acute findings. Patient limited this date with pain, decreased strength, decreased balance, decerased endurance, dereased safety, is a fall risk, will benefit from continued OT intervention and moderaete intensity rehab is recommended.  Prognosis: Good  Barriers to Discharge: Decreased caregiver support (Patients spouse is a patient currenlty in the hospital .)  Evaluation/Treatment Tolerance: Patient limited by fatigue, Patient limited by pain  Medical Staff Made Aware: Yes  End of Session Communication: Bedside nurse  End of Session Patient Position: Up in chair, Alarm on  OT Assessment Results: Decreased ADL status, Decreased upper extremity strength, Decreased safe judgment during ADL, Decreased endurance, Decreased cognition, Decreased functional mobility, Decreased IADLs  Prognosis: Good  Barriers to Discharge: Decreased caregiver support (Patients spouse is a patient currenlty in the hospital .)  Evaluation/Treatment Tolerance: Patient limited by fatigue, Patient limited by pain  Medical Staff Made Aware: Yes  Strengths: Ability to acquire knowledge, Premorbid level of function  Barriers to Participation: Comorbidities  Plan:  Treatment Interventions: ADL retraining, UE strengthening/ROM, Functional transfer training, Endurance training, Cognitive reorientation, Patient/family training, Equipment evaluation/education, Compensatory technique education  OT Frequency: 4 times per week  OT Discharge Recommendations: Moderate intensity level of continued care  Equipment Recommended upon Discharge: Wheeled walker  OT Recommended Transfer Status:  Assist of 1  OT - OK to Discharge: Yes  Treatment Interventions: ADL retraining, UE strengthening/ROM, Functional transfer training, Endurance training, Cognitive reorientation, Patient/family training, Equipment evaluation/education, Compensatory technique education    Subjective   Current Problem:  1. Acute congestive heart failure, unspecified heart failure type (Multi)        2. Lower extremity edema        3. Fall, initial encounter        4. Contusion of right hip, initial encounter        5. Ankle strain, left, initial encounter          General:   OT Received On: 07/01/24  General  Reason for Referral: Decreased ADL function for CHF.  Referred By: Dr. Sheri MD  Past Medical History Relevant to Rehab: anemia, BPH, CVA, R ICA stenosis, depression, Afib, HLD, HTN, RAJAN, CKD, anxiety, chronic respiratory failure, brain tumor, R TKR, lumbar stenosis, OA.  Family/Caregiver Present: Yes (Patients spouse is his roomate in the same room as a patient , was able to answer some questions this date.)  Co-Treatment: PT  Co-Treatment Reason: to optimize pt outcomes/safety  Prior to Session Communication: Bedside nurse  Patient Position Received: Bed, 3 rail up, Alarm on  Preferred Learning Style: verbal  General Comment: Cleared by nurse to see patient for OT, patient agreable to therapy. Patient  met in the room in the bed.  Precautions:  Hearing/Visual Limitations: glasses; hearing aids (Per patient hearind aids not with him this date.)  Medical Precautions: Fall precautions, Oxygen therapy device and L/min  Post-Surgical Precautions:  (3 ltiers of O2)  Precautions Comment: Patient was instructed in calling staff to use the restoom, recommend bed/chair alarm for safety.  Vital Signs:  Heart Rate: 60  SpO2: 99 % (92-99 this date with activity.)  Patient Position: Sitting  Pain:  Pain Assessment  Pain Assessment: 0-10  0-10 (Numeric) Pain Score: 3  Pain Type: Other (Comment) (All over)  Patient's Stated Pain Goal: No  pain    Objective   Cognition:  Overall Cognitive Status: Impaired (safety, insight/judgement)  Orientation Level: Oriented X4  Following Commands: Follows one step commands with repetition (Patient reports no hearing aids this date.)  Safety Judgment: Decreased awareness of need for assistance  Problem Solving: Exceptions to WFL  Complex Functional Tasks: Impaired  Safety/Judgement: Exceptions to WFL  Insight: Mild  Processing Speed: Delayed           Home Living:  Type of Home: House  Lives With: Spouse  Home Adaptive Equipment: Walker rolling or standard, Cane, Wheelchair-manual  Home Layout: Other (Comment) (rollator)  Home Access: Stairs to enter with rails  Entrance Stairs-Rails: Both  Entrance Stairs-Number of Steps: 4  Bathroom Shower/Tub: Tub/shower unit  Bathroom Toilet: Adaptive toilet seating (raised toilet seat)  Bathroom Equipment: Grab bars in shower, Tub transfer bench  Home Living Comments: Patients spouse is a patient in the same room as the patient .  Prior Function:  Level of [place]: Independent with ADLs and functional transfers, Needs assistance with homemaking  Receives Help From: Family  ADL Assistance: Independent  Homemaking Assistance: Needs assistance  Driving/Transportation: Family/Friend  Homemaking Assistance Comments: Patient needs assist for all I ADL's, daughters have been assisting with driving, managing his medication, spouse does the finances.  Ambulatory Assistance: Independent (with rollator /W/c per patient)  Vocational: Retired  Hand Dominance: Right  Prior Function Comments: Patient reports 3 falls in the past 1 monht, reports returned from SNF in the past 1 month, complete HHC.  IADL History:  Homemaking Responsibilities: No  Current License: No  Mode of Transportation: Family, Friends  ADL:  Eating Assistance: Independent  Eating Deficit: Setup  Grooming Assistance: Stand by  Grooming Deficit: Wash/dry hands, Wash/dry face  Bathing Assistance: Not performed  Bathing  Deficit:  (patients breakfast arrived)  UE Dressing Assistance: Minimal  UE Dressing Deficit:  (taco/doff gown)  LE Dressing Assistance: Moderate  LE Dressing Deficit: Don/doff L sock, Don/doff R sock (Patient limited this date with decreased sitting balance, dizziness with taco/doff socks.)  Toileting Assistance with Device: Moderate  Toileting Deficit: Increased time to complete, Incontinent, Perineal hygiene, Clothing management down, Clothing management up, Grab bar use, Verbal cueing, Steadying, Use of adaptive equipment (walker level for hygiene and to change pull up briefs this date.)  Functional Assistance: Minimal    Activity Tolerance:  Endurance: Tolerates less than 10 min exercise, no significant change in vital signs  Activity Tolerance Comments: Fair, limited this date with pain, overall weakness.  Functional Standing Tolerance:     Bed Mobility/Transfers: Bed Mobility  Bed Mobility: Yes (Patient needed minimal assist this date for trunk with the HOB raised this date and increased time this date, limited with dizziness.)    Transfers  Transfer: Yes (Patient needed minimal assist with moderate V/C for safe hand placement this date to complete bed, toilet and chair transfer this date with the walker.)  Functional Mobility:  Functional Mobility  Functional Mobility Performed: Yes (Shayna was able to walk to the bathoom and back to the room this date with minimal assist with increased time, limited with   R hip pain and mild R knee buckling noted with walking,with reports of dizziness this date, making patient a high fall risk.)  Sitting Balance:  Static Sitting Balance  Static Sitting-Balance Support: Feet supported, Right upper extremity supported  Static Sitting-Level of Assistance: Moderate assistance  Static Sitting-Comment/Number of Minutes: Poor, Patient with multiple LOB this date with sitting at the EOB, attempting to taco socks this date.  Dynamic Sitting Balance  Dynamic Sitting-Balance Support:  Feet supported  Dynamic Sitting-Balance: Forward lean, Lateral lean  Dynamic Sitting-Comments: Patient needed minimal assist/ Close Supervision to taco undergarments this date sitting on the toilet this date, limited with dizziness this date,  Standing Balance:  Static Standing Balance  Static Standing-Balance Support: Bilateral upper extremity supported  Static Standing-Level of Assistance: Minimum assistance  Static Standing-Comment/Number of Minutes: 2-3min with the walker, fair/fair+  Dynamic Standing Balance  Dynamic Standing-Balance Support: Bilateral upper extremity supported  Dynamic Standing-Comments: minimal assist with the walker , 3-4min    Vision:Vision - Basic Assessment  Current Vision: Wears glasses all the time  Sensation:  Sensation Comment: Patient denies any tingling/numbness in Both hands.  Strength:  Strength Comments: 3+/5 BUE    Coordination:  Movements are Fluid and Coordinated: No  Coordination Comment: decreased rate of movements   Hand Function:  Hand Function  Gross Grasp: Functional  Coordination: Functional    Outcome Measures: [facility] Daily Activity  Putting on and taking off regular lower body clothing: A lot  Bathing (including washing, rinsing, drying): A lot  Putting on and taking off regular upper body clothing: A lot  Toileting, which includes using toilet, bedpan or urinal: A lot  Taking care of personal grooming such as brushing teeth: A little  Eating Meals: A little  Daily Activity - Total Score: 14        Education Documentation  ADL Training, taught by Sue Killian OT at 7/1/2024 10:41 AM.  Learner: Patient  Readiness: Acceptance  Method: Explanation  Response: Verbalizes Understanding, Demonstrated Understanding, Needs Reinforcement  Comment: Patient was instructed in ADL retraining, functional transfers/mobility with the walker.    Education Comments  No comments found.        OP EDUCATION:       Goals:  Encounter Problems       Encounter Problems (Active)       OT  Goals       Patient will be able to complete grooming, UB dressing/bathing, and feeding with modified independence.  (Progressing)       Start:  07/01/24    Expected End:  07/15/24            Patient will be able to complete LB dressing/bathing/toileting with Close Supervision using AE as needed, with G balance and good safety. (Progressing)       Start:  07/01/24    Expected End:  07/15/24            Patient will be able to complete functional transfers/mobility with the walker with Distant Supervision using good safety and with G balance for ADL/transfers. (Progressing)       Start:  07/01/24    Expected End:  07/15/24            Patient will be able to tolerate 10 in of functional standing with G balance in prep for ADL/transfers. (Progressing)       Start:  07/01/24    Expected End:  07/15/24

## 2024-07-01 NOTE — PROGRESS NOTES
Physical Therapy    Physical Therapy Evaluation    Patient Name: Angel Carroll  MRN: [id number]  Today's Date: 7/1/2024   Time Calculation  Start Time: 0803  Stop Time: 0823  Time Calculation (min): 20 min    Assessment/Plan   PT Assessment  PT Assessment Results: Decreased strength, Decreased endurance, Impaired balance, Decreased mobility, Decreased cognition, Impaired judgement, Decreased safety awareness, Pain  Rehab Prognosis: Good  Barriers to Discharge: safety awareness  Evaluation/Treatment Tolerance: Patient tolerated treatment well  Medical Staff Made Aware: Yes  Strengths: Ability to acquire knowledge, Attitude of self  Barriers to Participation: Comorbidities  End of Session Communication: Bedside nurse  Assessment Comment: Pt demonstrates impaired functional mobility from baseline level; pt with BLE weakness, impaired balance, decreased overall activity tolerance, and impaired safety awareness; pt with multiple recent falls, and remains a high falls risk at this time; would benefit from 24 hr supervision/assist and mod intensity therapy needs upon d/c.  End of Session Patient Position: Up in chair, Alarm on  IP OR SWING BED PT PLAN  Inpatient or Swing Bed: Inpatient  PT Plan  Treatment/Interventions: Bed mobility, Transfer training, Gait training, Stair training, Balance training, Neuromuscular re-education, Strengthening, Endurance training, Therapeutic exercise, Therapeutic activity  PT Plan: Ongoing PT  PT Frequency: 3 times per week  PT Discharge Recommendations: Moderate intensity level of continued care  Equipment Recommended upon Discharge: Wheeled walker  PT Recommended Transfer Status: Assist x1, Assistive device  PT - OK to Discharge: Yes    Subjective   General Visit Information:  General  Reason for Referral: impaired functional mobility; Pt is an 79 year-old M admitted from home with c/o L ankle pain, R hip pain, low back pain, and multiple falls; radiology negative for acute findings.  Referred  By: Dr. Sheri MD  Past Medical History Relevant to Rehab: anemia, BPH, CVA, R ICA stenosis, depression, Afib, HLD, HTN, RAJAN, CKD, anxiety, chronic respiratory failure, brain tumor, R TKR, lumbar stenosis, OA.  Family/Caregiver Present: No  Co-Treatment: OT  Co-Treatment Reason: to optimize pt outcomes/safety  Prior to Session Communication: Bedside nurse  Patient Position Received: Bed, 3 rail up, Alarm on  Preferred Learning Style: verbal  General Comment: Pt cleared for therapy via RN, received in supine, NAD, agreeable to participate in therapy. (+) telemetry, 3L O2 via NC  Home Living:  Home Living  Type of Home: House  Lives With: Spouse (currently hospitalized at [facility])  Home Adaptive Equipment: Walker rolling or standard, Cane, Wheelchair-manual  Home Layout: Two level, Able to live on main level with bedroom/bathroom  Home Access: Stairs to enter with rails  Entrance Stairs-Rails: Both  Entrance Stairs-Number of Steps: 4  Bathroom Shower/Tub: Tub/shower unit  Bathroom Toilet: Handicapped height  Bathroom Equipment: Grab bars in shower, Tub transfer bench  Home Living Comments: pt currently hospitalized at [facility]  Prior Level of Function:  Prior Function Per Pt/Caregiver Report  Level of [place]: Independent with ADLs and functional transfers, Needs assistance with homemaking  Receives Help From: Family (daughters)  ADL Assistance: Independent  Homemaking Assistance: Needs assistance  Driving/Transportation: Family/Friend  Homemaking Assistance Comments: assist for iADLs from daughters  Ambulatory Assistance: Independent (mod indep with rollator)  Vocational: Retired  Prior Function Comments: h/o multiple recent falls; wears 2-3L O2 via NC 24/7  Precautions:  Precautions  Hearing/Visual Limitations: glasses; hearing aids  Medical Precautions: Fall precautions, Oxygen therapy device and L/min (3L O2 via NC)  Vital Signs:  Vital Signs  Heart Rate: 60  SpO2: 99 % (92-99% on 2L O2 via NC post-ambulation  training)    Objective   Pain:  Pain Assessment  Pain Assessment: 0-10  0-10 (Numeric) Pain Score: 0 - No pain  Cognition:  Cognition  Overall Cognitive Status: Within Functional Limits  Orientation Level: Oriented X4  Following Commands: Follows one step commands with increased time  Safety Judgment: Decreased awareness of need for safety precautions  Insight: Mild    General Assessments:  General Observation  General Observation: pleasant/cooperative     Activity Tolerance  Endurance: Tolerates less than 10 min exercise, no significant change in vital signs  Activity Tolerance Comments: fair    Sensation  Light Touch: No apparent deficits  Sensation Comment: pt denies paresthesias    Strength  Strength Comments: BLE > 3+/5  Strength  Strength Comments: BLE > 3+/5    Coordination  Movements are Fluid and Coordinated: Yes  Coordination Comment: decreased rate of movements    Postural Control  Postural Control: Impaired  Posture Comment: forward head, rounded shoulders    Static Sitting Balance  Static Sitting-Balance Support: Feet supported, Bilateral upper extremity supported  Static Sitting-Level of Assistance: Close supervision  Static Sitting-Comment/Number of Minutes: initial mild trunk retropulsion in sitting on EOB requiring min assist x 1 for balance; able to progress to supervision for safety    Static Standing Balance  Static Standing-Balance Support: Bilateral upper extremity supported  Static Standing-Level of Assistance: Minimum assistance  Functional Assessments:     Bed Mobility  Bed Mobility: Yes  Bed Mobility 1  Bed Mobility 1: Supine to sitting  Level of Assistance 1: Minimum assistance  Bed Mobility Comments 1: assist for trunk elevation; use of bedrails    Transfers  Transfer: Yes  Transfer 1  Transfer From 1: Sit to  Transfer to 1: Stand  Technique 1: Sit to stand  Transfer Device 1: Walker  Transfer Level of Assistance 1: Minimum assistance  Trials/Comments 1: assist for forward weight  shift/balance; cueing for proper hand placement  Transfers 2  Transfer From 2: Stand to  Transfer to 2: Sit  Technique 2: Stand to sit  Transfer Device 2: Walker  Transfer Level of Assistance 2: Minimum assistance  Trials/Comments 2: assist for controlled lowering of hips into chair, cueing for sequencing/safety    Ambulation/Gait Training  Ambulation/Gait Training Performed: Yes  Ambulation/Gait Training 1  Surface 1: Level tile  Device 1: Rolling walker  Assistance 1: Minimum assistance  Quality of Gait 1: Diminished heel strike, Decreased step length, Forward flexed posture (reciprocating pattern, decreased BLE heel strike toe off (R>L), mild buckling of RLE, unsteady gait)  Comments/Distance (ft) 1: 10' x 2    Stairs  Stairs: No    Extremity/Trunk Assessments:  RLE   RLE : Within Functional Limits  LLE   LLE : Within Functional Limits  Outcome Measures:  [facility] Basic Mobility  Turning from your back to your side while in a flat bed without using bedrails: A little  Moving from lying on your back to sitting on the side of a flat bed without using bedrails: A little  Moving to and from bed to chair (including a wheelchair): A little  Standing up from a chair using your arms (e.g. wheelchair or bedside chair): A little  To walk in hospital room: A little  Climbing 3-5 steps with railing: Total  Basic Mobility - Total Score: 16    Encounter Problems       Encounter Problems (Active)       Mobility       STG - Patient will ambulate 100' with use of rolling walker and modified independence. (Progressing)       Start:  07/01/24    Expected End:  08/01/24            STG - Patient will ascend and descend four to six stairs with use of B handrails and supervision for safety. (Not Progressing)       Start:  07/01/24    Expected End:  08/01/24               PT Transfers       STG - Patient to transfer to and from sit to supine with modified independence. (Progressing)       Start:  07/01/24    Expected End:  08/01/24             STG - Patient will transfer sit to and from stand with use of rolling walker and modified independence. (Progressing)       Start:  07/01/24    Expected End:  08/01/24               Pain - Adult          Safety       STG - Patient uses call light consistently to request assistance with transfers (Progressing)       Start:  07/01/24    Expected End:  08/01/24                   Education Documentation  Mobility Training, taught by Keri Hathaway PT at 7/1/2024 10:05 AM.  Learner: Patient  Readiness: Acceptance  Method: Explanation, Demonstration  Response: Needs Reinforcement    Education Comments  No comments found.

## 2024-07-01 NOTE — H&P
History Of Present Illness  Angel Carroll is a 79 y.o. male with past medical history significant for diastolic heart failure, coronary artery disease, BPH, dyslipidemia, paroxysmal atrial fibrillation, COPD, chronic respiratory failure on continuous O2, osteoarthritis, chronic kidney disease, dyslipidemia, GERD presented to ED after a fall out of bed that he sustained 1 hour before presentation.  He was not able to get up on his own and called her daughter who called 911 and he was delivered to the emergency room for evaluation.  Upon arrival he was complaining of left ankle, right hip and low back pain.  However x-rays failed to reveal any acute fractures.  Admitted to regular nursing floor for further management.  Today he is awake alert and oriented x 3 but able to stand up and walk.     Past Medical History  He has a past medical history of Atherosclerotic heart disease of native coronary artery without angina pectoris (12/14/2022), Benign neoplasm of meninges, unspecified (Multi), Benign prostatic hyperplasia without lower urinary tract symptoms (02/28/2022), Body mass index (BMI) 33.0-33.9, adult (07/02/2021), Body mass index (BMI) 34.0-34.9, adult (11/09/2021), Body mass index (BMI) 34.0-34.9, adult (09/08/2021), Body mass index (BMI) 35.0-35.9, adult (12/29/2021), Body mass index (BMI) 35.0-35.9, adult (02/28/2022), Elevated blood-pressure reading, without diagnosis of hypertension, Encounter for immunization (10/27/2015), Hyperlipidemia, unspecified (11/15/2021), Other conditions influencing health status, Pacemaker, Personal history of other diseases of the musculoskeletal system and connective tissue, Personal history of other drug therapy, Personal history of other specified conditions (10/25/2022), Rectal bleeding, and Vitamin D deficiency, unspecified (10/08/2020).    Surgical History  He has a past surgical history that includes Other surgical history (04/03/2013); Other surgical history (04/03/2013);  Physical Therapy  Facility/Department: [address] IP REHAB  Daily Treatment Note  NAME: Loree Mesa  : 1947  MRN: [id number]    Date of Service: 10/8/2020    Discharge Recommendations:  Patient would benefit from continued therapy after discharge, Continue to assess pending progress   PT Equipment Recommendations  Equipment Needed: Yes  Mobility Devices: Wheelchair  Wheelchair: Standard  Other: standard wheelchair with standard cushion, standard leg rests, removable arm rests    Assessment   Body structures, Functions, Activity limitations: Decreased functional mobility ; Decreased balance;Decreased strength  Assessment: Pt demonstrates good motivation and willingness to participate in therapy. Pt continues to require steadying assistance when upright, and needs max cues to extend at hip and knee with upright activity. He has difficulty remembering sequencing of events for ambulation so continually needs cuing on every movement for gait. Pt able to take some good steps, but was unable to ambulate increased distances this AM secondary to discomfort in L LE. Pt struggles pivoting on RLE when performing standing transfers from w/c, and needs assistance to facilitate rotation of hips and pivoting with cues for hand placement. Pt continues to be below baseline and would benefit from skilled PT to strengthen muscles, increase safety awareness, and improve functional mobility for safe transition to home. Treatment Diagnosis: decreased functional mobility following CVA with L alondra  Prognosis: Good;Fair  History: as noted  Exam: as above  Clinical Presentation: evolving  PT Education: General Safety;Transfer Training;Gait Training;Functional Mobility Training  Barriers to Learning: language  REQUIRES PT FOLLOW UP: Yes  Activity Tolerance  Activity Tolerance: Patient limited by pain; Patient limited by fatigue     Patient Diagnosis(es): There were no encounter diagnoses.      has a past medical history of Cerebral Other surgical history (04/03/2013); Eye surgery (04/03/2013); Tonsillectomy (04/03/2013); Total knee arthroplasty (08/03/2016); Other surgical history (06/11/2019); Back surgery (04/18/2016); Lithotripsy (07/22/2013); Other surgical history (07/22/2013); Colonoscopy (07/03/2013); Other surgical history (07/03/2013); Knee arthroscopy w/ debridement (07/03/2013); Knee arthroscopy w/ debridement (07/03/2013); Other surgical history (07/03/2013); Other surgical history (07/03/2013); Coronary angioplasty with stent (08/18/2020); CT angio head w and wo IV contrast (9/25/2021); CT angio neck (9/25/2021); MR angio head wo IV contrast (3/23/2021); and MR angio neck wo IV contrast (3/23/2021).     Social History  He reports that he quit smoking about 30 years ago. His smoking use included cigarettes. He has never used smokeless tobacco. He reports that he does not currently use alcohol. He reports that he does not use drugs.    Family History  Family History   Problem Relation Name Age of Onset    Diabetes Mother      Emphysema Father      Heart disease Father          Allergies  Other, Ranolazine, and Cyclobenzaprine    Review of Systems  No chest pain, no shortness of breath, no cough, no double vision, no dizziness, no palpitation, no abdominal pain, no nausea, no vomiting, no diarrhea, no constipation, no fever, no chills, no dysuria.     Physical Exam    General-awake, alert, oriented x3  Lung-clear to auscultation bilaterally  Heart-irregular, irregular RR, S1 and S2 audible, 1/6 systolic murmur best heard at tricuspid valve place  Abdomen- soft, nontender, nondistended, good bowel sounds, no organomegaly, no mass, bowel sounds are present  Extremities-+2 nonpitting edema up to both knees, left ankle is swollen, tender on medial aspect, right hip is slightly tender on lateral aspect, range of motion full but slightly painful.  Lumbar spine is without visible changes.  Tenderness over spinal paraspinal region at L4-S1  levels, no step-offs, range of motion is decreased  Wound-none applicable    Last Recorded Vitals  /53 (BP Location: Right arm, Patient Position: Lying)   Pulse 60   Temp 34.5 °C (94.1 °F) (Axillary)   Resp 18   Wt 98.6 kg (217 lb 6 oz)   SpO2 99% Comment: 92-99 this date with activity.    Relevant Results             Assessment/Plan   Sprain of left ankle and right hip-I will start Tylenol 650 mg p.o. 3 times daily, obtain PT/OT evaluation  Lumbar strain-again start Tylenol 650 mg p.o. 3 times daily, await PT/OT evaluation  Congestive heart failure, diastolic-I will continue Lasix 40 mg p.o. twice daily.  2D echocardiogram from 2/29/2024 revealed EF of 50±5% with moderate tricuspid regurgitation.  Paroxysmal atrial fibrillation-I will continue Coumadin 1 mg p.o. daily and monitor PTT/INR daily  Chronic kidney disease stage IIIb-creatinine 1.6 at baseline.  Continue home medications  Hypokalemia-potassium 3.1, replace potassium  Disposition-will depend on PT/OT inputs                                   Oli Wade MD     standing)  Stand to sit: Contact guard assistance(verbal and tactile cues for hand placement.)  Bed to Chair: Minimal assistance(Needs assistance to shift hips and facilitate turning. Also requires cuing for hand placement)  Stand Pivot Transfers: Minimal Assistance(To L side. Pt unstable and cuing to pivot on R and for hand placement. Assistance to shift hips and facilitate pivot)  Ambulation  Ambulation?: Yes  More Ambulation?: No  Ambulation 1  Surface: level tile  Device: Hemiwalker  Other Apparatus: Wheelchair follow(L arm sling)  Assistance: Minimal assistance(PT in front of patient providing tactile cues on sequencing. Needs assistance shifting weight R/L to unload swing phase leg. Tactile and verbal cues for hip and knee extension)  Quality of Gait: Patient demonstrates improved sequencing initially, but does still requires tactile cueing for correct sequence with increasing fatigue. Able to advance left LE with improved control of hip abduction, but still requires tactile cuing to advance L LE and to fully extend in stance phase. He was able to clear toes of left foot and able to extend knee, but required facilitation and assistance to maintain knee extension as fatigue increased. No substantial LoB, but when patient fatigues he assumes forward flexed posture and knees begin to buckle. Requires increased assist to weight shift onto B LE when advances swing LE as fatigue increases. Therapist foot between patient's feet to prevent hip adduction and maintain good Juhi  Gait Deviations: Slow Janeth;Decreased step length;Decreased step height  Distance: 5' x2 with no turns  Comments: Patient unable to advance despite cuing. Asked if he wanted to sit, which he did. Ocassionally rubs proximal LLE and states that he is experiencing discomfort in [place]. When PT asks if he is having pain, pt shakes head in agreement.   Son, Frank Jensen, not present for 75% of AM session  Wheelchair Activities  Wheelchair Parts Management: No  Propulsion: No        Exercises  Hip Flexion: x10 with visual target of PTs hand  Knee Long Arc Quad: x10 on BLEs. Patient achieving increased ROM, but unable to achieve sustained contraction  Ankle Pumps: PROM with tapping to left anterior tibialis and gastroc/soleus during DF/PF. PT attempted to demonstrate on right LE, but patient does not appear to fully understand. Unable to ellicit trace contraction at this time. Comments: Pt would intermittently rub proximal LLE to communicate discomfort(son, jose, not present for 75% of AM session so no translation)  Other exercises  Other exercises 2: seated balloon toss with R UE for approximately 4 minutes, then standing balloon toss with R UE with min A for posture and balance for approx 5 mins x2(seated rest break between standing trial secondary to fatigue)      PM session:   S:Pt met in therapy gym with interpretor. He denies pain and is agreeable to PT this afternoon. O: Pt was wheeled to [facility]. He performed Sit>stand with CGA, stand-pivot transfer with with CGA, and Stand>sit with CGA. Pt pedaled for 10 minutes with seat at 5, handles at 5 with L hand , and resistance at 2. He averaged 45 SPM.  When standing from NuStep he was CGA, and required Laure for Stand-pivot transfer and CGA for Stand>sit. Pt given seated rest break in w/c secondary to fatigue. Pt was asked to ambulate next. Sit<>stand with CGA throughout. PT guarded from the front of pt with one foot between pts feet to prevent excessive hip adduction. Pt ambulated with Laure-ModA and max cues for sequencing. Pt demonstrates decreased stride length and endurance. As pt fatigues his RLE begins to buckle and he bends forward at the hips. PT facilitates weight shifting to both sides. Increased difficulty taking step on R compared to L. PT facilitated L swing phase. For first walk, pt ambulated 5' and requested to sit down.   Pt given seated rest break secondary to fatigue. Pt ambulated another 15' after seated rest break with Laure-ModA and max cues with improved stride length. Pt propelled w/c 100' with SBA in hallway. Pt uses RUE to propel forward and RLE to steer. Pt demonstrated difficulty steering with RLE and turning wheel simultaneously. Pt would turn wheel, and only steer with RLE when he was in proximity with wall, or veered off to one side of the hallway. Pt cannot maintain straight path and would frequently steer close to the R wall. Pt required max verbal and tactile cues to stay on task. A: Pt tolerated afternoon session well. After completing NuStep, pt experienced coughing fit that eased up and did not occur again. Pt is unsteady when ambulating and requires w/c to follow as PT provides tactile and verbal cuing with Laure-ModA. Pt is pleasant throughout and benefits from having outside interpretor who translates what pt is saying. Pt would continue to benefit from skilled PT to increase activity tolerance, improve gait mechanics and sequencing, and develop upright balance for success with functional mobility. Safety Device - Type of devices:  [x]  All fall risk precautions in place [] Bed alarm in place  [] Call light within reach [] Chair alarm in place [] Positioning belt [x] Gait belt [x] Patient at risk for falls [] Left in bed [x] Left in chair [] Telesitter in use [] Sitter present [] Nurse notified []  None     Goals  Short term goals  Time Frame for Short term goals: 2 weeks  Short term goal 1: bed mobility SBA  Short term goal 2: transfers CGA  Short term goal 3: amb x 20 ft with RW and Min A x 1  Short term goal 4: tolerate LE Ther ex for increased LE strength.   Short term goal 5: curb step min A  Long term goals  Time Frame for Long term goals : 3-4 weeks  Long term goal 1: bed mobility SBA  Long term goal 2: transfers SBA  Long term goal 3: car transfer CGA  Long term goal 4: amb 48' with RW vs hemiwalker CGA to SBA  Long term goal 5: 4 steps B rails CGA  Patient Goals   Patient goals : pt's/family goal is some rehab before returning home. Plan    Plan  Times per week: 5 to 6  Times per day: Twice a day  Plan weeks: 3 to 4  Current Treatment Recommendations: Functional Mobility Training, Balance Training, Endurance Training, ROM, Cognitive/Perceptual Training, Transfer Training, Gait Training, Stair training, IADL Training, Neuromuscular Re-education, Wheelchair Mobility Training, Strengthening  Safety Devices  Type of devices: All fall risk precautions in place, Gait belt, Left in chair, Patient at risk for falls, Call light within reach, Chair alarm in place  Restraints  Initially in place: No     Therapy Time   Individual Concurrent Group Co-treatment   Time In 1115         Time Out 1200         Minutes 39             Second Session Therapy Time     Individual Co-treatment   Time In 1345    Time Out 1430    Minutes 45       Electronically signed by GENE Maldonado on 10/8/2020 at 12:30 PM   Therapist was present, directed the patient's care, made skilled judgement, and was responsible for assessment and treatment of the patient.       Electronically signed by Tracey Valente PT on 10/8/2020 at 4:38 PM

## 2024-07-02 LAB
ANION GAP SERPL CALC-SCNC: 11 MMOL/L
BUN SERPL-MCNC: 22 MG/DL (ref 8–25)
CALCIUM SERPL-MCNC: 9 MG/DL (ref 8.5–10.4)
CHLORIDE SERPL-SCNC: 101 MMOL/L (ref 97–107)
CO2 SERPL-SCNC: 29 MMOL/L (ref 24–31)
CREAT SERPL-MCNC: 1.5 MG/DL (ref 0.4–1.6)
EGFRCR SERPLBLD CKD-EPI 2021: 46 ML/MIN/1.73M*2
GLUCOSE SERPL-MCNC: 100 MG/DL (ref 65–99)
HOLD SPECIMEN: NORMAL
INR PPP: 1.8 (ref 0.9–1.2)
MAGNESIUM SERPL-MCNC: 2.1 MG/DL (ref 1.6–3.1)
POTASSIUM SERPL-SCNC: 3.7 MMOL/L (ref 3.4–5.1)
PROTHROMBIN TIME: 17.9 SECONDS (ref 9.3–12.7)
SODIUM SERPL-SCNC: 141 MMOL/L (ref 133–145)

## 2024-07-02 PROCEDURE — 2500000005 HC RX 250 GENERAL PHARMACY W/O HCPCS: Performed by: FAMILY MEDICINE

## 2024-07-02 PROCEDURE — 80048 BASIC METABOLIC PNL TOTAL CA: CPT | Performed by: FAMILY MEDICINE

## 2024-07-02 PROCEDURE — 97116 GAIT TRAINING THERAPY: CPT | Mod: GP

## 2024-07-02 PROCEDURE — 2500000001 HC RX 250 WO HCPCS SELF ADMINISTERED DRUGS (ALT 637 FOR MEDICARE OP): Performed by: FAMILY MEDICINE

## 2024-07-02 PROCEDURE — 97110 THERAPEUTIC EXERCISES: CPT | Mod: GP

## 2024-07-02 PROCEDURE — G0378 HOSPITAL OBSERVATION PER HR: HCPCS

## 2024-07-02 PROCEDURE — 83735 ASSAY OF MAGNESIUM: CPT | Performed by: FAMILY MEDICINE

## 2024-07-02 PROCEDURE — 85610 PROTHROMBIN TIME: CPT | Performed by: FAMILY MEDICINE

## 2024-07-02 PROCEDURE — 2500000002 HC RX 250 W HCPCS SELF ADMINISTERED DRUGS (ALT 637 FOR MEDICARE OP, ALT 636 FOR OP/ED): Performed by: FAMILY MEDICINE

## 2024-07-02 PROCEDURE — 97535 SELF CARE MNGMENT TRAINING: CPT | Mod: GO

## 2024-07-02 PROCEDURE — 36415 COLL VENOUS BLD VENIPUNCTURE: CPT | Performed by: FAMILY MEDICINE

## 2024-07-02 RX ORDER — POTASSIUM CHLORIDE 20 MEQ/1
20 TABLET, EXTENDED RELEASE ORAL ONCE
Status: COMPLETED | OUTPATIENT
Start: 2024-07-02 | End: 2024-07-02

## 2024-07-02 RX ORDER — WARFARIN 1 MG/1
1 TABLET ORAL ONCE
Status: COMPLETED | OUTPATIENT
Start: 2024-07-02 | End: 2024-07-02

## 2024-07-02 RX ORDER — LIDOCAINE 560 MG/1
2 PATCH PERCUTANEOUS; TOPICAL; TRANSDERMAL DAILY
Status: DISCONTINUED | OUTPATIENT
Start: 2024-07-02 | End: 2024-07-05 | Stop reason: HOSPADM

## 2024-07-02 ASSESSMENT — PAIN SCALES - GENERAL
PAINLEVEL_OUTOF10: 0 - NO PAIN
PAINLEVEL_OUTOF10: 7
PAINLEVEL_OUTOF10: 0 - NO PAIN

## 2024-07-02 ASSESSMENT — COGNITIVE AND FUNCTIONAL STATUS - GENERAL
MOVING TO AND FROM BED TO CHAIR: A LITTLE
DRESSING REGULAR UPPER BODY CLOTHING: A LITTLE
MOVING TO AND FROM BED TO CHAIR: A LITTLE
CLIMB 3 TO 5 STEPS WITH RAILING: TOTAL
TOILETING: A LITTLE
MOVING FROM LYING ON BACK TO SITTING ON SIDE OF FLAT BED WITH BEDRAILS: A LITTLE
CLIMB 3 TO 5 STEPS WITH RAILING: TOTAL
STANDING UP FROM CHAIR USING ARMS: A LITTLE
DAILY ACTIVITIY SCORE: 17
PERSONAL GROOMING: A LITTLE
DAILY ACTIVITIY SCORE: 20
TOILETING: A LITTLE
STANDING UP FROM CHAIR USING ARMS: A LITTLE
MOBILITY SCORE: 16
PERSONAL GROOMING: A LITTLE
DRESSING REGULAR LOWER BODY CLOTHING: A LITTLE
TURNING FROM BACK TO SIDE WHILE IN FLAT BAD: A LITTLE
STANDING UP FROM CHAIR USING ARMS: A LITTLE
WALKING IN HOSPITAL ROOM: A LITTLE
CLIMB 3 TO 5 STEPS WITH RAILING: TOTAL
DAILY ACTIVITIY SCORE: 18
DRESSING REGULAR LOWER BODY CLOTHING: A LITTLE
HELP NEEDED FOR BATHING: A LOT
MOVING TO AND FROM BED TO CHAIR: A LITTLE
TOILETING: A LITTLE
MOVING FROM LYING ON BACK TO SITTING ON SIDE OF FLAT BED WITH BEDRAILS: A LITTLE
WALKING IN HOSPITAL ROOM: A LITTLE
TURNING FROM BACK TO SIDE WHILE IN FLAT BAD: A LITTLE
MOVING FROM LYING ON BACK TO SITTING ON SIDE OF FLAT BED WITH BEDRAILS: A LITTLE
HELP NEEDED FOR BATHING: A LITTLE
DRESSING REGULAR UPPER BODY CLOTHING: A LITTLE
DRESSING REGULAR UPPER BODY CLOTHING: A LITTLE
DRESSING REGULAR LOWER BODY CLOTHING: A LITTLE
MOBILITY SCORE: 16
TURNING FROM BACK TO SIDE WHILE IN FLAT BAD: A LITTLE
HELP NEEDED FOR BATHING: A LITTLE
EATING MEALS: A LITTLE
EATING MEALS: A LITTLE
WALKING IN HOSPITAL ROOM: A LITTLE
MOBILITY SCORE: 16

## 2024-07-02 ASSESSMENT — PAIN SCALES - PAIN ASSESSMENT IN ADVANCED DEMENTIA (PAINAD)
TOTALSCORE: 0
BODYLANGUAGE: RELAXED
CONSOLABILITY: NO NEED TO CONSOLE
BREATHING: NORMAL
FACIALEXPRESSION: SMILING OR INEXPRESSIVE

## 2024-07-02 ASSESSMENT — PAIN SCALES - WONG BAKER
WONGBAKER_NUMERICALRESPONSE: NO HURT
WONGBAKER_NUMERICALRESPONSE: NO HURT

## 2024-07-02 ASSESSMENT — PAIN - FUNCTIONAL ASSESSMENT
PAIN_FUNCTIONAL_ASSESSMENT: 0-10
PAIN_FUNCTIONAL_ASSESSMENT: 0-10

## 2024-07-02 ASSESSMENT — ACTIVITIES OF DAILY LIVING (ADL): HOME_MANAGEMENT_TIME_ENTRY: 38

## 2024-07-02 NOTE — PROGRESS NOTES
Occupational Therapy    OT Treatment    Patient Name: Agnel Carroll  MRN: [id number]  Today's Date: 7/2/2024  Time Calculation  Start Time: 1020  Stop Time: 1058  Time Calculation (min): 38 min        Assessment:  OT Assessment: steady progress, pt still limited by fatigue and decreased balance/safety awareness  Prognosis: Good  Barriers to Discharge: Decreased caregiver support  Evaluation/Treatment Tolerance: Patient limited by fatigue  Medical Staff Made Aware: Yes  End of Session Communication: Bedside nurse  End of Session Patient Position: Up in chair, Alarm on  OT Assessment Results: Decreased ADL status, Decreased endurance, Decreased safe judgment during ADL, Decreased functional mobility  Prognosis: Good  Barriers to Discharge: Decreased caregiver support  Evaluation/Treatment Tolerance: Patient limited by fatigue  Medical Staff Made Aware: Yes  Strengths: Attitude of self, Ability to acquire knowledge  Barriers to Participation: Comorbidities  Plan:  Treatment Interventions: ADL retraining, Functional transfer training, UE strengthening/ROM, Endurance training, Equipment evaluation/education, Compensatory technique education  OT Frequency: 4 times per week  OT Discharge Recommendations: Moderate intensity level of continued care  Equipment Recommended upon Discharge: Wheeled walker  OT Recommended Transfer Status: Assist of 1, Minimal assist  OT - OK to Discharge: Yes  Treatment Interventions: ADL retraining, Functional transfer training, UE strengthening/ROM, Endurance training, Equipment evaluation/education, Compensatory technique education    Subjective   Previous Visit Info:  OT Last Visit  OT Received On: 07/02/24  General:  General  Reason for Referral: Decreased ADL function for CHF.  Past Medical History Relevant to Rehab: anemia, BPH, CVA, R ICA stenosis, depression, Afib, HLD, HTN, RAJAN, CKD, anxiety, chronic respiratory failure, brain tumor, R TKR, lumbar stenosis, OA.  Family/Caregiver Present:  No  Prior to Session Communication: Bedside nurse  Patient Position Received: Bed, 3 rail up, Alarm on  Preferred Learning Style: auditory, kinesthetic  General Comment: pt agreeable and cooperative; extended time to complete tasks due to fatigue and multiple rest breaks.  RN and patient reporting a fall this morning when attempting to get up on his own  Precautions:  Medical Precautions: Fall precautions, Oxygen therapy device and L/min (2L)     Pain:  Pain Assessment  Pain Assessment: 0-10  0-10 (Numeric) Pain Score: 0 - No pain    Objective    Cognition:  Cognition  Overall Cognitive Status: Impaired  Orientation Level: Oriented X4  Safety Judgment: Decreased awareness of need for assistance  Insight: Mild  Impulsive: Mildly  Coordination:     Activities of Daily Living: Grooming  Grooming Level of Assistance: Contact guard, Close supervision  Grooming Where Assessed: Chair, Standing sinkside  Grooming Comments: pt performed oral hygiene while seated in chair; pt requiring gathering items but was able to set up task without assistance.  pt also performed hand washing after toileting task while standing at sink with FWW at CGA for safety    LE Dressing  LE Dressing: Yes  Pants Level of Assistance: Minimum assistance  LE Dressing Where Assessed: Chair  LE Dressing Comments: pt threaded BLE into pants while seated however required MIN A for threading RLE after several attempts.  pt required MIN A to ascend into standing position but was then able to manage clothing over hips at CGA for safety    Toileting  Toileting Level of Assistance: Minimum assistance  Where Assessed: Toilet  Toileting Comments: pt performed hygiene while seated via lateral weight shifting.  pt was able to manage clothing prior/after voiding while standing with FWW with MIN A for steady assist  Functional Standing Tolerance:  Time: 2-3 minutes  Bed Mobility/Transfers: Bed Mobility 1  Bed Mobility 1: Supine to sitting  Level of Assistance 1:  Close supervision  Bed Mobility Comments 1: no physical assistance, performed with HOB elevated    Transfers  Transfer: Yes  Transfer 1  Technique 1: Sit to stand, Stand to sit  Transfer Device 1: Walker  Transfer Level of Assistance 1: Minimum assistance  Trials/Comments 1: from edge of bed and from standard chair, cues for positioning and hand placement.  pt requiring MIN A to lift into standing position  Transfers 2  Transfer to 2: Toilet  Technique 2: Stand pivot  Transfer Device 2: Walker  Transfer Level of Assistance 2: Minimum assistance  Trials/Comments 2: standing pivot to toilet in bathroom with FWW. pt utilized grab bar to ascend/descend with MIN A  Transfers 3  Transfer to 3: Chair with arms  Technique 3: Stand pivot  Transfer Device 3: Walker  Transfer Level of Assistance 3: Minimum assistance  Trials/Comments 3: Pt transferred to standard chair at end of session after ambulating from bathroom with FWW      Functional Mobility:  Functional Mobility  Functional Mobility Performed: Yes  Functional Mobility 1  Surface 1: Level tile  Device 1: Rolling walker  Assistance 1: Minimum assistance  Comments 1: to/from bathroom with FWW; pt requiring MIN A for steady assist throughout and cues for safe walker management when navigating in bathroom.  Sitting Balance:  Static Sitting Balance  Static Sitting-Balance Support: Feet supported  Static Sitting-Level of Assistance: Close supervision  Standing Balance:  Static Standing Balance  Static Standing-Balance Support: Bilateral upper extremity supported  Static Standing-Level of Assistance: Minimum assistance      Outcome Measures:[facility] Daily Activity  Putting on and taking off regular lower body clothing: A little  Bathing (including washing, rinsing, drying): A lot  Putting on and taking off regular upper body clothing: A little  Toileting, which includes using toilet, bedpan or urinal: A little  Taking care of personal grooming such as brushing teeth: A  little  Eating Meals: A little  Daily Activity - Total Score: 17        Education Documentation  Body Mechanics, taught by Javad Moody OT at 7/2/2024 11:53 AM.  Learner: Patient  Readiness: Acceptance  Method: Explanation, Demonstration  Response: Verbalizes Understanding    ADL Training, taught by Javad Moody OT at 7/2/2024 11:53 AM.  Learner: Patient  Readiness: Acceptance  Method: Explanation, Demonstration  Response: Verbalizes Understanding    Education Comments  No comments found.        OP EDUCATION:       Goals:  Encounter Problems       Encounter Problems (Active)       OT Goals       Patient will be able to complete grooming, UB dressing/bathing, and feeding with modified independence.  (Progressing)       Start:  07/01/24    Expected End:  07/15/24            Patient will be able to complete LB dressing/bathing/toileting with Close Supervision using AE as needed, with G balance and good safety. (Progressing)       Start:  07/01/24    Expected End:  07/15/24            Patient will be able to complete functional transfers/mobility with the walker with Distant Supervision using good safety and with G balance for ADL/transfers. (Progressing)       Start:  07/01/24    Expected End:  07/15/24            Patient will be able to tolerate 10 in of functional standing with G balance in prep for ADL/transfers. (Progressing)       Start:  07/01/24    Expected End:  07/15/24

## 2024-07-02 NOTE — PROGRESS NOTES
Anticipate discharge soon. Patient does not qualify for SNF at this time. Patient and family agreeable to [facility]. Dr. Wade made aware that patient will need an internal referral for [facility]. No safe discharge at this time. Will follow.      07/02/24 1204   Discharge Planning   Home or Post Acute Services In home services   Type of Home Care Services Home nursing visits;Home OT;Home PT   Patient expects to be discharged to: Home with [facility]   Does the patient need discharge transport arranged? No

## 2024-07-02 NOTE — CARE PLAN
ANNUAL PHYSICAL      Patient: Bayron Lerner Date of Service: [unfilled]    : 1954 MRN: [id number]     SUBJECTIVE:     Chief Complaint   Patient presents with   • Annual Exam     Heart test tesults     Here for annual physical exam.    HISTORY OF PRESENT ILLNESS:  Encounter for Medicare annual wellness exam:   Safety: Does not have any safety concerns at home.  Has a good support system at home.  No problems with medication refill.  Reports hearing issues.  No depression. Has no memory related concerns.  Labs: Due for labs    Type 2 diabetes mellitus without complication, without long-term current use of insulin (CMS/[facility]):   Monitors blood glucose at home regularly. Requests lab work.    Essential hypertension:   On Losartan, Carvedilol, Atorvastatin, Furosemide.   No persistent headaches, chest pain, dizziness, lightheadedness, shortness of breath or blurry vision.  Underwent cardiac workup recently, which detected fluid build up in heart.    Full code status:  On full code status.    Advance directive in chart:  Advanced care planning: Has filled the ACP form; Her daughter is her healthcare proxy.     Hearing difficulty of both ears:  Reports worsening of hearing, lately. Does not have a hearing aid. She went for hearing test during school and was on medication. Was discontinued on the same due to its potential risks.    Additional comments:  Has not been using inhalers lately, as she is not having any breathing difficulties.    PAST MEDICAL HISTORY:  Past Medical History:   Diagnosis Date   • Congestive cardiac failure (CMS/[facility])        MEDICATIONS:  Current Outpatient Medications   Medication Sig   • losartan (COZAAR) 25 MG tablet Take 1 tablet by mouth daily.   • carvedilol (COREG) 25 MG tablet Take 1 tablet by mouth 2 times daily (with meals).   • atorvastatin (LIPITOR) 40 MG tablet Take 1 tablet by mouth at bedtime.   • furosemide (LASIX) 20 MG tablet Take 1 tablet by mouth daily.   • bisacodyl  The patient's goals for the shift include safety    The clinical goals for the shift include Free from falls         (DULCOLAX) 5 MG EC tablet Take 2 tablets by mouth 1 time.   • albuterol (PROAIR HFA) 108 (90 Base) MCG/ACT inhaler Inhale 2 puffs into the lungs every 4 hours as needed for Shortness of Breath or Wheezing.   • aspirin 81 MG chewable tablet Chew 81 mg by mouth daily.     No current facility-administered medications for this visit.        ALLERGIES:  ALLERGIES:   Allergen Reactions   • No Known Allergies Other (See Comments)       PAST SURGICAL HISTORY:  History reviewed. No pertinent surgical history.    FAMILY HISTORY:  History reviewed. No pertinent family history.    SOCIAL HISTORY:  Social History     Tobacco Use   Smoking Status Never Smoker   Smokeless Tobacco Never Used     Social History     Substance and Sexual Activity   Alcohol Use Not Currently       Review of Systems   HENT: Per HPI.  Respiratory: Per HPI.   Cardiovascular: Per HPI.  Neurological: Per HPI.  Psychiatric/Behavioral: Per HPI.  All other systems reviewed and are negative.    OBJECTIVE:     Vitals:    08/03/20 1347   BP: 126/70   Pulse: (!) 43   Resp: 20   Temp: 96.4 °F (35.8 °C)   TempSrc: Temporal   Weight: 93.9 kg (206 lb 14.4 oz)   Height: 5' 3\" (1.6 m)   PainSc:  0         Physical Exam   Constitutional: Alert, in no acute distress.  Eyes: No discharge, normal conjunctiva, no eyelid swelling, no ptosis and the sclerae were normal. Pupils equal, round and reactive to light and accommodation, conjugate gaze and extraocular movements were intact.  ENT: Normal appearing outer ear, normal appearing nose. Examination of the tympanic membrane showed normal landmarks, normal appearing external canal. Nasal mucosa moist and pink, no nasal discharge. Normal lips. Oral mucosa pink and moist, no oral lesions.   Neck: Normal appearing, supple neck and no mass was seen. Thyroid not enlarged and no thyroid nodules. No lymphadenopathy.   Pulmonary: No respiratory distress, normal respiratory rate and effort and no accessory muscle use. Breath sounds  clear to auscultation bilaterally.   Cardiovascular: Normal rate, no murmurs, regular rhythm, normal S1 and normal S2. Edema was present in the lower extremities.   Abdomen: Soft, nontender, nondistended, normal bowel sounds and no abdominal mass. No hepatomegaly and no splenomegaly. No umbilical hernia was seen.  Lymphadenopathy: No cervical adenopathy.   Neurological: Alert and oriented to person, place, and time. No cranial nerve deficit or sensory deficit. Exhibits normal muscle tone. Coordination normal.   Musculoskeletal: Normal gait. No musculoskeletal erythema was seen, no joint swelling seen and no joint tenderness was elicited. No scoliosis. Normal range of motion. There was no joint instability noted. Muscle strength and tone were normal.   Psychiatric: Oriented to person, oriented to place and oriented to time. Interactive and mood/affect were appropriate. Judgment not impaired. Normal attention span. Short term memory intact.   Skin, Hair, Nails: Normal skin color and pigmentation and no rash. No foot ulcers and no skin ulcer was seen. Normal skin turgor. No clubbing or cyanosis of the fingernails.    LAB RESULTS:    No visits with results within 1 Month(s) from this visit.   Latest known visit with results is:   Orders Only on 2020   Component Date Value Ref Range Status   • Pathology Report 2020    Final                    Value:Name: SARAH LOMAX                MRN:     [id number]    /Age:1954 (Age: 65)            Visit#:  [id number]    Sex:F                        Surgical Pathology Report        Client: [facility]                      Submitting Physician: Juan Carlos Sifuentes MD        Additional Physician(s): Osbaldo Aguirre MD    Date Specimen Collected: 20             Accession #:  [id number]    Date Specimen Received:  20                 Date Reported:           2020 15:39      Location:  [facility]         ______________________________________________________________________________    Pathologic Diagnosis :    A: Ascending colon polyp:    -Hyperplastic polyp x1        Alannah Mahajan M.D.    ** Electronic Signature (SK) 1/28/2020 15:39 **    ______________________________________________________________________________        Specimen(s) Submitted:     Ascending colon polyp        Pre-Operative Diagnosis:    Screening for colon cancer         Post-Operative Diagnosis:                              Diverticulosis, Polyp          Gross Description:    A: The specimen is received in formalin with proper patient identification    labeled \"ascending colon polyp\" and consists of a single piece of pink-tan    tissue measuring 0.2 cm. Sections submitted:        A1: Entire specimen in screen cassette        ABL 1/27/2020 02:47 PM             Microscopic Description:        The attending pathologist whose signature appears on this report has reviewed    the diagnostic studies and has edited the gross and/or microscopic portion of    the report rendering the final diagnosis.        The immunohistochemical, FISH, or MALGORZATA reagents (if any) utilized in this test    were developed and their performance characteristics determined by [facility]    Laboratories.  Some of the immunohistochemical reagents have not been cleared    or approved by the U.S. Food and Drug Administration. The FDA has determined    that such clearance or approval is not necessary.  This test is used for    clinical purposes.  I                          t should not be regarded as investigational or for    research.  This laboratory is certified under the Clinical Laboratory    Improvement Amendments of 1988 (CLIA) as qualified to perform high complexity    clinical laboratory testing.  The appropriate controls were run and show    appropriate reactivity. Since FISH and/or immunohistochemistry for estrogen    receptor, progesterone receptor, and HER2/tyrel have not been  validated on    decalcified tissue, such results should be interpreted with caution given the    likelihood of false negativity.        Fee Codes:     A: [id number], [id number]        Performing Lab Location (Unless otherwise specified):    [address] [address]               ASSESSMENT AND PLAN:   This is a 66 year old year-old female who presents with :    1. Encounter for Medicare annual wellness exam    2. Type 2 diabetes mellitus without complication, without long-term current use of insulin (CMS/[facility])    3. Essential hypertension    4. Full code status    5. Advance directive in chart    6. Hearing difficulty of both ears          Orders Placed This Encounter   • AUDIOGRAM   • Lipid Panel With Reflex   • CBC with Automated Differential   • Thyroid Stimulating Hormone   • Microalbumin Urine Random   • Glycohemoglobin   • Comprehensive Metabolic Panel   • SERVICE TO CARDIOLOGY   • losartan (COZAAR) 25 MG tablet   • carvedilol (COREG) 25 MG tablet   • atorvastatin (LIPITOR) 40 MG tablet   • furosemide (LASIX) 20 MG tablet       PLAN   Encounter for Medicare annual wellness exam:   Reviewed the ACP and advanced directive form at office today.  Reviewed the revised guidelines- daily baby aspirin is not required to be taken preventatively if family history of cardiac disease is negative.  Discussed patient can visit [facility] if she cannot visit this office for sick visit.  Recommended regular exercise, discussed strengthening her quadriceps and doing squats will help reduce fall and fracture risk. Demonstration provided.  Ordered labs. Refer to orders.    Type 2 diabetes mellitus without complication, without long-term current use of insulin (CMS/[facility]):   Ordered lbs. Refer to orders.     Essential hypertension:   Reviewed cardiology reports.  Refilled Losartan.  Refilled Carvedilol.  Refiled Atorvastatin.   Refilled Furosemide.  Referral to  cardiology provided. Refer to orders.    Full code status:  On full code status.     Advance directive in chart:  Advanced care planning: Has filled the ACP form; Her daughter is her healthcare proxy.     Hearing difficulty of both ears:  Ordered audiogram. Refer to orders.    Refer to orders.  Patient understands and agrees with the plan.  Return to clinic as clinically indicated as discussed with patient who verbalized understanding of & agreement with the plan.    Entered by Oseas Zelaya acting as scribe for Dr. Aguirre.

## 2024-07-02 NOTE — PROGRESS NOTES
Angel Carroll is a 79 y.o. male on day 0 of admission presenting with Congestive heart failure, unspecified HF chronicity, unspecified heart failure type (Multi).      Subjective   Complaining of generalized shortness, low back pain       Objective     Last Recorded Vitals  BP (!) 121/49 (BP Location: Left arm, Patient Position: Lying) Comment: RN Notified.  Pulse 59   Temp 36.8 °C (98.2 °F) (Temporal)   Resp 18   Wt 98.6 kg (217 lb 6 oz)   SpO2 97%   Intake/Output last 3 Shifts:    Intake/Output Summary (Last 24 hours) at 7/2/2024 0758  Last data filed at 7/2/2024 0200  Gross per 24 hour   Intake 360 ml   Output --   Net 360 ml       Admission Weight  Weight: 97.5 kg (215 lb) (06/30/24 1120)    Daily Weight  07/01/24 : 98.6 kg (217 lb 6 oz)    Image Results  ECG 12 Lead  AV dual-paced rhythm with prolonged AV conduction  Abnormal ECG  Confirmed by Pop Sewell ([id number]) on 7/1/2024 7:52:31 PM      Physical Exam    General-awake, alert, oriented x3  Lung-clear to auscultation bilaterally  Heart-irregular, irregular RR, S1 and S2 audible, 1/6 systolic murmur best heard at tricuspid valve place  Abdomen- soft, nontender, nondistended, good bowel sounds, no organomegaly, no mass, bowel sounds are present  Extremities-+2 nonpitting edema up to both knees, left ankle is swollen, tender on medial aspect, right hip is slightly tender on lateral aspect, range of motion full but slightly painful.  Lumbar spine is without visible changes.  Tenderness over spinal paraspinal region at L4-S1 levels, no step-offs, range of motion is decreased  Wound-none applicable    Relevant Results               Assessment/Plan    Sprain of left ankle and right hip-I will start Tylenol 650 mg p.o. 3 times daily, obtain PT/OT evaluation  Lumbar strain-again start Tylenol 650 mg p.o. 3 times daily, await PT/OT evaluation  Congestive heart failure, diastolic-I will continue Lasix 40 mg p.o. twice daily.  2D echocardiogram from 2/29/2024  revealed EF of 50±5% with moderate tricuspid regurgitation.  Paroxysmal atrial fibrillation-INR is 1.8.  I will give additional Coumadin 1 mg today.  Chronic kidney disease stage IIIb-creatinine 1.5 at baseline.  Continue home medications  Hypokalemia-potassium 3.7, replace potassium  Disposition-still waiting for PT/OT evaluation                              Oli Wade MD

## 2024-07-02 NOTE — PROGRESS NOTES
Physical Therapy    Physical Therapy Treatment    Patient Name: Angel Carroll  MRN: [id number]  Today's Date: 7/2/2024  Time Calculation  Start Time: 1250  Stop Time: 1313  Time Calculation (min): 23 min    Assessment/Plan   PT Assessment  PT Assessment Results: Decreased strength, Decreased range of motion, Decreased endurance, Impaired balance, Decreased mobility, Impaired judgement, Decreased safety awareness, Pain  Rehab Prognosis: Good  Barriers to Discharge: safety awareness  Evaluation/Treatment Tolerance: Patient tolerated treatment well  Medical Staff Made Aware: Yes  Strengths: Ability to acquire knowledge, Attitude of self  Barriers to Participation: Comorbidities  End of Session Communication: Bedside nurse  Assessment Comment: Improved gait tolerance, SpO2 adequate throughout on 2L O2 via NC; cueing throughout for sequencing/safety; remains a high falls risk and appropriate for mod intensity therapy needs upon d/c.  End of Session Patient Position: Up in chair, Alarm on  PT Plan  Inpatient/Swing Bed or Outpatient: Inpatient  PT Plan  Treatment/Interventions: Bed mobility, Transfer training, Gait training, Stair training, Balance training, Neuromuscular re-education, Strengthening, Endurance training, Range of motion, Therapeutic exercise, Therapeutic activity  PT Plan: Ongoing PT  PT Frequency: 3 times per week  PT Discharge Recommendations: Moderate intensity level of continued care  Equipment Recommended upon Discharge: Wheeled walker  PT Recommended Transfer Status: Assist x1  PT - OK to Discharge: Yes    General Visit Information:   PT  Visit  PT Received On: 07/02/24  General  Reason for Referral: impaired functional mobility; Pt is an 79 year-old M admitted from home with c/o L ankle pain, R hip pain, low back pain, and multiple falls; radiology negative for acute findings.  Referred By: Dr. Sheri MD  Past Medical History Relevant to Rehab: anemia, BPH, CVA, R ICA stenosis, depression, Afib, HLD,  HTN, RAJAN, CKD, anxiety, chronic respiratory failure, brain tumor, R TKR, lumbar stenosis, OA.  Family/Caregiver Present: No  Co-Treatment:  (no)  Co-Treatment Reason: n/a  Prior to Session Communication: Bedside nurse  Patient Position Received: Up in chair, Alarm on  Preferred Learning Style: verbal  General Comment: Pt cleared for therapy via RN, received in sitting, NAD, agreeable to participate in therapy. (+) 2L O2 via NC    Subjective   Precautions:  Precautions  Hearing/Visual Limitations: glasses; hearing aids  Medical Precautions: Fall precautions, Oxygen therapy device and L/min (2L O2 via NC)  Vital Signs:  Vital Signs  Heart Rate: 58  SpO2: 99 % (97-99% on 2L O2 via NC throughout)    Objective   Pain:  Pain Assessment  Pain Assessment: 0-10  0-10 (Numeric) Pain Score: 7  Pain Type: Acute pain  Pain Location: Elbow  Pain Orientation: Left  Pain Interventions: Other (Comment) (RN aware; pt currently with lidocaine patch to L elbow for pain relief)  Cognition:  Cognition  Overall Cognitive Status: Impaired  Orientation Level: Oriented X4  Safety Judgment: Decreased awareness of need for assistance  Insight: Mild  Impulsive: Mildly  Coordination:  Movements are Fluid and Coordinated: Yes  Coordination Comment: decreased rate of movements  Postural Control:  Postural Control  Postural Control: Impaired  Posture Comment: forward head, rounded shoulders  Static Sitting Balance  Static Sitting-Balance Support: Feet supported, Bilateral upper extremity supported  Static Sitting-Level of Assistance: Close supervision  Static Standing Balance  Static Standing-Balance Support: Bilateral upper extremity supported  Static Standing-Level of Assistance: Minimum assistance  Extremity/Trunk Assessments:  RLE   RLE :  (AROM limited at end ranges)  LLE   LLE :  (AROM limited at end ranges)  Activity Tolerance:  Activity Tolerance  Endurance: Tolerates 10 - 20 min exercise with multiple rests  Activity Tolerance Comments:  fair  Rate of Perceived Exertion (RPE): 5/10  Treatments:  Therapeutic Exercise  Therapeutic Exercise Performed: Yes  Therapeutic Exercise Activity 1: B ankle pumps x 20  Therapeutic Exercise Activity 2: B seated knee ext 2 x 10  Therapeutic Exercise Activity 3: B seated hip flex 2 x 10  Therapeutic Exercise Activity 4: B seated isometric hip adduction x 20    Bed Mobility  Bed Mobility: No    Ambulation/Gait Training  Ambulation/Gait Training Performed: Yes  Ambulation/Gait Training 1  Surface 1: Level tile  Device 1: Rolling walker  Assistance 1: Minimum assistance  Quality of Gait 1: Narrow base of support, Decreased step length, Diminished heel strike, Forward flexed posture (reciprocating pattern, decreased BLE heel strike/toe off, decreased chrisyt; cueing to widen MU and increase BLE step length with fair carryover demonstrated)  Comments/Distance (ft) 1: 30' x 2  Transfers  Transfer: Yes  Transfer 1  Transfer From 1: Sit to  Transfer to 1: Stand  Technique 1: Sit to stand  Transfer Device 1: Walker  Transfer Level of Assistance 1: Minimum assistance  Trials/Comments 1: assist for forward weight shift and balance  Transfers 2  Transfer From 2: Stand to  Transfer to 2: Sit  Technique 2: Stand to sit  Transfer Device 2: Walker  Transfer Level of Assistance 2: Minimum assistance  Trials/Comments 2: assist for balance and controlled lowering of hips into chair; cueing for proper hand placement    Stairs  Stairs: No    Outcome Measures:  [facility] Basic Mobility  Turning from your back to your side while in a flat bed without using bedrails: A little  Moving from lying on your back to sitting on the side of a flat bed without using bedrails: A little  Moving to and from bed to chair (including a wheelchair): A little  Standing up from a chair using your arms (e.g. wheelchair or bedside chair): A little  To walk in hospital room: A little  Climbing 3-5 steps with railing: Total  Basic Mobility - Total Score:  16    Education Documentation  Mobility Training, taught by Keri Hathaway, PT at 7/2/2024  1:38 PM.  Learner: Patient  Readiness: Acceptance  Method: Explanation, Demonstration  Response: Needs Reinforcement    Education Comments  No comments found.      OP EDUCATION:  Outpatient Education  Individual(s) Educated: Patient  Education Provided: Fall Risk, POC  Risk and Benefits Discussed with Patient/Caregiver/Other: yes  Patient/Caregiver Demonstrated Understanding: yes  Plan of Care Discussed and Agreed Upon: yes  Patient Response to Education: Patient/Caregiver Verbalized Understanding of Information    Encounter Problems       Encounter Problems (Active)       Mobility       STG - Patient will ambulate 100' with use of rolling walker and modified independence. (Progressing)       Start:  07/01/24    Expected End:  08/01/24            STG - Patient will ascend and descend four to six stairs with use of B handrails and supervision for safety. (Not Progressing)       Start:  07/01/24    Expected End:  08/01/24               PT Transfers       STG - Patient to transfer to and from sit to supine with modified independence. (Progressing)       Start:  07/01/24    Expected End:  08/01/24            STG - Patient will transfer sit to and from stand with use of rolling walker and modified independence. (Progressing)       Start:  07/01/24    Expected End:  08/01/24               Pain - Adult          Safety       STG - Patient uses call light consistently to request assistance with transfers (Progressing)       Start:  07/01/24    Expected End:  08/01/24

## 2024-07-03 ENCOUNTER — APPOINTMENT (OUTPATIENT)
Dept: RADIOLOGY | Facility: HOSPITAL | Age: 79
End: 2024-07-03
Payer: MEDICARE

## 2024-07-03 LAB
ANION GAP SERPL CALC-SCNC: 10 MMOL/L
BUN SERPL-MCNC: 22 MG/DL (ref 8–25)
CALCIUM SERPL-MCNC: 8.9 MG/DL (ref 8.5–10.4)
CHLORIDE SERPL-SCNC: 98 MMOL/L (ref 97–107)
CO2 SERPL-SCNC: 31 MMOL/L (ref 24–31)
CREAT SERPL-MCNC: 1.5 MG/DL (ref 0.4–1.6)
EGFRCR SERPLBLD CKD-EPI 2021: 46 ML/MIN/1.73M*2
GLUCOSE SERPL-MCNC: 97 MG/DL (ref 65–99)
HOLD SPECIMEN: NORMAL
POTASSIUM SERPL-SCNC: 3.6 MMOL/L (ref 3.4–5.1)
SODIUM SERPL-SCNC: 139 MMOL/L (ref 133–145)

## 2024-07-03 PROCEDURE — 97110 THERAPEUTIC EXERCISES: CPT | Mod: GP

## 2024-07-03 PROCEDURE — 97530 THERAPEUTIC ACTIVITIES: CPT | Mod: GO,CO

## 2024-07-03 PROCEDURE — 82374 ASSAY BLOOD CARBON DIOXIDE: CPT | Performed by: FAMILY MEDICINE

## 2024-07-03 PROCEDURE — 36415 COLL VENOUS BLD VENIPUNCTURE: CPT | Performed by: FAMILY MEDICINE

## 2024-07-03 PROCEDURE — 97530 THERAPEUTIC ACTIVITIES: CPT | Mod: GP

## 2024-07-03 PROCEDURE — 2500000002 HC RX 250 W HCPCS SELF ADMINISTERED DRUGS (ALT 637 FOR MEDICARE OP, ALT 636 FOR OP/ED): Performed by: FAMILY MEDICINE

## 2024-07-03 PROCEDURE — 2500000001 HC RX 250 WO HCPCS SELF ADMINISTERED DRUGS (ALT 637 FOR MEDICARE OP): Performed by: FAMILY MEDICINE

## 2024-07-03 PROCEDURE — 73080 X-RAY EXAM OF ELBOW: CPT | Mod: LEFT SIDE | Performed by: RADIOLOGY

## 2024-07-03 PROCEDURE — 97535 SELF CARE MNGMENT TRAINING: CPT | Mod: GO,CO

## 2024-07-03 PROCEDURE — 2500000005 HC RX 250 GENERAL PHARMACY W/O HCPCS: Performed by: FAMILY MEDICINE

## 2024-07-03 PROCEDURE — G0378 HOSPITAL OBSERVATION PER HR: HCPCS

## 2024-07-03 PROCEDURE — 73080 X-RAY EXAM OF ELBOW: CPT | Mod: LT

## 2024-07-03 ASSESSMENT — COGNITIVE AND FUNCTIONAL STATUS - GENERAL
CLIMB 3 TO 5 STEPS WITH RAILING: A LOT
STANDING UP FROM CHAIR USING ARMS: A LITTLE
MOVING FROM LYING ON BACK TO SITTING ON SIDE OF FLAT BED WITH BEDRAILS: A LITTLE
DRESSING REGULAR LOWER BODY CLOTHING: A LOT
MOVING TO AND FROM BED TO CHAIR: A LITTLE
PERSONAL GROOMING: A LITTLE
MOBILITY SCORE: 17
STANDING UP FROM CHAIR USING ARMS: A LITTLE
MOVING TO AND FROM BED TO CHAIR: A LITTLE
DRESSING REGULAR UPPER BODY CLOTHING: A LITTLE
TURNING FROM BACK TO SIDE WHILE IN FLAT BAD: A LITTLE
DAILY ACTIVITIY SCORE: 17
MOBILITY SCORE: 15
TOILETING: A LOT
WALKING IN HOSPITAL ROOM: A LOT
DRESSING REGULAR LOWER BODY CLOTHING: A LITTLE
HELP NEEDED FOR BATHING: A LOT
HELP NEEDED FOR BATHING: A LITTLE
WALKING IN HOSPITAL ROOM: A LOT
TURNING FROM BACK TO SIDE WHILE IN FLAT BAD: A LITTLE
TOILETING: A LOT
DRESSING REGULAR UPPER BODY CLOTHING: A LITTLE
PERSONAL GROOMING: A LITTLE
DAILY ACTIVITIY SCORE: 16
CLIMB 3 TO 5 STEPS WITH RAILING: TOTAL
EATING MEALS: A LITTLE

## 2024-07-03 ASSESSMENT — PAIN SCALES - GENERAL
PAINLEVEL_OUTOF10: 3
PAINLEVEL_OUTOF10: 4
PAINLEVEL_OUTOF10: 8
PAINLEVEL_OUTOF10: 8
PAINLEVEL_OUTOF10: 3
PAINLEVEL_OUTOF10: 5 - MODERATE PAIN

## 2024-07-03 ASSESSMENT — PAIN SCALES - WONG BAKER: WONGBAKER_NUMERICALRESPONSE: HURTS WHOLE LOT

## 2024-07-03 ASSESSMENT — PAIN - FUNCTIONAL ASSESSMENT
PAIN_FUNCTIONAL_ASSESSMENT: 0-10
PAIN_FUNCTIONAL_ASSESSMENT: FLACC (FACE, LEGS, ACTIVITY, CRY, CONSOLABILITY)
PAIN_FUNCTIONAL_ASSESSMENT: 0-10

## 2024-07-03 ASSESSMENT — ACTIVITIES OF DAILY LIVING (ADL): HOME_MANAGEMENT_TIME_ENTRY: 8

## 2024-07-03 NOTE — NURSING NOTE
Upon entering room, bed alarm was sounding. Pt pleasantly confused attempting to get out of bed asking where his  brother is. Easily redirected and educated on using call light to prevent falls; patient verbalized understanding

## 2024-07-03 NOTE — CARE PLAN
The patient's goals for the shift include safety    The clinical goals for the shift include prevent falls      Problem: Skin  Goal: Decreased wound size/increased tissue granulation at next dressing change  Outcome: Progressing  Flowsheets (Taken 7/3/2024 1147)  Decreased wound size/increased tissue granulation at next dressing change:   Promote sleep for wound healing   Protective dressings over bony prominences  Goal: Participates in plan/prevention/treatment measures  Outcome: Progressing  Flowsheets (Taken 7/3/2024 1147)  Participates in plan/prevention/treatment measures:   Discuss with provider PT/OT consult   Elevate heels  Goal: Prevent/manage excess moisture  Outcome: Progressing  Flowsheets (Taken 7/3/2024 1147)  Prevent/manage excess moisture:   Monitor for/manage infection if present   Cleanse incontinence/protect with barrier cream   Moisturize dry skin  Goal: Prevent/minimize sheer/friction injuries  Outcome: Progressing  Flowsheets (Taken 7/3/2024 1147)  Prevent/minimize sheer/friction injuries:   Use pull sheet   HOB 30 degrees or less   Increase activity/out of bed for meals  Goal: Promote/optimize nutrition  Outcome: Progressing  Flowsheets (Taken 7/3/2024 1147)  Promote/optimize nutrition:   Offer water/supplements/favorite foods   Consume > 50% meals/supplements   Assist with feeding  Goal: Promote skin healing  Outcome: Progressing  Flowsheets (Taken 7/3/2024 1147)  Promote skin healing:   Protective dressings over bony prominences   Turn/reposition every 2 hours/use positioning/transfer devices     Problem: Fall/Injury  Goal: Not fall by end of shift  Outcome: Progressing  Goal: Be free from injury by end of the shift  Outcome: Progressing  Goal: Verbalize understanding of personal risk factors for fall in the hospital  Outcome: Progressing  Goal: Verbalize understanding of risk factor reduction measures to prevent injury from fall in the home  Outcome: Progressing  Goal: Use assistive devices  by end of the shift  Outcome: Progressing  Goal: Pace activities to prevent fatigue by end of the shift  Outcome: Progressing     Problem: Respiratory  Goal: Clear secretions with interventions this shift  Outcome: Progressing  Goal: Minimize anxiety/maximize coping throughout shift  Outcome: Progressing  Goal: Minimal/no exertional discomfort or dyspnea this shift  Outcome: Progressing  Goal: No signs of respiratory distress (eg. Use of accessory muscles. Peds grunting)  Outcome: Progressing  Goal: Patent airway maintained this shift  Outcome: Progressing  Goal: Tolerate mechanical ventilation evidenced by VS/agitation level this shift  Outcome: Progressing  Goal: Tolerate pulmonary toileting this shift  Outcome: Progressing  Goal: Verbalize decreased shortness of breath this shift  Outcome: Progressing  Goal: Wean oxygen to maintain O2 saturation per order/standard this shift  Outcome: Progressing  Goal: Increase self care and/or family involvement in next 24 hours  Outcome: Progressing     Problem: Pain - Adult  Goal: Verbalizes/displays adequate comfort level or baseline comfort level  Outcome: Progressing     Problem: Safety - Adult  Goal: Free from fall injury  Outcome: Progressing     Problem: Discharge Planning  Goal: Discharge to home or other facility with appropriate resources  Outcome: Progressing     Problem: Chronic Conditions and Co-morbidities  Goal: Patient's chronic conditions and co-morbidity symptoms are monitored and maintained or improved  Outcome: Progressing

## 2024-07-03 NOTE — PROGRESS NOTES
Anticipate discharge soon. Patient will return home with [facility]. Internal referral placed and waiting on SOC from [facility]. Patient cannot discharge until the start of care date has been received. Patient does not qualify for SNF at this time. Will follow.      07/03/24 3828   Discharge Planning   Home or Post Acute Services In home services   Type of Home Care Services Home nursing visits;Home OT;Home PT   Patient expects to be discharged to: Home with [facility]   Does the patient need discharge transport arranged? No

## 2024-07-03 NOTE — PROGRESS NOTES
Occupational Therapy    OT Treatment    Patient Name: Angel Carroll  MRN: [id number]  Today's Date: 7/3/2024  Time Calculation  Start Time: 1343  Stop Time: 1410  Time Calculation (min): 27 min        Assessment:  OT Assessment: Tolerated session fairly, demonstrating continued progression towards POC, however requiring increased cues for safety throughout session. Pt would benefit from continued skilled OT services to improve strength, balance, and functional tolerance to increase independence with ADL tasks  End of Session Communication: Bedside nurse  End of Session Patient Position: Bed, 3 rail up, Alarm on  OT Assessment Results: Decreased ADL status, Decreased endurance, Decreased safe judgment during ADL, Decreased functional mobility  Plan:  Treatment Interventions: ADL retraining, Functional transfer training, UE strengthening/ROM, Endurance training, Equipment evaluation/education, Compensatory technique education  OT Frequency: 4 times per week  OT Discharge Recommendations: Moderate intensity level of continued care  Equipment Recommended upon Discharge: Wheeled walker  OT Recommended Transfer Status: Assist of 1, Minimal assist  OT - OK to Discharge: Yes  Treatment Interventions: ADL retraining, Functional transfer training, UE strengthening/ROM, Endurance training, Equipment evaluation/education, Compensatory technique education    Subjective   Previous Visit Info:  OT Last Visit  OT Received On: 07/03/24  General:  General  Family/Caregiver Present: Yes  Caregiver Feedback: daughter present  Prior to Session Communication: Bedside nurse  Patient Position Received: Alarm off, not on at start of session, Up in chair  General Comment: Cleared for therapy per RN. Pt seated in chair upon arrival and agreeable to tx  Precautions:  Hearing/Visual Limitations: glasses; hearing aids  Medical Precautions: Fall precautions, Oxygen therapy device and L/min (2L O2 nc)  Vital Signs:  Vital Signs  Heart Rate: 61  SpO2:  97 %  Pain:  Pain Assessment  Pain Assessment: FLACC (Face, Legs, Activity, Cry, Consolability)  0-10 (Numeric) Pain Score: 5 - Moderate pain  Pain Type: Acute pain  Pain Location: Elbow  Pain Orientation: Left    Objective    Cognition:  Cognition  Following Commands: Follows one step commands with increased time  Safety Judgment: Decreased awareness of need for safety precautions  Insight: Mild  Processing Speed: Delayed    Activities of Daily Living:    LE Dressing  LE Dressing: Yes  Shoe Level of Assistance: Maximum assistance, Setup  LE Dressing Where Assessed: Chair  LE Dressing Comments: max A to don shoes with cues for sequencing. Pt able to doff shoes seated EOB with increased time    Bed Mobility/Transfers: Bed Mobility  Bed Mobility: Yes  Bed Mobility 1  Bed Mobility 1: Sitting to supine  Level of Assistance 1: Moderate assistance  Bed Mobility Comments 1: assist to lift BLE on bed with cues for trunk control and to adjust trunk to midline once supine    Transfers  Transfer: Yes  Transfer 1  Technique 1: Sit to stand  Transfer Device 1: Walker  Transfer Level of Assistance 1: Moderate assistance, Moderate verbal cues  Trials/Comments 1: assist for trunk elevation from chair with cues for proper hand placement, demonstrating minor LOB once standing with assist to correct  Transfers 2  Technique 2: Stand to sit  Transfer Device 2: Walker  Transfer Level of Assistance 2: Moderate assistance, Moderate verbal cues  Trials/Comments 2: assist for eccentric lowering with cues for proper hand placement with pt attempting to sit prior to alignment with EOB with cues for safety awareness    Toilet Transfers  Toilet Transfer Type: To and from  Toilet Transfer to: Standard toilet  Toilet Transfers: Maximal assistance, Verbal cues  Toilet Transfers Comments: assist for eccentric lowering and trunk elevation off toilet with failed attempt x1 with cues for use of grab bar for UE support    Functional  Mobility:  Functional Mobility  Functional Mobility Performed: Yes  Functional Mobility 1  Device 1: Rolling walker  Assistance 1: Minimum assistance  Comments 1: functional mobility short household distance at W with cues for step/walker sequencing and postural alignment, demonstrating fair- balance. Pt required seated rest break prior to returning to bedside d/t fatigue    Standing Balance:  Static Standing Balance  Static Standing-Level of Assistance: Minimum assistance  Static Standing-Comment/Number of Minutes: fair- balance during functional mobility    Outcome Measures:[facility] Daily Activity  Putting on and taking off regular lower body clothing: A lot  Bathing (including washing, rinsing, drying): A little  Putting on and taking off regular upper body clothing: A little  Toileting, which includes using toilet, bedpan or urinal: A lot  Taking care of personal grooming such as brushing teeth: A little  Eating Meals: A little  Daily Activity - Total Score: 16    Education Documentation  Body Mechanics, taught by GODFREY Leal at 7/3/2024  2:22 PM.  Learner: Patient  Readiness: Acceptance  Method: Explanation  Response: Verbalizes Understanding, Needs Reinforcement    ADL Training, taught by GODFREY Leal at 7/3/2024  2:22 PM.  Learner: Patient  Readiness: Acceptance  Method: Explanation  Response: Verbalizes Understanding, Needs Reinforcement    Education Comments  No comments found.        Problem: OT Goals  Goal: Patient will be able to complete grooming, UB dressing/bathing, and feeding with modified independence.   Outcome: Progressing  Goal: Patient will be able to complete LB dressing/bathing/toileting with Close Supervision using AE as needed, with G balance and good safety.  Outcome: Progressing  Goal: Patient will be able to complete functional transfers/mobility with the walker with Distant Supervision using good safety and with G balance for ADL/transfers.  Outcome:  Progressing  Goal: Patient will be able to tolerate 10 in of functional standing with G balance in prep for ADL/transfers.  Outcome: Progressing

## 2024-07-03 NOTE — PROGRESS NOTES
Physical Therapy    Physical Therapy Treatment    Patient Name: Angel Carroll  MRN: [id number]  Today's Date: 7/3/2024  Time Calculation  Start Time: 0922  Stop Time: 0945  Time Calculation (min): 23 min    Assessment/Plan   PT Assessment  End of Session Communication: Bedside nurse  Assessment Comment: limited today d/t fatigue; demonstrates decreased tolerance to activity, decreased strength, and impaired balance.  End of Session Patient Position: Up in chair, Alarm on  PT Plan  Inpatient/Swing Bed or Outpatient: Inpatient  PT Plan  Treatment/Interventions: Bed mobility, Transfer training, Gait training, Stair training, Balance training, Neuromuscular re-education, Strengthening, Endurance training, Range of motion, Therapeutic exercise, Therapeutic activity  PT Plan: Ongoing PT  PT Frequency: 3 times per week  PT Discharge Recommendations: Moderate intensity level of continued care  Equipment Recommended upon Discharge: Wheeled walker  PT Recommended Transfer Status: Assist x1  PT - OK to Discharge: Yes      General Visit Information:   PT  Visit  PT Received On: 07/03/24  Response to Previous Treatment: Patient with no complaints from previous session.  General  Prior to Session Communication: Bedside nurse  Patient Position Received: Bed, 3 rail up, Alarm on  General Comment: pt cleared for therapy by nursing, supine upon arrival and agreeable to therapy.    Subjective     Objective   Pain:  Pain Assessment  Pain Assessment: 0-10  0-10 (Numeric) Pain Score: 8  Pain Type: Acute pain  Pain Location: Elbow  Response to Interventions: RN aware    Activity Tolerance:  Activity Tolerance  Endurance: Decreased tolerance for upright activites  Treatments:  Therapeutic Exercise  Therapeutic Exercise Performed: Yes  Therapeutic Exercise Activity 1: B ankle pumps x 20  Therapeutic Exercise Activity 2: B seated knee ext 2 x 10  Therapeutic Exercise Activity 3: B seated hip flex 2 x 10  Therapeutic Exercise Activity 4: B seated  glute sets x 20    Bed Mobility  Bed Mobility:  (min A supine to sit EOB with hand assist for elevation of trunk, increased time/effort to perform. able to scoot to EOB with supervision. reports mild dizziness, supervision for static sitting balance x several minutes. RN present and aware.)    Ambulation/Gait Training  Ambulation/Gait Training Performed:  (few steps to bedside chair with min A, assist for walker management.)  Transfers  Transfer:  (min A sit <> stand at RW with cues for safe hand placement. good eccentric control.)    Outcome Measures:  [facility] Basic Mobility  Turning from your back to your side while in a flat bed without using bedrails: A little  Moving from lying on your back to sitting on the side of a flat bed without using bedrails: A little  Moving to and from bed to chair (including a wheelchair): A little  Standing up from a chair using your arms (e.g. wheelchair or bedside chair): A little  To walk in hospital room: A lot  Climbing 3-5 steps with railing: Total  Basic Mobility - Total Score: 15    Education Documentation  No documentation found.  Education Comments  No comments found.      IP EDUCATION:  Outpatient Education  Individual(s) Educated: Patient  Education Provided: Body Mechanics, Fall Risk  Risk and Benefits Discussed with Patient/Caregiver/Other: yes  Patient/Caregiver Demonstrated Understanding: yes  Plan of Care Discussed and Agreed Upon: yes  Patient Response to Education: Patient/Caregiver Verbalized Understanding of Information    Encounter Problems       Encounter Problems (Active)       Mobility       STG - Patient will ambulate 100' with use of rolling walker and modified independence. (Progressing)       Start:  07/01/24    Expected End:  08/01/24            STG - Patient will ascend and descend four to six stairs with use of B handrails and supervision for safety. (Not Progressing)       Start:  07/01/24    Expected End:  08/01/24               PT Transfers       STG  - Patient to transfer to and from sit to supine with modified independence. (Progressing)       Start:  07/01/24    Expected End:  08/01/24            STG - Patient will transfer sit to and from stand with use of rolling walker and modified independence. (Progressing)       Start:  07/01/24    Expected End:  08/01/24               Pain - Adult          Safety       STG - Patient uses call light consistently to request assistance with transfers (Progressing)       Start:  07/01/24    Expected End:  08/01/24

## 2024-07-04 ENCOUNTER — DOCUMENTATION (OUTPATIENT)
Dept: HOME HEALTH SERVICES | Facility: HOME HEALTH | Age: 79
End: 2024-07-04
Payer: MEDICARE

## 2024-07-04 PROBLEM — I50.9 CONGESTIVE HEART FAILURE, UNSPECIFIED HF CHRONICITY, UNSPECIFIED HEART FAILURE TYPE (MULTI): Status: RESOLVED | Noted: 2024-06-30 | Resolved: 2024-07-04

## 2024-07-04 LAB
ANION GAP SERPL CALC-SCNC: 10 MMOL/L
BUN SERPL-MCNC: 21 MG/DL (ref 8–25)
CALCIUM SERPL-MCNC: 8.8 MG/DL (ref 8.5–10.4)
CHLORIDE SERPL-SCNC: 99 MMOL/L (ref 97–107)
CO2 SERPL-SCNC: 32 MMOL/L (ref 24–31)
CREAT SERPL-MCNC: 1.5 MG/DL (ref 0.4–1.6)
EGFRCR SERPLBLD CKD-EPI 2021: 46 ML/MIN/1.73M*2
GLUCOSE SERPL-MCNC: 92 MG/DL (ref 65–99)
INR PPP: 1.9 (ref 0.9–1.2)
POTASSIUM SERPL-SCNC: 3.7 MMOL/L (ref 3.4–5.1)
PROTHROMBIN TIME: 19.2 SECONDS (ref 9.3–12.7)
SODIUM SERPL-SCNC: 141 MMOL/L (ref 133–145)

## 2024-07-04 PROCEDURE — 2500000001 HC RX 250 WO HCPCS SELF ADMINISTERED DRUGS (ALT 637 FOR MEDICARE OP): Performed by: FAMILY MEDICINE

## 2024-07-04 PROCEDURE — G0378 HOSPITAL OBSERVATION PER HR: HCPCS

## 2024-07-04 PROCEDURE — 2500000002 HC RX 250 W HCPCS SELF ADMINISTERED DRUGS (ALT 637 FOR MEDICARE OP, ALT 636 FOR OP/ED): Performed by: FAMILY MEDICINE

## 2024-07-04 PROCEDURE — 85610 PROTHROMBIN TIME: CPT | Performed by: FAMILY MEDICINE

## 2024-07-04 PROCEDURE — 2500000005 HC RX 250 GENERAL PHARMACY W/O HCPCS: Performed by: FAMILY MEDICINE

## 2024-07-04 PROCEDURE — 36415 COLL VENOUS BLD VENIPUNCTURE: CPT | Performed by: FAMILY MEDICINE

## 2024-07-04 PROCEDURE — 80048 BASIC METABOLIC PNL TOTAL CA: CPT | Performed by: FAMILY MEDICINE

## 2024-07-04 RX ORDER — LIDOCAINE 560 MG/1
2 PATCH PERCUTANEOUS; TOPICAL; TRANSDERMAL DAILY
Qty: 60 PATCH | Refills: 0 | Status: SHIPPED | OUTPATIENT
Start: 2024-07-05 | End: 2024-08-04

## 2024-07-04 ASSESSMENT — COGNITIVE AND FUNCTIONAL STATUS - GENERAL
DRESSING REGULAR LOWER BODY CLOTHING: A LOT
WALKING IN HOSPITAL ROOM: A LOT
MOVING TO AND FROM BED TO CHAIR: A LITTLE
CLIMB 3 TO 5 STEPS WITH RAILING: A LOT
DAILY ACTIVITIY SCORE: 17
HELP NEEDED FOR BATHING: A LOT
TOILETING: A LITTLE
DRESSING REGULAR UPPER BODY CLOTHING: A LITTLE
PERSONAL GROOMING: A LITTLE
TURNING FROM BACK TO SIDE WHILE IN FLAT BAD: A LITTLE
STANDING UP FROM CHAIR USING ARMS: A LOT
MOBILITY SCORE: 16

## 2024-07-04 ASSESSMENT — PAIN SCALES - GENERAL
PAINLEVEL_OUTOF10: 0 - NO PAIN
PAINLEVEL_OUTOF10: 3

## 2024-07-04 ASSESSMENT — PAIN - FUNCTIONAL ASSESSMENT: PAIN_FUNCTIONAL_ASSESSMENT: 0-10

## 2024-07-04 ASSESSMENT — PAIN SCALES - PAIN ASSESSMENT IN ADVANCED DEMENTIA (PAINAD): TOTALSCORE: MEDICATION (SEE MAR)

## 2024-07-04 ASSESSMENT — PAIN DESCRIPTION - DESCRIPTORS: DESCRIPTORS: DISCOMFORT;ACHING

## 2024-07-04 NOTE — CARE PLAN
Unknown if [facility] can accept this pt; sent list of C agencies to dtr Reed to [email or web address]; Reed will share the list with the family and make choices in case [facility] cannot accept pt    DISCHARGE PLAN: AT THIS TIME THE PLAN IS HOME WITH FAMILY AND HHC--DO NOT DISCHARGE PATIENT BEFORE SPEAKING WITH CARE COORDINATION; NEED HHC CHOICES, NEED ACCEPTING HHC AGENCY.

## 2024-07-04 NOTE — DISCHARGE SUMMARY
Discharge Diagnosis  Status post fall .sprain of left ankle and right hip.  Contusion of left elbow.  Lumbar strain.  Congestive heart failure, unspecified HF chronicity, unspecified heart failure type (Multi).  Paroxysmal atrial fibrillation.  Hypokalemia.  Gait impairment    Issues Requiring Follow-Up  Follow-up with PCP in 1 week    Discharge Meds     Your medication list        START taking these medications        Instructions Last Dose Given Next Dose Due   lidocaine 4 % patch  Start taking on: July 5, 2024      Place 2 patches over 12 hours on the skin once daily. Remove & discard patch within 12 hours or as directed by MD.              CONTINUE taking these medications        Instructions Last Dose Given Next Dose Due   acetaminophen 325 mg tablet  Commonly known as: Tylenol           amiodarone 200 mg tablet  Commonly known as: Pacerone      Take 1 tablet (200 mg) by mouth once daily.       aspirin 81 mg EC tablet           atorvastatin 20 mg tablet  Commonly known as: Lipitor      TAKE 1 TABLET EVERY DAY       cyanocobalamin 500 mcg tablet  Commonly known as: Vitamin B-12           ergocalciferol 1.25 MG (38563 UT) capsule  Commonly known as: Vitamin D-2      Take 1 capsule (50,000 Units) by mouth 1 (one) time per week.       fenofibrate 48 mg tablet  Commonly known as: Tricor      Take 1.5 tablets (72 mg) by mouth once daily.       ferrous sulfate 250 mg (50 mg iron) tablet extended release      Take 1 tablet (250 mg) by mouth 2 times a day.       finasteride 5 mg tablet  Commonly known as: Proscar      TAKE 1 TABLET EVERY DAY       folic acid 1 mg tablet  Commonly known as: Folvite      TAKE 1 TABLET EVERY DAY       furosemide 40 mg tablet  Commonly known as: Lasix      Take 1 tablet (40 mg) by mouth 2 times a day.       gabapentin 100 mg capsule  Commonly known as: Neurontin      TAKE 1 CAPSULE AT BEDTIME       metoprolol tartrate 25 mg tablet  Commonly known as: Lopressor      Take 0.5 tablets (12.5  mg) by mouth 2 times a day.       nystatin 100,000 unit/gram powder  Commonly known as: Mycostatin      Apply 1 Application topically 2 times a day.       oxygen gas therapy  Commonly known as: O2           pantoprazole 40 mg EC tablet  Commonly known as: ProtoNix      TAKE 1 TABLET EVERY DAY       PARoxetine 20 mg tablet  Commonly known as: Paxil      TAKE 1 TABLET EVERY DAY       psyllium 3.4 gram packet  Commonly known as: Metamucil           tamsulosin 0.4 mg 24 hr capsule  Commonly known as: Flomax      Take 2 capsules (0.8 mg) by mouth once daily at bedtime.       warfarin 2.5 mg tablet  Commonly known as: Coumadin      Take as directed. If you are unsure how to take this medication, talk to your nurse or doctor.  Original instructions: Take 2.5 mg by mouth 1 time. Give 1 tab at bedtime for A-fib              STOP taking these medications      levoFLOXacin 250 mg tablet  Commonly known as: Levaquin                  Where to Get Your Medications        You can get these medications from any pharmacy    Bring a paper prescription for each of these medications  lidocaine 4 % patch         Test Results Pending At Discharge  Pending Labs       No current pending labs.            Hospital Course   Angel Carroll is a 79 y.o. male with past medical history significant for diastolic heart failure, coronary artery disease, BPH, dyslipidemia, paroxysmal atrial fibrillation, COPD, chronic respiratory failure on continuous O2, osteoarthritis, chronic kidney disease, dyslipidemia, GERD presented to ED after a fall out of bed that he sustained 1 hour before presentation.  He was not able to get up on his own and called her daughter who called 911 and he was delivered to the emergency room for evaluation.  Upon arrival he was complaining of left ankle, right hip and low back pain.  However x-rays failed to reveal any acute fractures.  Admitted to regular nursing floor for further management.  Today he is awake alert and oriented  x 3 but able to stand up and walk.  X-rays did not reveal any acute fracture or dislocation, just consistent with osteoarthritis.  Started on pain control with Tylenol and Lidoderm patch.  Evaluated by PT/OT who suggested home physical therapy.  He has been hemodynamically stable.  Continued on home medication regimen.  Cleared for discharge in satisfactory condition.  Case was discussed with his daughter, Rhea    Pertinent Physical Exam At Time of Discharge  Physical Exam  General-awake, alert, oriented x3  Lung-clear to auscultation bilaterally  Heart-irregular, irregular RR, S1 and S2 audible, 1/6 systolic murmur best heard at tricuspid valve place  Abdomen- soft, nontender, nondistended, good bowel sounds, no organomegaly, no mass, bowel sounds are present  Extremities-+2 nonpitting edema up to both knees, left ankle is swollen, tender on medial aspect, right hip is slightly tender on lateral aspect, range of motion full but slightly painful.  Lumbar spine is without visible changes.  Tenderness over spinal paraspinal region at L4-S1 levels, no step-offs, range of motion is decreased.  Left elbow is slightly swollen.  Tender on lateral aspect.  Range of motion decreased.  No crepitus  Wound-none applicable  Outpatient Follow-Up  Future Appointments   Date Time Provider Department Center   7/29/2024  1:00 PM RON Daugherty-CNP [id number] [facility]   9/19/2024  2:20 PM Jung Tao MD [id number] [facility]   10/1/2024  1:40 PM Jose Mccormick MD [id number] [facility]   3/20/2025 11:40 AM RON Wilkes-CNP [id number] [facility]         Oli Wade MD

## 2024-07-04 NOTE — CARE PLAN
Problem: Skin  Goal: Decreased wound size/increased tissue granulation at next dressing change  Outcome: Progressing  Goal: Participates in plan/prevention/treatment measures  Outcome: Progressing  Goal: Prevent/manage excess moisture  Outcome: Progressing  Goal: Prevent/minimize sheer/friction injuries  Outcome: Progressing  Goal: Promote/optimize nutrition  Outcome: Progressing  Goal: Promote skin healing  Outcome: Progressing     Problem: Fall/Injury  Goal: Not fall by end of shift  Outcome: Progressing  Goal: Be free from injury by end of the shift  Outcome: Progressing  Goal: Verbalize understanding of personal risk factors for fall in the hospital  Outcome: Progressing  Goal: Verbalize understanding of risk factor reduction measures to prevent injury from fall in the home  Outcome: Progressing  Goal: Use assistive devices by end of the shift  Outcome: Progressing  Goal: Pace activities to prevent fatigue by end of the shift  Outcome: Progressing     Problem: Respiratory  Goal: Clear secretions with interventions this shift  Outcome: Progressing  Goal: Minimize anxiety/maximize coping throughout shift  Outcome: Progressing  Goal: Minimal/no exertional discomfort or dyspnea this shift  Outcome: Progressing  Goal: No signs of respiratory distress (eg. Use of accessory muscles. Peds grunting)  Outcome: Progressing  Goal: Patent airway maintained this shift  Outcome: Progressing  Goal: Tolerate mechanical ventilation evidenced by VS/agitation level this shift  Outcome: Progressing  Goal: Tolerate pulmonary toileting this shift  Outcome: Progressing  Goal: Verbalize decreased shortness of breath this shift  Outcome: Progressing  Goal: Wean oxygen to maintain O2 saturation per order/standard this shift  Outcome: Progressing  Goal: Increase self care and/or family involvement in next 24 hours  Outcome: Progressing     Problem: Pain - Adult  Goal: Verbalizes/displays adequate comfort level or baseline comfort  level  Outcome: Progressing     Problem: Safety - Adult  Goal: Free from fall injury  Outcome: Progressing     Problem: Discharge Planning  Goal: Discharge to home or other facility with appropriate resources  Outcome: Progressing     Problem: Chronic Conditions and Co-morbidities  Goal: Patient's chronic conditions and co-morbidity symptoms are monitored and maintained or improved  Outcome: Progressing   The patient's goals for the shift include safety    The clinical goals for the shift include safety

## 2024-07-04 NOTE — CARE PLAN
The patient's goals for the shift include safety    The clinical goals for the shift include Pt will remain safe and free from falls throughout the shift      Problem: Skin  Goal: Decreased wound size/increased tissue granulation at next dressing change  Outcome: Progressing  Goal: Participates in plan/prevention/treatment measures  Outcome: Progressing  Goal: Prevent/manage excess moisture  Outcome: Progressing  Goal: Prevent/minimize sheer/friction injuries  Outcome: Progressing  Goal: Promote/optimize nutrition  Outcome: Progressing  Goal: Promote skin healing  Outcome: Progressing     Problem: Fall/Injury  Goal: Not fall by end of shift  Outcome: Progressing  Goal: Be free from injury by end of the shift  Outcome: Progressing  Goal: Verbalize understanding of personal risk factors for fall in the hospital  Outcome: Progressing  Goal: Verbalize understanding of risk factor reduction measures to prevent injury from fall in the home  Outcome: Progressing  Goal: Use assistive devices by end of the shift  Outcome: Progressing  Goal: Pace activities to prevent fatigue by end of the shift  Outcome: Progressing     Problem: Respiratory  Goal: Clear secretions with interventions this shift  Outcome: Progressing  Goal: Minimize anxiety/maximize coping throughout shift  Outcome: Progressing  Goal: Minimal/no exertional discomfort or dyspnea this shift  Outcome: Progressing  Goal: No signs of respiratory distress (eg. Use of accessory muscles. Peds grunting)  Outcome: Progressing  Goal: Patent airway maintained this shift  Outcome: Progressing  Goal: Tolerate mechanical ventilation evidenced by VS/agitation level this shift  Outcome: Progressing  Goal: Tolerate pulmonary toileting this shift  Outcome: Progressing  Goal: Verbalize decreased shortness of breath this shift  Outcome: Progressing  Goal: Wean oxygen to maintain O2 saturation per order/standard this shift  Outcome: Progressing  Goal: Increase self care and/or  family involvement in next 24 hours  Outcome: Progressing     Problem: Pain - Adult  Goal: Verbalizes/displays adequate comfort level or baseline comfort level  Outcome: Progressing     Problem: Safety - Adult  Goal: Free from fall injury  Outcome: Progressing     Problem: Discharge Planning  Goal: Discharge to home or other facility with appropriate resources  Outcome: Progressing     Problem: Chronic Conditions and Co-morbidities  Goal: Patient's chronic conditions and co-morbidity symptoms are monitored and maintained or improved  Outcome: Progressing

## 2024-07-04 NOTE — PROGRESS NOTES
Angel Carroll is a 79 y.o. male on day 0 of admission presenting with Congestive heart failure, unspecified HF chronicity, unspecified heart failure type (Multi).      Subjective   Patient c/o left elbow pain.       Objective     Last Recorded Vitals  /53 (BP Location: Left arm, Patient Position: Lying)   Pulse 60   Temp 37 °C (98.6 °F) (Oral)   Resp 20   Wt 98.6 kg (217 lb 6 oz)   SpO2 100%   Intake/Output last 3 Shifts:    Intake/Output Summary (Last 24 hours) at 7/3/2024 2142  Last data filed at 7/3/2024 1600  Gross per 24 hour   Intake 850 ml   Output 815 ml   Net 35 ml       Admission Weight  Weight: 97.5 kg (215 lb) (06/30/24 1120)    Daily Weight  07/01/24 : 98.6 kg (217 lb 6 oz)    Image Results  ECG 12 Lead  AV dual-paced rhythm with prolonged AV conduction  Abnormal ECG  Confirmed by Pop Sewell ([id number]) on 7/1/2024 7:52:31 PM      Physical Exam    General-awake, alert, oriented x3  Lung-clear to auscultation bilaterally  Heart-irregular, irregular RR, S1 and S2 audible, 1/6 systolic murmur best heard at tricuspid valve place  Abdomen- soft, nontender, nondistended, good bowel sounds, no organomegaly, no mass, bowel sounds are present  Extremities-+1 nonpitting edema up to both knees, left ankle is swollen, tender on medial aspect, right hip is slightly tender on lateral aspect, range of motion full but slightly painful.  Lumbar spine is without visible changes.  Tenderness over spinal paraspinal region at L4-S1 levels, no step-offs, range of motion is decreased.Left elbow is slightly swollen.Mild tenderness to palpation.ROM decreased  Wound-none applicable    Relevant Results                 Assessment/Plan     Sprain of left ankle and right hip-I will start Tylenol 650 mg p.o. 3 times daily, PT/OT recommending moderate intensity rehabilitation,however family prefers home with [facility]   Lumbar strain-continue Tylenol 650 mg p.o. 3 times daily.  Left elbow pain- obtain XR od left elbow  Congestive  heart failure, diastolic-I  continue Lasix 40 mg p.o. twice daily.  2D echocardiogram from 2/29/2024 revealed EF of 50±5% with moderate tricuspid regurgitation.  Paroxysmal atrial fibrillation-INR is 1.8.  I will give additional Coumadin 1 mg today.  Chronic kidney disease stage IIIb-creatinine 1.5 at baseline.  Continue home medications  Hypokalemia-potassium 3.7, replace potassium  Disposition-home with [facility]                              Oli Wade MD

## 2024-07-04 NOTE — HH CARE COORDINATION
Home Care received a Referral to Resume Care for Nursing, Physical Therapy, and Occupational Therapy. We have processed the referral for a Resumption of Care on 7/5-7/6/24.     If you have any questions or concerns, please feel free to contact us at [phone]. Follow the prompts, enter your five digit zip code, and you will be directed to your care team on [facility] 1.

## 2024-07-05 VITALS
HEIGHT: 72 IN | BODY MASS INDEX: 29.44 KG/M2 | DIASTOLIC BLOOD PRESSURE: 57 MMHG | WEIGHT: 217.37 LBS | RESPIRATION RATE: 16 BRPM | TEMPERATURE: 98.2 F | HEART RATE: 61 BPM | OXYGEN SATURATION: 100 % | SYSTOLIC BLOOD PRESSURE: 117 MMHG

## 2024-07-05 LAB
HOLD SPECIMEN: NORMAL
HOLD SPECIMEN: NORMAL
INR PPP: 1.8 (ref 0.9–1.2)
PROTHROMBIN TIME: 18.6 SECONDS (ref 9.3–12.7)

## 2024-07-05 PROCEDURE — 2500000001 HC RX 250 WO HCPCS SELF ADMINISTERED DRUGS (ALT 637 FOR MEDICARE OP): Performed by: FAMILY MEDICINE

## 2024-07-05 PROCEDURE — 36415 COLL VENOUS BLD VENIPUNCTURE: CPT | Performed by: FAMILY MEDICINE

## 2024-07-05 PROCEDURE — 99233 SBSQ HOSP IP/OBS HIGH 50: CPT | Performed by: ORTHOPAEDIC SURGERY

## 2024-07-05 PROCEDURE — G0378 HOSPITAL OBSERVATION PER HR: HCPCS

## 2024-07-05 PROCEDURE — 2500000002 HC RX 250 W HCPCS SELF ADMINISTERED DRUGS (ALT 637 FOR MEDICARE OP, ALT 636 FOR OP/ED): Performed by: FAMILY MEDICINE

## 2024-07-05 PROCEDURE — 2500000005 HC RX 250 GENERAL PHARMACY W/O HCPCS: Performed by: FAMILY MEDICINE

## 2024-07-05 PROCEDURE — 85610 PROTHROMBIN TIME: CPT | Performed by: FAMILY MEDICINE

## 2024-07-05 RX ORDER — TRAMADOL HYDROCHLORIDE 50 MG/1
50 TABLET ORAL EVERY 6 HOURS PRN
Status: DISCONTINUED | OUTPATIENT
Start: 2024-07-05 | End: 2024-07-05 | Stop reason: HOSPADM

## 2024-07-05 RX ORDER — TRAMADOL HYDROCHLORIDE 50 MG/1
50 TABLET ORAL EVERY 6 HOURS PRN
Qty: 30 TABLET | Refills: 0 | Status: SHIPPED | OUTPATIENT
Start: 2024-07-05 | End: 2024-07-15

## 2024-07-05 ASSESSMENT — PAIN SCALES - GENERAL
PAINLEVEL_OUTOF10: 6
PAINLEVEL_OUTOF10: 7

## 2024-07-05 ASSESSMENT — PAIN - FUNCTIONAL ASSESSMENT: PAIN_FUNCTIONAL_ASSESSMENT: 0-10

## 2024-07-05 ASSESSMENT — PAIN DESCRIPTION - LOCATION: LOCATION: ELBOW

## 2024-07-05 ASSESSMENT — PAIN DESCRIPTION - ORIENTATION: ORIENTATION: LEFT

## 2024-07-05 NOTE — CONSULTS
Consults    Reason For Consult  Left elbow pain and left ankle pain    History Of Present Illness  Angel Carroll is a 79 y.o. male presenting with left elbow pain and left ankle pain.  He fell injuring his left elbow and left ankle and has had difficulty mobilizing since.  He states that this left elbow pain is worse with and aggravated by pushing up from a chair and his left ankle pain is worse with and aggravated by walking.  He has been treated with physical therapy.     Past Medical History  He has a past medical history of Atherosclerotic heart disease of native coronary artery without angina pectoris (12/14/2022), Benign neoplasm of meninges, unspecified (Multi), Benign prostatic hyperplasia without lower urinary tract symptoms (02/28/2022), Body mass index (BMI) 33.0-33.9, adult (07/02/2021), Body mass index (BMI) 34.0-34.9, adult (11/09/2021), Body mass index (BMI) 34.0-34.9, adult (09/08/2021), Body mass index (BMI) 35.0-35.9, adult (12/29/2021), Body mass index (BMI) 35.0-35.9, adult (02/28/2022), Elevated blood-pressure reading, without diagnosis of hypertension, Encounter for immunization (10/27/2015), Hyperlipidemia, unspecified (11/15/2021), Other conditions influencing health status, Pacemaker, Personal history of other diseases of the musculoskeletal system and connective tissue, Personal history of other drug therapy, Personal history of other specified conditions (10/25/2022), Rectal bleeding, and Vitamin D deficiency, unspecified (10/08/2020).    Surgical History  He has a past surgical history that includes Other surgical history (04/03/2013); Other surgical history (04/03/2013); Other surgical history (04/03/2013); Eye surgery (04/03/2013); Tonsillectomy (04/03/2013); Total knee arthroplasty (08/03/2016); Other surgical history (06/11/2019); Back surgery (04/18/2016); Lithotripsy (07/22/2013); Other surgical history (07/22/2013); Colonoscopy (07/03/2013); Other surgical history (07/03/2013); Knee  arthroscopy w/ debridement (07/03/2013); Knee arthroscopy w/ debridement (07/03/2013); Other surgical history (07/03/2013); Other surgical history (07/03/2013); Coronary angioplasty with stent (08/18/2020); CT angio head w and wo IV contrast (9/25/2021); CT angio neck (9/25/2021); MR angio head wo IV contrast (3/23/2021); and MR angio neck wo IV contrast (3/23/2021).     Social History  He reports that he quit smoking about 30 years ago. His smoking use included cigarettes. He has never used smokeless tobacco. He reports that he does not currently use alcohol. He reports that he does not use drugs.    Family History  Family History   Problem Relation Name Age of Onset    Diabetes Mother      Emphysema Father      Heart disease Father          Allergies  Other, Ranolazine, and Cyclobenzaprine    Review of Systems     Physical Exam  GENERAL:  This is a pleasant patient with appropriate affect resting comfortably in bed.  He is not in any respiratory or pulmonary distress    HEENT:  No evidence of head trauma.  No bruising, otorrhea or rhinorrhea    SKIN:  Skin at the head, face, trunk and both upper and lower extremities is intact.    EXTREMITIES/VASCULAR:  Right and left thigh are nontender and nonswollen.  Right and left feet are warm    NEUROLOGIC:  Patient is alert and oriented to person place time and situation.  He speaks clearly and coherently.  While right lower extremity motor examination is limited secondary to patient's pain no definite focal motor or sensory deficit is seen.    MUSCULOSKELETAL:  Neck:  Nontender.  No pain with range of motion.  Back:  Nontender at lumbar spine.  Straight leg raising is negative bilaterally.  Left elbow: There is some pain with range of motion of the left elbow.  There is some mild tenderness.  The patient has most pain with attempting to push self up from a chair using his left elbow.  Right elbow: Nontender.  No pain with range of motion.  Left ankle: There is some  tenderness and swelling laterally.  Position is clinically satisfactory.  Right ankle: Nontender.  No pain with range of motion.  Last Recorded Vitals  Blood pressure 117/57, pulse 61, temperature 36.8 °C (98.2 °F), temperature source Oral, resp. rate 16, height 1.829 m (6'), weight 98.6 kg (217 lb 6 oz), SpO2 100%.    Relevant Results      Scheduled medications  amiodarone, 200 mg, oral, Daily  aspirin, 81 mg, oral, Daily  atorvastatin, 20 mg, oral, Daily  cyanocobalamin, 500 mcg, oral, Daily  fenofibrate, 54 mg, oral, Daily  finasteride, 5 mg, oral, Daily  folic acid, 1 mg, oral, Daily  furosemide, 40 mg, oral, BID  gabapentin, 100 mg, oral, Nightly  lidocaine, 2 patch, transdermal, Daily  metoprolol tartrate, 12.5 mg, oral, BID  pantoprazole, 40 mg, oral, Daily before breakfast  PARoxetine, 20 mg, oral, Daily  psyllium, 1 packet, oral, BID  tamsulosin, 0.8 mg, oral, Nightly  warfarin, 1 mg, oral, Daily      Continuous medications  oxygen, 3 L/min      PRN medications  PRN medications: acetaminophen, traMADol  Results for orders placed or performed during the hospital encounter of 06/30/24 (from the past 24 hour(s))   Protime-INR   Result Value Ref Range    Protime 18.6 (H) 9.3 - 12.7 seconds    INR 1.8 (H) 0.9 - 1.2   Lavender Top   Result Value Ref Range    Extra Tube Hold for add-ons.    PST Top   Result Value Ref Range    Extra Tube Hold for add-ons.      *Note: Due to a large number of results and/or encounters for the requested time period, some results have not been displayed. A complete set of results can be found in Results Review.   X-rays of the left elbow done on 7-3-2024 show cortical irregularity consistent with possible occult fracture.  X-rays of the left ankle on 6- do not show any definite evidence of fracture or destruction.  I reviewed all of the x-rays and imaging studies from 6- to 7-3-2024 today.     Assessment/Plan   #1) possible occult fracture of the left elbow    2.)  Left  ankle sprain    3.)  Osteoarthritis of right hip.  No acute injury    4.)  Osteoarthritis of lumbar spine.  No acute injury    Plan: Options are discussed with the patient at the bedside in detail and also with his daughter, Rhea, over the phone in detail.  Also discussed this patient in person with both the physical therapist and occupational therapist then instructed them to continue with getting the patient up and out of bed with physical therapy and to use a walking boot for the left ankle and a sling for the left arm.  According to the physical therapist this patient is able to mobilize with use of walker and would do best with home nursing and home PT which has been scheduled.  The patient and his daughter are instructed regarding activity modification and risk for further injury with falling or trauma, ice, physician directed at home gentle strengthening and ROM exercises, and the appropriate use of Tylenol as needed for pain with its potential adverse reactions and side effects. The patient and his daughter understand and the patient will follow-up with me as an outpatient in 2 weeks and this appointment has been scheduled.  Please note that this report has been produced using speech recognition software.  It may contain errors related to grammar, punctuation or spelling.  Electronically signed, but not reviewed.      Grabiel Alston MD

## 2024-07-05 NOTE — CARE PLAN
Phoned pts dtr Rhea for C choices. Rhea said that her father recently had [facility] and she received a call today stating that they will re-open his case. They already have a SOC date    Discharge Plan: home with family and [facility]

## 2024-07-05 NOTE — PROGRESS NOTES
Angel Carroll is a 79 y.o. male on day 0 of admission presenting with Congestive heart failure, unspecified HF chronicity, unspecified heart failure type (Multi).      Subjective   Complaining of left elbow pain       Objective     Last Recorded Vitals  /50 (BP Location: Left arm, Patient Position: Lying)   Pulse 62   Temp 36.9 °C (98.4 °F) (Oral)   Resp 17   Wt 98.6 kg (217 lb 6 oz)   SpO2 99%   Intake/Output last 3 Shifts:    Intake/Output Summary (Last 24 hours) at 7/5/2024 0850  Last data filed at 7/5/2024 0647  Gross per 24 hour   Intake 1260 ml   Output 1000 ml   Net 260 ml       Admission Weight  Weight: 97.5 kg (215 lb) (06/30/24 1120)    Daily Weight  07/01/24 : 98.6 kg (217 lb 6 oz)    Image Results  XR elbow left 3+ views  Narrative: Interpreted By:  Malik Alonso,   STUDY:  XR ELBOW LEFT 3+ VIEWS; 7/3/2024 5:25 pm      INDICATION:  Signs/Symptoms:Lleft elbow pain.S/p fall      COMPARISON:  None.      ACCESSION NUMBER(S):  [id number]      ORDERING CLINICIAN:  MAURA ZELAYA      TECHNIQUE:  Number of films: Four view images of the left elbow were obtained.      FINDINGS:  There is minimal cortical irregularity of the radial neck, suspicious  for possible nondisplaced fracture. There is associated soft tissue  swelling and possible mild hemarthrosis. The alignment is anatomic.  Degenerative changes involve the articulation between the humeral  condyles and olecranon.      Impression: Minimal cortical irregularity of the radial neck, suspicious for  possible nondisplaced fracture. Splinting and follow-up in 2 weeks  recommended.      Signed by: Malik Alonso 7/4/2024 11:18 AM  Dictation workstation:   [id number]      Physical Exam    General-awake, alert, oriented x3  Lung-clear to auscultation bilaterally  Heart-irregular, irregular RR, S1 and S2 audible, 1/6 systolic murmur best heard at tricuspid valve place  Abdomen- soft, nontender, nondistended, good bowel sounds, no organomegaly, no  mass, bowel sounds are present  Extremities-no edema, left ankle is less swollen, tender on medial aspect, right hip is slightly tender on lateral aspect, range of motion full but slightly painful.  Lumbar spine is without visible changes.  Tenderness over spinal paraspinal region at L4-S1 levels, no step-offs, range of motion is decreased.Left elbow is swollen.Mild tenderness to palpation.ROM decreased and painful  Wound-none applicable    Relevant Results               Assessment/Plan      Sprain of left ankle and right hip-I will start Tylenol 650 mg p.o. 3 times daily, PT/OT recommending moderate intensity rehabilitation,however family prefers home with [facility]   Lumbar strain-continue Tylenol 650 mg p.o. 3 times daily.  Possible left radial neck fracture-will apply arm sling and repeat x-ray in 2 weeks  Congestive heart failure, diastolic-I  continue Lasix 40 mg p.o. twice daily.  2D echocardiogram from 2/29/2024 revealed EF of 50±5% with moderate tricuspid regurgitation.  Paroxysmal atrial fibrillation-INR is 1.8.  I will give additional Coumadin 1 mg today.  Chronic kidney disease stage IIIb-creatinine 1.9 at baseline.  Continue home medications  Hypokalemia-potassium 3.7, replace potassium  Disposition-home with [facility]                              Oli Wade MD

## 2024-07-05 NOTE — CARE PLAN
The patient's goals for the shift include safety    The clinical goals for the shift include Pt will remain safe and free from harm throughout the shift      Problem: Skin  Goal: Decreased wound size/increased tissue granulation at next dressing change  Outcome: Progressing  Goal: Participates in plan/prevention/treatment measures  Outcome: Progressing  Goal: Prevent/manage excess moisture  Outcome: Progressing  Goal: Prevent/minimize sheer/friction injuries  Outcome: Progressing  Goal: Promote/optimize nutrition  Outcome: Progressing  Goal: Promote skin healing  Outcome: Progressing     Problem: Fall/Injury  Goal: Not fall by end of shift  Outcome: Progressing  Goal: Be free from injury by end of the shift  Outcome: Progressing  Goal: Verbalize understanding of personal risk factors for fall in the hospital  Outcome: Progressing  Goal: Verbalize understanding of risk factor reduction measures to prevent injury from fall in the home  Outcome: Progressing  Goal: Use assistive devices by end of the shift  Outcome: Progressing  Goal: Pace activities to prevent fatigue by end of the shift  Outcome: Progressing     Problem: Respiratory  Goal: Clear secretions with interventions this shift  Outcome: Progressing  Goal: Minimize anxiety/maximize coping throughout shift  Outcome: Progressing  Goal: Minimal/no exertional discomfort or dyspnea this shift  Outcome: Progressing  Goal: No signs of respiratory distress (eg. Use of accessory muscles. Peds grunting)  Outcome: Progressing  Goal: Patent airway maintained this shift  Outcome: Progressing  Goal: Tolerate mechanical ventilation evidenced by VS/agitation level this shift  Outcome: Progressing  Goal: Tolerate pulmonary toileting this shift  Outcome: Progressing  Goal: Verbalize decreased shortness of breath this shift  Outcome: Progressing  Goal: Wean oxygen to maintain O2 saturation per order/standard this shift  Outcome: Progressing  Goal: Increase self care and/or  family involvement in next 24 hours  Outcome: Progressing     Problem: Pain - Adult  Goal: Verbalizes/displays adequate comfort level or baseline comfort level  Outcome: Progressing     Problem: Safety - Adult  Goal: Free from fall injury  Outcome: Progressing     Problem: Discharge Planning  Goal: Discharge to home or other facility with appropriate resources  Outcome: Progressing

## 2024-07-05 NOTE — PROGRESS NOTES
Occupational Therapy                 Therapy Communication Note    Patient Name: Angel Carroll  MRN: [id number]  Today's Date: 7/5/2024     Discipline: Occupational Therapy    Missed Visit Reason: Missed Visit Reason: Other (Comment)    Missed Time: Attempt    Comment: Xray of left elbow completed indicating: Minimal cortical irregularity of the radial neck, suspicious for possible nondisplaced fracture. Splinting and follow-up in 2 weeks recommended per MR. Patient is declining at this time due to pain. He is pending L UE splint. OT tx deferred

## 2024-07-07 ENCOUNTER — HOME CARE VISIT (OUTPATIENT)
Dept: HOME HEALTH SERVICES | Facility: HOME HEALTH | Age: 79
End: 2024-07-07
Payer: MEDICARE

## 2024-07-07 VITALS
SYSTOLIC BLOOD PRESSURE: 110 MMHG | DIASTOLIC BLOOD PRESSURE: 60 MMHG | RESPIRATION RATE: 20 BRPM | OXYGEN SATURATION: 98 % | TEMPERATURE: 98 F | HEART RATE: 60 BPM

## 2024-07-07 PROCEDURE — G0299 HHS/HOSPICE OF RN EA 15 MIN: HCPCS | Mod: HHH

## 2024-07-08 ENCOUNTER — ANTICOAGULATION - WARFARIN VISIT (OUTPATIENT)
Dept: CARDIOLOGY | Facility: CLINIC | Age: 79
End: 2024-07-08
Payer: MEDICARE

## 2024-07-08 ENCOUNTER — PATIENT OUTREACH (OUTPATIENT)
Dept: PRIMARY CARE | Facility: CLINIC | Age: 79
End: 2024-07-08
Payer: MEDICARE

## 2024-07-08 ENCOUNTER — DOCUMENTATION (OUTPATIENT)
Dept: PRIMARY CARE | Facility: CLINIC | Age: 79
End: 2024-07-08
Payer: MEDICARE

## 2024-07-08 DIAGNOSIS — I48.0 PAROXYSMAL ATRIAL FIBRILLATION (MULTI): Primary | ICD-10-CM

## 2024-07-08 NOTE — PROGRESS NOTES
Patient identification verified with 2 identifiers.    Location: [facility] Patient Self-Testing Program [phone]    Referring Physician: DR. YAZ MORGAN  Enrollment/ Re-enrollment date: 2025   INR Goal: 2.0-3.0  INR monitoring is per [facility] protocol.  Anticoagulation Medication: warfarin  Indication: Atrial Fibrillation/Atrial Flutter    Subjective   Bleeding signs/symptoms: No    Bruising: No   Major bleeding event: No  Thrombosis signs/symptoms: No  Thromboembolic event: No  Missed doses: No  Extra doses: No  Medication changes: No  Dietary changes: No  Change in health: No  Change in activity: No  Alcohol: No  Other concerns: No    Upcoming Procedures:  Does the Patient Have any upcoming procedures that require interruption in anticoagulation therapy? no  Does the patient require bridging? no      Anticoagulation Summary  As of 2024      INR goal:  2.0-3.0   TTR:  56.3% (8.5 mo)   INR used for dosin.90 (2024)   Weekly warfarin total:  16.25 mg               Assessment/Plan   Subtherapeutic     1. New dose: no change  SPOKE TO PT'S DAUGHTER KIRSTEN. PT DISCHARGED FROM HOSPITAL 24. KIRSTEN STATES PT HAD BEEN RECEIVING A LOWER DOSE OF WARFARIN WHILE HOSPITALIZED BUT RESTARTED BACK ON CURRENT DOSING INSTRUCTIONS PER [facility] 24. KIRSTEN VERBALIZED INSTRUCTIONS CORRECTLY  2. Next INR:  24      Education provided to patient during the visit:  Patient instructed to call in interim with questions, concerns and changes.   Patient educated on compliance with dosing, follow up appointments, and prescribed plan of care.

## 2024-07-08 NOTE — PROGRESS NOTES
Pt declining TCM services at this time. Pt states he will make follow up appt with PCP if it becomes necessary. Pt has no questions on medication or discharge instructions at this time. Pt states he is doing well.

## 2024-07-09 ENCOUNTER — OFFICE VISIT (OUTPATIENT)
Dept: ORTHOPEDIC SURGERY | Facility: CLINIC | Age: 79
End: 2024-07-09
Payer: MEDICARE

## 2024-07-09 ENCOUNTER — HOSPITAL ENCOUNTER (OUTPATIENT)
Dept: RADIOLOGY | Facility: CLINIC | Age: 79
Discharge: HOME | End: 2024-07-09
Payer: MEDICARE

## 2024-07-09 ENCOUNTER — HOME CARE VISIT (OUTPATIENT)
Dept: HOME HEALTH SERVICES | Facility: HOME HEALTH | Age: 79
End: 2024-07-09
Payer: MEDICARE

## 2024-07-09 VITALS — BODY MASS INDEX: 29.39 KG/M2 | WEIGHT: 217 LBS | HEIGHT: 72 IN

## 2024-07-09 VITALS
TEMPERATURE: 97.4 F | OXYGEN SATURATION: 99 % | SYSTOLIC BLOOD PRESSURE: 130 MMHG | HEART RATE: 60 BPM | DIASTOLIC BLOOD PRESSURE: 72 MMHG

## 2024-07-09 DIAGNOSIS — M25.522 LEFT ELBOW PAIN: ICD-10-CM

## 2024-07-09 DIAGNOSIS — R60.9 SWELLING: ICD-10-CM

## 2024-07-09 DIAGNOSIS — S53.402A ELBOW SPRAIN, LEFT, INITIAL ENCOUNTER: ICD-10-CM

## 2024-07-09 DIAGNOSIS — S42.402A ELBOW FRACTURE, LEFT: ICD-10-CM

## 2024-07-09 DIAGNOSIS — M25.572 LEFT ANKLE PAIN, UNSPECIFIED CHRONICITY: Primary | ICD-10-CM

## 2024-07-09 DIAGNOSIS — L03.114 CELLULITIS OF LEFT HAND: ICD-10-CM

## 2024-07-09 PROCEDURE — 73130 X-RAY EXAM OF HAND: CPT | Mod: LT

## 2024-07-09 PROCEDURE — 73080 X-RAY EXAM OF ELBOW: CPT | Mod: LT

## 2024-07-09 PROCEDURE — 73080 X-RAY EXAM OF ELBOW: CPT | Mod: LEFT SIDE | Performed by: ORTHOPAEDIC SURGERY

## 2024-07-09 PROCEDURE — 1125F AMNT PAIN NOTED PAIN PRSNT: CPT | Performed by: PHYSICIAN ASSISTANT

## 2024-07-09 PROCEDURE — 1159F MED LIST DOCD IN RCRD: CPT | Performed by: PHYSICIAN ASSISTANT

## 2024-07-09 PROCEDURE — 73130 X-RAY EXAM OF HAND: CPT | Mod: LEFT SIDE | Performed by: ORTHOPAEDIC SURGERY

## 2024-07-09 PROCEDURE — G0151 HHCP-SERV OF PT,EA 15 MIN: HCPCS | Mod: HHH

## 2024-07-09 PROCEDURE — 99213 OFFICE O/P EST LOW 20 MIN: CPT | Performed by: PHYSICIAN ASSISTANT

## 2024-07-09 PROCEDURE — 1036F TOBACCO NON-USER: CPT | Performed by: PHYSICIAN ASSISTANT

## 2024-07-09 PROCEDURE — 1157F ADVNC CARE PLAN IN RCRD: CPT | Performed by: PHYSICIAN ASSISTANT

## 2024-07-09 PROCEDURE — 1160F RVW MEDS BY RX/DR IN RCRD: CPT | Performed by: PHYSICIAN ASSISTANT

## 2024-07-09 RX ORDER — DOXYCYCLINE 100 MG/1
100 CAPSULE ORAL 2 TIMES DAILY
Qty: 20 CAPSULE | Refills: 0 | Status: SHIPPED | OUTPATIENT
Start: 2024-07-09 | End: 2024-07-19

## 2024-07-09 SDOH — HEALTH STABILITY: PHYSICAL HEALTH: EXERCISE COMMENTS: SITTING LAQ MARCHING AP

## 2024-07-09 ASSESSMENT — LIFESTYLE VARIABLES
HAS A RELATIVE, FRIEND, DOCTOR, OR ANOTHER HEALTH PROFESSIONAL EXPRESSED CONCERN ABOUT YOUR DRINKING OR SUGGESTED YOU CUT DOWN: NO
SKIP TO QUESTIONS 9-10: 1
HOW OFTEN DURING THE LAST YEAR HAVE YOU HAD A FEELING OF GUILT OR REMORSE AFTER DRINKING: NEVER
HOW OFTEN DO YOU HAVE A DRINK CONTAINING ALCOHOL: NEVER
HOW OFTEN DO YOU HAVE SIX OR MORE DRINKS ON ONE OCCASION: NEVER
HOW OFTEN DO YOU HAVE SIX OR MORE DRINKS ON ONE OCCASION: NEVER
HOW OFTEN DURING THE LAST YEAR HAVE YOU BEEN UNABLE TO REMEMBER WHAT HAPPENED THE NIGHT BEFORE BECAUSE YOU HAD BEEN DRINKING: NEVER
HOW OFTEN DURING THE LAST YEAR HAVE YOU HAD A FEELING OF GUILT OR REMORSE AFTER DRINKING: NEVER
HOW MANY STANDARD DRINKS CONTAINING ALCOHOL DO YOU HAVE ON A TYPICAL DAY: PATIENT DOES NOT DRINK
AUDIT TOTAL SCORE: 0
HOW OFTEN DURING THE LAST YEAR HAVE YOU NEEDED AN ALCOHOLIC DRINK FIRST THING IN THE MORNING TO GET YOURSELF GOING AFTER A NIGHT OF HEAVY DRINKING: NEVER
HOW OFTEN DURING THE LAST YEAR HAVE YOU BEEN UNABLE TO REMEMBER WHAT HAPPENED THE NIGHT BEFORE BECAUSE YOU HAD BEEN DRINKING: NEVER
HOW OFTEN DURING THE LAST YEAR HAVE YOU FAILED TO DO WHAT WAS NORMALLY EXPECTED FROM YOU BECAUSE OF DRINKING: NEVER
HOW OFTEN DURING THE LAST YEAR HAVE YOU NEEDED AN ALCOHOLIC DRINK FIRST THING IN THE MORNING TO GET YOURSELF GOING AFTER A NIGHT OF HEAVY DRINKING: NEVER
HAVE YOU OR SOMEONE ELSE BEEN INJURED AS A RESULT OF YOUR DRINKING: NO
AUDIT-C TOTAL SCORE: 0
HOW OFTEN DURING THE LAST YEAR HAVE YOU FOUND THAT YOU WERE NOT ABLE TO STOP DRINKING ONCE YOU HAD STARTED: NEVER
HOW OFTEN DO YOU HAVE A DRINK CONTAINING ALCOHOL: NEVER
HOW OFTEN DURING THE LAST YEAR HAVE YOU FOUND THAT YOU WERE NOT ABLE TO STOP DRINKING ONCE YOU HAD STARTED: NEVER
AUDIT-C TOTAL SCORE: 0
SKIP TO QUESTIONS 9-10: 1
HOW OFTEN DURING THE LAST YEAR HAVE YOU FAILED TO DO WHAT WAS NORMALLY EXPECTED FROM YOU BECAUSE OF DRINKING: NEVER
HAS A RELATIVE, FRIEND, DOCTOR, OR ANOTHER HEALTH PROFESSIONAL EXPRESSED CONCERN ABOUT YOUR DRINKING OR SUGGESTED YOU CUT DOWN: NO
HOW MANY STANDARD DRINKS CONTAINING ALCOHOL DO YOU HAVE ON A TYPICAL DAY: PATIENT DOES NOT DRINK
HAVE YOU OR SOMEONE ELSE BEEN INJURED AS A RESULT OF YOUR DRINKING: NO
AUDIT TOTAL SCORE: 0

## 2024-07-09 ASSESSMENT — ENCOUNTER SYMPTOMS
PAIN LOCATION: BACK
OCCASIONAL FEELINGS OF UNSTEADINESS: 1
LOWEST PAIN SEVERITY IN PAST 24 HOURS: 3/10
OCCASIONAL FEELINGS OF UNSTEADINESS: 1
LOSS OF SENSATION IN FEET: 0
DEPRESSION: 0
HIGHEST PAIN SEVERITY IN PAST 24 HOURS: 3/10
PAIN LOCATION - PAIN SEVERITY: 3/10

## 2024-07-09 ASSESSMENT — ACTIVITIES OF DAILY LIVING (ADL): AMBULATION_DISTANCE/DURATION_TOLERATED: 50 FEET

## 2024-07-09 ASSESSMENT — COLUMBIA-SUICIDE SEVERITY RATING SCALE - C-SSRS
6. HAVE YOU EVER DONE ANYTHING, STARTED TO DO ANYTHING, OR PREPARED TO DO ANYTHING TO END YOUR LIFE?: NO
2. HAVE YOU ACTUALLY HAD ANY THOUGHTS OF KILLING YOURSELF?: NO
1. IN THE PAST MONTH, HAVE YOU WISHED YOU WERE DEAD OR WISHED YOU COULD GO TO SLEEP AND NOT WAKE UP?: NO

## 2024-07-09 ASSESSMENT — PAIN - FUNCTIONAL ASSESSMENT: PAIN_FUNCTIONAL_ASSESSMENT: 0-10

## 2024-07-09 ASSESSMENT — PAIN SCALES - GENERAL
PAINLEVEL_OUTOF10: 4
PAINLEVEL: 4

## 2024-07-09 NOTE — PROGRESS NOTES
Subjective      Chief Complaint   Patient presents with    Left Ankle - Follow-up    Left Elbow - Follow-up        History Of Present Illness  Dr. Viera previously evaluated this patient for left elbow pain and left ankle pain as an inpatient.  There was concern for an occult fracture at the left radial neck.  He presents today in office for reevaluation.  He states that his left elbow pain has significantly improved and he is no longer using a splint for support.  He also states that his left ankle pain has improved.  He states he is able to ambulate with the use of a walker from his bedroom to his bathroom.  His concern today is left hand erythema and swelling.  He states that this has been present for 1 week.  He denies any specific trauma.  He denies fever chills nausea vomiting.  He is currently working with physical therapy at home.    CARDIOLOGY:   Negative for chest pain, shortness of breath.   RESPIRATORY:   Negative for chest pain, shortness of breath.   MUSCULOSKELETAL:   See HPI for details.   NEUROLOGY:   Negative for tingling, numbness, weakness.    Objective    There were no vitals filed for this visit.    Physical Exam  GENERAL:          General Appearance:  This is a pleasant patient with appropriate affect, in no acute distress.   DERMATOLOGY:          Skin: skin at the neck, upper and lower back, and trunk is intact. There is no evidence of skin rash, skin breakdown or ulceration, or atrophic skin change.   EXTREMITIES:          Vascular:  Right, left hands and feet are warm with good color and pulses. Right and left calf and thigh are nontender and nonswollen.   NEUROLOGICAL:          Orientation:  Patient is alert and oriented to person, place, time and situation. Right and left upper and lower extremity motor and sensory examinations are intact.  MUSCULOSKELETAL:  Neck:  Nontender.  No pain with range of motion.  Back:  Nontender at lumbar spine.  Straight leg raising is negative bilaterally.   Left elbow: There is no pain with range of motion of the left elbow.  There is no tenderness.  There is diffuse swelling and erythema at the dorsum of the left hand.  The patient is able to make a fist with no pain.  There is no area of palpable fluctuance.  No evidence of abscess.  Right elbow: Nontender.  No pain with range of motion.  Left ankle: There is no tenderness but some  swelling laterally.  Position is clinically satisfactory.  Right ankle: Nontender.  No pain with range of motion.    XR elbow left 3+ views    Result Date: 7/4/2024  Interpreted By:  Malik Alonso, STUDY: XR ELBOW LEFT 3+ VIEWS; 7/3/2024 5:25 pm   INDICATION: Signs/Symptoms:Lleft elbow pain.S/p fall   COMPARISON: None.   ACCESSION NUMBER(S): [id number]   ORDERING CLINICIAN: MAURA ZELAYA   TECHNIQUE: Number of films: Four view images of the left elbow were obtained.   FINDINGS: There is minimal cortical irregularity of the radial neck, suspicious for possible nondisplaced fracture. There is associated soft tissue swelling and possible mild hemarthrosis. The alignment is anatomic. Degenerative changes involve the articulation between the humeral condyles and olecranon.       Minimal cortical irregularity of the radial neck, suspicious for possible nondisplaced fracture. Splinting and follow-up in 2 weeks recommended.   Signed by: Malik Alonso 7/4/2024 11:18 AM Dictation workstation:   [id number]    ECG 12 Lead    Result Date: 7/1/2024  AV dual-paced rhythm with prolonged AV conduction Abnormal ECG Confirmed by Pop Sewell ([id number]) on 7/1/2024 7:52:31 PM    XR chest 1 view    Result Date: 6/30/2024  Interpreted By:  Kilo Reynolds, STUDY: XR CHEST 1 VIEW;  6/30/2024 12:27 pm   INDICATION: Signs/Symptoms:falls.   COMPARISON: Chest radiograph 04/07/2024   ACCESSION NUMBER(S): [id number]   ORDERING CLINICIAN: JESSICA STEIN   FINDINGS:     CARDIOMEDIASTINAL SILHOUETTE: Cardiomediastinal silhouette is normal in size and  configuration. Left-sided pulse generator device.   LUNGS: No consolidation, pneumothorax, or significant effusion.   ABDOMEN: No remarkable upper abdominal findings.   BONES: No acute osseous changes.       1.  No evidence of acute cardiopulmonary process.     Signed by: Kilo Reynolds 6/30/2024 12:36 PM Dictation workstation:   [id number]    XR lumbar spine 2-3 views    Result Date: 6/30/2024  Interpreted By:  Kilo Reynolds, STUDY: XR LUMBAR SPINE 2-3 VIEWS; ;  6/30/2024 12:27 pm   INDICATION: Signs/Symptoms:fall low back pain.   COMPARISON: None.   ACCESSION NUMBER(S): [id number]   ORDERING CLINICIAN: JESSICA STEIN   FINDINGS: No acute fracture. Multilevel presumed chronic or degenerative endplate changes. Multilevel disc space narrowing. Multilevel facet arthropathy. Multilevel anterior osteophyte formation.       Multilevel spondylosis without acute osseous abnormality of the lumbar spine.     MACRO: None   Signed by: Kilo Reynolds 6/30/2024 12:35 PM Dictation workstation:   [id number]    XR hip right with pelvis when performed 2 or 3 views    Result Date: 6/30/2024  Interpreted By:  Kilo Reynolds, STUDY: XR HIP RIGHT WITH PELVIS WHEN PERFORMED 2 OR 3 VIEWS; ;  6/30/2024 12:27 pm   INDICATION: Signs/Symptoms:view with pelvis, fall pain, no previous imaging.   COMPARISON: None.   ACCESSION NUMBER(S): [id number]   ORDERING CLINICIAN: JESSICA STEIN   FINDINGS: Three views of the right hip. No acute displaced fracture. Moderate degenerative changes are noted of the hip. No hip dislocation.       Degenerative changes of the right hip without acute osseous abnormality detected.     MACRO: None   Signed by: Kilo Reynolds 6/30/2024 12:34 PM Dictation workstation:   [id number]    XR ankle left 3+ views    Result Date: 6/30/2024  Interpreted By:  Kilo Reynolds, STUDY: XR ANKLE LEFT 3+ VIEWS; XR FOOT LEFT 3+ VIEWS; ;  6/30/2024 12:27 pm   INDICATION: Signs/Symptoms:ankle pain no previous imaging;  Signs/Symptoms:fall, injury, no previous imaging.   COMPARISON: None.   ACCESSION NUMBER(S): [id number]; [id number]   ORDERING CLINICIAN: JESSICA STEIN   FINDINGS: Multiple views of the left foot and left ankle. Several ossicles inferior to the lateral malleolus suggestive of sequela of remote injury. Calcaneal enthesopathy. The ankle mortise appears congruent. Degenerative changes of the interphalangeal and 1st MTP joints. No acute displaced fracture is seen.       Small ossicle adjacent and inferior to the lateral malleolus suggestive of sequela of remote injury. If there is concern for acute injury, consider MRI or CT for further assessment.   Degenerative changes of the foot.   Calcaneal enthesopathy.     MACRO: None   Signed by: Kilo Reynolds 6/30/2024 12:34 PM Dictation workstation:   [id number]    XR foot left 3+ views    Result Date: 6/30/2024  Interpreted By:  Kilo Reynolds, STUDY: XR ANKLE LEFT 3+ VIEWS; XR FOOT LEFT 3+ VIEWS; ;  6/30/2024 12:27 pm   INDICATION: Signs/Symptoms:ankle pain no previous imaging; Signs/Symptoms:fall, injury, no previous imaging.   COMPARISON: None.   ACCESSION NUMBER(S): [id number]; [id number]   ORDERING CLINICIAN: JESSICA STEIN   FINDINGS: Multiple views of the left foot and left ankle. Several ossicles inferior to the lateral malleolus suggestive of sequela of remote injury. Calcaneal enthesopathy. The ankle mortise appears congruent. Degenerative changes of the interphalangeal and 1st MTP joints. No acute displaced fracture is seen.       Small ossicle adjacent and inferior to the lateral malleolus suggestive of sequela of remote injury. If there is concern for acute injury, consider MRI or CT for further assessment.   Degenerative changes of the foot.   Calcaneal enthesopathy.     MACRO: None   Signed by: Kilo Reynolds 6/30/2024 12:34 PM Dictation workstation:   [id number]    CT cervical spine wo IV contrast    Result Date: 6/30/2024  Interpreted By:   Kilo Renyolds, STUDY: CT CERVICAL SPINE WO IV CONTRAST;  6/30/2024 12:12 pm   INDICATION: Signs/Symptoms:falls pain, no previous injury.   COMPARISON: CT cervical spine 04/05/2024   ACCESSION NUMBER(S): [id number]   ORDERING CLINICIAN: JESSICA STEIN   TECHNIQUE: Axial CT images of the cervical spine are obtained. Axial, coronal and sagittal reconstructions are provided for review.   FINDINGS:     Fractures: There is no evidence for an acute fracture of the cervical spine.   Vertebral Alignment: No posttraumatic malalignment. Grade 1 anterolisthesis of C3 on C4 and C4 on C5, likely chronic/degenerative in nature.   Craniocervical Junction: The odontoid process and craniocervical junction are intact. Partially calcified presumed degenerative pannus formation at the C1-C2 articulation.   Vertebrae/Disc Spaces:  Similar appearing severe multilevel spondylosis. Multilevel disc space narrowing. Multilevel facet arthropathy Multilevel uncovertebral hypertrophy.Multilevel osteophyte formation.Multilevel presumed chronic or degenerative endplate changes of the spine.   Prevertebral/Paraspinal Soft Tissues: The prevertebral and paraspinal soft tissues are unremarkable.         No evidence for an acute fracture or subluxation of the cervical spine.   MACRO: None   Signed by: Kilo Reynolds 6/30/2024 12:28 PM Dictation workstation:   [id number]    CT head wo IV contrast    Result Date: 6/30/2024  Interpreted By:  Kilo Reynolds, STUDY: CT HEAD WO IV CONTRAST;  6/30/2024 12:12 pm   INDICATION: Signs/Symptoms:falls no previous injury.   COMPARISON: Head CT 04/05/2024   ACCESSION NUMBER(S): [id number]   ORDERING CLINICIAN: JESSICA STEIN   TECHNIQUE: Noncontrast axial CT scan of head was performed. Angled reformats in brain and bone windows were generated. The images were reviewed in bone, brain, blood and soft tissue windows.   FINDINGS: CSF Spaces: Ventricles appear stable respect to size and configuration. There  is no extraaxial fluid collection.   Parenchyma: Overall stable appearance of the parenchyma with similar appearing parenchymal atrophy. Similar appearing dystrophic calcifications and encephalomalacia of the right frontal lobe. The grey-white differentiation is intact. There is no mass effect or midline shift.  There is no intracranial hemorrhage.   Calvarium: No acute displaced calvarial fracture. Stable postsurgical changes of the right frontal calvarium.   Paranasal sinuses and mastoids: Visualized paranasal sinuses and mastoids are clear.       No CT evidence of acute intracranial injury.   Stable intracranial findings since comparison head CT 04/05/2024.   MACRO: None       Signed by: Kilo Reynolds 6/30/2024 12:27 PM Dictation workstation:   [id number]       Angel was seen today for follow-up and follow-up.  Diagnoses and all orders for this visit:  Left ankle pain, unspecified chronicity (Primary)  Left elbow pain  Elbow sprain, left, initial encounter  Options are discussed with the patient in detail. The patient is instructed to continue with physical therapy.  We discussed treating his left hand cellulitis with a course of oral antibiotics as an outpatient.  However he is instructed that if the cellulitis worsens and he has fever chills nausea, increasing pain or difficulty using his left hand that he must immediately go to the emergency room.  The patient and his daughter understand.. The patient is instructed regarding activity modification and risk for further injury with falling or trauma, ice, provider directed at home gentle strengthening and ROM exercises, and the appropriate use of Tylenol as needed for pain with its potential adverse reactions and side effects. The patient understands.  Return in 1 week for reevaluation or sooner as needed please note that this report has been produced using speech recognition software.  It may contain errors related to grammar, punctuation or spelling.   Electronically signed, but not reviewed.    Tana Bates PA-C

## 2024-07-09 NOTE — PATIENT INSTRUCTIONS
Thank you for coming to see us today!     Continue to use tylenol for pain control.   Rest, ice and elevate and remember to do exercises.   Take antibiotic for your left hand cellulitis.  If your symptoms worsen, including chest pain, shortness of breath, fever, chills go to the Emergency room     Follow up  one week

## 2024-07-11 ENCOUNTER — HOME CARE VISIT (OUTPATIENT)
Dept: HOME HEALTH SERVICES | Facility: HOME HEALTH | Age: 79
End: 2024-07-11
Payer: MEDICARE

## 2024-07-11 ENCOUNTER — ANTICOAGULATION - WARFARIN VISIT (OUTPATIENT)
Dept: CARDIOLOGY | Facility: CLINIC | Age: 79
End: 2024-07-11
Payer: MEDICARE

## 2024-07-11 VITALS
OXYGEN SATURATION: 99 % | RESPIRATION RATE: 20 BRPM | TEMPERATURE: 97.9 F | DIASTOLIC BLOOD PRESSURE: 64 MMHG | SYSTOLIC BLOOD PRESSURE: 112 MMHG | HEART RATE: 60 BPM

## 2024-07-11 DIAGNOSIS — I48.0 PAROXYSMAL ATRIAL FIBRILLATION (MULTI): Primary | ICD-10-CM

## 2024-07-11 LAB
INR IN PPP BY COAGULATION ASSAY EXTERNAL: 1.6
PROTHROMBIN TIME (PT) IN PPP BY COAGULATION ASSAY EXTERNAL: NORMAL

## 2024-07-11 NOTE — PROGRESS NOTES
Patient identification verified with 2 identifiers.    Location: [facility] Patient Self-Testing Program [phone]    Referring Physician: DR. YAZ MORGAN  Enrollment/ Re-enrollment date: 2025   INR Goal: 2.0-3.0  INR monitoring is per [facility] protocol.  Anticoagulation Medication: warfarin  Indication: Atrial Fibrillation/Atrial Flutter    Subjective   Bleeding signs/symptoms: No    Bruising: No   Major bleeding event: No  Thrombosis signs/symptoms: No  Thromboembolic event: No  Missed doses: No  Extra doses: No  Medication changes: Yes  ON DOXYCYCLINE  Dietary changes: Yes  INCONSITENT DIET PER DAUGHTER  Change in health: No  Change in activity: No  Alcohol: No  Other concerns: No    Upcoming Procedures:  Does the Patient Have any upcoming procedures that require interruption in anticoagulation therapy? no  Does the patient require bridging? no      Anticoagulation Summary  As of 2024      INR goal:  2.0-3.0   TTR:  55.6% (8.6 mo)   INR used for dosin.60 (2024)   Weekly warfarin total:  17.5 mg               Assessment/Plan   Subtherapeutic   Increase TWD   Repeat INR in 1 week.  REVIEWED TWD AND NEXT TESTING DATE WITH BOTH PT AND DAUGHTER.      Education provided to patient during the visit:  Patient instructed to call in interim with questions, concerns and changes.   Patient educated on compliance with dosing, follow up appointments, and prescribed plan of care.

## 2024-07-12 ENCOUNTER — APPOINTMENT (OUTPATIENT)
Dept: HOME HEALTH SERVICES | Facility: HOME HEALTH | Age: 79
End: 2024-07-12
Payer: MEDICARE

## 2024-07-12 DIAGNOSIS — J96.11 CHRONIC HYPOXEMIC RESPIRATORY FAILURE (MULTI): Primary | ICD-10-CM

## 2024-07-12 DIAGNOSIS — I50.9 HEART FAILURE, UNSPECIFIED HF CHRONICITY, UNSPECIFIED HEART FAILURE TYPE (MULTI): ICD-10-CM

## 2024-07-14 SDOH — HEALTH STABILITY: MENTAL HEALTH: SMOKING IN HOME: 0

## 2024-07-14 SDOH — ECONOMIC STABILITY: HOUSING INSECURITY: EVIDENCE OF SMOKING MATERIAL: 0

## 2024-07-14 ASSESSMENT — ENCOUNTER SYMPTOMS
LOWER EXTREMITY EDEMA: 1
MUSCLE WEAKNESS: 1
CHANGE IN APPETITE: UNCHANGED
PAIN LOCATION - PAIN SEVERITY: 5/10
PAIN: 1
PERSON REPORTING PAIN: PATIENT
PAIN LOCATION: GENERALIZED
FATIGUES EASILY: 1
APPETITE LEVEL: GOOD

## 2024-07-14 ASSESSMENT — ACTIVITIES OF DAILY LIVING (ADL)
ENTERING_EXITING_HOME: ONE PERSON
OASIS_M1830: 05

## 2024-07-15 ENCOUNTER — TELEPHONE (OUTPATIENT)
Dept: CARDIOLOGY | Facility: CLINIC | Age: 79
End: 2024-07-15
Payer: MEDICARE

## 2024-07-15 DIAGNOSIS — I25.10 ARTERIOSCLEROTIC CARDIOVASCULAR DISEASE (ASCVD): ICD-10-CM

## 2024-07-15 DIAGNOSIS — I48.20 CHRONIC ATRIAL FIBRILLATION (MULTI): ICD-10-CM

## 2024-07-15 DIAGNOSIS — I48.0 PAROXYSMAL ATRIAL FIBRILLATION (MULTI): ICD-10-CM

## 2024-07-15 RX ORDER — FENOFIBRATE 48 MG/1
67 TABLET, FILM COATED ORAL DAILY
Qty: 45 TABLET | Refills: 3 | Status: SHIPPED | OUTPATIENT
Start: 2024-07-15 | End: 2025-07-15

## 2024-07-15 RX ORDER — WARFARIN 2.5 MG/1
TABLET ORAL
Qty: 90 TABLET | Refills: 3 | Status: SHIPPED | OUTPATIENT
Start: 2024-07-15

## 2024-07-15 RX ORDER — METOPROLOL TARTRATE 25 MG/1
12.5 TABLET, FILM COATED ORAL 2 TIMES DAILY
Qty: 30 TABLET | Refills: 3 | Status: SHIPPED | OUTPATIENT
Start: 2024-07-15 | End: 2025-07-15

## 2024-07-15 SDOH — ECONOMIC STABILITY: HOUSING INSECURITY: EVIDENCE OF SMOKING MATERIAL: 0

## 2024-07-15 SDOH — HEALTH STABILITY: MENTAL HEALTH: SMOKING IN HOME: 0

## 2024-07-15 ASSESSMENT — ENCOUNTER SYMPTOMS
LOWER EXTREMITY EDEMA: 1
CHANGE IN APPETITE: UNCHANGED
MUSCLE WEAKNESS: 1
PAIN: 1
APPETITE LEVEL: GOOD
PERSON REPORTING PAIN: PATIENT
PAIN LOCATION - PAIN SEVERITY: 4/10
PAIN LOCATION: GENERALIZED

## 2024-07-15 ASSESSMENT — ACTIVITIES OF DAILY LIVING (ADL)
OASIS_M1830: 05
ENTERING_EXITING_HOME: ONE PERSON

## 2024-07-16 ENCOUNTER — OFFICE VISIT (OUTPATIENT)
Dept: ORTHOPEDIC SURGERY | Facility: CLINIC | Age: 79
End: 2024-07-16
Payer: MEDICARE

## 2024-07-16 VITALS — WEIGHT: 217 LBS | BODY MASS INDEX: 29.39 KG/M2 | HEIGHT: 72 IN

## 2024-07-16 DIAGNOSIS — S53.402A ELBOW SPRAIN, LEFT, INITIAL ENCOUNTER: ICD-10-CM

## 2024-07-16 DIAGNOSIS — M19.90 ARTHRITIS: ICD-10-CM

## 2024-07-16 DIAGNOSIS — M25.522 LEFT ELBOW PAIN: Primary | ICD-10-CM

## 2024-07-16 DIAGNOSIS — L03.114 CELLULITIS OF LEFT HAND: ICD-10-CM

## 2024-07-16 DIAGNOSIS — N40.0 BPH WITHOUT OBSTRUCTION/LOWER URINARY TRACT SYMPTOMS: ICD-10-CM

## 2024-07-16 PROCEDURE — 1036F TOBACCO NON-USER: CPT | Performed by: PHYSICIAN ASSISTANT

## 2024-07-16 PROCEDURE — 99213 OFFICE O/P EST LOW 20 MIN: CPT | Performed by: PHYSICIAN ASSISTANT

## 2024-07-16 PROCEDURE — 1125F AMNT PAIN NOTED PAIN PRSNT: CPT | Performed by: PHYSICIAN ASSISTANT

## 2024-07-16 PROCEDURE — 1159F MED LIST DOCD IN RCRD: CPT | Performed by: PHYSICIAN ASSISTANT

## 2024-07-16 PROCEDURE — 1157F ADVNC CARE PLAN IN RCRD: CPT | Performed by: PHYSICIAN ASSISTANT

## 2024-07-16 RX ORDER — TAMSULOSIN HYDROCHLORIDE 0.4 MG/1
0.8 CAPSULE ORAL NIGHTLY
Qty: 180 CAPSULE | Refills: 3 | Status: SHIPPED | OUTPATIENT
Start: 2024-07-16 | End: 2025-07-16

## 2024-07-16 RX ORDER — GABAPENTIN 100 MG/1
100 CAPSULE ORAL NIGHTLY
Qty: 30 CAPSULE | Refills: 2 | Status: SHIPPED | OUTPATIENT
Start: 2024-07-16 | End: 2024-10-14

## 2024-07-16 ASSESSMENT — LIFESTYLE VARIABLES
HAS A RELATIVE, FRIEND, DOCTOR, OR ANOTHER HEALTH PROFESSIONAL EXPRESSED CONCERN ABOUT YOUR DRINKING OR SUGGESTED YOU CUT DOWN: NO
HOW OFTEN DO YOU HAVE SIX OR MORE DRINKS ON ONE OCCASION: NEVER
AUDIT-C TOTAL SCORE: 0
SKIP TO QUESTIONS 9-10: 1
HAVE YOU OR SOMEONE ELSE BEEN INJURED AS A RESULT OF YOUR DRINKING: NO
HOW MANY STANDARD DRINKS CONTAINING ALCOHOL DO YOU HAVE ON A TYPICAL DAY: PATIENT DOES NOT DRINK
AUDIT TOTAL SCORE: 0
HOW OFTEN DO YOU HAVE A DRINK CONTAINING ALCOHOL: NEVER

## 2024-07-16 ASSESSMENT — ENCOUNTER SYMPTOMS
DEPRESSION: 0
OCCASIONAL FEELINGS OF UNSTEADINESS: 1
LOSS OF SENSATION IN FEET: 0

## 2024-07-16 ASSESSMENT — PAIN - FUNCTIONAL ASSESSMENT: PAIN_FUNCTIONAL_ASSESSMENT: 0-10

## 2024-07-16 ASSESSMENT — PAIN DESCRIPTION - DESCRIPTORS: DESCRIPTORS: SORE

## 2024-07-16 ASSESSMENT — PAIN SCALES - GENERAL
PAINLEVEL: 6
PAINLEVEL_OUTOF10: 6

## 2024-07-16 NOTE — PATIENT INSTRUCTIONS
Thank you for coming to see us today!     Continue to use tylenol for pain control.   Rest, ice and elevate and remember to do exercises.   Continue to Take antibiotic for your left hand cellulitis.  If your symptoms worsen, including chest pain, shortness of breath, fever, chills go to the Emergency room     Follow up  as needed

## 2024-07-16 NOTE — PROGRESS NOTES
Subjective      Chief Complaint   Patient presents with    Left Hand - Pain     Swelling has improved in the hand other than the middle finger, continued swelling of the knuckle of the finger and limited ROM. Denies any catching or locking of the joint. Tolerating doxycycline.         History Of Present Illness  Dr. Viera previously evaluated this patient for left elbow pain and left ankle pain as an inpatient.  There was concern for an occult fracture at the left radial neck.  Previous x-rays in our office on 7-9-2024 showed no evidence of acute fracture. He presented to the office on 7-9-2024 with cellulitis of his right hand. He is currently on doxycycline for his right hand cellulitis. He presents today in office for reevaluation.  He states that his left elbow pain has significantly improved and he is no longer using a splint for support.  He also states that his left ankle pain has improved.  He states he is able to ambulate with the use of a walker from his bedroom to his bathroom.  His left hand swelling has significantly improved. It is now localized to his left middle finger. He states that this has been present for 2 week.  He denies any specific trauma.  He denies fever chills nausea vomiting.  He is currently working with physical therapy at home.    CARDIOLOGY:   Negative for chest pain, shortness of breath.   RESPIRATORY:   Negative for chest pain, shortness of breath.   MUSCULOSKELETAL:   See HPI for details.   NEUROLOGY:   Negative for tingling, numbness, weakness.    Objective    There were no vitals filed for this visit.    Physical Exam  GENERAL:          General Appearance:  This is a pleasant patient with appropriate affect, in no acute distress.   DERMATOLOGY:          Skin: skin at the neck, upper and lower back, and trunk is intact. There is no evidence of skin rash, skin breakdown or ulceration, or atrophic skin change.   EXTREMITIES:          Vascular:  Right, left hands and feet are warm  with good color and pulses. Right and left calf and thigh are nontender and nonswollen.   NEUROLOGICAL:          Orientation:  Patient is alert and oriented to person, place, time and situation. Right and left upper and lower extremity motor and sensory examinations are intact.  MUSCULOSKELETAL:  Neck:  Nontender.  No pain with range of motion.  Back:  Nontender at lumbar spine.  Straight leg raising is negative bilaterally.  Left elbow: There is no pain with range of motion of the left elbow.  There is no tenderness.  There is no swelling or erythema at the dorsum of the left hand. There is mild swelling and erythema at the left middle finger. There is limitation with ROM of the left middle finger. The patient is able to make a fist with  pain at the middle finger.  There is no area of palpable fluctuance.  No evidence of abscess.  Right elbow: Nontender.  No pain with range of motion.  Left ankle: There is no tenderness but some  swelling laterally.  Position is clinically satisfactory.  Right ankle: Nontender.  No pain with range of motion.    XR elbow left 3+ views    Result Date: 7/4/2024  Interpreted By:  Malik Alonso, STUDY: XR ELBOW LEFT 3+ VIEWS; 7/3/2024 5:25 pm   INDICATION: Signs/Symptoms:Lleft elbow pain.S/p fall   COMPARISON: None.   ACCESSION NUMBER(S): [id number]   ORDERING CLINICIAN: MAURA ZELAYA   TECHNIQUE: Number of films: Four view images of the left elbow were obtained.   FINDINGS: There is minimal cortical irregularity of the radial neck, suspicious for possible nondisplaced fracture. There is associated soft tissue swelling and possible mild hemarthrosis. The alignment is anatomic. Degenerative changes involve the articulation between the humeral condyles and olecranon.       Minimal cortical irregularity of the radial neck, suspicious for possible nondisplaced fracture. Splinting and follow-up in 2 weeks recommended.   Signed by: Malik Alonso 7/4/2024 11:18 AM Dictation  workstation:   [id number]    ECG 12 Lead    Result Date: 7/1/2024  AV dual-paced rhythm with prolonged AV conduction Abnormal ECG Confirmed by Pop Sewell ([id number]) on 7/1/2024 7:52:31 PM    XR chest 1 view    Result Date: 6/30/2024  Interpreted By:  Kilo Reynolds, STUDY: XR CHEST 1 VIEW;  6/30/2024 12:27 pm   INDICATION: Signs/Symptoms:falls.   COMPARISON: Chest radiograph 04/07/2024   ACCESSION NUMBER(S): [id number]   ORDERING CLINICIAN: JESSICA STEIN   FINDINGS:     CARDIOMEDIASTINAL SILHOUETTE: Cardiomediastinal silhouette is normal in size and configuration. Left-sided pulse generator device.   LUNGS: No consolidation, pneumothorax, or significant effusion.   ABDOMEN: No remarkable upper abdominal findings.   BONES: No acute osseous changes.       1.  No evidence of acute cardiopulmonary process.     Signed by: Kilo Reynolds 6/30/2024 12:36 PM Dictation workstation:   [id number]    XR lumbar spine 2-3 views    Result Date: 6/30/2024  Interpreted By:  Kilo Reynolds, STUDY: XR LUMBAR SPINE 2-3 VIEWS; ;  6/30/2024 12:27 pm   INDICATION: Signs/Symptoms:fall low back pain.   COMPARISON: None.   ACCESSION NUMBER(S): [id number]   ORDERING CLINICIAN: JESSICA STEIN   FINDINGS: No acute fracture. Multilevel presumed chronic or degenerative endplate changes. Multilevel disc space narrowing. Multilevel facet arthropathy. Multilevel anterior osteophyte formation.       Multilevel spondylosis without acute osseous abnormality of the lumbar spine.     MACRO: None   Signed by: Kilo Reynolds 6/30/2024 12:35 PM Dictation workstation:   [id number]    XR hip right with pelvis when performed 2 or 3 views    Result Date: 6/30/2024  Interpreted By:  Kilo Reynolds, STUDY: XR HIP RIGHT WITH PELVIS WHEN PERFORMED 2 OR 3 VIEWS; ;  6/30/2024 12:27 pm   INDICATION: Signs/Symptoms:view with pelvis, fall pain, no previous imaging.   COMPARISON: None.   ACCESSION NUMBER(S): [id number]   ORDERING CLINICIAN: JESSICA  SARITA   FINDINGS: Three views of the right hip. No acute displaced fracture. Moderate degenerative changes are noted of the hip. No hip dislocation.       Degenerative changes of the right hip without acute osseous abnormality detected.     MACRO: None   Signed by: Kilo Reynolds 6/30/2024 12:34 PM Dictation workstation:   [id number]    XR ankle left 3+ views    Result Date: 6/30/2024  Interpreted By:  Kilo Reynolds, STUDY: XR ANKLE LEFT 3+ VIEWS; XR FOOT LEFT 3+ VIEWS; ;  6/30/2024 12:27 pm   INDICATION: Signs/Symptoms:ankle pain no previous imaging; Signs/Symptoms:fall, injury, no previous imaging.   COMPARISON: None.   ACCESSION NUMBER(S): [id number]; [id number]   ORDERING CLINICIAN: JESSICA STEIN   FINDINGS: Multiple views of the left foot and left ankle. Several ossicles inferior to the lateral malleolus suggestive of sequela of remote injury. Calcaneal enthesopathy. The ankle mortise appears congruent. Degenerative changes of the interphalangeal and 1st MTP joints. No acute displaced fracture is seen.       Small ossicle adjacent and inferior to the lateral malleolus suggestive of sequela of remote injury. If there is concern for acute injury, consider MRI or CT for further assessment.   Degenerative changes of the foot.   Calcaneal enthesopathy.     MACRO: None   Signed by: Kilo Reynolds 6/30/2024 12:34 PM Dictation workstation:   [id number]    XR foot left 3+ views    Result Date: 6/30/2024  Interpreted By:  Kilo Reynolds, STUDY: XR ANKLE LEFT 3+ VIEWS; XR FOOT LEFT 3+ VIEWS; ;  6/30/2024 12:27 pm   INDICATION: Signs/Symptoms:ankle pain no previous imaging; Signs/Symptoms:fall, injury, no previous imaging.   COMPARISON: None.   ACCESSION NUMBER(S): [id number]; [id number]   ORDERING CLINICIAN: JESSICA STEIN   FINDINGS: Multiple views of the left foot and left ankle. Several ossicles inferior to the lateral malleolus suggestive of sequela of remote injury. Calcaneal enthesopathy.  The ankle mortise appears congruent. Degenerative changes of the interphalangeal and 1st MTP joints. No acute displaced fracture is seen.       Small ossicle adjacent and inferior to the lateral malleolus suggestive of sequela of remote injury. If there is concern for acute injury, consider MRI or CT for further assessment.   Degenerative changes of the foot.   Calcaneal enthesopathy.     MACRO: None   Signed by: Kilo Reynolds 6/30/2024 12:34 PM Dictation workstation:   [id number]    CT cervical spine wo IV contrast    Result Date: 6/30/2024  Interpreted By:  Kilo Reynolds, STUDY: CT CERVICAL SPINE WO IV CONTRAST;  6/30/2024 12:12 pm   INDICATION: Signs/Symptoms:falls pain, no previous injury.   COMPARISON: CT cervical spine 04/05/2024   ACCESSION NUMBER(S): [id number]   ORDERING CLINICIAN: JESSICA STEIN   TECHNIQUE: Axial CT images of the cervical spine are obtained. Axial, coronal and sagittal reconstructions are provided for review.   FINDINGS:     Fractures: There is no evidence for an acute fracture of the cervical spine.   Vertebral Alignment: No posttraumatic malalignment. Grade 1 anterolisthesis of C3 on C4 and C4 on C5, likely chronic/degenerative in nature.   Craniocervical Junction: The odontoid process and craniocervical junction are intact. Partially calcified presumed degenerative pannus formation at the C1-C2 articulation.   Vertebrae/Disc Spaces:  Similar appearing severe multilevel spondylosis. Multilevel disc space narrowing. Multilevel facet arthropathy Multilevel uncovertebral hypertrophy.Multilevel osteophyte formation.Multilevel presumed chronic or degenerative endplate changes of the spine.   Prevertebral/Paraspinal Soft Tissues: The prevertebral and paraspinal soft tissues are unremarkable.         No evidence for an acute fracture or subluxation of the cervical spine.   MACRO: None   Signed by: Kilo Reynolds 6/30/2024 12:28 PM Dictation workstation:   [id number]    CT head wo IV  contrast    Result Date: 6/30/2024  Interpreted By:  Kilo Reynolds, STUDY: CT HEAD WO IV CONTRAST;  6/30/2024 12:12 pm   INDICATION: Signs/Symptoms:falls no previous injury.   COMPARISON: Head CT 04/05/2024   ACCESSION NUMBER(S): [id number]   ORDERING CLINICIAN: JESSICA STEIN   TECHNIQUE: Noncontrast axial CT scan of head was performed. Angled reformats in brain and bone windows were generated. The images were reviewed in bone, brain, blood and soft tissue windows.   FINDINGS: CSF Spaces: Ventricles appear stable respect to size and configuration. There is no extraaxial fluid collection.   Parenchyma: Overall stable appearance of the parenchyma with similar appearing parenchymal atrophy. Similar appearing dystrophic calcifications and encephalomalacia of the right frontal lobe. The grey-white differentiation is intact. There is no mass effect or midline shift.  There is no intracranial hemorrhage.   Calvarium: No acute displaced calvarial fracture. Stable postsurgical changes of the right frontal calvarium.   Paranasal sinuses and mastoids: Visualized paranasal sinuses and mastoids are clear.       No CT evidence of acute intracranial injury.   Stable intracranial findings since comparison head CT 04/05/2024.   MACRO: None       Signed by: Kilo Reynolds 6/30/2024 12:27 PM Dictation workstation:   [id number]       Angel was seen today for pain.  Diagnoses and all orders for this visit:  Left elbow pain (Primary)  Elbow sprain, left, initial encounter  Cellulitis of left hand  Options are discussed with the patient in detail. The patient is instructed to continue with physical therapy.  We discussed treating his left hand cellulitis with a course of oral antibiotics as an outpatient. He has had significant improvement over the past week with oral antibiotics. However he is instructed that if the cellulitis worsens and he has fever chills nausea, increasing pain or difficulty using his left hand that he must  immediately go to the emergency room.  The patient and his daughter understand.. The patient is instructed regarding activity modification and risk for further injury with falling or trauma, ice, provider directed at home gentle strengthening and ROM exercises, and the appropriate use of Tylenol as needed for pain with its potential adverse reactions and side effects. The patient understands.  Return in 1 week for reevaluation or sooner as needed please note that this report has been produced using speech recognition software.  It may contain errors related to grammar, punctuation or spelling.  Electronically signed, but not reviewed.    Tana Bates PA-C

## 2024-07-18 ENCOUNTER — HOME CARE VISIT (OUTPATIENT)
Dept: HOME HEALTH SERVICES | Facility: HOME HEALTH | Age: 79
End: 2024-07-18
Payer: MEDICARE

## 2024-07-18 ENCOUNTER — ANTICOAGULATION - WARFARIN VISIT (OUTPATIENT)
Dept: CARDIOLOGY | Facility: CLINIC | Age: 79
End: 2024-07-18
Payer: MEDICARE

## 2024-07-18 VITALS — DIASTOLIC BLOOD PRESSURE: 72 MMHG | HEART RATE: 60 BPM | TEMPERATURE: 97.6 F | SYSTOLIC BLOOD PRESSURE: 130 MMHG

## 2024-07-18 DIAGNOSIS — I48.0 PAROXYSMAL ATRIAL FIBRILLATION (MULTI): Primary | ICD-10-CM

## 2024-07-18 LAB
INR IN PPP BY COAGULATION ASSAY EXTERNAL: 2.6
PROTHROMBIN TIME (PT) IN PPP BY COAGULATION ASSAY EXTERNAL: NORMAL

## 2024-07-18 PROCEDURE — G0151 HHCP-SERV OF PT,EA 15 MIN: HCPCS | Mod: HHH

## 2024-07-18 PROCEDURE — G0299 HHS/HOSPICE OF RN EA 15 MIN: HCPCS | Mod: HHH

## 2024-07-18 ASSESSMENT — ENCOUNTER SYMPTOMS
PERSON REPORTING PAIN: PATIENT
HIGHEST PAIN SEVERITY IN PAST 24 HOURS: 7/10
PAIN LOCATION - PAIN SEVERITY: 1/10
PAIN: 1
LOWEST PAIN SEVERITY IN PAST 24 HOURS: 1/10
PAIN LOCATION: LEFT HAND

## 2024-07-18 NOTE — PROGRESS NOTES
Patient identification verified with 2 identifiers.    Location: [facility] Patient Self-Testing Program [phone]    Referring Physician: DR. YAZ MORGAN  Enrollment/ Re-enrollment date: 2025   INR Goal: 2.0-3.0  INR monitoring is per [facility] protocol.  Anticoagulation Medication: warfarin  Indication: Atrial Fibrillation/Atrial Flutter    Subjective   Bleeding signs/symptoms: No    Bruising: No   Major bleeding event: No  Thrombosis signs/symptoms: No  Thromboembolic event: No  Missed doses: No  Extra doses: No  Medication changes: No  Dietary changes: Yes  Patient has been eating a lot of blueberries, but cut down the amount the past week when his INR was low.  Change in health: No  Change in activity: No  Alcohol: No  Other concerns: No    Upcoming Procedures:  Does the Patient Have any upcoming procedures that require interruption in anticoagulation therapy? no  Does the patient require bridging? no      Anticoagulation Summary  As of 2024      INR goal:  2.0-3.0   TTR:  55.7% (8.8 mo)   INR used for dosin.60 (2024)   Weekly warfarin total:  17.5 mg               Assessment/Plan   Therapeutic     1. New dose: Pt will finish Doxycycline in 2 days.  Will maintain dose.     2. Next INR: 1 week      Education provided to patient during the visit:  Patient instructed to call in interim with questions, concerns and changes.

## 2024-07-20 ENCOUNTER — HOME CARE VISIT (OUTPATIENT)
Dept: HOME HEALTH SERVICES | Facility: HOME HEALTH | Age: 79
End: 2024-07-20
Payer: MEDICARE

## 2024-07-20 PROCEDURE — G0152 HHCP-SERV OF OT,EA 15 MIN: HCPCS | Mod: HHH

## 2024-07-21 VITALS
RESPIRATION RATE: 18 BRPM | DIASTOLIC BLOOD PRESSURE: 62 MMHG | HEART RATE: 60 BPM | TEMPERATURE: 97.1 F | SYSTOLIC BLOOD PRESSURE: 118 MMHG | OXYGEN SATURATION: 98 %

## 2024-07-21 ASSESSMENT — PAIN SCALES - PAIN ASSESSMENT IN ADVANCED DEMENTIA (PAINAD)
BODYLANGUAGE: 0
FACIALEXPRESSION: 0 - SMILING OR INEXPRESSIVE.
NEGVOCALIZATION: 0
CONSOLABILITY: 0 - NO NEED TO CONSOLE.
TOTALSCORE: 0
BREATHING: 0
CONSOLABILITY: 0
BODYLANGUAGE: 0 - RELAXED.
FACIALEXPRESSION: 0
NEGVOCALIZATION: 0 - NONE.

## 2024-07-21 ASSESSMENT — ACTIVITIES OF DAILY LIVING (ADL)
GROOMING_WITHIN_DEFINED_LIMITS: 1
FEEDING_WITHIN_DEFINED_LIMITS: 1

## 2024-07-21 ASSESSMENT — ENCOUNTER SYMPTOMS
DENIES PAIN: 1
PERSON REPORTING PAIN: PATIENT

## 2024-07-22 ENCOUNTER — HOME CARE VISIT (OUTPATIENT)
Dept: HOME HEALTH SERVICES | Facility: HOME HEALTH | Age: 79
End: 2024-07-22
Payer: MEDICARE

## 2024-07-22 ENCOUNTER — APPOINTMENT (OUTPATIENT)
Dept: ORTHOPEDIC SURGERY | Facility: CLINIC | Age: 79
End: 2024-07-22
Payer: MEDICARE

## 2024-07-22 VITALS — TEMPERATURE: 97.9 F | OXYGEN SATURATION: 98 % | HEART RATE: 60 BPM

## 2024-07-22 PROCEDURE — G0151 HHCP-SERV OF PT,EA 15 MIN: HCPCS | Mod: HHH

## 2024-07-22 ASSESSMENT — ENCOUNTER SYMPTOMS
PERSON REPORTING PAIN: PATIENT
PAIN: 1
PAIN LOCATION - PAIN SEVERITY: 0/10
HIGHEST PAIN SEVERITY IN PAST 24 HOURS: 3/10
PAIN LOCATION: BACK
LOWEST PAIN SEVERITY IN PAST 24 HOURS: 0/10

## 2024-07-23 ENCOUNTER — APPOINTMENT (OUTPATIENT)
Dept: ORTHOPEDIC SURGERY | Facility: CLINIC | Age: 79
End: 2024-07-23
Payer: MEDICARE

## 2024-07-25 ENCOUNTER — HOME CARE VISIT (OUTPATIENT)
Dept: HOME HEALTH SERVICES | Facility: HOME HEALTH | Age: 79
End: 2024-07-25
Payer: MEDICARE

## 2024-07-25 ENCOUNTER — ANTICOAGULATION - WARFARIN VISIT (OUTPATIENT)
Dept: CARDIOLOGY | Facility: CLINIC | Age: 79
End: 2024-07-25
Payer: MEDICARE

## 2024-07-25 VITALS
TEMPERATURE: 97.6 F | HEART RATE: 64 BPM | DIASTOLIC BLOOD PRESSURE: 54 MMHG | RESPIRATION RATE: 18 BRPM | SYSTOLIC BLOOD PRESSURE: 120 MMHG | OXYGEN SATURATION: 97 %

## 2024-07-25 VITALS — OXYGEN SATURATION: 98 % | SYSTOLIC BLOOD PRESSURE: 136 MMHG | DIASTOLIC BLOOD PRESSURE: 70 MMHG

## 2024-07-25 DIAGNOSIS — I48.0 PAROXYSMAL ATRIAL FIBRILLATION (MULTI): Primary | ICD-10-CM

## 2024-07-25 LAB
INR IN PPP BY COAGULATION ASSAY EXTERNAL: 3.1
PROTHROMBIN TIME (PT) IN PPP BY COAGULATION ASSAY EXTERNAL: NORMAL

## 2024-07-25 PROCEDURE — G0299 HHS/HOSPICE OF RN EA 15 MIN: HCPCS | Mod: HHH

## 2024-07-25 PROCEDURE — G0151 HHCP-SERV OF PT,EA 15 MIN: HCPCS | Mod: HHH

## 2024-07-25 ASSESSMENT — ENCOUNTER SYMPTOMS: DENIES PAIN: 1

## 2024-07-25 NOTE — PROGRESS NOTES
Patient identification verified with 2 identifiers.    Location: [facility] Patient Self-Testing Program [phone]    Referring Physician: DR. BANDAR MATTHEWS  Enrollment/ Re-enrollment date: 2/20/2025   INR Goal: 2.0-3.0  INR monitoring is per [facility] protocol.  Anticoagulation Medication: warfarin  Indication: Atrial Fibrillation/Atrial Flutter    Subjective   Bleeding signs/symptoms: No  Bruising: No   Major bleeding event: No  Thrombosis signs/symptoms: No  Thromboembolic event: No  Missed doses: No  Extra doses: No  Medication changes: No  Dietary changes: No  Change in health: No  Change in activity: No  Alcohol: No  Other concerns: No    Upcoming Procedures:  Does the Patient Have any upcoming procedures that require interruption in anticoagulation therapy? no  Does the patient require bridging? no      Anticoagulation Summary  As of 7/25/2024      INR goal:  2.0-3.0   TTR:  56.4% (9.1 mo)   INR used for dosing:  3.10 (7/25/2024)   Weekly warfarin total:  17.5 mg               Assessment/Plan   Supratherapeutic     1. New dose: no change    2. Next INR: 1 week    Received faxed INR self test result and called patient and pt daughter, Rhea. Pt identification verified with 2 pt identifiers. Previous INR was therapeutic at 2.6. Today, self test reveals INR of 3.1 which is supratherapeutic for range of 2-3. Current incoming dose schedule reviewed with pt and Rhea and read back correctly to me. Pt and Rhea deny complications related to ACT therapy. Pt and Rhea deny changes in pt diet, medications, social habits or general health. Will maintain dose and retest in 1 week. Outgoing dose schedule reviewed with pt and Rhea and read back correctly to me. Pt and Rhea instructed to call in interim with questions, concerns and changes.    Education provided to patient during the visit:  Patient instructed to call in interim with questions, concerns and changes.   Patient educated on interactions  between medications and warfarin.   Patient educated on dietary consistency in vitamin k consumption.   Patient educated on affects of alcohol consumption while taking warfarin.   Patient educated on signs of bleeding/clotting.   Patient educated on compliance with dosing, follow up appointments, and prescribed plan of care.

## 2024-07-26 PROBLEM — M79.676 PAIN OF TOE: Status: ACTIVE | Noted: 2024-07-26

## 2024-07-26 PROBLEM — R29.898 WEAKNESS OF LEFT HAND: Status: ACTIVE | Noted: 2024-07-26

## 2024-07-26 PROBLEM — J30.0 VASOMOTOR RHINITIS: Status: ACTIVE | Noted: 2024-07-26

## 2024-07-26 PROBLEM — Z20.822 CONTACT WITH AND (SUSPECTED) EXPOSURE TO COVID-19: Status: ACTIVE | Noted: 2022-11-13

## 2024-07-26 PROBLEM — Z86.16 PERSONAL HISTORY OF COVID-19: Status: ACTIVE | Noted: 2022-10-15

## 2024-07-26 PROBLEM — S83.90XA SPRAIN OF KNEE: Status: ACTIVE | Noted: 2024-07-26

## 2024-07-26 PROBLEM — S01.119A LACERATION OF EYEBROW: Status: ACTIVE | Noted: 2024-07-26

## 2024-07-26 PROBLEM — R60.0 EDEMA OF LOWER EXTREMITY: Status: ACTIVE | Noted: 2024-07-26

## 2024-07-26 PROBLEM — R41.0 ACUTE CONFUSION: Status: ACTIVE | Noted: 2024-07-26

## 2024-07-26 PROBLEM — R29.6 RECURRENT FALLS: Status: ACTIVE | Noted: 2024-07-26

## 2024-07-26 PROBLEM — S01.81XA FACIAL LACERATION: Status: ACTIVE | Noted: 2024-07-26
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
## 2024-07-26 PROBLEM — T14.8XXA LOCAL INFECTION OF WOUND: Status: ACTIVE | Noted: 2024-07-26

## 2024-07-26 PROBLEM — T83.9XXA URINARY CATHETER COMPLICATION (CMS-HCC): Status: ACTIVE | Noted: 2024-07-26

## 2024-07-26 PROBLEM — L03.211 CELLULITIS OF FACE: Status: ACTIVE | Noted: 2022-07-18

## 2024-07-26 PROBLEM — L08.9 LOCAL INFECTION OF WOUND: Status: ACTIVE | Noted: 2024-07-26

## 2024-07-28 SDOH — HEALTH STABILITY: MENTAL HEALTH: SMOKING IN HOME: 0

## 2024-07-28 SDOH — ECONOMIC STABILITY: HOUSING INSECURITY: EVIDENCE OF SMOKING MATERIAL: 0

## 2024-07-28 ASSESSMENT — ENCOUNTER SYMPTOMS
CHANGE IN APPETITE: UNCHANGED
PAIN: 1
PAIN LOCATION - PAIN SEVERITY: 3/10
MUSCLE WEAKNESS: 1
LOWER EXTREMITY EDEMA: 1
MUSCLE WEAKNESS: 1
PAIN: 1
APPETITE LEVEL: GOOD
PERSON REPORTING PAIN: PATIENT
PAIN LOCATION - PAIN SEVERITY: 3/10
CHANGE IN APPETITE: UNCHANGED
PERSON REPORTING PAIN: PATIENT
LOWER EXTREMITY EDEMA: 1
PAIN LOCATION: GENERALIZED
APPETITE LEVEL: GOOD
PAIN LOCATION: GENERALIZED

## 2024-07-29 ENCOUNTER — OFFICE VISIT (OUTPATIENT)
Dept: CARDIOLOGY | Facility: CLINIC | Age: 79
End: 2024-07-29
Payer: MEDICARE

## 2024-07-29 VITALS
WEIGHT: 228.2 LBS | SYSTOLIC BLOOD PRESSURE: 101 MMHG | OXYGEN SATURATION: 93 % | DIASTOLIC BLOOD PRESSURE: 63 MMHG | HEIGHT: 73 IN | BODY MASS INDEX: 30.24 KG/M2 | HEART RATE: 79 BPM

## 2024-07-29 DIAGNOSIS — I48.0 PAROXYSMAL ATRIAL FIBRILLATION (MULTI): ICD-10-CM

## 2024-07-29 DIAGNOSIS — E78.00 PURE HYPERCHOLESTEROLEMIA: Primary | ICD-10-CM

## 2024-07-29 PROCEDURE — 3074F SYST BP LT 130 MM HG: CPT | Performed by: NURSE PRACTITIONER

## 2024-07-29 PROCEDURE — 99214 OFFICE O/P EST MOD 30 MIN: CPT | Performed by: NURSE PRACTITIONER

## 2024-07-29 PROCEDURE — 3078F DIAST BP <80 MM HG: CPT | Performed by: NURSE PRACTITIONER

## 2024-07-29 PROCEDURE — 1036F TOBACCO NON-USER: CPT | Performed by: NURSE PRACTITIONER

## 2024-07-29 PROCEDURE — 1157F ADVNC CARE PLAN IN RCRD: CPT | Performed by: NURSE PRACTITIONER

## 2024-07-29 PROCEDURE — 1160F RVW MEDS BY RX/DR IN RCRD: CPT | Performed by: NURSE PRACTITIONER

## 2024-07-29 PROCEDURE — 1159F MED LIST DOCD IN RCRD: CPT | Performed by: NURSE PRACTITIONER

## 2024-07-29 PROCEDURE — 1126F AMNT PAIN NOTED NONE PRSNT: CPT | Performed by: NURSE PRACTITIONER

## 2024-07-29 ASSESSMENT — PAIN SCALES - GENERAL: PAINLEVEL: 0-NO PAIN

## 2024-07-29 ASSESSMENT — ENCOUNTER SYMPTOMS
CARDIOVASCULAR NEGATIVE: 1
RESPIRATORY NEGATIVE: 1
FALLS: 1

## 2024-07-29 ASSESSMENT — PATIENT HEALTH QUESTIONNAIRE - PHQ9
SUM OF ALL RESPONSES TO PHQ9 QUESTIONS 1 AND 2: 0
1. LITTLE INTEREST OR PLEASURE IN DOING THINGS: NOT AT ALL
2. FEELING DOWN, DEPRESSED OR HOPELESS: NOT AT ALL

## 2024-07-29 NOTE — PROGRESS NOTES
Subjective   Angel Carroll is a 79 y.o. male.    Chief Complaint:  Follow-up    HPI  Mr. Carroll is seen for a 3 month followup.  He is accompanied by his wife and daughter.  He is seen in collaboration with Dr. Martin.  He was hospitalized since our last visit for multiple falls injuring his hip, elbow and ankle. He is home now and receiving home PT.  He is compliant with medication.  He is not particularly active however denies any symptoms referable to angina or recurrent Afib.       Review of Systems   Cardiovascular: Negative.    Respiratory: Negative.     Musculoskeletal:  Positive for arthritis and falls.       Objective     Gen.: Well-developed, well-nourished in no acute distress.  Eyes: Conjunctivae are pink.  HEENT: Normocephalic, atraumatic. Oral pharynx is clear.  Neck: Supple, JVP is normal. 2+ carotid pulses   Pulmonary: Normal respiratory effort, clear to auscultation although diminished at bases but without gelacio crackles or wheezes  Cardiovascular: Regular rate, normal S1 and S2, no murmurs, rubs or gallops.  Abdomen: Soft, nontender, nondistended.  Extremities: Warm with 1+ ankle edema 2+ radial pulses bilaterally   Musculoskeletal: Unable to assess  Skin: No rash, multiple ecchymotic areas  Neurologic: Alert and oriented ×3.  Psychiatric: Normal mood and affect      Lab Review:   Lab Results   Component Value Date     07/04/2024    K 3.7 07/04/2024    CL 99 07/04/2024    CO2 32 (H) 07/04/2024    BUN 21 07/04/2024    CREATININE 1.50 07/04/2024    GLUCOSE 92 07/04/2024    CALCIUM 8.8 07/04/2024     Lab Results   Component Value Date    WBC 4.1 (L) 07/01/2024    HGB 11.2 (L) 07/01/2024    HCT 36.1 (L) 07/01/2024    MCV 91 07/01/2024     07/01/2024     Lab Results   Component Value Date    CHOL 117 (L) 11/14/2022    TRIG 81 11/14/2022    HDL 31 (L) 11/14/2022       Assessment/Plan   Mr. Carroll is a pleasant 79-year-old male with a past medical history significant for hypertension,  hyperlipidemia, coronary artery disease s/p multiple PCI a low normal LVEF of 50 to 55% by echo 2/2024 patent stents by repeat cath 12/2022, atrial fibrillation with RVR s/p ablation, PPM secondary to sick sinus syndrome 1/2023, CVA in the setting of subtherapeutic INR with a near subtotal occlusion of the right ICA and carotid endarterectomy s/p bovine patch angioplasty 3/2021.  He presents following a hospitalization for falls.  He is feeling well with some residual soreness.  He is relatively inactive and using a wheelchair for mobility today.  He is receiving home PT. VS are stable.  He is receiving home PT.  He is due for a FLP.  For now all medication will remain unchanged.  I will send his Eliquis to the clinical pharmacy for assistance with cost.  He will return in October for followup and devic clinic in October as well.

## 2024-07-30 ENCOUNTER — SPECIALTY PHARMACY (OUTPATIENT)
Dept: PHARMACY | Facility: CLINIC | Age: 79
End: 2024-07-30

## 2024-07-31 ENCOUNTER — HOME CARE VISIT (OUTPATIENT)
Dept: HOME HEALTH SERVICES | Facility: HOME HEALTH | Age: 79
End: 2024-07-31
Payer: MEDICARE

## 2024-07-31 VITALS
SYSTOLIC BLOOD PRESSURE: 110 MMHG | OXYGEN SATURATION: 98 % | DIASTOLIC BLOOD PRESSURE: 70 MMHG | RESPIRATION RATE: 18 BRPM | TEMPERATURE: 97.9 F | HEART RATE: 70 BPM

## 2024-07-31 DIAGNOSIS — I48.0 PAROXYSMAL ATRIAL FIBRILLATION (MULTI): ICD-10-CM

## 2024-07-31 PROCEDURE — G0157 HHC PT ASSISTANT EA 15: HCPCS | Mod: CQ,HHH

## 2024-08-01 ENCOUNTER — ANTICOAGULATION - WARFARIN VISIT (OUTPATIENT)
Dept: CARDIOLOGY | Facility: CLINIC | Age: 79
End: 2024-08-01
Payer: MEDICARE

## 2024-08-01 DIAGNOSIS — I48.0 PAROXYSMAL ATRIAL FIBRILLATION (MULTI): Primary | ICD-10-CM

## 2024-08-01 LAB
INR IN PPP BY COAGULATION ASSAY EXTERNAL: 5
PROTHROMBIN TIME (PT) IN PPP BY COAGULATION ASSAY EXTERNAL: NORMAL

## 2024-08-01 NOTE — PROGRESS NOTES
Patient identification verified with 2 identifiers.    Location: [facility] Patient Self-Testing Program [phone]    Referring Physician: Doc Martin MD   Enrollment/ Re-enrollment date: 25   INR Goal: 2.0-3.0  INR monitoring is per [facility] protocol.  Anticoagulation Medication: warfarin  Indication: Atrial Fibrillation/Atrial Flutter    Subjective   Bleeding signs/symptoms: No    Bruising: No   Major bleeding event: No  Thrombosis signs/symptoms: No  Thromboembolic event: No  Missed doses: No  Extra doses: No  Medication changes: No  Dietary changes: No  pt hasn't been eating many greens or blueberries lately  Change in health: No  Change in activity: No  Alcohol: No  Other concerns: No    Upcoming Procedures:  Does the Patient Have any upcoming procedures that require interruption in anticoagulation therapy? no  Does the patient require bridging? no      Anticoagulation Summary  As of 2024      INR goal:  2.0-3.0   TTR:  55.0% (9.3 mo)   INR used for dosin.00 (2024)   Weekly warfarin total:  16.25 mg               Assessment/Plan   Supratherapeutic     1. New dose:  hold two days and reduce dose  to 1.25 mg     2. Next INR: 1 week      Education provided to patient during the visit:  Patient instructed to call in interim with questions, concerns and changes.

## 2024-08-02 ENCOUNTER — HOME CARE VISIT (OUTPATIENT)
Dept: HOME HEALTH SERVICES | Facility: HOME HEALTH | Age: 79
End: 2024-08-02
Payer: MEDICARE

## 2024-08-02 DIAGNOSIS — I48.20 CHRONIC ATRIAL FIBRILLATION (MULTI): ICD-10-CM

## 2024-08-02 RX ORDER — FUROSEMIDE 40 MG/1
40 TABLET ORAL 2 TIMES DAILY
Qty: 60 TABLET | Refills: 2 | Status: SHIPPED | OUTPATIENT
Start: 2024-08-02 | End: 2025-08-02

## 2024-08-03 ENCOUNTER — APPOINTMENT (OUTPATIENT)
Dept: HOME HEALTH SERVICES | Facility: HOME HEALTH | Age: 79
End: 2024-08-03
Payer: MEDICARE

## 2024-08-06 ENCOUNTER — HOME CARE VISIT (OUTPATIENT)
Dept: HOME HEALTH SERVICES | Facility: HOME HEALTH | Age: 79
End: 2024-08-06
Payer: MEDICARE

## 2024-08-06 VITALS — SYSTOLIC BLOOD PRESSURE: 126 MMHG | OXYGEN SATURATION: 98 % | DIASTOLIC BLOOD PRESSURE: 72 MMHG

## 2024-08-06 PROCEDURE — G0151 HHCP-SERV OF PT,EA 15 MIN: HCPCS | Mod: HHH

## 2024-08-06 ASSESSMENT — ENCOUNTER SYMPTOMS
LOWEST PAIN SEVERITY IN PAST 24 HOURS: 0/10
HIGHEST PAIN SEVERITY IN PAST 24 HOURS: 6/10
PAIN: 1
PERSON REPORTING PAIN: PATIENT
PAIN LOCATION - PAIN SEVERITY: 2/10
SUBJECTIVE PAIN PROGRESSION: GRADUALLY IMPROVING
PAIN LOCATION: RIGHT HIP

## 2024-08-08 ENCOUNTER — HOME CARE VISIT (OUTPATIENT)
Dept: HOME HEALTH SERVICES | Facility: HOME HEALTH | Age: 79
End: 2024-08-08
Payer: MEDICARE

## 2024-08-08 ENCOUNTER — ANTICOAGULATION - WARFARIN VISIT (OUTPATIENT)
Dept: CARDIOLOGY | Facility: CLINIC | Age: 79
End: 2024-08-08
Payer: MEDICARE

## 2024-08-08 ENCOUNTER — APPOINTMENT (OUTPATIENT)
Dept: RADIOLOGY | Facility: HOSPITAL | Age: 79
DRG: 291 | End: 2024-08-08
Payer: MEDICARE

## 2024-08-08 ENCOUNTER — HOSPITAL ENCOUNTER (INPATIENT)
Facility: HOSPITAL | Age: 79
End: 2024-08-08
Attending: STUDENT IN AN ORGANIZED HEALTH CARE EDUCATION/TRAINING PROGRAM | Admitting: INTERNAL MEDICINE
Payer: MEDICARE

## 2024-08-08 VITALS — HEART RATE: 60 BPM | SYSTOLIC BLOOD PRESSURE: 125 MMHG | TEMPERATURE: 98.6 F | DIASTOLIC BLOOD PRESSURE: 64 MMHG

## 2024-08-08 DIAGNOSIS — R55 NEAR SYNCOPE: ICD-10-CM

## 2024-08-08 DIAGNOSIS — I49.5 SICK SINUS SYNDROME (MULTI): ICD-10-CM

## 2024-08-08 DIAGNOSIS — Z95.0 PRESENCE OF CARDIAC PACEMAKER: ICD-10-CM

## 2024-08-08 DIAGNOSIS — I48.0 PAROXYSMAL ATRIAL FIBRILLATION (MULTI): Primary | ICD-10-CM
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
## 2024-08-08 DIAGNOSIS — S09.90XA CLOSED HEAD INJURY, INITIAL ENCOUNTER: ICD-10-CM

## 2024-08-08 DIAGNOSIS — I48.20 CHRONIC ATRIAL FIBRILLATION (MULTI): ICD-10-CM

## 2024-08-08 LAB
ABO GROUP (TYPE) IN BLOOD: NORMAL
ALBUMIN SERPL-MCNC: 3.6 G/DL (ref 3.5–5)
ALP BLD-CCNC: 85 U/L (ref 35–125)
ALT SERPL-CCNC: 6 U/L (ref 5–40)
ANION GAP SERPL CALC-SCNC: 12 MMOL/L
ANTIBODY SCREEN: NORMAL
AST SERPL-CCNC: 18 U/L (ref 5–40)
BASOPHILS # BLD AUTO: 0.03 X10*3/UL (ref 0–0.1)
BASOPHILS NFR BLD AUTO: 0.9 %
BILIRUB SERPL-MCNC: 0.7 MG/DL (ref 0.1–1.2)
BUN SERPL-MCNC: 18 MG/DL (ref 8–25)
CALCIUM SERPL-MCNC: 8.6 MG/DL (ref 8.5–10.4)
CHLORIDE SERPL-SCNC: 101 MMOL/L (ref 97–107)
CO2 SERPL-SCNC: 31 MMOL/L (ref 24–31)
CREAT SERPL-MCNC: 1.7 MG/DL (ref 0.4–1.6)
EGFRCR SERPLBLD CKD-EPI 2021: 40 ML/MIN/1.73M*2
EOSINOPHIL # BLD AUTO: 0.09 X10*3/UL (ref 0–0.4)
EOSINOPHIL NFR BLD AUTO: 2.8 %
ERYTHROCYTE [DISTWIDTH] IN BLOOD BY AUTOMATED COUNT: 17.2 % (ref 11.5–14.5)
ETHANOL SERPL-MCNC: <0.01 G/DL
GLUCOSE SERPL-MCNC: 107 MG/DL (ref 65–99)
HCT VFR BLD AUTO: 33.4 % (ref 41–52)
HGB BLD-MCNC: 10.4 G/DL (ref 13.5–17.5)
IMM GRANULOCYTES # BLD AUTO: 0.02 X10*3/UL (ref 0–0.5)
IMM GRANULOCYTES NFR BLD AUTO: 0.6 % (ref 0–0.9)
INR IN PPP BY COAGULATION ASSAY EXTERNAL: 3.2
INR PPP: 3 (ref 0.9–1.2)
LACTATE BLDV-SCNC: 1.2 MMOL/L (ref 0.4–2)
LYMPHOCYTES # BLD AUTO: 0.56 X10*3/UL (ref 0.8–3)
LYMPHOCYTES NFR BLD AUTO: 17.4 %
MCH RBC QN AUTO: 29.3 PG (ref 26–34)
MCHC RBC AUTO-ENTMCNC: 31.1 G/DL (ref 32–36)
MCV RBC AUTO: 94 FL (ref 80–100)
MONOCYTES # BLD AUTO: 0.38 X10*3/UL (ref 0.05–0.8)
MONOCYTES NFR BLD AUTO: 11.8 %
NEUTROPHILS # BLD AUTO: 2.13 X10*3/UL (ref 1.6–5.5)
NEUTROPHILS NFR BLD AUTO: 66.5 %
NRBC BLD-RTO: 0 /100 WBCS (ref 0–0)
NT-PROBNP SERPL-MCNC: 4737 PG/ML (ref 0–852)
PLATELET # BLD AUTO: 171 X10*3/UL (ref 150–450)
POTASSIUM SERPL-SCNC: 3 MMOL/L (ref 3.4–5.1)
PROT SERPL-MCNC: 6.4 G/DL (ref 5.9–7.9)
PROTHROMBIN TIME (PT) IN PPP BY COAGULATION ASSAY EXTERNAL: NORMAL
PROTHROMBIN TIME: 29.3 SECONDS (ref 9.3–12.7)
RBC # BLD AUTO: 3.55 X10*6/UL (ref 4.5–5.9)
RH FACTOR (ANTIGEN D): NORMAL
SODIUM SERPL-SCNC: 144 MMOL/L (ref 133–145)
TROPONIN T SERPL-MCNC: 41 NG/L
TROPONIN T SERPL-MCNC: 48 NG/L
TROPONIN T SERPL-MCNC: 54 NG/L
WBC # BLD AUTO: 3.2 X10*3/UL (ref 4.4–11.3)

## 2024-08-08 PROCEDURE — 2500000002 HC RX 250 W HCPCS SELF ADMINISTERED DRUGS (ALT 637 FOR MEDICARE OP, ALT 636 FOR OP/ED): Performed by: STUDENT IN AN ORGANIZED HEALTH CARE EDUCATION/TRAINING PROGRAM

## 2024-08-08 PROCEDURE — 72128 CT CHEST SPINE W/O DYE: CPT

## 2024-08-08 PROCEDURE — 80053 COMPREHEN METABOLIC PANEL: CPT | Performed by: STUDENT IN AN ORGANIZED HEALTH CARE EDUCATION/TRAINING PROGRAM

## 2024-08-08 PROCEDURE — 36415 COLL VENOUS BLD VENIPUNCTURE: CPT | Performed by: PHYSICIAN ASSISTANT

## 2024-08-08 PROCEDURE — 71045 X-RAY EXAM CHEST 1 VIEW: CPT | Performed by: STUDENT IN AN ORGANIZED HEALTH CARE EDUCATION/TRAINING PROGRAM

## 2024-08-08 PROCEDURE — 2500000002 HC RX 250 W HCPCS SELF ADMINISTERED DRUGS (ALT 637 FOR MEDICARE OP, ALT 636 FOR OP/ED): Performed by: NURSE PRACTITIONER

## 2024-08-08 PROCEDURE — 4B02XSZ MEASUREMENT OF CARDIAC PACEMAKER, EXTERNAL APPROACH: ICD-10-PCS | Performed by: STUDENT IN AN ORGANIZED HEALTH CARE EDUCATION/TRAINING PROGRAM

## 2024-08-08 PROCEDURE — 83605 ASSAY OF LACTIC ACID: CPT | Performed by: PHYSICIAN ASSISTANT

## 2024-08-08 PROCEDURE — 2500000001 HC RX 250 WO HCPCS SELF ADMINISTERED DRUGS (ALT 637 FOR MEDICARE OP): Performed by: NURSE PRACTITIONER

## 2024-08-08 PROCEDURE — 70450 CT HEAD/BRAIN W/O DYE: CPT | Performed by: STUDENT IN AN ORGANIZED HEALTH CARE EDUCATION/TRAINING PROGRAM

## 2024-08-08 PROCEDURE — 86901 BLOOD TYPING SEROLOGIC RH(D): CPT | Performed by: STUDENT IN AN ORGANIZED HEALTH CARE EDUCATION/TRAINING PROGRAM

## 2024-08-08 PROCEDURE — 84484 ASSAY OF TROPONIN QUANT: CPT | Performed by: STUDENT IN AN ORGANIZED HEALTH CARE EDUCATION/TRAINING PROGRAM

## 2024-08-08 PROCEDURE — 70450 CT HEAD/BRAIN W/O DYE: CPT

## 2024-08-08 PROCEDURE — 72170 X-RAY EXAM OF PELVIS: CPT | Performed by: STUDENT IN AN ORGANIZED HEALTH CARE EDUCATION/TRAINING PROGRAM

## 2024-08-08 PROCEDURE — 72128 CT CHEST SPINE W/O DYE: CPT | Performed by: STUDENT IN AN ORGANIZED HEALTH CARE EDUCATION/TRAINING PROGRAM

## 2024-08-08 PROCEDURE — 82077 ASSAY SPEC XCP UR&BREATH IA: CPT | Performed by: STUDENT IN AN ORGANIZED HEALTH CARE EDUCATION/TRAINING PROGRAM

## 2024-08-08 PROCEDURE — 85610 PROTHROMBIN TIME: CPT | Performed by: STUDENT IN AN ORGANIZED HEALTH CARE EDUCATION/TRAINING PROGRAM

## 2024-08-08 PROCEDURE — 72170 X-RAY EXAM OF PELVIS: CPT

## 2024-08-08 PROCEDURE — 71045 X-RAY EXAM CHEST 1 VIEW: CPT

## 2024-08-08 PROCEDURE — 73564 X-RAY EXAM KNEE 4 OR MORE: CPT | Mod: RT

## 2024-08-08 PROCEDURE — 72192 CT PELVIS W/O DYE: CPT | Performed by: RADIOLOGY

## 2024-08-08 PROCEDURE — 73610 X-RAY EXAM OF ANKLE: CPT | Mod: RIGHT SIDE | Performed by: STUDENT IN AN ORGANIZED HEALTH CARE EDUCATION/TRAINING PROGRAM

## 2024-08-08 PROCEDURE — 99285 EMERGENCY DEPT VISIT HI MDM: CPT | Mod: 25

## 2024-08-08 PROCEDURE — 73564 X-RAY EXAM KNEE 4 OR MORE: CPT | Mod: RIGHT SIDE | Performed by: STUDENT IN AN ORGANIZED HEALTH CARE EDUCATION/TRAINING PROGRAM

## 2024-08-08 PROCEDURE — G0390 TRAUMA RESPONS W/HOSP CRITI: HCPCS

## 2024-08-08 PROCEDURE — G0151 HHCP-SERV OF PT,EA 15 MIN: HCPCS | Mod: HHH

## 2024-08-08 PROCEDURE — 72125 CT NECK SPINE W/O DYE: CPT | Performed by: STUDENT IN AN ORGANIZED HEALTH CARE EDUCATION/TRAINING PROGRAM

## 2024-08-08 PROCEDURE — 36415 COLL VENOUS BLD VENIPUNCTURE: CPT | Performed by: STUDENT IN AN ORGANIZED HEALTH CARE EDUCATION/TRAINING PROGRAM

## 2024-08-08 PROCEDURE — 72125 CT NECK SPINE W/O DYE: CPT

## 2024-08-08 PROCEDURE — 73610 X-RAY EXAM OF ANKLE: CPT | Mod: RT

## 2024-08-08 PROCEDURE — 72192 CT PELVIS W/O DYE: CPT

## 2024-08-08 PROCEDURE — 2500000004 HC RX 250 GENERAL PHARMACY W/ HCPCS (ALT 636 FOR OP/ED): Performed by: NURSE PRACTITIONER

## 2024-08-08 PROCEDURE — 83880 ASSAY OF NATRIURETIC PEPTIDE: CPT | Performed by: STUDENT IN AN ORGANIZED HEALTH CARE EDUCATION/TRAINING PROGRAM

## 2024-08-08 PROCEDURE — 1210000001 HC SEMI-PRIVATE ROOM DAILY

## 2024-08-08 PROCEDURE — 85025 COMPLETE CBC W/AUTO DIFF WBC: CPT | Performed by: STUDENT IN AN ORGANIZED HEALTH CARE EDUCATION/TRAINING PROGRAM

## 2024-08-08 RX ORDER — ATORVASTATIN CALCIUM 20 MG/1
20 TABLET, FILM COATED ORAL NIGHTLY
Status: DISPENSED | OUTPATIENT
Start: 2024-08-08

## 2024-08-08 RX ORDER — METOPROLOL TARTRATE 25 MG/1
12.5 TABLET, FILM COATED ORAL 2 TIMES DAILY
Status: DISCONTINUED | OUTPATIENT
Start: 2024-08-08 | End: 2024-08-09

## 2024-08-08 RX ORDER — WARFARIN 2.5 MG/1
2.5 TABLET ORAL DAILY
Status: DISPENSED | OUTPATIENT
Start: 2024-08-08

## 2024-08-08 RX ORDER — TAMSULOSIN HYDROCHLORIDE 0.4 MG/1
0.8 CAPSULE ORAL NIGHTLY
Status: DISPENSED | OUTPATIENT
Start: 2024-08-08

## 2024-08-08 RX ORDER — ACETAMINOPHEN 325 MG/1
325 TABLET ORAL EVERY 6 HOURS PRN
Status: ACTIVE | OUTPATIENT
Start: 2024-08-08

## 2024-08-08 RX ORDER — POTASSIUM CHLORIDE 20 MEQ/1
40 TABLET, EXTENDED RELEASE ORAL ONCE
Status: COMPLETED | OUTPATIENT
Start: 2024-08-08 | End: 2024-08-08

## 2024-08-08 RX ORDER — FENOFIBRATE 54 MG/1
54 TABLET ORAL DAILY
Status: DISPENSED | OUTPATIENT
Start: 2024-08-09

## 2024-08-08 RX ORDER — ERGOCALCIFEROL 1.25 MG/1
50000 CAPSULE ORAL
Status: DISPENSED | OUTPATIENT
Start: 2024-08-11

## 2024-08-08 RX ORDER — FUROSEMIDE 10 MG/ML
60 INJECTION INTRAMUSCULAR; INTRAVENOUS ONCE
Status: COMPLETED | OUTPATIENT
Start: 2024-08-08 | End: 2024-08-08

## 2024-08-08 RX ORDER — PANTOPRAZOLE SODIUM 40 MG/1
40 TABLET, DELAYED RELEASE ORAL DAILY
Status: DISPENSED | OUTPATIENT
Start: 2024-08-09

## 2024-08-08 RX ORDER — FINASTERIDE 5 MG/1
5 TABLET, FILM COATED ORAL DAILY
Status: DISPENSED | OUTPATIENT
Start: 2024-08-09

## 2024-08-08 RX ORDER — AMIODARONE HYDROCHLORIDE 200 MG/1
200 TABLET ORAL DAILY
Status: DISPENSED | OUTPATIENT
Start: 2024-08-09

## 2024-08-08 RX ORDER — FERROUS SULFATE 325(65) MG
65 TABLET ORAL DAILY
Status: DISPENSED | OUTPATIENT
Start: 2024-08-09

## 2024-08-08 RX ORDER — UBIDECARENONE 75 MG
500 CAPSULE ORAL DAILY
Status: DISPENSED | OUTPATIENT
Start: 2024-08-09

## 2024-08-08 RX ORDER — FUROSEMIDE 40 MG/1
40 TABLET ORAL 2 TIMES DAILY
Status: DISPENSED | OUTPATIENT
Start: 2024-08-08

## 2024-08-08 RX ORDER — FOLIC ACID 1 MG/1
1 TABLET ORAL DAILY
Status: DISPENSED | OUTPATIENT
Start: 2024-08-09

## 2024-08-08 RX ORDER — PAROXETINE HYDROCHLORIDE 20 MG/1
20 TABLET, FILM COATED ORAL DAILY
Status: DISPENSED | OUTPATIENT
Start: 2024-08-09

## 2024-08-08 RX ORDER — GABAPENTIN 100 MG/1
100 CAPSULE ORAL NIGHTLY
Status: DISPENSED | OUTPATIENT
Start: 2024-08-08

## 2024-08-08 SDOH — SOCIAL STABILITY: SOCIAL INSECURITY: DOES ANYONE TRY TO KEEP YOU FROM HAVING/CONTACTING OTHER FRIENDS OR DOING THINGS OUTSIDE YOUR HOME?: NO

## 2024-08-08 SDOH — SOCIAL STABILITY: SOCIAL INSECURITY: ARE YOU OR HAVE YOU BEEN THREATENED OR ABUSED PHYSICALLY, EMOTIONALLY, OR SEXUALLY BY ANYONE?: NO

## 2024-08-08 SDOH — SOCIAL STABILITY: SOCIAL INSECURITY: HAVE YOU HAD THOUGHTS OF HARMING ANYONE ELSE?: NO

## 2024-08-08 SDOH — SOCIAL STABILITY: SOCIAL INSECURITY: HAVE YOU HAD ANY THOUGHTS OF HARMING ANYONE ELSE?: NO

## 2024-08-08 SDOH — SOCIAL STABILITY: SOCIAL INSECURITY: ABUSE: ADULT

## 2024-08-08 SDOH — SOCIAL STABILITY: SOCIAL INSECURITY: ARE THERE ANY APPARENT SIGNS OF INJURIES/BEHAVIORS THAT COULD BE RELATED TO ABUSE/NEGLECT?: NO

## 2024-08-08 SDOH — SOCIAL STABILITY: SOCIAL INSECURITY: DO YOU FEEL UNSAFE GOING BACK TO THE PLACE WHERE YOU ARE LIVING?: NO

## 2024-08-08 SDOH — SOCIAL STABILITY: SOCIAL INSECURITY: DO YOU FEEL ANYONE HAS EXPLOITED OR TAKEN ADVANTAGE OF YOU FINANCIALLY OR OF YOUR PERSONAL PROPERTY?: NO

## 2024-08-08 SDOH — SOCIAL STABILITY: SOCIAL INSECURITY: HAS ANYONE EVER THREATENED TO HURT YOUR FAMILY OR YOUR PETS?: NO

## 2024-08-08 SDOH — SOCIAL STABILITY: SOCIAL INSECURITY: WERE YOU ABLE TO COMPLETE ALL THE BEHAVIORAL HEALTH SCREENINGS?: YES

## 2024-08-08 ASSESSMENT — COGNITIVE AND FUNCTIONAL STATUS - GENERAL
PATIENT BASELINE BEDBOUND: NO
DRESSING REGULAR LOWER BODY CLOTHING: A LOT
MOBILITY SCORE: 18
TURNING FROM BACK TO SIDE WHILE IN FLAT BAD: A LITTLE
TURNING FROM BACK TO SIDE WHILE IN FLAT BAD: A LITTLE
DAILY ACTIVITIY SCORE: 19
MOVING FROM LYING ON BACK TO SITTING ON SIDE OF FLAT BED WITH BEDRAILS: A LITTLE
DRESSING REGULAR UPPER BODY CLOTHING: A LITTLE
WALKING IN HOSPITAL ROOM: A LOT
STANDING UP FROM CHAIR USING ARMS: A LITTLE
TOILETING: A LITTLE
MOVING TO AND FROM BED TO CHAIR: A LITTLE
MOVING FROM LYING ON BACK TO SITTING ON SIDE OF FLAT BED WITH BEDRAILS: A LITTLE
DRESSING REGULAR UPPER BODY CLOTHING: A LITTLE
HELP NEEDED FOR BATHING: A LITTLE
MOVING TO AND FROM BED TO CHAIR: A LITTLE
MOBILITY SCORE: 18
DAILY ACTIVITIY SCORE: 19
TOILETING: A LITTLE
WALKING IN HOSPITAL ROOM: A LOT
HELP NEEDED FOR BATHING: A LITTLE
STANDING UP FROM CHAIR USING ARMS: A LITTLE
DRESSING REGULAR LOWER BODY CLOTHING: A LOT

## 2024-08-08 ASSESSMENT — ACTIVITIES OF DAILY LIVING (ADL)
GROOMING: NEEDS ASSISTANCE
ADEQUATE_TO_COMPLETE_ADL: YES
ADEQUATE_TO_COMPLETE_ADL: YES
WALKS IN HOME: NEEDS ASSISTANCE
PATIENT'S MEMORY ADEQUATE TO SAFELY COMPLETE DAILY ACTIVITIES?: YES
PATIENT'S MEMORY ADEQUATE TO SAFELY COMPLETE DAILY ACTIVITIES?: YES
FEEDING YOURSELF: INDEPENDENT
HEARING - RIGHT EAR: HEARING AID
TOILETING: NEEDS ASSISTANCE
LACK_OF_TRANSPORTATION: NO
BATHING: NEEDS ASSISTANCE
BATHING: NEEDS ASSISTANCE
FEEDING YOURSELF: INDEPENDENT
JUDGMENT_ADEQUATE_SAFELY_COMPLETE_DAILY_ACTIVITIES: YES
HEARING - LEFT EAR: HEARING AID
TOILETING: NEEDS ASSISTANCE
DRESSING YOURSELF: INDEPENDENT
HEARING - LEFT EAR: HEARING AID
WALKS IN HOME: NEEDS ASSISTANCE
JUDGMENT_ADEQUATE_SAFELY_COMPLETE_DAILY_ACTIVITIES: YES
ASSISTIVE_DEVICE: CANE;WALKER
GROOMING: NEEDS ASSISTANCE
ASSISTIVE_DEVICE: CANE;WALKER

## 2024-08-08 ASSESSMENT — PAIN SCALES - GENERAL
PAINLEVEL_OUTOF10: 6
PAINLEVEL_OUTOF10: 6
PAINLEVEL_OUTOF10: 0 - NO PAIN

## 2024-08-08 ASSESSMENT — ENCOUNTER SYMPTOMS
PAIN LOCATION: LEFT ARM
APNEA: 0
VOMITING: 0
LOWEST PAIN SEVERITY IN PAST 24 HOURS: 0/10
FREQUENCY: 0
ABDOMINAL PAIN: 0
PERSON REPORTING PAIN: PATIENT
FEVER: 0
PAIN: 1
CHEST TIGHTNESS: 0
SORE THROAT: 0
ABDOMINAL DISTENTION: 0
SHORTNESS OF BREATH: 0
SINUS PAIN: 0
CHILLS: 0
PAIN LOCATION - PAIN SEVERITY: 4/10
TROUBLE SWALLOWING: 0
ACTIVITY CHANGE: 1
DYSURIA: 0
HEMATURIA: 0
FATIGUE: 0
PALPITATIONS: 0
HIGHEST PAIN SEVERITY IN PAST 24 HOURS: 4/10
DIFFICULTY URINATING: 0
NAUSEA: 0
CHOKING: 0
UNEXPECTED WEIGHT CHANGE: 0

## 2024-08-08 ASSESSMENT — PAIN - FUNCTIONAL ASSESSMENT
PAIN_FUNCTIONAL_ASSESSMENT: 0-10

## 2024-08-08 ASSESSMENT — LIFESTYLE VARIABLES
SKIP TO QUESTIONS 9-10: 1
HOW MANY STANDARD DRINKS CONTAINING ALCOHOL DO YOU HAVE ON A TYPICAL DAY: PATIENT DOES NOT DRINK
HOW OFTEN DO YOU HAVE A DRINK CONTAINING ALCOHOL: NEVER
HOW OFTEN DO YOU HAVE 6 OR MORE DRINKS ON ONE OCCASION: NEVER
AUDIT-C TOTAL SCORE: 0
AUDIT-C TOTAL SCORE: 0

## 2024-08-08 ASSESSMENT — PAIN DESCRIPTION - LOCATION: LOCATION: ANKLE

## 2024-08-08 ASSESSMENT — PAIN DESCRIPTION - ORIENTATION: ORIENTATION: RIGHT

## 2024-08-08 NOTE — ED TRIAGE NOTES
Pt from home via EMS after a fall this morning. States he fell this morning and got him self up. Home health aid came a few hours after the fall and called ems stating pt c/o dizziness. Pt on coumadin denies hitting his head. C/o lower back pain, right ankle and right knee pain.

## 2024-08-08 NOTE — ED PROVIDER NOTES
HPI   Chief Complaint   Patient presents with    Fall       HPI    Patient is an 79-year-old male presenting for evaluation after a fall.  Patient states that he got up out of bed at approximately 4 AM to check his oxygen tanks when he walked a few feet and suddenly became lightheaded and dizzy, falling downwards onto the ground.  He denies striking his head or neck.  He is complaining of right hip and right ankle pain.  He denies loss of consciousness.  He does admit to blood thinner use.  He denies chest pain or shortness of breath.  No abdominal pain.  No nausea or vomiting.  No dysuria or hematuria.    Patient History   Past Medical History:   Diagnosis Date    Atherosclerotic heart disease of native coronary artery without angina pectoris 12/14/2022    Arteriosclerotic cardiovascular disease (ASCVD)    Benign neoplasm of meninges, unspecified (Multi)     Meningioma    Benign prostatic hyperplasia without lower urinary tract symptoms 02/28/2022    BPH without obstruction/lower urinary tract symptoms    Body mass index (BMI) 33.0-33.9, adult 07/02/2021    BMI 33.0-33.9,adult    Body mass index (BMI) 34.0-34.9, adult 11/09/2021    BMI 34.0-34.9,adult    Body mass index (BMI) 34.0-34.9, adult 09/08/2021    BMI 34.0-34.9,adult    Body mass index (BMI) 35.0-35.9, adult 12/29/2021    BMI 35.0-35.9,adult    Body mass index (BMI) 35.0-35.9, adult 02/28/2022    BMI 35.0-35.9,adult    Elevated blood-pressure reading, without diagnosis of hypertension     Prehypertension    Encounter for immunization 10/27/2015    Need for prophylactic vaccination and inoculation against influenza    Hyperlipidemia, unspecified 11/15/2021    Hyperlipidemia    Other conditions influencing health status     Nephrolithiasis    Pacemaker     Personal history of other diseases of the musculoskeletal system and connective tissue     History of tendinitis    Personal history of other drug therapy     History of influenza vaccination    Personal  history of other specified conditions 10/25/2022    History of bradycardia    Rectal bleeding     Vitamin D deficiency, unspecified 10/08/2020    Vitamin D deficiency     Past Surgical History:   Procedure Laterality Date    BACK SURGERY  04/18/2016    Back Surgery    COLONOSCOPY  07/03/2013    Complete Colonoscopy    CORONARY ANGIOPLASTY WITH STENT PLACEMENT  08/18/2020    Cath Stent Placement    CT ANGIO NECK  9/25/2021    CT NECK ANGIO W AND WO IV CONTRAST 9/25/2021 [facility]    CT HEAD ANGIO W AND WO IV CONTRAST  9/25/2021    CT HEAD ANGIO W AND WO IV CONTRAST 9/25/2021 [facility]GAC[facility]    EYE SURGERY  04/03/2013    Eye Surgery    KNEE ARTHROSCOPY W/ DEBRIDEMENT  07/03/2013    Arthroscopy Knee Right    KNEE ARTHROSCOPY W/ DEBRIDEMENT  07/03/2013    Arthroscopy Knee Left    LITHOTRIPSY  07/22/2013    Renal Lithotripsy    MR HEAD ANGIO WO IV CONTRAST  3/23/2021    MR HEAD ANGIO WO IV CONTRAST [facility]    MR NECK ANGIO WO IV CONTRAST  3/23/2021    MR NECK ANGIO WO IV CONTRAST [facility]    OTHER SURGICAL HISTORY  04/03/2013    Cardiac Cath Procedure Outcome: Successful    OTHER SURGICAL HISTORY  04/03/2013    Neuroplasty With Transposition Of Ulnar Nerve - At Elbow    OTHER SURGICAL HISTORY  04/03/2013    General Surgery    OTHER SURGICAL HISTORY  06/11/2019    Knee replacement    OTHER SURGICAL HISTORY  07/22/2013    Cystoscopy With Insertion Of Ureteral Stent Bilateral    OTHER SURGICAL HISTORY  07/03/2013    Wrist Carpectomy    OTHER SURGICAL HISTORY  07/03/2013    Brain Surgery    OTHER SURGICAL HISTORY  07/03/2013    Ulnar Osteotomy    TONSILLECTOMY  04/03/2013    Tonsillectomy    TOTAL KNEE ARTHROPLASTY  08/03/2016    Total Knee Arthroplasty     Family History   Problem Relation Name Age of Onset    Diabetes Mother      Emphysema Father      Heart disease Father       Social History     Tobacco Use    Smoking status: Former     Current packs/day: 0.00     Types: Cigarettes      Quit date:      Years since quittin.6    Smokeless tobacco: Never   Vaping Use    Vaping status: Never Used   Substance Use Topics    Alcohol use: Not Currently    Drug use: Never       Physical Exam   ED Triage Vitals [24 1229]   Temperature Heart Rate Respirations BP   36.4 °C (97.6 °F) 60 18 132/58      Pulse Ox Temp Source Heart Rate Source Patient Position   (!) 93 % Oral Monitor Lying      BP Location FiO2 (%)     -- --       Physical Exam  Vitals and nursing note reviewed.   Constitutional:       Appearance: Normal appearance.   Eyes:      Extraocular Movements: Extraocular movements intact.      Conjunctiva/sclera: Conjunctivae normal.      Pupils: Pupils are equal, round, and reactive to light.   Neck:      Comments: No C-spine tenderness, palpable deformities or step-offs.  Cardiovascular:      Rate and Rhythm: Normal rate and regular rhythm.   Pulmonary:      Effort: Pulmonary effort is normal.      Breath sounds: Normal breath sounds.   Abdominal:      General: Abdomen is flat. Bowel sounds are normal.      Palpations: Abdomen is soft.   Musculoskeletal:      Cervical back: Normal range of motion and neck supple.      Comments: Tenderness to palpation of the right anterior lateral hip.  No gross deformity.   Skin:     General: Skin is warm and dry.   Neurological:      Mental Status: He is alert.           ED Course & MDM   Diagnoses as of 24 1634   Fall, initial encounter   Closed head injury, initial encounter   Near syncope                 No data recorded     Gladys Coma Scale Score: 15 (24 1330 : Annamaria Chavez, ELMER)                           Medical Decision Making  Parts of this chart have been completed using voice recognition software. Please excuse any errors of transcription. Despite the medical decision making time stamp above-my medical decision making has taken place during the patient's entire visit. My thought process and reason for plan has been formulated  from the time that I saw the patient until the time of disposition and is not specific to one specific moment during their visit and furthermore my MDM encompasses this entire chart and not only this text box.      HPI: Detailed above.    Exam: A medically appropriate exam performed, outlined above, given the known history and presentation.    History obtained from: Patient    Social Determinants of Health considered during this visit: Lives at home    EKG interpreted by my attending physician, reviewed by myself.    Labs Reviewed   CBC WITH AUTO DIFFERENTIAL - Abnormal       Result Value    WBC 3.2 (*)     nRBC 0.0      RBC 3.55 (*)     Hemoglobin 10.4 (*)     Hematocrit 33.4 (*)     MCV 94      MCH 29.3      MCHC 31.1 (*)     RDW 17.2 (*)     Platelets 171      Neutrophils % 66.5      Immature Granulocytes %, Automated 0.6      Lymphocytes % 17.4      Monocytes % 11.8      Eosinophils % 2.8      Basophils % 0.9      Neutrophils Absolute 2.13      Immature Granulocytes Absolute, Automated 0.02      Lymphocytes Absolute 0.56 (*)     Monocytes Absolute 0.38      Eosinophils Absolute 0.09      Basophils Absolute 0.03     COMPREHENSIVE METABOLIC PANEL - Abnormal    Glucose 107 (*)     Sodium 144      Potassium 3.0 (*)     Chloride 101      Bicarbonate 31      Urea Nitrogen 18      Creatinine 1.70 (*)     eGFR 40 (*)     Calcium 8.6      Albumin 3.6      Alkaline Phosphatase 85      Total Protein 6.4      AST 18      Bilirubin, Total 0.7      ALT 6      Anion Gap 12     PROTIME-INR - Abnormal    Protime 29.3 (*)     INR 3.0 (*)     Narrative:     INR Therapeutic Range: 2.0-3.5  Patient is on COUMADIN/WARFARIN.   SERIAL TROPONIN, INITIAL ([facility]) - Abnormal    Troponin T, High Sensitivity 54 (*)    SERIAL TROPONIN,  2 HOUR ([facility]) - Abnormal    Troponin T, High Sensitivity 48 (*)    N-TERMINAL PROBNP - Abnormal    PROBNP 4,737 (*)     Narrative:     Reference ranges are based on clinical submission data. These ranges  represent the 95th percentile of normal cut-off points. As NT Pro- BNP values approach 1000 pg/ml, clinical symptoms are more likely associated with CHF.   ALCOHOL - Normal    Alcohol <0.010     BLOOD GAS LACTIC ACID, VENOUS - Normal    POCT Lactate, Venous 1.2     TYPE AND SCREEN    ABO TYPE B      Rh TYPE POS      ANTIBODY SCREEN POS     TROPONIN T SERIES, HIGH SENSITIVITY (0, 2 HR, 6 HR)    Narrative:     The following orders were created for panel order Troponin T Series, High Sensitivity (0, 2HR, 6HR).  Procedure                               Abnormality         Status                     ---------                               -----------         ------                     Serial Troponin, Initial...[[id number]]  Abnormal            Final result               Serial Troponin, 2 Hour ...[[id number]]  Abnormal            Final result               Serial Troponin, 6 Hour ...[[id number]]                                                   Please view results for these tests on the individual orders.   BB ORDER ONLY - ANTIBODY IDENTIFICATION     CT pelvis wo IV contrast   Final Result   1.  No acute fracture at the pelvis or hips. Mild degenerative   changes at the bilateral hips with chondrocalcinosis.   2. Vesicolithiasis.   3. Prostatomegaly.        MACRO:   None        Signed by: Trinity Mancini 8/8/2024 4:10 PM   Dictation workstation:   [id number]      CT thoracic spine wo IV contrast   Final Result   No acute fracture or traumatic malalignment.        CHF.        Stable 2 cm right thyroid nodule.        MACRO:   Incidental Finding:  There are few small hypoattenuating nodules   measuring equal to or greater than 1.5 cm in the thyroid gland.   (**-YCF-**)        Instructions:  Further evaluation with nonemergent thyroid ultrasound.   (Managing Incidental Thyroid Nodules Detected on Imaging: White Paper   of the ACR Incidental Thyroid Findings Committee. Hiral Guillaume. et   al. Journal of the American College  of Radiology,Volume 12, Issue 2,   143 - 150.) [email or web address]        Signed by: Don Quigley 8/8/2024 1:59 PM   Dictation workstation:   [id number]      XR chest 1 view   Final Result   CHF with mild interstitial edema.        MACRO   None        Signed by: Don Quigley 8/8/2024 1:50 PM   Dictation workstation:   [id number]      XR pelvis 1-2 views   Final Result   Apparent subtle cortical step-off in the right superior pubic ramus;   fracture versus artifact. Consider further evaluation with pelvis CT.        MACRO   None        Signed by: Don Quigley 8/8/2024 2:03 PM   Dictation workstation:   [id number]      XR ankle right 3+ views   Final Result   No acute osseous abnormality.        MACRO   None        Signed by: Don Quigley 8/8/2024 2:05 PM   Dictation workstation:   [id number]      XR knee right 4+ views   Final Result   No acute osseous abnormality. Intact hardware.        MACRO   None        Signed by: Don Quigley 8/8/2024 2:04 PM   Dictation workstation:   [id number]      CT head W O contrast trauma protocol   Final Result   No acute intracranial hemorrhage or calvarial fracture.        Stable changes compared to 06/30/2024.        MACRO:   None        Signed by: Don Quigley 8/8/2024 1:22 PM   Dictation workstation:   [id number]      CT cervical spine wo IV contrast   Final Result   No acute fracture or traumatic malalignment.        MACRO   None        Signed by: Don Quigley 8/8/2024 1:29 PM   Dictation workstation:   [id number]        Medications   potassium chloride CR (Klor-Con M20) ER tablet 40 mEq (40 mEq oral Given 8/8/24 1516)         Differential diagnoses considered for this visit include: Acute intracranial processes versus cervical spine instability versus rib fracture versus pelvic fracture versus ankle fracture    Considerations/further MDM:    Patient is an 79-year-old male presenting for evaluation after near syncopal event at home.  Vital signs are  hemodynamically stable within normal limits on arrival.  Physical examination demonstrated a well-nourished well-developed gentleman in no acute distress.  Given complaints of multiple injuries and fall, limited trauma activation was activated on patient's arrival.  Labs were obtained with the right ankle, right knee, pelvis, chest and CT of the head, cervical spine and thoracic spine.    X-ray right ankle show no acute findings.  X-ray of the right knee shows no acute osseous abnormality with intact hardware.  X-ray of the pelvis shows an apparent subtle cortical step-off in the right superior pubic ramus, fracture versus artifact.  Consider further evaluation pelvis CT.  CT thoracic spine shows no acute fracture or traumatic malalignment.  X-ray of the chest shows CHF with mild interstitial edema.  CT cervical spine shows no acute fracture or traumatic malalignment.  CT head without contrast shows no acute intracranial hemorrhage or calvarial fracture. CT of the pelvis showed no acute fracture of the pelvis or hips with mild degenerative changes.    CBC was within normal limits.  Pro PT/INR was supratherapeutic with an INR of 3 and a PTT of 29.3.  CMP demonstrated mild hypokalemia with a potassium of 3.0, supplemented with 40 mEq K-Jihan.  proBNP slightly elevated at 4737.  Initial troponin of 54, repeat of 48.  Lactic acid was unremarkable at 1.2.  Given patient is elevated troponins with a near syncopal event, admission to the hospital was recommended at this time.  I discussed this with the patient and family member at the bedside, who did feel comfortable with this plan of care. I discussed this case with the on call hospitalist Dr. Colvin, patient was accepted to the regular nursing floor with telemetry in good condition.    The patient/family was counseled on clinical impression, expectations, and plan along with recommendations to admission. All questions were answered and involved parties were understanding  and agreeable to course of treatment. Case was discussed with admitting physician and any consultants. Bed type, ED treatment and further ED workup decided by joint decision making with admitting team and any consultants. Patient stable for admission per my assessment and further management of patient will be deferred to the inpatient setting.    Patient was seen in conjunction with attending physician Dr. Surjit Miller    Patient's history, physical exam, diagnostic studies, and treatment plan were discussed thoroughly.    Procedure  Procedures     Stacie Cordova PA-C  08/08/24 0662

## 2024-08-08 NOTE — PROGRESS NOTES
Spiritual Care Visit    Clinical Encounter Type  Visited With: Family  Routine Visit: Introduction  Crisis Visit: ED, Trauma  Referral From: Nurse  Referral To:     Faith Encounters  Faith Needs:  (Emotional Support)     Annotation:  responded to Limited trauma page for patient who fell.  talked directly to the patient's daughter who stated that her father fell today. During the visit the family stated that they are dealing with their elderly mother who is currently in rehab facility. Family welcomed the spiritual care support. No Congregational or spiritual needs were identified. Spiritual care will remain available as requested.

## 2024-08-08 NOTE — PROGRESS NOTES
Patient identification verified with 2 identifiers.    Location: [facility] Patient Self-Testing Program [phone]    Referring Physician: Doc Martin MD   Enrollment/ Re-enrollment date: 2/20/25   INR Goal: 2.0-3.0  INR monitoring is per [facility] protocol.  Anticoagulation Medication: warfarin  Indication: Atrial Fibrillation/Atrial Flutter    Subjective   Bleeding signs/symptoms: No    Bruising: No   Major bleeding event: No  Thrombosis signs/symptoms: No  Thromboembolic event: No  Missed doses: No  Extra doses: No  Medication changes: No  Dietary changes: Yes  Patient has no power and his diet has been inconsistent.  Change in health: No  Change in activity: No  Alcohol: No  Other concerns: No    Upcoming Procedures:  Does the Patient Have any upcoming procedures that require interruption in anticoagulation therapy? no  Does the patient require bridging? no      Anticoagulation Summary  As of 8/8/2024      INR goal:  2.0-3.0   TTR:  53.6% (9.5 mo)   INR used for dosing:  3.20 (8/8/2024)   Weekly warfarin total:  16.25 mg             Patient's daughter, Rhea, stated that Angel picked a scab on his ear on Monday and it started bleeding and was taking a while to stop. They checked Angel's INR on 8/5/24 and it was 2.8.     Received faxed INR self-test results and called patient. Pt identification verified with 2 pt identifiers. Current dose schedule reviewed with Rhea, patient verbalized understanding. Rhea instructed to call in interim with questions, concerns or changes.  TEA Valadez RN    Assessment/Plan   Supratherapeutic     1. New dose: no change  Patient currently has no power and his diet is not consistent. Will maintain dose and retest in 1 week.  2. Next INR: 1 week      Education provided to patient during the visit:  Patient instructed to call in interim with questions, concerns and changes.   Patient educated on dietary consistency in vitamin k consumption.   Patient educated on compliance with  dosing, follow up appointments, and prescribed plan of care.

## 2024-08-08 NOTE — H&P
History Of Present Illness  Angel Carroll is a 79 y.o. male with a past medical history of CAD, bradycardia, hypertension, hyperlipidemia, bradycardia with pacer placement and COPD with home O2 presented for evaluation after falling at home. Patient has been without electricity and got up from bed at 4 am to check his oxygen tanks and become dizzy and lightheaded and fell. He struck his left arm and suffered a skin tear to left lower arm and falling onto his left hip, also twisting and injuring his right ankle. CT of thoracic spine and pelvis showed  no acute change. Chest  xray is consistent with CHF. Patient complains of mild pain in right ankle and more than usual edema in BLE. Denies chest pain or sob. Patient has had no recent illness; fever fatigue or chills.      Past Medical History  Past Medical History:   Diagnosis Date    Atherosclerotic heart disease of native coronary artery without angina pectoris 12/14/2022    Arteriosclerotic cardiovascular disease (ASCVD)    Benign neoplasm of meninges, unspecified (Multi)     Meningioma    Benign prostatic hyperplasia without lower urinary tract symptoms 02/28/2022    BPH without obstruction/lower urinary tract symptoms    Body mass index (BMI) 33.0-33.9, adult 07/02/2021    BMI 33.0-33.9,adult    Body mass index (BMI) 34.0-34.9, adult 11/09/2021    BMI 34.0-34.9,adult    Body mass index (BMI) 34.0-34.9, adult 09/08/2021    BMI 34.0-34.9,adult    Body mass index (BMI) 35.0-35.9, adult 12/29/2021    BMI 35.0-35.9,adult    Body mass index (BMI) 35.0-35.9, adult 02/28/2022    BMI 35.0-35.9,adult    Elevated blood-pressure reading, without diagnosis of hypertension     Prehypertension    Encounter for immunization 10/27/2015    Need for prophylactic vaccination and inoculation against influenza    Hyperlipidemia, unspecified 11/15/2021    Hyperlipidemia    Other conditions influencing health status     Nephrolithiasis    Pacemaker     Personal history of other diseases of  the musculoskeletal system and connective tissue     History of tendinitis    Personal history of other drug therapy     History of influenza vaccination    Personal history of other specified conditions 10/25/2022    History of bradycardia    Rectal bleeding     Vitamin D deficiency, unspecified 10/08/2020    Vitamin D deficiency       Surgical History  Past Surgical History:   Procedure Laterality Date    BACK SURGERY  04/18/2016    Back Surgery    COLONOSCOPY  07/03/2013    Complete Colonoscopy    CORONARY ANGIOPLASTY WITH STENT PLACEMENT  08/18/2020    Cath Stent Placement    CT ANGIO NECK  9/25/2021    CT NECK ANGIO W AND WO IV CONTRAST 9/25/2021 [facility]    CT HEAD ANGIO W AND WO IV CONTRAST  9/25/2021    CT HEAD ANGIO W AND WO IV CONTRAST 9/25/2021 [facility]    EYE SURGERY  04/03/2013    Eye Surgery    KNEE ARTHROSCOPY W/ DEBRIDEMENT  07/03/2013    Arthroscopy Knee Right    KNEE ARTHROSCOPY W/ DEBRIDEMENT  07/03/2013    Arthroscopy Knee Left    LITHOTRIPSY  07/22/2013    Renal Lithotripsy    MR HEAD ANGIO WO IV CONTRAST  3/23/2021    MR HEAD ANGIO WO IV CONTRAST [facility]    MR NECK ANGIO WO IV CONTRAST  3/23/2021    MR NECK ANGIO WO IV CONTRAST [facility]    OTHER SURGICAL HISTORY  04/03/2013    Cardiac Cath Procedure Outcome: Successful    OTHER SURGICAL HISTORY  04/03/2013    Neuroplasty With Transposition Of Ulnar Nerve - At Elbow    OTHER SURGICAL HISTORY  04/03/2013    General Surgery    OTHER SURGICAL HISTORY  06/11/2019    Knee replacement    OTHER SURGICAL HISTORY  07/22/2013    Cystoscopy With Insertion Of Ureteral Stent Bilateral    OTHER SURGICAL HISTORY  07/03/2013    Wrist Carpectomy    OTHER SURGICAL HISTORY  07/03/2013    Brain Surgery    OTHER SURGICAL HISTORY  07/03/2013    Ulnar Osteotomy    TONSILLECTOMY  04/03/2013    Tonsillectomy    TOTAL KNEE ARTHROPLASTY  08/03/2016    Total Knee Arthroplasty        Social History  He reports that he quit  smoking about 30 years ago. His smoking use included cigarettes. He has never used smokeless tobacco. He reports that he does not currently use alcohol. He reports that he does not use drugs.    Family History  Family History   Problem Relation Name Age of Onset    Diabetes Mother      Emphysema Father      Heart disease Father          Allergies  Other, Ranolazine, and Cyclobenzaprine    Review of Systems   Constitutional:  Positive for activity change. Negative for chills, fatigue, fever and unexpected weight change.   HENT:  Negative for congestion, sinus pain, sore throat and trouble swallowing.    Respiratory:  Negative for apnea, choking, chest tightness and shortness of breath.    Cardiovascular:  Positive for leg swelling. Negative for chest pain and palpitations.   Gastrointestinal:  Negative for abdominal distention, abdominal pain, nausea and vomiting.   Genitourinary:  Negative for difficulty urinating, dysuria, frequency, hematuria and urgency.        Physical Exam  Constitutional:       Appearance: Normal appearance.   Cardiovascular:      Rate and Rhythm: Normal rate and regular rhythm.      Pulses: Normal pulses.      Heart sounds: Normal heart sounds.   Pulmonary:      Effort: Pulmonary effort is normal.      Breath sounds: Normal breath sounds.   Abdominal:      General: Bowel sounds are normal.      Palpations: Abdomen is soft.   Musculoskeletal:         General: Normal range of motion.      Right lower leg: Edema present.      Left lower leg: Edema present.   Skin:     General: Skin is warm and dry.   Neurological:      Mental Status: He is alert and oriented to person, place, and time.          Last Recorded Vitals  Blood pressure 121/59, pulse 60, temperature 36.4 °C (97.6 °F), temperature source Oral, resp. rate 19, height 1.829 m (6'), weight 110 kg (242 lb 11.6 oz), SpO2 (!) 93%.    Relevant Results        Results for orders placed or performed during the hospital encounter of 08/08/24 (from  the past 24 hour(s))   CBC and Auto Differential   Result Value Ref Range    WBC 3.2 (L) 4.4 - 11.3 x10*3/uL    nRBC 0.0 0.0 - 0.0 /100 WBCs    RBC 3.55 (L) 4.50 - 5.90 x10*6/uL    Hemoglobin 10.4 (L) 13.5 - 17.5 g/dL    Hematocrit 33.4 (L) 41.0 - 52.0 %    MCV 94 80 - 100 fL    MCH 29.3 26.0 - 34.0 pg    MCHC 31.1 (L) 32.0 - 36.0 g/dL    RDW 17.2 (H) 11.5 - 14.5 %    Platelets 171 150 - 450 x10*3/uL    Neutrophils % 66.5 40.0 - 80.0 %    Immature Granulocytes %, Automated 0.6 0.0 - 0.9 %    Lymphocytes % 17.4 13.0 - 44.0 %    Monocytes % 11.8 2.0 - 10.0 %    Eosinophils % 2.8 0.0 - 6.0 %    Basophils % 0.9 0.0 - 2.0 %    Neutrophils Absolute 2.13 1.60 - 5.50 x10*3/uL    Immature Granulocytes Absolute, Automated 0.02 0.00 - 0.50 x10*3/uL    Lymphocytes Absolute 0.56 (L) 0.80 - 3.00 x10*3/uL    Monocytes Absolute 0.38 0.05 - 0.80 x10*3/uL    Eosinophils Absolute 0.09 0.00 - 0.40 x10*3/uL    Basophils Absolute 0.03 0.00 - 0.10 x10*3/uL   Comprehensive Metabolic Panel   Result Value Ref Range    Glucose 107 (H) 65 - 99 mg/dL    Sodium 144 133 - 145 mmol/L    Potassium 3.0 (L) 3.4 - 5.1 mmol/L    Chloride 101 97 - 107 mmol/L    Bicarbonate 31 24 - 31 mmol/L    Urea Nitrogen 18 8 - 25 mg/dL    Creatinine 1.70 (H) 0.40 - 1.60 mg/dL    eGFR 40 (L) >60 mL/min/1.73m*2    Calcium 8.6 8.5 - 10.4 mg/dL    Albumin 3.6 3.5 - 5.0 g/dL    Alkaline Phosphatase 85 35 - 125 U/L    Total Protein 6.4 5.9 - 7.9 g/dL    AST 18 5 - 40 U/L    Bilirubin, Total 0.7 0.1 - 1.2 mg/dL    ALT 6 5 - 40 U/L    Anion Gap 12 <=19 mmol/L   Alcohol   Result Value Ref Range    Alcohol <0.010 0.000 - 0.010 g/dL   Protime-INR   Result Value Ref Range    Protime 29.3 (H) 9.3 - 12.7 seconds    INR 3.0 (H) 0.9 - 1.2   Type And Screen   Result Value Ref Range    ABO TYPE B     Rh TYPE POS     ANTIBODY SCREEN POS    Serial Troponin, Initial ([facility])   Result Value Ref Range    Troponin T, High Sensitivity 54 (HH) <=14 ng/L   NT Pro-BNP   Result Value Ref Range     PROBNP 4,737 (H) 0 - 852 pg/mL   BLOOD GAS LACTIC ACID, VENOUS   Result Value Ref Range    POCT Lactate, Venous 1.2 0.4 - 2.0 mmol/L   Serial Troponin, 2 Hour ([facility])   Result Value Ref Range    Troponin T, High Sensitivity 48 (HH) <=14 ng/L     *Note: Due to a large number of results and/or encounters for the requested time period, some results have not been displayed. A complete set of results can be found in Results Review.          Assessment/Plan   Principal Problem:    Fall, initial encounter    Fall; near syncope  Fall precaution  PT/OT  Orthostatic vital signs    S/P pacemaker placement for bradycardia with atrial fibrillation  Pacemaker interrogation  Chronic Coumadin use with INR today of 3.0. will hold and check am INR    CHF with elevated troponin  Patient has signs clinically of volume overload.  Will hold home medication dose of Lasix and give a one-time dose of 60 g IV.  Consult cardiology, Dr. Cullen.  Dr. Martin is patient's primary cardiologist  Most recent Echo 2/2024 will defer to cardiology for new echo    DVT prophylaxis  Coumadin     Plan of care  Home medications reconciled.  Advanced directives discussed with the patient.  Patient does have a living will.  Wishes to remain a full code at this time.  Patient lives at home alone and plans to return there at discharge, would be agreeable to home health care.  Plan of care discussed with the patient, daughter at bedside and collaborating physician, Dr. Colvin.    I spent 50 minutes in the professional and overall care of this patient.      Dee Dee Haines, APRN-CNP

## 2024-08-09 ENCOUNTER — HOME CARE VISIT (OUTPATIENT)
Dept: HOME HEALTH SERVICES | Facility: HOME HEALTH | Age: 79
End: 2024-08-09
Payer: MEDICARE

## 2024-08-09 LAB
ANION GAP SERPL CALC-SCNC: 10 MMOL/L
APPEARANCE UR: CLEAR
BB ANTIBODY IDENTIFICATION: NORMAL
BILIRUB UR STRIP.AUTO-MCNC: NEGATIVE MG/DL
BUN SERPL-MCNC: 18 MG/DL (ref 8–25)
CALCIUM SERPL-MCNC: 8.5 MG/DL (ref 8.5–10.4)
CASE #: NORMAL
CHLORIDE SERPL-SCNC: 103 MMOL/L (ref 97–107)
CO2 SERPL-SCNC: 31 MMOL/L (ref 24–31)
COLOR UR: ABNORMAL
CREAT SERPL-MCNC: 1.7 MG/DL (ref 0.4–1.6)
EGFRCR SERPLBLD CKD-EPI 2021: 40 ML/MIN/1.73M*2
ERYTHROCYTE [DISTWIDTH] IN BLOOD BY AUTOMATED COUNT: 17.2 % (ref 11.5–14.5)
GLUCOSE SERPL-MCNC: 96 MG/DL (ref 65–99)
GLUCOSE UR STRIP.AUTO-MCNC: NORMAL MG/DL
HCT VFR BLD AUTO: 34.3 % (ref 41–52)
HGB BLD-MCNC: 10.7 G/DL (ref 13.5–17.5)
KETONES UR STRIP.AUTO-MCNC: NEGATIVE MG/DL
LEUKOCYTE ESTERASE UR QL STRIP.AUTO: ABNORMAL
MCH RBC QN AUTO: 29.6 PG (ref 26–34)
MCHC RBC AUTO-ENTMCNC: 31.2 G/DL (ref 32–36)
MCV RBC AUTO: 95 FL (ref 80–100)
MUCOUS THREADS #/AREA URNS AUTO: ABNORMAL /LPF
NITRITE UR QL STRIP.AUTO: NEGATIVE
NRBC BLD-RTO: 0 /100 WBCS (ref 0–0)
PH UR STRIP.AUTO: 7.5 [PH]
PLATELET # BLD AUTO: 163 X10*3/UL (ref 150–450)
POTASSIUM SERPL-SCNC: 3.3 MMOL/L (ref 3.4–5.1)
PROT UR STRIP.AUTO-MCNC: NEGATIVE MG/DL
RBC # BLD AUTO: 3.62 X10*6/UL (ref 4.5–5.9)
RBC # UR STRIP.AUTO: ABNORMAL /UL
RBC #/AREA URNS AUTO: ABNORMAL /HPF
SODIUM SERPL-SCNC: 144 MMOL/L (ref 133–145)
SP GR UR STRIP.AUTO: 1.01
UROBILINOGEN UR STRIP.AUTO-MCNC: ABNORMAL MG/DL
WBC # BLD AUTO: 3 X10*3/UL (ref 4.4–11.3)
WBC #/AREA URNS AUTO: ABNORMAL /HPF

## 2024-08-09 PROCEDURE — 36415 COLL VENOUS BLD VENIPUNCTURE: CPT | Performed by: NURSE PRACTITIONER

## 2024-08-09 PROCEDURE — 97530 THERAPEUTIC ACTIVITIES: CPT | Mod: GP

## 2024-08-09 PROCEDURE — 2500000001 HC RX 250 WO HCPCS SELF ADMINISTERED DRUGS (ALT 637 FOR MEDICARE OP): Performed by: NURSE PRACTITIONER

## 2024-08-09 PROCEDURE — 1200000002 HC GENERAL ROOM WITH TELEMETRY DAILY

## 2024-08-09 PROCEDURE — 2500000002 HC RX 250 W HCPCS SELF ADMINISTERED DRUGS (ALT 637 FOR MEDICARE OP, ALT 636 FOR OP/ED): Performed by: NURSE PRACTITIONER

## 2024-08-09 PROCEDURE — 81001 URINALYSIS AUTO W/SCOPE: CPT | Performed by: NURSE PRACTITIONER

## 2024-08-09 PROCEDURE — 85027 COMPLETE CBC AUTOMATED: CPT | Performed by: NURSE PRACTITIONER

## 2024-08-09 PROCEDURE — 97165 OT EVAL LOW COMPLEX 30 MIN: CPT | Mod: GO

## 2024-08-09 PROCEDURE — 99222 1ST HOSP IP/OBS MODERATE 55: CPT | Performed by: INTERNAL MEDICINE

## 2024-08-09 PROCEDURE — 87086 URINE CULTURE/COLONY COUNT: CPT | Mod: WESLAB | Performed by: NURSE PRACTITIONER

## 2024-08-09 PROCEDURE — 80051 ELECTROLYTE PANEL: CPT | Performed by: NURSE PRACTITIONER

## 2024-08-09 PROCEDURE — 97161 PT EVAL LOW COMPLEX 20 MIN: CPT | Mod: GP

## 2024-08-09 PROCEDURE — 2500000001 HC RX 250 WO HCPCS SELF ADMINISTERED DRUGS (ALT 637 FOR MEDICARE OP): Performed by: INTERNAL MEDICINE

## 2024-08-09 RX ORDER — POLYETHYLENE GLYCOL 3350 17 G/17G
17 POWDER, FOR SOLUTION ORAL DAILY PRN
Status: ACTIVE | OUTPATIENT
Start: 2024-08-09

## 2024-08-09 RX ORDER — POTASSIUM CHLORIDE 20 MEQ/1
40 TABLET, EXTENDED RELEASE ORAL ONCE
Status: COMPLETED | OUTPATIENT
Start: 2024-08-09 | End: 2024-08-09

## 2024-08-09 RX ORDER — METOPROLOL SUCCINATE 25 MG/1
25 TABLET, EXTENDED RELEASE ORAL DAILY
Status: DISPENSED | OUTPATIENT
Start: 2024-08-09

## 2024-08-09 ASSESSMENT — PAIN SCALES - GENERAL
PAINLEVEL_OUTOF10: 0 - NO PAIN
PAINLEVEL_OUTOF10: 2
PAINLEVEL_OUTOF10: 0 - NO PAIN
PAINLEVEL_OUTOF10: 2

## 2024-08-09 ASSESSMENT — COGNITIVE AND FUNCTIONAL STATUS - GENERAL
MOVING TO AND FROM BED TO CHAIR: A LITTLE
MOVING TO AND FROM BED TO CHAIR: A LITTLE
DRESSING REGULAR UPPER BODY CLOTHING: A LITTLE
WALKING IN HOSPITAL ROOM: A LITTLE
STANDING UP FROM CHAIR USING ARMS: A LITTLE
MOBILITY SCORE: 20
DRESSING REGULAR LOWER BODY CLOTHING: A LITTLE
TOILETING: A LITTLE
DAILY ACTIVITIY SCORE: 20
DRESSING REGULAR UPPER BODY CLOTHING: A LITTLE
CLIMB 3 TO 5 STEPS WITH RAILING: A LITTLE
CLIMB 3 TO 5 STEPS WITH RAILING: A LITTLE
HELP NEEDED FOR BATHING: A LITTLE
MOVING FROM LYING ON BACK TO SITTING ON SIDE OF FLAT BED WITH BEDRAILS: A LITTLE
TURNING FROM BACK TO SIDE WHILE IN FLAT BAD: A LITTLE
DRESSING REGULAR LOWER BODY CLOTHING: A LITTLE
MOBILITY SCORE: 18
HELP NEEDED FOR BATHING: A LITTLE
TOILETING: A LITTLE
DAILY ACTIVITIY SCORE: 20
WALKING IN HOSPITAL ROOM: A LITTLE
STANDING UP FROM CHAIR USING ARMS: A LITTLE

## 2024-08-09 ASSESSMENT — PAIN - FUNCTIONAL ASSESSMENT
PAIN_FUNCTIONAL_ASSESSMENT: 0-10

## 2024-08-09 ASSESSMENT — ACTIVITIES OF DAILY LIVING (ADL)
ADL_ASSISTANCE: INDEPENDENT
BATHING_ASSISTANCE: MINIMAL
ADL_ASSISTANCE: INDEPENDENT

## 2024-08-09 ASSESSMENT — PAIN DESCRIPTION - DESCRIPTORS: DESCRIPTORS: SORE

## 2024-08-09 NOTE — PROGRESS NOTES
08/09/24 1918   Discharge Planning   Living Arrangements Spouse/significant other   Support Systems Spouse/significant other;Children   Assistance Needed ambulates with a rollator walker, daughter helps with shopping and laundry   Type of Residence Private residence   Number of Stairs to Enter Residence 4   Number of Stairs Within Residence 3   Home or Post Acute Services In home services   Type of Home Care Services DME or oxygen;Home PT;Home OT   Expected Discharge Disposition  Services     Met with patient at bedside.  An explanation of discharge planning was provided.  Patient resides in a multi level house with four steps to enter the home. Patient has been living alone for the past few months while his spouse was in rehab, however spouse returned home today. Patient ambulates with a rollator walker.  He also has a wheelchair available if needed.  Patient requires 2 L oxygen . He is unable to recall the name of his oxygen supplier.  Patient is independent with most ADL's. His daughter lives nearby and assists with shopping and laundry. Patient denies smoking and alcohol use.  Patient reports having had at least 10 falls in the past 6 months. POA is his spouse.  A copy of the paperwork was requested for hospital records. PCP is Dr. Agarwal.      Patient is active with [facility] PT/OT and would like to continue with their services. Patient is declining SNF at this time but would like to think about.  Patient states that now that his wife has been discharged home from rehab, they will be able to help each other in the home. If patient resumes care with [facility], will need an internal referral.

## 2024-08-09 NOTE — PROGRESS NOTES
08/09/24 1917   [facility] Disability Status   Are you deaf or do you have serious difficulty hearing? N   Are you blind or do you have serious difficulty seeing, even when wearing glasses? N   Because of a physical, mental, or emotional condition, do you have serious difficulty concentrating, remembering, or making decisions? (5 years old or older) N   Do you have serious difficulty walking or climbing stairs? Y   Do you have serious difficulty dressing or bathing? N   Because of a physical, mental, or emotional condition, do you have serious difficulty doing errands alone such as visiting the doctor? Y

## 2024-08-09 NOTE — CARE PLAN
The patient's goals for the shift include      The clinical goals for the shift include assess and monitor      Problem: Skin  Goal: Decreased wound size/increased tissue granulation at next dressing change  Outcome: Progressing  Flowsheets (Taken 8/8/2024 2244)  Decreased wound size/increased tissue granulation at next dressing change: Promote sleep for wound healing  Goal: Participates in plan/prevention/treatment measures  Outcome: Progressing  Goal: Prevent/manage excess moisture  Outcome: Progressing  Goal: Prevent/minimize sheer/friction injuries  Outcome: Progressing  Goal: Promote/optimize nutrition  Outcome: Progressing  Goal: Promote skin healing  Outcome: Progressing     Problem: Fall/Injury  Goal: Not fall by end of shift  Outcome: Progressing  Goal: Be free from injury by end of the shift  Outcome: Progressing  Goal: Verbalize understanding of personal risk factors for fall in the hospital  Outcome: Progressing  Goal: Verbalize understanding of risk factor reduction measures to prevent injury from fall in the home  Outcome: Progressing  Goal: Use assistive devices by end of the shift  Outcome: Progressing  Goal: Pace activities to prevent fatigue by end of the shift  Outcome: Progressing     Problem: Heart Failure  Goal: Improved gas exchange this shift  Outcome: Progressing  Goal: Improved urinary output this shift  Outcome: Progressing  Goal: Reduction in peripheral edema within 24 hours  Outcome: Progressing  Goal: Report improvement of dyspnea/breathlessness this shift  Outcome: Progressing  Goal: Weight from fluid excess reduced over 2-3 days, then stabilize  Outcome: Progressing  Goal: Increase self care and/or family involvement in 24 hours  Outcome: Progressing

## 2024-08-09 NOTE — PROGRESS NOTES
Occupational Therapy    Evaluation    Patient Name: Angel Carroll  MRN: [id number]  Today's Date: 8/9/2024  Time Calculation  Start Time: 1020  Stop Time: 1039  Time Calculation (min): 19 min    Assessment  IP OT Assessment  OT Assessment: OT order received, chart reviewed, evaluation completed. Pt demonstrated mildly impaired ADLs and functional mobility from baseline.Pt would benefit from acute OT services to faciliate return to PLOF.  Prognosis: Good  Barriers to Discharge: Decreased caregiver support  Evaluation/Treatment Tolerance: Patient limited by fatigue  Medical Staff Made Aware: Yes  End of Session Communication: Bedside nurse  End of Session Patient Position: Up in chair, Alarm on (call light in reach)  Plan:  Treatment Interventions: ADL retraining, Functional transfer training, UE strengthening/ROM, Endurance training, Patient/family training  OT Frequency: 3 times per week  OT Discharge Recommendations: Low intensity level of continued care  Equipment Recommended upon Discharge: Wheeled walker  OT Recommended Transfer Status: Minimal assist  OT - OK to Discharge: Yes    Subjective     General:  General  Reason for Referral: activities of daily living, fall  Referred By: Dee Dee BAKER  Past Medical History Relevant to Rehab: CAD, bradycardia, HTN, HLD, pacemaker, COPD  Missed Visit: No  Family/Caregiver Present: No  Prior to Session Communication: Bedside nurse  Patient Position Received: Bed, 2 rail up, Alarm on  Preferred Learning Style: verbal  General Comment: Pt is s/p fall at home after tripping over O2 tubing during local power outage. Imaging negative for acute fx's.  Precautions:  Hearing/Visual Limitations: [place]/glasses  Medical Precautions: Fall precautions, Oxygen therapy device and L/min (2L O2)    Pain:  Pain Assessment  Pain Assessment: 0-10  0-10 (Numeric) Pain Score: 2  Pain Type: Acute pain  Pain Location: Back  Pain Orientation: Lower  Pain Interventions: Repositioned  Response  to Interventions: denies worsened pain    Objective   Cognition:  Overall Cognitive Status: Within Functional Limits  Orientation Level: Oriented X4     Home Living:  Type of Home: House  Lives With: Spouse (spouse currently at [facility] for rehab)  Home Adaptive Equipment: Walker rolling or standard, Wheelchair-manual  Home Layout: Two level, Able to live on main level with bedroom/bathroom, Stairs to alternate level with rails  Home Access: Stairs to enter with rails  Entrance Stairs-Rails: Both  Entrance Stairs-Number of Steps: 4  Bathroom Shower/Tub: Tub only  Bathroom Toilet: Standard  Bathroom Equipment: Grab bars in shower, Tub transfer bench   Prior Function:  Level of [place]: Independent with ADLs and functional transfers, Needs assistance with homemaking  Receives Help From: Family  ADL Assistance: Independent  Homemaking Assistance: Needs assistance  Ambulatory Assistance: Independent (w/rollator/ w/c per pt)  Prior Function Comments: Adult children live nearby and assist with IADL's.  IADL History:     ADL:  Eating Assistance: Independent  Grooming Deficit: Setup (seated grooming in chair)  Bathing Assistance: Minimal  Bathing Deficit:  (projected due to current functional status)  UE Dressing Assistance: Minimal  UE Dressing Deficit: Thread RUE, Thread LUE, Pull around back  LE Dressing Assistance: Moderate  LE Dressing Deficit: Don/doff R sock, Don/doff L sock  Toileting Assistance with Device: Minimal  Toileting Deficit:  (currently has male purewick)  Functional Assistance: Minimal  Activity Tolerance:  Endurance: Tolerates less than 10 min exercise, no significant change in vital signs, Decreased tolerance for upright activites  Activity Tolerance Comments: fair  Bed Mobility/Transfers: Bed Mobility  Bed Mobility: Yes  Bed Mobility 1  Bed Mobility 1: Supine to sitting  Level of Assistance 1: Close supervision  Bed Mobility Comments 1: HOB elevated and use of rail.    Transfers  Transfer:  Yes  Transfer 1  Transfer From 1: Bed to  Transfer to 1: Sit, Stand  Technique 1: Sit to stand, Stand to sit  Transfer Device 1: Walker  Transfer Level of Assistance 1: Contact guard  Trials/Comments 1: cues for safe hand placement and RW mangement  Transfers 2  Transfer From 2: Stand to  Transfer to 2: Chair with arms  Technique 2: Stand pivot  Transfer Level of Assistance 2: Contact guard  Trials/Comments 2: Pt performed SPT to bedside chair without device and CGA with cues for safe hand placement and transfer technique.      Functional Mobility:  Functional Mobility  Functional Mobility Performed: Yes  Functional Mobility 1  Surface 1: Level tile  Device 1: Rolling walker  Assistance 1: Contact guard  Comments 1: Pt performed functional mobility short household distance in room limited by male purewick tubing to suction with CGA and RW support.    Sensation:  Light Touch: No apparent deficits  Strength:  Strength Comments: SHAHID UEs 4/5 overall    Coordination:  Movements are Fluid and Coordinated: Yes   Hand Function:  Hand Function  Gross Grasp: Functional  Coordination: Functional  Extremities: RUE   RUE : Within Functional Limits and LUE   LUE: Within Functional Limits    Outcome Measures: [facility] Daily Activity  Putting on and taking off regular lower body clothing: A little  Bathing (including washing, rinsing, drying): A little  Putting on and taking off regular upper body clothing: A little  Toileting, which includes using toilet, bedpan or urinal: A little  Taking care of personal grooming such as brushing teeth: None  Eating Meals: None  Daily Activity - Total Score: 20      Education Documentation  Home Exercise Program, taught by Dee Dee Huff OT at 8/9/2024  1:50 PM.  Learner: Patient  Readiness: Acceptance  Method: Explanation  Response: Verbalizes Understanding  Comment: Pt edu on OT POC    Body Mechanics, taught by Dee Dee Huff OT at 8/9/2024  1:50 PM.  Learner: Patient  Readiness:  Acceptance  Method: Explanation  Response: Verbalizes Understanding  Comment: Pt edu on OT POC    ADL Training, taught by Dee Dee Huff OT at 8/9/2024  1:50 PM.  Learner: Patient  Readiness: Acceptance  Method: Explanation  Response: Verbalizes Understanding  Comment: Pt edu on OT POC      Goals:   Encounter Problems       Encounter Problems (Active)       OT Goals       ADLs       Start:  08/09/24    Expected End:  08/30/24       Pt will complete ADL tasks with Mod I, using AE as needed, in order to complete self-care tasks.           Functional transfers       Start:  08/09/24    Expected End:  08/30/24       Pt will perform functional transfers at mod ind level with RW           Functional mobility       Start:  08/09/24    Expected End:  08/30/24       Pt will perform functional mobility household distance at mod ind level with RW

## 2024-08-09 NOTE — PROGRESS NOTES
Physical Therapy    Physical Therapy Evaluation & Treatment    Patient Name: Angel Carroll  MRN: [id number]  Today's Date: 8/9/2024   Time Calculation  Start Time: 1016  Stop Time: 1035  Time Calculation (min): 19 min    Assessment/Plan   PT Assessment  PT Assessment Results: Decreased strength, Decreased endurance, Impaired balance, Decreased mobility, Decreased coordination, Impaired hearing, Decreased skin integrity, Pain  Rehab Prognosis: Good  Barriers to Discharge: co-morbidities  Evaluation/Treatment Tolerance: Patient tolerated treatment well  Medical Staff Made Aware: Yes  Strengths: Ability to acquire knowledge, Attitude of self, Support of Caregivers, Premorbid level of function  Barriers to Participation: Comorbidities  End of Session Communication: Bedside nurse  Assessment Comment: Pt is currently functioning below baseline level of function. He would benefit from ongoing skilled PT to maximize level of function as pt currently lives alone (spouse is in a short term rehab facility). Pt mod I for ambulation and ADL's at baseline. Rec low intensity rehab upon discharge.  End of Session Patient Position: Up in chair, Alarm on (call light in reach)   IP OR SWING BED PT PLAN  Inpatient or Swing Bed: Inpatient  PT Plan  Treatment/Interventions: Bed mobility, Transfer training, Gait training, Stair training, Neuromuscular re-education, Balance training, Strengthening, Endurance training, Range of motion, Therapeutic exercise, Therapeutic activity, Home exercise program, Postural re-education  PT Plan: Ongoing PT  PT Frequency: 4 times per week  PT Discharge Recommendations: Low intensity level of continued care  Equipment Recommended upon Discharge: Wheeled walker, Wheelchair  PT Recommended Transfer Status: Assist x1  PT - OK to Discharge: Yes      Subjective     General Visit Information:  General  Reason for Referral: mobility impairment  Referred By: Dee Dee BAKER  Past Medical History Relevant to  Rehab: CAD, bradycardia, HTN, HLD, pacemaker, COPD  Missed Visit: No  Family/Caregiver Present: No  Prior to Session Communication: Bedside nurse  Patient Position Received: Bed, 2 rail up, Alarm on  Preferred Learning Style: verbal  General Comment: Pt is s/p fall at home after tripping over O2 tubing during local power outage. Imaging negative for acute fx's.  Home Living:  Home Living  Type of Home: House  Lives With: Spouse (spouse currently at [facility] for rehab)  Home Adaptive Equipment: Walker rolling or standard, Wheelchair-manual  Home Layout: Two level, Able to live on main level with bedroom/bathroom, Stairs to alternate level with rails  Home Access: Stairs to enter with rails  Entrance Stairs-Rails: Both  Entrance Stairs-Number of Steps: 4  Bathroom Shower/Tub: Tub only  Bathroom Toilet: Standard  Bathroom Equipment: Grab bars in shower, Tub transfer bench  Prior Level of Function:  Prior Function Per Pt/Caregiver Report  Level of [place]: Independent with ADLs and functional transfers, Needs assistance with homemaking  Receives Help From: Family  ADL Assistance: Independent  Homemaking Assistance: Needs assistance  Ambulatory Assistance: Independent (w/rollator/ w/c per pt)  Prior Function Comments: Adult children live nearby and assist with IADL's.  Precautions:  Precautions  Hearing/Visual Limitations: [place]/glasses  Medical Precautions: Fall precautions, Oxygen therapy device and L/min (2L NC, skin tear to LUE)  Vital Signs:       Objective   Pain:  Pain Assessment  Pain Assessment: 0-10  0-10 (Numeric) Pain Score: 2  Pain Type: Acute pain  Pain Location: Back  Pain Orientation: Lower  Pain Descriptors: Sore  Pain Frequency: Constant/continuous  Pain Onset: Ongoing  Effect of Pain on Daily Activities: does not limit participation  Pain Interventions: Distraction, Repositioned  Response to Interventions: denies worsened pain  Cognition:  Cognition  Overall Cognitive Status: Within Functional  Limits  Orientation Level: Oriented X4    General Assessments:     Activity Tolerance  Endurance: Tolerates less than 10 min exercise, no significant change in vital signs, Decreased tolerance for upright activites  Activity Tolerance Comments: Fair    Sensation  Sensation Comment: pt denies parasthesias    Strength  Strength Comments: BLEs grossly 4/5  Strength  Strength Comments: BLEs grossly 4/5           Coordination  Movements are Fluid and Coordinated: Yes  Coordination Comment: BLEs WFL-assessed via function    Postural Control  Postural Control: Within Functional Limits    Static Sitting Balance  Static Sitting-Balance Support: Feet supported, No upper extremity supported  Static Sitting-Level of Assistance: Independent    Static Standing Balance  Static Standing-Balance Support: Bilateral upper extremity supported  Static Standing-Level of Assistance: Close supervision  Static Standing-Comment/Number of Minutes: FWW  Functional Assessments:  Bed Mobility  Bed Mobility: Yes  Bed Mobility 1  Bed Mobility 1: Supine to sitting  Level of Assistance 1: Close supervision  Bed Mobility Comments 1: HOB elevated, SBA for safety    Transfers  Transfer: Yes  Transfer 1  Transfer From 1: Bed to  Transfer to 1: Sit, Stand  Technique 1: Sit to stand, Stand to sit  Transfer Device 1: Walker  Transfer Level of Assistance 1: Contact guard  Trials/Comments 1: at FWW, CGA for safety. Cues to reach back for bed prior to sitting.  Transfers 2  Transfer From 2: Bed to  Transfer to 2: Chair with arms  Technique 2: Stand pivot  Transfer Level of Assistance 2: Contact guard  Trials/Comments 2: no device, CGA for safety    Ambulation/Gait Training  Ambulation/Gait Training Performed: Yes  Ambulation/Gait Training 1  Surface 1: Level tile  Device 1: Rolling walker  Assistance 1: Contact guard  Quality of Gait 1: Forward flexed posture, Decreased step length  Comments/Distance (ft) 1: Performed at bedside d/t (+) external catheter on  wall suction. Pt performs 6 side steps using FWW along edge of bed with CGA for safety. Good foot clearance noted. Then forward 4 steps and backward 4 steps using FWW with CGA for safety. No overt LOB.    Stairs  Stairs: No  Extremity/Trunk Assessments:  RLE   RLE : Within Functional Limits  LLE   LLE : Within Functional Limits  Treatments:  Therapeutic Activity  Therapeutic Activity Performed: Yes  Therapeutic Activity 1: Training provided in transfers and stepping at edge of bed-refer below for details.    Bed Mobility  Bed Mobility: Yes  Bed Mobility 1  Bed Mobility 1: Supine to sitting  Level of Assistance 1: Close supervision  Bed Mobility Comments 1: HOB elevated, SBA for safety    Ambulation/Gait Training  Ambulation/Gait Training Performed: Yes  Ambulation/Gait Training 1  Surface 1: Level tile  Device 1: Rolling walker  Assistance 1: Contact guard  Quality of Gait 1: Forward flexed posture, Decreased step length  Comments/Distance (ft) 1: Performed at bedside d/t (+) external catheter on wall suction. Pt performs 6 side steps using FWW along edge of bed with CGA for safety. Good foot clearance noted. Then forward 4 steps and backward 4 steps using FWW with CGA for safety. No overt LOB.  Transfers  Transfer: Yes  Transfer 1  Transfer From 1: Bed to  Transfer to 1: Sit, Stand  Technique 1: Sit to stand, Stand to sit  Transfer Device 1: Walker  Transfer Level of Assistance 1: Contact guard  Trials/Comments 1: at FWW, CGA for safety. Cues to reach back for bed prior to sitting.  Transfers 2  Transfer From 2: Bed to  Transfer to 2: Chair with arms  Technique 2: Stand pivot  Transfer Level of Assistance 2: Contact guard  Trials/Comments 2: no device, CGA for safety  Outcome Measures:  [facility] Basic Mobility  Turning from your back to your side while in a flat bed without using bedrails: A little  Moving from lying on your back to sitting on the side of a flat bed without using bedrails: A little  Moving to and from  bed to chair (including a wheelchair): A little  Standing up from a chair using your arms (e.g. wheelchair or bedside chair): A little  To walk in hospital room: A little  Climbing 3-5 steps with railing: A little  Basic Mobility - Total Score: 18    Encounter Problems       Encounter Problems (Active)       PT Problem       Patient will ambulate 30' distance using walker with modified independent (Progressing)       Start:  08/09/24    Expected End:  08/23/24            Patient will ascend and descend 4-6 steps with rails modified independent (Progressing)       Start:  08/09/24    Expected End:  08/23/24            Patient will perform chair to and from bed transfer with modified independent (Progressing)       Start:  08/09/24    Expected End:  08/23/24                   Education Documentation  No documentation found.  Education Comments  No comments found.

## 2024-08-09 NOTE — CONSULTS
Inpatient consult to Cardiology  Consult performed by: Deacon Cullen MD  Consult ordered by: RON Rosario-CNP  Reason for consult: fall, weakness      Note: Consult performed exclusively by Dr. Deacon Cullen.  Notations that this consult were completed by Nestor Eisenberg are inaccurate and were unable to be edited.    History Of Present Illness:    Angel Carroll is a 79 y.o. male presenting with .    Mr. Carroll is a pleasant 79-year-old male with a past medical history significant for hypertension, hyperlipidemia, COPD with home oxygen.  Coronary artery disease s/p multiple PCI a low normal LVEF of 50 to 55% by echo 2/2024 patent stents by repeat cath 12/2022, atrial fibrillation with RVR s/p ablation, PPM secondary to sick sinus syndrome 1/2023, CVA in the setting of subtherapeutic INR with a near subtotal occlusion of the right ICA and carotid endarterectomy s/p bovine patch angioplasty 3/2021.  The patient did have a echocardiogram 2/29/2024 showing an LV ejection fraction of 50-55% abnormal septal motion due to right ventricular pacemaker.  There was aortic valve sclerosis moderate pulmonic and tricuspid valve insufficiency and borderline pulmonary hypertension estimated PA systolic pressure of 39 mmHg    The patient is currently admitted to the [facility], [place], operated by [facility] emergency room after experiencing a mechanical fall.  The patient had lost electrical power at his home and had gotten up early in the morning to check on his oxygen tanks when he became somewhat lightheaded and fell experiencing a skin tear to the left lower arm and also falling onto his left hip and injuring the right ankle. The patient's evaluation thus far included an initial CBC WBC of 3200 hematocrit 33.4.  The comprehensive metabolic panel included a creatinine of 1.7 potassium 3.0.  INR was 3.0.  proBNP was 4737.  High-sensitivity troponin was 54 repeated at 48.  The patient had a CT scan of the pelvis performed showing no  fracture of the pelvis or hips with mild DJD of the hips bilaterally and prostatic enlargement.  CT scan of the thoracic spine showed incidental hypoattenuating nodules of the thyroid.  Chest x-ray was interpreted as showing mild interstitial CHF.  X-ray of the right ankle was negative for fracture.  X-ray right knee showed intact hardware of the right knee.  Head CT without contrast showed no intracranial hemorrhage or calvarial fracture.  CT scan of the cervical spine was negative for fracture or traumatic malalignment.  The patient's medications at time of admission were listed as amiodarone 200 mg daily Eliquis 5 mg twice daily aspirin 81 mg daily atorvastatin 20 mg daily vitamin B12 500 mcg daily vitamin D 50,000 units/week fenofibrate 72 mg daily ferrous sulfate 250 mg twice daily finasteride 5 mg daily folic acid 1 mg daily Lasix 40 mg twice daily metoprolol to tartrate 12.5 mg twice daily Capoten 7 100 mg daily paroxetine 20 mg daily tamsulosin 0.8 mg daily and warfarin 2.5 mg daily.  The patient presumably is not taking the Eliquis and is on the warfarin given his elevated INR.  The patient's EKG on admission shows an AV sequential pacemaker rhythm.             Last Recorded Vitals:  Vitals:    08/08/24 2331 08/09/24 0355 08/09/24 0736 08/09/24 1151   BP: 122/57 123/60 126/63 117/57   BP Location: Right arm Right arm Right arm Right arm   Patient Position: Lying Lying Lying    Pulse: 61 60 64 60   Resp: 18 18 17 17   Temp: 36.5 °C (97.7 °F) 36.5 °C (97.7 °F) 36.5 °C (97.7 °F) 36.3 °C (97.3 °F)   TempSrc: Oral Oral Oral Oral   SpO2: 97% 97% 95% 99%   Weight:       Height:           Last Labs:  CBC - 8/9/2024:  5:17 AM  3.0 10.7 163    34.3      CMP - 8/9/2024:  5:17 AM  8.5 6.4 18 --- 0.7   2.9 3.6 6 85      PTT - 4/7/2024:  8:17 PM  3.0   29.3 45.5     BNP   Date/Time Value Ref Range Status   02/12/2022 05:28  (H) 0 - 99 pg/mL Final     Comment:     .  <100 pg/mL - Heart failure unlikely  100-299  pg/mL - Intermediate probability of acute heart  .               failure exacerbation. Correlate with clinical  .               context and patient history.    >=300 pg/mL - Heart Failure likely. Correlate with clinical  .               context and patient history.  BNP testing is performed using different testing   methodology at [facility] than at other   system hospitals. Direct result comparisons should   only be made within the same method.     02/11/2022 07:52  (H) 0 - 99 pg/mL Final     Comment:     .  <100 pg/mL - Heart failure unlikely  100-299 pg/mL - Intermediate probability of acute heart  .               failure exacerbation. Correlate with clinical  .               context and patient history.    >=300 pg/mL - Heart Failure likely. Correlate with clinical  .               context and patient history.  BNP testing is performed using different testing   methodology at [facility] than at other   [facility] hospitals. Direct result comparisons should   only be made within the same method.       Hemoglobin A1C   Date/Time Value Ref Range Status   02/12/2022 05:27 AM 5.0 % Final     Comment:          Diagnosis of Diabetes-Adults   Non-Diabetic: < or = 5.6%   Increased risk for developing diabetes: 5.7-6.4%   Diagnostic of diabetes: > or = 6.5%  .       Monitoring of Diabetes                Age (y)     Therapeutic Goal (%)   Adults:          >18           <7.0   Pediatrics:    13-18           <7.5                   7-12           <8.0                   0- 6            7.5-8.5   American Diabetes Association. Diabetes Care 33(S1), Jan 2010.     09/26/2021 12:15 PM 5.6 % Final     Comment:          Diagnosis of Diabetes-Adults   Non-Diabetic: < or = 5.6%   Increased risk for developing diabetes: 5.7-6.4%   Diagnostic of diabetes: > or = 6.5%  .       Monitoring of Diabetes                Age (y)     Therapeutic Goal (%)   Adults:          >18           <7.0   Pediatrics:    13-18    "        <7.5                   7-12           <8.0                   0- 6            7.5-8.5   American Diabetes Association. Diabetes Care 33(S1), Jan 2010.       LDL Calculated   Date/Time Value Ref Range Status   11/14/2022 03:42 AM 70 65 - 130 MG/DL Final   10/15/2022 06:11 AM 48 (L) 65 - 130 MG/DL Final   03/23/2021 02:08 PM 50 (L) 65 - 130 MG/DL Final     VLDL   Date/Time Value Ref Range Status   01/10/2022 01:43 PM 12 0 - 40 mg/dL Final   09/26/2021 12:15 PM 23 0 - 40 mg/dL Final   06/22/2021 10:58 AM 10 0 - 40 mg/dL Final      Last I/O:  I/O last 3 completed shifts:  In: 200 (1.8 mL/kg) [P.O.:200]  Out: 1800 (16.3 mL/kg) [Urine:1800 (0.5 mL/kg/hr)]  Weight: 110.1 kg     Past Cardiology Tests (Last 3 Years):  EKG:  ECG 12 Lead 06/30/2024      ECG 12 lead 02/29/2024    Echo:  Transthoracic Echo (TTE) Complete 02/29/2024    Ejection Fractions:  No results found for: \"EF\"  Cath:  No results found for this or any previous visit from the past 1095 days.    Stress Test:  Nuclear Stress Test 11/28/2022    Cardiac Imaging:  No results found for this or any previous visit from the past 1095 days.      Past Medical History:  He has a past medical history of Atherosclerotic heart disease of native coronary artery without angina pectoris (12/14/2022), Benign neoplasm of meninges, unspecified (Multi), Benign prostatic hyperplasia without lower urinary tract symptoms (02/28/2022), Body mass index (BMI) 33.0-33.9, adult (07/02/2021), Body mass index (BMI) 34.0-34.9, adult (11/09/2021), Body mass index (BMI) 34.0-34.9, adult (09/08/2021), Body mass index (BMI) 35.0-35.9, adult (12/29/2021), Body mass index (BMI) 35.0-35.9, adult (02/28/2022), Elevated blood-pressure reading, without diagnosis of hypertension, Encounter for immunization (10/27/2015), Hyperlipidemia, unspecified (11/15/2021), Other conditions influencing health status, Pacemaker, Personal history of other diseases of the musculoskeletal system and connective " tissue, Personal history of other drug therapy, Personal history of other specified conditions (10/25/2022), Rectal bleeding, and Vitamin D deficiency, unspecified (10/08/2020).    Past Surgical History:  He has a past surgical history that includes Other surgical history (04/03/2013); Other surgical history (04/03/2013); Other surgical history (04/03/2013); Eye surgery (04/03/2013); Tonsillectomy (04/03/2013); Total knee arthroplasty (08/03/2016); Other surgical history (06/11/2019); Back surgery (04/18/2016); Lithotripsy (07/22/2013); Other surgical history (07/22/2013); Colonoscopy (07/03/2013); Other surgical history (07/03/2013); Knee arthroscopy w/ debridement (07/03/2013); Knee arthroscopy w/ debridement (07/03/2013); Other surgical history (07/03/2013); Other surgical history (07/03/2013); Coronary angioplasty with stent (08/18/2020); CT angio head w and wo IV contrast (9/25/2021); CT angio neck (9/25/2021); MR angio head wo IV contrast (3/23/2021); and MR angio neck wo IV contrast (3/23/2021).      Social History:  He reports that he quit smoking about 30 years ago. His smoking use included cigarettes. He has never used smokeless tobacco. He reports that he does not currently use alcohol. He reports that he does not use drugs.    Family History:  Family History   Problem Relation Name Age of Onset    Diabetes Mother      Emphysema Father      Heart disease Father          Allergies:  Other, Ranolazine, and Cyclobenzaprine    Inpatient Medications:  Scheduled medications   Medication Dose Route Frequency    amiodarone  200 mg oral Daily    atorvastatin  20 mg oral Nightly    cyanocobalamin  500 mcg oral Daily    [START ON 8/11/2024] ergocalciferol  50,000 Units oral Every Sunday    fenofibrate  54 mg oral Daily    ferrous sulfate (325 mg ferrous sulfate)  65 mg of iron oral Daily    finasteride  5 mg oral Daily    folic acid  1 mg oral Daily    [Held by provider] furosemide  40 mg oral BID    gabapentin  100  mg oral Nightly    metoprolol tartrate  12.5 mg oral BID    pantoprazole  40 mg oral Daily    PARoxetine  20 mg oral Daily    psyllium  1 packet oral BID    tamsulosin  0.8 mg oral Nightly    [Held by provider] warfarin  2.5 mg oral Daily     PRN medications   Medication    acetaminophen    polyethylene glycol     Continuous Medications   Medication Dose Last Rate    oxygen  3 L/min       Outpatient Medications:  Current Outpatient Medications   Medication Instructions    acetaminophen (TYLENOL) 325 mg, oral, Every 6 hours PRN    amiodarone (PACERONE) 200 mg, oral, Daily    atorvastatin (Lipitor) 20 mg tablet TAKE 1 TABLET EVERY DAY    cyanocobalamin (Vitamin B-12) 500 mcg tablet 1 tablet, oral, Daily    ergocalciferol (VITAMIN D-2) 50,000 Units, oral, Once Weekly    fenofibrate (TRICOR) 72 mg, oral, Daily    ferrous sulfate 250 mg, oral, 2 times daily    finasteride (PROSCAR) 5 mg, oral, Daily    folic acid (Folvite) 1 mg tablet TAKE 1 TABLET EVERY DAY    furosemide (LASIX) 40 mg, oral, 2 times daily    gabapentin (NEURONTIN) 100 mg, oral, Nightly    metoprolol tartrate (LOPRESSOR) 12.5 mg, oral, 2 times daily    oxygen (O2) 3 L/min, inhalation, Continuous    pantoprazole (ProtoNix) 40 mg EC tablet TAKE 1 TABLET EVERY DAY    PARoxetine (Paxil) 20 mg tablet TAKE 1 TABLET EVERY DAY    psyllium husk, with sugar, 2 gram wafer 2 capsules, oral, Daily, Mix and drink with at least 8 ounces of water or juice.    tamsulosin (FLOMAX) 0.8 mg, oral, Nightly    warfarin (Coumadin) 2.5 mg tablet TAKE 1 TABLET AT BEDTIME FOR A-FIB       Physical Exam:  The patient is a nonobese elderly white male awake alert comfortable in bed no respiratory distress on low-flow nasal oxygen and no significant pain.  JVP not elevated carotid and pulses 2+  Chest shallow air movement diminished breath sounds  Cardiac rhythm regular no premature beats S1-S2 normal no gallop murmur rub  Abdomen is soft and benign  No peripheral edema pedal pulses  present     Assessment/Plan   8/9: The patient is a very pleasant 79-year-old white male with a past history including hypertension hyperlipidemia former smoking COPD with continuous nasal oxygen therapy, coronary artery disease status post multiple PCI procedures, borderline ischemic congestive cardiomyopathy with low normal LV ejection fraction, paroxysmal atrial fibrillation with radiofrequency ablation, status post permanent pacemaker implantation for sick sinus syndrome in 1/2023, history of CVA with subtherapeutic INR with 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  The patient is admitted after sustaining a mechanical fall during the middle of the night when he was checking his oxygen due to a power outage.  Extensive x-rays are negative for evidence of fracture.  His EKG shows an AV sequential pacemaker rhythm.  INR is 3.0.  Will will change the patient's with Toprol tartrate 12.5 mg twice daily to Toprol-XL 25 mg daily.  Creatinine values slightly above baseline at 1.7 and will temporarily hold Lasix and ultimately will reduce his Lasix dosage to 40 mg daily.  Cardiac status at this time is relatively stable    Peripheral IV 08/08/24 20 G Right;Anterior Forearm (Active)   Site Assessment Clean;Dry;Intact 08/09/24 0948   Dressing Type Transparent 08/09/24 0948   Line Status Flushed;Saline locked 08/09/24 0948   Dressing Status Clean;Dry 08/09/24 0948   Number of days: 1       Code Status:  Full Code    I spent  minutes in the professional and overall care of this patient.        Deacon Cullen MD

## 2024-08-09 NOTE — PROGRESS NOTES
Angel Carroll is a 79 y.o. male on day 1 of admission presenting with Fall, initial encounter.      Subjective   Patient resting in chair at side of bed. He denies pain or sob.        Objective     Last Recorded Vitals  /72 (BP Location: Right arm, Patient Position: Lying)   Pulse 61   Temp 36.4 °C (97.5 °F) (Oral)   Resp 18   Wt 110 kg (242 lb 11.6 oz)   SpO2 99%   Intake/Output last 3 Shifts:    Intake/Output Summary (Last 24 hours) at 8/9/2024 1856  Last data filed at 8/9/2024 1801  Gross per 24 hour   Intake 680 ml   Output 2200 ml   Net -1520 ml       Admission Weight  Weight: 110 kg (242 lb 11.6 oz) (08/08/24 1229)    Daily Weight  08/08/24 : 110 kg (242 lb 11.6 oz)    Image Results  CT pelvis wo IV contrast  Narrative: Interpreted By:  Trinity Mancini,   STUDY:  CT PELVIS WO IV CONTRAST;  8/8/2024 3:40 pm      INDICATION:  Signs/Symptoms:r/o fracture.      COMPARISON:  CT abdomen and pelvis 11/15/2023. Pelvis x-ray 08/08/2024.      ACCESSION NUMBER(S):  [id number]      ORDERING CLINICIAN:  RAHEEM VAZQUEZ      TECHNIQUE:  CT of the pelvis was performed. Contiguous axial images were obtained  at 3 mm slice thickness through the pelvis. Coronal and sagittal  reconstructions at 3 mm slice thickness were performed.  No  intravenous contrast was administered.      FINDINGS:  PELVIS:      BLADDER:  There is a 6 mm calculus along the right side at the base of the  urinary bladder.      REPRODUCTIVE ORGANS:  The prostate measures 5.1 cm in transverse diameter.      BOWEL:  No dilated bowel loops at the visualized pelvis.      VESSELS:  Atherosclerotic calcifications at the visualized infrarenal abdominal  aorta and its branches.      PERITONEUM/RETROPERITONEUM/LYMPH NODES:  No free fluid or free air. No pathologically enlarged lymph nodes.      ABDOMINAL WALL:  There is a small fat containing umbilical hernia.      BONES:  There is diffuse osteopenia.  Degenerative changes at the visualized lower lumbar  spine.  Postsurgical changes of laminectomy at the lumbar spine.  No acute fracture or dislocation at the pelvis or hips. Mild  degenerative changes at the bilateral hips with chondrocalcinosis.      Impression: 1.  No acute fracture at the pelvis or hips. Mild degenerative  changes at the bilateral hips with chondrocalcinosis.  2. Vesicolithiasis.  3. Prostatomegaly.      MACRO:  None      Signed by: Trinity Mancini 8/8/2024 4:10 PM  Dictation workstation:   [id number]  XR ankle right 3+ views  Narrative: Interpreted By:  Don Quigley,   STUDY:  XR ANKLE RIGHT 3+ VIEWS;  8/8/2024 1:41 pm      INDICATION:  Signs/Symptoms:Fall, pain.      COMPARISON:  None.      ACCESSION NUMBER(S):  [id number]      ORDERING CLINICIAN:  LORELEI LYNCH      FINDINGS:  No acute fracture or dislocation. Ankle mortise is congruent. Joint  spaces are maintained. Tiny retrocalcaneal and small plantar  calcaneal enthesophytes. Mild lateral ankle soft tissue swelling.      Impression: No acute osseous abnormality.      MACRO  None      Signed by: Don Quigley 8/8/2024 2:05 PM  Dictation workstation:   [id number]  XR knee right 4+ views  Narrative: Interpreted By:  Don Quigley,   STUDY:  XR KNEE RIGHT 4+ VIEWS;  8/8/2024 1:41 pm      INDICATION:  Signs/Symptoms:Fall, pain.      COMPARISON:  Radiographs 10/09/2019.      ACCESSION NUMBER(S):  [id number]      ORDERING CLINICIAN:  LORELEI LYNCH      FINDINGS:  Right total knee arthroplasty. Hardware is intact, well positioned  and well aligned. No acute fracture or dislocation. Trace joint  effusion.      Impression: No acute osseous abnormality. Intact hardware.      MACRO  None      Signed by: Don Quigley 8/8/2024 2:04 PM  Dictation workstation:   [id number]  XR pelvis 1-2 views  Narrative: Interpreted By:  Don Quigley,   STUDY:  XR PELVIS 1-2 VIEWS;  8/8/2024 1:41 pm      INDICATION:  Signs/Symptoms:Fall, trauma.      COMPARISON:  Radiographs 06/30/2024.      ACCESSION  NUMBER(S):  [id number]      ORDERING CLINICIAN:  LORELEI LYNCH      FINDINGS:  Apparent subtle step-off along the superior cortex of the right  superior pubic ramus. Moderate right and mild left hip  osteoarthritis. Minimal degenerative changes in the bilateral  sacroiliac joints and pubic symphysis.      Impression: Apparent subtle cortical step-off in the right superior pubic ramus;  fracture versus artifact. Consider further evaluation with pelvis CT.      MACRO  None      Signed by: Don Quigley 8/8/2024 2:03 PM  Dictation workstation:   [id number]  CT thoracic spine wo IV contrast  Narrative: Interpreted By:  Don Quigley,   STUDY:  CT THORACIC SPINE WO IV CONTRAST;  8/8/2024 1:46 pm      INDICATION:  Signs/Symptoms:Fall, upper back pain.      COMPARISON:  CT cervical spine 06/30/2024.      ACCESSION NUMBER(S):  [id number]      ORDERING CLINICIAN:  LORELEI LYNCH      TECHNIQUE:  Axial CT images of the thoracic and lumbar spine are obtained. Axial,  coronal and sagittal reconstructions are submitted for review.      FINDINGS:  THORACIC SPINE:      Trauma: No acute fracture.      Alignment: No traumatic malalignment. Minimal dextrocurvature.      Vertebral Body Heights: Maintained.      Intervertebral Discs:  Multilevel disc height narrowing, overall mild.      Degenerative change: Multilevel degenerative disc disease with bulky  anterior bridging osteophytes. No high-grade canal stenosis or neural  foramina narrowing.      Paraspinous Soft Tissues/Lungs: Stable 2 cm right thyroid nodule.  Cardiomegaly. Pulmonary edema.      Impression: No acute fracture or traumatic malalignment.      CHF.      Stable 2 cm right thyroid nodule.      MACRO:  Incidental Finding:  There are few small hypoattenuating nodules  measuring equal to or greater than 1.5 cm in the thyroid gland.  (**-YCF-**)      Instructions:  Further evaluation with nonemergent thyroid ultrasound.  (Managing Incidental Thyroid Nodules Detected  on Imaging: White Paper  of the ACR Incidental Thyroid Findings Committee. Hiral Guillaume et  al. Journal of the American College of Radiology,Volume 12, Issue 2,  143  150.) [email or web address]      Signed by: Don Quigley 8/8/2024 1:59 PM  Dictation workstation:   [id number]  XR chest 1 view  Narrative: Interpreted By:  Don Quigley,   STUDY:  XR CHEST 1 VIEW; 8/8/2024 1:41 pm      INDICATION:  Signs/Symptoms:Trauma      COMPARISON:  Radiographs 06/30/2024      ACCESSION NUMBER(S):  [id number]      ORDERING CLINICIAN:  LORELEI LYNCH      TECHNIQUE:  Single frontal view of the chest performed.      FINDINGS:  LINES AND DEVICES:  Stable left subclavian cardiac pacer.      LUNGS:  Bilateral interstitial opacities. No focal consolidation, pleural  effusion or pneumothorax.      CARDIOMEDIASTINAL SILHOUETTE:  Stable cardiomegaly.      OTHER:  No acute displaced rib fractures.      Impression: CHF with mild interstitial edema.      MACRO  None      Signed by: Don Quigley 8/8/2024 1:50 PM  Dictation workstation:   [id number]  CT cervical spine wo IV contrast  Narrative: Interpreted By:  Don Quigley,   STUDY:  CT CERVICAL SPINE WO IV CONTRAST;  8/8/2024 1:11 pm      INDICATION:  Signs/Symptoms:Fall, neck trauma.      COMPARISON:  CT C-spine 06/30/2024      ACCESSION NUMBER(S):  [id number]      ORDERING CLINICIAN:  LORELEI LYNCH      TECHNIQUE:  Thin section axial images were obtained from the skull base down  through the thoracic inlet. Sagittal and coronal reconstruction  images were generated. Soft tissue, lung, and bone windows were  reviewed.      FINDINGS:  Prevertebral/Paraspinal Soft Tissues: No acute abnormalities.      CERVICAL SPINE:  Hardware: None.  Fracture: No acute fracture.  Vertebral Body Heights: Normal.  Alignment: No traumatic listhesis. Unchanged stepwise grade 1  anterolisthesis from C3 to C5. Spinal canal and neural foramina:  Unchanged multilevel degenerative disc disease, most  pronounced with  severe neural foramina narrowing at C3-C4 on the left, C4-C5 on the  left, C5-C6 bilaterally, C6-C7 on the left and moderate to severe  canal stenosis from C3 to C6. 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      Impression: No acute fracture or traumatic malalignment.      MACRO  None      Signed by: Don Quigley 8/8/2024 1:29 PM  Dictation workstation:   [id number]  CT head W O contrast trauma protocol  Narrative: Interpreted By:  Don Quigley,   STUDY:  CT HEAD W/O CONTRAST TRAUMA PROTOCOL;  8/8/2024 1:11 pm      INDICATION:  Signs/Symptoms:Fall, head injury on anticoagulation.      COMPARISON:  CT head 06/30/2024.      ACCESSION NUMBER(S):  [id number]      ORDERING CLINICIAN:  LORELEI LYNCH      TECHNIQUE:  Noncontrast axial CT scan of head was performed.      FINDINGS:  Parenchyma: No intracranial hemorrhage. No mass effect or midline  shift. Unchanged right frontal encephalomalacia and scattered  dystrophic calcifications. Patchy periventricular white matter  hypodensities, likely moderate chronic microvascular ischemic change,  grossly stable. Moderate diffuse parenchymal atrophy.      CSF Spaces: The ventricles, sulci and basal cisterns are unchanged in  size and proportional to degree of parenchymal volume loss.      Extra-Axial Fluid: None.      Calvarium: No acute fracture. Unchanged frontal craniotomy.      Paranasal sinuses: Visualized paranasal sinuses are clear.      Mastoids: Clear.      Orbits: Normal.      Soft tissues: No acute abnormality.      Impression: No acute intracranial hemorrhage or calvarial fracture.      Stable changes compared to 06/30/2024.      MACRO:  None      Signed by: Don Quigley 8/8/2024 1:22 PM  Dictation workstation:   [id number]      Physical Exam  Constitutional:       Appearance: Normal appearance.   Cardiovascular:      Rate and Rhythm: Normal rate. Rhythm irregular.      Pulses: Normal pulses.      Heart sounds: Normal heart  sounds.   Pulmonary:      Effort: Pulmonary effort is normal.      Breath sounds: Normal breath sounds.   Abdominal:      General: Bowel sounds are normal.      Palpations: Abdomen is soft.   Musculoskeletal:         General: Normal range of motion.      Right lower leg: Edema present.      Left lower leg: Edema present.   Skin:     General: Skin is warm and dry.   Neurological:      Mental Status: He is alert and oriented to person, place, and time.         Relevant Results             Results for orders placed or performed during the hospital encounter of 08/08/24 (from the past 24 hour(s))   Serial Troponin, 6 Hour ([facility])   Result Value Ref Range    Troponin T, High Sensitivity 41 (HH) <=14 ng/L   CBC   Result Value Ref Range    WBC 3.0 (L) 4.4 - 11.3 x10*3/uL    nRBC 0.0 0.0 - 0.0 /100 WBCs    RBC 3.62 (L) 4.50 - 5.90 x10*6/uL    Hemoglobin 10.7 (L) 13.5 - 17.5 g/dL    Hematocrit 34.3 (L) 41.0 - 52.0 %    MCV 95 80 - 100 fL    MCH 29.6 26.0 - 34.0 pg    MCHC 31.2 (L) 32.0 - 36.0 g/dL    RDW 17.2 (H) 11.5 - 14.5 %    Platelets 163 150 - 450 x10*3/uL   Basic Metabolic Panel   Result Value Ref Range    Glucose 96 65 - 99 mg/dL    Sodium 144 133 - 145 mmol/L    Potassium 3.3 (L) 3.4 - 5.1 mmol/L    Chloride 103 97 - 107 mmol/L    Bicarbonate 31 24 - 31 mmol/L    Urea Nitrogen 18 8 - 25 mg/dL    Creatinine 1.70 (H) 0.40 - 1.60 mg/dL    eGFR 40 (L) >60 mL/min/1.73m*2    Calcium 8.5 8.5 - 10.4 mg/dL    Anion Gap 10 <=19 mmol/L   Urinalysis with Reflex Culture and Microscopic   Result Value Ref Range    Color, Urine Light-Yellow Light-Yellow, Yellow, Dark-Yellow    Appearance, Urine Clear Clear    Specific Gravity, Urine 1.008 1.005 - 1.035    pH, Urine 7.5 5.0, 5.5, 6.0, 6.5, 7.0, 7.5, 8.0    Protein, Urine NEGATIVE NEGATIVE, 10 (TRACE), 20 (TRACE) mg/dL    Glucose, Urine Normal Normal mg/dL    Blood, Urine 0.03 (TRACE) (A) NEGATIVE    Ketones, Urine NEGATIVE NEGATIVE mg/dL    Bilirubin, Urine NEGATIVE NEGATIVE     Urobilinogen, Urine 2 (1+) (A) Normal mg/dL    Nitrite, Urine NEGATIVE NEGATIVE    Leukocyte Esterase, Urine 75 Ruth/µL (A) NEGATIVE   Microscopic Only, Urine   Result Value Ref Range    WBC, Urine 6-10 (A) 1-5, NONE /HPF    RBC, Urine 3-5 NONE, 1-2, 3-5 /HPF    Mucus, Urine FEW Reference range not established. /LPF     *Note: Due to a large number of results and/or encounters for the requested time period, some results have not been displayed. A complete set of results can be found in Results Review.       Assessment/Plan                  Principal Problem:    Fall, initial encounter    Fall; near syncope  Fall precaution  PT/OT  Orthostatic vital signs     S/P pacemaker placement for bradycardia with atrial fibrillation  Pacemaker interrogation  Chronic Coumadin use with INR today of 3.0. will hold and check am INR     CHF with elevated troponin  Patient has signs clinically of volume overload.  Will hold home medication dose of Lasix and give a one-time dose of 60 g IV.  Consult cardiology, Dr. Cullen.  Dr. Martin is patient's primary cardiologist  Most recent Echo 2/2024 will defer to cardiology for new echo     DVT prophylaxis  Coumadin on hold     Plan of care  Patient plans to go home at discharge with [facility].     Plan of care discussed with the patient, daughter at bedside and collaborating physician, Dr. Colvin.              Dee Dee Haines, RON-CNP

## 2024-08-10 LAB
ANION GAP SERPL CALC-SCNC: 9 MMOL/L
BACTERIA UR CULT: ABNORMAL
BUN SERPL-MCNC: 18 MG/DL (ref 8–25)
CALCIUM SERPL-MCNC: 8.7 MG/DL (ref 8.5–10.4)
CHLORIDE SERPL-SCNC: 103 MMOL/L (ref 97–107)
CO2 SERPL-SCNC: 31 MMOL/L (ref 24–31)
CREAT SERPL-MCNC: 1.6 MG/DL (ref 0.4–1.6)
EGFRCR SERPLBLD CKD-EPI 2021: 43 ML/MIN/1.73M*2
ERYTHROCYTE [DISTWIDTH] IN BLOOD BY AUTOMATED COUNT: 17.3 % (ref 11.5–14.5)
GLUCOSE SERPL-MCNC: 87 MG/DL (ref 65–99)
HCT VFR BLD AUTO: 34.4 % (ref 41–52)
HGB BLD-MCNC: 10.7 G/DL (ref 13.5–17.5)
INR PPP: 2.5 (ref 0.9–1.2)
MCH RBC QN AUTO: 29.6 PG (ref 26–34)
MCHC RBC AUTO-ENTMCNC: 31.1 G/DL (ref 32–36)
MCV RBC AUTO: 95 FL (ref 80–100)
NRBC BLD-RTO: 0 /100 WBCS (ref 0–0)
PLATELET # BLD AUTO: 159 X10*3/UL (ref 150–450)
POTASSIUM SERPL-SCNC: 3.6 MMOL/L (ref 3.4–5.1)
PROTHROMBIN TIME: 24.5 SECONDS (ref 9.3–12.7)
RBC # BLD AUTO: 3.62 X10*6/UL (ref 4.5–5.9)
SODIUM SERPL-SCNC: 143 MMOL/L (ref 133–145)
WBC # BLD AUTO: 3.2 X10*3/UL (ref 4.4–11.3)

## 2024-08-10 PROCEDURE — 1200000002 HC GENERAL ROOM WITH TELEMETRY DAILY

## 2024-08-10 PROCEDURE — 85027 COMPLETE CBC AUTOMATED: CPT | Performed by: NURSE PRACTITIONER

## 2024-08-10 PROCEDURE — 2500000001 HC RX 250 WO HCPCS SELF ADMINISTERED DRUGS (ALT 637 FOR MEDICARE OP): Performed by: NURSE PRACTITIONER

## 2024-08-10 PROCEDURE — 36415 COLL VENOUS BLD VENIPUNCTURE: CPT | Performed by: INTERNAL MEDICINE

## 2024-08-10 PROCEDURE — 85610 PROTHROMBIN TIME: CPT | Performed by: INTERNAL MEDICINE

## 2024-08-10 PROCEDURE — 80051 ELECTROLYTE PANEL: CPT | Performed by: INTERNAL MEDICINE

## 2024-08-10 PROCEDURE — 99232 SBSQ HOSP IP/OBS MODERATE 35: CPT | Performed by: NURSE PRACTITIONER

## 2024-08-10 PROCEDURE — 2500000002 HC RX 250 W HCPCS SELF ADMINISTERED DRUGS (ALT 637 FOR MEDICARE OP, ALT 636 FOR OP/ED): Performed by: NURSE PRACTITIONER

## 2024-08-10 RX ORDER — NYSTATIN 100000 [USP'U]/G
1 POWDER TOPICAL 2 TIMES DAILY
Status: DISPENSED | OUTPATIENT
Start: 2024-08-10

## 2024-08-10 ASSESSMENT — COGNITIVE AND FUNCTIONAL STATUS - GENERAL
MOVING TO AND FROM BED TO CHAIR: A LITTLE
TURNING FROM BACK TO SIDE WHILE IN FLAT BAD: A LITTLE
WALKING IN HOSPITAL ROOM: A LITTLE
DAILY ACTIVITIY SCORE: 20
CLIMB 3 TO 5 STEPS WITH RAILING: A LITTLE
DRESSING REGULAR UPPER BODY CLOTHING: A LITTLE
HELP NEEDED FOR BATHING: A LITTLE
CLIMB 3 TO 5 STEPS WITH RAILING: A LOT
CLIMB 3 TO 5 STEPS WITH RAILING: A LOT
TOILETING: A LITTLE
DAILY ACTIVITIY SCORE: 20
TURNING FROM BACK TO SIDE WHILE IN FLAT BAD: A LITTLE
MOVING FROM LYING ON BACK TO SITTING ON SIDE OF FLAT BED WITH BEDRAILS: A LITTLE
DRESSING REGULAR UPPER BODY CLOTHING: A LITTLE
DRESSING REGULAR UPPER BODY CLOTHING: A LITTLE
WALKING IN HOSPITAL ROOM: A LOT
HELP NEEDED FOR BATHING: A LITTLE
DRESSING REGULAR LOWER BODY CLOTHING: A LITTLE
TOILETING: A LITTLE
DAILY ACTIVITIY SCORE: 20
WALKING IN HOSPITAL ROOM: A LOT
STANDING UP FROM CHAIR USING ARMS: A LITTLE
MOBILITY SCORE: 15
MOVING TO AND FROM BED TO CHAIR: A LITTLE
STANDING UP FROM CHAIR USING ARMS: A LOT
MOBILITY SCORE: 15
HELP NEEDED FOR BATHING: A LITTLE
STANDING UP FROM CHAIR USING ARMS: A LOT
DRESSING REGULAR LOWER BODY CLOTHING: A LITTLE
MOVING TO AND FROM BED TO CHAIR: A LITTLE
MOVING FROM LYING ON BACK TO SITTING ON SIDE OF FLAT BED WITH BEDRAILS: A LITTLE
DRESSING REGULAR LOWER BODY CLOTHING: A LITTLE
MOVING FROM LYING ON BACK TO SITTING ON SIDE OF FLAT BED WITH BEDRAILS: A LITTLE
TOILETING: A LITTLE
TURNING FROM BACK TO SIDE WHILE IN FLAT BAD: A LITTLE
MOBILITY SCORE: 18

## 2024-08-10 ASSESSMENT — PAIN SCALES - GENERAL
PAINLEVEL_OUTOF10: 0 - NO PAIN

## 2024-08-10 ASSESSMENT — PAIN - FUNCTIONAL ASSESSMENT: PAIN_FUNCTIONAL_ASSESSMENT: 0-10

## 2024-08-10 NOTE — PROGRESS NOTES
Angel Carroll is a 79 y.o. male on day 2 of admission presenting with Fall, initial encounter.    Subjective   Alert and oriented.  Denies complaints chest pain or pressure, palpitations or feeling of rapid heart rate.       Objective     Physical Exam  Vitals and nursing note reviewed.   Constitutional:       General: He is not in acute distress.     Appearance: He is ill-appearing. He is not toxic-appearing.   HENT:      Head: Normocephalic and atraumatic.      Nose: Nose normal.      Mouth/Throat:      Mouth: Mucous membranes are moist.      Pharynx: Oropharynx is clear.   Cardiovascular:      Rate and Rhythm: Normal rate. Rhythm irregular.      Pulses: Normal pulses.      Heart sounds: Normal heart sounds.   Pulmonary:      Effort: Pulmonary effort is normal.      Breath sounds: Normal breath sounds.      Comments: Oxygen via nasal cannula.  No conversational dyspnea appreciated.  no tachypnea.  No pain with deep inspiration  Abdominal:      General: Bowel sounds are normal.      Palpations: Abdomen is soft.   Musculoskeletal:      Cervical back: Normal range of motion.      Right lower leg: Edema present.      Left lower leg: Edema present.      Comments: Trace pedal and ankle edema   Skin:     General: Skin is warm and dry.      Capillary Refill: Capillary refill takes less than 2 seconds.   Neurological:      Mental Status: He is alert and oriented to person, place, and time. Mental status is at baseline.   Psychiatric:         Mood and Affect: Mood normal.         Behavior: Behavior normal.         Thought Content: Thought content normal.         Judgment: Judgment normal.         Last Recorded Vitals  Blood pressure 118/59, pulse 59, temperature 36.9 °C (98.4 °F), temperature source Oral, resp. rate 18, height 1.829 m (6'), weight 110 kg (242 lb 11.6 oz), SpO2 96%.  Intake/Output last 3 Shifts:  I/O last 3 completed shifts:  In: 680 (6.2 mL/kg) [P.O.:680]  Out: 2600 (23.6 mL/kg) [Urine:2600 (0.7  mL/kg/hr)]  Weight: 110.1 kg     Relevant Results  Results for orders placed or performed during the hospital encounter of 08/08/24 (from the past 24 hour(s))   CBC   Result Value Ref Range    WBC 3.2 (L) 4.4 - 11.3 x10*3/uL    nRBC 0.0 0.0 - 0.0 /100 WBCs    RBC 3.62 (L) 4.50 - 5.90 x10*6/uL    Hemoglobin 10.7 (L) 13.5 - 17.5 g/dL    Hematocrit 34.4 (L) 41.0 - 52.0 %    MCV 95 80 - 100 fL    MCH 29.6 26.0 - 34.0 pg    MCHC 31.1 (L) 32.0 - 36.0 g/dL    RDW 17.3 (H) 11.5 - 14.5 %    Platelets 159 150 - 450 x10*3/uL   Basic Metabolic Panel   Result Value Ref Range    Glucose 87 65 - 99 mg/dL    Sodium 143 133 - 145 mmol/L    Potassium 3.6 3.4 - 5.1 mmol/L    Chloride 103 97 - 107 mmol/L    Bicarbonate 31 24 - 31 mmol/L    Urea Nitrogen 18 8 - 25 mg/dL    Creatinine 1.60 0.40 - 1.60 mg/dL    eGFR 43 (L) >60 mL/min/1.73m*2    Calcium 8.7 8.5 - 10.4 mg/dL    Anion Gap 9 <=19 mmol/L   Protime-INR   Result Value Ref Range    Protime 24.5 (H) 9.3 - 12.7 seconds    INR 2.5 (H) 0.9 - 1.2     *Note: Due to a large number of results and/or encounters for the requested time period, some results have not been displayed. A complete set of results can be found in Results Review.           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 The patient is admitted after sustaining a mechanical  fall during the middle of the night when he was checking his oxygen due to a power outage. Extensive x-rays are negative for evidence of fracture. His EKG shows an AV sequential pacemaker rhythm. INR is 3.0. Will will change the patient's with Toprol tartrate 12.5 mg twice daily to Toprol-XL 25 mg daily. Creatinine values slightly above baseline at 1.7 and will temporarily hold Lasix and ultimately will reduce his Lasix dosage to 40 mg daily. Cardiac status at this time is relatively stable     8/10: Assessed by Sienna WHITAKERBC in collaboration with Dr. Coronado on coverage for Dr. Cullen.  Stable overnight.  Denies complaints chest pain or pressure, palpitations or feeling of rapid heart rate.  His diuretics remain on hold him his creatinine this morning is noted at 1.6.  Mildly improved from peak of 1.7 on admission.  Blood pressure stable with most recent of 118/59.  Current pulse ox on nasal cannula oxygen 96%.  He remains in a rate controlled atrial fibrillation on telemetry.  His metoprolol was increased yesterday.  He does have a trace pedal ankle edema which appears to be chronic for this patient.  Overall appears generally stable at this time.  Will continue to follow creatinine.  Will follow with you.      I spent 35 minutes in the professional and overall care of this patient.      Nestor Eisenberg, RON-CNP

## 2024-08-10 NOTE — PROGRESS NOTES
Angel Carroll is a 79 y.o. male on day 2 of admission presenting with Fall, initial encounter.      Subjective   Patient sitting up in chair denies any pain or sob.       Objective     Last Recorded Vitals  /59 (BP Location: Right arm, Patient Position: Standing)   Pulse 59   Temp 36.9 °C (98.4 °F) (Oral)   Resp 18   Wt 110 kg (242 lb 11.6 oz)   SpO2 96%   Intake/Output last 3 Shifts:    Intake/Output Summary (Last 24 hours) at 8/10/2024 1708  Last data filed at 8/10/2024 0450  Gross per 24 hour   Intake 240 ml   Output 400 ml   Net -160 ml       Admission Weight  Weight: 110 kg (242 lb 11.6 oz) (08/08/24 1229)    Daily Weight  08/08/24 : 110 kg (242 lb 11.6 oz)    Image Results  CT pelvis wo IV contrast  Narrative: Interpreted By:  Trinity Mancini,   STUDY:  CT PELVIS WO IV CONTRAST;  8/8/2024 3:40 pm      INDICATION:  Signs/Symptoms:r/o fracture.      COMPARISON:  CT abdomen and pelvis 11/15/2023. Pelvis x-ray 08/08/2024.      ACCESSION NUMBER(S):  [id number]      ORDERING CLINICIAN:  RAHEEM VAZQUEZ      TECHNIQUE:  CT of the pelvis was performed. Contiguous axial images were obtained  at 3 mm slice thickness through the pelvis. Coronal and sagittal  reconstructions at 3 mm slice thickness were performed.  No  intravenous contrast was administered.      FINDINGS:  PELVIS:      BLADDER:  There is a 6 mm calculus along the right side at the base of the  urinary bladder.      REPRODUCTIVE ORGANS:  The prostate measures 5.1 cm in transverse diameter.      BOWEL:  No dilated bowel loops at the visualized pelvis.      VESSELS:  Atherosclerotic calcifications at the visualized infrarenal abdominal  aorta and its branches.      PERITONEUM/RETROPERITONEUM/LYMPH NODES:  No free fluid or free air. No pathologically enlarged lymph nodes.      ABDOMINAL WALL:  There is a small fat containing umbilical hernia.      BONES:  There is diffuse osteopenia.  Degenerative changes at the visualized lower lumbar  spine.  Postsurgical changes of laminectomy at the lumbar spine.  No acute fracture or dislocation at the pelvis or hips. Mild  degenerative changes at the bilateral hips with chondrocalcinosis.      Impression: 1.  No acute fracture at the pelvis or hips. Mild degenerative  changes at the bilateral hips with chondrocalcinosis.  2. Vesicolithiasis.  3. Prostatomegaly.      MACRO:  None      Signed by: Trinity Mancini 8/8/2024 4:10 PM  Dictation workstation:   [id number]  XR ankle right 3+ views  Narrative: Interpreted By:  Don Quigley,   STUDY:  XR ANKLE RIGHT 3+ VIEWS;  8/8/2024 1:41 pm      INDICATION:  Signs/Symptoms:Fall, pain.      COMPARISON:  None.      ACCESSION NUMBER(S):  [id number]      ORDERING CLINICIAN:  LORELEI LYNCH      FINDINGS:  No acute fracture or dislocation. Ankle mortise is congruent. Joint  spaces are maintained. Tiny retrocalcaneal and small plantar  calcaneal enthesophytes. Mild lateral ankle soft tissue swelling.      Impression: No acute osseous abnormality.      MACRO  None      Signed by: Don Quigley 8/8/2024 2:05 PM  Dictation workstation:   [id number]  XR knee right 4+ views  Narrative: Interpreted By:  Don Quigley,   STUDY:  XR KNEE RIGHT 4+ VIEWS;  8/8/2024 1:41 pm      INDICATION:  Signs/Symptoms:Fall, pain.      COMPARISON:  Radiographs 10/09/2019.      ACCESSION NUMBER(S):  [id number]      ORDERING CLINICIAN:  LORELEI LYNCH      FINDINGS:  Right total knee arthroplasty. Hardware is intact, well positioned  and well aligned. No acute fracture or dislocation. Trace joint  effusion.      Impression: No acute osseous abnormality. Intact hardware.      MACRO  None      Signed by: Don Quigley 8/8/2024 2:04 PM  Dictation workstation:   [id number]  XR pelvis 1-2 views  Narrative: Interpreted By:  Don Quigley,   STUDY:  XR PELVIS 1-2 VIEWS;  8/8/2024 1:41 pm      INDICATION:  Signs/Symptoms:Fall, trauma.      COMPARISON:  Radiographs 06/30/2024.      ACCESSION  NUMBER(S):  [id number]      ORDERING CLINICIAN:  LORELEI LYNCH      FINDINGS:  Apparent subtle step-off along the superior cortex of the right  superior pubic ramus. Moderate right and mild left hip  osteoarthritis. Minimal degenerative changes in the bilateral  sacroiliac joints and pubic symphysis.      Impression: Apparent subtle cortical step-off in the right superior pubic ramus;  fracture versus artifact. Consider further evaluation with pelvis CT.      MACRO  None      Signed by: Don Quigley 8/8/2024 2:03 PM  Dictation workstation:   [id number]  CT thoracic spine wo IV contrast  Narrative: Interpreted By:  Don Quigley,   STUDY:  CT THORACIC SPINE WO IV CONTRAST;  8/8/2024 1:46 pm      INDICATION:  Signs/Symptoms:Fall, upper back pain.      COMPARISON:  CT cervical spine 06/30/2024.      ACCESSION NUMBER(S):  [id number]      ORDERING CLINICIAN:  LORELEI LYNCH      TECHNIQUE:  Axial CT images of the thoracic and lumbar spine are obtained. Axial,  coronal and sagittal reconstructions are submitted for review.      FINDINGS:  THORACIC SPINE:      Trauma: No acute fracture.      Alignment: No traumatic malalignment. Minimal dextrocurvature.      Vertebral Body Heights: Maintained.      Intervertebral Discs:  Multilevel disc height narrowing, overall mild.      Degenerative change: Multilevel degenerative disc disease with bulky  anterior bridging osteophytes. No high-grade canal stenosis or neural  foramina narrowing.      Paraspinous Soft Tissues/Lungs: Stable 2 cm right thyroid nodule.  Cardiomegaly. Pulmonary edema.      Impression: No acute fracture or traumatic malalignment.      CHF.      Stable 2 cm right thyroid nodule.      MACRO:  Incidental Finding:  There are few small hypoattenuating nodules  measuring equal to or greater than 1.5 cm in the thyroid gland.  (**-YCF-**)      Instructions:  Further evaluation with nonemergent thyroid ultrasound.  (Managing Incidental Thyroid Nodules Detected  on Imaging: White Paper  of the ACR Incidental Thyroid Findings Committee. Hiral Guillaume et  al. Journal of the American College of Radiology,Volume 12, Issue 2,  143  150.) [email or web address]      Signed by: Don Quigley 8/8/2024 1:59 PM  Dictation workstation:   [id number]  XR chest 1 view  Narrative: Interpreted By:  Don Quigley,   STUDY:  XR CHEST 1 VIEW; 8/8/2024 1:41 pm      INDICATION:  Signs/Symptoms:Trauma      COMPARISON:  Radiographs 06/30/2024      ACCESSION NUMBER(S):  [id number]      ORDERING CLINICIAN:  LORELEI LYNCH      TECHNIQUE:  Single frontal view of the chest performed.      FINDINGS:  LINES AND DEVICES:  Stable left subclavian cardiac pacer.      LUNGS:  Bilateral interstitial opacities. No focal consolidation, pleural  effusion or pneumothorax.      CARDIOMEDIASTINAL SILHOUETTE:  Stable cardiomegaly.      OTHER:  No acute displaced rib fractures.      Impression: CHF with mild interstitial edema.      MACRO  None      Signed by: Don Quigley 8/8/2024 1:50 PM  Dictation workstation:   [id number]  CT cervical spine wo IV contrast  Narrative: Interpreted By:  Don Quigley,   STUDY:  CT CERVICAL SPINE WO IV CONTRAST;  8/8/2024 1:11 pm      INDICATION:  Signs/Symptoms:Fall, neck trauma.      COMPARISON:  CT C-spine 06/30/2024      ACCESSION NUMBER(S):  [id number]      ORDERING CLINICIAN:  LORELEI LYNCH      TECHNIQUE:  Thin section axial images were obtained from the skull base down  through the thoracic inlet. Sagittal and coronal reconstruction  images were generated. Soft tissue, lung, and bone windows were  reviewed.      FINDINGS:  Prevertebral/Paraspinal Soft Tissues: No acute abnormalities.      CERVICAL SPINE:  Hardware: None.  Fracture: No acute fracture.  Vertebral Body Heights: Normal.  Alignment: No traumatic listhesis. Unchanged stepwise grade 1  anterolisthesis from C3 to C5. Spinal canal and neural foramina:  Unchanged multilevel degenerative disc disease, most  pronounced with  severe neural foramina narrowing at C3-C4 on the left, C4-C5 on the  left, C5-C6 bilaterally, C6-C7 on the left and moderate to severe  canal stenosis from C3 to C6. 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      Impression: No acute fracture or traumatic malalignment.      MACRO  None      Signed by: Don Quigley 8/8/2024 1:29 PM  Dictation workstation:   [id number]  CT head W O contrast trauma protocol  Narrative: Interpreted By:  Don Quigley,   STUDY:  CT HEAD W/O CONTRAST TRAUMA PROTOCOL;  8/8/2024 1:11 pm      INDICATION:  Signs/Symptoms:Fall, head injury on anticoagulation.      COMPARISON:  CT head 06/30/2024.      ACCESSION NUMBER(S):  [id number]      ORDERING CLINICIAN:  LORELEI LYNCH      TECHNIQUE:  Noncontrast axial CT scan of head was performed.      FINDINGS:  Parenchyma: No intracranial hemorrhage. No mass effect or midline  shift. Unchanged right frontal encephalomalacia and scattered  dystrophic calcifications. Patchy periventricular white matter  hypodensities, likely moderate chronic microvascular ischemic change,  grossly stable. Moderate diffuse parenchymal atrophy.      CSF Spaces: The ventricles, sulci and basal cisterns are unchanged in  size and proportional to degree of parenchymal volume loss.      Extra-Axial Fluid: None.      Calvarium: No acute fracture. Unchanged frontal craniotomy.      Paranasal sinuses: Visualized paranasal sinuses are clear.      Mastoids: Clear.      Orbits: Normal.      Soft tissues: No acute abnormality.      Impression: No acute intracranial hemorrhage or calvarial fracture.      Stable changes compared to 06/30/2024.      MACRO:  None      Signed by: Don Quigley 8/8/2024 1:22 PM  Dictation workstation:   [id number]      Physical Exam    Relevant Results               Assessment/Plan                  Principal Problem:    Fall, initial encounter    Fall; near syncope  Fall precaution  PT/OT  Orthostatic vital signs  negative per vitals patient was dizzy.      S/P pacemaker placement for bradycardia with atrial fibrillation  Pacemaker interrogation  Chronic Coumadin use with INR today of 2.5. coumadin restarted today     CHF with elevated troponin  Patient has signs clinically of volume overload.  Will hold home medication dose of Lasix held for JASBIR which is improving if continues improving will restart lasix tomorrow.  Consult cardiology, Dr. Cullen.  Dr. Martin is patient's primary cardiologist  Most recent Echo 2/2024 will defer to cardiology for new echo     DVT prophylaxis  Coumadin restarted INR 2.5     Plan of care  Patient plans to go home at discharge with [facility].      Plan of care discussed with the patient, daughter at bedside and collaborating physician.               Dee Dee Haines, RON-CNP

## 2024-08-10 NOTE — CARE PLAN
The patient's goals for the shift include      The clinical goals for the shift include remain hds    Over the shift, the patient did   Problem: Skin  Goal: Promote/optimize nutrition  Outcome: Progressing     Problem: Skin  Goal: Promote skin healing  Outcome: Progressing     Problem: Fall/Injury  Goal: Not fall by end of shift  Outcome: Progressing   make progress toward the following goals.

## 2024-08-10 NOTE — CARE PLAN
The patient's goals for the shift include      The clinical goals for the shift include safety  Problem: Skin  Goal: Decreased wound size/increased tissue granulation at next dressing change  Outcome: Progressing  Flowsheets (Taken 8/10/2024 1114)  Decreased wound size/increased tissue granulation at next dressing change: Promote sleep for wound healing  Goal: Participates in plan/prevention/treatment measures  Outcome: Progressing  Flowsheets (Taken 8/10/2024 1114)  Participates in plan/prevention/treatment measures:   Discuss with provider PT/OT consult   Increase activity/out of bed for meals  Goal: Prevent/manage excess moisture  Outcome: Progressing  Flowsheets (Taken 8/10/2024 1114)  Prevent/manage excess moisture: Monitor for/manage infection if present  Goal: Prevent/minimize sheer/friction injuries  Outcome: Progressing  Flowsheets (Taken 8/10/2024 1114)  Prevent/minimize sheer/friction injuries:   Use pull sheet   Increase activity/out of bed for meals   HOB 30 degrees or less  Goal: Promote/optimize nutrition  Outcome: Progressing  Flowsheets (Taken 8/10/2024 1114)  Promote/optimize nutrition:   Monitor/record intake including meals   Consume > 50% meals/supplements   Offer water/supplements/favorite foods  Goal: Promote skin healing  Outcome: Progressing  Flowsheets (Taken 8/10/2024 1114)  Promote skin healing:   Protective dressings over bony prominences   Assess skin/pad under line(s)/device(s)     Problem: Fall/Injury  Goal: Not fall by end of shift  Outcome: Progressing  Goal: Be free from injury by end of the shift  Outcome: Progressing  Goal: Verbalize understanding of personal risk factors for fall in the hospital  Outcome: Progressing  Goal: Verbalize understanding of risk factor reduction measures to prevent injury from fall in the home  Outcome: Progressing  Goal: Use assistive devices by end of the shift  Outcome: Progressing  Goal: Pace activities to prevent fatigue by end of the  shift  Outcome: Progressing     Problem: Heart Failure  Goal: Improved gas exchange this shift  Outcome: Progressing  Goal: Improved urinary output this shift  Outcome: Progressing  Goal: Reduction in peripheral edema within 24 hours  Outcome: Progressing  Goal: Report improvement of dyspnea/breathlessness this shift  Outcome: Progressing  Goal: Weight from fluid excess reduced over 2-3 days, then stabilize  Outcome: Progressing  Goal: Increase self care and/or family involvement in 24 hours  Outcome: Progressing     Problem: Pain - Adult  Goal: Verbalizes/displays adequate comfort level or baseline comfort level  Outcome: Progressing     Problem: Safety - Adult  Goal: Free from fall injury  Outcome: Progressing     Problem: Discharge Planning  Goal: Discharge to home or other facility with appropriate resources  Outcome: Progressing     Problem: Chronic Conditions and Co-morbidities  Goal: Patient's chronic conditions and co-morbidity symptoms are monitored and maintained or improved  Outcome: Progressing

## 2024-08-10 NOTE — NURSING NOTE
[employer] [place] pacemaker interrogated at bedside. Report faxed and placed in patient's chart. Dee Dee Haines notified of report placed in chart.

## 2024-08-10 NOTE — NURSING NOTE
Assumed care of patient. Patient currently laying in bed, call light within reach, and no needs stated at this time.

## 2024-08-11 VITALS
HEART RATE: 60 BPM | RESPIRATION RATE: 18 BRPM | SYSTOLIC BLOOD PRESSURE: 129 MMHG | OXYGEN SATURATION: 96 % | DIASTOLIC BLOOD PRESSURE: 81 MMHG | HEIGHT: 72 IN | WEIGHT: 242.73 LBS | BODY MASS INDEX: 32.88 KG/M2 | TEMPERATURE: 98.2 F

## 2024-08-11 LAB
ANION GAP SERPL CALC-SCNC: 11 MMOL/L
BUN SERPL-MCNC: 19 MG/DL (ref 8–25)
CALCIUM SERPL-MCNC: 8.9 MG/DL (ref 8.5–10.4)
CHLORIDE SERPL-SCNC: 103 MMOL/L (ref 97–107)
CO2 SERPL-SCNC: 28 MMOL/L (ref 24–31)
CREAT SERPL-MCNC: 1.5 MG/DL (ref 0.4–1.6)
EGFRCR SERPLBLD CKD-EPI 2021: 46 ML/MIN/1.73M*2
ERYTHROCYTE [DISTWIDTH] IN BLOOD BY AUTOMATED COUNT: 17.5 % (ref 11.5–14.5)
GLUCOSE SERPL-MCNC: 95 MG/DL (ref 65–99)
HCT VFR BLD AUTO: 32.8 % (ref 41–52)
HGB BLD-MCNC: 10.5 G/DL (ref 13.5–17.5)
INR PPP: 2.2 (ref 0.9–1.2)
MCH RBC QN AUTO: 29.7 PG (ref 26–34)
MCHC RBC AUTO-ENTMCNC: 32 G/DL (ref 32–36)
MCV RBC AUTO: 93 FL (ref 80–100)
NRBC BLD-RTO: 0 /100 WBCS (ref 0–0)
PLATELET # BLD AUTO: 156 X10*3/UL (ref 150–450)
POTASSIUM SERPL-SCNC: 4 MMOL/L (ref 3.4–5.1)
PROTHROMBIN TIME: 22.3 SECONDS (ref 9.3–12.7)
RBC # BLD AUTO: 3.53 X10*6/UL (ref 4.5–5.9)
SODIUM SERPL-SCNC: 142 MMOL/L (ref 133–145)
WBC # BLD AUTO: 3.5 X10*3/UL (ref 4.4–11.3)

## 2024-08-11 PROCEDURE — 85610 PROTHROMBIN TIME: CPT | Performed by: INTERNAL MEDICINE

## 2024-08-11 PROCEDURE — 1200000002 HC GENERAL ROOM WITH TELEMETRY DAILY

## 2024-08-11 PROCEDURE — 2500000002 HC RX 250 W HCPCS SELF ADMINISTERED DRUGS (ALT 637 FOR MEDICARE OP, ALT 636 FOR OP/ED): Performed by: NURSE PRACTITIONER

## 2024-08-11 PROCEDURE — 85027 COMPLETE CBC AUTOMATED: CPT | Performed by: NURSE PRACTITIONER

## 2024-08-11 PROCEDURE — 36415 COLL VENOUS BLD VENIPUNCTURE: CPT | Performed by: INTERNAL MEDICINE

## 2024-08-11 PROCEDURE — 80048 BASIC METABOLIC PNL TOTAL CA: CPT | Performed by: INTERNAL MEDICINE

## 2024-08-11 PROCEDURE — 2500000001 HC RX 250 WO HCPCS SELF ADMINISTERED DRUGS (ALT 637 FOR MEDICARE OP): Performed by: NURSE PRACTITIONER

## 2024-08-11 PROCEDURE — 99232 SBSQ HOSP IP/OBS MODERATE 35: CPT | Performed by: NURSE PRACTITIONER

## 2024-08-11 PROCEDURE — 2500000001 HC RX 250 WO HCPCS SELF ADMINISTERED DRUGS (ALT 637 FOR MEDICARE OP): Performed by: INTERNAL MEDICINE

## 2024-08-11 ASSESSMENT — COGNITIVE AND FUNCTIONAL STATUS - GENERAL
MOVING TO AND FROM BED TO CHAIR: A LITTLE
MOVING FROM LYING ON BACK TO SITTING ON SIDE OF FLAT BED WITH BEDRAILS: A LITTLE
CLIMB 3 TO 5 STEPS WITH RAILING: A LOT
DRESSING REGULAR UPPER BODY CLOTHING: A LITTLE
MOVING FROM LYING ON BACK TO SITTING ON SIDE OF FLAT BED WITH BEDRAILS: A LITTLE
DRESSING REGULAR LOWER BODY CLOTHING: A LITTLE
DAILY ACTIVITIY SCORE: 20
HELP NEEDED FOR BATHING: A LITTLE
DAILY ACTIVITIY SCORE: 20
MOBILITY SCORE: 15
MOVING TO AND FROM BED TO CHAIR: A LITTLE
STANDING UP FROM CHAIR USING ARMS: A LOT
WALKING IN HOSPITAL ROOM: A LOT
HELP NEEDED FOR BATHING: A LITTLE
DRESSING REGULAR LOWER BODY CLOTHING: A LITTLE
TOILETING: A LITTLE
TOILETING: A LITTLE
CLIMB 3 TO 5 STEPS WITH RAILING: A LOT
STANDING UP FROM CHAIR USING ARMS: A LOT
WALKING IN HOSPITAL ROOM: A LOT
TURNING FROM BACK TO SIDE WHILE IN FLAT BAD: A LITTLE
DRESSING REGULAR UPPER BODY CLOTHING: A LITTLE
MOBILITY SCORE: 15
TURNING FROM BACK TO SIDE WHILE IN FLAT BAD: A LITTLE

## 2024-08-11 ASSESSMENT — PAIN SCALES - WONG BAKER: WONGBAKER_NUMERICALRESPONSE: NO HURT

## 2024-08-11 ASSESSMENT — PAIN SCALES - GENERAL
PAINLEVEL_OUTOF10: 0 - NO PAIN
PAINLEVEL_OUTOF10: 0 - NO PAIN

## 2024-08-11 NOTE — NURSING NOTE
Pt resting in bed after sitting in chair for a couple hours today, comfortable and finished dinner.

## 2024-08-11 NOTE — PROGRESS NOTES
Angel Carroll is a 79 y.o. male on day 3 of admission presenting with Fall, initial encounter.    Subjective   Alert and oriented, denies complaints chest pain or pressure, palpitations or feeling of rapid heart rate, states he is slowly improving.       Objective     Physical Exam  Vitals and nursing note reviewed.   Constitutional:       General: He is not in acute distress.     Appearance: He is obese. He is not ill-appearing or toxic-appearing.   HENT:      Head: Normocephalic and atraumatic.      Nose: Nose normal.      Mouth/Throat:      Mouth: Mucous membranes are moist.      Pharynx: Oropharynx is clear.   Cardiovascular:      Rate and Rhythm: Normal rate. Rhythm irregular.      Pulses: Normal pulses.      Heart sounds: No murmur heard.     No friction rub. No gallop.   Pulmonary:      Effort: Pulmonary effort is normal.      Comments: Diminished in bilateral bases, mildly worse to right.  Oxygen via nasal cannula.  No pain with deep inspiration.  No conversational dyspnea  Abdominal:      General: Bowel sounds are normal.      Palpations: Abdomen is soft.   Musculoskeletal:         General: Normal range of motion.      Cervical back: Normal range of motion.      Right lower leg: Edema present.      Left lower leg: Edema present.      Comments: Trace pedal and ankle edema   Skin:     General: Skin is warm and dry.      Capillary Refill: Capillary refill takes less than 2 seconds.   Neurological:      Mental Status: He is alert and oriented to person, place, and time. Mental status is at baseline.   Psychiatric:         Mood and Affect: Mood normal.         Behavior: Behavior normal.         Thought Content: Thought content normal.         Judgment: Judgment normal.         Last Recorded Vitals  Blood pressure 129/81, pulse 60, temperature 36.8 °C (98.2 °F), temperature source Oral, resp. rate 18, height 1.829 m (6'), weight 110 kg (242 lb 11.6 oz), SpO2 96%.  Intake/Output last 3 Shifts:  I/O last 3 completed  shifts:  In: - (0 mL/kg)   Out: 1050 (9.5 mL/kg) [Urine:1050 (0.3 mL/kg/hr)]  Weight: 110.1 kg     Relevant Results  Results for orders placed or performed during the hospital encounter of 08/08/24 (from the past 24 hour(s))   Basic Metabolic Panel   Result Value Ref Range    Glucose 95 65 - 99 mg/dL    Sodium 142 133 - 145 mmol/L    Potassium 4.0 3.4 - 5.1 mmol/L    Chloride 103 97 - 107 mmol/L    Bicarbonate 28 24 - 31 mmol/L    Urea Nitrogen 19 8 - 25 mg/dL    Creatinine 1.50 0.40 - 1.60 mg/dL    eGFR 46 (L) >60 mL/min/1.73m*2    Calcium 8.9 8.5 - 10.4 mg/dL    Anion Gap 11 <=19 mmol/L   Protime-INR   Result Value Ref Range    Protime 22.3 (H) 9.3 - 12.7 seconds    INR 2.2 (H) 0.9 - 1.2   CBC   Result Value Ref Range    WBC 3.5 (L) 4.4 - 11.3 x10*3/uL    nRBC 0.0 0.0 - 0.0 /100 WBCs    RBC 3.53 (L) 4.50 - 5.90 x10*6/uL    Hemoglobin 10.5 (L) 13.5 - 17.5 g/dL    Hematocrit 32.8 (L) 41.0 - 52.0 %    MCV 93 80 - 100 fL    MCH 29.7 26.0 - 34.0 pg    MCHC 32.0 32.0 - 36.0 g/dL    RDW 17.5 (H) 11.5 - 14.5 %    Platelets 156 150 - 450 x10*3/uL     *Note: Due to a large number of results and/or encounters for the requested time period, some results have not been displayed. A complete set of results can be found in Results Review.           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 The patient is admitted after sustaining a mechanical fall during the middle of the night when he was checking his oxygen due to a power outage. Extensive x-rays are negative for evidence of fracture. His EKG shows an AV sequential pacemaker rhythm. INR is 3.0. Will will change the patient's with Toprol tartrate 12.5 mg twice daily to Toprol-XL 25 mg daily. Creatinine values slightly above baseline at 1.7 and will temporarily hold Lasix and ultimately will reduce his Lasix dosage to 40 mg daily. Cardiac status at this time is relatively stable      8/10: Assessed by Sienna CORDOVA-BC in collaboration with Dr. Coronado on coverage for Dr. Cullen.  Stable overnight.  Denies complaints chest pain or pressure, palpitations or feeling of rapid heart rate.  His diuretics remain on hold him his creatinine this morning is noted at 1.6.  Mildly improved from peak of 1.7 on admission.  Blood pressure stable with most recent of 118/59.  Current pulse ox on nasal cannula oxygen 96%.  He remains in a rate controlled atrial fibrillation on telemetry.  His metoprolol was increased yesterday.  He does have a trace pedal ankle edema which appears to be chronic for this patient.  Overall appears generally stable at this time.  Will continue to follow creatinine.  Will follow with you.       8/11: Assessed by Sienna CORDOVA-BC in collaboration with Dr. Coronado on coverage for Dr. Cullen.  No significant changes overnight.  Has been restarted on his oral diuretics.  His creatinine continues to slowly improve to most recent 1.5.  Potassium stable at 4.0.  No significant vents on telemetry.  Remains in rate controlled atrial fibrillation.  Most recent blood pressure 129/81.  He is chest pain-free overall believe he will be stable for discharge tomorrow from the cardiac perspective.  Will follow with you.      I spent 35 minutes in the professional and overall care of this patient.      Nestor Eisenberg, RON-CNP

## 2024-08-11 NOTE — CARE PLAN
Problem: Skin  Goal: Decreased wound size/increased tissue granulation at next dressing change  Outcome: Progressing  Flowsheets (Taken 8/11/2024 0424)  Decreased wound size/increased tissue granulation at next dressing change: Protective dressings over bony prominences     Problem: Fall/Injury  Goal: Be free from injury by end of the shift  Outcome: Progressing   The patient's goals for the shift include      The clinical goals for the shift include safety    Over the shift, the patient did not make progress toward the following goals. Barriers to progression include . Recommendations to address these barriers include .

## 2024-08-11 NOTE — PROGRESS NOTES
Angel Carroll is a 79 y.o. male on day 3 of admission presenting with Fall, initial encounter.      Subjective   Patient resting at side of bed.  He appears more fatigued today.  Denies any shortness of breath or pain.       Objective     Last Recorded Vitals  /81 (BP Location: Right arm, Patient Position: Sitting)   Pulse 60   Temp 36.8 °C (98.2 °F) (Oral)   Resp 18   Wt 110 kg (242 lb 11.6 oz)   SpO2 96%   Intake/Output last 3 Shifts:    Intake/Output Summary (Last 24 hours) at 8/11/2024 1258  Last data filed at 8/11/2024 0628  Gross per 24 hour   Intake --   Output 650 ml   Net -650 ml       Admission Weight  Weight: 110 kg (242 lb 11.6 oz) (08/08/24 1229)    Daily Weight  08/08/24 : 110 kg (242 lb 11.6 oz)    Image Results  CT pelvis wo IV contrast  Narrative: Interpreted By:  Trinity Mancini,   STUDY:  CT PELVIS WO IV CONTRAST;  8/8/2024 3:40 pm      INDICATION:  Signs/Symptoms:r/o fracture.      COMPARISON:  CT abdomen and pelvis 11/15/2023. Pelvis x-ray 08/08/2024.      ACCESSION NUMBER(S):  [id number]      ORDERING CLINICIAN:  RAHEEM VAZQUEZ      TECHNIQUE:  CT of the pelvis was performed. Contiguous axial images were obtained  at 3 mm slice thickness through the pelvis. Coronal and sagittal  reconstructions at 3 mm slice thickness were performed.  No  intravenous contrast was administered.      FINDINGS:  PELVIS:      BLADDER:  There is a 6 mm calculus along the right side at the base of the  urinary bladder.      REPRODUCTIVE ORGANS:  The prostate measures 5.1 cm in transverse diameter.      BOWEL:  No dilated bowel loops at the visualized pelvis.      VESSELS:  Atherosclerotic calcifications at the visualized infrarenal abdominal  aorta and its branches.      PERITONEUM/RETROPERITONEUM/LYMPH NODES:  No free fluid or free air. No pathologically enlarged lymph nodes.      ABDOMINAL WALL:  There is a small fat containing umbilical hernia.      BONES:  There is diffuse osteopenia.  Degenerative  changes at the visualized lower lumbar spine.  Postsurgical changes of laminectomy at the lumbar spine.  No acute fracture or dislocation at the pelvis or hips. Mild  degenerative changes at the bilateral hips with chondrocalcinosis.      Impression: 1.  No acute fracture at the pelvis or hips. Mild degenerative  changes at the bilateral hips with chondrocalcinosis.  2. Vesicolithiasis.  3. Prostatomegaly.      MACRO:  None      Signed by: Trinity Mancini 8/8/2024 4:10 PM  Dictation workstation:   [id number]  XR ankle right 3+ views  Narrative: Interpreted By:  Don Quigley,   STUDY:  XR ANKLE RIGHT 3+ VIEWS;  8/8/2024 1:41 pm      INDICATION:  Signs/Symptoms:Fall, pain.      COMPARISON:  None.      ACCESSION NUMBER(S):  [id number]      ORDERING CLINICIAN:  LORELEI LYNCH      FINDINGS:  No acute fracture or dislocation. Ankle mortise is congruent. Joint  spaces are maintained. Tiny retrocalcaneal and small plantar  calcaneal enthesophytes. Mild lateral ankle soft tissue swelling.      Impression: No acute osseous abnormality.      MACRO  None      Signed by: Don Quigley 8/8/2024 2:05 PM  Dictation workstation:   [id number]  XR knee right 4+ views  Narrative: Interpreted By:  Don Quigley,   STUDY:  XR KNEE RIGHT 4+ VIEWS;  8/8/2024 1:41 pm      INDICATION:  Signs/Symptoms:Fall, pain.      COMPARISON:  Radiographs 10/09/2019.      ACCESSION NUMBER(S):  [id number]      ORDERING CLINICIAN:  LORELEI LYNCH      FINDINGS:  Right total knee arthroplasty. Hardware is intact, well positioned  and well aligned. No acute fracture or dislocation. Trace joint  effusion.      Impression: No acute osseous abnormality. Intact hardware.      MACRO  None      Signed by: Don Quigley 8/8/2024 2:04 PM  Dictation workstation:   [id number]  XR pelvis 1-2 views  Narrative: Interpreted By:  Don Quigley,   STUDY:  XR PELVIS 1-2 VIEWS;  8/8/2024 1:41 pm      INDICATION:  Signs/Symptoms:Fall, trauma.       COMPARISON:  Radiographs 06/30/2024.      ACCESSION NUMBER(S):  [id number]      ORDERING CLINICIAN:  LORELEI LYNCH      FINDINGS:  Apparent subtle step-off along the superior cortex of the right  superior pubic ramus. Moderate right and mild left hip  osteoarthritis. Minimal degenerative changes in the bilateral  sacroiliac joints and pubic symphysis.      Impression: Apparent subtle cortical step-off in the right superior pubic ramus;  fracture versus artifact. Consider further evaluation with pelvis CT.      MACRO  None      Signed by: Don Quigley 8/8/2024 2:03 PM  Dictation workstation:   [id number]  CT thoracic spine wo IV contrast  Narrative: Interpreted By:  oDn Quigley,   STUDY:  CT THORACIC SPINE WO IV CONTRAST;  8/8/2024 1:46 pm      INDICATION:  Signs/Symptoms:Fall, upper back pain.      COMPARISON:  CT cervical spine 06/30/2024.      ACCESSION NUMBER(S):  [id number]      ORDERING CLINICIAN:  LORELEI LYNCH      TECHNIQUE:  Axial CT images of the thoracic and lumbar spine are obtained. Axial,  coronal and sagittal reconstructions are submitted for review.      FINDINGS:  THORACIC SPINE:      Trauma: No acute fracture.      Alignment: No traumatic malalignment. Minimal dextrocurvature.      Vertebral Body Heights: Maintained.      Intervertebral Discs:  Multilevel disc height narrowing, overall mild.      Degenerative change: Multilevel degenerative disc disease with bulky  anterior bridging osteophytes. No high-grade canal stenosis or neural  foramina narrowing.      Paraspinous Soft Tissues/Lungs: Stable 2 cm right thyroid nodule.  Cardiomegaly. Pulmonary edema.      Impression: No acute fracture or traumatic malalignment.      CHF.      Stable 2 cm right thyroid nodule.      MACRO:  Incidental Finding:  There are few small hypoattenuating nodules  measuring equal to or greater than 1.5 cm in the thyroid gland.  (**-YCF-**)      Instructions:  Further evaluation with nonemergent thyroid  ultrasound.  (Managing Incidental Thyroid Nodules Detected on Imaging: White Paper  of the ACR Incidental Thyroid Findings Committee. Hiral Guillaume. et  al. Journal of the American College of Radiology,Volume 12, Issue 2,  143  150.) [email or web address]      Signed by: Don Quigley 8/8/2024 1:59 PM  Dictation workstation:   [id number]  XR chest 1 view  Narrative: Interpreted By:  Don Quigley,   STUDY:  XR CHEST 1 VIEW; 8/8/2024 1:41 pm      INDICATION:  Signs/Symptoms:Trauma      COMPARISON:  Radiographs 06/30/2024      ACCESSION NUMBER(S):  [id number]      ORDERING CLINICIAN:  LORELEI LYNCH      TECHNIQUE:  Single frontal view of the chest performed.      FINDINGS:  LINES AND DEVICES:  Stable left subclavian cardiac pacer.      LUNGS:  Bilateral interstitial opacities. No focal consolidation, pleural  effusion or pneumothorax.      CARDIOMEDIASTINAL SILHOUETTE:  Stable cardiomegaly.      OTHER:  No acute displaced rib fractures.      Impression: CHF with mild interstitial edema.      MACRO  None      Signed by: Don Quigley 8/8/2024 1:50 PM  Dictation workstation:   [id number]  CT cervical spine wo IV contrast  Narrative: Interpreted By:  Don Quigley,   STUDY:  CT CERVICAL SPINE WO IV CONTRAST;  8/8/2024 1:11 pm      INDICATION:  Signs/Symptoms:Fall, neck trauma.      COMPARISON:  CT C-spine 06/30/2024      ACCESSION NUMBER(S):  [id number]      ORDERING CLINICIAN:  LORELEI LYNCH      TECHNIQUE:  Thin section axial images were obtained from the skull base down  through the thoracic inlet. Sagittal and coronal reconstruction  images were generated. Soft tissue, lung, and bone windows were  reviewed.      FINDINGS:  Prevertebral/Paraspinal Soft Tissues: No acute abnormalities.      CERVICAL SPINE:  Hardware: None.  Fracture: No acute fracture.  Vertebral Body Heights: Normal.  Alignment: No traumatic listhesis. Unchanged stepwise grade 1  anterolisthesis from C3 to C5. Spinal canal and neural  foramina:  Unchanged multilevel degenerative disc disease, most pronounced with  severe neural foramina narrowing at C3-C4 on the left, C4-C5 on the  left, C5-C6 bilaterally, C6-C7 on the left and moderate to severe  canal stenosis from C3 to C6. 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      Impression: No acute fracture or traumatic malalignment.      MACRO  None      Signed by: Don Quigley 8/8/2024 1:29 PM  Dictation workstation:   [id number]  CT head W O contrast trauma protocol  Narrative: Interpreted By:  Don Quigley,   STUDY:  CT HEAD W/O CONTRAST TRAUMA PROTOCOL;  8/8/2024 1:11 pm      INDICATION:  Signs/Symptoms:Fall, head injury on anticoagulation.      COMPARISON:  CT head 06/30/2024.      ACCESSION NUMBER(S):  [id number]      ORDERING CLINICIAN:  LORELEI LYNCH      TECHNIQUE:  Noncontrast axial CT scan of head was performed.      FINDINGS:  Parenchyma: No intracranial hemorrhage. No mass effect or midline  shift. Unchanged right frontal encephalomalacia and scattered  dystrophic calcifications. Patchy periventricular white matter  hypodensities, likely moderate chronic microvascular ischemic change,  grossly stable. Moderate diffuse parenchymal atrophy.      CSF Spaces: The ventricles, sulci and basal cisterns are unchanged in  size and proportional to degree of parenchymal volume loss.      Extra-Axial Fluid: None.      Calvarium: No acute fracture. Unchanged frontal craniotomy.      Paranasal sinuses: Visualized paranasal sinuses are clear.      Mastoids: Clear.      Orbits: Normal.      Soft tissues: No acute abnormality.      Impression: No acute intracranial hemorrhage or calvarial fracture.      Stable changes compared to 06/30/2024.      MACRO:  None      Signed by: Don Quigley 8/8/2024 1:22 PM  Dictation workstation:   [id number]      Physical Exam  Constitutional:       Appearance: He is obese. He is ill-appearing.   Cardiovascular:      Rate and Rhythm: Normal rate  and regular rhythm.      Pulses: Normal pulses.      Heart sounds: Normal heart sounds.   Pulmonary:      Effort: Pulmonary effort is normal.      Breath sounds: Normal breath sounds.   Abdominal:      General: Bowel sounds are normal.      Palpations: Abdomen is soft.   Musculoskeletal:         General: Normal range of motion.   Skin:     General: Skin is warm and dry.   Neurological:      Mental Status: He is alert and oriented to person, place, and time.         Relevant Results             Results for orders placed or performed during the hospital encounter of 08/08/24 (from the past 24 hour(s))   Basic Metabolic Panel   Result Value Ref Range    Glucose 95 65 - 99 mg/dL    Sodium 142 133 - 145 mmol/L    Potassium 4.0 3.4 - 5.1 mmol/L    Chloride 103 97 - 107 mmol/L    Bicarbonate 28 24 - 31 mmol/L    Urea Nitrogen 19 8 - 25 mg/dL    Creatinine 1.50 0.40 - 1.60 mg/dL    eGFR 46 (L) >60 mL/min/1.73m*2    Calcium 8.9 8.5 - 10.4 mg/dL    Anion Gap 11 <=19 mmol/L   Protime-INR   Result Value Ref Range    Protime 22.3 (H) 9.3 - 12.7 seconds    INR 2.2 (H) 0.9 - 1.2   CBC   Result Value Ref Range    WBC 3.5 (L) 4.4 - 11.3 x10*3/uL    nRBC 0.0 0.0 - 0.0 /100 WBCs    RBC 3.53 (L) 4.50 - 5.90 x10*6/uL    Hemoglobin 10.5 (L) 13.5 - 17.5 g/dL    Hematocrit 32.8 (L) 41.0 - 52.0 %    MCV 93 80 - 100 fL    MCH 29.7 26.0 - 34.0 pg    MCHC 32.0 32.0 - 36.0 g/dL    RDW 17.5 (H) 11.5 - 14.5 %    Platelets 156 150 - 450 x10*3/uL     *Note: Due to a large number of results and/or encounters for the requested time period, some results have not been displayed. A complete set of results can be found in Results Review.       Assessment/Plan                  Principal Problem:    Fall, initial encounter      Fall; near syncope  Fall precaution  PT/OT  Orthostatic vital signs negative per vitals patient was dizzy.      S/P pacemaker placement for bradycardia with atrial fibrillation  Pacemaker interrogation  Chronic Coumadin use with INR  today of 2.2     CHF with elevated troponin  Patient has signs clinically of volume overload.  Will hold home medication dose of Lasix held for JASBIR which is improving, restarted lasix today.  Consult cardiology, Dr. Cullen.  Dr. Martin is patient's primary cardiologist  Most recent Echo 2/2024 will defer to cardiology for new echo     DVT prophylaxis  Coumadin restarted INR 2.2 today     Plan of care  If patient remains stable will likely be discharged tomorrow.   Patient plans to go home at discharge with [facility].      Plan of care discussed with the patient, daughter at bedside and collaborating physician.               Dee Dee Haines, APRN-CNP

## 2024-08-11 NOTE — NURSING NOTE
Assumed care of patient at this time, patient is resting in bed with brake in place and call light in reach denies any needs.

## 2024-08-12 ENCOUNTER — APPOINTMENT (OUTPATIENT)
Dept: CARDIOLOGY | Facility: CLINIC | Age: 79
End: 2024-08-12
Payer: MEDICARE

## 2024-08-12 LAB
ANION GAP SERPL CALC-SCNC: 9 MMOL/L
BACTERIA UR CULT: ABNORMAL
BUN SERPL-MCNC: 20 MG/DL (ref 8–25)
CALCIUM SERPL-MCNC: 8.8 MG/DL (ref 8.5–10.4)
CHLORIDE SERPL-SCNC: 102 MMOL/L (ref 97–107)
CO2 SERPL-SCNC: 30 MMOL/L (ref 24–31)
CREAT SERPL-MCNC: 1.7 MG/DL (ref 0.4–1.6)
EGFRCR SERPLBLD CKD-EPI 2021: 40 ML/MIN/1.73M*2
ERYTHROCYTE [DISTWIDTH] IN BLOOD BY AUTOMATED COUNT: 17.4 % (ref 11.5–14.5)
GLUCOSE SERPL-MCNC: 96 MG/DL (ref 65–99)
HCT VFR BLD AUTO: 33.4 % (ref 41–52)
HGB BLD-MCNC: 10.2 G/DL (ref 13.5–17.5)
INR PPP: 2.1 (ref 0.9–1.2)
MCH RBC QN AUTO: 29 PG (ref 26–34)
MCHC RBC AUTO-ENTMCNC: 30.5 G/DL (ref 32–36)
MCV RBC AUTO: 95 FL (ref 80–100)
NRBC BLD-RTO: 0 /100 WBCS (ref 0–0)
PLATELET # BLD AUTO: 157 X10*3/UL (ref 150–450)
POTASSIUM SERPL-SCNC: 4.3 MMOL/L (ref 3.4–5.1)
PROTHROMBIN TIME: 21.2 SECONDS (ref 9.3–12.7)
RBC # BLD AUTO: 3.52 X10*6/UL (ref 4.5–5.9)
SODIUM SERPL-SCNC: 141 MMOL/L (ref 133–145)
WBC # BLD AUTO: 3.1 X10*3/UL (ref 4.4–11.3)

## 2024-08-12 PROCEDURE — 2500000001 HC RX 250 WO HCPCS SELF ADMINISTERED DRUGS (ALT 637 FOR MEDICARE OP): Performed by: INTERNAL MEDICINE

## 2024-08-12 PROCEDURE — 99232 SBSQ HOSP IP/OBS MODERATE 35: CPT | Performed by: INTERNAL MEDICINE

## 2024-08-12 PROCEDURE — 97116 GAIT TRAINING THERAPY: CPT | Mod: GP

## 2024-08-12 PROCEDURE — 85610 PROTHROMBIN TIME: CPT | Performed by: INTERNAL MEDICINE

## 2024-08-12 PROCEDURE — 93296 REM INTERROG EVL PM/IDS: CPT

## 2024-08-12 PROCEDURE — 2500000002 HC RX 250 W HCPCS SELF ADMINISTERED DRUGS (ALT 637 FOR MEDICARE OP, ALT 636 FOR OP/ED): Performed by: NURSE PRACTITIONER

## 2024-08-12 PROCEDURE — 36415 COLL VENOUS BLD VENIPUNCTURE: CPT | Performed by: INTERNAL MEDICINE

## 2024-08-12 PROCEDURE — 93294 REM INTERROG EVL PM/LDLS PM: CPT | Performed by: INTERNAL MEDICINE

## 2024-08-12 PROCEDURE — 80048 BASIC METABOLIC PNL TOTAL CA: CPT | Performed by: INTERNAL MEDICINE

## 2024-08-12 PROCEDURE — 85027 COMPLETE CBC AUTOMATED: CPT | Performed by: NURSE PRACTITIONER

## 2024-08-12 PROCEDURE — 97110 THERAPEUTIC EXERCISES: CPT | Mod: GP

## 2024-08-12 PROCEDURE — 2500000001 HC RX 250 WO HCPCS SELF ADMINISTERED DRUGS (ALT 637 FOR MEDICARE OP): Performed by: NURSE PRACTITIONER

## 2024-08-12 PROCEDURE — 1200000002 HC GENERAL ROOM WITH TELEMETRY DAILY

## 2024-08-12 RX ORDER — FUROSEMIDE 40 MG/1
40 TABLET ORAL DAILY
Status: DISCONTINUED | OUTPATIENT
Start: 2024-08-13 | End: 2024-08-13 | Stop reason: HOSPADM

## 2024-08-12 ASSESSMENT — COGNITIVE AND FUNCTIONAL STATUS - GENERAL
DRESSING REGULAR UPPER BODY CLOTHING: A LITTLE
STANDING UP FROM CHAIR USING ARMS: A LITTLE
MOBILITY SCORE: 19
TURNING FROM BACK TO SIDE WHILE IN FLAT BAD: A LOT
MOBILITY SCORE: 16
WALKING IN HOSPITAL ROOM: A LITTLE
HELP NEEDED FOR BATHING: A LITTLE
TOILETING: A LITTLE
WALKING IN HOSPITAL ROOM: A LITTLE
MOVING FROM LYING ON BACK TO SITTING ON SIDE OF FLAT BED WITH BEDRAILS: A LITTLE
MOVING TO AND FROM BED TO CHAIR: A LITTLE
MOVING FROM LYING ON BACK TO SITTING ON SIDE OF FLAT BED WITH BEDRAILS: A LITTLE
DRESSING REGULAR UPPER BODY CLOTHING: A LITTLE
DAILY ACTIVITIY SCORE: 20
DRESSING REGULAR LOWER BODY CLOTHING: A LITTLE
DRESSING REGULAR LOWER BODY CLOTHING: A LITTLE
DAILY ACTIVITIY SCORE: 20
CLIMB 3 TO 5 STEPS WITH RAILING: A LITTLE
MOVING TO AND FROM BED TO CHAIR: A LITTLE
CLIMB 3 TO 5 STEPS WITH RAILING: A LOT
MOBILITY SCORE: 16
STANDING UP FROM CHAIR USING ARMS: A LOT
TOILETING: A LITTLE
CLIMB 3 TO 5 STEPS WITH RAILING: A LOT
WALKING IN HOSPITAL ROOM: A LOT
STANDING UP FROM CHAIR USING ARMS: A LITTLE
TURNING FROM BACK TO SIDE WHILE IN FLAT BAD: A LOT
HELP NEEDED FOR BATHING: A LITTLE

## 2024-08-12 ASSESSMENT — PAIN - FUNCTIONAL ASSESSMENT
PAIN_FUNCTIONAL_ASSESSMENT: 0-10
PAIN_FUNCTIONAL_ASSESSMENT: 0-10

## 2024-08-12 ASSESSMENT — PAIN SCALES - GENERAL
PAINLEVEL_OUTOF10: 0 - NO PAIN
PAINLEVEL_OUTOF10: 2
PAINLEVEL_OUTOF10: 0 - NO PAIN

## 2024-08-12 NOTE — PROGRESS NOTES
Angel Carroll is a 79 y.o. male on day 4 of admission presenting with Fall, initial encounter.      Subjective   Patient seen and examined, sitting up in bed.  Denies acute events overnight.  Denies chest pain or shortness of breath.  Denies complaints at this time.  No acute distress.       Objective     Last Recorded Vitals  /67 (BP Location: Right arm, Patient Position: Lying)   Pulse 60   Temp 36.4 °C (97.5 °F) (Oral)   Resp 18   Wt 110 kg (242 lb 11.6 oz)   SpO2 99%   Intake/Output last 3 Shifts:    Intake/Output Summary (Last 24 hours) at 8/12/2024 1431  Last data filed at 8/12/2024 1300  Gross per 24 hour   Intake 200 ml   Output 950 ml   Net -750 ml       Admission Weight  Weight: 110 kg (242 lb 11.6 oz) (08/08/24 1229)    Daily Weight  08/08/24 : 110 kg (242 lb 11.6 oz)    Image Results  CT pelvis wo IV contrast  Narrative: Interpreted By:  Trinity Mancini,   STUDY:  CT PELVIS WO IV CONTRAST;  8/8/2024 3:40 pm      INDICATION:  Signs/Symptoms:r/o fracture.      COMPARISON:  CT abdomen and pelvis 11/15/2023. Pelvis x-ray 08/08/2024.      ACCESSION NUMBER(S):  [id number]      ORDERING CLINICIAN:  RAHEEM VAZQUEZ      TECHNIQUE:  CT of the pelvis was performed. Contiguous axial images were obtained  at 3 mm slice thickness through the pelvis. Coronal and sagittal  reconstructions at 3 mm slice thickness were performed.  No  intravenous contrast was administered.      FINDINGS:  PELVIS:      BLADDER:  There is a 6 mm calculus along the right side at the base of the  urinary bladder.      REPRODUCTIVE ORGANS:  The prostate measures 5.1 cm in transverse diameter.      BOWEL:  No dilated bowel loops at the visualized pelvis.      VESSELS:  Atherosclerotic calcifications at the visualized infrarenal abdominal  aorta and its branches.      PERITONEUM/RETROPERITONEUM/LYMPH NODES:  No free fluid or free air. No pathologically enlarged lymph nodes.      ABDOMINAL WALL:  There is a small fat containing  umbilical hernia.      BONES:  There is diffuse osteopenia.  Degenerative changes at the visualized lower lumbar spine.  Postsurgical changes of laminectomy at the lumbar spine.  No acute fracture or dislocation at the pelvis or hips. Mild  degenerative changes at the bilateral hips with chondrocalcinosis.      Impression: 1.  No acute fracture at the pelvis or hips. Mild degenerative  changes at the bilateral hips with chondrocalcinosis.  2. Vesicolithiasis.  3. Prostatomegaly.      MACRO:  None      Signed by: Trinity Mancini 8/8/2024 4:10 PM  Dictation workstation:   [id number]  XR ankle right 3+ views  Narrative: Interpreted By:  Don Quigley,   STUDY:  XR ANKLE RIGHT 3+ VIEWS;  8/8/2024 1:41 pm      INDICATION:  Signs/Symptoms:Fall, pain.      COMPARISON:  None.      ACCESSION NUMBER(S):  [id number]      ORDERING CLINICIAN:  LORELEI LYNCH      FINDINGS:  No acute fracture or dislocation. Ankle mortise is congruent. Joint  spaces are maintained. Tiny retrocalcaneal and small plantar  calcaneal enthesophytes. Mild lateral ankle soft tissue swelling.      Impression: No acute osseous abnormality.      MACRO  None      Signed by: Don Quigley 8/8/2024 2:05 PM  Dictation workstation:   [id number]  XR knee right 4+ views  Narrative: Interpreted By:  Don Quigley,   STUDY:  XR KNEE RIGHT 4+ VIEWS;  8/8/2024 1:41 pm      INDICATION:  Signs/Symptoms:Fall, pain.      COMPARISON:  Radiographs 10/09/2019.      ACCESSION NUMBER(S):  [id number]      ORDERING CLINICIAN:  LORELEI LYNCH      FINDINGS:  Right total knee arthroplasty. Hardware is intact, well positioned  and well aligned. No acute fracture or dislocation. Trace joint  effusion.      Impression: No acute osseous abnormality. Intact hardware.      MACRO  None      Signed by: Don Quigley 8/8/2024 2:04 PM  Dictation workstation:   [id number]  XR pelvis 1-2 views  Narrative: Interpreted By:  Don Quigley,   STUDY:  XR PELVIS 1-2 VIEWS;   8/8/2024 1:41 pm      INDICATION:  Signs/Symptoms:Fall, trauma.      COMPARISON:  Radiographs 06/30/2024.      ACCESSION NUMBER(S):  [id number]      ORDERING CLINICIAN:  LORELEI LYNCH      FINDINGS:  Apparent subtle step-off along the superior cortex of the right  superior pubic ramus. Moderate right and mild left hip  osteoarthritis. Minimal degenerative changes in the bilateral  sacroiliac joints and pubic symphysis.      Impression: Apparent subtle cortical step-off in the right superior pubic ramus;  fracture versus artifact. Consider further evaluation with pelvis CT.      MACRO  None      Signed by: Don Quigley 8/8/2024 2:03 PM  Dictation workstation:   [id number]  CT thoracic spine wo IV contrast  Narrative: Interpreted By:  Don Quigley,   STUDY:  CT THORACIC SPINE WO IV CONTRAST;  8/8/2024 1:46 pm      INDICATION:  Signs/Symptoms:Fall, upper back pain.      COMPARISON:  CT cervical spine 06/30/2024.      ACCESSION NUMBER(S):  [id number]      ORDERING CLINICIAN:  LORELEI LYNCH      TECHNIQUE:  Axial CT images of the thoracic and lumbar spine are obtained. Axial,  coronal and sagittal reconstructions are submitted for review.      FINDINGS:  THORACIC SPINE:      Trauma: No acute fracture.      Alignment: No traumatic malalignment. Minimal dextrocurvature.      Vertebral Body Heights: Maintained.      Intervertebral Discs:  Multilevel disc height narrowing, overall mild.      Degenerative change: Multilevel degenerative disc disease with bulky  anterior bridging osteophytes. No high-grade canal stenosis or neural  foramina narrowing.      Paraspinous Soft Tissues/Lungs: Stable 2 cm right thyroid nodule.  Cardiomegaly. Pulmonary edema.      Impression: No acute fracture or traumatic malalignment.      CHF.      Stable 2 cm right thyroid nodule.      MACRO:  Incidental Finding:  There are few small hypoattenuating nodules  measuring equal to or greater than 1.5 cm in the thyroid gland.  (**-YCF-**)       Instructions:  Further evaluation with nonemergent thyroid ultrasound.  (Managing Incidental Thyroid Nodules Detected on Imaging: White Paper  of the ACR Incidental Thyroid Findings Committee. Hiral Guillaume. et  al. Journal of the American College of Radiology,Volume 12, Issue 2,  143 - 150.) [email or web address]      Signed by: Don Quigley 8/8/2024 1:59 PM  Dictation workstation:   [id number]  XR chest 1 view  Narrative: Interpreted By:  Don Quigley,   STUDY:  XR CHEST 1 VIEW; 8/8/2024 1:41 pm      INDICATION:  Signs/Symptoms:Trauma      COMPARISON:  Radiographs 06/30/2024      ACCESSION NUMBER(S):  [id number]      ORDERING CLINICIAN:  LORELEI LYNCH      TECHNIQUE:  Single frontal view of the chest performed.      FINDINGS:  LINES AND DEVICES:  Stable left subclavian cardiac pacer.      LUNGS:  Bilateral interstitial opacities. No focal consolidation, pleural  effusion or pneumothorax.      CARDIOMEDIASTINAL SILHOUETTE:  Stable cardiomegaly.      OTHER:  No acute displaced rib fractures.      Impression: CHF with mild interstitial edema.      MACRO  None      Signed by: Don Quigley 8/8/2024 1:50 PM  Dictation workstation:   [id number]  CT cervical spine wo IV contrast  Narrative: Interpreted By:  Don Quigley,   STUDY:  CT CERVICAL SPINE WO IV CONTRAST;  8/8/2024 1:11 pm      INDICATION:  Signs/Symptoms:Fall, neck trauma.      COMPARISON:  CT C-spine 06/30/2024      ACCESSION NUMBER(S):  [id number]      ORDERING CLINICIAN:  LORELEI LYNCH      TECHNIQUE:  Thin section axial images were obtained from the skull base down  through the thoracic inlet. Sagittal and coronal reconstruction  images were generated. Soft tissue, lung, and bone windows were  reviewed.      FINDINGS:  Prevertebral/Paraspinal Soft Tissues: No acute abnormalities.      CERVICAL SPINE:  Hardware: None.  Fracture: No acute fracture.  Vertebral Body Heights: Normal.  Alignment: No traumatic listhesis. Unchanged stepwise grade  1  anterolisthesis from C3 to C5. Spinal canal and neural foramina:  Unchanged multilevel degenerative disc disease, most pronounced with  severe neural foramina narrowing at C3-C4 on the left, C4-C5 on the  left, C5-C6 bilaterally, C6-C7 on the left and moderate to severe  canal stenosis from C3 to C6. 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      Impression: No acute fracture or traumatic malalignment.      MACRO  None      Signed by: Don Quigley 8/8/2024 1:29 PM  Dictation workstation:   [id number]  CT head W O contrast trauma protocol  Narrative: Interpreted By:  Don Quigley,   STUDY:  CT HEAD W/O CONTRAST TRAUMA PROTOCOL;  8/8/2024 1:11 pm      INDICATION:  Signs/Symptoms:Fall, head injury on anticoagulation.      COMPARISON:  CT head 06/30/2024.      ACCESSION NUMBER(S):  [id number]      ORDERING CLINICIAN:  LORELEI LYNCH      TECHNIQUE:  Noncontrast axial CT scan of head was performed.      FINDINGS:  Parenchyma: No intracranial hemorrhage. No mass effect or midline  shift. Unchanged right frontal encephalomalacia and scattered  dystrophic calcifications. Patchy periventricular white matter  hypodensities, likely moderate chronic microvascular ischemic change,  grossly stable. Moderate diffuse parenchymal atrophy.      CSF Spaces: The ventricles, sulci and basal cisterns are unchanged in  size and proportional to degree of parenchymal volume loss.      Extra-Axial Fluid: None.      Calvarium: No acute fracture. Unchanged frontal craniotomy.      Paranasal sinuses: Visualized paranasal sinuses are clear.      Mastoids: Clear.      Orbits: Normal.      Soft tissues: No acute abnormality.      Impression: No acute intracranial hemorrhage or calvarial fracture.      Stable changes compared to 06/30/2024.      MACRO:  None      Signed by: Don Quigley 8/8/2024 1:22 PM  Dictation workstation:   [id number]      Physical Exam  Vitals reviewed.   Constitutional:       Appearance: Normal  appearance.   HENT:      Head: Normocephalic and atraumatic.      Mouth/Throat:      Mouth: Mucous membranes are moist.   Eyes:      Extraocular Movements: Extraocular movements intact.      Pupils: Pupils are equal, round, and reactive to light.   Cardiovascular:      Rate and Rhythm: Normal rate.      Pulses: Normal pulses.   Pulmonary:      Effort: Pulmonary effort is normal.      Breath sounds: Normal breath sounds.   Abdominal:      General: Bowel sounds are normal.      Palpations: Abdomen is soft.   Musculoskeletal:         General: Normal range of motion.      Cervical back: Normal range of motion and neck supple.   Skin:     General: Skin is warm and dry.      Capillary Refill: Capillary refill takes less than 2 seconds.      Findings: Bruising present.   Neurological:      General: No focal deficit present.      Mental Status: He is alert and oriented to person, place, and time.   Psychiatric:         Mood and Affect: Mood normal.         Relevant Results               Assessment/Plan          Principal Problem:    Fall, initial encounter    Presyncopal episode  Cardiology has been consulted  Fall precaution  PT/OT  Negative orthostatic vital signs  Echocardiogram from 2/2024 demonstrates a low normal LVSF with EF 50-55% percent     S/P pacemaker placement for bradycardia with atrial fibrillation  Pacemaker interrogation  Continue home coumadin, beta-blocker  Daily PT/INR  Monitor telemetry--INR 2.1 today     CHF with elevated troponin  Continue home Lasix  Consult cardiology, Dr. Cullen--recommendations appreciated     DVT/GI  Coumadin, PPI     8/12/2024: Anticipate discharge home with C within the next 24 hours.              RON Cooper-CNP

## 2024-08-12 NOTE — PROGRESS NOTES
Patient is active with [facility] PT/OT and would like to continue with their services. Patient is declining SNF at this time but would like to think about. Patient states that now that his wife has been discharged home from rehab, they will be able to help each other in the home. If patient resumes care with [facility], will need an internal referral.      08/12/24 1212   Current Planned Discharge Disposition   Current Planned Discharge Disposition [facility]  ([facility])

## 2024-08-12 NOTE — CARE PLAN
Problem: PT Problem  Goal: Patient will ambulate 30' distance using walker with modified independent  Outcome: Progressing  Note: Patient will ambulate 30 distance using walker with modified independent  Goal: Patient will perform chair to and from bed transfer with modified independent  Outcome: Progressing  Note: Patient will perform chair to and from bed transfer with modified independent

## 2024-08-12 NOTE — PROGRESS NOTES
Subjective Data:  The patient is resting relatively comfortably    Overnight Events:    As described below.     Objective Data:  Last Recorded Vitals:  Vitals:    08/10/24 2200 08/11/24 0917 08/12/24 0005 08/12/24 0734   BP: 120/68 129/81 119/63 125/67   BP Location: Right arm Right arm Right arm Right arm   Patient Position: Lying Sitting Lying Lying   Pulse: 63 60 63 60   Resp: 16 18 18 18   Temp: 36.3 °C (97.3 °F) 36.8 °C (98.2 °F) 36.4 °C (97.5 °F) 36.4 °C (97.5 °F)   TempSrc: Axillary Oral Oral Oral   SpO2: 99% 96% 98% 99%   Weight:       Height:           Last Labs:  CBC - 8/12/2024:  6:10 AM  3.1 10.2 157    33.4      CMP - 8/12/2024:  6:10 AM  8.8 6.4 18 --- 0.7   2.9 3.6 6 85      PTT - 4/7/2024:  8:17 PM  2.1   21.2 45.5     BNP   Date/Time Value Ref Range Status   02/12/2022 05:28  0 - 99 pg/mL Final     Comment:     .  <100 pg/mL - Heart failure unlikely  100-299 pg/mL - Intermediate probability of acute heart  .               failure exacerbation. Correlate with clinical  .               context and patient history.    >=300 pg/mL - Heart Failure likely. Correlate with clinical  .               context and patient history.  BNP testing is performed using different testing   methodology at [facility] than at other   [facility]. Direct result comparisons should   only be made within the same method.     02/11/2022 07:52  0 - 99 pg/mL Final     Comment:     .  <100 pg/mL - Heart failure unlikely  100-299 pg/mL - Intermediate probability of acute heart  .               failure exacerbation. Correlate with clinical  .               context and patient history.    >=300 pg/mL - Heart Failure likely. Correlate with clinical  .               context and patient history.  BNP testing is performed using different testing   methodology at [facility] than at other   [facility]. Direct result comparisons should   only be made within the same method.       HGBA1C  "  Date/Time Value Ref Range Status   02/12/2022 05:27 AM 5.0 % Final     Comment:          Diagnosis of Diabetes-Adults   Non-Diabetic: < or = 5.6%   Increased risk for developing diabetes: 5.7-6.4%   Diagnostic of diabetes: > or = 6.5%  .       Monitoring of Diabetes                Age (y)     Therapeutic Goal (%)   Adults:          >18           <7.0   Pediatrics:    13-18           <7.5                   7-12           <8.0                   0- 6            7.5-8.5   American Diabetes Association. Diabetes Care 33(S1), Jan 2010.     09/26/2021 12:15 PM 5.6 % Final     Comment:          Diagnosis of Diabetes-Adults   Non-Diabetic: < or = 5.6%   Increased risk for developing diabetes: 5.7-6.4%   Diagnostic of diabetes: > or = 6.5%  .       Monitoring of Diabetes                Age (y)     Therapeutic Goal (%)   Adults:          >18           <7.0   Pediatrics:    13-18           <7.5                   7-12           <8.0                   0- 6            7.5-8.5   American Diabetes Association. Diabetes Care 33(S1), Jan 2010.       LDLCALC   Date/Time Value Ref Range Status   11/14/2022 03:42 AM 70 65 - 130 MG/DL Final   10/15/2022 06:11 AM 48 65 - 130 MG/DL Final   03/23/2021 02:08 PM 50 65 - 130 MG/DL Final     VLDL   Date/Time Value Ref Range Status   01/10/2022 01:43 PM 12 0 - 40 mg/dL Final   09/26/2021 12:15 PM 23 0 - 40 mg/dL Final   06/22/2021 10:58 AM 10 0 - 40 mg/dL Final      Last I/O:  I/O last 3 completed shifts:  In: - (0 mL/kg)   Out: 1500 (13.6 mL/kg) [Urine:1500 (0.4 mL/kg/hr)]  Weight: 110.1 kg     Past Cardiology Tests (Last 3 Years):  EKG:  ECG 12 Lead 06/30/2024      ECG 12 lead 02/29/2024    Echo:  Transthoracic Echo (TTE) Complete 02/29/2024    Ejection Fractions:  No results found for: \"EF\"  Cath:  No results found for this or any previous visit from the past 1095 days.    Stress Test:  Nuclear Stress Test 11/28/2022    Cardiac Imaging:  No results found for this or any previous visit from " the past 1095 days.      Inpatient Medications:  Scheduled medications   Medication Dose Route Frequency    amiodarone  200 mg oral Daily    atorvastatin  20 mg oral Nightly    cyanocobalamin  500 mcg oral Daily    ergocalciferol  50,000 Units oral Every Sunday    fenofibrate  54 mg oral Daily    ferrous sulfate (325 mg ferrous sulfate)  65 mg of iron oral Daily    finasteride  5 mg oral Daily    folic acid  1 mg oral Daily    furosemide  40 mg oral BID    gabapentin  100 mg oral Nightly    metoprolol succinate XL  25 mg oral Daily    nystatin  1 Application Topical BID    pantoprazole  40 mg oral Daily    PARoxetine  20 mg oral Daily    psyllium  1 packet oral BID    tamsulosin  0.8 mg oral Nightly    warfarin  2.5 mg oral Daily     PRN medications   Medication    acetaminophen    polyethylene glycol     Continuous Medications   Medication Dose Last Rate    oxygen  3 L/min         Physical Exam:       Assessment/Plan   8/9: The patient is a very pleasant 79-year-old white male with a past history including hypertension hyperlipidemia former smoking COPD with continuous nasal oxygen therapy, coronary artery disease status post multiple PCI procedures, borderline ischemic congestive cardiomyopathy with low normal LV ejection fraction, paroxysmal atrial fibrillation with radiofrequency ablation, status post permanent pacemaker implantation for sick sinus syndrome in 1/2023, history of CVA with subtherapeutic INR with 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 The patient is admitted after sustaining a mechanical fall during the middle of the night when he was checking his oxygen due to a power outage. Extensive x-rays are negative for evidence of fracture. His EKG shows an AV sequential pacemaker rhythm. INR is 3.0. Will will change the patient's with Toprol tartrate 12.5 mg twice daily to Toprol-XL 25 mg daily. Creatinine values  slightly above baseline at 1.7 and will temporarily hold Lasix and ultimately will reduce his Lasix dosage to 40 mg daily. Cardiac status at this time is relatively stable      8/10: Assessed by Sienna GROSS in collaboration with Dr. Coronado on coverage for Dr. Cullen.  Stable overnight.  Denies complaints chest pain or pressure, palpitations or feeling of rapid heart rate.  His diuretics remain on hold him his creatinine this morning is noted at 1.6.  Mildly improved from peak of 1.7 on admission.  Blood pressure stable with most recent of 118/59.  Current pulse ox on nasal cannula oxygen 96%.  He remains in a rate controlled atrial fibrillation on telemetry.  His metoprolol was increased yesterday.  He does have a trace pedal ankle edema which appears to be chronic for this patient.  Overall appears generally stable at this time.  Will continue to follow creatinine.  Will follow with you.        8/11: Assessed by Sienna GROSS in collaboration with Dr. Coronado on coverage for Dr. Cullen.  No significant changes overnight.  Has been restarted on his oral diuretics.  His creatinine continues to slowly improve to most recent 1.5.  Potassium stable at 4.0.  No significant vents on telemetry.  Remains in rate controlled atrial fibrillation.  Most recent blood pressure 129/81.  He is chest pain-free overall believe he will be stable for discharge tomorrow from the cardiac perspective.  Will follow with you.    8/12: Patient resting comfortably no significant pain from his various contusions and abrasions.  Respirations comfortable on his usual supplemental continuous nasal oxygen.  Telemetry monitor shows a stable AV sequential pacemaker rhythm at 60/min.  Creatinine 1.70 and has been ranging between 1.50-1.70.  Will reduce his oral Lasix to 40 mg once daily and continue the Toprol-XL 25 mg daily for treatment of his coronary artery disease and prevention of paroxysmal atrial fibrillation.  Patient has  an apparent baseline leukopenia WBC 3100 as well as anemia hematocrit 33.4 which are stable.  Platelet count is in the low normal range.  INR remains subtherapeutic at 2.1.    Peripheral IV 08/08/24 20 G Right;Anterior Forearm (Active)   Site Assessment Clean;Dry;Intact 08/12/24 0714   Dressing Status Clean;Dry 08/12/24 0714   Number of days: 4       Code Status:  Full Code    I spent 20 minutes in the professional and overall care of this patient.        Deacon Cullen MD

## 2024-08-12 NOTE — PROGRESS NOTES
Spiritual Care Visit    Clinical Encounter Type  Visited With: Patient  Routine Visit: Introduction  Continue Visiting: Yes         Values/Beliefs  Spiritual Requests During Hospitalization: [place] today     Jacinto Grijalva

## 2024-08-12 NOTE — CONSULTS
Wound Care Consult     Visit Date: 8/12/2024      Patient Name: Angel Carroll         MRN: [id number]           YOB: 1945    Consulted for wound care. A 79 y.o. male patient admitted for   Near syncope [R55]  Closed head injury, initial encounter [S09.90XA]  Fall, initial encounter [W19.XXXA]   Past Medical History:   Diagnosis Date    Atherosclerotic heart disease of native coronary artery without angina pectoris 12/14/2022    Arteriosclerotic cardiovascular disease (ASCVD)    Benign neoplasm of meninges, unspecified (Multi)     Meningioma    Benign prostatic hyperplasia without lower urinary tract symptoms 02/28/2022    BPH without obstruction/lower urinary tract symptoms    Body mass index (BMI) 33.0-33.9, adult 07/02/2021    BMI 33.0-33.9,adult    Body mass index (BMI) 34.0-34.9, adult 11/09/2021    BMI 34.0-34.9,adult    Body mass index (BMI) 34.0-34.9, adult 09/08/2021    BMI 34.0-34.9,adult    Body mass index (BMI) 35.0-35.9, adult 12/29/2021    BMI 35.0-35.9,adult    Body mass index (BMI) 35.0-35.9, adult 02/28/2022    BMI 35.0-35.9,adult    Elevated blood-pressure reading, without diagnosis of hypertension     Prehypertension    Encounter for immunization 10/27/2015    Need for prophylactic vaccination and inoculation against influenza    Hyperlipidemia, unspecified 11/15/2021    Hyperlipidemia    Other conditions influencing health status     Nephrolithiasis    Pacemaker     Personal history of other diseases of the musculoskeletal system and connective tissue     History of tendinitis    Personal history of other drug therapy     History of influenza vaccination    Personal history of other specified conditions 10/25/2022    History of bradycardia    Rectal bleeding     Vitamin D deficiency, unspecified 10/08/2020    Vitamin D deficiency      Past Surgical History:   Procedure Laterality Date    BACK SURGERY  04/18/2016    Back Surgery    COLONOSCOPY  07/03/2013    Complete Colonoscopy     CORONARY ANGIOPLASTY WITH STENT PLACEMENT  08/18/2020    Cath Stent Placement    CT ANGIO NECK  9/25/2021    CT NECK ANGIO W AND WO IV CONTRAST 9/25/2021 [facility]    CT HEAD ANGIO W AND WO IV CONTRAST  9/25/2021    CT HEAD ANGIO W AND WO IV CONTRAST 9/25/2021 [facility]    EYE SURGERY  04/03/2013    Eye Surgery    KNEE ARTHROSCOPY W/ DEBRIDEMENT  07/03/2013    Arthroscopy Knee Right    KNEE ARTHROSCOPY W/ DEBRIDEMENT  07/03/2013    Arthroscopy Knee Left    LITHOTRIPSY  07/22/2013    Renal Lithotripsy    MR HEAD ANGIO WO IV CONTRAST  3/23/2021    MR HEAD ANGIO WO IV CONTRAST [facility]    MR NECK ANGIO WO IV CONTRAST  3/23/2021    MR NECK ANGIO WO IV CONTRAST [facility]    OTHER SURGICAL HISTORY  04/03/2013    Cardiac Cath Procedure Outcome: Successful    OTHER SURGICAL HISTORY  04/03/2013    Neuroplasty With Transposition Of Ulnar Nerve - At Elbow    OTHER SURGICAL HISTORY  04/03/2013    General Surgery    OTHER SURGICAL HISTORY  06/11/2019    Knee replacement    OTHER SURGICAL HISTORY  07/22/2013    Cystoscopy With Insertion Of Ureteral Stent Bilateral    OTHER SURGICAL HISTORY  07/03/2013    Wrist Carpectomy    OTHER SURGICAL HISTORY  07/03/2013    Brain Surgery    OTHER SURGICAL HISTORY  07/03/2013    Ulnar Osteotomy    TONSILLECTOMY  04/03/2013    Tonsillectomy    TOTAL KNEE ARTHROPLASTY  08/03/2016    Total Knee Arthroplasty      Scheduled medications  amiodarone, 200 mg, oral, Daily  atorvastatin, 20 mg, oral, Nightly  cyanocobalamin, 500 mcg, oral, Daily  ergocalciferol, 50,000 Units, oral, Every Sunday  fenofibrate, 54 mg, oral, Daily  ferrous sulfate (325 mg ferrous sulfate), 65 mg of iron, oral, Daily  finasteride, 5 mg, oral, Daily  folic acid, 1 mg, oral, Daily  [START ON 8/13/2024] furosemide, 40 mg, oral, Daily  gabapentin, 100 mg, oral, Nightly  metoprolol succinate XL, 25 mg, oral, Daily  nystatin, 1 Application, Topical, BID  pantoprazole, 40 mg, oral,  Daily  PARoxetine, 20 mg, oral, Daily  psyllium, 1 packet, oral, BID  tamsulosin, 0.8 mg, oral, Nightly  warfarin, 2.5 mg, oral, Daily      Continuous medications  oxygen, 3 L/min      PRN medications  PRN medications: acetaminophen, polyethylene glycol     Allergies   Allergen Reactions    Other Unknown     Anti-Inflammatories    Ranolazine Unknown    Cyclobenzaprine Hallucinations, Unknown and Psychosis        Susceptibility data from last 90 days.  Collected Specimen Info Organism Ampicillin Ciprofloxacin Levofloxacin Nitrofurantoin Penicillin Trimethoprim/Sulfamethoxazole Vancomycin   08/09/24 Urine from Clean Catch/Voided Enterococcus faecalis  S  R  R  S  S  R  S          Pertinent Labs:   Albumin   Date Value Ref Range Status   08/08/2024 3.6 3.5 - 5.0 g/dL Final   05/18/2022 3.5 3.4 - 5.0 g/dL Final     Albumin, Urine Random   Date Value Ref Range Status   06/11/2024 37.8 Not established mg/L Final       Wound Assessment:  Wound 08/08/24 Arm Left;Lower;Ventral (Active)   Wound Image   08/08/24 2040   Drainage Description None 08/08/24 2040   Drainage Amount None 08/08/24 2040   Dressing Status Old drainage;Dry;Occlusive 08/09/24 0948       Reason for Consult: Traumatic wound to left forearm from fall at home, present on admission, messaged bedside nurse to reach out to provider for wound care orders. Nursing to continue pressure injury prevention interventions and nursing to continue to follow provider orders. Re consult wound RN PRN.    Sonya DIEGON ELMER [facility]  8/12/2024  9:11 AM

## 2024-08-12 NOTE — CARE PLAN
Problem: Skin  Goal: Decreased wound size/increased tissue granulation at next dressing change  Outcome: Progressing  Goal: Participates in plan/prevention/treatment measures  Outcome: Progressing  Goal: Prevent/manage excess moisture  Outcome: Progressing  Goal: Prevent/minimize sheer/friction injuries  Outcome: Progressing  Goal: Promote/optimize nutrition  Outcome: Progressing  Goal: Promote skin healing  Outcome: Progressing     Problem: Fall/Injury  Goal: Not fall by end of shift  Outcome: Progressing  Goal: Be free from injury by end of the shift  Outcome: Progressing  Goal: Verbalize understanding of personal risk factors for fall in the hospital  Outcome: Progressing  Goal: Verbalize understanding of risk factor reduction measures to prevent injury from fall in the home  Outcome: Progressing  Goal: Use assistive devices by end of the shift  Outcome: Progressing  Goal: Pace activities to prevent fatigue by end of the shift  Outcome: Progressing     Problem: Heart Failure  Goal: Improved gas exchange this shift  Outcome: Progressing  Goal: Improved urinary output this shift  Outcome: Progressing  Goal: Reduction in peripheral edema within 24 hours  Outcome: Progressing  Goal: Report improvement of dyspnea/breathlessness this shift  Outcome: Progressing  Goal: Weight from fluid excess reduced over 2-3 days, then stabilize  Outcome: Progressing  Goal: Increase self care and/or family involvement in 24 hours  Outcome: Progressing     Problem: Pain - Adult  Goal: Verbalizes/displays adequate comfort level or baseline comfort level  Outcome: Progressing     Problem: Safety - Adult  Goal: Free from fall injury  Outcome: Progressing     Problem: Discharge Planning  Goal: Discharge to home or other facility with appropriate resources  Outcome: Progressing     Problem: Chronic Conditions and Co-morbidities  Goal: Patient's chronic conditions and co-morbidity symptoms are monitored and maintained or improved  Outcome:  Progressing   The patient's goals for the shift include      The clinical goals for the shift include maintain free from falls    Over the shift, the patient did not make progress toward the following goals. Barriers to progression include . Recommendations to address these barriers include .

## 2024-08-12 NOTE — PROGRESS NOTES
Physical Therapy    Physical Therapy Treatment    Patient Name: Angel Carroll  MRN: [id number]  Today's Date: 8/12/2024  Time Calculation  Start Time: 1457  Stop Time: 1525  Time Calculation (min): 28 min    Assessment/Plan   PT Assessment  Rehab Prognosis: Good  Evaluation/Treatment Tolerance: Patient limited by fatigue (lightheadedness)  End of Session Communication: Bedside nurse  Assessment Comment: Slow progress regarding functional mobility;  tolerance to activity improving slowly but limited by mild lightheadedness;  high fall risk  End of Session Patient Position: Up in chair, Alarm on  PT Plan  Inpatient/Swing Bed or Outpatient: Inpatient  PT Plan  Treatment/Interventions: Bed mobility, Transfer training, Gait training, Stair training, Neuromuscular re-education, Balance training, Strengthening, Endurance training, Range of motion, Therapeutic exercise, Therapeutic activity, Home exercise program, Postural re-education  PT Plan: Ongoing PT  PT Frequency: 4 times per week  PT Discharge Recommendations: Moderate intensity level of continued care  Equipment Recommended upon Discharge: Wheeled walker, Wheelchair  PT Recommended Transfer Status: Assist x1  PT - OK to Discharge: Yes (with skilled physical therapy services at next level of care)      General Visit Information:   PT  Visit  PT Received On: 08/12/24  General  Prior to Session Communication: Bedside nurse  Patient Position Received: Bed, 2 rail up, Alarm off, not on at start of session  General Comment: Nurse cleared patient for treatment.  Patient reports agreeable to treatment.    Subjective   Precautions:  Precautions  Medical Precautions: Fall precautions      Objective   Pain:  Pain Assessment  Pain Assessment: 0-10  0-10 (Numeric) Pain Score: 2  Pain Type: Acute pain  Pain Location: Arm  Pain Orientation: Left  Cognition:  Cognition  Overall Cognitive Status: Within Functional Limits  Coordination:  Movements are Fluid and Coordinated: No  Lower  Body Coordination: slower rate of movement miguelito LE  Postural Control:  Static Sitting Balance  Static Sitting-Balance Support: Bilateral upper extremity supported  Static Sitting-Level of Assistance: Close supervision  Static Sitting-Comment/Number of Minutes: Supervision for balance while sitting on side of bed  Static Standing Balance  Static Standing-Balance Support: Bilateral upper extremity supported  Static Standing-Level of Assistance: Minimum assistance  Static Standing-Comment/Number of Minutes: Assist with balance during static standing with rolling walker          Activity Tolerance:  Activity Tolerance  Endurance: Decreased tolerance for upright activites  Activity Tolerance Comments: Fatigue;  lightheadedness  Treatments:  Therapeutic Exercise  Therapeutic Exercise Performed: Yes  Therapeutic Exercise Activity 1: ankle pumps, heel slides, hip abduction 5 reps x 1 set    Therapeutic Activity  Therapeutic Activity Performed: Yes  Therapeutic Activity 1: Static standing with rolling walker x ~2 minutes with min assist for trunk support and balance         Bed Mobility  Bed Mobility: Yes  Bed Mobility 1  Bed Mobility 1: Supine to sitting  Level of Assistance 1: Moderate assistance  Bed Mobility Comments 1: Assist with trunk-up during supine to sit;  increased time and effort required to scoot hips to edge of bed during supine to sit    Ambulation/Gait Training  Ambulation/Gait Training Performed: Yes  Ambulation/Gait Training 1  Surface 1: Level tile  Device 1: Rolling walker  Assistance 1: Minimum assistance  Comments/Distance (ft) 1: 10 feet x 2 with rolling walker and assist with balance;  patient ambulate with slow christy, non-reciprocating gait pattern, decreased step length miguelito LE, and forward flexed posture.  Transfers  Transfer: Yes  Transfer 1  Transfer From 1: Sit to  Transfer to 1: Stand  Technique 1: Sit to stand  Transfer Device 1: Walker  Transfer Level of Assistance 1: Minimum  assistance  Trials/Comments 1: Assist with trunk support and balance;  verbal cues for hand placement  Transfers 2  Transfer From 2: Stand to  Transfer to 2: Sit  Technique 2: Stand to sit  Transfer Device 2: Walker  Transfer Level of Assistance 2: Minimum assistance  Trials/Comments 2: Assist with trunk support and balance;  verbal cues for hand placement;  decreased eccentric control    Stairs  Stairs: No         Outcome Measures:  [facility] Basic Mobility  Turning from your back to your side while in a flat bed without using bedrails: A little  Moving from lying on your back to sitting on the side of a flat bed without using bedrails: A lot  Moving to and from bed to chair (including a wheelchair): A little  Standing up from a chair using your arms (e.g. wheelchair or bedside chair): A little  To walk in hospital room: A little  Climbing 3-5 steps with railing: A lot  Basic Mobility - Total Score: 16    Education Documentation  No documentation found.  Education Comments  No comments found.        OP EDUCATION:       Encounter Problems       Encounter Problems (Active)       PT Problem       Patient will ambulate 30' distance using walker with modified independent (Progressing)       Start:  08/09/24    Expected End:  08/23/24         Goal Note       Patient will ambulate 30 distance using walker with modified independent              Patient will ascend and descend 4-6 steps with rails modified independent (Progressing)       Start:  08/09/24    Expected End:  08/23/24            Patient will perform chair to and from bed transfer with modified independent (Progressing)       Start:  08/09/24    Expected End:  08/23/24         Goal Note       Patient will perform chair to and from bed transfer with modified independent                 Pain - Adult

## 2024-08-13 ENCOUNTER — APPOINTMENT (OUTPATIENT)
Dept: CARDIOLOGY | Facility: HOSPITAL | Age: 79
DRG: 291 | End: 2024-08-13
Payer: MEDICARE

## 2024-08-13 ENCOUNTER — HOME HEALTH ADMISSION (OUTPATIENT)
Dept: HOME HEALTH SERVICES | Facility: HOME HEALTH | Age: 79
End: 2024-08-13
Payer: MEDICARE

## 2024-08-13 ENCOUNTER — DOCUMENTATION (OUTPATIENT)
Dept: HOME HEALTH SERVICES | Facility: HOME HEALTH | Age: 79
End: 2024-08-13
Payer: MEDICARE

## 2024-08-13 ENCOUNTER — PHARMACY VISIT (OUTPATIENT)
Dept: PHARMACY | Facility: CLINIC | Age: 79
End: 2024-08-13
Payer: COMMERCIAL

## 2024-08-13 VITALS
TEMPERATURE: 97.3 F | WEIGHT: 242.73 LBS | RESPIRATION RATE: 17 BRPM | SYSTOLIC BLOOD PRESSURE: 113 MMHG | HEART RATE: 62 BPM | OXYGEN SATURATION: 98 % | BODY MASS INDEX: 32.88 KG/M2 | DIASTOLIC BLOOD PRESSURE: 51 MMHG | HEIGHT: 72 IN

## 2024-08-13 LAB
ANION GAP SERPL CALC-SCNC: 7 MMOL/L
BUN SERPL-MCNC: 21 MG/DL (ref 8–25)
CALCIUM SERPL-MCNC: 8.8 MG/DL (ref 8.5–10.4)
CHLORIDE SERPL-SCNC: 101 MMOL/L (ref 97–107)
CO2 SERPL-SCNC: 31 MMOL/L (ref 24–31)
CREAT SERPL-MCNC: 1.6 MG/DL (ref 0.4–1.6)
EGFRCR SERPLBLD CKD-EPI 2021: 43 ML/MIN/1.73M*2
GLUCOSE SERPL-MCNC: 91 MG/DL (ref 65–99)
HOLD SPECIMEN: NORMAL
INR PPP: 2.3 (ref 0.9–1.2)
POTASSIUM SERPL-SCNC: 4.4 MMOL/L (ref 3.4–5.1)
PROTHROMBIN TIME: 22.8 SECONDS (ref 9.3–12.7)
SODIUM SERPL-SCNC: 139 MMOL/L (ref 133–145)

## 2024-08-13 PROCEDURE — 85610 PROTHROMBIN TIME: CPT | Performed by: INTERNAL MEDICINE

## 2024-08-13 PROCEDURE — 2500000001 HC RX 250 WO HCPCS SELF ADMINISTERED DRUGS (ALT 637 FOR MEDICARE OP): Performed by: INTERNAL MEDICINE

## 2024-08-13 PROCEDURE — 93005 ELECTROCARDIOGRAM TRACING: CPT

## 2024-08-13 PROCEDURE — 80048 BASIC METABOLIC PNL TOTAL CA: CPT | Performed by: INTERNAL MEDICINE

## 2024-08-13 PROCEDURE — 2500000001 HC RX 250 WO HCPCS SELF ADMINISTERED DRUGS (ALT 637 FOR MEDICARE OP): Performed by: NURSE PRACTITIONER

## 2024-08-13 PROCEDURE — 36415 COLL VENOUS BLD VENIPUNCTURE: CPT | Performed by: INTERNAL MEDICINE

## 2024-08-13 PROCEDURE — RXMED WILLOW AMBULATORY MEDICATION CHARGE

## 2024-08-13 PROCEDURE — 99232 SBSQ HOSP IP/OBS MODERATE 35: CPT | Performed by: INTERNAL MEDICINE

## 2024-08-13 PROCEDURE — 2500000002 HC RX 250 W HCPCS SELF ADMINISTERED DRUGS (ALT 637 FOR MEDICARE OP, ALT 636 FOR OP/ED): Performed by: NURSE PRACTITIONER

## 2024-08-13 RX ORDER — NYSTATIN 100000 [USP'U]/G
1 POWDER TOPICAL 2 TIMES DAILY
Qty: 30 G | Refills: 0 | Status: SHIPPED | OUTPATIENT
Start: 2024-08-13 | End: 2024-08-20 | Stop reason: WASHOUT

## 2024-08-13 RX ORDER — METOPROLOL SUCCINATE 25 MG/1
25 TABLET, EXTENDED RELEASE ORAL DAILY
Qty: 30 TABLET | Refills: 0 | Status: SHIPPED | OUTPATIENT
Start: 2024-08-14 | End: 2024-08-20 | Stop reason: SDUPTHER

## 2024-08-13 ASSESSMENT — COGNITIVE AND FUNCTIONAL STATUS - GENERAL
HELP NEEDED FOR BATHING: A LITTLE
TOILETING: A LITTLE
DRESSING REGULAR LOWER BODY CLOTHING: A LITTLE
DAILY ACTIVITIY SCORE: 20
MOVING TO AND FROM BED TO CHAIR: A LITTLE
DRESSING REGULAR UPPER BODY CLOTHING: A LITTLE
TURNING FROM BACK TO SIDE WHILE IN FLAT BAD: A LOT
WALKING IN HOSPITAL ROOM: A LITTLE
MOBILITY SCORE: 16
STANDING UP FROM CHAIR USING ARMS: A LITTLE
MOVING FROM LYING ON BACK TO SITTING ON SIDE OF FLAT BED WITH BEDRAILS: A LITTLE
CLIMB 3 TO 5 STEPS WITH RAILING: A LOT

## 2024-08-13 ASSESSMENT — PAIN SCALES - GENERAL: PAINLEVEL_OUTOF10: 0 - NO PAIN

## 2024-08-13 NOTE — DISCHARGE SUMMARY
Discharge Diagnosis  Fall, initial encounter    Issues Requiring Follow-Up  Atrial fibrillation on Coumadin--Daily PT/INR or as directed by outpatient provider    Discharge Meds     Your medication list        START taking these medications        Instructions Last Dose Given Next Dose Due   metoprolol succinate XL 25 mg 24 hr tablet  Commonly known as: Toprol-XL  Start taking on: August 14, 2024      Take 1 tablet (25 mg) by mouth once daily. Do not crush or chew.       nystatin 100,000 unit/gram powder  Commonly known as: Mycostatin      Apply 1 Application topically 2 times a day.              CHANGE how you take these medications        Instructions Last Dose Given Next Dose Due   warfarin 2.5 mg tablet  Commonly known as: Coumadin  What changed:   how much to take  how to take this  when to take this  additional instructions      Take as directed. If you are unsure how to take this medication, talk to your nurse or doctor.  Original instructions: TAKE 1 TABLET AT BEDTIME FOR A-FIB              CONTINUE taking these medications        Instructions Last Dose Given Next Dose Due   acetaminophen 325 mg tablet  Commonly known as: Tylenol           amiodarone 200 mg tablet  Commonly known as: Pacerone      Take 1 tablet (200 mg) by mouth once daily.       atorvastatin 20 mg tablet  Commonly known as: Lipitor      TAKE 1 TABLET EVERY DAY       cyanocobalamin 500 mcg tablet  Commonly known as: Vitamin B-12           ergocalciferol 1.25 MG (86745 UT) capsule  Commonly known as: Vitamin D-2      Take 1 capsule (50,000 Units) by mouth 1 (one) time per week.       fenofibrate 48 mg tablet  Commonly known as: Tricor      Take 1.5 tablets (72 mg) by mouth once daily.       ferrous sulfate 250 mg (50 mg iron) tablet extended release      Take 1 tablet (250 mg) by mouth 2 times a day.       finasteride 5 mg tablet  Commonly known as: Proscar      TAKE 1 TABLET EVERY DAY       folic acid 1 mg tablet  Commonly known as:  Folvite      TAKE 1 TABLET EVERY DAY       furosemide 40 mg tablet  Commonly known as: Lasix      Take 1 tablet (40 mg) by mouth 2 times a day.       gabapentin 100 mg capsule  Commonly known as: Neurontin      Take 1 capsule (100 mg) by mouth once daily at bedtime.       oxygen gas therapy  Commonly known as: O2           pantoprazole 40 mg EC tablet  Commonly known as: ProtoNix      TAKE 1 TABLET EVERY DAY       PARoxetine 20 mg tablet  Commonly known as: Paxil      TAKE 1 TABLET EVERY DAY       psyllium husk (with sugar) 2 gram wafer           tamsulosin 0.4 mg 24 hr capsule  Commonly known as: Flomax      Take 2 capsules (0.8 mg) by mouth once daily at bedtime.              STOP taking these medications      metoprolol tartrate 25 mg tablet  Commonly known as: Lopressor                  Where to Get Your Medications        These medications were sent to [facility]  [id number] [facility] [address]      Hours: 9 AM to 6 PM Mon-Fri, 9 AM to 1 PM Sat Phone: [phone]   metoprolol succinate XL 25 mg 24 hr tablet  nystatin 100,000 unit/gram powder         Test Results Pending At Discharge  Pending Labs       No current pending labs.            Hospital Course  79-year-old male admitted 8/8/2024 after mechanical fall at home.  Denies hitting head or change in consciousness.  No acute fractures or acute injuries noted on imaging.  Seen by cardiology for some mild bilateral lower extremity edema.  Given Lasix with improvement.  Cleared to discharge home with home health care.  Patient to follow-up with PCP in 1 to 2 weeks.    Pertinent Physical Exam At Time of Discharge  Physical Exam  Vitals and nursing note reviewed.   Constitutional:       Appearance: Normal appearance.   HENT:      Head: Normocephalic and atraumatic.      Mouth/Throat:      Mouth: Mucous membranes are moist.   Eyes:      Extraocular Movements: Extraocular movements intact.      Pupils: Pupils are equal, round, and reactive  to light.   Cardiovascular:      Rate and Rhythm: Normal rate.      Pulses: Normal pulses.      Heart sounds: Normal heart sounds.   Pulmonary:      Breath sounds: Normal breath sounds.      Comments: On 2 L of oxygen via nasal cannula--his baseline  Abdominal:      General: Bowel sounds are normal.      Palpations: Abdomen is soft.   Musculoskeletal:         General: Normal range of motion.      Cervical back: Normal range of motion and neck supple.   Skin:     General: Skin is warm and dry.      Capillary Refill: Capillary refill takes less than 2 seconds.      Findings: Bruising present.   Neurological:      General: No focal deficit present.      Mental Status: He is alert and oriented to person, place, and time.   Psychiatric:         Mood and Affect: Mood normal.         Outpatient Follow-Up  Future Appointments   Date Time Provider Department Center   9/20/2024  1:20 PM Jung Tao MD [id number] [facility]   9/25/2024 11:00 AM PHARMACY [facility] [id number] [facility]   10/1/2024  1:40 PM Jose Mccormick MD [id number] [facility]   10/21/2024  2:00 PM RON Daugherty-CNP [id number] [facility]   11/26/2024  2:20 PM Tiana Juarez MD [id number] [facility]   3/20/2025 11:40 AM SAMUEL Wilkes [id number] [facility]         RON Cooper-CNP

## 2024-08-13 NOTE — PROGRESS NOTES
Spiritual Care Visit    Clinical Encounter Type  Visited With: Patient not available  Routine Visit: Introduction  Continue Visiting: Yes     Jacinto Grijalva

## 2024-08-13 NOTE — HH CARE COORDINATION
Home Care received a Referral for Physical Therapy, Occupational Therapy, and Home Health Aide. We have processed the referral for a Start of Care on 08/14-08/15.     If you have any questions or concerns, please feel free to contact us at [phone]. Follow the prompts, enter your five digit zip code, and you will be directed to your care team on EAST 1.

## 2024-08-13 NOTE — PROGRESS NOTES
Occupational Therapy                 Therapy Communication Note    Patient Name: Angel Carroll  MRN: [id number]  Today's Date: 8/13/2024     Discipline: Occupational Therapy    Missed Visit Reason: Missed Visit Reason:  (approached pt for followup, pt fully dressed, family and RN in room, pt currently discharging, no treatment completed.)

## 2024-08-13 NOTE — PROGRESS NOTES
Subjective Data:      Overnight Events:         Objective Data:  Last Recorded Vitals:  Vitals:    08/12/24 1615 08/12/24 1958 08/13/24 0538 08/13/24 0805   BP: 127/71 128/69 124/57 129/69   BP Location: Right arm   Left arm   Patient Position: Sitting   Lying   Pulse: 59 64 60 62   Resp: 18 18 17 18   Temp: 36.4 °C (97.5 °F) 36.4 °C (97.5 °F) 36.5 °C (97.7 °F) 36.8 °C (98.2 °F)   TempSrc: Oral Oral Oral Axillary   SpO2: 100% 99% 97% 97%   Weight:       Height:           Last Labs:  CBC - 8/12/2024:  6:10 AM  3.1 10.2 157    33.4      CMP - 8/13/2024:  6:21 AM  8.8 6.4 18 --- 0.7   2.9 3.6 6 85      PTT - 4/7/2024:  8:17 PM  2.3   22.8 45.5     BNP   Date/Time Value Ref Range Status   02/12/2022 05:28  0 - 99 pg/mL Final     Comment:     .  <100 pg/mL - Heart failure unlikely  100-299 pg/mL - Intermediate probability of acute heart  .               failure exacerbation. Correlate with clinical  .               context and patient history.    >=300 pg/mL - Heart Failure likely. Correlate with clinical  .               context and patient history.  BNP testing is performed using different testing   methodology at [facility] than at other   system [facility]. Direct result comparisons should   only be made within the same method.     02/11/2022 07:52  0 - 99 pg/mL Final     Comment:     .  <100 pg/mL - Heart failure unlikely  100-299 pg/mL - Intermediate probability of acute heart  .               failure exacerbation. Correlate with clinical  .               context and patient history.    >=300 pg/mL - Heart Failure likely. Correlate with clinical  .               context and patient history.  BNP testing is performed using different testing   methodology at [facility] than at other   system hospitals. Direct result comparisons should   only be made within the same method.       HGBA1C   Date/Time Value Ref Range Status   02/12/2022 05:27 AM 5.0 % Final     Comment:           "Diagnosis of Diabetes-Adults   Non-Diabetic: < or = 5.6%   Increased risk for developing diabetes: 5.7-6.4%   Diagnostic of diabetes: > or = 6.5%  .       Monitoring of Diabetes                Age (y)     Therapeutic Goal (%)   Adults:          >18           <7.0   Pediatrics:    13-18           <7.5                   7-12           <8.0                   0- 6            7.5-8.5   American Diabetes Association. Diabetes Care 33(S1), Jan 2010.     09/26/2021 12:15 PM 5.6 % Final     Comment:          Diagnosis of Diabetes-Adults   Non-Diabetic: < or = 5.6%   Increased risk for developing diabetes: 5.7-6.4%   Diagnostic of diabetes: > or = 6.5%  .       Monitoring of Diabetes                Age (y)     Therapeutic Goal (%)   Adults:          >18           <7.0   Pediatrics:    13-18           <7.5                   7-12           <8.0                   0- 6            7.5-8.5   American Diabetes Association. Diabetes Care 33(S1), Jan 2010.       LDLCALC   Date/Time Value Ref Range Status   11/14/2022 03:42 AM 70 65 - 130 MG/DL Final   10/15/2022 06:11 AM 48 65 - 130 MG/DL Final   03/23/2021 02:08 PM 50 65 - 130 MG/DL Final     VLDL   Date/Time Value Ref Range Status   01/10/2022 01:43 PM 12 0 - 40 mg/dL Final   09/26/2021 12:15 PM 23 0 - 40 mg/dL Final   06/22/2021 10:58 AM 10 0 - 40 mg/dL Final      Last I/O:  I/O last 3 completed shifts:  In: 200 (1.8 mL/kg) [P.O.:200]  Out: 1650 (15 mL/kg) [Urine:1650 (0.4 mL/kg/hr)]  Weight: 110.1 kg     Past Cardiology Tests (Last 3 Years):  EKG:  ECG 12 Lead 06/30/2024      ECG 12 lead 02/29/2024    Echo:  Transthoracic Echo (TTE) Complete 02/29/2024    Ejection Fractions:  No results found for: \"EF\"  Cath:  No results found for this or any previous visit from the past 1095 days.    Stress Test:  Nuclear Stress Test 11/28/2022    Cardiac Imaging:  No results found for this or any previous visit from the past 1095 days.      Inpatient Medications:  Scheduled medications "   Medication Dose Route Frequency    amiodarone  200 mg oral Daily    atorvastatin  20 mg oral Nightly    cyanocobalamin  500 mcg oral Daily    ergocalciferol  50,000 Units oral Every Sunday    fenofibrate  54 mg oral Daily    ferrous sulfate (325 mg ferrous sulfate)  65 mg of iron oral Daily    finasteride  5 mg oral Daily    folic acid  1 mg oral Daily    furosemide  40 mg oral Daily    gabapentin  100 mg oral Nightly    metoprolol succinate XL  25 mg oral Daily    nystatin  1 Application Topical BID    pantoprazole  40 mg oral Daily    PARoxetine  20 mg oral Daily    psyllium  1 packet oral BID    tamsulosin  0.8 mg oral Nightly    warfarin  2.5 mg oral Daily     PRN medications   Medication    acetaminophen    polyethylene glycol     Continuous Medications   Medication Dose Last Rate    oxygen  3 L/min         Physical Exam:       Assessment/Plan   8/9: The patient is a very pleasant 79-year-old white male with a past history including hypertension hyperlipidemia former smoking COPD with continuous nasal oxygen therapy, coronary artery disease status post multiple PCI procedures, borderline ischemic congestive cardiomyopathy with low normal LV ejection fraction, paroxysmal atrial fibrillation with radiofrequency ablation, status post permanent pacemaker implantation for sick sinus syndrome in 1/2023, history of CVA with subtherapeutic INR with 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 The patient is admitted after sustaining a mechanical fall during the middle of the night when he was checking his oxygen due to a power outage. Extensive x-rays are negative for evidence of fracture. His EKG shows an AV sequential pacemaker rhythm. INR is 3.0. Will will change the patient's with Toprol tartrate 12.5 mg twice daily to Toprol-XL 25 mg daily. Creatinine values slightly above baseline at 1.7 and will temporarily hold Lasix and  ultimately will reduce his Lasix dosage to 40 mg daily. Cardiac status at this time is relatively stable      8/10: Assessed by Sienna GROSS in collaboration with Dr. Coronado on coverage for Dr. Cullen.  Stable overnight.  Denies complaints chest pain or pressure, palpitations or feeling of rapid heart rate.  His diuretics remain on hold him his creatinine this morning is noted at 1.6.  Mildly improved from peak of 1.7 on admission.  Blood pressure stable with most recent of 118/59.  Current pulse ox on nasal cannula oxygen 96%.  He remains in a rate controlled atrial fibrillation on telemetry.  His metoprolol was increased yesterday.  He does have a trace pedal ankle edema which appears to be chronic for this patient.  Overall appears generally stable at this time.  Will continue to follow creatinine.  Will follow with you.        8/11: Assessed by Sienna GROSS in collaboration with Dr. Coronado on coverage for Dr. Cullen.  No significant changes overnight.  Has been restarted on his oral diuretics.  His creatinine continues to slowly improve to most recent 1.5.  Potassium stable at 4.0.  No significant vents on telemetry.  Remains in rate controlled atrial fibrillation.  Most recent blood pressure 129/81.  He is chest pain-free overall believe he will be stable for discharge tomorrow from the cardiac perspective.  Will follow with you.     8/12: Patient resting comfortably no significant pain from his various contusions and abrasions.  Respirations comfortable on his usual supplemental continuous nasal oxygen.  Telemetry monitor shows a stable AV sequential pacemaker rhythm at 60/min.  Creatinine 1.70 and has been ranging between 1.50-1.70.  Will reduce his oral Lasix to 40 mg once daily and continue the Toprol-XL 25 mg daily for treatment of his coronary artery disease and prevention of paroxysmal atrial fibrillation.  Patient has an apparent baseline leukopenia WBC 3100 as well as anemia  hematocrit 33.4 which are stable.  Platelet count is in the low normal range.  INR remains subtherapeutic at 2.1.    8/13: The patient is sitting in bedside chair comfortable not short of breath and his usual low-flow O2 nasal cannula.  Telemetry monitor demonstrates an AV sequential pacemaker rhythm.  Renal parameters remain at baseline creatinine 1.6 potassium is 4.4.  INR remains therapeutic at 2.3 on his usual Coumadin dosage.  Patient appears to be ready for discharge on current therapy which at this point includes in part Lasix 40 mg daily Toprol XL 25 mg daily rather than the metoprolol tartrate.  He will remain on the amiodarone 200 mg daily warfarin 2.5 mg daily atorvastatin 20 mg daily.  He will be arranged to follow-up with Dr. Martin for outpatient visit within the next several weeks.        Peripheral IV 08/08/24 20 G Right;Anterior Forearm (Active)   Site Assessment Clean;Dry;Intact 08/13/24 0715   Dressing Status Clean;Dry 08/13/24 0715   Number of days: 5       Code Status:  Full Code    I spent 20 minutes in the professional and overall care of this patient.        Deacon Cullen MD

## 2024-08-13 NOTE — CARE PLAN
The patient's goals for the shift include      The clinical goals for the shift include safety    Over the shift, the patient did   Problem: Fall/Injury  Goal: Not fall by end of shift  Outcome: Progressing     Problem: Skin  Goal: Promote skin healing  Outcome: Progressing    make progress toward the following goals.

## 2024-08-13 NOTE — PROGRESS NOTES
Patient is active with [facility] PT/OT and would like to continue with their services. Patient is declining SNF at this time but would like to think about. Patient states that now that his wife has been discharged home from rehab, they will be able to help each other in the home.     **NEED INTERNAL REFERRAL FOR [facility] FOR PT/OT.    DISCHARGE PLAN TO RETURN HOME WITH [facility].     UPDATE 1615: WAITING FOR INSURANCE APPROVAL AND SOC WITH [facility].     DO NOT DISCHARGE WITHOUT SPEAKING TO CARE COORDINATION.     SECURE DISCHARGE RETURN HOME WITH [facility] SOC 08/14/24-08/15/24.     08/13/24 1252   Current Planned Discharge Disposition   Current Planned Discharge Disposition [facility]  ([facility])

## 2024-08-14 ENCOUNTER — HOME CARE VISIT (OUTPATIENT)
Dept: HOME HEALTH SERVICES | Facility: HOME HEALTH | Age: 79
End: 2024-08-14
Payer: MEDICARE

## 2024-08-14 ENCOUNTER — PATIENT OUTREACH (OUTPATIENT)
Dept: CARE COORDINATION | Facility: CLINIC | Age: 79
End: 2024-08-14
Payer: MEDICARE

## 2024-08-14 ENCOUNTER — DOCUMENTATION (OUTPATIENT)
Dept: HOME HEALTH SERVICES | Facility: HOME HEALTH | Age: 79
End: 2024-08-14
Payer: MEDICARE

## 2024-08-14 NOTE — HH CARE COORDINATION
Home Care received a Referral to Resume Care for Physical Therapy, Occupational Therapy, and Home Health Aide. We have processed the referral for a Resumption of Care on 8/15.     If you have any questions or concerns, please feel free to contact us at [phone]. Follow the prompts, enter your five digit zip code, and you will be directed to your care team on EAST 1.

## 2024-08-14 NOTE — PROGRESS NOTES
Discharge Facility: [facility]   Discharge Diagnosis:   Fall, initial encounter    Admission Date: 8-8-24  Discharge Date: 8-13-24     PCP Appointment Date: Message routed to office for scheduling     Specialist Appointment Date:  10-1-24 Cardio  Hospital Encounter and Summary Linked: Yes  See discharge assessment below for further details  Medications  Medications reviewed with patient/caregiver?: Yes (8/14/2024  9:29 AM)  Is the patient having any side effects they believe may be caused by any medication additions or changes?: No (8/14/2024  9:29 AM)  Does the patient have all medications ordered at discharge?: Yes (8/14/2024  9:29 AM)  Care Management Interventions: No intervention needed (8/14/2024  9:29 AM)  Prescription Comments: START taking: metoprolol succinate XL (Toprol-XL) Start taking on: August 14, 2024 nystatin (Mycostatin)  STOP taking: metoprolol tartrate 25 mg tablet (Lopressor) (8/14/2024  9:29 AM)  Is the patient taking all medications as directed (includes completed medication regime)?: Yes (8/14/2024  9:29 AM)  Care Management Interventions: Provided patient education (8/14/2024  9:29 AM)    Appointments  Does the patient have a primary care provider?: Yes (8/14/2024  9:29 AM)  Care Management Interventions: Educated patient on importance of making appointment (8/14/2024  9:29 AM)  Has the patient kept scheduled appointments due by today?: No (8/14/2024  9:29 AM)  Care Management Interventions: Advised patient to keep appointment (8/14/2024  9:29 AM)    Self Management  What is the home health agency?: Plan Status Workflow Plan Type Effective From Effective To   Home Health Recert Plan of Care 7/14/24 Active Home Health Plan of Care Certified Home Health 7/14/2024 9/11/2024 (8/14/2024  9:29 AM)  Has home health visited the patient within 72 hours of discharge?: Call prior to 72 hours (8/14/2024  9:29 AM)    Patient Teaching  Does the patient have access to their discharge instructions?: Yes  (8/14/2024  9:29 AM)  Care Management Interventions: Reviewed instructions with patient (8/14/2024  9:29 AM)  What is the patient's perception of their health status since discharge?: Same (8/14/2024  9:29 AM)  Is the patient/caregiver able to teach back the hierarchy of who to call/visit for symptoms/problems? PCP, Specialist, Home Health nurse, Urgent Care, ED, 911: Yes (8/14/2024  9:29 AM)      .Darrion Hill LPN

## 2024-08-15 ENCOUNTER — ANTICOAGULATION - WARFARIN VISIT (OUTPATIENT)
Dept: CARDIOLOGY | Facility: CLINIC | Age: 79
End: 2024-08-15
Payer: MEDICARE

## 2024-08-15 DIAGNOSIS — I48.0 PAROXYSMAL ATRIAL FIBRILLATION (MULTI): Primary | ICD-10-CM

## 2024-08-15 LAB
INR IN PPP BY COAGULATION ASSAY EXTERNAL: 2.4
PROTHROMBIN TIME (PT) IN PPP BY COAGULATION ASSAY EXTERNAL: NORMAL

## 2024-08-15 NOTE — PROGRESS NOTES
Patient identification verified with 2 identifiers.    Location: [facility] Patient Self-Testing Program [phone]    Referring Physician: Doc Martin MD   Enrollment/ Re-enrollment date: 25   INR Goal: 2.0-3.0  INR monitoring is per [facility] protocol.  Anticoagulation Medication: warfarin  Indication: Atrial Fibrillation/Atrial Flutter    Subjective   Bleeding signs/symptoms: No    Bruising: No   Major bleeding event: No  Thrombosis signs/symptoms: No  Thromboembolic event: No  Missed doses: No  Extra doses: No  Medication changes: No  Dietary changes: No  Change in health: No  Change in activity: No  Alcohol: No  Other concerns: No    Upcoming Procedures:  Does the Patient Have any upcoming procedures that require interruption in anticoagulation therapy? no  Does the patient require bridging? no      Anticoagulation Summary  As of 8/15/2024      INR goal:  2.0-3.0   TTR:  53.6% (9.5 mo)   INR used for dosin.40 (8/15/2024)   Weekly warfarin total:  16.25 mg               Assessment/Plan   Therapeutic     1. New dose: Spoke to Rhea with dosing instructions and next testing date.    2. Next INR: 1 week      Education provided to patient during the visit:  Patient instructed to call in interim with questions, concerns and changes.

## 2024-08-16 NOTE — DOCUMENTATION CLARIFICATION NOTE
"    PATIENT:               MICHEL COLEY  ACCT #:                  [id number]  MRN:                       [id number]  :                       1945  ADMIT DATE:       2024 12:18 PM  DISCH DATE:        2024 4:47 PM  RESPONDING PROVIDER #:        [id number]          PROVIDER RESPONSE TEXT:    Acute on Chronic Diastolic Congestive Heart Failure    CDI QUERY TEXT:    Clarification    Instruction:    Based on your assessment of the patient and the clinical information, please provide the requested documentation by clicking on the appropriate radio button and enter any additional information if prompted.    Question: Please further clarify the type and acuity of congestive heart failure    When answering this query, please exercise your independent professional judgment. The fact that a question is being asked, does not imply that any particular answer is desired or expected.    The patient's clinical indicators include:  Clinical Information: 79 Y/M presenting for evaluation after a fall.    Clinical Indicators:     ProBNP: 4,737     H/P: \"Patient has been without electricity and got up from bed at 4 am to check his oxygen tanks and become dizzy and lightheaded and fell... Chest  xray is consistent with CHF. Patient complains of mild pain in right ankle and more than usual edema in BLE...  CHF with elevated troponin  Patient has signs clinically of volume overload.  Will hold home medication dose of Lasix and give a one-time dose of 60 g IV.\"       Cardiology consult: \"past medical history significant for hypertension, hyperlipidemia, COPD with home oxygen.  Coronary artery disease s/p multiple PCI a low normal LVEF of 50 to 55% by echo 2024 patent stents by repeat cath 2022, atrial fibrillation with RVR s/p ablation, PPM secondary to sick sinus syndrome 2023...echocardiogram 2024 showing an LV ejection fraction of 50-55% abnormal septal motion due to right ventricular pacemaker.  There was " "aortic valve sclerosis moderate pulmonic and tricuspid valve insufficiency and borderline pulmonary hypertension estimated PA systolic pressure of 39 mmHg\"    8/8 CXR: \"Impression:  CHF with mild interstitial edema.\"    Treatment: Lasix 60mg IVx1 then 40mg oral BID then daily    Risk Factors: significant cardiac history as outlined above, COPD dependent on home O2  Options provided:  -- Acute Diastolic Congestive Heart Failure  -- Acute on Chronic Diastolic Congestive Heart Failure  -- Other - I will add my own diagnosis  -- Refer to Clinical Documentation Reviewer    Query created by: Luci Otero on 8/13/2024 10:19 AM      Electronically signed by:  TOMER BAKER 8/16/2024 9:52 AM          "

## 2024-08-17 ENCOUNTER — HOME CARE VISIT (OUTPATIENT)
Dept: HOME HEALTH SERVICES | Facility: HOME HEALTH | Age: 79
End: 2024-08-17
Payer: MEDICARE

## 2024-08-17 VITALS
HEART RATE: 60 BPM | TEMPERATURE: 97.4 F | DIASTOLIC BLOOD PRESSURE: 60 MMHG | SYSTOLIC BLOOD PRESSURE: 116 MMHG | RESPIRATION RATE: 18 BRPM | OXYGEN SATURATION: 99 %

## 2024-08-17 PROCEDURE — G0152 HHCP-SERV OF OT,EA 15 MIN: HCPCS | Mod: HHH

## 2024-08-17 ASSESSMENT — ACTIVITIES OF DAILY LIVING (ADL)
ENTERING_EXITING_HOME: MODERATE ASSIST
DRESSING_LB_CURRENT_FUNCTION: MINIMUM ASSIST
BATHING ASSESSED: 1
BATHING_CURRENT_FUNCTION: MINIMUM ASSIST
OASIS_M1830: 03

## 2024-08-17 ASSESSMENT — PAIN SCALES - PAIN ASSESSMENT IN ADVANCED DEMENTIA (PAINAD)
TOTALSCORE: 0
BODYLANGUAGE: 0
CONSOLABILITY: 0 - NO NEED TO CONSOLE.
FACIALEXPRESSION: 0
NEGVOCALIZATION: 0 - NONE.
BODYLANGUAGE: 0 - RELAXED.
FACIALEXPRESSION: 0 - SMILING OR INEXPRESSIVE.
NEGVOCALIZATION: 0
CONSOLABILITY: 0
BREATHING: 0

## 2024-08-17 ASSESSMENT — ENCOUNTER SYMPTOMS
PERSON REPORTING PAIN: PATIENT
DENIES PAIN: 1

## 2024-08-20 ENCOUNTER — HOME CARE VISIT (OUTPATIENT)
Dept: HOME HEALTH SERVICES | Facility: HOME HEALTH | Age: 79
End: 2024-08-20
Payer: MEDICARE

## 2024-08-20 ENCOUNTER — APPOINTMENT (OUTPATIENT)
Dept: PRIMARY CARE | Facility: CLINIC | Age: 79
End: 2024-08-20
Payer: MEDICARE

## 2024-08-20 VITALS
BODY MASS INDEX: 32.78 KG/M2 | HEART RATE: 60 BPM | WEIGHT: 242 LBS | OXYGEN SATURATION: 92 % | HEIGHT: 72 IN | DIASTOLIC BLOOD PRESSURE: 67 MMHG | SYSTOLIC BLOOD PRESSURE: 122 MMHG

## 2024-08-20 VITALS — HEART RATE: 60 BPM | DIASTOLIC BLOOD PRESSURE: 68 MMHG | SYSTOLIC BLOOD PRESSURE: 124 MMHG | OXYGEN SATURATION: 99 %

## 2024-08-20 DIAGNOSIS — I48.20 CHRONIC ATRIAL FIBRILLATION (MULTI): ICD-10-CM

## 2024-08-20 DIAGNOSIS — I25.5 ISCHEMIC CARDIOMYOPATHY: ICD-10-CM
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
## 2024-08-20 DIAGNOSIS — I77.9 DISORDER OF CAROTID ARTERY (CMS-HCC): Primary | ICD-10-CM

## 2024-08-20 DIAGNOSIS — I50.9 ACUTE ON CHRONIC HEART FAILURE, UNSPECIFIED HEART FAILURE TYPE (MULTI): ICD-10-CM

## 2024-08-20 DIAGNOSIS — M19.90 ARTHRITIS: ICD-10-CM

## 2024-08-20 PROBLEM — D49.6 BRAIN TUMOR (MULTI): Status: RESOLVED | Noted: 2023-09-12 | Resolved: 2024-08-20

## 2024-08-20 PROCEDURE — G0151 HHCP-SERV OF PT,EA 15 MIN: HCPCS | Mod: HHH

## 2024-08-20 RX ORDER — GABAPENTIN 100 MG/1
100 CAPSULE ORAL NIGHTLY
Qty: 90 CAPSULE | Refills: 0 | Status: SHIPPED | OUTPATIENT
Start: 2024-08-20 | End: 2024-11-18

## 2024-08-20 RX ORDER — METOPROLOL SUCCINATE 25 MG/1
25 TABLET, EXTENDED RELEASE ORAL DAILY
Qty: 90 TABLET | Refills: 1 | Status: SHIPPED | OUTPATIENT
Start: 2024-08-20 | End: 2025-08-20

## 2024-08-20 RX ORDER — PSYLLIUM HUSK 0.4 G
5 CAPSULE ORAL 4 TIMES DAILY
COMMUNITY

## 2024-08-20 SDOH — HEALTH STABILITY: PHYSICAL HEALTH: EXERCISE COMMENTS: SITTING LAQ MARCHING AP. PT FAMILIAR WITH A HEP

## 2024-08-20 ASSESSMENT — ENCOUNTER SYMPTOMS
DENIES PAIN: 1
LOSS OF SENSATION IN FEET: 1
OCCASIONAL FEELINGS OF UNSTEADINESS: 1
DEPRESSION: 0

## 2024-08-20 ASSESSMENT — PATIENT HEALTH QUESTIONNAIRE - PHQ9
1. LITTLE INTEREST OR PLEASURE IN DOING THINGS: NOT AT ALL
SUM OF ALL RESPONSES TO PHQ9 QUESTIONS 1 AND 2: 0
2. FEELING DOWN, DEPRESSED OR HOPELESS: NOT AT ALL

## 2024-08-20 ASSESSMENT — LIFESTYLE VARIABLES
SKIP TO QUESTIONS 9-10: 1
HOW MANY STANDARD DRINKS CONTAINING ALCOHOL DO YOU HAVE ON A TYPICAL DAY: PATIENT DOES NOT DRINK
AUDIT-C TOTAL SCORE: 0
HOW OFTEN DO YOU HAVE SIX OR MORE DRINKS ON ONE OCCASION: NEVER
HOW OFTEN DO YOU HAVE A DRINK CONTAINING ALCOHOL: NEVER

## 2024-08-20 ASSESSMENT — ACTIVITIES OF DAILY LIVING (ADL): AMBULATION_DISTANCE/DURATION_TOLERATED: 75 FEET

## 2024-08-20 NOTE — PROGRESS NOTES
"Patient: Angel Carroll  : 1945  PCP: Jung Tao MD  MRN: [id number]  Program: Transitional Care Management  Status: Enrolled  Effective Dates: 2024 - present  Responsible Staff: Darrion Hill LPN  Social Determinants to be Addressed: No information to display         Angel Carroll is a 79 y.o. male presenting today for follow-up after being discharged from the hospital 7 days ago. The main problem requiring admission was fall at home and chf . The discharge summary and/or Transitional Care Management documentation was reviewed. Medication reconciliation was performed as indicated via the \"[place] as Reviewed\" timestamp.  Patient is brought to the clinic in a wheelchair by his family after his discharge  from [facility].  He had several investigations during his hospitalization including CT of the cervical spine which did not reveal any fractures or malalignment CT of the head done did not reveal any intracranial bleeding and CT of the cervical spine also came back negative for any subluxation  or fracture, CT of the pelvis done revealed vesicle lithiasis, prostatomegaly without any fractures of hips or pelvic area and showed some degenerative changes of both hips with chondrocalcinosis, x-ray of the right hip revealed degenerative changes without any acute osseous abnormalities and x-ray of the lumbar spine revealed multilevel spondylosis without any fractures and other studies have been reviewed and discussed with him.  Please see discharge summary for detailed information.  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       Angel Carroll was contacted by Transitional Care Management services two days after his discharge. This encounter and supporting documentation was reviewed.    Review of Systems   Constitutional: Negative.    HENT: Negative.     Eyes: Negative.    Respiratory: Negative.     Cardiovascular: Negative.    Gastrointestinal: Negative.    Endocrine: Negative.    Genitourinary: Negative.    Musculoskeletal: Negative.    Skin: Negative.    Allergic/Immunologic: Negative.    Neurological: Negative.    Hematological: Negative.    Psychiatric/Behavioral: Negative.         /67 (BP Location: Left arm)   Pulse 60   Ht 1.829 m (6')   Wt 110 kg (242 lb)   SpO2 92%   BMI 32.82 kg/m²     Physical Exam  Constitutional:       Appearance: Normal appearance. He is obese.   HENT:      Right Ear: Tympanic membrane normal.      Left Ear: Tympanic membrane and ear canal normal.      Nose: Nose normal.   Neck:      Vascular: No carotid bruit.   Cardiovascular:      Rate and Rhythm: Normal rate.   Pulmonary:      Effort: No respiratory distress.      Breath sounds: No stridor. No wheezing.   Abdominal:      Palpations: Abdomen is soft.      Tenderness: There is no abdominal tenderness. There is no guarding or rebound.   Skin:     Coloration: Skin is not jaundiced.      Findings: No bruising.   Neurological:      General: No focal deficit present.      Mental Status: He is alert and oriented to person, place, and time.   Psychiatric:         Mood and Affect: Mood normal.         The complexity of medical decision making for this patient's transitional care is moderate.    Assessment/Plan    patient is given reassurance.  He will continue to follow-up with cardiology clinic regarding history of nonischemic  cardiomyopathy.  He will continue current medications and will return to his scheduled appointment.

## 2024-08-22 ENCOUNTER — ANTICOAGULATION - WARFARIN VISIT (OUTPATIENT)
Dept: CARDIOLOGY | Facility: CLINIC | Age: 79
End: 2024-08-22
Payer: MEDICARE

## 2024-08-22 DIAGNOSIS — I48.0 PAROXYSMAL ATRIAL FIBRILLATION (MULTI): Primary | ICD-10-CM

## 2024-08-22 NOTE — PROGRESS NOTES
Patient identification verified with 2 identifiers.    Location: [facility] Patient Self-Testing Program [phone]    Referring Physician: Dr. Doc Martin  Enrollment/ Re-enrollment date: 2025   INR Goal: 2.0-3.0  INR monitoring is per [facility] protocol.  Anticoagulation Medication: warfarin  Indication: Atrial Fibrillation/Atrial Flutter    Subjective   Bleeding signs/symptoms: No    Bruising: No   Major bleeding event: No  Thrombosis signs/symptoms: No  Thromboembolic event: No  Missed doses: No  Extra doses: No  Medication changes: No  Dietary changes: No  Change in health: No  Change in activity: No  Alcohol: No  Other concerns: No    Upcoming Procedures:  Does the Patient Have any upcoming procedures that require interruption in anticoagulation therapy? no  Does the patient require bridging? no      Anticoagulation Summary  As of 2024      INR goal:  2.0-3.0   TTR:  54.7% (9.8 mo)   INR used for dosin.40 (2024)   Weekly warfarin total:  16.25 mg               Assessment/Plan   Therapeutic     1. New dose: no change    2. Next INR: 2 weeks      Education provided to patient during the visit:  Patient instructed to call in interim with questions, concerns and changes.   Patient educated on interactions between medications and warfarin.   Patient educated on dietary consistency in vitamin k consumption.   Patient educated on affects of alcohol consumption while taking warfarin.   Patient educated on signs of bleeding/clotting.   Patient educated on compliance with dosing, follow up appointments, and prescribed plan of care.

## 2024-08-23 ENCOUNTER — HOME CARE VISIT (OUTPATIENT)
Dept: HOME HEALTH SERVICES | Facility: HOME HEALTH | Age: 79
End: 2024-08-23
Payer: MEDICARE

## 2024-08-23 VITALS
TEMPERATURE: 97.5 F | OXYGEN SATURATION: 99 % | RESPIRATION RATE: 18 BRPM | DIASTOLIC BLOOD PRESSURE: 62 MMHG | SYSTOLIC BLOOD PRESSURE: 116 MMHG | HEART RATE: 59 BPM

## 2024-08-23 PROCEDURE — G0152 HHCP-SERV OF OT,EA 15 MIN: HCPCS | Mod: HHH

## 2024-08-23 ASSESSMENT — PAIN SCALES - PAIN ASSESSMENT IN ADVANCED DEMENTIA (PAINAD)
BREATHING: 0
CONSOLABILITY: 0
NEGVOCALIZATION: 0 - NONE.
FACIALEXPRESSION: 0
FACIALEXPRESSION: 0 - SMILING OR INEXPRESSIVE.
BODYLANGUAGE: 0 - RELAXED.
TOTALSCORE: 0
BODYLANGUAGE: 0
NEGVOCALIZATION: 0
CONSOLABILITY: 0 - NO NEED TO CONSOLE.

## 2024-08-23 ASSESSMENT — ACTIVITIES OF DAILY LIVING (ADL)
FEEDING_WITHIN_DEFINED_LIMITS: 1
BATHING ASSESSED: 1
BATHING_CURRENT_FUNCTION: MINIMUM ASSIST
GROOMING_WITHIN_DEFINED_LIMITS: 1
DRESSING_LB_CURRENT_FUNCTION: MINIMUM ASSIST

## 2024-08-23 ASSESSMENT — ENCOUNTER SYMPTOMS
DENIES PAIN: 1
PERSON REPORTING PAIN: PATIENT

## 2024-08-24 ASSESSMENT — ENCOUNTER SYMPTOMS
EYES NEGATIVE: 1
MUSCULOSKELETAL NEGATIVE: 1
NEUROLOGICAL NEGATIVE: 1
ENDOCRINE NEGATIVE: 1
RESPIRATORY NEGATIVE: 1
GASTROINTESTINAL NEGATIVE: 1
HEMATOLOGIC/LYMPHATIC NEGATIVE: 1
CARDIOVASCULAR NEGATIVE: 1
ALLERGIC/IMMUNOLOGIC NEGATIVE: 1
PSYCHIATRIC NEGATIVE: 1
CONSTITUTIONAL NEGATIVE: 1

## 2024-08-26 ENCOUNTER — OFFICE VISIT (OUTPATIENT)
Dept: CARDIOLOGY | Facility: CLINIC | Age: 79
End: 2024-08-26
Payer: MEDICARE

## 2024-08-26 VITALS
HEIGHT: 72 IN | DIASTOLIC BLOOD PRESSURE: 66 MMHG | SYSTOLIC BLOOD PRESSURE: 120 MMHG | HEART RATE: 60 BPM | BODY MASS INDEX: 30.48 KG/M2 | RESPIRATION RATE: 20 BRPM | WEIGHT: 225 LBS | OXYGEN SATURATION: 96 %

## 2024-08-26 DIAGNOSIS — I48.20 CHRONIC ATRIAL FIBRILLATION (MULTI): Primary | ICD-10-CM

## 2024-08-26 DIAGNOSIS — I25.10 ATHEROSCLEROSIS OF NATIVE CORONARY ARTERY OF NATIVE HEART WITHOUT ANGINA PECTORIS: ICD-10-CM

## 2024-08-26 DIAGNOSIS — E78.49 OTHER HYPERLIPIDEMIA: ICD-10-CM

## 2024-08-26 PROCEDURE — 1111F DSCHRG MED/CURRENT MED MERGE: CPT | Performed by: NURSE PRACTITIONER

## 2024-08-26 PROCEDURE — 1157F ADVNC CARE PLAN IN RCRD: CPT | Performed by: NURSE PRACTITIONER

## 2024-08-26 PROCEDURE — 3074F SYST BP LT 130 MM HG: CPT | Performed by: NURSE PRACTITIONER

## 2024-08-26 PROCEDURE — 93005 ELECTROCARDIOGRAM TRACING: CPT | Performed by: NURSE PRACTITIONER

## 2024-08-26 PROCEDURE — 3078F DIAST BP <80 MM HG: CPT | Performed by: NURSE PRACTITIONER

## 2024-08-26 PROCEDURE — 99214 OFFICE O/P EST MOD 30 MIN: CPT | Performed by: NURSE PRACTITIONER

## 2024-08-26 PROCEDURE — 1160F RVW MEDS BY RX/DR IN RCRD: CPT | Performed by: NURSE PRACTITIONER

## 2024-08-26 PROCEDURE — 1036F TOBACCO NON-USER: CPT | Performed by: NURSE PRACTITIONER

## 2024-08-26 PROCEDURE — 93010 ELECTROCARDIOGRAM REPORT: CPT | Performed by: INTERNAL MEDICINE

## 2024-08-26 PROCEDURE — 1159F MED LIST DOCD IN RCRD: CPT | Performed by: NURSE PRACTITIONER

## 2024-08-26 ASSESSMENT — COLUMBIA-SUICIDE SEVERITY RATING SCALE - C-SSRS
2. HAVE YOU ACTUALLY HAD ANY THOUGHTS OF KILLING YOURSELF?: NO
1. IN THE PAST MONTH, HAVE YOU WISHED YOU WERE DEAD OR WISHED YOU COULD GO TO SLEEP AND NOT WAKE UP?: NO
6. HAVE YOU EVER DONE ANYTHING, STARTED TO DO ANYTHING, OR PREPARED TO DO ANYTHING TO END YOUR LIFE?: NO

## 2024-08-26 NOTE — PROGRESS NOTES
Subjective   Angel Carroll is a 79 y.o. male.    Chief Complaint:  Coronary Artery Disease, Atrial Fibrillation, and Hypertension    Mr. Carroll returns for an interval follow up. He is seen in collaboration with Dr. Martin. He comes in today following another hospitalization for another fall. He was getting up in the middle of the night to check his oxygen tanks and go to the bathroom when he became dizzy and fell. He denies hitting his head or losing consciousness. He is working with PT/OT at home, and is getting around reasonably well with the use of a walker. He was also diuresised while in the hospital. He has some dyspnea, though this remains stable and unchanged. He offers no new cardiovascular complaints or concerns today. He denies any complaints of chest pain, lightheadedness, dizziness, palpitations, syncope, orthopnea, paroxysmal nocturnal dyspnea, lower extremity swelling or bleeding concerns.          Review of Systems   All other systems reviewed and are negative.      Objective   Physical Exam  Constitutional:       Appearance: Healthy appearance. In no distress  Pulmonary:      Effort: Pulmonary effort is normal.      Breath sounds: Normal breath sounds.   Cardiovascular:      Normal rate. Regular rhythm. Normal S1. Normal S2.       Murmurs: There is no murmur.      Carotids: right carotid pulse +2, no bruit heard over the right carotid. left carotid pulse +2, no bruit heard over the left carotid.  Pacemaker/ICD Implant site has healed without signs of infection.  Edema:     Peripheral edema absent.   Abdominal:      Palpations: Abdomen is soft.   Musculoskeletal:       Cervical back: Normal range of motion.   Skin:     General: Skin is warm and dry. Normal color and pigmentation   Neurological:      Mental Status: Alert and oriented to person, place and time.   Psychiatric:     Mood and Affect: appropriate mood and appropriate affect.     EKG obtained and reviewed. AV paced. HR 60    Lab Review:    Lab Results   Component Value Date     08/13/2024    K 4.4 08/13/2024     08/13/2024    CO2 31 08/13/2024    BUN 21 08/13/2024    CREATININE 1.60 08/13/2024    GLUCOSE 91 08/13/2024    CALCIUM 8.8 08/13/2024     Lab Results   Component Value Date    WBC 3.1 (L) 08/12/2024    HGB 10.2 (L) 08/12/2024    HCT 33.4 (L) 08/12/2024    MCV 95 08/12/2024     08/12/2024     Lab Results   Component Value Date    CHOL 117 (L) 11/14/2022    TRIG 81 11/14/2022    HDL 31 (L) 11/14/2022       Assessment/Plan   Mr. Carroll is a pleasant 79-year-old male with a past medical history significant for hypertension, hyperlipidemia, CKD, COPD on continuous O2, CAD s/p multiple PCI, patent by repeat cath 12/2022, atrial fibrillation s/p ablation on warfarin anticoagulation, PPM secondary to sick sinus syndrome 1/2023, CVA in the setting of subtherapeutic INR with a near subtotal occlusion of the right ICA and carotid endarterectomy s/p bovine patch angioplasty 3/2021. Echocardiogram 2/2024 showed an EF of 50 to 55% with normal RVSP and aortic valve sclerosis. He presents today following another hospitalization for a fall. His VS and EKG remain stable today. I will have him continue all medications unchanged. We will not embark on any additional cardiovascular testing at this time. He will follow up with us in clinic in October as previously scheduled. He knows to call for any concerns.

## 2024-08-27 ENCOUNTER — HOME CARE VISIT (OUTPATIENT)
Dept: HOME HEALTH SERVICES | Facility: HOME HEALTH | Age: 79
End: 2024-08-27
Payer: MEDICARE

## 2024-08-27 VITALS — SYSTOLIC BLOOD PRESSURE: 122 MMHG | DIASTOLIC BLOOD PRESSURE: 64 MMHG | HEART RATE: 60 BPM | TEMPERATURE: 97.7 F

## 2024-08-27 LAB
ATRIAL RATE: 55 BPM
PR INTERVAL: 268 MS
Q ONSET: 187 MS
QRS COUNT: 10 BEATS
QRS DURATION: 194 MS
QT INTERVAL: 556 MS
QTC CALCULATION(BAZETT): 556 MS
QTC FREDERICIA: 556 MS
R AXIS: -85 DEGREES
T AXIS: 94 DEGREES
T OFFSET: 465 MS
VENTRICULAR RATE: 60 BPM

## 2024-08-27 PROCEDURE — G0151 HHCP-SERV OF PT,EA 15 MIN: HCPCS | Mod: HHH

## 2024-08-27 ASSESSMENT — ENCOUNTER SYMPTOMS: DENIES PAIN: 1

## 2024-08-29 ENCOUNTER — PATIENT OUTREACH (OUTPATIENT)
Dept: PRIMARY CARE | Facility: CLINIC | Age: 79
End: 2024-08-29
Payer: MEDICARE

## 2024-08-29 ENCOUNTER — HOME CARE VISIT (OUTPATIENT)
Dept: HOME HEALTH SERVICES | Facility: HOME HEALTH | Age: 79
End: 2024-08-29
Payer: MEDICARE

## 2024-08-29 VITALS
SYSTOLIC BLOOD PRESSURE: 118 MMHG | OXYGEN SATURATION: 100 % | DIASTOLIC BLOOD PRESSURE: 60 MMHG | RESPIRATION RATE: 18 BRPM | HEART RATE: 60 BPM | TEMPERATURE: 97.5 F

## 2024-08-29 PROCEDURE — G0152 HHCP-SERV OF OT,EA 15 MIN: HCPCS | Mod: HHH

## 2024-08-29 ASSESSMENT — PAIN SCALES - PAIN ASSESSMENT IN ADVANCED DEMENTIA (PAINAD)
BREATHING: 0
CONSOLABILITY: 0
CONSOLABILITY: 0 - NO NEED TO CONSOLE.
NEGVOCALIZATION: 0 - NONE.
NEGVOCALIZATION: 0
TOTALSCORE: 0
BODYLANGUAGE: 0 - RELAXED.
BODYLANGUAGE: 0
FACIALEXPRESSION: 0
FACIALEXPRESSION: 0 - SMILING OR INEXPRESSIVE.

## 2024-08-29 ASSESSMENT — ACTIVITIES OF DAILY LIVING (ADL)
BATHING_CURRENT_FUNCTION: CONTACT GUARD ASSIST
FEEDING_WITHIN_DEFINED_LIMITS: 1
BATHING ASSESSED: 1
DRESSING_LB_CURRENT_FUNCTION: CONTACT GUARD ASSIST
GROOMING_WITHIN_DEFINED_LIMITS: 1

## 2024-08-29 ASSESSMENT — ENCOUNTER SYMPTOMS
PERSON REPORTING PAIN: PATIENT
DENIES PAIN: 1

## 2024-08-29 NOTE — PROGRESS NOTES
Call regarding appt. with PCP on ( 8-20-24 ) after hospitalization.  At time of outreach call the patient feels as if their condition has improved since last visit.  Reviewed the PCP appointment with the pt and addressed any questions or concerns.    Pt. States no questions/concerns at this time.       Darrion Hill LPN

## 2024-09-03 ENCOUNTER — HOME CARE VISIT (OUTPATIENT)
Dept: HOME HEALTH SERVICES | Facility: HOME HEALTH | Age: 79
End: 2024-09-03
Payer: MEDICARE

## 2024-09-04 ENCOUNTER — HOME CARE VISIT (OUTPATIENT)
Dept: HOME HEALTH SERVICES | Facility: HOME HEALTH | Age: 79
End: 2024-09-04
Payer: MEDICARE

## 2024-09-04 VITALS — TEMPERATURE: 98 F | OXYGEN SATURATION: 97 % | DIASTOLIC BLOOD PRESSURE: 66 MMHG | SYSTOLIC BLOOD PRESSURE: 116 MMHG

## 2024-09-04 DIAGNOSIS — I48.20 CHRONIC ATRIAL FIBRILLATION (MULTI): ICD-10-CM

## 2024-09-04 PROCEDURE — G0151 HHCP-SERV OF PT,EA 15 MIN: HCPCS | Mod: HHH

## 2024-09-04 SDOH — HEALTH STABILITY: PHYSICAL HEALTH: EXERCISE COMMENTS: REVIEWED SLR AND HEEL SLIDES

## 2024-09-04 ASSESSMENT — ENCOUNTER SYMPTOMS
PAIN LOCATION: RIGHT HIP
PERSON REPORTING PAIN: PATIENT
PAIN LOCATION - PAIN SEVERITY: 3/10
HIGHEST PAIN SEVERITY IN PAST 24 HOURS: 7/10
LOWEST PAIN SEVERITY IN PAST 24 HOURS: 0/10
PAIN: 1

## 2024-09-05 ENCOUNTER — ANTICOAGULATION - WARFARIN VISIT (OUTPATIENT)
Dept: CARDIOLOGY | Facility: CLINIC | Age: 79
End: 2024-09-05
Payer: MEDICARE

## 2024-09-05 DIAGNOSIS — I48.0 PAROXYSMAL ATRIAL FIBRILLATION (MULTI): Primary | ICD-10-CM

## 2024-09-05 LAB
INR IN PPP BY COAGULATION ASSAY EXTERNAL: 3.2
PROTHROMBIN TIME (PT) IN PPP BY COAGULATION ASSAY EXTERNAL: NORMAL

## 2024-09-05 RX ORDER — FUROSEMIDE 40 MG/1
40 TABLET ORAL 2 TIMES DAILY
Qty: 180 TABLET | Refills: 1 | Status: SHIPPED | OUTPATIENT
Start: 2024-09-05

## 2024-09-05 NOTE — PROGRESS NOTES
Patient identification verified with 2 identifiers.    Location: [facility] Patient Self-Testing Program [phone]    Referring Physician: Dr. Doc Martin  Enrollment/ Re-enrollment date: 2/20/2025   INR Goal: 2.0-3.0  INR monitoring is per [facility] protocol.  Anticoagulation Medication: warfarin  Indication: Atrial Fibrillation/Atrial Flutter    Subjective   Bleeding signs/symptoms: No    Bruising: No   Major bleeding event: No  Thrombosis signs/symptoms: No  Thromboembolic event: No  Missed doses: No  Extra doses: No  Medication changes: No  Dietary changes: Yes  Patient stated that he is eating alot of snacks ex: xochitl dunes and wafer cookies. And not taking in as much vitamin K.  Change in health: No  Change in activity: No  Alcohol: No  Other concerns: No    Upcoming Procedures:  Does the Patient Have any upcoming procedures that require interruption in anticoagulation therapy? no  Does the patient require bridging? no      Anticoagulation Summary  As of 9/5/2024      INR goal:  2.0-3.0   TTR:  55.6% (10.2 mo)   INR used for dosing:  3.20 (9/5/2024)   Weekly warfarin total:  16.25 mg               Assessment/Plan   Supratherapeutic     1. New dose: no change    2. Next INR: 1 week      Education provided to patient during the visit:  Patient instructed to call in interim with questions, concerns and changes.   Patient educated on dietary consistency in vitamin k consumption.

## 2024-09-09 ENCOUNTER — HOME CARE VISIT (OUTPATIENT)
Dept: HOME HEALTH SERVICES | Facility: HOME HEALTH | Age: 79
End: 2024-09-09
Payer: MEDICARE

## 2024-09-09 VITALS
RESPIRATION RATE: 18 BRPM | TEMPERATURE: 97.7 F | DIASTOLIC BLOOD PRESSURE: 62 MMHG | HEART RATE: 61 BPM | OXYGEN SATURATION: 99 % | SYSTOLIC BLOOD PRESSURE: 120 MMHG

## 2024-09-09 PROCEDURE — G0152 HHCP-SERV OF OT,EA 15 MIN: HCPCS | Mod: HHH

## 2024-09-09 ASSESSMENT — PAIN SCALES - PAIN ASSESSMENT IN ADVANCED DEMENTIA (PAINAD)
CONSOLABILITY: 0
TOTALSCORE: 0
NEGVOCALIZATION: 0 - NONE.
BREATHING: 0
BODYLANGUAGE: 0 - RELAXED.
BODYLANGUAGE: 0 - RELAXED.
FACIALEXPRESSION: 0
NEGVOCALIZATION: 0
CONSOLABILITY: 0 - NO NEED TO CONSOLE.
TOTALSCORE: 0
FACIALEXPRESSION: 0
NEGVOCALIZATION: 0 - NONE.
FACIALEXPRESSION: 0 - SMILING OR INEXPRESSIVE.
NEGVOCALIZATION: 0
BODYLANGUAGE: 0
CONSOLABILITY: 0 - NO NEED TO CONSOLE.
BREATHING: 0
FACIALEXPRESSION: 0 - SMILING OR INEXPRESSIVE.
BODYLANGUAGE: 0
CONSOLABILITY: 0

## 2024-09-09 ASSESSMENT — ACTIVITIES OF DAILY LIVING (ADL)
DRESSING_LB_CURRENT_FUNCTION: INDEPENDENT
TOILETING: INDEPENDENT
TOILETING: 1
BATHING_CURRENT_FUNCTION: INDEPENDENT
TOILETING: INDEPENDENT
BATHING ASSESSED: 1
DRESSING_LB_CURRENT_FUNCTION: INDEPENDENT
BATHING ASSESSED: 1
BATHING_CURRENT_FUNCTION: INDEPENDENT
TOILETING: 1

## 2024-09-09 ASSESSMENT — ENCOUNTER SYMPTOMS
PERSON REPORTING PAIN: PATIENT
DENIES PAIN: 1
DENIES PAIN: 1
PERSON REPORTING PAIN: PATIENT

## 2024-09-12 ENCOUNTER — ANTICOAGULATION - WARFARIN VISIT (OUTPATIENT)
Dept: CARDIOLOGY | Facility: CLINIC | Age: 79
End: 2024-09-12
Payer: MEDICARE

## 2024-09-12 DIAGNOSIS — I48.0 PAROXYSMAL ATRIAL FIBRILLATION (MULTI): ICD-10-CM

## 2024-09-12 DIAGNOSIS — I48.91 ATRIAL FIBRILLATION, UNSPECIFIED TYPE (MULTI): Primary | ICD-10-CM

## 2024-09-12 NOTE — PROGRESS NOTES
Patient identification verified with 2 identifiers.    Location: [facility] Patient Self-Testing Program [phone]    Referring Physician: Dr Doc Martin  Enrollment/ Re-enrollment date: 2025   INR Goal: 2.0-3.0  INR monitoring is per [facility] protocol.  Anticoagulation Medication: warfarin  Indication: Atrial Fibrillation/Atrial Flutter    Received faxed INR self-test result, called patient and spoke to patient and daughter Rhea.       Subjective   Bleeding signs/symptoms: No    Bruising: No   Major bleeding event: No  Thrombosis signs/symptoms: No  Thromboembolic event: No  Missed doses: No  Extra doses: No  Medication changes: No  Dietary changes: No  Change in health: No  Change in activity: No  Alcohol: No  Other concerns: No    Upcoming Procedures:  Does the Patient Have any upcoming procedures that require interruption in anticoagulation therapy? no  Does the patient require bridging? no      Anticoagulation Summary  As of 2024      INR goal:  2.0-3.0   TTR:  56.1% (10.5 mo)   INR used for dosin.40 (2024)   Weekly warfarin total:  16.25 mg               Assessment/Plan   Therapeutic     1. New dose: no change  Reviewed current dosage schedule and patient and Rhea confirmed dosing.     2. Next INR: 2 weeks      Education provided to patient during the visit:  Patient instructed to call in interim with questions, concerns and changes.   Patient educated on interactions between medications and warfarin.   Patient educated on dietary consistency in vitamin k consumption.   Patient educated on affects of alcohol consumption while taking warfarin.   Patient educated on signs of bleeding/clotting.   Patient educated on compliance with dosing, follow up appointments, and prescribed plan of care.    Patient instructed to call PST voicemail at [phone] in interim with questions, concerns and changes.

## 2024-09-17 ENCOUNTER — PATIENT OUTREACH (OUTPATIENT)
Dept: PRIMARY CARE | Facility: CLINIC | Age: 79
End: 2024-09-17
Payer: MEDICARE

## 2024-09-19 ENCOUNTER — APPOINTMENT (OUTPATIENT)
Dept: PRIMARY CARE | Facility: CLINIC | Age: 79
End: 2024-09-19
Payer: MEDICARE

## 2024-09-20 ENCOUNTER — APPOINTMENT (OUTPATIENT)
Dept: PRIMARY CARE | Facility: CLINIC | Age: 79
End: 2024-09-20
Payer: MEDICARE

## 2024-09-24 ENCOUNTER — APPOINTMENT (OUTPATIENT)
Dept: PRIMARY CARE | Facility: CLINIC | Age: 79
End: 2024-09-24
Payer: MEDICARE

## 2024-09-25 ENCOUNTER — APPOINTMENT (OUTPATIENT)
Dept: PHARMACY | Facility: HOSPITAL | Age: 79
End: 2024-09-25
Payer: MEDICARE

## 2024-09-25 DIAGNOSIS — I48.0 PAROXYSMAL ATRIAL FIBRILLATION (MULTI): ICD-10-CM

## 2024-09-25 NOTE — ASSESSMENT & PLAN NOTE
Continue on warfarin as directed by coumadin clinic. Plan to submit  PAP application and follow up in 1 month.   Vital signs taken. Blood drawn.

## 2024-09-25 NOTE — Clinical Note
Cam Hatfield,  Today I spoke with Angel and Dorota (daughter) in regard to warfarin conversion to Eliquis for both Angel and Geneva Carroll. Geneva is currently hospitalized so I spoke with Angel on her behalf.  PAP application to be submitted once 1040 is received, will follow up in 1 month (or earlier) with plans to transition Angel and Geneva pending PAP approval. Thank you!

## 2024-09-25 NOTE — PROGRESS NOTES
Pharmacist Clinic: Cardiology Management    Angel Carroll is a 79 y.o. male was referred to Clinical Pharmacy Team for anticoagulation management (warfarin conversion).     Referring Provider: Alysia Sheets APRN-CNP    THIS IS A NEW PATIENT APPOINTMENT. PATIENT WILL BE ESTABLISHING CARE WITH CLINICAL PHARMACY.    Appointment was completed by Galina (daughter) who was reached at primary number.    Allergies Reviewed? Yes    Allergies   Allergen Reactions    Other Unknown     Anti-Inflammatories    Ranolazine Unknown    Cyclobenzaprine Hallucinations, Unknown and Psychosis       Past Medical History:   Diagnosis Date    Atherosclerotic heart disease of native coronary artery without angina pectoris 12/14/2022    Arteriosclerotic cardiovascular disease (ASCVD)    Benign neoplasm of meninges, unspecified (Multi)     Meningioma    Benign prostatic hyperplasia without lower urinary tract symptoms 02/28/2022    BPH without obstruction/lower urinary tract symptoms    Body mass index (BMI) 33.0-33.9, adult 07/02/2021    BMI 33.0-33.9,adult    Body mass index (BMI) 34.0-34.9, adult 11/09/2021    BMI 34.0-34.9,adult    Body mass index (BMI) 34.0-34.9, adult 09/08/2021    BMI 34.0-34.9,adult    Body mass index (BMI) 35.0-35.9, adult 12/29/2021    BMI 35.0-35.9,adult    Body mass index (BMI) 35.0-35.9, adult 02/28/2022    BMI 35.0-35.9,adult    Elevated blood-pressure reading, without diagnosis of hypertension     Prehypertension    Encounter for immunization 10/27/2015    Need for prophylactic vaccination and inoculation against influenza    Hyperlipidemia, unspecified 11/15/2021    Hyperlipidemia    Other conditions influencing health status     Nephrolithiasis    Pacemaker     Personal history of other diseases of the musculoskeletal system and connective tissue     History of tendinitis    Personal history of other drug therapy     History of influenza vaccination    Personal history of other specified conditions  10/25/2022    History of bradycardia    Rectal bleeding     Vitamin D deficiency, unspecified 10/08/2020    Vitamin D deficiency       Current Outpatient Medications on File Prior to Visit   Medication Sig Dispense Refill    acetaminophen (Tylenol) 325 mg tablet Take 1 tablet (325 mg) by mouth every 6 hours if needed for mild pain (1 - 3).      amiodarone (Pacerone) 200 mg tablet Take 1 tablet (200 mg) by mouth once daily. 90 tablet 3    atorvastatin (Lipitor) 20 mg tablet TAKE 1 TABLET EVERY DAY 90 tablet 3    cyanocobalamin (Vitamin B-12) 500 mcg tablet Take 1 tablet (500 mcg) by mouth once daily.      ergocalciferol (Vitamin D-2) 1.25 MG (75357 UT) capsule Take 1 capsule (50,000 Units) by mouth 1 (one) time per week. 12 capsule 1    fenofibrate (Tricor) 48 mg tablet Take 1.5 tablets (72 mg) by mouth once daily. 45 tablet 3    ferrous sulfate 250 mg (50 mg iron) tablet extended release Take 1 tablet (250 mg) by mouth 2 times a day. 180 tablet 3    finasteride (Proscar) 5 mg tablet TAKE 1 TABLET EVERY DAY (Patient taking differently: Take 1 tablet (5 mg) by mouth once daily.) 90 tablet 10    folic acid (Folvite) 1 mg tablet TAKE 1 TABLET EVERY DAY 90 tablet 3    furosemide (Lasix) 40 mg tablet TAKE 1 TABLET TWICE DAILY 180 tablet 1    gabapentin (Neurontin) 100 mg capsule Take 1 capsule (100 mg) by mouth once daily at bedtime. 90 capsule 0    metoprolol succinate XL (Toprol-XL) 25 mg 24 hr tablet Take 1 tablet (25 mg) by mouth once daily. Do not crush or chew. (Patient taking differently: Take 0.5 tablets (12.5 mg) by mouth 2 times a day. Do not crush or chew.) 90 tablet 1    PARoxetine (Paxil) 20 mg tablet TAKE 1 TABLET EVERY DAY 90 tablet 3    psyllium husk, with sugar, 2 gram wafer Take 2 capsules by mouth once daily. Mix and drink with at least 8 ounces of water or juice.      tamsulosin (Flomax) 0.4 mg 24 hr capsule Take 2 capsules (0.8 mg) by mouth once daily at bedtime. 180 capsule 3    warfarin (Coumadin)  "2.5 mg tablet TAKE 1 TABLET AT BEDTIME FOR A-FIB (Patient taking differently: Take 1 tablet (2.5 mg) by mouth once daily. TAKE 1 TABLET AT BEDTIME FOR A-FIB. Half a tablet - 1.25mg on Sundays per daughter.) 90 tablet 3    oxygen (O2) gas therapy Inhale 2 L/min continuously.      pantoprazole (ProtoNix) 40 mg EC tablet TAKE 1 TABLET EVERY DAY 90 tablet 3    [DISCONTINUED] psyllium (Metamucil) 0.4 gram capsule Take 5 capsules by mouth 4 times a day.       No current facility-administered medications on file prior to visit.         RELEVANT LAB RESULTS  Lab Results   Component Value Date    BILITOT 0.7 08/08/2024    CALCIUM 8.8 08/13/2024    CO2 31 08/13/2024     08/13/2024    CREATININE 1.60 08/13/2024    GLUCOSE 91 08/13/2024    ALKPHOS 85 08/08/2024    K 4.4 08/13/2024    PROT 6.4 08/08/2024     08/13/2024    AST 18 08/08/2024    ALT 6 08/08/2024    BUN 21 08/13/2024    ANIONGAP 7 08/13/2024    MG 2.10 07/02/2024    PHOS 2.9 06/11/2024    ALBUMIN 3.6 08/08/2024    GFRMALE 28 (A) 09/01/2023     Lab Results   Component Value Date    TRIG 81 11/14/2022    CHOL 117 (L) 11/14/2022    LDLCALC 70 11/14/2022    HDL 31 (L) 11/14/2022     No results found for: \"BMCBC\", \"CBCDIF\"     PHARMACEUTICAL ASSESSMENT    MEDICATION RECONCILIATION    Home Pharmacy Reviewed? Yes, describe: [facility]    Changed:  - Metoprolol 12.5mg twice daily      Drug Interactions? Yes, describe: Warfarin, fenofibrate: Patient being closely monitored by warfarin clinic.    Medication Documentation Review Audit       Reviewed by Annamaria Burgess, PharmD (Pharmacist) on 09/25/24 at 1128      Medication Order Taking? Sig Documenting Provider Last Dose Status   acetaminophen (Tylenol) 325 mg tablet [id number] Yes Take 1 tablet (325 mg) by mouth every 6 hours if needed for mild pain (1 - 3). Historical Provider, MD Taking Active   amiodarone (Pacerone) 200 mg tablet [id number] Yes Take 1 tablet (200 mg) by mouth once daily. " Agata Bonner, APRN-CNP Taking Active   atorvastatin (Lipitor) 20 mg tablet [id number] Yes TAKE 1 TABLET EVERY DAY Jung Tao MD Taking Active   cyanocobalamin (Vitamin B-12) 500 mcg tablet [id number] Yes Take 1 tablet (500 mcg) by mouth once daily. Historical Provider, MD Taking Active   ergocalciferol (Vitamin D-2) 1.25 MG (88959 UT) capsule [id number] Yes Take 1 capsule (50,000 Units) by mouth 1 (one) time per week. Jung Tao MD Taking Active   fenofibrate (Tricor) 48 mg tablet [id number] Yes Take 1.5 tablets (72 mg) by mouth once daily. Alysia Sheets, APRN-CNP Taking Active   ferrous sulfate 250 mg (50 mg iron) tablet extended release [id number] Yes Take 1 tablet (250 mg) by mouth 2 times a day. Tiana Juarez MD Taking Active   finasteride (Proscar) 5 mg tablet [id number] Yes TAKE 1 TABLET EVERY DAY   Patient taking differently: Take 1 tablet (5 mg) by mouth once daily.    Jung Tao MD Taking Active   folic acid (Folvite) 1 mg tablet [id number] Yes TAKE 1 TABLET EVERY DAY Jung Tao MD Taking Active   furosemide (Lasix) 40 mg tablet [id number] Yes TAKE 1 TABLET TWICE DAILY Jung Tao MD Taking Active   gabapentin (Neurontin) 100 mg capsule [id number] Yes Take 1 capsule (100 mg) by mouth once daily at bedtime. Jung Tao MD Taking Active   metoprolol succinate XL (Toprol-XL) 25 mg 24 hr tablet [id number] Yes Take 1 tablet (25 mg) by mouth once daily. Do not crush or chew.   Patient taking differently: Take 0.5 tablets (12.5 mg) by mouth 2 times a day. Do not crush or chew.    Jung Tao MD Taking Differently Active   oxygen (O2) gas therapy [id number]  Inhale 2 L/min continuously. Historical Provider, MD  Active   pantoprazole (ProtoNix) 40 mg EC tablet [id number]  TAKE 1 TABLET EVERY DAY Jung Tao MD   24 [id number]   PARoxetine (Paxil) 20 mg tablet [id number] Yes TAKE 1 TABLET EVERY DAY Jung Tao MD Taking Active     Discontinued 24 1111   psyllium husk, with sugar, 2  gram wafer [id number] Yes Take 2 capsules by mouth once daily. Mix and drink with at least 8 ounces of water or juice. Historical Provider, MD Taking Active   tamsulosin (Flomax) 0.4 mg 24 hr capsule [id number] Yes Take 2 capsules (0.8 mg) by mouth once daily at bedtime. Hanny Moe MD Taking Active   warfarin (Coumadin) 2.5 mg tablet [id number] Yes TAKE 1 TABLET AT BEDTIME FOR A-FIB   Patient taking differently: Take 1 tablet (2.5 mg) by mouth once daily. TAKE 1 TABLET AT BEDTIME FOR A-FIB. Half a tablet - 1.25mg on Sundays per daughter.    Jung Tao MD Taking Active                    DISEASE MANAGEMENT ASSESSMENT:     ANTICOAGULATION ASSESSMENT    The ASCVD Risk score (Naomi DK, et al., 2019) failed to calculate for the following reasons:    The 2019 ASCVD risk score is only valid for ages 40 to 79    The patient has a prior MI or stroke diagnosis    DIAGNOSIS: prevention of nonvalvular atrial fibrilliation stroke and systemic embolism  - Patient is projected to be on anticoagulation long term  - [id number] Score: [4] (only included if diagnosis is atrial fibrillation)   Age: [<65 (0)] [65-74 (+1)] [> 75 (+2)]: 2  Sex: [Male/Female (+1)]: 0  CHF history: [No/Yes(+1)]: 1  Hypertension history: [No/Yes(+1)]: 1  Stroke/TIA/thromboembolism history: [No/Yes(+2)]: 0  Vascular disease history (prior MI, peripheral artery disease, aortic plaque): [No/Yes(+1)]: 0  Diabetes history: [No/Yes(+1)]: 0    CURRENT PHARMACOTHERAPY:    Warfarin- managed by anticoagulation clinic  Patient reports no adverse effects    RELEVANT PAST MEDICAL HISTORY:   Afib, HTN, HF    Affordability/Accessibility:  PAP screening for warfarin conversion to DOAC (Eliquis)      EDUCATION/COUNSELING:   - Counseled patient on MOA, expectations, duration of therapy, contraindications, administration, and monitoring parameters  - Counseled patient of side effects that are indicative of bleeding such as dark tarry stool, unexplainable bruising, or  vomiting up a coffee ground like substance    DISCUSSION/NOTES:   Reviewed and updated medication list.  Explained and screened patient for  PAP. Discussed with patient once  PAP approved we will reach out and give further directions (ie schedule INR, patient education, delivery) but for now continue on warfarin as directed by warfarin clinic. Patient verbalized understanding.  Will follow up in 1 month, potentially sooner pending income documents.    ASSESSMENT AND PLAN:     Patient Assistance Program (PAP)    Application for program to be submitted for the following medications: [facility] Permanent Address: [facility]   Prescription Insurance:   Yes   Members of Household: 2   Files Taxes: Yes       Patient's daughter, Dorota, will email financial information to pharmacist directly at [email or web address].    Patient verbally reports monthly or yearly income which is less than 400% federal poverty level    Patient aware this process may take up to 6 weeks.     If approved medication must be filled through [facility] and MEDICATION WILL BE MAILED TO PATIENT.    Assessment/Plan   Problem List Items Addressed This Visit       Paroxysmal atrial fibrillation (Multi)     Continue on warfarin as directed by coumadin clinic. Plan to submit  PAP application and follow up in 1 month.         Relevant Orders    Follow Up In Clinical Pharmacy         RECOMMENDATIONS/PLAN    CONTINUE  Warfarin as directed by anticoagulation clinic  Submit  PAP application, once approved plan to start Eliquis 2.5mg twice daily (80 yo, 102kg, Scr 1.61 (08/13/24))  Follow up in 1 month     Last Appnt with Referring Provider: 07/29/2024  Next Appnt with Referring Provider: 10/28/2024  Clinical Pharmacist follow up: 1 month  VAF/Application Expiration: pending  Type of Encounter: Sal Burgess, PharmMARCOS    Verbal consent to manage patient's drug therapy was obtained from the patient . They were  informed they may decline to participate or withdraw from participation in pharmacy services at any time.    Continue all meds under the continuation of care with the referring provider and clinical pharmacy team.

## 2024-09-26 ENCOUNTER — ANTICOAGULATION - WARFARIN VISIT (OUTPATIENT)
Dept: CARDIOLOGY | Facility: CLINIC | Age: 79
End: 2024-09-26
Payer: MEDICARE

## 2024-09-26 DIAGNOSIS — I48.0 PAROXYSMAL ATRIAL FIBRILLATION (MULTI): Primary | ICD-10-CM

## 2024-09-26 LAB
INR IN PPP BY COAGULATION ASSAY EXTERNAL: 2.3
PROTHROMBIN TIME (PT) IN PPP BY COAGULATION ASSAY EXTERNAL: NORMAL

## 2024-09-26 NOTE — PROGRESS NOTES
Patient identification verified with 2 identifiers.    Location: [facility] Patient Self-Testing Program [phone]    Referring Physician: Dr Doc Martin  Enrollment/ Re-enrollment date: 2/20/2025   INR Goal: 2.0-3.0  INR monitoring is per [facility] protocol.  Anticoagulation Medication: warfarin  Indication: Atrial Fibrillation/Atrial Flutter    Subjective   Bleeding signs/symptoms: No    Bruising: No   Major bleeding event: No  Thrombosis signs/symptoms: No  Thromboembolic event: No  Missed doses: No  Extra doses: No  Medication changes: No  Dietary changes: No  Change in health: No  Change in activity: No  Alcohol: No  Other concerns: No    Upcoming Procedures:  Does the Patient Have any upcoming procedures that require interruption in anticoagulation therapy? no  Does the patient require bridging? no      Anticoagulation Summary  As of 9/26/2024      INR goal:  2.0-3.0   TTR:  56.1% (10.5 mo)   INR used for dosing:  --               Assessment/Plan   Therapeutic     1. New dose: Spoke to Rhea with dosing instructions and next testing date.    2. Next INR: 2 weeks      Education provided to patient during the visit:  Patient instructed to call in interim with questions, concerns and changes.

## 2024-09-30 ENCOUNTER — TELEPHONE (OUTPATIENT)
Dept: PHARMACY | Facility: HOSPITAL | Age: 79
End: 2024-09-30
Payer: MEDICARE

## 2024-09-30 DIAGNOSIS — I25.10 ARTERIOSCLEROTIC CARDIOVASCULAR DISEASE (ASCVD): ICD-10-CM

## 2024-09-30 PROCEDURE — RXMED WILLOW AMBULATORY MEDICATION CHARGE

## 2024-09-30 RX ORDER — FENOFIBRATE 48 MG/1
TABLET, FILM COATED ORAL
Qty: 135 TABLET | Refills: 3 | Status: SHIPPED | OUTPATIENT
Start: 2024-09-30

## 2024-09-30 NOTE — TELEPHONE ENCOUNTER
Patient Assistance Program Approval:     We are pleased to inform you that your application for assistance has been approved.     This approval is valid through  09/30/2025  as long as the following criteria continue to be satisfied:     Your medication (Eliquis) remains covered under your current insurance plan.   Your prescriber does not discontinue therapy.   You do not seek reimbursement from any other private or government-funded programs for the  medication.    Under this program, the pharmacy will first bill your insurance plan for your indemnified specified medication. The [employer] will then offset your copay balance, so that your out-of pocket expense for your specialty medication will be $0.00.    Annamaria Burgess, PharmD

## 2024-10-01 ENCOUNTER — TELEPHONE (OUTPATIENT)
Dept: CARDIOLOGY | Facility: CLINIC | Age: 79
End: 2024-10-01

## 2024-10-01 ENCOUNTER — OFFICE VISIT (OUTPATIENT)
Dept: CARDIOLOGY | Facility: HOSPITAL | Age: 79
End: 2024-10-01
Payer: MEDICARE

## 2024-10-01 VITALS
BODY MASS INDEX: 30.61 KG/M2 | HEIGHT: 72 IN | SYSTOLIC BLOOD PRESSURE: 110 MMHG | HEART RATE: 60 BPM | WEIGHT: 226 LBS | DIASTOLIC BLOOD PRESSURE: 66 MMHG

## 2024-10-01 DIAGNOSIS — I48.20 CHRONIC ATRIAL FIBRILLATION (MULTI): Primary | ICD-10-CM

## 2024-10-01 LAB
ATRIAL RATE: 60 BPM
Q ONSET: 189 MS
QRS COUNT: 10 BEATS
QRS DURATION: 172 MS
QT INTERVAL: 550 MS
QTC CALCULATION(BAZETT): 550 MS
QTC FREDERICIA: 550 MS
R AXIS: -87 DEGREES
T AXIS: 91 DEGREES
T OFFSET: 464 MS
VENTRICULAR RATE: 60 BPM

## 2024-10-01 PROCEDURE — 1157F ADVNC CARE PLAN IN RCRD: CPT | Performed by: INTERNAL MEDICINE

## 2024-10-01 PROCEDURE — 3074F SYST BP LT 130 MM HG: CPT | Performed by: INTERNAL MEDICINE

## 2024-10-01 PROCEDURE — 93010 ELECTROCARDIOGRAM REPORT: CPT | Performed by: INTERNAL MEDICINE

## 2024-10-01 PROCEDURE — 1036F TOBACCO NON-USER: CPT | Performed by: INTERNAL MEDICINE

## 2024-10-01 PROCEDURE — 99214 OFFICE O/P EST MOD 30 MIN: CPT | Mod: 25 | Performed by: INTERNAL MEDICINE

## 2024-10-01 PROCEDURE — 3078F DIAST BP <80 MM HG: CPT | Performed by: INTERNAL MEDICINE

## 2024-10-01 PROCEDURE — 1159F MED LIST DOCD IN RCRD: CPT | Performed by: INTERNAL MEDICINE

## 2024-10-01 PROCEDURE — 99214 OFFICE O/P EST MOD 30 MIN: CPT | Performed by: INTERNAL MEDICINE

## 2024-10-01 PROCEDURE — 93005 ELECTROCARDIOGRAM TRACING: CPT | Performed by: INTERNAL MEDICINE

## 2024-10-01 ASSESSMENT — ENCOUNTER SYMPTOMS
DEPRESSION: 0
LOSS OF SENSATION IN FEET: 1
OCCASIONAL FEELINGS OF UNSTEADINESS: 1

## 2024-10-01 NOTE — PROGRESS NOTES
Referred by Jose العراقي MD provider found for   Chief Complaint   Patient presents with    Atrial Fibrillation        Angel Carroll is a 79 y.o. year old male patient with h/o PAF and tachy-ankush syndrome s/p PPM. Presents for 1 year follow up.     PMHx/PSHx: As above    FamHx: unremarkable     Allergies:  Allergies   Allergen Reactions    Other Unknown     Anti-Inflammatories    Ranolazine Unknown    Cyclobenzaprine Hallucinations, Unknown and Psychosis        Review of Systems    Constitutional: not feeling tired.   Eyes: no eyesight problems.   ENT: no hearing loss and no nosebleeds.   Cardiovascular: no intermittent leg claudication and as noted in HPI.   Respiratory: no chronic cough and no shortness of breath.   Gastrointestinal: no change in bowel habits and no blood in stools.   Genitourinary: no urinary frequency and no hematuria.   Skin: no skin rashes.   Neurological: no seizures and no frequent falls.   Psychiatric: no depression and not suicidal.   All other systems have been reviewed and are negative for complaint.     Outpatient Medications:  Current Outpatient Medications   Medication Instructions    acetaminophen (TYLENOL) 325 mg, oral, Every 6 hours PRN    amiodarone (PACERONE) 200 mg, oral, Daily    apixaban (ELIQUIS) 2.5 mg, oral, 2 times daily    atorvastatin (Lipitor) 20 mg tablet TAKE 1 TABLET EVERY DAY    cyanocobalamin (Vitamin B-12) 500 mcg tablet 1 tablet, oral, Daily    ergocalciferol (VITAMIN D-2) 50,000 Units, oral, Once Weekly    fenofibrate (Tricor) 48 mg tablet TAKE 1 AND 1/2 TABLETS ONE TIME DAILY    ferrous sulfate 250 mg, oral, 2 times daily    finasteride (PROSCAR) 5 mg, oral, Daily    folic acid (Folvite) 1 mg tablet TAKE 1 TABLET EVERY DAY    furosemide (LASIX) 40 mg, oral, 2 times daily    gabapentin (NEURONTIN) 100 mg, oral, Nightly    metoprolol succinate XL (TOPROL-XL) 25 mg, oral, Daily, Do not crush or chew.    oxygen (O2) 2 L/min, inhalation, Continuous     pantoprazole (ProtoNix) 40 mg EC tablet TAKE 1 TABLET EVERY DAY    PARoxetine (Paxil) 20 mg tablet TAKE 1 TABLET EVERY DAY    psyllium husk, with sugar, 2 gram wafer 2 capsules, oral, Daily, Mix and drink with at least 8 ounces of water or juice.    tamsulosin (FLOMAX) 0.8 mg, oral, Nightly    warfarin (Coumadin) 2.5 mg tablet TAKE 1 TABLET AT BEDTIME FOR A-FIB         Last Recorded Vitals:      8/20/2024     2:01 PM 8/23/2024     3:31 PM 8/26/2024     1:28 PM 8/27/2024    12:02 PM 8/29/2024     3:26 PM 9/4/2024    11:57 AM 9/9/2024     4:40 PM   Vitals   Systolic 124 116 120 122 118 116 120   Diastolic 68 62 66 64 60 66 62   Heart Rate 60 59 60 60 60  61   Temp  36.4 °C (97.5 °F)  36.5 °C (97.7 °F) 36.4 °C (97.5 °F) 36.7 °C (98 °F) 36.5 °C (97.7 °F)   Resp  18 20  18  18   Height (in)   1.829 m (6')       Weight (lb)   225       BMI   30.52 kg/m2       BSA (m2)   2.28 m2       Visit Report Report  Report        Visit Vitals  Smoking Status Former        Physical Exam:  Constitutional: alert and in no acute distress.   Eyes: no erythema, swelling or discharge from the eye .   Neck: neck is supple, symmetric, trachea midline, no masses  and no thyromegaly .   Pulmonary: no increased work of breathing or signs of respiratory distress  and lungs clear to auscultation.    Cardiovascular: carotid pulses 2+ bilaterally with no bruit , JVP was normal, no thrills , regular rhythm, normal S1 and S2, no murmurs , pedal pulses 2+ bilaterally  and no edema .   Abdomen: abdomen non-tender, no masses  and no hepatomegaly .   Skin: skin warm and dry, normal skin turgor .   Psychiatric judgment and insight is normal  and oriented to person, place and time .        Assessment/Plan   Problem List Items Addressed This Visit             ICD-10-CM    Chronic atrial fibrillation (Multi) - Primary I48.20    Relevant Orders    ECG 12 Lead       Angel Carroll is a 79 y.o. year old male patient with h/o PAF and tachy-ankush syndrome s/p PPM.  Presents for 1 year follow up.   Most recent device interrogation showed a normal device function and no evidence of arrhythmias. His current ECG shows AV paced rhythm, HR 60 bpm. He reports no symptoms. Will continue his current medications and follow up in 1 year.         Jose Mccormick MD  Cardiac Electrophysiology      Thank you very much for allowing me to participate in the care of this pleasant patient. Please do not hesitate to contact me with any further questions or concerns regarding his care.    **Disclaimer: This note was dictated by speech recognition, and every effort has been made to prevent any error in transcription, however minor errors may be present**

## 2024-10-01 NOTE — TELEPHONE ENCOUNTER
I spoke to patients daughter. Angel is scheduled to receive his eliquis shipment on Friday. If this is the case, his daughter will start holding his warfarin on Sunday, and start eliquis on Wednesday. He monitors his INR at home and will check his INR Tuesday to make sure it is <2.0 prior to starting eliquis. They know to call for any other concerns.

## 2024-10-02 ENCOUNTER — PHARMACY VISIT (OUTPATIENT)
Dept: PHARMACY | Facility: CLINIC | Age: 79
End: 2024-10-02
Payer: COMMERCIAL

## 2024-10-04 ENCOUNTER — APPOINTMENT (OUTPATIENT)
Dept: PRIMARY CARE | Facility: CLINIC | Age: 79
End: 2024-10-04
Payer: MEDICARE

## 2024-10-04 VITALS
DIASTOLIC BLOOD PRESSURE: 70 MMHG | SYSTOLIC BLOOD PRESSURE: 120 MMHG | HEART RATE: 62 BPM | HEIGHT: 72 IN | BODY MASS INDEX: 31.02 KG/M2 | WEIGHT: 229 LBS | OXYGEN SATURATION: 98 %

## 2024-10-04 DIAGNOSIS — Z23 NEED FOR VACCINATION: ICD-10-CM

## 2024-10-04 DIAGNOSIS — M19.90 ARTHRITIS: ICD-10-CM

## 2024-10-04 DIAGNOSIS — I25.5 ISCHEMIC CARDIOMYOPATHY: ICD-10-CM

## 2024-10-04 DIAGNOSIS — I48.20 CHRONIC ATRIAL FIBRILLATION (MULTI): ICD-10-CM

## 2024-10-04 DIAGNOSIS — N18.32 STAGE 3B CHRONIC KIDNEY DISEASE (MULTI): ICD-10-CM

## 2024-10-04 DIAGNOSIS — I77.9 DISORDER OF CAROTID ARTERY (CMS-HCC): ICD-10-CM

## 2024-10-04 DIAGNOSIS — N40.0 BPH WITHOUT OBSTRUCTION/LOWER URINARY TRACT SYMPTOMS: ICD-10-CM

## 2024-10-04 DIAGNOSIS — Z00.00 ROUTINE GENERAL MEDICAL EXAMINATION AT HEALTH CARE FACILITY: Primary | ICD-10-CM

## 2024-10-04 PROCEDURE — 1126F AMNT PAIN NOTED NONE PRSNT: CPT | Performed by: INTERNAL MEDICINE

## 2024-10-04 PROCEDURE — G0008 ADMIN INFLUENZA VIRUS VAC: HCPCS | Performed by: INTERNAL MEDICINE

## 2024-10-04 PROCEDURE — 99214 OFFICE O/P EST MOD 30 MIN: CPT | Performed by: INTERNAL MEDICINE

## 2024-10-04 PROCEDURE — 3078F DIAST BP <80 MM HG: CPT | Performed by: INTERNAL MEDICINE

## 2024-10-04 PROCEDURE — G0439 PPPS, SUBSEQ VISIT: HCPCS | Performed by: INTERNAL MEDICINE

## 2024-10-04 PROCEDURE — 90662 IIV NO PRSV INCREASED AG IM: CPT | Performed by: INTERNAL MEDICINE

## 2024-10-04 PROCEDURE — 1157F ADVNC CARE PLAN IN RCRD: CPT | Performed by: INTERNAL MEDICINE

## 2024-10-04 PROCEDURE — 1170F FXNL STATUS ASSESSED: CPT | Performed by: INTERNAL MEDICINE

## 2024-10-04 PROCEDURE — 3074F SYST BP LT 130 MM HG: CPT | Performed by: INTERNAL MEDICINE

## 2024-10-04 PROCEDURE — 1159F MED LIST DOCD IN RCRD: CPT | Performed by: INTERNAL MEDICINE

## 2024-10-04 RX ORDER — GABAPENTIN 300 MG/1
300 CAPSULE ORAL NIGHTLY
Qty: 90 CAPSULE | Refills: 1 | Status: SHIPPED | OUTPATIENT
Start: 2024-10-04 | End: 2024-10-04 | Stop reason: SDUPTHER

## 2024-10-04 RX ORDER — GABAPENTIN 300 MG/1
300 CAPSULE ORAL NIGHTLY
Qty: 90 CAPSULE | Refills: 1 | Status: SHIPPED | OUTPATIENT
Start: 2024-10-04 | End: 2025-10-04

## 2024-10-04 RX ORDER — FINASTERIDE 5 MG/1
5 TABLET, FILM COATED ORAL DAILY
Qty: 90 TABLET | Refills: 10 | Status: SHIPPED | OUTPATIENT
Start: 2024-10-04

## 2024-10-04 RX ORDER — PSYLLIUM HUSK 0.4 G
5 CAPSULE ORAL 4 TIMES DAILY
COMMUNITY

## 2024-10-04 ASSESSMENT — ENCOUNTER SYMPTOMS
OCCASIONAL FEELINGS OF UNSTEADINESS: 0
LOSS OF SENSATION IN FEET: 0
DEPRESSION: 0

## 2024-10-04 ASSESSMENT — ACTIVITIES OF DAILY LIVING (ADL)
GROCERY_SHOPPING: TOTAL CARE
MANAGING_FINANCES: NEEDS ASSISTANCE
TAKING_MEDICATION: TOTAL CARE
DRESSING: INDEPENDENT
BATHING: INDEPENDENT
DOING_HOUSEWORK: TOTAL CARE

## 2024-10-04 ASSESSMENT — PAIN SCALES - GENERAL: PAINLEVEL: 0-NO PAIN

## 2024-10-04 ASSESSMENT — PATIENT HEALTH QUESTIONNAIRE - PHQ9
2. FEELING DOWN, DEPRESSED OR HOPELESS: NOT AT ALL
SUM OF ALL RESPONSES TO PHQ9 QUESTIONS 1 AND 2: 0
1. LITTLE INTEREST OR PLEASURE IN DOING THINGS: NOT AT ALL
SUM OF ALL RESPONSES TO PHQ9 QUESTIONS 1 AND 2: 0
2. FEELING DOWN, DEPRESSED OR HOPELESS: NOT AT ALL
1. LITTLE INTEREST OR PLEASURE IN DOING THINGS: NOT AT ALL

## 2024-10-04 NOTE — ASSESSMENT & PLAN NOTE
He is advised to avoid nephrotoxic medication and will follow-up with nephrology clinic..  Orders:    Comprehensive metabolic panel; Future

## 2024-10-04 NOTE — PROGRESS NOTES
Subjective   Reason for Visit: Angel Carroll is an 79 y.o. male here for a Medicare Wellness visit.          Reviewed all medications by prescribing practitioner or clinical pharmacist (such as prescriptions, OTCs, herbal therapies and supplements) and documented in the medical record.    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  He seems to doing fairly well and denies any cough, congestion, fever chills abdominal pain and nausea vomiting.  He has been taking warfarin due to increased cost of Eliquis regarding history of atrial fibrillation.  His last INR was 2.3 and other laboratory studies done showed GFR of 43 glucose of 91, hemoglobin of 10.2 WBC of 3.1 and other studies have been reviewed and discussed with him..       Patient Care Team:  Jung Tao MD as PCP - General (Internal Medicine)  Tasneem Sandoval MD (Internal Medicine)  Darrion Hill LPN as Care Manager (Case Management)     Review of Systems   Constitutional: Negative.    HENT: Negative.     Eyes: Negative.    Respiratory: Negative.     Cardiovascular: Negative.    Gastrointestinal: Negative.    Endocrine: Negative.    Genitourinary: Negative.    Musculoskeletal: Negative.    Skin: Negative.    Allergic/Immunologic: Negative.     Neurological: Negative.    Hematological: Negative.    Psychiatric/Behavioral: Negative.         Objective   Vitals:  /70   Pulse 62   Ht 1.829 m (6')   Wt 104 kg (229 lb)   SpO2 98%   BMI 31.06 kg/m²       Physical Exam  Constitutional:       Appearance: Normal appearance. He is normal weight.   HENT:      Right Ear: Tympanic membrane normal.      Left Ear: Tympanic membrane and ear canal normal.      Nose: Nose normal.   Neck:      Vascular: No carotid bruit.   Cardiovascular:      Rate and Rhythm: Normal rate.   Pulmonary:      Effort: No respiratory distress.      Breath sounds: No stridor. No wheezing.   Abdominal:      Palpations: Abdomen is soft.      Tenderness: There is no abdominal tenderness. There is no guarding or rebound.   Skin:     Coloration: Skin is not jaundiced.      Findings: No bruising.   Neurological:      General: No focal deficit present.      Mental Status: He is alert and oriented to person, place, and time.   Psychiatric:         Mood and Affect: Mood normal.         Assessment & Plan  Routine general medical examination at health care facility  Patient will be scheduled for routine blood work and will he will influenza vaccine for health maintenance.  He will return to clinic in 6 months for follow-up visit.  Orders:    1 Year Follow Up In Primary Care - Wellness Exam; Future    Chronic atrial fibrillation (Multi)  He is advised to continue apixaban and amiodarone regarding history of atrial fibrillation.  He will also continue to follow-up with cardiology clinic.  Orders:    CBC and Auto Differential; Future    Disorder of carotid artery (CMS-HCC)  He will continue atorvastatin regarding history of carotid artery disease.       Ischemic cardiomyopathy  He will continue metoprolol and furosemide regarding history of ischemic Cardiomyopathy.  He will continue to follow-up with cardiology clinic regarding a history of ischemic cardiomyopathy and permanent pacemaker  insertion.  Orders:    Comprehensive metabolic panel; Future    Stage 3b chronic kidney disease (Multi)  He is advised to avoid nephrotoxic medication and will follow-up with nephrology clinic..  Orders:    Comprehensive metabolic panel; Future    BPH without obstruction/lower urinary tract symptoms  He will continue finasteride regarding history of BPH.  Orders:    finasteride (Proscar) 5 mg tablet; Take 1 tablet (5 mg) by mouth once daily.    Urinalysis with Reflex Microscopic; Future    Arthritis  He will continue gabapentin regarding history of arthritis.  Orders:    gabapentin (Neurontin) 300 mg capsule; Take 1 capsule (300 mg) by mouth once daily at bedtime.    Need for vaccination  He will be given influenza vaccine for health maintenance.  Orders:    Flu vaccine, high dose           Depression Screening  5 - 10 minutes were spent screening for depression.

## 2024-10-04 NOTE — ASSESSMENT & PLAN NOTE
He will continue metoprolol and furosemide regarding history of ischemic Cardiomyopathy.  He will continue to follow-up with cardiology clinic regarding a history of ischemic cardiomyopathy and permanent pacemaker insertion.  Orders:    Comprehensive metabolic panel; Future

## 2024-10-04 NOTE — ASSESSMENT & PLAN NOTE
He will continue finasteride regarding history of BPH.  Orders:    finasteride (Proscar) 5 mg tablet; Take 1 tablet (5 mg) by mouth once daily.    Urinalysis with Reflex Microscopic; Future

## 2024-10-04 NOTE — ASSESSMENT & PLAN NOTE
He is advised to continue apixaban and amiodarone regarding history of atrial fibrillation.  He will also continue to follow-up with cardiology clinic.  Orders:    CBC and Auto Differential; Future

## 2024-10-07 ENCOUNTER — TELEPHONE (OUTPATIENT)
Dept: PHARMACY | Facility: HOSPITAL | Age: 79
End: 2024-10-07
Payer: MEDICARE

## 2024-10-07 DIAGNOSIS — I48.0 PAROXYSMAL ATRIAL FIBRILLATION (MULTI): Primary | ICD-10-CM

## 2024-10-07 ASSESSMENT — ENCOUNTER SYMPTOMS
MUSCULOSKELETAL NEGATIVE: 1
ENDOCRINE NEGATIVE: 1
PSYCHIATRIC NEGATIVE: 1
GASTROINTESTINAL NEGATIVE: 1
HEMATOLOGIC/LYMPHATIC NEGATIVE: 1
NEUROLOGICAL NEGATIVE: 1
EYES NEGATIVE: 1
CARDIOVASCULAR NEGATIVE: 1
CONSTITUTIONAL NEGATIVE: 1
ALLERGIC/IMMUNOLOGIC NEGATIVE: 1
RESPIRATORY NEGATIVE: 1

## 2024-10-07 NOTE — TELEPHONE ENCOUNTER
Patient Instructions for Eliquis Conversion  Hold warfarin for 3 days (Last dose: 10/05/2024)  Get INR check on the 4th day (Date Scheduled: 10/09/2024 Time: AM- patient tests INR at home)  Speak with Clinical Pharmacist After INR Draw: (Appointment: 10/09/2024 Time: 1:30pm)  INR must be <2 to start Eliquis. Do NOT start until instructed by Clinical Pharmacist at appointment.   Spoke with patient's daughter Reed regarding conversion instructions. Reed states she will tell Angel to test INR on Wednesday. [facility] to follow up with Angel on 10/09 at 1:30 and will also let Reed know the plan.

## 2024-10-09 ENCOUNTER — ANTICOAGULATION - WARFARIN VISIT (OUTPATIENT)
Dept: CARDIOLOGY | Facility: CLINIC | Age: 79
End: 2024-10-09

## 2024-10-09 ENCOUNTER — TELEMEDICINE (OUTPATIENT)
Dept: PHARMACY | Facility: HOSPITAL | Age: 79
End: 2024-10-09
Payer: MEDICARE

## 2024-10-09 DIAGNOSIS — I48.0 PAROXYSMAL ATRIAL FIBRILLATION (MULTI): Primary | ICD-10-CM

## 2024-10-09 LAB
INR IN PPP BY COAGULATION ASSAY EXTERNAL: 2
PROTHROMBIN TIME (PT) IN PPP BY COAGULATION ASSAY EXTERNAL: NORMAL

## 2024-10-09 NOTE — PROGRESS NOTES
Pharmacist Clinic: Cardiology Management    Angel Carroll is a 79 y.o. male was referred to Clinical Pharmacy Team for anticoagulation/warfarin conversion management.     Referring Provider: Alysia Sheets APRN-CNP    THIS IS A FOLLOW UP PATIENT APPOINTMENT. AT LAST VISIT ON 09/25/2024 WITH PHARMACIST (Annamaria Burgess).    Appointment was completed by Angel who was reached at primary number.    REVIEW OF PAST APPNT (IF APPLICABLE):   Patient  PAP application was submitted and approved.    Allergies Reviewed? No    Allergies   Allergen Reactions    Other Unknown     Anti-Inflammatories    Ranolazine Unknown    Cyclobenzaprine Hallucinations, Unknown and Psychosis       Past Medical History:   Diagnosis Date    Atherosclerotic heart disease of native coronary artery without angina pectoris 12/14/2022    Arteriosclerotic cardiovascular disease (ASCVD)    Benign neoplasm of meninges, unspecified     Meningioma    Benign prostatic hyperplasia without lower urinary tract symptoms 02/28/2022    BPH without obstruction/lower urinary tract symptoms    Body mass index (BMI) 33.0-33.9, adult 07/02/2021    BMI 33.0-33.9,adult    Body mass index (BMI) 34.0-34.9, adult 11/09/2021    BMI 34.0-34.9,adult    Body mass index (BMI) 34.0-34.9, adult 09/08/2021    BMI 34.0-34.9,adult    Body mass index (BMI) 35.0-35.9, adult 12/29/2021    BMI 35.0-35.9,adult    Body mass index (BMI) 35.0-35.9, adult 02/28/2022    BMI 35.0-35.9,adult    Elevated blood-pressure reading, without diagnosis of hypertension     Prehypertension    Encounter for immunization 10/27/2015    Need for prophylactic vaccination and inoculation against influenza    Hyperlipidemia, unspecified 11/15/2021    Hyperlipidemia    Other conditions influencing health status     Nephrolithiasis    Pacemaker     Personal history of other diseases of the musculoskeletal system and connective tissue     History of tendinitis    Personal history of other drug therapy     History of  influenza vaccination    Personal history of other specified conditions 10/25/2022    History of bradycardia    Rectal bleeding     Vitamin D deficiency, unspecified 10/08/2020    Vitamin D deficiency       Current Outpatient Medications on File Prior to Visit   Medication Sig Dispense Refill    acetaminophen (Tylenol) 325 mg tablet Take 1 tablet (325 mg) by mouth every 6 hours if needed for mild pain (1 - 3).      amiodarone (Pacerone) 200 mg tablet Take 1 tablet (200 mg) by mouth once daily. 90 tablet 3    apixaban (Eliquis) 2.5 mg tablet Take 1 tablet (2.5 mg) by mouth 2 times a day. 180 tablet 3    atorvastatin (Lipitor) 20 mg tablet TAKE 1 TABLET EVERY DAY 90 tablet 3    cyanocobalamin (Vitamin B-12) 500 mcg tablet Take 1 tablet (500 mcg) by mouth once daily.      ergocalciferol (Vitamin D-2) 1.25 MG (83285 UT) capsule Take 1 capsule (50,000 Units) by mouth 1 (one) time per week. 12 capsule 1    fenofibrate (Tricor) 48 mg tablet TAKE 1 AND 1/2 TABLETS ONE TIME DAILY 135 tablet 3    ferrous sulfate 250 mg (50 mg iron) tablet extended release Take 1 tablet (250 mg) by mouth 2 times a day. 180 tablet 3    finasteride (Proscar) 5 mg tablet Take 1 tablet (5 mg) by mouth once daily. 90 tablet 10    folic acid (Folvite) 1 mg tablet TAKE 1 TABLET EVERY DAY 90 tablet 3    furosemide (Lasix) 40 mg tablet TAKE 1 TABLET TWICE DAILY 180 tablet 1    gabapentin (Neurontin) 300 mg capsule Take 1 capsule (300 mg) by mouth once daily at bedtime. 90 capsule 1    metoprolol succinate XL (Toprol-XL) 25 mg 24 hr tablet Take 1 tablet (25 mg) by mouth once daily. Do not crush or chew. (Patient taking differently: Take 0.5 tablets (12.5 mg) by mouth 2 times a day. Do not crush or chew.) 90 tablet 1    oxygen (O2) gas therapy Inhale 2 L/min continuously.      pantoprazole (ProtoNix) 40 mg EC tablet TAKE 1 TABLET EVERY DAY 90 tablet 3    PARoxetine (Paxil) 20 mg tablet TAKE 1 TABLET EVERY DAY 90 tablet 3    psyllium (Metamucil) 0.4 gram  "capsule Take 5 capsules by mouth 4 times a day.      psyllium husk, with sugar, 2 gram wafer Take 2 capsules by mouth once daily. Mix and drink with at least 8 ounces of water or juice.      tamsulosin (Flomax) 0.4 mg 24 hr capsule Take 2 capsules (0.8 mg) by mouth once daily at bedtime. 180 capsule 3    [DISCONTINUED] finasteride (Proscar) 5 mg tablet TAKE 1 TABLET EVERY DAY (Patient taking differently: Take 1 tablet (5 mg) by mouth once daily.) 90 tablet 10    [DISCONTINUED] gabapentin (Neurontin) 100 mg capsule Take 1 capsule (100 mg) by mouth once daily at bedtime. 90 capsule 0    [DISCONTINUED] gabapentin (Neurontin) 300 mg capsule Take 1 capsule (300 mg) by mouth once daily at bedtime. 90 capsule 1     No current facility-administered medications on file prior to visit.         RELEVANT LAB RESULTS  Lab Results   Component Value Date    BILITOT 0.7 08/08/2024    CALCIUM 8.8 08/13/2024    CO2 31 08/13/2024     08/13/2024    CREATININE 1.60 08/13/2024    GLUCOSE 91 08/13/2024    ALKPHOS 85 08/08/2024    K 4.4 08/13/2024    PROT 6.4 08/08/2024     08/13/2024    AST 18 08/08/2024    ALT 6 08/08/2024    BUN 21 08/13/2024    ANIONGAP 7 08/13/2024    MG 2.10 07/02/2024    PHOS 2.9 06/11/2024    ALBUMIN 3.6 08/08/2024    GFRMALE 28 (A) 09/01/2023     Lab Results   Component Value Date    TRIG 81 11/14/2022    CHOL 117 (L) 11/14/2022    LDLCALC 70 11/14/2022    HDL 31 (L) 11/14/2022     No results found for: \"BMCBC\", \"CBCDIF\"     PHARMACEUTICAL ASSESSMENT    MEDICATION RECONCILIATION    Drug Interactions? No    Medication Documentation Review Audit       Reviewed by Doc Morales MA (Medical Assistant) on 10/04/24 at 1320      Medication Order Taking? Sig Documenting Provider Last Dose Status   acetaminophen (Tylenol) 325 mg tablet [id number] Yes Take 1 tablet (325 mg) by mouth every 6 hours if needed for mild pain (1 - 3). Historical Provider, MD Taking Active   amiodarone (Pacerone) 200 mg tablet " [id number] Yes Take 1 tablet (200 mg) by mouth once daily. Agata Bonner APRN-CNP Taking Active   apixaban (Eliquis) 2.5 mg tablet [id number]  Take 1 tablet (2.5 mg) by mouth 2 times a day. Alysia Sheets APRN-CNP  Active   atorvastatin (Lipitor) 20 mg tablet [id number] Yes TAKE 1 TABLET EVERY DAY Jung Tao MD Taking Active   cyanocobalamin (Vitamin B-12) 500 mcg tablet [id number] Yes Take 1 tablet (500 mcg) by mouth once daily. Grey Ascencio MD Taking Active   ergocalciferol (Vitamin D-2) 1.25 MG (32591 UT) capsule [id number] Yes Take 1 capsule (50,000 Units) by mouth 1 (one) time per week. Jung Tao MD Taking Active     Discontinued 09/30/24 1604   fenofibrate (Tricor) 48 mg tablet [id number] Yes TAKE 1 AND 1/2 TABLETS ONE TIME DAILY Alysia Sheets APRN-CNP Taking Active   ferrous sulfate 250 mg (50 mg iron) tablet extended release [id number] Yes Take 1 tablet (250 mg) by mouth 2 times a day. Tiana Juarez MD Taking Active   finasteride (Proscar) 5 mg tablet [id number] Yes TAKE 1 TABLET EVERY DAY   Patient taking differently: Take 1 tablet (5 mg) by mouth once daily.    Jung Tao MD Taking Active   folic acid (Folvite) 1 mg tablet [id number] Yes TAKE 1 TABLET EVERY DAY Jung Tao MD Taking Active   furosemide (Lasix) 40 mg tablet [id number] Yes TAKE 1 TABLET TWICE DAILY Jung Tao MD Taking Active   gabapentin (Neurontin) 100 mg capsule [id number] Yes Take 1 capsule (100 mg) by mouth once daily at bedtime. Jung Tao MD Taking Active   metoprolol succinate XL (Toprol-XL) 25 mg 24 hr tablet [id number] Yes Take 1 tablet (25 mg) by mouth once daily. Do not crush or chew.   Patient taking differently: Take 0.5 tablets (12.5 mg) by mouth 2 times a day. Do not crush or chew.    Jung Tao MD Taking Active   oxygen (O2) gas therapy [id number] Yes Inhale 2 L/min continuously. Historical Provider, MD Taking Active   pantoprazole (ProtoNix) 40 mg EC tablet [id number] Yes TAKE 1 TABLET EVERY DAY  Jung Tao MD Taking Active   PARoxetine (Paxil) 20 mg tablet [id number] Yes TAKE 1 TABLET EVERY DAY Jung Tao MD Taking Active   psyllium (Metamucil) 0.4 gram capsule [id number] Yes Take 5 capsules by mouth 4 times a day. Historical Provider, MD Taking Active   psyllium husk, with sugar, 2 gram wafer [id number] No Take 2 capsules by mouth once daily. Mix and drink with at least 8 ounces of water or juice. Historical Provider, MD Not Taking Active   tamsulosin (Flomax) 0.4 mg 24 hr capsule [id number] Yes Take 2 capsules (0.8 mg) by mouth once daily at bedtime. Hanny Moe MD Taking Active    Patient taking differently:   Discontinued 10/01/24 1126                    DISEASE MANAGEMENT ASSESSMENT:     ANTICOAGULATION ASSESSMENT    The ASCVD Risk score (Naomi HODGSON, et al., 2019) failed to calculate for the following reasons:    The 2019 ASCVD risk score is only valid for ages 40 to 79    The patient has a prior MI or stroke diagnosis    DIAGNOSIS: prevention of nonvalvular atrial fibrilliation stroke and systemic embolism  - Patient is projected to be on anticoagulation long term  - [id number] Score: [4] (only included if diagnosis is atrial fibrillation)   Age: [<65 (0)] [65-74 (+1)] [> 75 (+2)]: 2  Sex: [Male/Female (+1)]: 0  CHF history: [No/Yes(+1)]: 1  Hypertension history: [No/Yes(+1)]: 1  Stroke/TIA/thromboembolism history: [No/Yes(+2)]: 0  Vascular disease history (prior MI, peripheral artery disease, aortic plaque): [No/Yes(+1)]: 0  Diabetes history: [No/Yes(+1)]: 0    CURRENT PHARMACOTHERAPY:    Patient is being transitioned from warfarin to Eliquis    RELEVANT PAST MEDICAL HISTORY:   Afib, HTN, HF    Affordability/Accessibility:  PAP  Adherence/Organization: Daughter Rhea puts medicine into his pillbox.  Adverse Reactions: denies s/s of bleeding  Recent Hospitalizations: none  Recent Falls/Trauma: none reported    EDUCATION/COUNSELING:   - Counseled patient on MOA, expectations, duration of  therapy, contraindications, administration, and monitoring parameters  - Counseled patient of side effects that are indicative of bleeding such as dark tarry stool, unexplainable bruising, or vomiting up a coffee ground like substance       DISCUSSION/NOTES:   Patient was previously on warfarin as directed by Coumadin Clinic, Angel held his Warfarin starting Angel 10/6 and his INR today is 2.0. INR is appropriate to START Eliquis 2.5mg twice daily (78yo, 104kg, Scr 1.6) tonight and STOP warfarin.  Spoke with Angel about starting his Eliquis 2.5mg twice daily tonight. Educated patient on side effects that are indicative of bleeding and to report to ER if he falls and hit his head. Patient verbalizes understanding. Also spoke with Rhea, who manages patients medications, she verbalized understanding of starting Eliquis 2.5mg with first dose being tonight (10/9), states she will fill his pill box. Plan to follow up in 1 week to assess tolerability.     ASSESSMENT AND PLAN:    Assessment/Plan   Problem List Items Addressed This Visit       Paroxysmal atrial fibrillation (Multi) - Primary     INR 2.0 is appropriate to START eliquis 2.5mg twice daily (78 yo, 104kg, Scr 1.6). STOP warfarin. Plan to follow up in 1 week to assess tolerability.         Relevant Orders    Referral to Clinical Pharmacy         RECOMMENDATIONS/PLAN    START  Eliquis 2.5mg twice daily  STOP  Warfarin as direct by Coumadin Clinic    Last Appnt with Referring Provider: 07/29/2024  Next Appnt with Referring Provider: 10/28/2024  Clinical Pharmacist follow up: 1 week  VAF/Application Expiration: Yes    Date: 09/30/2025  Type of Encounter: Sal Burgess PharmD    Verbal consent to manage patient's drug therapy was obtained from the patient . They were informed they may decline to participate or withdraw from participation in pharmacy services at any time.    Continue all meds under the continuation of care with the referring provider  and clinical pharmacy team.

## 2024-10-09 NOTE — ASSESSMENT & PLAN NOTE
INR 2.0 is appropriate to START eliquis 2.5mg twice daily (78 yo, 104kg, Scr 1.6). STOP warfarin. Plan to follow up in 1 week to assess tolerability.

## 2024-10-09 NOTE — Clinical Note
Cam Hatfield,  Today I spoke with Angel about his anticoagulation therapy/warfarin conversion. Last visit patient was approved for [facility]. Angel held his Warfarin starting Angel 10/6 and his INR today is 2.0. INR is appropriate to start Eliquis 2.5mg twice daily with first dose being tonight. Plan to follow up in 1 week to assess tolerability. Thank you!

## 2024-10-09 NOTE — PROGRESS NOTES
Patient identification verified with 2 identifiers.    Location: [facility] Patient Self-Testing Program [phone]    Referring Physician: DR. MATTHEWS  Enrollment/ Re-enrollment date: 2025   INR Goal: 2.0-3.0  INR monitoring is per [facility] protocol.  Anticoagulation Medication: warfarin  Indication: Atrial Fibrillation/Atrial Flutter    Subjective   Bleeding signs/symptoms: No    Bruising: No   Major bleeding event: No  Thrombosis signs/symptoms: No  Thromboembolic event: No  Missed doses: Yes  Extra doses: No  Medication changes: Yes  Dietary changes: No  Change in health: No  Change in activity: No  Alcohol: No  Other concerns: No    Upcoming Procedures:  Does the Patient Have any upcoming procedures that require interruption in anticoagulation therapy? no  Does the patient require bridging? no      Anticoagulation Summary  As of 10/9/2024      INR goal:  2.0-3.0   TTR:  59.5% (11.4 mo)   INR used for dosin.00 (10/9/2024)   Weekly warfarin total:  16.25 mg               Assessment/Plan   Therapeutic     1. New dose: no change  SPOKE TO KIRSTEN. PT HELD WARFARIN LAST 3 DAYS AND WHEN INR IS <2.0 HE WILL START ELIQUIS. PLEASE FOLLOW UP WITH KIRSTEN TOMORROW 10/10 TO CONFIRM HE HAS BEEN TRANSITIONED  2. Next INR:  10/10      Education provided to patient during the visit:  Patient instructed to call in interim with questions, concerns and changes.   Patient educated on compliance with dosing, follow up appointments, and prescribed plan of care.

## 2024-10-10 NOTE — PROGRESS NOTES
Spoke with daughter Rhea today.  Pt's Pharmacy provider, who obtained Eliquis for patient, advised on 10/9 that he should start Eliquis that day.  He has been off warfarin since 10/6 and INR was 2.0 on 10/9.  He will no longer be using the [facility] service.  His Episode for Anticoagulation has been resolved.

## 2024-10-14 ENCOUNTER — TELEPHONE (OUTPATIENT)
Dept: PRIMARY CARE | Facility: CLINIC | Age: 79
End: 2024-10-14
Payer: MEDICARE

## 2024-10-16 ENCOUNTER — APPOINTMENT (OUTPATIENT)
Dept: PHARMACY | Facility: HOSPITAL | Age: 79
End: 2024-10-16
Payer: MEDICARE

## 2024-10-16 DIAGNOSIS — I48.0 PAROXYSMAL ATRIAL FIBRILLATION (MULTI): Primary | ICD-10-CM

## 2024-10-16 DIAGNOSIS — I48.0 PAROXYSMAL ATRIAL FIBRILLATION (MULTI): ICD-10-CM

## 2024-10-16 NOTE — PROGRESS NOTES
Pharmacist Clinic: Cardiology Management    Angel Carroll is a 79 y.o. male was referred to Clinical Pharmacy Team for anticoagulation management.     Referring Provider: Alysia Sheets, APRN-CNP    THIS IS A FOLLOW UP PATIENT APPOINTMENT. AT LAST VISIT ON 10/09/2024 WITH PHARMACIST (Annamaria Burgess).    Appointment was completed by Angel/Reed who was reached at [phone].    REVIEW OF PAST APPNT (IF APPLICABLE):   Angel was transitioned from warfarin to Eliquis. He started Eliquis 2.5mg twice daily on 10/9 (78yo, 104kg, Scr 1.6).    Allergies Reviewed? No    Allergies   Allergen Reactions    Other Unknown     Anti-Inflammatories    Ranolazine Unknown    Cyclobenzaprine Hallucinations, Unknown and Psychosis       Past Medical History:   Diagnosis Date    Atherosclerotic heart disease of native coronary artery without angina pectoris 12/14/2022    Arteriosclerotic cardiovascular disease (ASCVD)    Benign neoplasm of meninges, unspecified     Meningioma    Benign prostatic hyperplasia without lower urinary tract symptoms 02/28/2022    BPH without obstruction/lower urinary tract symptoms    Body mass index (BMI) 33.0-33.9, adult 07/02/2021    BMI 33.0-33.9,adult    Body mass index (BMI) 34.0-34.9, adult 11/09/2021    BMI 34.0-34.9,adult    Body mass index (BMI) 34.0-34.9, adult 09/08/2021    BMI 34.0-34.9,adult    Body mass index (BMI) 35.0-35.9, adult 12/29/2021    BMI 35.0-35.9,adult    Body mass index (BMI) 35.0-35.9, adult 02/28/2022    BMI 35.0-35.9,adult    Elevated blood-pressure reading, without diagnosis of hypertension     Prehypertension    Encounter for immunization 10/27/2015    Need for prophylactic vaccination and inoculation against influenza    Hyperlipidemia, unspecified 11/15/2021    Hyperlipidemia    Other conditions influencing health status     Nephrolithiasis    Pacemaker     Personal history of other diseases of the musculoskeletal system and connective tissue     History of tendinitis     Personal history of other drug therapy     History of influenza vaccination    Personal history of other specified conditions 10/25/2022    History of bradycardia    Rectal bleeding     Vitamin D deficiency, unspecified 10/08/2020    Vitamin D deficiency       Current Outpatient Medications on File Prior to Visit   Medication Sig Dispense Refill    acetaminophen (Tylenol) 325 mg tablet Take 1 tablet (325 mg) by mouth every 6 hours if needed for mild pain (1 - 3).      amiodarone (Pacerone) 200 mg tablet Take 1 tablet (200 mg) by mouth once daily. 90 tablet 3    apixaban (Eliquis) 2.5 mg tablet Take 1 tablet (2.5 mg) by mouth 2 times a day. 180 tablet 3    atorvastatin (Lipitor) 20 mg tablet TAKE 1 TABLET EVERY DAY 90 tablet 3    cyanocobalamin (Vitamin B-12) 500 mcg tablet Take 1 tablet (500 mcg) by mouth once daily.      ergocalciferol (Vitamin D-2) 1.25 MG (77821 UT) capsule Take 1 capsule (50,000 Units) by mouth 1 (one) time per week. 12 capsule 1    fenofibrate (Tricor) 48 mg tablet TAKE 1 AND 1/2 TABLETS ONE TIME DAILY 135 tablet 3    ferrous sulfate 250 mg (50 mg iron) tablet extended release Take 1 tablet (250 mg) by mouth 2 times a day. 180 tablet 3    finasteride (Proscar) 5 mg tablet Take 1 tablet (5 mg) by mouth once daily. 90 tablet 10    folic acid (Folvite) 1 mg tablet TAKE 1 TABLET EVERY DAY 90 tablet 3    furosemide (Lasix) 40 mg tablet TAKE 1 TABLET TWICE DAILY 180 tablet 1    gabapentin (Neurontin) 300 mg capsule Take 1 capsule (300 mg) by mouth once daily at bedtime. 90 capsule 1    metoprolol succinate XL (Toprol-XL) 25 mg 24 hr tablet Take 1 tablet (25 mg) by mouth once daily. Do not crush or chew. (Patient taking differently: Take 0.5 tablets (12.5 mg) by mouth 2 times a day. Do not crush or chew.) 90 tablet 1    oxygen (O2) gas therapy Inhale 2 L/min continuously.      pantoprazole (ProtoNix) 40 mg EC tablet TAKE 1 TABLET EVERY DAY 90 tablet 3    PARoxetine (Paxil) 20 mg tablet TAKE 1 TABLET  "EVERY DAY 90 tablet 3    psyllium (Metamucil) 0.4 gram capsule Take 5 capsules by mouth 4 times a day.      psyllium husk, with sugar, 2 gram wafer Take 2 capsules by mouth once daily. Mix and drink with at least 8 ounces of water or juice.      tamsulosin (Flomax) 0.4 mg 24 hr capsule Take 2 capsules (0.8 mg) by mouth once daily at bedtime. 180 capsule 3     No current facility-administered medications on file prior to visit.         RELEVANT LAB RESULTS  Lab Results   Component Value Date    BILITOT 0.7 08/08/2024    CALCIUM 8.8 08/13/2024    CO2 31 08/13/2024     08/13/2024    CREATININE 1.60 08/13/2024    GLUCOSE 91 08/13/2024    ALKPHOS 85 08/08/2024    K 4.4 08/13/2024    PROT 6.4 08/08/2024     08/13/2024    AST 18 08/08/2024    ALT 6 08/08/2024    BUN 21 08/13/2024    ANIONGAP 7 08/13/2024    MG 2.10 07/02/2024    PHOS 2.9 06/11/2024    ALBUMIN 3.6 08/08/2024    GFRMALE 28 (A) 09/01/2023     Lab Results   Component Value Date    TRIG 81 11/14/2022    CHOL 117 (L) 11/14/2022    LDLCALC 70 11/14/2022    HDL 31 (L) 11/14/2022     No results found for: \"BMCBC\", \"CBCDIF\"     PHARMACEUTICAL ASSESSMENT    MEDICATION RECONCILIATION    Drug Interactions? No    Medication Documentation Review Audit       Reviewed by Doc Morales MA (Medical Assistant) on 10/04/24 at 1320      Medication Order Taking? Sig Documenting Provider Last Dose Status   acetaminophen (Tylenol) 325 mg tablet [id number] Yes Take 1 tablet (325 mg) by mouth every 6 hours if needed for mild pain (1 - 3). Historical Provider, MD Taking Active   amiodarone (Pacerone) 200 mg tablet [id number] Yes Take 1 tablet (200 mg) by mouth once daily. Agata Bonner, APRN-CNP Taking Active   apixaban (Eliquis) 2.5 mg tablet [id number]  Take 1 tablet (2.5 mg) by mouth 2 times a day. Alysia Sheets APRN-CNP  Active   atorvastatin (Lipitor) 20 mg tablet [id number] Yes TAKE 1 TABLET EVERY DAY Jung Tao MD Taking Active   cyanocobalamin (Vitamin B-12) " 500 mcg tablet [id number] Yes Take 1 tablet (500 mcg) by mouth once daily. Historical Provider, MD Taking Active   ergocalciferol (Vitamin D-2) 1.25 MG (21066 UT) capsule [id number] Yes Take 1 capsule (50,000 Units) by mouth 1 (one) time per week. Jung Tao MD Taking Active     Discontinued 09/30/24 1604   fenofibrate (Tricor) 48 mg tablet [id number] Yes TAKE 1 AND 1/2 TABLETS ONE TIME DAILY Alysia Sheets APRN-CNP Taking Active   ferrous sulfate 250 mg (50 mg iron) tablet extended release [id number] Yes Take 1 tablet (250 mg) by mouth 2 times a day. Tiana Juarez MD Taking Active   finasteride (Proscar) 5 mg tablet [id number] Yes TAKE 1 TABLET EVERY DAY   Patient taking differently: Take 1 tablet (5 mg) by mouth once daily.    Jung Tao MD Taking Active   folic acid (Folvite) 1 mg tablet [id number] Yes TAKE 1 TABLET EVERY DAY Jung Tao MD Taking Active   furosemide (Lasix) 40 mg tablet [id number] Yes TAKE 1 TABLET TWICE DAILY Jung Tao MD Taking Active   gabapentin (Neurontin) 100 mg capsule [id number] Yes Take 1 capsule (100 mg) by mouth once daily at bedtime. Jung Tao MD Taking Active   metoprolol succinate XL (Toprol-XL) 25 mg 24 hr tablet [id number] Yes Take 1 tablet (25 mg) by mouth once daily. Do not crush or chew.   Patient taking differently: Take 0.5 tablets (12.5 mg) by mouth 2 times a day. Do not crush or chew.    Jung Tao MD Taking Active   oxygen (O2) gas therapy [id number] Yes Inhale 2 L/min continuously. Historical Provider, MD Taking Active   pantoprazole (ProtoNix) 40 mg EC tablet [id number] Yes TAKE 1 TABLET EVERY DAY Jung Tao MD Taking Active   PARoxetine (Paxil) 20 mg tablet [id number] Yes TAKE 1 TABLET EVERY DAY Jung Tao MD Taking Active   psyllium (Metamucil) 0.4 gram capsule [id number] Yes Take 5 capsules by mouth 4 times a day. Historical Provider, MD Taking Active   psyllium husk, with sugar, 2 gram wafer [id number] No Take 2 capsules by mouth once daily. Mix  and drink with at least 8 ounces of water or juice. Historical Provider, MD Not Taking Active   tamsulosin (Flomax) 0.4 mg 24 hr capsule [id number] Yes Take 2 capsules (0.8 mg) by mouth once daily at bedtime. Hanny Moe MD Taking Active    Patient taking differently:   Discontinued 10/01/24 1358                    DISEASE MANAGEMENT ASSESSMENT:     ANTICOAGULATION ASSESSMENT    The ASCVD Risk score (Naomi DK, et al., 2019) failed to calculate for the following reasons:    The 2019 ASCVD risk score is only valid for ages 40 to 79    Risk score cannot be calculated because patient has a medical history suggesting prior/existing ASCVD    DIAGNOSIS: prevention of nonvalvular atrial fibrilliation stroke and systemic embolism  - Patient is projected to be on anticoagulation long term  - [id number] Score: [4] (only included if diagnosis is atrial fibrillation)   Age: [<65 (0)] [65-74 (+1)] [> 75 (+2)]: 2  Sex: [Male/Female (+1)]: 0  CHF history: [No/Yes(+1)]: 1  Hypertension history: [No/Yes(+1)]: 1  Stroke/TIA/thromboembolism history: [No/Yes(+2)]: 0  Vascular disease history (prior MI, peripheral artery disease, aortic plaque): [No/Yes(+1)]: 0  Diabetes history: [No/Yes(+1)]: 0    CURRENT PHARMACOTHERAPY:   Eliquis 2.5mg twice daily  80yo  104kg  Scr 1.6 (8/13/2024)    RELEVANT PAST MEDICAL HISTORY:   Afib, HTN, HF    Affordability/Accessibility:  PAP  Adherence/Organization: Daughter Rhea puts medicine into his pillbox.  Adverse Reactions: denies s/s of bleeding  Recent Hospitalizations: none  Recent Falls/Trauma: Daughter reports 1 fall- denies hitting his head    EDUCATION/COUNSELING:   - Counseled patient on MOA, expectations, duration of therapy, contraindications, administration, and monitoring parameters  - Counseled patient of side effects that are indicative of bleeding such as dark tarry stool, unexplainable bruising, or vomiting up a coffee ground like substance       DISCUSSION/NOTES:   Patient has  been taking Eliquis now for 1 week, he reports tolerating new medication and doing well on anticoagulation therapy. Patient and Reed report adherence, no adverse effects, and denies s/s of bleeding.   Reed reports Angel had a minor fall onto his side- denies hitting his head. Reed is a nurse, she examined Angel for bruising, she states there was no bruising.    ASSESSMENT AND PLAN:    Assessment/Plan   Problem List Items Addressed This Visit       Paroxysmal atrial fibrillation (Multi)     Patient age, weight and renal function appropriate to continue Eliquis 2.5mg twice daily.          RECOMMENDATIONS/PLAN    CONTINUE  Eliquis 2.5mg twice daily    Last Appnt with Referring Provider: 07/29/2024  Next Appnt with Referring Provider: 10/28/2024  Clinical Pharmacist follow up: 3 months  VAF/Application Expiration: Yes    Date: 09/30/2025  Type of Encounter: Sal Burgess, PharmD    Verbal consent to manage patient's drug therapy was obtained from the patient . They were informed they may decline to participate or withdraw from participation in pharmacy services at any time.    Continue all meds under the continuation of care with the referring provider and clinical pharmacy team.

## 2024-10-19 DIAGNOSIS — N18.32 STAGE 3B CHRONIC KIDNEY DISEASE (MULTI): ICD-10-CM

## 2024-10-19 DIAGNOSIS — N40.0 BPH WITHOUT OBSTRUCTION/LOWER URINARY TRACT SYMPTOMS: ICD-10-CM

## 2024-10-21 ENCOUNTER — APPOINTMENT (OUTPATIENT)
Dept: CARDIOLOGY | Facility: CLINIC | Age: 79
End: 2024-10-21
Payer: MEDICARE

## 2024-10-21 ENCOUNTER — TELEPHONE (OUTPATIENT)
Dept: PRIMARY CARE | Facility: CLINIC | Age: 79
End: 2024-10-21
Payer: MEDICARE

## 2024-10-21 DIAGNOSIS — M19.90 ARTHRITIS: ICD-10-CM

## 2024-10-21 RX ORDER — GABAPENTIN 100 MG/1
100 CAPSULE ORAL NIGHTLY
Qty: 90 CAPSULE | Refills: 1 | Status: SHIPPED | OUTPATIENT
Start: 2024-10-21 | End: 2025-10-21

## 2024-10-21 RX ORDER — GABAPENTIN 100 MG/1
100 CAPSULE ORAL NIGHTLY
Qty: 90 CAPSULE | Refills: 1 | Status: SHIPPED | OUTPATIENT
Start: 2024-10-21 | End: 2024-10-21 | Stop reason: SDUPTHER

## 2024-10-21 RX ORDER — GABAPENTIN 100 MG/1
300 CAPSULE ORAL NIGHTLY
Qty: 90 CAPSULE | Refills: 1 | Status: SHIPPED | OUTPATIENT
Start: 2024-10-21 | End: 2024-10-21 | Stop reason: DRUGHIGH

## 2024-10-28 ENCOUNTER — OFFICE VISIT (OUTPATIENT)
Dept: CARDIOLOGY | Facility: CLINIC | Age: 79
End: 2024-10-28
Payer: MEDICARE

## 2024-10-28 VITALS
WEIGHT: 229.3 LBS | DIASTOLIC BLOOD PRESSURE: 70 MMHG | HEIGHT: 72 IN | HEART RATE: 60 BPM | BODY MASS INDEX: 31.06 KG/M2 | OXYGEN SATURATION: 94 % | SYSTOLIC BLOOD PRESSURE: 124 MMHG

## 2024-10-28 DIAGNOSIS — Z95.0 PACEMAKER: ICD-10-CM

## 2024-10-28 DIAGNOSIS — I25.10 ARTERIOSCLEROTIC CARDIOVASCULAR DISEASE (ASCVD): ICD-10-CM

## 2024-10-28 DIAGNOSIS — I10 BENIGN ESSENTIAL HYPERTENSION: ICD-10-CM

## 2024-10-28 DIAGNOSIS — I48.0 PAROXYSMAL ATRIAL FIBRILLATION (MULTI): Primary | ICD-10-CM

## 2024-10-28 DIAGNOSIS — I48.92 ATRIAL FLUTTER, UNSPECIFIED TYPE (MULTI): ICD-10-CM

## 2024-10-28 DIAGNOSIS — E78.00 PURE HYPERCHOLESTEROLEMIA: ICD-10-CM

## 2024-10-28 PROCEDURE — 1125F AMNT PAIN NOTED PAIN PRSNT: CPT | Performed by: NURSE PRACTITIONER

## 2024-10-28 PROCEDURE — 93010 ELECTROCARDIOGRAM REPORT: CPT | Performed by: INTERNAL MEDICINE

## 2024-10-28 PROCEDURE — 3074F SYST BP LT 130 MM HG: CPT | Performed by: NURSE PRACTITIONER

## 2024-10-28 PROCEDURE — 3078F DIAST BP <80 MM HG: CPT | Performed by: NURSE PRACTITIONER

## 2024-10-28 PROCEDURE — 99214 OFFICE O/P EST MOD 30 MIN: CPT | Performed by: NURSE PRACTITIONER

## 2024-10-28 PROCEDURE — 1157F ADVNC CARE PLAN IN RCRD: CPT | Performed by: NURSE PRACTITIONER

## 2024-10-28 PROCEDURE — 1036F TOBACCO NON-USER: CPT | Performed by: NURSE PRACTITIONER

## 2024-10-28 PROCEDURE — 1159F MED LIST DOCD IN RCRD: CPT | Performed by: NURSE PRACTITIONER

## 2024-10-28 PROCEDURE — 93005 ELECTROCARDIOGRAM TRACING: CPT | Performed by: NURSE PRACTITIONER

## 2024-10-28 PROCEDURE — 1160F RVW MEDS BY RX/DR IN RCRD: CPT | Performed by: NURSE PRACTITIONER

## 2024-10-28 ASSESSMENT — ENCOUNTER SYMPTOMS
OCCASIONAL FEELINGS OF UNSTEADINESS: 1
RESPIRATORY NEGATIVE: 1
LOSS OF SENSATION IN FEET: 0
CARDIOVASCULAR NEGATIVE: 1
DEPRESSION: 0
CONSTITUTIONAL NEGATIVE: 1

## 2024-10-28 ASSESSMENT — PAIN SCALES - GENERAL: PAINLEVEL_OUTOF10: 3

## 2024-10-28 NOTE — PROGRESS NOTES
Angel Carroll is a 78 y.o. male on day 5 of admission presenting with GI bleed.      Subjective   I evaluated the patient after colonoscopy procedure  Patient is sleepy but easy to arouse.  He denies chest pain or shortness of breath.  Patient denied abdominal pain.  Objective     Last Recorded Vitals  /74   Pulse 66   Temp 36.1 °C (97 °F) (Temporal)   Resp 15   Wt 116 kg (256 lb 9.9 oz)   SpO2 96%   Intake/Output last 3 Shifts:    Intake/Output Summary (Last 24 hours) at 3/5/2024 0947  Last data filed at 3/5/2024 0900  Gross per 24 hour   Intake 0 ml   Output 1100 ml   Net -1100 ml         Admission Weight  Weight: 108 kg (237 lb) (02/29/24 0441)    Daily Weight  03/04/24 : 116 kg (256 lb 9.9 oz)    Image Results  Colonoscopy Screening; Average Risk Patient  Table formatting from the original result was not included.  Impression  Large hemorrhoids - given size and no other sources of bleeding   identified, this is the likely etiology of blood loss.   One 10 mm polyp in the cecum; performed cold snare removal  2 subcentimeter polyps were removed with cold snare    Findings  Large hemorrhoids observed during retroflexion; no bleeding was identified  One 10 mm sessile, adenomatous-appearing polyp in the cecum; performed   cold snare with complete removal and retrieved specimen  One flat polyp measuring smaller than 5 mm in the cecum; performed cold   snare with complete removal and retrieved specimen  One 6 mm sessile, adenomatous-appearing polyp in the transverse colon;   performed cold snare with complete removal and retrieved specimen    Recommendation   Await pathology results    No further screening colonoscopies necessary  Resume anticoagulation in 2 days.   Discussed findings with patient's family.   Consider general surgery evaluation for banding of large hemorrhoids. This   can be done as outpatient.   Okay to resume diet.      Indication  Colon cancer screening    Staff  Staff Role   Josh Nassar,  MD Proceduralist     Medications  fentaNYL PF (Sublimaze) injection 100 mcg   midazolam (Versed) injection 3 mg   (Totals for administrations occurring from 0759 to 0859 on 03/05/24)     Preprocedure  A history and physical has been performed, and patient medication   allergies have been reviewed. The patient's tolerance of previous   anesthesia has been reviewed. The risks and benefits of the procedure and   the sedation options and risks were discussed with the patient. All   questions were answered and informed consent obtained.    Details of the Procedure  The patient underwent moderate sedation, which was administered by the   procedural nurse. The patient's blood pressure, ECG, ETCO2, heart rate,   level of consciousness, oxygen and respirations were monitored throughout   the procedure. A digital rectal exam was performed. A perianal exam was   performed. The scope was introduced through the anus and advanced to the   cecum. Retroflexion was performed in the rectum. The quality of bowel   preparation was evaluated using the [place] Bowel Preparation Scale with   scores of: right colon = 3, transverse colon = 3, left colon = 2. The   total BBPS score was 8. Bowel prep was adequate. The patient's estimated   blood loss was minimal (<5 mL). The procedure was not difficult. The   patient tolerated the procedure well. There were no apparent adverse   events. Total intra-service moderate sedation time is 43 minutes.   Medications given by procedural nurse - Fentanyl 100mcg IV, Versed 3mg IV.     Events  Procedure Events   Event Event Time   ENDO SCOPE IN TIME 3/5/2024  8:17 AM   ENDO CECUM REACHED 3/5/2024  8:35 AM   ENDO SCOPE OUT TIME 3/5/2024  8:57 AM     Specimens  ID Type Source Tests Collected by Time   1 :  Tissue COLON -CECUM POLYP SURGICAL PATHOLOGY EXAM Bonnie Lima RN 3/5/2024 0839   2 :  Tissue COLON - TRANSVERSE POLYP SURGICAL PATHOLOGY EXAM Bonnie Lima RN 3/5/2024 0852     Procedure  Location  [place] [address] [place] [id number] [phone]    Referring Provider  No referring provider defined for this encounter.    Procedure Provider  Josh Nassar MD      Physical Exam    General:  pleasant, cooperating during physical exam, on oxygen therapy, sleepy but easy to arouse, talk with a full sentences  HEENT: Pupils are equal and reactive to light and commendation , oral mucosa moist, no JVD   Cardiovascular: Normal sinus rhythm, no MRG.  Lungs: Clear to auscultation bilaterally, no wheezing, no crackles, no dullness to percussion.  Abdomen: No hepatosplenomegaly appreciated, soft , not tender, positive bowel sounds, positive bowel movement.  Neuro: Alert and oriented x2, strength in upper and lower extremities , sensation intact.  Psych: Patient had great insight was going on  Musculoskeletal:  Trace Edema in both lower extremities.  Vascular: Pulses are intact in upper and lower extremities  Skin: No petechiae, ecchymosis or other stigmata for dermatology disease.     Assessment/Plan        Acute Blood Loss Anemia  Secondary to GI bleed  Patient has been transfused during the hospital stay.   Status post EGD  EGD revealed gastritis but no obvious source of bleeding  Status post colonoscopy today.  Colonoscopy revealed small polyp which was narrowed, evidence of hemorrhoids Monitor hemoglobin and hematocrit.  Continue with Protonix  Dr. Nassar on the case  H&H 7.7 and 26.1  Transfuse for hemoglobin less than 7.  Plan to resume anticoagulation on 2 days  Discussed in detail with GI     Acute kidney injury on chronic kidney disease  Secondary to GI bleed  Creatinine proved after IV fluid and blood transfusion  Dr. Capellan on the case  Monitor close  Discussed in detail with nephrology on the case    Atrial fibrillation  -Rate-controlled even with lower metoprolol dose.  Anticoagulation on hold   continue metoprolol and  amiodarone.  Patient and the family aware of risk and benefits not being on anticoagulation    Asymptomatic Bacteriuria  -Continue to monitor off of antibiotics.     Physical Therapy/Occupational Therapy  Discussed in detail with his wife and his daughter present in the room.    CBC and BMP in a.m.      Principal Problem:    ELIZABETH Leyva MD       General Sunscreen Counseling: I recommended a broad spectrum sunscreen with a SPF of 30 or higher.  I explained that SPF 30 sunscreens block approximately 97 percent of the sun's harmful rays.  Sunscreens should be applied at least 15 minutes prior to expected sun exposure and then every 2 hours after that as long as sun exposure continues. If swimming or exercising sunscreen should be reapplied every 45 minutes to an hour after getting wet or sweating.  One ounce, or the equivalent of a shot glass full of sunscreen, is adequate to protect the skin not covered by a bathing suit. I also recommended a lip balm with a sunscreen as well. Sun protective clothing can be used in lieu of sunscreen but must be worn the entire time you are exposed to the sun's rays. Products Recommended: Hat Detail Level: Detailed

## 2024-10-29 ENCOUNTER — PATIENT MESSAGE (OUTPATIENT)
Dept: CARDIOLOGY | Facility: CLINIC | Age: 79
End: 2024-10-29
Payer: MEDICARE

## 2024-10-29 LAB
ATRIAL RATE: 58 BPM
PR INTERVAL: 294 MS
Q ONSET: 201 MS
QRS COUNT: 10 BEATS
QRS DURATION: 154 MS
QT INTERVAL: 524 MS
QTC CALCULATION(BAZETT): 524 MS
QTC FREDERICIA: 524 MS
R AXIS: -84 DEGREES
T AXIS: 95 DEGREES
T OFFSET: 463 MS
VENTRICULAR RATE: 60 BPM

## 2024-11-01 ENCOUNTER — APPOINTMENT (OUTPATIENT)
Dept: PHARMACY | Facility: HOSPITAL | Age: 79
End: 2024-11-01
Payer: MEDICARE

## 2024-11-04 DIAGNOSIS — N40.0 BPH WITHOUT OBSTRUCTION/LOWER URINARY TRACT SYMPTOMS: ICD-10-CM

## 2024-11-04 DIAGNOSIS — I48.20 CHRONIC ATRIAL FIBRILLATION (MULTI): ICD-10-CM

## 2024-11-04 DIAGNOSIS — N18.32 STAGE 3B CHRONIC KIDNEY DISEASE (MULTI): ICD-10-CM

## 2024-11-04 RX ORDER — ERGOCALCIFEROL 1.25 MG/1
50000 CAPSULE ORAL
Qty: 12 CAPSULE | Refills: 2 | Status: SHIPPED | OUTPATIENT
Start: 2024-11-10 | End: 2025-11-10

## 2024-11-04 RX ORDER — FINASTERIDE 5 MG/1
5 TABLET, FILM COATED ORAL DAILY
Qty: 90 TABLET | Refills: 10 | Status: SHIPPED | OUTPATIENT
Start: 2024-11-04

## 2024-11-04 RX ORDER — METOPROLOL SUCCINATE 25 MG/1
25 TABLET, EXTENDED RELEASE ORAL DAILY
Qty: 90 TABLET | Refills: 1 | Status: SHIPPED | OUTPATIENT
Start: 2024-11-04 | End: 2025-11-04

## 2024-11-14 NOTE — PROGRESS NOTES
Subjective      Chief Complaint   Patient presents with    Right Hip - Follow-up        Past Surgical History:   Procedure Laterality Date    BACK SURGERY  04/18/2016    Back Surgery    COLONOSCOPY  07/03/2013    Complete Colonoscopy    CORONARY ANGIOPLASTY WITH STENT PLACEMENT  08/18/2020    Cath Stent Placement    CT ANGIO NECK  9/25/2021    CT NECK ANGIO W AND WO IV CONTRAST 9/25/2021 [facility]ICAL [facility]GAC[facility]    CT HEAD ANGIO W AND WO IV CONTRAST  9/25/2021    CT HEAD ANGIO W AND WO IV CONTRAST 9/25/2021 [facility] CL[facility]ICAL [facility]GAC[facility]    EYE SURGERY  04/03/2013    Eye Surgery    KNEE ARTHROSCOPY W/ DEBRIDEMENT  07/03/2013    Arthroscopy Knee Right    KNEE ARTHROSCOPY W/ DEBRIDEMENT  07/03/2013    Arthroscopy Knee Left    LITHOTRIPSY  07/22/2013    Renal Lithotripsy    MR HEAD ANGIO WO IV CONTRAST  3/23/2021    MR HEAD ANGIO WO IV CONTRAST [facility]    MR NECK ANGIO WO IV CONTRAST  3/23/2021    MR NECK ANGIO WO IV CONTRAST [facility]    OTHER SURGICAL HISTORY  04/03/2013    Cardiac Cath Procedure Outcome: Successful    OTHER SURGICAL HISTORY  04/03/2013    Neuroplasty With Transposition Of Ulnar Nerve - At Elbow    OTHER SURGICAL HISTORY  04/03/2013    General Surgery    OTHER SURGICAL HISTORY  06/11/2019    Knee replacement    OTHER SURGICAL HISTORY  07/22/2013    Cystoscopy With Insertion Of Ureteral Stent Bilateral    OTHER SURGICAL HISTORY  07/03/2013    Wrist Carpectomy    OTHER SURGICAL HISTORY  07/03/2013    Brain Surgery    OTHER SURGICAL HISTORY  07/03/2013    Ulnar Osteotomy    TONSILLECTOMY  04/03/2013    Tonsillectomy    TOTAL KNEE ARTHROPLASTY  08/03/2016    Total Knee Arthroplasty        Past Medical History:   Diagnosis Date    Atherosclerotic heart disease of native coronary artery without angina pectoris 12/14/2022    Arteriosclerotic cardiovascular disease (ASCVD)    Benign neoplasm of meninges, unspecified     Meningioma    Benign prostatic hyperplasia without lower urinary tract symptoms  02/28/2022    BPH without obstruction/lower urinary tract symptoms    Body mass index (BMI) 33.0-33.9, adult 07/02/2021    BMI 33.0-33.9,adult    Body mass index (BMI) 34.0-34.9, adult 11/09/2021    BMI 34.0-34.9,adult    Body mass index (BMI) 34.0-34.9, adult 09/08/2021    BMI 34.0-34.9,adult    Body mass index (BMI) 35.0-35.9, adult 12/29/2021    BMI 35.0-35.9,adult    Body mass index (BMI) 35.0-35.9, adult 02/28/2022    BMI 35.0-35.9,adult    Elevated blood-pressure reading, without diagnosis of hypertension     Prehypertension    Encounter for immunization 10/27/2015    Need for prophylactic vaccination and inoculation against influenza    Hyperlipidemia, unspecified 11/15/2021    Hyperlipidemia    Other conditions influencing health status     Nephrolithiasis    Pacemaker     Personal history of other diseases of the musculoskeletal system and connective tissue     History of tendinitis    Personal history of other drug therapy     History of influenza vaccination    Personal history of other specified conditions 10/25/2022    History of bradycardia    Rectal bleeding     Vitamin D deficiency, unspecified 10/08/2020    Vitamin D deficiency        HPI  This 79 year old patient presents today with right hip pain (8/10). The patient states that this right hip pain has been worsening and persistent for months. The patient denies trauma or injury to the right hip. The patient states that the right hip pain is worse with and aggravated by prolonged walking and standing. He is seen today sitting in a wheelchair. He is homebound and unable to drive. The patient states that this right hip pain impairs their ability to complete normal activities of daily living. The patient has tried tylenol and ibuprofen with no relief.    Allergies   Allergen Reactions    Other Unknown     Anti-Inflammatories    Ranolazine Unknown    Cyclobenzaprine Hallucinations, Unknown and Psychosis      Body mass index is 30.52 kg/m².      ROS  CARDIOLOGY:   Negative for chest pain, shortness of breath.   RESPIRATORY:   Negative for chest pain, shortness of breath.   MUSCULOSKELETAL:   See HPI for details.   NEUROLOGY:   Negative for tingling, numbness, weakness.    Objective      Body mass index is 30.52 kg/m².     Physical Exam  GENERAL:          General Appearance:  This is a pleasant patient with appropriate affect, in no acute distress.   DERMATOLOGY:          Skin: skin at the neck, upper and lower back, and trunk is intact. There is no evidence of skin rash, skin breakdown or ulceration, or atrophic skin change.   EXTREMITIES:          Vascular:  Right, left hands and feet are warm with good color and pulses. Right and left calf and thigh are nontender and nonswollen.   NEUROLOGICAL:          Orientation:  Patient is alert and oriented to person, place, time and situation. Right and left upper and lower extremity motor and sensory examinations are intact.  MUSCULOSKELETAL: Neck: No tenderness. No pain or limitation with range of motion. Back: No tenderness. Straight leg test negative bilaterally. Left hip: nontender. No pain or limitation with ROM. Right hip: There is not tenderness at the greater trochanter. There is pain with gentle ROM in flexion and extension at the hip. There is pain with external rotation and abduction. right lower extremity is in good position. Nontender at the calf. Neurovascular is intact. The patient is seen walking today walking with a painful gait.    ECG 12 lead (Clinic Performed)    Result Date: 10/29/2024  AV paced rhythm Confirmed by Doc Martin ([id number]) on 10/29/2024 9:31:25 AM       AP and lateral x-rays of the right hip done and read in office today show osteoarthritis of the right hip.     Angel was seen today for follow-up.  Diagnoses and all orders for this visit:  Right hip pain (Primary)  Primary osteoarthritis of right hip  Options are discussed with the patient in detail. The patient is given a  prescription for home physical therapy to evaluate and treat with gentle strengthening and ROM exercises with modalities as needed. The patient is instructed regarding activity modification and risk for further injury with falling or trauma, ice, provider directed at home gentle strengthening and ROM exercises, and the appropriate use of Tylenol as needed for pain with its potential adverse reactions and side effects. The patient understands. Return as needed. Please note that this report has been produced using speech recognition software.  It may contain errors related to grammar, punctuation or spelling.  Electronically signed, but not reviewed.    Tana Bates PA-C

## 2024-11-15 ENCOUNTER — HOSPITAL ENCOUNTER (OUTPATIENT)
Dept: RADIOLOGY | Facility: CLINIC | Age: 79
Discharge: HOME | End: 2024-11-15
Payer: MEDICARE

## 2024-11-15 ENCOUNTER — OFFICE VISIT (OUTPATIENT)
Dept: ORTHOPEDIC SURGERY | Facility: CLINIC | Age: 79
End: 2024-11-15
Payer: MEDICARE

## 2024-11-15 ENCOUNTER — HOME HEALTH ADMISSION (OUTPATIENT)
Dept: HOME HEALTH SERVICES | Facility: HOME HEALTH | Age: 79
End: 2024-11-15
Payer: MEDICARE

## 2024-11-15 ENCOUNTER — DOCUMENTATION (OUTPATIENT)
Dept: HOME HEALTH SERVICES | Facility: HOME HEALTH | Age: 79
End: 2024-11-15

## 2024-11-15 VITALS — HEIGHT: 72 IN | WEIGHT: 225 LBS | BODY MASS INDEX: 30.48 KG/M2

## 2024-11-15 DIAGNOSIS — M16.11 PRIMARY OSTEOARTHRITIS OF RIGHT HIP: ICD-10-CM

## 2024-11-15 DIAGNOSIS — M25.551 RIGHT HIP PAIN: Primary | ICD-10-CM

## 2024-11-15 DIAGNOSIS — R52 PAIN: ICD-10-CM

## 2024-11-15 PROCEDURE — 1159F MED LIST DOCD IN RCRD: CPT | Performed by: PHYSICIAN ASSISTANT

## 2024-11-15 PROCEDURE — 1157F ADVNC CARE PLAN IN RCRD: CPT | Performed by: PHYSICIAN ASSISTANT

## 2024-11-15 PROCEDURE — 99213 OFFICE O/P EST LOW 20 MIN: CPT | Performed by: PHYSICIAN ASSISTANT

## 2024-11-15 PROCEDURE — 1160F RVW MEDS BY RX/DR IN RCRD: CPT | Performed by: PHYSICIAN ASSISTANT

## 2024-11-15 PROCEDURE — 1125F AMNT PAIN NOTED PAIN PRSNT: CPT | Performed by: PHYSICIAN ASSISTANT

## 2024-11-15 PROCEDURE — 1036F TOBACCO NON-USER: CPT | Performed by: PHYSICIAN ASSISTANT

## 2024-11-15 PROCEDURE — 73502 X-RAY EXAM HIP UNI 2-3 VIEWS: CPT | Mod: RT

## 2024-11-15 ASSESSMENT — LIFESTYLE VARIABLES
HOW MANY STANDARD DRINKS CONTAINING ALCOHOL DO YOU HAVE ON A TYPICAL DAY: PATIENT DOES NOT DRINK
HOW OFTEN DURING THE LAST YEAR HAVE YOU NEEDED AN ALCOHOLIC DRINK FIRST THING IN THE MORNING TO GET YOURSELF GOING AFTER A NIGHT OF HEAVY DRINKING: NEVER
HOW OFTEN DO YOU HAVE SIX OR MORE DRINKS ON ONE OCCASION: NEVER
HAVE YOU OR SOMEONE ELSE BEEN INJURED AS A RESULT OF YOUR DRINKING: NO
AUDIT TOTAL SCORE: 0
AUDIT-C TOTAL SCORE: 0
HOW OFTEN DURING THE LAST YEAR HAVE YOU FAILED TO DO WHAT WAS NORMALLY EXPECTED FROM YOU BECAUSE OF DRINKING: NEVER
HAS A RELATIVE, FRIEND, DOCTOR, OR ANOTHER HEALTH PROFESSIONAL EXPRESSED CONCERN ABOUT YOUR DRINKING OR SUGGESTED YOU CUT DOWN: NO
SKIP TO QUESTIONS 9-10: 1
HOW OFTEN DURING THE LAST YEAR HAVE YOU HAD A FEELING OF GUILT OR REMORSE AFTER DRINKING: NEVER
HOW OFTEN DO YOU HAVE A DRINK CONTAINING ALCOHOL: NEVER
HOW OFTEN DURING THE LAST YEAR HAVE YOU FOUND THAT YOU WERE NOT ABLE TO STOP DRINKING ONCE YOU HAD STARTED: NEVER
HOW OFTEN DURING THE LAST YEAR HAVE YOU BEEN UNABLE TO REMEMBER WHAT HAPPENED THE NIGHT BEFORE BECAUSE YOU HAD BEEN DRINKING: NEVER

## 2024-11-15 ASSESSMENT — PAIN SCALES - GENERAL
PAINLEVEL_OUTOF10: 4
PAINLEVEL_OUTOF10: 4

## 2024-11-15 ASSESSMENT — ENCOUNTER SYMPTOMS
DEPRESSION: 0
LOSS OF SENSATION IN FEET: 0
OCCASIONAL FEELINGS OF UNSTEADINESS: 1

## 2024-11-15 ASSESSMENT — PAIN - FUNCTIONAL ASSESSMENT: PAIN_FUNCTIONAL_ASSESSMENT: 0-10

## 2024-11-15 ASSESSMENT — PAIN DESCRIPTION - DESCRIPTORS: DESCRIPTORS: ACHING

## 2024-11-15 NOTE — PATIENT INSTRUCTIONS
LOV 11/4/19   Thank you for coming to see us today!     We are going to give you a referral for home physical therapy   Please call central scheduling to make this appointment.     Please follow up with us as needed

## 2024-11-15 NOTE — HH CARE COORDINATION
Home Care received a Referral for Physical Therapy. We have processed the referral for a Start of Care on 11/18-11/19.     If you have any questions or concerns, please feel free to contact us at [phone]. Follow the prompts, enter your five digit zip code, and you will be directed to your care team on [facility].

## 2024-11-16 DIAGNOSIS — I25.10 ARTERIOSCLEROTIC CARDIOVASCULAR DISEASE (ASCVD): ICD-10-CM

## 2024-11-18 ENCOUNTER — PATIENT OUTREACH (OUTPATIENT)
Dept: PRIMARY CARE | Facility: CLINIC | Age: 79
End: 2024-11-18
Payer: MEDICARE

## 2024-11-18 RX ORDER — ATORVASTATIN CALCIUM 20 MG/1
TABLET, FILM COATED ORAL
Qty: 90 TABLET | Refills: 3 | Status: SHIPPED | OUTPATIENT
Start: 2024-11-18

## 2024-11-19 ENCOUNTER — HOME CARE VISIT (OUTPATIENT)
Dept: HOME HEALTH SERVICES | Facility: HOME HEALTH | Age: 79
End: 2024-11-19
Payer: MEDICARE

## 2024-11-19 VITALS
TEMPERATURE: 97.4 F | SYSTOLIC BLOOD PRESSURE: 112 MMHG | DIASTOLIC BLOOD PRESSURE: 62 MMHG | OXYGEN SATURATION: 96 % | HEART RATE: 60 BPM

## 2024-11-19 PROCEDURE — G0151 HHCP-SERV OF PT,EA 15 MIN: HCPCS | Mod: HHH

## 2024-11-19 PROCEDURE — 169592 NO-PAY CLAIM PROCEDURE

## 2024-11-19 SDOH — HEALTH STABILITY: PHYSICAL HEALTH: EXERCISE COMMENTS: SUPINE HEEL SLIDES  SITTING MARCHING WITH ORANGE T BAND. HIP ABD WITHOUT BAND DT DISCOMFORT.

## 2024-11-19 SDOH — ECONOMIC STABILITY: HOUSING INSECURITY: EVIDENCE OF SMOKING MATERIAL: 0

## 2024-11-19 SDOH — HEALTH STABILITY: MENTAL HEALTH: SMOKING IN HOME: 0

## 2024-11-19 ASSESSMENT — ENCOUNTER SYMPTOMS
PAIN LOCATION - EXACERBATING FACTORS: WALKING
OCCASIONAL FEELINGS OF UNSTEADINESS: 1
PERSON REPORTING PAIN: PATIENT
PAIN LOCATION: RIGHT HIP
LOWEST PAIN SEVERITY IN PAST 24 HOURS: 0/10
HIGHEST PAIN SEVERITY IN PAST 24 HOURS: 5/10
SHORTNESS OF BREATH: T
LOSS OF SENSATION IN FEET: 0
PAIN: 1
PAIN LOCATION - PAIN SEVERITY: 0/10
DEPRESSION: 0

## 2024-11-19 ASSESSMENT — ACTIVITIES OF DAILY LIVING (ADL)
AMBULATION_DISTANCE/DURATION_TOLERATED: 60 FEET
ENTERING_EXITING_HOME: STAND BY ASSIST

## 2024-11-22 ASSESSMENT — ACTIVITIES OF DAILY LIVING (ADL): OASIS_M1830: 03

## 2024-11-25 ENCOUNTER — HOME CARE VISIT (OUTPATIENT)
Dept: HOME HEALTH SERVICES | Facility: HOME HEALTH | Age: 79
End: 2024-11-25
Payer: MEDICARE

## 2024-11-25 VITALS — SYSTOLIC BLOOD PRESSURE: 120 MMHG | HEART RATE: 60 BPM | TEMPERATURE: 97.7 F | DIASTOLIC BLOOD PRESSURE: 58 MMHG

## 2024-11-25 PROCEDURE — G0151 HHCP-SERV OF PT,EA 15 MIN: HCPCS | Mod: HHH

## 2024-11-25 ASSESSMENT — ENCOUNTER SYMPTOMS
PERSON REPORTING PAIN: PATIENT
PAIN: 1
PAIN LOCATION - PAIN SEVERITY: 3/10
LOWEST PAIN SEVERITY IN PAST 24 HOURS: 0/10
PAIN LOCATION: RIGHT HIP
PAIN LOCATION - EXACERBATING FACTORS: MORNINGS

## 2024-11-26 ENCOUNTER — APPOINTMENT (OUTPATIENT)
Dept: NEPHROLOGY | Facility: CLINIC | Age: 79
End: 2024-11-26
Payer: MEDICARE

## 2024-12-03 ENCOUNTER — HOME CARE VISIT (OUTPATIENT)
Dept: HOME HEALTH SERVICES | Facility: HOME HEALTH | Age: 79
End: 2024-12-03
Payer: MEDICARE

## 2024-12-03 VITALS — SYSTOLIC BLOOD PRESSURE: 128 MMHG | TEMPERATURE: 97.7 F | DIASTOLIC BLOOD PRESSURE: 68 MMHG | OXYGEN SATURATION: 97 %

## 2024-12-03 PROCEDURE — G0151 HHCP-SERV OF PT,EA 15 MIN: HCPCS | Mod: HHH

## 2024-12-03 ASSESSMENT — ENCOUNTER SYMPTOMS
PAIN: 1
LOWEST PAIN SEVERITY IN PAST 24 HOURS: 0/10
HIGHEST PAIN SEVERITY IN PAST 24 HOURS: 5/10
SUBJECTIVE PAIN PROGRESSION: UNCHANGED
PAIN LOCATION: RIGHT HIP
PERSON REPORTING PAIN: PATIENT

## 2024-12-06 ENCOUNTER — APPOINTMENT (OUTPATIENT)
Facility: CLINIC | Age: 79
End: 2024-12-06
Payer: MEDICARE

## 2024-12-06 VITALS
SYSTOLIC BLOOD PRESSURE: 93 MMHG | HEART RATE: 60 BPM | BODY MASS INDEX: 29.27 KG/M2 | RESPIRATION RATE: 14 BRPM | WEIGHT: 216.1 LBS | HEIGHT: 72 IN | DIASTOLIC BLOOD PRESSURE: 46 MMHG

## 2024-12-06 DIAGNOSIS — N18.32 CHRONIC KIDNEY DISEASE, STAGE 3B (MULTI): ICD-10-CM

## 2024-12-06 DIAGNOSIS — I48.20 CHRONIC ATRIAL FIBRILLATION (MULTI): ICD-10-CM

## 2024-12-06 PROCEDURE — 1157F ADVNC CARE PLAN IN RCRD: CPT | Performed by: INTERNAL MEDICINE

## 2024-12-06 PROCEDURE — 1160F RVW MEDS BY RX/DR IN RCRD: CPT | Performed by: INTERNAL MEDICINE

## 2024-12-06 PROCEDURE — 1159F MED LIST DOCD IN RCRD: CPT | Performed by: INTERNAL MEDICINE

## 2024-12-06 PROCEDURE — 99213 OFFICE O/P EST LOW 20 MIN: CPT | Performed by: INTERNAL MEDICINE

## 2024-12-06 PROCEDURE — 3078F DIAST BP <80 MM HG: CPT | Performed by: INTERNAL MEDICINE

## 2024-12-06 PROCEDURE — 3074F SYST BP LT 130 MM HG: CPT | Performed by: INTERNAL MEDICINE

## 2024-12-06 RX ORDER — FINASTERIDE 5 MG/1
5 TABLET, FILM COATED ORAL DAILY
Qty: 90 TABLET | Refills: 3 | OUTPATIENT
Start: 2024-12-06

## 2024-12-06 RX ORDER — ERGOCALCIFEROL 1.25 MG/1
CAPSULE ORAL
Qty: 12 CAPSULE | Refills: 3 | OUTPATIENT
Start: 2024-12-06

## 2024-12-06 RX ORDER — FUROSEMIDE 40 MG/1
20 TABLET ORAL 2 TIMES DAILY
Qty: 180 TABLET | Refills: 1 | Status: SHIPPED | OUTPATIENT
Start: 2024-12-06

## 2024-12-06 NOTE — PROGRESS NOTES
Chief Complaint: Follow up CKD    Good and bad days  Weak, dyspnea,   Concern for passing stone with pain  Unsteady  Denies nausea, vomiting, chest pain  Difficulty emptying bladder; dripping instead of steady stream, occ dysuria    NAD, walker  O2 per NC, frail  Sclera AI s inj  MMM, no sores  Swelling much improved, tr LE edema  No tremor  No rash    CKD stage 3b  HTN at goal  Proteinuria subnephrotic, much improved     Change to lasix 20 mg twice daily  Labs and follow up in 3 months  Fol up with urology for prostatic symptoms

## 2024-12-10 ENCOUNTER — HOME CARE VISIT (OUTPATIENT)
Dept: HOME HEALTH SERVICES | Facility: HOME HEALTH | Age: 79
End: 2024-12-10
Payer: MEDICARE

## 2024-12-10 VITALS
HEART RATE: 60 BPM | TEMPERATURE: 97.8 F | SYSTOLIC BLOOD PRESSURE: 120 MMHG | DIASTOLIC BLOOD PRESSURE: 66 MMHG | OXYGEN SATURATION: 96 %

## 2024-12-10 PROCEDURE — G0151 HHCP-SERV OF PT,EA 15 MIN: HCPCS | Mod: HHH

## 2024-12-10 ASSESSMENT — ENCOUNTER SYMPTOMS
SUBJECTIVE PAIN PROGRESSION: UNCHANGED
HIGHEST PAIN SEVERITY IN PAST 24 HOURS: 5/10
PAIN LOCATION - PAIN SEVERITY: 0/10
LOWEST PAIN SEVERITY IN PAST 24 HOURS: 0/10
PAIN: 1
PAIN LOCATION: RIGHT HIP

## 2024-12-10 ASSESSMENT — ACTIVITIES OF DAILY LIVING (ADL)
OASIS_M1830: 01
HOME_HEALTH_OASIS: 01

## 2024-12-24 ENCOUNTER — PHARMACY VISIT (OUTPATIENT)
Dept: PHARMACY | Facility: CLINIC | Age: 79
End: 2024-12-24
Payer: COMMERCIAL

## 2024-12-24 PROCEDURE — RXMED WILLOW AMBULATORY MEDICATION CHARGE

## 2025-01-15 ENCOUNTER — APPOINTMENT (OUTPATIENT)
Dept: UROLOGY | Facility: CLINIC | Age: 80
End: 2025-01-15
Payer: MEDICARE

## 2025-01-15 VITALS — TEMPERATURE: 97.1 F

## 2025-01-15 DIAGNOSIS — N20.0 KIDNEY STONES: ICD-10-CM

## 2025-01-15 DIAGNOSIS — N20.0 KIDNEY STONE: ICD-10-CM

## 2025-01-15 DIAGNOSIS — N40.0 BPH WITHOUT OBSTRUCTION/LOWER URINARY TRACT SYMPTOMS: Primary | ICD-10-CM

## 2025-01-15 DIAGNOSIS — R31.0 GROSS HEMATURIA: ICD-10-CM

## 2025-01-15 LAB
POC APPEARANCE, URINE: ABNORMAL
POC BILIRUBIN, URINE: NEGATIVE
POC BLOOD, URINE: ABNORMAL
POC COLOR, URINE: YELLOW
POC GLUCOSE, URINE: NEGATIVE MG/DL
POC KETONES, URINE: ABNORMAL MG/DL
POC LEUKOCYTES, URINE: ABNORMAL
POC NITRITE,URINE: NEGATIVE
POC PH, URINE: 7.5 PH
POC PROTEIN, URINE: ABNORMAL MG/DL
POC SPECIFIC GRAVITY, URINE: 1.02
POC UROBILINOGEN, URINE: 1 EU/DL

## 2025-01-15 PROCEDURE — 81001 URINALYSIS AUTO W/SCOPE: CPT

## 2025-01-15 PROCEDURE — 99213 OFFICE O/P EST LOW 20 MIN: CPT | Performed by: NURSE PRACTITIONER

## 2025-01-15 PROCEDURE — 1036F TOBACCO NON-USER: CPT | Performed by: NURSE PRACTITIONER

## 2025-01-15 PROCEDURE — 1157F ADVNC CARE PLAN IN RCRD: CPT | Performed by: NURSE PRACTITIONER

## 2025-01-15 PROCEDURE — 87186 SC STD MICRODIL/AGAR DIL: CPT

## 2025-01-15 PROCEDURE — 1159F MED LIST DOCD IN RCRD: CPT | Performed by: NURSE PRACTITIONER

## 2025-01-15 PROCEDURE — 1160F RVW MEDS BY RX/DR IN RCRD: CPT | Performed by: NURSE PRACTITIONER

## 2025-01-15 PROCEDURE — 87086 URINE CULTURE/COLONY COUNT: CPT

## 2025-01-15 PROCEDURE — G2211 COMPLEX E/M VISIT ADD ON: HCPCS | Performed by: NURSE PRACTITIONER

## 2025-01-15 PROCEDURE — 81003 URINALYSIS AUTO W/O SCOPE: CPT | Performed by: NURSE PRACTITIONER

## 2025-01-15 RX ORDER — FINASTERIDE 5 MG/1
5 TABLET, FILM COATED ORAL DAILY
Qty: 90 TABLET | Refills: 3 | Status: SHIPPED | OUTPATIENT
Start: 2025-01-15

## 2025-01-15 NOTE — PROGRESS NOTES
[facility]  Outpatient Clinic Note    Subjective   Angel Carroll is a 79 y.o. male    History of Present Illness   Patient presenting today with his daughter for FUV. History of nephrolithiasis s/p right ULLS on 02/11/2016, gross hematuria with nephrolithiasis found on workup and BPH. Taking Tamsulosin 0.8 mg daily. Patient complaint of urinary frequency. NTF 2 to 3x  He endorses occasional gross hematuria. No dysuria, fevers, n/v, or flank pain.     CT A&P from 11/15/2023 which shows generalized wall thickening and stranding of the perivesical fat with underlying cystitis of concern. Right kidney demonstrates nonobstructing nephrolithiasis in the inferior pole measuring 9 mm. Left kidney demonstrates nonobstructing nephrolithiasis with a few  stones demonstrated largest measuring 4 mm in the midpole posteriorly. No   hydronephrosis.     3/18/2024 Cystoscopy with Dr. Moe: IMPRESSION:  Large distended bladder, no mass or lesions. No obstructing median lobe, lateral hypertrophy and good sphincter coaptation.    I reviewed CT Pelvis wo Contrast from 8/8/2024  There is a 6 mm calculus along the right side at the base of the  urinary bladder.  The prostate measures 5.1 cm in transverse diameter.       ml     Past Medical History and Surgical History   Past Medical History:   Diagnosis Date    Atherosclerotic heart disease of native coronary artery without angina pectoris 12/14/2022    Arteriosclerotic cardiovascular disease (ASCVD)    Benign neoplasm of meninges, unspecified     Meningioma    Benign prostatic hyperplasia without lower urinary tract symptoms 02/28/2022    BPH without obstruction/lower urinary tract symptoms    Body mass index (BMI) 33.0-33.9, adult 07/02/2021    BMI 33.0-33.9,adult    Body mass index (BMI) 34.0-34.9, adult 11/09/2021    BMI 34.0-34.9,adult    Body mass index (BMI) 34.0-34.9, adult 09/08/2021    BMI 34.0-34.9,adult    Body mass index (BMI) 35.0-35.9, adult 12/29/2021    BMI  35.0-35.9,adult    Body mass index (BMI) 35.0-35.9, adult 02/28/2022    BMI 35.0-35.9,adult    Elevated blood-pressure reading, without diagnosis of hypertension     Prehypertension    Encounter for immunization 10/27/2015    Need for prophylactic vaccination and inoculation against influenza    Hyperlipidemia, unspecified 11/15/2021    Hyperlipidemia    Other conditions influencing health status     Nephrolithiasis    Pacemaker     Personal history of other diseases of the musculoskeletal system and connective tissue     History of tendinitis    Personal history of other drug therapy     History of influenza vaccination    Personal history of other specified conditions 10/25/2022    History of bradycardia    Rectal bleeding     Vitamin D deficiency, unspecified 10/08/2020    Vitamin D deficiency     Past Surgical History:   Procedure Laterality Date    BACK SURGERY  04/18/2016    Back Surgery    COLONOSCOPY  07/03/2013    Complete Colonoscopy    CORONARY ANGIOPLASTY WITH STENT PLACEMENT  08/18/2020    Cath Stent Placement    CT ANGIO NECK  9/25/2021    CT NECK ANGIO W AND WO IV CONTRAST 9/25/2021 [facility] CLINICAL LEGACY    CT HEAD ANGIO W AND WO IV CONTRAST  9/25/2021    CT HEAD ANGIO W AND WO IV CONTRAST 9/25/2021 [facility] CLINICAL LEGACY    EYE SURGERY  04/03/2013    Eye Surgery    KNEE ARTHROSCOPY W/ DEBRIDEMENT  07/03/2013    Arthroscopy Knee Right    KNEE ARTHROSCOPY W/ DEBRIDEMENT  07/03/2013    Arthroscopy Knee Left    LITHOTRIPSY  07/22/2013    Renal Lithotripsy    MR HEAD ANGIO WO IV CONTRAST  3/23/2021    MR HEAD ANGIO WO IV CONTRAST [facility]    MR NECK ANGIO WO IV CONTRAST  3/23/2021    MR NECK ANGIO WO IV CONTRAST [facility]    OTHER SURGICAL HISTORY  04/03/2013    Cardiac Cath Procedure Outcome: Successful    OTHER SURGICAL HISTORY  04/03/2013    Neuroplasty With Transposition Of Ulnar Nerve - At Elbow    OTHER SURGICAL HISTORY  04/03/2013    General Surgery    OTHER SURGICAL HISTORY   06/11/2019    Knee replacement    OTHER SURGICAL HISTORY  07/22/2013    Cystoscopy With Insertion Of Ureteral Stent Bilateral    OTHER SURGICAL HISTORY  07/03/2013    Wrist Carpectomy    OTHER SURGICAL HISTORY  07/03/2013    Brain Surgery    OTHER SURGICAL HISTORY  07/03/2013    Ulnar Osteotomy    TONSILLECTOMY  04/03/2013    Tonsillectomy    TOTAL KNEE ARTHROPLASTY  08/03/2016    Total Knee Arthroplasty       Medications  Current Outpatient Medications on File Prior to Visit   Medication Sig Dispense Refill    acetaminophen (Tylenol) 325 mg tablet Take 1 tablet (325 mg) by mouth every 6 hours if needed for mild pain (1 - 3).      amiodarone (Pacerone) 200 mg tablet Take 1 tablet (200 mg) by mouth once daily. 90 tablet 3    apixaban (Eliquis) 2.5 mg tablet Take 1 tablet (2.5 mg) by mouth 2 times a day. 180 tablet 3    atorvastatin (Lipitor) 20 mg tablet TAKE 1 TABLET EVERY DAY 90 tablet 3    cyanocobalamin (Vitamin B-12) 500 mcg tablet Take 1 tablet (500 mcg) by mouth once daily.      ergocalciferol (Vitamin D-2) 1.25 MG (24304 UT) capsule Take 1 capsule (50,000 Units) by mouth 1 (one) time per week. 12 capsule 2    fenofibrate (Tricor) 48 mg tablet TAKE 1 AND 1/2 TABLETS ONE TIME DAILY 135 tablet 3    ferrous sulfate 250 mg (50 mg iron) tablet extended release Take 1 tablet (250 mg) by mouth 2 times a day. (Patient taking differently: Take 325 mg by mouth 2 times a day.) 180 tablet 3    finasteride (Proscar) 5 mg tablet Take 1 tablet (5 mg) by mouth once daily. 90 tablet 10    folic acid (Folvite) 1 mg tablet TAKE 1 TABLET EVERY DAY 90 tablet 3    furosemide (Lasix) 40 mg tablet Take 0.5 tablets (20 mg) by mouth 2 times a day. 180 tablet 1    gabapentin (Neurontin) 100 mg capsule Take 1 capsule (100 mg) by mouth once daily at bedtime. 90 capsule 1    metoprolol succinate XL (Toprol-XL) 25 mg 24 hr tablet Take 1 tablet (25 mg) by mouth once daily. Do not crush or chew. 90 tablet 1    oxygen (O2) gas therapy Inhale 2  L/min continuously.      pantoprazole (ProtoNix) 40 mg EC tablet TAKE 1 TABLET EVERY DAY 90 tablet 3    pantoprazole (Protonix) 40 mg EC tablet Take 1 tablet (40 mg) by mouth once daily in the morning. Take before meals.      PARoxetine (Paxil) 20 mg tablet TAKE 1 TABLET EVERY DAY 90 tablet 3    psyllium (Metamucil) 0.4 gram capsule Take 2 capsules by mouth 2 times a day.      psyllium husk, with sugar, 2 gram wafer Take 2 capsules by mouth once daily. Mix and drink with at least 8 ounces of water or juice. (Patient not taking: Reported on 12/6/2024)      tamsulosin (Flomax) 0.4 mg 24 hr capsule Take 2 capsules (0.8 mg) by mouth once daily at bedtime. 180 capsule 3     No current facility-administered medications on file prior to visit.       Objective   Physicial Exam  General: Well developed, well nourished, alert and cooperative, appears in no acute distress  Eyes: Non-injected conjunctiva, sclera clear, no proptosis  Cardiac: Extremities are warm and well perfused. No edema, cyanosis or pallor.   Lungs: Breathing is easy, non-labored. Speaking in clear and complete sentences. Normal diaphragmatic movement.  MSK: Ambulatory with steady gait, unassisted  Neuro: alert and oriented to person, place and time  Psych: Demonstrates good judgement and reason, without hallucinations, abnormal affect or abnormal behaviors.  Skin: no obvious lesions, no rashes.    No visits with results within 30 Day(s) from this visit.   Latest known visit with results is:   Office Visit on 10/28/2024   Component Date Value Ref Range Status    Ventricular Rate 10/28/2024 60  BPM Final    Atrial Rate 10/28/2024 58  BPM Final    MS Interval 10/28/2024 294  ms Final    QRS Duration 10/28/2024 154  ms Final    QT Interval 10/28/2024 524  ms Final    QTC Calculation(Bazett) 10/28/2024 524  ms Final    R Axis 10/28/2024 -84  degrees Final    T Axis 10/28/2024 95  degrees Final    QRS Count 10/28/2024 10  beats Final    Q Onset 10/28/2024 201  ms  Final    T Offset 10/28/2024 463  ms Final    QTC Fredericia 10/28/2024 524  ms Final      Review of Systems  All other systems have been reviewed and are negative for complaint.      Assessment and Plan     - Gross Hematuria  Sending Urine for Culture will call with results     All questions and concerns were addressed. Patient verbalizes understanding and has no other questions at this time. If you have any questions about your care, do not hesitate to call and leave a message, we return calls in a timely manner.    Lety Avalos-- SOFY VELASQUEZ  Office Phone:  [phone]

## 2025-01-16 LAB
APPEARANCE UR: ABNORMAL
BILIRUB UR STRIP.AUTO-MCNC: NEGATIVE MG/DL
COLOR UR: ABNORMAL
GLUCOSE UR STRIP.AUTO-MCNC: NORMAL MG/DL
KETONES UR STRIP.AUTO-MCNC: NEGATIVE MG/DL
LEUKOCYTE ESTERASE UR QL STRIP.AUTO: ABNORMAL
MUCOUS THREADS #/AREA URNS AUTO: ABNORMAL /LPF
NITRITE UR QL STRIP.AUTO: NEGATIVE
PH UR STRIP.AUTO: 8.5 [PH]
PROT UR STRIP.AUTO-MCNC: ABNORMAL MG/DL
RBC # UR STRIP.AUTO: ABNORMAL /UL
RBC #/AREA URNS AUTO: ABNORMAL /HPF
SP GR UR STRIP.AUTO: 1.01
SQUAMOUS #/AREA URNS AUTO: ABNORMAL /HPF
TRI-PHOS CRY #/AREA UR COMP ASSIST: ABNORMAL /HPF
UROBILINOGEN UR STRIP.AUTO-MCNC: NORMAL MG/DL
WBC #/AREA URNS AUTO: ABNORMAL /HPF

## 2025-01-17 DIAGNOSIS — N30.01 ACUTE CYSTITIS WITH HEMATURIA: ICD-10-CM

## 2025-01-17 DIAGNOSIS — I48.11 LONGSTANDING PERSISTENT ATRIAL FIBRILLATION (MULTI): ICD-10-CM

## 2025-01-17 DIAGNOSIS — R39.9 UTI SYMPTOMS: Primary | ICD-10-CM

## 2025-01-17 RX ORDER — AMIODARONE HYDROCHLORIDE 200 MG/1
200 TABLET ORAL DAILY
Qty: 90 TABLET | Refills: 0 | Status: SHIPPED | OUTPATIENT
Start: 2025-01-17

## 2025-01-17 RX ORDER — NITROFURANTOIN 25; 75 MG/1; MG/1
100 CAPSULE ORAL 2 TIMES DAILY
Qty: 14 CAPSULE | Refills: 0 | Status: SHIPPED | OUTPATIENT
Start: 2025-01-17 | End: 2025-01-24

## 2025-01-18 LAB — BACTERIA UR CULT: ABNORMAL

## 2025-01-24 ENCOUNTER — APPOINTMENT (OUTPATIENT)
Dept: PHARMACY | Facility: HOSPITAL | Age: 80
End: 2025-01-24
Payer: MEDICARE

## 2025-01-24 DIAGNOSIS — I48.0 PAROXYSMAL ATRIAL FIBRILLATION (MULTI): ICD-10-CM

## 2025-01-24 NOTE — PROGRESS NOTES
Pharmacist Clinic: Cardiology Management    Angel Carroll is a 79 y.o. male was referred to Clinical Pharmacy Team for anticoagulation management.     Referring Provider: Alysia Sheets, APRN-CNP    THIS IS A FOLLOW UP PATIENT APPOINTMENT. AT LAST VISIT ON 10/16/2024 WITH PHARMACIST (Annamaria Burgess).    Appointment was completed by Angel/Reed who was reached at [phone].    REVIEW OF PAST APPNT (IF APPLICABLE):   Patient has been taking Eliquis now for 1 week, he reports tolerating new medication and doing well on anticoagulation therapy. Patient and Reed report adherence, no adverse effects, and denies s/s of bleeding.   Reed reports Angel had a minor fall onto his side- denies hitting his head. Reed is a nurse, she examined Angel for bruising, she states there was no bruising.    Allergies Reviewed? No    Allergies   Allergen Reactions    Other Unknown     Anti-Inflammatories    Ranolazine Unknown    Cyclobenzaprine Hallucinations, Unknown and Psychosis       Past Medical History:   Diagnosis Date    Atherosclerotic heart disease of native coronary artery without angina pectoris 12/14/2022    Arteriosclerotic cardiovascular disease (ASCVD)    Benign neoplasm of meninges, unspecified     Meningioma    Benign prostatic hyperplasia without lower urinary tract symptoms 02/28/2022    BPH without obstruction/lower urinary tract symptoms    Body mass index (BMI) 33.0-33.9, adult 07/02/2021    BMI 33.0-33.9,adult    Body mass index (BMI) 34.0-34.9, adult 11/09/2021    BMI 34.0-34.9,adult    Body mass index (BMI) 34.0-34.9, adult 09/08/2021    BMI 34.0-34.9,adult    Body mass index (BMI) 35.0-35.9, adult 12/29/2021    BMI 35.0-35.9,adult    Body mass index (BMI) 35.0-35.9, adult 02/28/2022    BMI 35.0-35.9,adult    Elevated blood-pressure reading, without diagnosis of hypertension     Prehypertension    Encounter for immunization 10/27/2015    Need for prophylactic vaccination and inoculation against influenza     Hyperlipidemia, unspecified 11/15/2021    Hyperlipidemia    Other conditions influencing health status     Nephrolithiasis    Pacemaker     Personal history of other diseases of the musculoskeletal system and connective tissue     History of tendinitis    Personal history of other drug therapy     History of influenza vaccination    Personal history of other specified conditions 10/25/2022    History of bradycardia    Rectal bleeding     Vitamin D deficiency, unspecified 10/08/2020    Vitamin D deficiency       Current Outpatient Medications on File Prior to Visit   Medication Sig Dispense Refill    acetaminophen (Tylenol) 325 mg tablet Take 1 tablet (325 mg) by mouth every 6 hours if needed for mild pain (1 - 3).      amiodarone (Pacerone) 200 mg tablet TAKE 1 TABLET ONE TIME DAILY 90 tablet 0    apixaban (Eliquis) 2.5 mg tablet Take 1 tablet (2.5 mg) by mouth 2 times a day. 180 tablet 3    atorvastatin (Lipitor) 20 mg tablet TAKE 1 TABLET EVERY DAY 90 tablet 3    cyanocobalamin (Vitamin B-12) 500 mcg tablet Take 1 tablet (500 mcg) by mouth once daily.      ergocalciferol (Vitamin D-2) 1.25 MG (55358 UT) capsule Take 1 capsule (50,000 Units) by mouth 1 (one) time per week. 12 capsule 2    fenofibrate (Tricor) 48 mg tablet TAKE 1 AND 1/2 TABLETS ONE TIME DAILY 135 tablet 3    ferrous sulfate 250 mg (50 mg iron) tablet extended release Take 1 tablet (250 mg) by mouth 2 times a day. (Patient taking differently: Take 325 mg by mouth 2 times a day.) 180 tablet 3    finasteride (Proscar) 5 mg tablet Take 1 tablet (5 mg) by mouth once daily. 90 tablet 3    folic acid (Folvite) 1 mg tablet TAKE 1 TABLET EVERY DAY 90 tablet 3    furosemide (Lasix) 40 mg tablet Take 0.5 tablets (20 mg) by mouth 2 times a day. 180 tablet 1    gabapentin (Neurontin) 100 mg capsule Take 1 capsule (100 mg) by mouth once daily at bedtime. 90 capsule 1    metoprolol succinate XL (Toprol-XL) 25 mg 24 hr tablet Take 1 tablet (25 mg) by mouth once  "daily. Do not crush or chew. 90 tablet 1    nitrofurantoin, macrocrystal-monohydrate, (Macrobid) 100 mg capsule Take 1 capsule (100 mg) by mouth 2 times a day for 7 days. 14 capsule 0    oxygen (O2) gas therapy Inhale 2 L/min continuously.      pantoprazole (ProtoNix) 40 mg EC tablet TAKE 1 TABLET EVERY DAY 90 tablet 3    pantoprazole (Protonix) 40 mg EC tablet Take 1 tablet (40 mg) by mouth once daily in the morning. Take before meals.      PARoxetine (Paxil) 20 mg tablet TAKE 1 TABLET EVERY DAY 90 tablet 3    psyllium (Metamucil) 0.4 gram capsule Take 2 capsules by mouth 2 times a day.      psyllium husk, with sugar, 2 gram wafer Take 2 capsules by mouth once daily. Mix and drink with at least 8 ounces of water or juice. (Patient not taking: Reported on 12/6/2024)      tamsulosin (Flomax) 0.4 mg 24 hr capsule Take 2 capsules (0.8 mg) by mouth once daily at bedtime. 180 capsule 3     No current facility-administered medications on file prior to visit.         RELEVANT LAB RESULTS  Lab Results   Component Value Date    BILITOT 0.7 08/08/2024    CALCIUM 8.8 08/13/2024    CO2 31 08/13/2024     08/13/2024    CREATININE 1.60 08/13/2024    GLUCOSE 91 08/13/2024    ALKPHOS 85 08/08/2024    K 4.4 08/13/2024    PROT 6.4 08/08/2024     08/13/2024    AST 18 08/08/2024    ALT 6 08/08/2024    BUN 21 08/13/2024    ANIONGAP 7 08/13/2024    MG 2.10 07/02/2024    PHOS 2.9 06/11/2024    ALBUMIN 3.6 08/08/2024    GFRMALE 28 (A) 09/01/2023     Lab Results   Component Value Date    TRIG 81 11/14/2022    CHOL 117 (L) 11/14/2022    LDLCALC 70 11/14/2022    HDL 31 (L) 11/14/2022     No results found for: \"BMCBC\", \"CBCDIF\"     PHARMACEUTICAL ASSESSMENT    MEDICATION RECONCILIATION    Drug Interactions? No    Medication Documentation Review Audit       Reviewed by SAMUEL Wilkes (Nurse Practitioner) on 01/15/25 at 1145      Medication Order Taking? Sig Documenting Provider Last Dose Status   acetaminophen (Tylenol) 325 " mg tablet [id number] No Take 1 tablet (325 mg) by mouth every 6 hours if needed for mild pain (1 - 3). Historical Provider, MD Taking Active   amiodarone (Pacerone) 200 mg tablet [id number] No Take 1 tablet (200 mg) by mouth once daily. Agata Bonner APRN-CNP Taking Active   apixaban (Eliquis) 2.5 mg tablet [id number] No Take 1 tablet (2.5 mg) by mouth 2 times a day. Alysia Sheets APRN-CNP Taking Active   atorvastatin (Lipitor) 20 mg tablet [id number]  TAKE 1 TABLET EVERY DAY Jung Tao MD  Active   cyanocobalamin (Vitamin B-12) 500 mcg tablet [id number] No Take 1 tablet (500 mcg) by mouth once daily. Historical Provider, MD Taking Active   ergocalciferol (Vitamin D-2) 1.25 MG (07342 UT) capsule [id number]  Take 1 capsule (50,000 Units) by mouth 1 (one) time per week. Jung Tao MD  Active   fenofibrate (Tricor) 48 mg tablet [id number] No TAKE 1 AND 1/2 TABLETS ONE TIME DAILY Alysia Sheets APRN-CNP Taking Active   ferrous sulfate 250 mg (50 mg iron) tablet extended release [id number] No Take 1 tablet (250 mg) by mouth 2 times a day.   Patient taking differently: Take 325 mg by mouth 2 times a day.    Tiana Juarez MD Taking Active   finasteride (Proscar) 5 mg tablet [id number]  Take 1 tablet (5 mg) by mouth once daily. Jung Tao MD  Active   folic acid (Folvite) 1 mg tablet [id number] No TAKE 1 TABLET EVERY DAY Jung Tao MD Taking Active   furosemide (Lasix) 40 mg tablet [id number]  Take 0.5 tablets (20 mg) by mouth 2 times a day. Tiana Juarez MD  Active   gabapentin (Neurontin) 100 mg capsule [id number]  Take 1 capsule (100 mg) by mouth once daily at bedtime. Jung Tao MD  Active   metoprolol succinate XL (Toprol-XL) 25 mg 24 hr tablet [id number]  Take 1 tablet (25 mg) by mouth once daily. Do not crush or chew. Jung Tao MD  Active   oxygen (O2) gas therapy [id number] No Inhale 2 L/min continuously. Historical Provider, MD Taking Active   pantoprazole (ProtoNix) 40 mg EC tablet [id number] No  TAKE 1 TABLET EVERY DAY Jung Tao MD Taking  10/28/24 8139   pantoprazole (Protonix) 40 mg EC tablet [id number]  Take 1 tablet (40 mg) by mouth once daily in the morning. Take before meals. Historical Provider, MD  Active   PARoxetine (Paxil) 20 mg tablet [id number] No TAKE 1 TABLET EVERY DAY Jung Tao MD Taking Active   psyllium (Metamucil) 0.4 gram capsule [id number] No Take 2 capsules by mouth 2 times a day. Historical Provider, MD Taking Active   psyllium husk, with sugar, 2 gram wafer [id number] No Take 2 capsules by mouth once daily. Mix and drink with at least 8 ounces of water or juice.   Patient not taking: Reported on 2024    Historical Provider, MD Not Taking Active   tamsulosin (Flomax) 0.4 mg 24 hr capsule [id number] No Take 2 capsules (0.8 mg) by mouth once daily at bedtime. Hanny Moe MD Taking Active                    DISEASE MANAGEMENT ASSESSMENT:     ANTICOAGULATION ASSESSMENT    The ASCVD Risk score (Naomi DK, et al., 2019) failed to calculate for the following reasons:    The 2019 ASCVD risk score is only valid for ages 40 to 79    Risk score cannot be calculated because patient has a medical history suggesting prior/existing ASCVD    DIAGNOSIS: prevention of nonvalvular atrial fibrilliation stroke and systemic embolism  - Patient is projected to be on anticoagulation long term  - [id number] Score: [4] (only included if diagnosis is atrial fibrillation)   Age: [<65 (0)] [65-74 (+1)] [> 75 (+2)]: 2  Sex: [Male/Female (+1)]: 0  CHF history: [No/Yes(+1)]: 1  Hypertension history: [No/Yes(+1)]: 1  Stroke/TIA/thromboembolism history: [No/Yes(+2)]: 0  Vascular disease history (prior MI, peripheral artery disease, aortic plaque): [No/Yes(+1)]: 0  Diabetes history: [No/Yes(+1)]: 0    CURRENT PHARMACOTHERAPY:   Eliquis 2.5mg twice daily  78yo  98kg  Scr 1.6 (2024)    RELEVANT PAST MEDICAL HISTORY:   Afib, HTN, HF    Affordability/Accessibility:  PAP  Adherence/Organization:  Daughter Rhea puts medicine into his pillbox.  Adverse Reactions: denies s/s of bleeding  Recent Hospitalizations: none  Recent Falls/Trauma: Reed states patient does not often admit to falling    EDUCATION/COUNSELING:   - Counseled patient on MOA, expectations, duration of therapy, contraindications, administration, and monitoring parameters  - Counseled patient of side effects that are indicative of bleeding such as dark tarry stool, unexplainable bruising, or vomiting up a coffee ground like substance       DISCUSSION/NOTES:   Today was a follow up visit conducted with Reed (dtr). Reed states patient is doing well on anticoagulation therapy. Patient reports adherence, no adverse effects, and denies s/s of bleeding.     ASSESSMENT AND PLAN:    Assessment/Plan   Problem List Items Addressed This Visit       Paroxysmal atrial fibrillation (Multi)     Patient age, weight and renal function appropriate to continue Eliquis 2.5mg twice daily.          Relevant Orders    Referral to Clinical Pharmacy       RECOMMENDATIONS/PLAN    CONTINUE  Eliquis 2.5mg twice daily    Last Appnt with Referring Provider: 10/28/2024  Next Appnt with Referring Provider: 02/24/2025  Clinical Pharmacist follow up: 6 months  VAF/Application Expiration: Yes    Date: 09/30/2025  Type of Encounter: Sal Burgess PharmD    Verbal consent to manage patient's drug therapy was obtained from the patient . They were informed they may decline to participate or withdraw from participation in pharmacy services at any time.    Continue all meds under the continuation of care with the referring provider and clinical pharmacy team.

## 2025-01-24 NOTE — Clinical Note
Cam Hatfield,  Today I spoke with Reed about Angel's Eliquis. Reed states patient is doing well on anticoagulation therapy. Patient reports adherence, no adverse effects, and denies s/s of bleeding. Plan to follow up in 5 months. Thank you!

## 2025-01-30 ENCOUNTER — APPOINTMENT (OUTPATIENT)
Dept: RADIOLOGY | Facility: HOSPITAL | Age: 80
End: 2025-01-30
Payer: MEDICARE

## 2025-01-30 ENCOUNTER — APPOINTMENT (OUTPATIENT)
Dept: CARDIOLOGY | Facility: HOSPITAL | Age: 80
End: 2025-01-30
Payer: MEDICARE

## 2025-01-30 ENCOUNTER — HOSPITAL ENCOUNTER (INPATIENT)
Facility: HOSPITAL | Age: 80
End: 2025-01-30
Attending: STUDENT IN AN ORGANIZED HEALTH CARE EDUCATION/TRAINING PROGRAM | Admitting: STUDENT IN AN ORGANIZED HEALTH CARE EDUCATION/TRAINING PROGRAM
Payer: MEDICARE

## 2025-01-30 DIAGNOSIS — I25.10 ARTERIOSCLEROTIC CARDIOVASCULAR DISEASE (ASCVD): ICD-10-CM

## 2025-01-30 DIAGNOSIS — N30.00 ACUTE CYSTITIS WITHOUT HEMATURIA: ICD-10-CM

## 2025-01-30 DIAGNOSIS — N30.01 ACUTE CYSTITIS WITH HEMATURIA: ICD-10-CM

## 2025-01-30 DIAGNOSIS — I50.9 CONGESTIVE HEART FAILURE, UNSPECIFIED HF CHRONICITY, UNSPECIFIED HEART FAILURE TYPE: ICD-10-CM

## 2025-01-30 DIAGNOSIS — I48.20 CHRONIC ATRIAL FIBRILLATION (MULTI): ICD-10-CM

## 2025-01-30 DIAGNOSIS — E87.6 HYPOKALEMIA: Primary | ICD-10-CM

## 2025-01-30 DIAGNOSIS — R06.02 SHORTNESS OF BREATH: ICD-10-CM

## 2025-01-30 PROBLEM — R82.90 ABNORMAL FINDING ON URINALYSIS: Status: ACTIVE | Noted: 2025-01-30

## 2025-01-30 PROBLEM — D72.819 LEUKOPENIA: Status: ACTIVE | Noted: 2025-01-30

## 2025-01-30 PROBLEM — N18.31 STAGE 3A CHRONIC KIDNEY DISEASE (MULTI): Status: ACTIVE | Noted: 2025-01-30

## 2025-01-30 PROBLEM — N39.0 UTI (URINARY TRACT INFECTION): Status: ACTIVE | Noted: 2025-01-30

## 2025-01-30 LAB
ALBUMIN SERPL BCP-MCNC: 3.5 G/DL (ref 3.4–5)
ALP SERPL-CCNC: 77 U/L (ref 33–136)
ALT SERPL W P-5'-P-CCNC: 5 U/L (ref 10–52)
ANION GAP SERPL CALCULATED.3IONS-SCNC: 10 MMOL/L (ref 10–20)
APPEARANCE UR: ABNORMAL
AST SERPL W P-5'-P-CCNC: 14 U/L (ref 9–39)
BACTERIA #/AREA URNS AUTO: ABNORMAL /HPF
BASOPHILS # BLD AUTO: 0.02 X10*3/UL (ref 0–0.1)
BASOPHILS NFR BLD AUTO: 0.8 %
BILIRUB SERPL-MCNC: 1 MG/DL (ref 0–1.2)
BILIRUB UR STRIP.AUTO-MCNC: NEGATIVE MG/DL
BNP SERPL-MCNC: 727 PG/ML (ref 0–99)
BUN SERPL-MCNC: 16 MG/DL (ref 6–23)
CALCIUM SERPL-MCNC: 8.7 MG/DL (ref 8.6–10.3)
CARDIAC TROPONIN I PNL SERPL HS: 11 NG/L (ref 0–20)
CARDIAC TROPONIN I PNL SERPL HS: 11 NG/L (ref 0–20)
CHLORIDE SERPL-SCNC: 101 MMOL/L (ref 98–107)
CO2 SERPL-SCNC: 35 MMOL/L (ref 21–32)
COLOR UR: ABNORMAL
CREAT SERPL-MCNC: 1.54 MG/DL (ref 0.5–1.3)
EGFRCR SERPLBLD CKD-EPI 2021: 45 ML/MIN/1.73M*2
EOSINOPHIL # BLD AUTO: 0.07 X10*3/UL (ref 0–0.4)
EOSINOPHIL NFR BLD AUTO: 2.8 %
ERYTHROCYTE [DISTWIDTH] IN BLOOD BY AUTOMATED COUNT: 15.5 % (ref 11.5–14.5)
GLUCOSE SERPL-MCNC: 93 MG/DL (ref 74–99)
GLUCOSE UR STRIP.AUTO-MCNC: NORMAL MG/DL
HCT VFR BLD AUTO: 37.4 % (ref 41–52)
HGB BLD-MCNC: 11.5 G/DL (ref 13.5–17.5)
IMM GRANULOCYTES # BLD AUTO: 0.01 X10*3/UL (ref 0–0.5)
IMM GRANULOCYTES NFR BLD AUTO: 0.4 % (ref 0–0.9)
INR PPP: 1.2 (ref 0.9–1.2)
KETONES UR STRIP.AUTO-MCNC: NEGATIVE MG/DL
LEUKOCYTE ESTERASE UR QL STRIP.AUTO: ABNORMAL
LYMPHOCYTES # BLD AUTO: 0.56 X10*3/UL (ref 0.8–3)
LYMPHOCYTES NFR BLD AUTO: 22 %
MCH RBC QN AUTO: 30.4 PG (ref 26–34)
MCHC RBC AUTO-ENTMCNC: 30.7 G/DL (ref 32–36)
MCV RBC AUTO: 99 FL (ref 80–100)
MONOCYTES # BLD AUTO: 0.23 X10*3/UL (ref 0.05–0.8)
MONOCYTES NFR BLD AUTO: 9.1 %
MUCOUS THREADS #/AREA URNS AUTO: ABNORMAL /LPF
NEUTROPHILS # BLD AUTO: 1.65 X10*3/UL (ref 1.6–5.5)
NEUTROPHILS NFR BLD AUTO: 64.9 %
NITRITE UR QL STRIP.AUTO: ABNORMAL
NRBC BLD-RTO: 0 /100 WBCS (ref 0–0)
PH UR STRIP.AUTO: 7.5 [PH]
PLATELET # BLD AUTO: 128 X10*3/UL (ref 150–450)
POTASSIUM SERPL-SCNC: 2.9 MMOL/L (ref 3.5–5.3)
PROT SERPL-MCNC: 6.4 G/DL (ref 6.4–8.2)
PROT UR STRIP.AUTO-MCNC: ABNORMAL MG/DL
PROTHROMBIN TIME: 12.8 SECONDS (ref 9.3–12.7)
RBC # BLD AUTO: 3.78 X10*6/UL (ref 4.5–5.9)
RBC # UR STRIP.AUTO: ABNORMAL /UL
RBC #/AREA URNS AUTO: >20 /HPF
SODIUM SERPL-SCNC: 143 MMOL/L (ref 136–145)
SP GR UR STRIP.AUTO: 1.02
SQUAMOUS #/AREA URNS AUTO: ABNORMAL /HPF
TRI-PHOS CRY #/AREA UR COMP ASSIST: ABNORMAL /HPF
UROBILINOGEN UR STRIP.AUTO-MCNC: ABNORMAL MG/DL
WBC # BLD AUTO: 2.5 X10*3/UL (ref 4.4–11.3)
WBC #/AREA URNS AUTO: >50 /HPF

## 2025-01-30 PROCEDURE — 96374 THER/PROPH/DIAG INJ IV PUSH: CPT | Mod: 59

## 2025-01-30 PROCEDURE — 71045 X-RAY EXAM CHEST 1 VIEW: CPT | Performed by: RADIOLOGY

## 2025-01-30 PROCEDURE — 93010 ELECTROCARDIOGRAM REPORT: CPT | Performed by: INTERNAL MEDICINE

## 2025-01-30 PROCEDURE — 1200000002 HC GENERAL ROOM WITH TELEMETRY DAILY

## 2025-01-30 PROCEDURE — 73502 X-RAY EXAM HIP UNI 2-3 VIEWS: CPT | Mod: RIGHT SIDE | Performed by: RADIOLOGY

## 2025-01-30 PROCEDURE — 85025 COMPLETE CBC W/AUTO DIFF WBC: CPT

## 2025-01-30 PROCEDURE — 2500000002 HC RX 250 W HCPCS SELF ADMINISTERED DRUGS (ALT 637 FOR MEDICARE OP, ALT 636 FOR OP/ED): Performed by: STUDENT IN AN ORGANIZED HEALTH CARE EDUCATION/TRAINING PROGRAM

## 2025-01-30 PROCEDURE — 2500000002 HC RX 250 W HCPCS SELF ADMINISTERED DRUGS (ALT 637 FOR MEDICARE OP, ALT 636 FOR OP/ED)

## 2025-01-30 PROCEDURE — 83880 ASSAY OF NATRIURETIC PEPTIDE: CPT

## 2025-01-30 PROCEDURE — 99285 EMERGENCY DEPT VISIT HI MDM: CPT | Mod: 25

## 2025-01-30 PROCEDURE — 2500000001 HC RX 250 WO HCPCS SELF ADMINISTERED DRUGS (ALT 637 FOR MEDICARE OP): Performed by: STUDENT IN AN ORGANIZED HEALTH CARE EDUCATION/TRAINING PROGRAM

## 2025-01-30 PROCEDURE — 84484 ASSAY OF TROPONIN QUANT: CPT

## 2025-01-30 PROCEDURE — 81001 URINALYSIS AUTO W/SCOPE: CPT

## 2025-01-30 PROCEDURE — 93005 ELECTROCARDIOGRAM TRACING: CPT

## 2025-01-30 PROCEDURE — 99223 1ST HOSP IP/OBS HIGH 75: CPT | Performed by: STUDENT IN AN ORGANIZED HEALTH CARE EDUCATION/TRAINING PROGRAM

## 2025-01-30 PROCEDURE — 80053 COMPREHEN METABOLIC PANEL: CPT

## 2025-01-30 PROCEDURE — 85610 PROTHROMBIN TIME: CPT

## 2025-01-30 PROCEDURE — 73502 X-RAY EXAM HIP UNI 2-3 VIEWS: CPT | Mod: RT

## 2025-01-30 PROCEDURE — 71045 X-RAY EXAM CHEST 1 VIEW: CPT

## 2025-01-30 PROCEDURE — 72100 X-RAY EXAM L-S SPINE 2/3 VWS: CPT | Performed by: RADIOLOGY

## 2025-01-30 PROCEDURE — 2500000005 HC RX 250 GENERAL PHARMACY W/O HCPCS: Performed by: STUDENT IN AN ORGANIZED HEALTH CARE EDUCATION/TRAINING PROGRAM

## 2025-01-30 PROCEDURE — 87086 URINE CULTURE/COLONY COUNT: CPT | Mod: WESLAB

## 2025-01-30 PROCEDURE — 2500000004 HC RX 250 GENERAL PHARMACY W/ HCPCS (ALT 636 FOR OP/ED): Performed by: STUDENT IN AN ORGANIZED HEALTH CARE EDUCATION/TRAINING PROGRAM

## 2025-01-30 PROCEDURE — 72100 X-RAY EXAM L-S SPINE 2/3 VWS: CPT

## 2025-01-30 PROCEDURE — 36415 COLL VENOUS BLD VENIPUNCTURE: CPT

## 2025-01-30 PROCEDURE — 2500000004 HC RX 250 GENERAL PHARMACY W/ HCPCS (ALT 636 FOR OP/ED)

## 2025-01-30 RX ORDER — POTASSIUM CHLORIDE 20 MEQ/1
40 TABLET, EXTENDED RELEASE ORAL ONCE
Status: COMPLETED | OUTPATIENT
Start: 2025-01-30 | End: 2025-01-30

## 2025-01-30 RX ORDER — GABAPENTIN 100 MG/1
100 CAPSULE ORAL NIGHTLY
Status: DISCONTINUED | OUTPATIENT
Start: 2025-01-30 | End: 2025-02-04 | Stop reason: HOSPADM

## 2025-01-30 RX ORDER — POTASSIUM CHLORIDE 29.8 MG/ML
40 INJECTION INTRAVENOUS ONCE
Status: DISCONTINUED | OUTPATIENT
Start: 2025-01-30 | End: 2025-01-30

## 2025-01-30 RX ORDER — FERROUS SULFATE 325(65) MG
65 TABLET ORAL
Status: DISCONTINUED | OUTPATIENT
Start: 2025-01-30 | End: 2025-02-04 | Stop reason: HOSPADM

## 2025-01-30 RX ORDER — POTASSIUM CHLORIDE 14.9 MG/ML
20 INJECTION INTRAVENOUS
Status: DISPENSED | OUTPATIENT
Start: 2025-01-30 | End: 2025-01-30

## 2025-01-30 RX ORDER — TAMSULOSIN HYDROCHLORIDE 0.4 MG/1
0.8 CAPSULE ORAL NIGHTLY
Status: DISCONTINUED | OUTPATIENT
Start: 2025-01-30 | End: 2025-02-04 | Stop reason: HOSPADM

## 2025-01-30 RX ORDER — FINASTERIDE 5 MG/1
5 TABLET, FILM COATED ORAL DAILY
Status: DISCONTINUED | OUTPATIENT
Start: 2025-01-30 | End: 2025-02-04 | Stop reason: HOSPADM

## 2025-01-30 RX ORDER — POLYETHYLENE GLYCOL 3350 17 G/17G
17 POWDER, FOR SOLUTION ORAL DAILY
Status: DISCONTINUED | OUTPATIENT
Start: 2025-01-30 | End: 2025-02-04 | Stop reason: HOSPADM

## 2025-01-30 RX ORDER — PANTOPRAZOLE SODIUM 40 MG/1
40 TABLET, DELAYED RELEASE ORAL DAILY
Qty: 90 TABLET | Refills: 3 | Status: ON HOLD | OUTPATIENT
Start: 2025-01-30

## 2025-01-30 RX ORDER — PAROXETINE HYDROCHLORIDE 20 MG/1
20 TABLET, FILM COATED ORAL DAILY
Status: DISCONTINUED | OUTPATIENT
Start: 2025-01-30 | End: 2025-02-04 | Stop reason: HOSPADM

## 2025-01-30 RX ORDER — CEFAZOLIN SODIUM 1 G/50ML
1 SOLUTION INTRAVENOUS EVERY 8 HOURS
Status: DISCONTINUED | OUTPATIENT
Start: 2025-01-30 | End: 2025-02-04 | Stop reason: HOSPADM

## 2025-01-30 RX ORDER — PANTOPRAZOLE SODIUM 40 MG/10ML
40 INJECTION, POWDER, LYOPHILIZED, FOR SOLUTION INTRAVENOUS DAILY
Status: DISCONTINUED | OUTPATIENT
Start: 2025-01-30 | End: 2025-02-04 | Stop reason: HOSPADM

## 2025-01-30 RX ORDER — FUROSEMIDE 20 MG/1
20 TABLET ORAL 2 TIMES DAILY
Status: DISCONTINUED | OUTPATIENT
Start: 2025-01-30 | End: 2025-02-04

## 2025-01-30 RX ORDER — ACETAMINOPHEN 325 MG/1
650 TABLET ORAL EVERY 4 HOURS PRN
Status: DISCONTINUED | OUTPATIENT
Start: 2025-01-30 | End: 2025-02-04 | Stop reason: HOSPADM

## 2025-01-30 RX ORDER — ONDANSETRON 4 MG/1
4 TABLET, ORALLY DISINTEGRATING ORAL EVERY 8 HOURS PRN
Status: DISCONTINUED | OUTPATIENT
Start: 2025-01-30 | End: 2025-02-04 | Stop reason: HOSPADM

## 2025-01-30 RX ORDER — AMIODARONE HYDROCHLORIDE 200 MG/1
200 TABLET ORAL DAILY
Status: DISCONTINUED | OUTPATIENT
Start: 2025-01-30 | End: 2025-02-04 | Stop reason: HOSPADM

## 2025-01-30 RX ORDER — PAROXETINE HYDROCHLORIDE 20 MG/1
TABLET, FILM COATED ORAL
Qty: 90 TABLET | Refills: 3 | Status: ON HOLD | OUTPATIENT
Start: 2025-01-30

## 2025-01-30 RX ORDER — PANTOPRAZOLE SODIUM 40 MG/1
40 TABLET, DELAYED RELEASE ORAL DAILY
Status: DISCONTINUED | OUTPATIENT
Start: 2025-01-30 | End: 2025-02-04 | Stop reason: HOSPADM

## 2025-01-30 RX ORDER — CEFTRIAXONE 1 G/50ML
1 INJECTION, SOLUTION INTRAVENOUS ONCE
Status: DISCONTINUED | OUTPATIENT
Start: 2025-01-30 | End: 2025-01-30

## 2025-01-30 RX ORDER — ATORVASTATIN CALCIUM 20 MG/1
20 TABLET, FILM COATED ORAL NIGHTLY
Status: DISCONTINUED | OUTPATIENT
Start: 2025-01-30 | End: 2025-02-04 | Stop reason: HOSPADM

## 2025-01-30 RX ORDER — ONDANSETRON HYDROCHLORIDE 2 MG/ML
4 INJECTION, SOLUTION INTRAVENOUS EVERY 8 HOURS PRN
Status: DISCONTINUED | OUTPATIENT
Start: 2025-01-30 | End: 2025-02-04 | Stop reason: HOSPADM

## 2025-01-30 RX ORDER — ACETAMINOPHEN 160 MG/5ML
650 SOLUTION ORAL EVERY 4 HOURS PRN
Status: DISCONTINUED | OUTPATIENT
Start: 2025-01-30 | End: 2025-02-04 | Stop reason: HOSPADM

## 2025-01-30 RX ORDER — METOPROLOL SUCCINATE 25 MG/1
25 TABLET, EXTENDED RELEASE ORAL DAILY
Status: DISCONTINUED | OUTPATIENT
Start: 2025-01-30 | End: 2025-02-04 | Stop reason: HOSPADM

## 2025-01-30 RX ORDER — ACETAMINOPHEN 650 MG/1
650 SUPPOSITORY RECTAL EVERY 4 HOURS PRN
Status: DISCONTINUED | OUTPATIENT
Start: 2025-01-30 | End: 2025-02-04 | Stop reason: HOSPADM

## 2025-01-30 RX ORDER — FOLIC ACID 1 MG/1
1 TABLET ORAL DAILY
Status: DISCONTINUED | OUTPATIENT
Start: 2025-01-30 | End: 2025-02-04 | Stop reason: HOSPADM

## 2025-01-30 RX ADMIN — TAMSULOSIN HYDROCHLORIDE 0.8 MG: 0.4 CAPSULE ORAL at 22:19

## 2025-01-30 RX ADMIN — GABAPENTIN 100 MG: 100 CAPSULE ORAL at 22:18

## 2025-01-30 RX ADMIN — FERROUS SULFATE TAB 325 MG (65 MG ELEMENTAL FE) 325 MG: 325 (65 FE) TAB at 22:18

## 2025-01-30 RX ADMIN — CEFAZOLIN SODIUM 1 G: 1 INJECTION, SOLUTION INTRAVENOUS at 23:44

## 2025-01-30 RX ADMIN — POTASSIUM CHLORIDE 40 MEQ: 1500 TABLET, EXTENDED RELEASE ORAL at 17:24

## 2025-01-30 RX ADMIN — POTASSIUM CHLORIDE 20 MEQ: 14.9 INJECTION, SOLUTION INTRAVENOUS at 17:54

## 2025-01-30 RX ADMIN — APIXABAN 2.5 MG: 2.5 TABLET, FILM COATED ORAL at 22:19

## 2025-01-30 RX ADMIN — ATORVASTATIN CALCIUM 20 MG: 20 TABLET, FILM COATED ORAL at 22:19

## 2025-01-30 RX ADMIN — Medication 2 L/MIN: at 22:38

## 2025-01-30 SDOH — ECONOMIC STABILITY: FOOD INSECURITY: HOW HARD IS IT FOR YOU TO PAY FOR THE VERY BASICS LIKE FOOD, HOUSING, MEDICAL CARE, AND HEATING?: NOT VERY HARD

## 2025-01-30 SDOH — SOCIAL STABILITY: SOCIAL INSECURITY: ABUSE: ADULT

## 2025-01-30 SDOH — SOCIAL STABILITY: SOCIAL INSECURITY: HAVE YOU HAD THOUGHTS OF HARMING ANYONE ELSE?: NO

## 2025-01-30 SDOH — SOCIAL STABILITY: SOCIAL INSECURITY: HAVE YOU HAD ANY THOUGHTS OF HARMING ANYONE ELSE?: NO

## 2025-01-30 SDOH — SOCIAL STABILITY: SOCIAL INSECURITY: HAS ANYONE EVER THREATENED TO HURT YOUR FAMILY OR YOUR PETS?: NO

## 2025-01-30 SDOH — SOCIAL STABILITY: SOCIAL INSECURITY: DOES ANYONE TRY TO KEEP YOU FROM HAVING/CONTACTING OTHER FRIENDS OR DOING THINGS OUTSIDE YOUR HOME?: NO

## 2025-01-30 SDOH — SOCIAL STABILITY: SOCIAL INSECURITY: DO YOU FEEL UNSAFE GOING BACK TO THE PLACE WHERE YOU ARE LIVING?: NO

## 2025-01-30 SDOH — SOCIAL STABILITY: SOCIAL INSECURITY: DO YOU FEEL ANYONE HAS EXPLOITED OR TAKEN ADVANTAGE OF YOU FINANCIALLY OR OF YOUR PERSONAL PROPERTY?: NO

## 2025-01-30 SDOH — SOCIAL STABILITY: SOCIAL INSECURITY: ARE YOU OR HAVE YOU BEEN THREATENED OR ABUSED PHYSICALLY, EMOTIONALLY, OR SEXUALLY BY ANYONE?: NO

## 2025-01-30 SDOH — HEALTH STABILITY: PHYSICAL HEALTH: ON AVERAGE, HOW MANY DAYS PER WEEK DO YOU ENGAGE IN MODERATE TO STRENUOUS EXERCISE (LIKE A BRISK WALK)?: 0 DAYS

## 2025-01-30 SDOH — SOCIAL STABILITY: SOCIAL INSECURITY: ARE THERE ANY APPARENT SIGNS OF INJURIES/BEHAVIORS THAT COULD BE RELATED TO ABUSE/NEGLECT?: NO

## 2025-01-30 SDOH — SOCIAL STABILITY: SOCIAL INSECURITY: WERE YOU ABLE TO COMPLETE ALL THE BEHAVIORAL HEALTH SCREENINGS?: YES

## 2025-01-30 SDOH — HEALTH STABILITY: PHYSICAL HEALTH: ON AVERAGE, HOW MANY MINUTES DO YOU ENGAGE IN EXERCISE AT THIS LEVEL?: 0 MIN

## 2025-01-30 ASSESSMENT — ENCOUNTER SYMPTOMS
CONFUSION: 0
WEAKNESS: 1
ABDOMINAL PAIN: 0
CONSTIPATION: 0
DIARRHEA: 0
TROUBLE SWALLOWING: 0
COUGH: 0
SHORTNESS OF BREATH: 0
DIFFICULTY URINATING: 1
VOMITING: 0
FEVER: 0

## 2025-01-30 ASSESSMENT — LIFESTYLE VARIABLES
HAVE YOU EVER FELT YOU SHOULD CUT DOWN ON YOUR DRINKING: NO
SUBSTANCE_ABUSE_PAST_12_MONTHS: NO
EVER FELT BAD OR GUILTY ABOUT YOUR DRINKING: NO
AUDIT-C TOTAL SCORE: 0
HOW OFTEN DO YOU HAVE 6 OR MORE DRINKS ON ONE OCCASION: NEVER
AUDIT-C TOTAL SCORE: 0
HOW MANY STANDARD DRINKS CONTAINING ALCOHOL DO YOU HAVE ON A TYPICAL DAY: PATIENT DOES NOT DRINK
PRESCIPTION_ABUSE_PAST_12_MONTHS: NO
EVER HAD A DRINK FIRST THING IN THE MORNING TO STEADY YOUR NERVES TO GET RID OF A HANGOVER: NO
HOW OFTEN DO YOU HAVE A DRINK CONTAINING ALCOHOL: NEVER
HAVE PEOPLE ANNOYED YOU BY CRITICIZING YOUR DRINKING: NO
SKIP TO QUESTIONS 9-10: 1
TOTAL SCORE: 0

## 2025-01-30 ASSESSMENT — COLUMBIA-SUICIDE SEVERITY RATING SCALE - C-SSRS
6. HAVE YOU EVER DONE ANYTHING, STARTED TO DO ANYTHING, OR PREPARED TO DO ANYTHING TO END YOUR LIFE?: NO
2. HAVE YOU ACTUALLY HAD ANY THOUGHTS OF KILLING YOURSELF?: NO
1. IN THE PAST MONTH, HAVE YOU WISHED YOU WERE DEAD OR WISHED YOU COULD GO TO SLEEP AND NOT WAKE UP?: NO
2. HAVE YOU ACTUALLY HAD ANY THOUGHTS OF KILLING YOURSELF?: NO
6. HAVE YOU EVER DONE ANYTHING, STARTED TO DO ANYTHING, OR PREPARED TO DO ANYTHING TO END YOUR LIFE?: NO
1. IN THE PAST MONTH, HAVE YOU WISHED YOU WERE DEAD OR WISHED YOU COULD GO TO SLEEP AND NOT WAKE UP?: NO

## 2025-01-30 ASSESSMENT — PAIN DESCRIPTION - LOCATION: LOCATION: LEG

## 2025-01-30 ASSESSMENT — PATIENT HEALTH QUESTIONNAIRE - PHQ9
2. FEELING DOWN, DEPRESSED OR HOPELESS: SEVERAL DAYS
SUM OF ALL RESPONSES TO PHQ9 QUESTIONS 1 & 2: 1
1. LITTLE INTEREST OR PLEASURE IN DOING THINGS: NOT AT ALL

## 2025-01-30 ASSESSMENT — COGNITIVE AND FUNCTIONAL STATUS - GENERAL
DAILY ACTIVITIY SCORE: 22
PATIENT BASELINE BEDBOUND: NO
WALKING IN HOSPITAL ROOM: A LOT
HELP NEEDED FOR BATHING: A LITTLE
DRESSING REGULAR UPPER BODY CLOTHING: A LITTLE
MOVING TO AND FROM BED TO CHAIR: A LITTLE
MOBILITY SCORE: 17
CLIMB 3 TO 5 STEPS WITH RAILING: A LOT
STANDING UP FROM CHAIR USING ARMS: A LOT

## 2025-01-30 ASSESSMENT — ACTIVITIES OF DAILY LIVING (ADL)
BATHING: INDEPENDENT
FEEDING YOURSELF: INDEPENDENT
LACK_OF_TRANSPORTATION: NO
JUDGMENT_ADEQUATE_SAFELY_COMPLETE_DAILY_ACTIVITIES: YES
HEARING - LEFT EAR: HEARING AID
ASSISTIVE_DEVICE: WHEELCHAIR;WALKER;CANE
TOILETING: INDEPENDENT
WALKS IN HOME: NEEDS ASSISTANCE
HEARING - RIGHT EAR: HEARING AID
GROOMING: NEEDS ASSISTANCE
ADEQUATE_TO_COMPLETE_ADL: YES
PATIENT'S MEMORY ADEQUATE TO SAFELY COMPLETE DAILY ACTIVITIES?: YES
DRESSING YOURSELF: INDEPENDENT

## 2025-01-30 ASSESSMENT — PAIN SCALES - GENERAL
PAINLEVEL_OUTOF10: 0 - NO PAIN
PAINLEVEL_OUTOF10: 4

## 2025-01-30 ASSESSMENT — PAIN - FUNCTIONAL ASSESSMENT: PAIN_FUNCTIONAL_ASSESSMENT: 0-10

## 2025-01-30 ASSESSMENT — PAIN DESCRIPTION - ORIENTATION: ORIENTATION: RIGHT

## 2025-01-30 ASSESSMENT — PAIN SCALES - WONG BAKER: WONGBAKER_NUMERICALRESPONSE: NO HURT

## 2025-01-30 NOTE — ED PROVIDER NOTES
HPI   Chief Complaint   Patient presents with    Leg Pain     Right leg pain that radiates from hip and down leg. No loss of bladder or bowel       Patient is a 79-year-old male presenting to the emergency department for evaluation of bilateral lower extremity swelling and right hip pain.  Patient's daughter is at bedside helping to provide history.  Patient states that approximately 1 week ago he slid down the wall and lowered himself to the ground landing on his right hip.  He states since then he has had increased pain in the right hip.  He states he is unable to do transfers in his wheelchair at home due to the pain.  He is also having a hard time getting around at home.  Patient's daughter states that patient has also had increased swelling in his bilateral lower extremities.  Patient's daughter states that patient has a history of congestive heart failure and she is concerned that patient is retaining water.  She states that patient is on Lasix however they just decreased his dose to 20 mg due to him losing weight.  Patient denies any chest pain, shortness of breath, fevers, chills, nausea, vomiting, abdominal pain.  He states he does not feel safe going home due to the inability to get around.              Patient History   Past Medical History:   Diagnosis Date    Atherosclerotic heart disease of native coronary artery without angina pectoris 12/14/2022    Arteriosclerotic cardiovascular disease (ASCVD)    Benign neoplasm of meninges, unspecified     Meningioma    Benign prostatic hyperplasia without lower urinary tract symptoms 02/28/2022    BPH without obstruction/lower urinary tract symptoms    Body mass index (BMI) 33.0-33.9, adult 07/02/2021    BMI 33.0-33.9,adult    Body mass index (BMI) 34.0-34.9, adult 11/09/2021    BMI 34.0-34.9,adult    Body mass index (BMI) 34.0-34.9, adult 09/08/2021    BMI 34.0-34.9,adult    Body mass index (BMI) 35.0-35.9, adult 12/29/2021    BMI 35.0-35.9,adult    Body mass  index (BMI) 35.0-35.9, adult 02/28/2022    BMI 35.0-35.9,adult    Elevated blood-pressure reading, without diagnosis of hypertension     Prehypertension    Encounter for immunization 10/27/2015    Need for prophylactic vaccination and inoculation against influenza    Hyperlipidemia, unspecified 11/15/2021    Hyperlipidemia    Other conditions influencing health status     Nephrolithiasis    Pacemaker     Personal history of other diseases of the musculoskeletal system and connective tissue     History of tendinitis    Personal history of other drug therapy     History of influenza vaccination    Personal history of other specified conditions 10/25/2022    History of bradycardia    Rectal bleeding     Vitamin D deficiency, unspecified 10/08/2020    Vitamin D deficiency     Past Surgical History:   Procedure Laterality Date    BACK SURGERY  04/18/2016    Back Surgery    COLONOSCOPY  07/03/2013    Complete Colonoscopy    CORONARY ANGIOPLASTY WITH STENT PLACEMENT  08/18/2020    Cath Stent Placement    CT ANGIO NECK  9/25/2021    CT NECK ANGIO W AND WO IV CONTRAST 9/25/2021 [facility]    CT HEAD ANGIO W AND WO IV CONTRAST  9/25/2021    CT HEAD ANGIO W AND WO IV CONTRAST 9/25/2021 [facility]    EYE SURGERY  04/03/2013    Eye Surgery    KNEE ARTHROSCOPY W/ DEBRIDEMENT  07/03/2013    Arthroscopy Knee Right    KNEE ARTHROSCOPY W/ DEBRIDEMENT  07/03/2013    Arthroscopy Knee Left    LITHOTRIPSY  07/22/2013    Renal Lithotripsy    MR HEAD ANGIO WO IV CONTRAST  3/23/2021    MR HEAD ANGIO WO IV CONTRAST [facility]    MR NECK ANGIO WO IV CONTRAST  3/23/2021    MR NECK ANGIO WO IV CONTRAST [facility]    OTHER SURGICAL HISTORY  04/03/2013    Cardiac Cath Procedure Outcome: Successful    OTHER SURGICAL HISTORY  04/03/2013    Neuroplasty With Transposition Of Ulnar Nerve - At Elbow    OTHER SURGICAL HISTORY  04/03/2013    General Surgery    OTHER SURGICAL HISTORY  06/11/2019    Knee replacement     OTHER SURGICAL HISTORY  2013    Cystoscopy With Insertion Of Ureteral Stent Bilateral    OTHER SURGICAL HISTORY  2013    Wrist Carpectomy    OTHER SURGICAL HISTORY  2013    Brain Surgery    OTHER SURGICAL HISTORY  2013    Ulnar Osteotomy    TONSILLECTOMY  2013    Tonsillectomy    TOTAL KNEE ARTHROPLASTY  2016    Total Knee Arthroplasty     Family History   Problem Relation Name Age of Onset    Diabetes Mother      Emphysema Father      Heart disease Father       Social History     Tobacco Use    Smoking status: Former     Current packs/day: 0.00     Types: Cigarettes     Quit date:      Years since quittin.1    Smokeless tobacco: Never   Vaping Use    Vaping status: Never Used   Substance Use Topics    Alcohol use: Not Currently    Drug use: Never       Physical Exam   ED Triage Vitals [25 1542]   Temperature Heart Rate Respirations BP   37 °C (98.6 °F) 62 16 129/70      Pulse Ox Temp Source Heart Rate Source Patient Position   100 % Tympanic Monitor Sitting      BP Location FiO2 (%)     Right arm --       Physical Exam  Vitals and nursing note reviewed.   Constitutional:       General: He is not in acute distress.     Appearance: Normal appearance. He is not ill-appearing or toxic-appearing.   HENT:      Head: Normocephalic and atraumatic.      Nose: Nose normal.      Mouth/Throat:      Mouth: Mucous membranes are moist.   Eyes:      Extraocular Movements: Extraocular movements intact.      Pupils: Pupils are equal, round, and reactive to light.   Cardiovascular:      Rate and Rhythm: Normal rate.      Pulses: Normal pulses.   Pulmonary:      Effort: Pulmonary effort is normal.      Breath sounds: No wheezing, rhonchi or rales.   Abdominal:      Palpations: Abdomen is soft.   Musculoskeletal:         General: Tenderness (Tenderness with range of motion of the right hip.) present.      Right lower leg: Edema present.      Left lower leg: Edema present.   Skin:      General: Skin is warm and dry.   Neurological:      General: No focal deficit present.      Mental Status: He is alert and oriented to person, place, and time.      Sensory: No sensory deficit.      Motor: Weakness present.   Psychiatric:         Mood and Affect: Mood normal.         Behavior: Behavior normal.           ED Course & MDM   ED Course as of 01/30/25 1830   Thu Jan 30, 2025   1647 CBC and Auto Differential(!)  Leukopenia and anemia consistent with previous labs [AJ]   1659 EKG interpreted by myself independently, EKG shows an AV dual paced rhythm, rate 60 bpm, NJ interval 272, , QT and QTc 584, patient has no ST elevation or depression, negative for acute MI. [AMOS]      ED Course User Index  [AJ] Chitra Cobos PA-C  [AMOS] Viet Arana, DO         Diagnoses as of 01/30/25 1830   Hypokalemia   Acute cystitis with hematuria   Congestive heart failure, unspecified HF chronicity, unspecified heart failure type                 No data recorded     Gladys Coma Scale Score: 15 (01/30/25 1544 : Shannon Perez RN)                           Medical Decision Making  **Disclaimer parts of this chart have been completed using voice recognition software. Please excuse any errors of transcription.     Evaluated this patient independently and my supervising physician was available for consultation.    HPI: Detailed above.    Exam: A medically appropriate exam performed, outlined above, given the known history and presentation.    History obtained from: Patient    EKG: Reviewed and interpreted by my attending physician    Labs/Diagnostics:  Labs Reviewed   CBC WITH AUTO DIFFERENTIAL - Abnormal       Result Value    WBC 2.5 (*)     nRBC 0.0      RBC 3.78 (*)     Hemoglobin 11.5 (*)     Hematocrit 37.4 (*)     MCV 99      MCH 30.4      MCHC 30.7 (*)     RDW 15.5 (*)     Platelets 128 (*)     Neutrophils % 64.9      Immature Granulocytes %, Automated 0.4      Lymphocytes % 22.0      Monocytes % 9.1      Eosinophils %  2.8      Basophils % 0.8      Neutrophils Absolute 1.65      Immature Granulocytes Absolute, Automated 0.01      Lymphocytes Absolute 0.56 (*)     Monocytes Absolute 0.23      Eosinophils Absolute 0.07      Basophils Absolute 0.02     COMPREHENSIVE METABOLIC PANEL - Abnormal    Glucose 93      Sodium 143      Potassium 2.9 (*)     Chloride 101      Bicarbonate 35 (*)     Anion Gap 10      Urea Nitrogen 16      Creatinine 1.54 (*)     eGFR 45 (*)     Calcium 8.7      Albumin 3.5      Alkaline Phosphatase 77      Total Protein 6.4      AST 14      Bilirubin, Total 1.0      ALT 5 (*)    URINALYSIS WITH REFLEX CULTURE AND MICROSCOPIC - Abnormal    Color, Urine Light-Orange (*)     Appearance, Urine Ex.Turbid (*)     Specific Gravity, Urine 1.017      pH, Urine 7.5      Protein, Urine 300 (3+) (*)     Glucose, Urine Normal      Blood, Urine 1.0 (3+) (*)     Ketones, Urine NEGATIVE      Bilirubin, Urine NEGATIVE      Urobilinogen, Urine 2 (1+) (*)     Nitrite, Urine 2+ (*)     Leukocyte Esterase, Urine 500 Ruth/uL (*)    B-TYPE NATRIURETIC PEPTIDE - Abnormal     (*)     Narrative:        <100 pg/mL - Heart failure unlikely  100-299 pg/mL - Intermediate probability of acute heart                  failure exacerbation. Correlate with clinical                  context and patient history.    >=300 pg/mL - Heart Failure likely. Correlate with clinical                  context and patient history.    BNP testing is performed using different testing methodology at [facility] than at other [facility]. Direct result comparisons should only be made within the same method.      PROTIME-INR - Abnormal    Protime 12.8 (*)     INR 1.2      Narrative:     INR Therapeutic Range: 2.0-3.5   MICROSCOPIC ONLY, URINE - Abnormal    WBC, Urine >50 (*)     RBC, Urine >20 (*)     Squamous Epithelial Cells, Urine 1-9 (SPARSE)      Bacteria, Urine 1+ (*)     Mucus, Urine FEW      Triple Phosphate Crystals, Urine 1+      SERIAL TROPONIN-INITIAL - Normal    Troponin I, High Sensitivity 11      Narrative:     Less than 99th percentile of normal range cutoff-  Female and children under 18 years old <14 ng/L; Male <21 ng/L: Negative  Repeat testing should be performed if clinically indicated.     Female and children under 18 years old 14-50 ng/L; Male 21-50 ng/L:  Consistent with possible cardiac damage and possible increased clinical   risk. Serial measurements may help to assess extent of myocardial damage.     >50 ng/L: Consistent with cardiac damage, increased clinical risk and  myocardial infarction. Serial measurements may help assess extent of   myocardial damage.      NOTE: Children less than 1 year old may have higher baseline troponin   levels and results should be interpreted in conjunction with the overall   clinical context.     NOTE: Troponin I testing is performed using a different   testing methodology at [facility] than at other   [facility]. Direct result comparisons should only   be made within the same method.   SERIAL TROPONIN, 1 HOUR - Normal    Troponin I, High Sensitivity 11      Narrative:     Less than 99th percentile of normal range cutoff-  Female and children under 18 years old <14 ng/L; Male <21 ng/L: Negative  Repeat testing should be performed if clinically indicated.     Female and children under 18 years old 14-50 ng/L; Male 21-50 ng/L:  Consistent with possible cardiac damage and possible increased clinical   risk. Serial measurements may help to assess extent of myocardial damage.     >50 ng/L: Consistent with cardiac damage, increased clinical risk and  myocardial infarction. Serial measurements may help assess extent of   myocardial damage.      NOTE: Children less than 1 year old may have higher baseline troponin   levels and results should be interpreted in conjunction with the overall   clinical context.     NOTE: Troponin I testing is performed using a different   testing  methodology at [facility] than at other   [facility]. Direct result comparisons should only   be made within the same method.   URINE CULTURE   TROPONIN SERIES- (INITIAL, 1 HR)    Narrative:     The following orders were created for panel order Troponin I Series, High Sensitivity (0, 1 HR).  Procedure                               Abnormality         Status                     ---------                               -----------         ------                     Troponin I, High Sensiti...[[id number]]  Normal              Final result               Troponin, High Sensitivi...[[id number]]  Normal              Final result                 Please view results for these tests on the individual orders.   URINALYSIS WITH REFLEX CULTURE AND MICROSCOPIC    Narrative:     The following orders were created for panel order Urinalysis with Reflex Culture and Microscopic.  Procedure                               Abnormality         Status                     ---------                               -----------         ------                     Urinalysis with Reflex C...[[id number]]  Abnormal            Final result               Extra Urine Gray Tube[[id number]]                            In process                   Please view results for these tests on the individual orders.   EXTRA URINE GRAY TUBE     XR chest 1 view   Final Result   1.  No evidence of acute cardiopulmonary process.             Signed by: Kilo Reynolds 1/30/2025 5:06 PM   Dictation workstation:   [id number]      XR lumbar spine 2-3 views   Final Result   Multilevel spondylosis without acute osseous abnormality of the   lumbar spine.             MACRO:   None        Signed by: Kilo Reynolds 1/30/2025 5:03 PM   Dictation workstation:   [id number]      XR hip right with pelvis when performed 2 or 3 views   Final Result   Degenerative changes of the right hip without acute osseous   abnormality detected.             MACRO:   None         Signed by: Kilo Reynolds 1/30/2025 5:04 PM   Dictation workstation:   [id number]        EMERGENCY DEPARTMENT COURSE and DIFFERENTIAL DIAGNOSIS/MDM:  Patient is a 79-year-old male presenting to the emergency department for evaluation of bilateral lower extremity swelling and right hip pain.  On physical exam vital signs stable and patient is in no acute distress.  Patient has bilateral lower extremity pitting edema.  He has some limited range of motion of the right hip due to pain as well as difficulty ambulating.  Case management, PT, and OT referrals placed.  Diagnostic labs also placed as well as x-rays of the right hip, lumbar spine due to concern for possible sciatica, and chest x-ray.  Urine showed evidence of infection with 2+ nitrites and 500 leukocyte esterase.  IV ceftriaxone therefore ordered.  Initial troponin 11 and repeat troponin 11 therefore low suspicion for ACS.  INR subtherapeutic.  BNP elevated at 727.  CMP showed a potassium of 2.9 likely due to patient's Lasix.  Patient given IV and oral potassium.  Creatinine 1.54 and EGFR 45.  CBC showed leukopenia and anemia which is consistent with patient's previous labs.  Chest x-ray showed no acute cardiopulmonary process with no pneumonia, pneumothorax, cardiomegaly.  X-ray of the lumbar spine showed degenerative changes but no acute abnormalities.  X-ray of the right hip showed degeneration without acute osseous abnormality.  Due to patient's hypokalemia, UTI, and possible CHF exacerbation I feel patient wants admission for further management.  Family would also like possible placement for the patient at SNF.  I spoke with hospitalist Dr. Rodriguez who agreed to admission for further management.     The patient presented with a chief complaint of bilateral lower extremity swelling and right hip pain. The differential diagnosis associated with this patient's presentation includes cough, asthma, atrial fibrillation, COPD, pulmonary edema, acute MI, CHF  exacerbation, sepsis.     Vitals:    Vitals:    01/30/25 1542   BP: 129/70   BP Location: Right arm   Patient Position: Sitting   Pulse: 62   Resp: 16   Temp: 37 °C (98.6 °F)   TempSrc: Tympanic   SpO2: 100%   Weight: 98 kg (216 lb)   Height: 1.829 m (6')     History Limited by:    None    Independent history obtained from:    Family Member daughter at bedside    External records reviewed:    Outpatient Note cardiology note from 10/28/2024 determine patient's past medical history including paroxysmal A-fib, hypertension, hyperlipidemia, chronic kidney disease, COPD, coronary artery disease, CVA    Diagnostics interpreted by me:    Xrays - see my independent interpretation in MDM    Discussions with other clinicians:    Hospitalist/Admitting Team Dr. Rodriguez.     Chronic conditions impacting care:    Hypertension, COPD, and Heart Disease    Social determinants of health affecting care:    None    Diagnostic tests considered but not performed: None    ED Medications managed:    Medications   potassium chloride 20 mEq in sterile water for injection 100 mL (20 mEq intravenous New Bag 1/30/25 1754)   cefTRIAXone (Rocephin) 1 g in dextrose (iso) IV 50 mL (has no administration in time range)   potassium chloride CR (Klor-Con M20) ER tablet 40 mEq (40 mEq oral Given 1/30/25 1724)       Prescription drugs considered:    None    Screenings:              Procedure  Procedures     Chitra Cobos PA-C  01/30/25 1836

## 2025-01-31 ENCOUNTER — APPOINTMENT (OUTPATIENT)
Dept: CARDIOLOGY | Facility: HOSPITAL | Age: 80
End: 2025-01-31
Payer: MEDICARE

## 2025-01-31 ENCOUNTER — DOCUMENTATION (OUTPATIENT)
Dept: CASE MANAGEMENT | Facility: HOSPITAL | Age: 80
End: 2025-01-31
Payer: MEDICARE

## 2025-01-31 PROBLEM — J44.9 COPD (CHRONIC OBSTRUCTIVE PULMONARY DISEASE) (MULTI): Status: ACTIVE | Noted: 2025-01-31

## 2025-01-31 LAB
ANION GAP SERPL CALCULATED.3IONS-SCNC: 10 MMOL/L (ref 10–20)
ATRIAL RATE: 60 BPM
BUN SERPL-MCNC: 16 MG/DL (ref 6–23)
CALCIUM SERPL-MCNC: 8.3 MG/DL (ref 8.6–10.3)
CHLORIDE SERPL-SCNC: 103 MMOL/L (ref 98–107)
CO2 SERPL-SCNC: 33 MMOL/L (ref 21–32)
CREAT SERPL-MCNC: 1.34 MG/DL (ref 0.5–1.3)
EGFRCR SERPLBLD CKD-EPI 2021: 53 ML/MIN/1.73M*2
ERYTHROCYTE [DISTWIDTH] IN BLOOD BY AUTOMATED COUNT: 15.5 % (ref 11.5–14.5)
GLUCOSE SERPL-MCNC: 86 MG/DL (ref 74–99)
HCT VFR BLD AUTO: 33.8 % (ref 41–52)
HGB BLD-MCNC: 10.6 G/DL (ref 13.5–17.5)
HOLD SPECIMEN: NORMAL
MCH RBC QN AUTO: 30.5 PG (ref 26–34)
MCHC RBC AUTO-ENTMCNC: 31.4 G/DL (ref 32–36)
MCV RBC AUTO: 97 FL (ref 80–100)
NRBC BLD-RTO: 0 /100 WBCS (ref 0–0)
P AXIS: -83 DEGREES
PLATELET # BLD AUTO: 116 X10*3/UL (ref 150–450)
POTASSIUM SERPL-SCNC: 3.1 MMOL/L (ref 3.5–5.3)
POTASSIUM SERPL-SCNC: 3.6 MMOL/L (ref 3.5–5.3)
PR INTERVAL: 272 MS
Q ONSET: 181 MS
QRS COUNT: 9 BEATS
QRS DURATION: 194 MS
QT INTERVAL: 584 MS
QTC CALCULATION(BAZETT): 584 MS
QTC FREDERICIA: 584 MS
R AXIS: -78 DEGREES
RBC # BLD AUTO: 3.48 X10*6/UL (ref 4.5–5.9)
SODIUM SERPL-SCNC: 143 MMOL/L (ref 136–145)
T AXIS: 105 DEGREES
T OFFSET: 473 MS
VENTRICULAR RATE: 60 BPM
WBC # BLD AUTO: 2.9 X10*3/UL (ref 4.4–11.3)

## 2025-01-31 PROCEDURE — 97530 THERAPEUTIC ACTIVITIES: CPT | Mod: GP

## 2025-01-31 PROCEDURE — 85027 COMPLETE CBC AUTOMATED: CPT | Performed by: STUDENT IN AN ORGANIZED HEALTH CARE EDUCATION/TRAINING PROGRAM

## 2025-01-31 PROCEDURE — 93005 ELECTROCARDIOGRAM TRACING: CPT

## 2025-01-31 PROCEDURE — 80048 BASIC METABOLIC PNL TOTAL CA: CPT | Performed by: STUDENT IN AN ORGANIZED HEALTH CARE EDUCATION/TRAINING PROGRAM

## 2025-01-31 PROCEDURE — 2500000001 HC RX 250 WO HCPCS SELF ADMINISTERED DRUGS (ALT 637 FOR MEDICARE OP): Performed by: STUDENT IN AN ORGANIZED HEALTH CARE EDUCATION/TRAINING PROGRAM

## 2025-01-31 PROCEDURE — 2500000004 HC RX 250 GENERAL PHARMACY W/ HCPCS (ALT 636 FOR OP/ED): Performed by: STUDENT IN AN ORGANIZED HEALTH CARE EDUCATION/TRAINING PROGRAM

## 2025-01-31 PROCEDURE — 36415 COLL VENOUS BLD VENIPUNCTURE: CPT

## 2025-01-31 PROCEDURE — 97162 PT EVAL MOD COMPLEX 30 MIN: CPT | Mod: GP

## 2025-01-31 PROCEDURE — 2500000002 HC RX 250 W HCPCS SELF ADMINISTERED DRUGS (ALT 637 FOR MEDICARE OP, ALT 636 FOR OP/ED): Performed by: STUDENT IN AN ORGANIZED HEALTH CARE EDUCATION/TRAINING PROGRAM

## 2025-01-31 PROCEDURE — 97166 OT EVAL MOD COMPLEX 45 MIN: CPT | Mod: GO

## 2025-01-31 PROCEDURE — 99222 1ST HOSP IP/OBS MODERATE 55: CPT

## 2025-01-31 PROCEDURE — 2500000002 HC RX 250 W HCPCS SELF ADMINISTERED DRUGS (ALT 637 FOR MEDICARE OP, ALT 636 FOR OP/ED)

## 2025-01-31 PROCEDURE — 2500000004 HC RX 250 GENERAL PHARMACY W/ HCPCS (ALT 636 FOR OP/ED)

## 2025-01-31 PROCEDURE — 99232 SBSQ HOSP IP/OBS MODERATE 35: CPT | Performed by: STUDENT IN AN ORGANIZED HEALTH CARE EDUCATION/TRAINING PROGRAM

## 2025-01-31 PROCEDURE — 1200000002 HC GENERAL ROOM WITH TELEMETRY DAILY

## 2025-01-31 PROCEDURE — 36415 COLL VENOUS BLD VENIPUNCTURE: CPT | Performed by: STUDENT IN AN ORGANIZED HEALTH CARE EDUCATION/TRAINING PROGRAM

## 2025-01-31 PROCEDURE — 84132 ASSAY OF SERUM POTASSIUM: CPT

## 2025-01-31 RX ORDER — FUROSEMIDE 10 MG/ML
40 INJECTION INTRAMUSCULAR; INTRAVENOUS ONCE
Status: COMPLETED | OUTPATIENT
Start: 2025-01-31 | End: 2025-01-31

## 2025-01-31 RX ORDER — POTASSIUM CHLORIDE 20 MEQ/1
40 TABLET, EXTENDED RELEASE ORAL DAILY
Status: DISCONTINUED | OUTPATIENT
Start: 2025-01-31 | End: 2025-02-04 | Stop reason: HOSPADM

## 2025-01-31 RX ORDER — POTASSIUM CHLORIDE 20 MEQ/1
40 TABLET, EXTENDED RELEASE ORAL ONCE
Status: COMPLETED | OUTPATIENT
Start: 2025-01-31 | End: 2025-01-31

## 2025-01-31 RX ADMIN — CEFAZOLIN SODIUM 1 G: 1 INJECTION, SOLUTION INTRAVENOUS at 15:29

## 2025-01-31 RX ADMIN — GABAPENTIN 100 MG: 100 CAPSULE ORAL at 21:22

## 2025-01-31 RX ADMIN — APIXABAN 2.5 MG: 2.5 TABLET, FILM COATED ORAL at 21:23

## 2025-01-31 RX ADMIN — ATORVASTATIN CALCIUM 20 MG: 20 TABLET, FILM COATED ORAL at 21:22

## 2025-01-31 RX ADMIN — POTASSIUM CHLORIDE 40 MEQ: 1500 TABLET, EXTENDED RELEASE ORAL at 10:10

## 2025-01-31 RX ADMIN — AMIODARONE HYDROCHLORIDE 200 MG: 200 TABLET ORAL at 08:35

## 2025-01-31 RX ADMIN — POTASSIUM CHLORIDE 40 MEQ: 1500 TABLET, EXTENDED RELEASE ORAL at 08:35

## 2025-01-31 RX ADMIN — CEFAZOLIN SODIUM 1 G: 1 INJECTION, SOLUTION INTRAVENOUS at 23:52

## 2025-01-31 RX ADMIN — FINASTERIDE 5 MG: 5 TABLET, FILM COATED ORAL at 08:36

## 2025-01-31 RX ADMIN — FOLIC ACID 1 MG: 1 TABLET ORAL at 08:35

## 2025-01-31 RX ADMIN — ACETAMINOPHEN 650 MG: 325 TABLET ORAL at 15:31

## 2025-01-31 RX ADMIN — PANTOPRAZOLE SODIUM 40 MG: 40 TABLET, DELAYED RELEASE ORAL at 08:35

## 2025-01-31 RX ADMIN — FERROUS SULFATE TAB 325 MG (65 MG ELEMENTAL FE) 325 MG: 325 (65 FE) TAB at 08:35

## 2025-01-31 RX ADMIN — FUROSEMIDE 40 MG: 10 INJECTION, SOLUTION INTRAMUSCULAR; INTRAVENOUS at 10:10

## 2025-01-31 RX ADMIN — CEFAZOLIN SODIUM 1 G: 1 INJECTION, SOLUTION INTRAVENOUS at 08:35

## 2025-01-31 RX ADMIN — METOPROLOL SUCCINATE 25 MG: 25 TABLET, FILM COATED, EXTENDED RELEASE ORAL at 08:36

## 2025-01-31 RX ADMIN — TAMSULOSIN HYDROCHLORIDE 0.8 MG: 0.4 CAPSULE ORAL at 21:22

## 2025-01-31 RX ADMIN — APIXABAN 2.5 MG: 2.5 TABLET, FILM COATED ORAL at 08:37

## 2025-01-31 RX ADMIN — PAROXETINE HYDROCHLORIDE 20 MG: 20 TABLET, FILM COATED ORAL at 08:35

## 2025-01-31 SDOH — HEALTH STABILITY: MENTAL HEALTH
DO YOU FEEL STRESS - TENSE, RESTLESS, NERVOUS, OR ANXIOUS, OR UNABLE TO SLEEP AT NIGHT BECAUSE YOUR MIND IS TROUBLED ALL THE TIME - THESE DAYS?: NOT AT ALL

## 2025-01-31 SDOH — ECONOMIC STABILITY: HOUSING INSECURITY: AT ANY TIME IN THE PAST 12 MONTHS, WERE YOU HOMELESS OR LIVING IN A SHELTER (INCLUDING NOW)?: NO

## 2025-01-31 SDOH — HEALTH STABILITY: PHYSICAL HEALTH
HOW OFTEN DO YOU NEED TO HAVE SOMEONE HELP YOU WHEN YOU READ INSTRUCTIONS, PAMPHLETS, OR OTHER WRITTEN MATERIAL FROM YOUR DOCTOR OR PHARMACY?: NEVER

## 2025-01-31 SDOH — SOCIAL STABILITY: SOCIAL NETWORK
IN A TYPICAL WEEK, HOW MANY TIMES DO YOU TALK ON THE PHONE WITH FAMILY, FRIENDS, OR NEIGHBORS?: MORE THAN THREE TIMES A WEEK

## 2025-01-31 SDOH — ECONOMIC STABILITY: TRANSPORTATION INSECURITY: IN THE PAST 12 MONTHS, HAS LACK OF TRANSPORTATION KEPT YOU FROM MEDICAL APPOINTMENTS OR FROM GETTING MEDICATIONS?: NO

## 2025-01-31 SDOH — ECONOMIC STABILITY: FOOD INSECURITY: WITHIN THE PAST 12 MONTHS, THE FOOD YOU BOUGHT JUST DIDN'T LAST AND YOU DIDN'T HAVE MONEY TO GET MORE.: NEVER TRUE

## 2025-01-31 SDOH — ECONOMIC STABILITY: INCOME INSECURITY: IN THE PAST 12 MONTHS HAS THE ELECTRIC, GAS, OIL, OR WATER COMPANY THREATENED TO SHUT OFF SERVICES IN YOUR HOME?: NO

## 2025-01-31 SDOH — SOCIAL STABILITY: SOCIAL NETWORK: HOW OFTEN DO YOU ATTEND CHURCH OR RELIGIOUS SERVICES?: NEVER

## 2025-01-31 SDOH — ECONOMIC STABILITY: HOUSING INSECURITY: IN THE PAST 12 MONTHS, HOW MANY TIMES HAVE YOU MOVED WHERE YOU WERE LIVING?: 0

## 2025-01-31 SDOH — ECONOMIC STABILITY: HOUSING INSECURITY: IN THE LAST 12 MONTHS, WAS THERE A TIME WHEN YOU WERE NOT ABLE TO PAY THE MORTGAGE OR RENT ON TIME?: NO

## 2025-01-31 SDOH — ECONOMIC STABILITY: FOOD INSECURITY: HOW HARD IS IT FOR YOU TO PAY FOR THE VERY BASICS LIKE FOOD, HOUSING, MEDICAL CARE, AND HEATING?: NOT VERY HARD

## 2025-01-31 SDOH — ECONOMIC STABILITY: FOOD INSECURITY: WITHIN THE PAST 12 MONTHS, YOU WORRIED THAT YOUR FOOD WOULD RUN OUT BEFORE YOU GOT THE MONEY TO BUY MORE.: NEVER TRUE

## 2025-01-31 SDOH — SOCIAL STABILITY: SOCIAL INSECURITY: WITHIN THE LAST YEAR, HAVE YOU BEEN HUMILIATED OR EMOTIONALLY ABUSED IN OTHER WAYS BY YOUR PARTNER OR EX-PARTNER?: NO

## 2025-01-31 SDOH — SOCIAL STABILITY: SOCIAL NETWORK: HOW OFTEN DO YOU GET TOGETHER WITH FRIENDS OR RELATIVES?: NEVER

## 2025-01-31 SDOH — HEALTH STABILITY: MENTAL HEALTH: HOW OFTEN DO YOU HAVE SIX OR MORE DRINKS ON ONE OCCASION?: NEVER

## 2025-01-31 SDOH — SOCIAL STABILITY: SOCIAL INSECURITY: ARE YOU MARRIED, WIDOWED, DIVORCED, SEPARATED, NEVER MARRIED, OR LIVING WITH A PARTNER?: MARRIED

## 2025-01-31 SDOH — SOCIAL STABILITY: SOCIAL INSECURITY: WITHIN THE LAST YEAR, HAVE YOU BEEN AFRAID OF YOUR PARTNER OR EX-PARTNER?: NO

## 2025-01-31 SDOH — HEALTH STABILITY: MENTAL HEALTH: HOW OFTEN DO YOU HAVE A DRINK CONTAINING ALCOHOL?: NEVER

## 2025-01-31 SDOH — SOCIAL STABILITY: SOCIAL NETWORK: HOW OFTEN DO YOU ATTEND MEETINGS OF THE CLUBS OR ORGANIZATIONS YOU BELONG TO?: NEVER

## 2025-01-31 SDOH — HEALTH STABILITY: MENTAL HEALTH: HOW MANY DRINKS CONTAINING ALCOHOL DO YOU HAVE ON A TYPICAL DAY WHEN YOU ARE DRINKING?: PATIENT DOES NOT DRINK

## 2025-01-31 SDOH — HEALTH STABILITY: PHYSICAL HEALTH: ON AVERAGE, HOW MANY DAYS PER WEEK DO YOU ENGAGE IN MODERATE TO STRENUOUS EXERCISE (LIKE A BRISK WALK)?: 0 DAYS

## 2025-01-31 SDOH — HEALTH STABILITY: PHYSICAL HEALTH: ON AVERAGE, HOW MANY MINUTES DO YOU ENGAGE IN EXERCISE AT THIS LEVEL?: 0 MIN

## 2025-01-31 ASSESSMENT — PAIN - FUNCTIONAL ASSESSMENT
PAIN_FUNCTIONAL_ASSESSMENT: 0-10

## 2025-01-31 ASSESSMENT — COGNITIVE AND FUNCTIONAL STATUS - GENERAL
STANDING UP FROM CHAIR USING ARMS: A LOT
TOILETING: TOTAL
HELP NEEDED FOR BATHING: A LOT
PERSONAL GROOMING: A LITTLE
TURNING FROM BACK TO SIDE WHILE IN FLAT BAD: A LOT
CLIMB 3 TO 5 STEPS WITH RAILING: TOTAL
DRESSING REGULAR LOWER BODY CLOTHING: A LOT
DAILY ACTIVITIY SCORE: 14
WALKING IN HOSPITAL ROOM: A LOT
STANDING UP FROM CHAIR USING ARMS: A LOT
MOVING TO AND FROM BED TO CHAIR: A LOT
WALKING IN HOSPITAL ROOM: A LOT
MOVING FROM LYING ON BACK TO SITTING ON SIDE OF FLAT BED WITH BEDRAILS: A LITTLE
HELP NEEDED FOR BATHING: A LOT
DAILY ACTIVITIY SCORE: 16
PERSONAL GROOMING: A LOT
MOBILITY SCORE: 15
DRESSING REGULAR UPPER BODY CLOTHING: A LITTLE
TOILETING: A LOT
MOBILITY SCORE: 12
DRESSING REGULAR LOWER BODY CLOTHING: A LOT
DRESSING REGULAR UPPER BODY CLOTHING: A LITTLE
MOVING TO AND FROM BED TO CHAIR: A LOT
CLIMB 3 TO 5 STEPS WITH RAILING: TOTAL

## 2025-01-31 ASSESSMENT — ACTIVITIES OF DAILY LIVING (ADL)
LACK_OF_TRANSPORTATION: NO
ADL_ASSISTANCE: INDEPENDENT
LACK_OF_TRANSPORTATION: NO
BATHING_ASSISTANCE: MODERATE
ADL_ASSISTANCE: INDEPENDENT

## 2025-01-31 ASSESSMENT — PAIN DESCRIPTION - ORIENTATION: ORIENTATION: RIGHT

## 2025-01-31 ASSESSMENT — PAIN SCALES - GENERAL
PAINLEVEL_OUTOF10: 0 - NO PAIN
PAINLEVEL_OUTOF10: 0 - NO PAIN
PAINLEVEL_OUTOF10: 4
PAINLEVEL_OUTOF10: 4
PAINLEVEL_OUTOF10: 3

## 2025-01-31 ASSESSMENT — LIFESTYLE VARIABLES
SKIP TO QUESTIONS 9-10: 1
AUDIT-C TOTAL SCORE: 0

## 2025-01-31 ASSESSMENT — PAIN DESCRIPTION - LOCATION: LOCATION: LEG

## 2025-01-31 NOTE — CONSULTS
Nutrition Assessement Note    Nutrition Assessment    Reason for Assessment: Dietitian discretion (CHF)    Reason for Hospital Admission:  Angel Carroll is a 79 y.o. male who is admitted for hypokalemia. Pt presenting with generalized weakness and worsening lower extremity edema. Spoke with pt and daughter at bedside. Pt reported good PO intake and appetite. Pt reported that he tries to watch his sodium intake. Daughter reported that pt receives no salt added snacks like chips and cashews. They denied for further diet education.     Malnutrition Screening Tool (MST)  Have you recently lost weight without trying?: No  Weight Loss Score: 0  Have you been eating poorly because of a decreased appetite?: No  Malnutrition Score: 0  Nutrition Screen  Stage 3 or 4 Pressure Injury or Multiple Non-Healing Wounds: No  Home Tube Feeding or Total Parenteral Nutrition (TPN): No  Dietitian Consult Needed: No    Past Medical History:   Diagnosis Date    Atherosclerotic heart disease of native coronary artery without angina pectoris 12/14/2022    Arteriosclerotic cardiovascular disease (ASCVD)    Benign neoplasm of meninges, unspecified     Meningioma    Benign prostatic hyperplasia without lower urinary tract symptoms 02/28/2022    BPH without obstruction/lower urinary tract symptoms    Body mass index (BMI) 33.0-33.9, adult 07/02/2021    BMI 33.0-33.9,adult    Body mass index (BMI) 34.0-34.9, adult 11/09/2021    BMI 34.0-34.9,adult    Body mass index (BMI) 34.0-34.9, adult 09/08/2021    BMI 34.0-34.9,adult    Body mass index (BMI) 35.0-35.9, adult 12/29/2021    BMI 35.0-35.9,adult    Body mass index (BMI) 35.0-35.9, adult 02/28/2022    BMI 35.0-35.9,adult    Elevated blood-pressure reading, without diagnosis of hypertension     Prehypertension    Encounter for immunization 10/27/2015    Need for prophylactic vaccination and inoculation against influenza    Hyperlipidemia, unspecified 11/15/2021    Hyperlipidemia    Other conditions  influencing health status     Nephrolithiasis    Pacemaker     Personal history of other diseases of the musculoskeletal system and connective tissue     History of tendinitis    Personal history of other drug therapy     History of influenza vaccination    Personal history of other specified conditions 10/25/2022    History of bradycardia    Rectal bleeding     Vitamin D deficiency, unspecified 10/08/2020    Vitamin D deficiency      Past Surgical History:   Procedure Laterality Date    BACK SURGERY  04/18/2016    Back Surgery    COLONOSCOPY  07/03/2013    Complete Colonoscopy    CORONARY ANGIOPLASTY WITH STENT PLACEMENT  08/18/2020    Cath Stent Placement    CT ANGIO NECK  9/25/2021    CT NECK ANGIO W AND WO IV CONTRAST 9/25/2021 [facility]    CT HEAD ANGIO W AND WO IV CONTRAST  9/25/2021    CT HEAD ANGIO W AND WO IV CONTRAST 9/25/2021 [facility]    EYE SURGERY  04/03/2013    Eye Surgery    KNEE ARTHROSCOPY W/ DEBRIDEMENT  07/03/2013    Arthroscopy Knee Right    KNEE ARTHROSCOPY W/ DEBRIDEMENT  07/03/2013    Arthroscopy Knee Left    LITHOTRIPSY  07/22/2013    Renal Lithotripsy    MR HEAD ANGIO WO IV CONTRAST  3/23/2021    MR HEAD ANGIO WO IV CONTRAST [facility]    MR NECK ANGIO WO IV CONTRAST  3/23/2021    MR NECK ANGIO WO IV CONTRAST [facility]    OTHER SURGICAL HISTORY  04/03/2013    Cardiac Cath Procedure Outcome: Successful    OTHER SURGICAL HISTORY  04/03/2013    Neuroplasty With Transposition Of Ulnar Nerve - At Elbow    OTHER SURGICAL HISTORY  04/03/2013    General Surgery    OTHER SURGICAL HISTORY  06/11/2019    Knee replacement    OTHER SURGICAL HISTORY  07/22/2013    Cystoscopy With Insertion Of Ureteral Stent Bilateral    OTHER SURGICAL HISTORY  07/03/2013    Wrist Carpectomy    OTHER SURGICAL HISTORY  07/03/2013    Brain Surgery    OTHER SURGICAL HISTORY  07/03/2013    Ulnar Osteotomy    TONSILLECTOMY  04/03/2013    Tonsillectomy    TOTAL KNEE ARTHROPLASTY   08/03/2016    Total Knee Arthroplasty       Nutrition History:  Food and Nutrient History: Pt reported good PO intake. Daughter stated that pt receives meals on wheels during the week and weekends is when pt consumes fast food. Daughter reported that pt monitors his Na intake.  Energy Intake: Good > 75 %     Food Allergies/Intolerances:  None    Anthropometrics:  Ht: 182.9 cm (6'), Wt: 98 kg (216 lb), BMI: 29.29  IBW/kg (Dietitian Calculated): 80.91 kg          Weight Change:  Daily Weight  01/30/25 : 98 kg (216 lb)  12/06/24 : 98 kg (216 lb 1.6 oz)  11/15/24 : 102 kg (225 lb)  10/28/24 : 104 kg (229 lb 4.8 oz)  10/04/24 : 104 kg (229 lb)  10/01/24 : 103 kg (226 lb)  08/26/24 : 102 kg (225 lb)  08/20/24 : 110 kg (242 lb)  08/08/24 : 110 kg (242 lb 11.6 oz)  07/29/24 : 104 kg (228 lb 3.2 oz)     Weight History / % Weight Change: Pt reported UBW is between 215-225#             Nutrition Focused Physical Exam Findings:             Edema  Edema: +2 mild  Edema Location: BLE         Nutrition Significant Labs:  Lab Results   Component Value Date    WBC 2.9 (L) 01/31/2025    HGB 10.6 (L) 01/31/2025    HCT 33.8 (L) 01/31/2025     (L) 01/31/2025    CHOL 117 (L) 11/14/2022    TRIG 81 11/14/2022    HDL 31 (L) 11/14/2022    ALT 5 (L) 01/30/2025    AST 14 01/30/2025     01/31/2025    K 3.6 01/31/2025     01/31/2025    CREATININE 1.34 (H) 01/31/2025    BUN 16 01/31/2025    CO2 33 (H) 01/31/2025    TSH 2.84 11/14/2022    INR 1.2 01/30/2025    HGBA1C 5.0 02/12/2022     Nutrition Specific Medications:  amiodarone, 200 mg, oral, Daily  apixaban, 2.5 mg, oral, BID  atorvastatin, 20 mg, oral, Nightly  ceFAZolin, 1 g, intravenous, q8h  ferrous sulfate (325 mg ferrous sulfate), 65 mg of iron, oral, Daily with breakfast  finasteride, 5 mg, oral, Daily  folic acid, 1 mg, oral, Daily  [Held by provider] furosemide, 20 mg, oral, BID  gabapentin, 100 mg, oral, Nightly  metoprolol succinate XL, 25 mg, oral,  Daily  pantoprazole, 40 mg, oral, Daily   Or  pantoprazole, 40 mg, intravenous, Daily  PARoxetine, 20 mg, oral, Daily  polyethylene glycol, 17 g, oral, Daily  potassium chloride CR, 40 mEq, oral, Daily  tamsulosin, 0.8 mg, oral, Nightly      oxygen, 2 L/min        Dietary Orders (From admission, onward)       Start     Ordered    01/31/25 0914  Adult diet Regular, Cardiac; 70 gm fat; 2 - 3 grams Sodium; 2000 mL fluid  Diet effective now        Question Answer Comment   Diet type Regular    Diet type Cardiac    Fat restriction: 70 gm fat    Sodium restriction: 2 - 3 grams Sodium    Dietary fluid restriction / 24h: 2000 mL fluid        01/31/25 0914    01/30/25 2141  May Participate in Room Service  ( ROOM SERVICE MAY PARTICIPATE)  Once        Question:  .  Answer:  Yes    01/30/25 2140                     Estimated Needs:   Estimated Energy Needs  Total Energy Estimated Needs in 24 hours (kCal): 2023 kCal  Energy Estimated Needs per kg Body Weight in 24 hours (kCal/kg): 25 kCal/kg  Method for Estimating Needs: IBW    Estimated Protein Needs  Total Protein Estimated Needs in 24 Hours (g): 97 g  Protein Estimated Needs per kg Body Weight in 24 Hours (g/kg): 1.2 g/kg  Method for Estimating 24 Hour Protein Needs: IBW    Estimated Fluid Needs  Total Fluid Estimated Needs in 24 Hours (mL): 2000 mL  Patient on Order Fluid Restriction: Yes        Nutrition Diagnosis   Nutrition Diagnosis:       Nutrition Diagnosis  Patient has Nutrition Diagnosis: Yes  Diagnosis Status (1): New  Nutrition Diagnosis 1: Increased nutrient needs  Related to (1): increased demand for nutrient  As Evidenced by (1): CHF       Nutrition Interventions/Recommendations   Nutrition Interventions and Recommendations:  Nutrition Prescription: Nutrition prescription for oral nutrition    Nutrition Recommendations:  Individualized Nutrition Prescription Provided for : 2023 kcals, 97 g protein    Nutrition Interventions/Goals:   Food and/or Nutrient  Delivery Interventions  Interventions: Meals and snacks  Meals and Snacks: Mineral-modified diet, Fat-modified diet  Goal: provide diet as ordered    Education Documentation  No documentation found.           Nutrition Monitoring and Evaluation   Monitoring/Evaluation:   Food/Nutrient Related History Monitoring  Monitoring and Evaluation Plan: Estimated Energy Intake  Estimated Energy Intake: Energy intake greater or equal to 75% of estimated energy needs  Additional Plans: pt will plan meals within prescribed guidelines                        Goal Status: New goal(s) identified       Time Spent (min): 30 minutes

## 2025-01-31 NOTE — PROGRESS NOTES
Physical Therapy    Physical Therapy Evaluation & Treatment    Patient Name: Angel Carroll  MRN: [id number]  Department: [address]  Room: [address] [place] [address]  Today's Date: 1/31/2025   Time Calculation  Start Time: 0806  Stop Time: 0830  Time Calculation (min): 24 min    Assessment/Plan   PT Assessment  PT Assessment Results: Decreased strength, Decreased range of motion, Decreased endurance, Impaired balance, Decreased mobility, Decreased coordination, Impaired judgement, Decreased safety awareness, Impaired vision, Impaired hearing, Decreased skin integrity, Pain  Rehab Prognosis: Good  Barriers to Discharge Home: Physical needs  Physical Needs: Stair navigation into home limited by function/safety, 24hr mobility assistance needed, 24hr ADL assistance needed, High falls risk due to function or environment  Evaluation/Treatment Tolerance: Patient tolerated treatment well, Patient limited by fatigue  Medical Staff Made Aware: Yes  Strengths: Ability to acquire knowledge  Barriers to Participation: Comorbidities  End of Session Communication: Bedside nurse  Assessment Comment: The patient is an 79 y.o. male admitted to the hospital for increasing BLE edema. The patient currently requires mod to maxA for transfers and ambulation with RW. The patient would benefit from skilled therapy services to address functional deficits.  End of Session Patient Position: Up in chair, Alarm on   IP OR SWING BED PT PLAN  Inpatient or Swing Bed: Inpatient  PT Plan  Treatment/Interventions: Bed mobility, Gait training, Transfer training, Balance training, Neuromuscular re-education, Strengthening, Endurance training, Range of motion, Therapeutic exercise, Therapeutic activity, Home exercise program  PT Plan: Ongoing PT  PT Frequency: 4 times per week  PT Discharge Recommendations: Moderate intensity level of continued care  Equipment Recommended upon Discharge: Wheeled walker  PT Recommended Transfer Status: Assist x1, Assist x2  PT - OK to Discharge:  Yes      Subjective     General Visit Information:  General  Reason for Referral: weakness, impaired ADL's/mobility  Referred By: Eugenia Rodriguez MD  Past Medical History Relevant to Rehab: a-fib, CKD, HTN, BPH, AHD  Family/Caregiver Present: No  Co-Treatment: OT  Co-Treatment Reason: Physically complex eval requiring 2 skilled Therapists  Prior to Session Communication: Bedside nurse  Patient Position Received: Bed, 3 rail up, Alarm on  Preferred Learning Style: verbal, visual  General Comment: Pt is an 78 yo male admitted to the hospital with hypokalemia and a UTI.  Home Living:  Home Living  Type of Home: House  Lives With: Spouse  Home Adaptive Equipment: Walker rolling or standard, Cane, Wheelchair-manual, Other (Comment) (rollator)  Home Layout: Multi-level, Stairs to alternate level with rails, Able to live on main level with bedroom/bathroom  Alternate Level Stairs-Rails:  (unilateral)  Alternate Level Stairs-Number of Steps: 12  Home Access: Stairs to enter with rails  Entrance Stairs-Rails: Both  Entrance Stairs-Number of Steps: 4  Bathroom Shower/Tub: Tub/shower unit  Bathroom Toilet: Standard  Bathroom Equipment: Grab bars in shower, Tub transfer bench  Bathroom Accessibility: 1st floor access  Home Living Comments: multi-level home with 1st floor set-up  Prior Level of Function:  Prior Function Per Pt/Caregiver Report  Level of Early: Independent with ADLs and functional transfers, Needs assistance with homemaking  Receives Help From: Family  ADL Assistance: Independent  Homemaking Assistance: Needs assistance (family completes IADLs; MOW)  Ambulatory Assistance: Independent (RW vs WC)  Vocational: Retired  Hand Dominance: Left  Precautions:  Precautions  Hearing/Visual Limitations: wears glasses, [place](has hearing aids)  Medical Precautions: Fall precautions, Oxygen therapy device and L/min (3L O2)  Precautions Comment: h/o recent fall    Objective   Pain:  Pain Assessment  Pain Assessment:  0-10  0-10 (Numeric) Pain Score: 4  Pain Type: Acute pain  Pain Location: Leg  Pain Orientation: Right  Pain Interventions: Repositioned  Response to Interventions: Resting quietly  Cognition:  Cognition  Overall Cognitive Status: Within Functional Limits  Orientation Level: Oriented X4  Cognition Comments: Pt is pleasant and talkative  Insight: Mild    General Assessments:  General Observation  General Observation: pleasant; agreeable to mobility; Edema BLE    Activity Tolerance  Endurance: Decreased tolerance for upright activites  Activity Tolerance Comments: limited due to general fatigue  Rate of Perceived Exertion (RPE): 5/10    Sensation  Sensation Comment: pt denies paresthesia BLE    Strength  Strength Comments: BLE grossly 3-3+/5  Coordination  Coordination Comment: increased time and effort for mobility    Postural Control  Posture Comment: forward head with rounded shoulders    Static Sitting Balance  Static Sitting-Balance Support: Feet supported  Static Sitting-Level of Assistance: Contact guard  Static Sitting-Comment/Number of Minutes: intermittent retrolean requiring min to modA to correct to midline    Static Standing Balance  Static Standing-Balance Support: Bilateral upper extremity supported (RW)  Static Standing-Level of Assistance: Moderate assistance  Static Standing-Comment/Number of Minutes: forward flexed posture  Functional Assessments:  Bed Mobility  Bed Mobility: Yes  Bed Mobility 1  Bed Mobility 1: Supine to sitting  Level of Assistance 1: Maximum assistance, +2  Bed Mobility Comments 1: assist for moving BLE to EOB; assist for trunk elevation and scooting hips with draw sheet.    Transfers  Transfer: Yes  Transfer 1  Transfer From 1: Bed to, Stand to  Transfer to 1: Stand, Chair with arms  Technique 1: Sit to stand, Stand to sit  Transfer Device 1: Walker  Transfer Level of Assistance 1: Moderate assistance  Trials/Comments 1: VCs for safe hand placement and  seqeuncing    Ambulation/Gait Training  Ambulation/Gait Training Performed: Yes  Ambulation/Gait Training 1  Surface 1: Level tile  Device 1: Rolling walker  Assistance 1: Moderate assistance  Quality of Gait 1: Narrow base of support, Shuffling gait  Comments/Distance (ft) 1: 4ftx1 with RW and modA; VCs for safe navigation of RW; shuffled-like gait.  Extremity/Trunk Assessments:  RLE   RLE :  (limited due to pain; grossly 3-3+/5)  LLE   LLE :  (grossly 3+/5)  Treatments:  Therapeutic Activity  Therapeutic Activity Performed: Yes  Therapeutic Activity 1: Patient found with soiled brief; hygiene assist provided in standing with new gown also donned.    Bed Mobility  Bed Mobility: Yes  Bed Mobility 1  Bed Mobility 1: Supine to sitting  Level of Assistance 1: Maximum assistance, +2  Bed Mobility Comments 1: assist for moving BLE to EOB; assist for trunk elevation and scooting hips with draw sheet.    Ambulation/Gait Training  Ambulation/Gait Training Performed: Yes  Ambulation/Gait Training 1  Surface 1: Level tile  Device 1: Rolling walker  Assistance 1: Moderate assistance  Quality of Gait 1: Narrow base of support, Shuffling gait  Comments/Distance (ft) 1: 4ftx1 with RW and modA; VCs for safe navigation of RW; shuffled-like gait.  Transfers  Transfer: Yes  Transfer 1  Transfer From 1: Bed to, Stand to  Transfer to 1: Stand, Chair with arms  Technique 1: Sit to stand, Stand to sit  Transfer Device 1: Walker  Transfer Level of Assistance 1: Moderate assistance  Trials/Comments 1: VCs for safe hand placement and seqeuncing  Outcome Measures:  [facility] Basic Mobility  Turning from your back to your side while in a flat bed without using bedrails: A little  Moving from lying on your back to sitting on the side of a flat bed without using bedrails: A lot  Moving to and from bed to chair (including a wheelchair): A lot  Standing up from a chair using your arms (e.g. wheelchair or bedside chair): A lot  To walk in hospital  room: A lot  Climbing 3-5 steps with railing: Total  Basic Mobility - Total Score: 12    Encounter Problems       Encounter Problems (Active)       PT Problem       Strength (Progressing)       Start:  01/31/25    Expected End:  02/28/25       The patient will demonstrate an overall strength of 4/5 in BLE to assist with completion of functional mobility.           Functional Mobility (Progressing)       Start:  01/31/25    Expected End:  02/28/25       The patient will complete functional mobility (bed mobility, transfers, etc.) at a distant supervision level with LRAD by DC.           Ambulation (Progressing)       Start:  01/31/25    Expected End:  02/28/25       The patient will be able to ambulate at a distant supervision level for 30ftx1 with RW.          Balance (Progressing)       Start:  01/31/25    Expected End:  02/28/25       The patient will demonstrate good dynamic standing balance during activity with LRAD.             Pain - Adult              Education Documentation  Mobility Training, taught by Lara Quiroz PT at 1/31/2025  9:48 AM.  Learner: Patient  Readiness: Acceptance  Method: Explanation  Response: Verbalizes Understanding  Comment: educated pt on PT POC    Education Comments  No comments found.

## 2025-01-31 NOTE — PROGRESS NOTES
01/31/25 1307   Discharge Planning   Living Arrangements Spouse/significant other   Support Systems Spouse/significant other   Type of Residence Private residence   Number of Stairs Within Residence 24   Do you have animals or pets at home? Yes   Type of Animals or Pets 2 cats   Home or Post Acute Services Post acute facilities (Rehab/SNF/etc)   Expected Discharge Disposition SNF   Does the patient need discharge transport arranged? Yes   RoundTrip coordination needed? Yes   Has discharge transport been arranged? No   Financial Resource Strain   How hard is it for you to pay for the very basics like food, housing, medical care, and heating? Not very   Housing Stability   In the last 12 months, was there a time when you were not able to pay the mortgage or rent on time? N   In the past 12 months, how many times have you moved where you were living? 0   At any time in the past 12 months, were you homeless or living in a shelter (including now)? N   Transportation Needs   In the past 12 months, has lack of transportation kept you from medical appointments or from getting medications? no   In the past 12 months, has lack of transportation kept you from meetings, work, or from getting things needed for daily living? No   Patient Choice   Provider Choice list and CMS website (https://medicare.gov/care-compare#search) for post-acute Quality and Resource Measure Data were provided and reviewed with: Patient   Patient / Family choosing to utilize agency / facility established prior to hospitalization No   Stroke Family Assessment   Stroke Family Assessment Needed No

## 2025-01-31 NOTE — H&P
"History Of Present Illness  Angel Carroll is a 79 y.o. male hx of Afib on eliquis, BPH, CKD, HTN, chronic right hip pain, presenting with generalized weakness and worsening lower extremity edema. Reports worsening lower extremity edema over the last week or 2, states RLE is worse than LLE at baseline. Patient states he has had progressive functional decline and frequent falls over the last year or so. He was admitted at [facility] in August 2024 after a mechanical fall at home and was discharged to [facility]. States he did well at [facility] and would like to return there. Reports having a \"soft fall\" at home about 1 week ago and has felt unsteady on his feet ever since then. Lives at home with his wife who he describes to also be in \"poor health\". Has 2 daughters and a granddaughter that live nearby and help check on him and his wife.     In the ED, VSS. Labs notable for Cr 1.54, K 2.9, , WBC 2.5, Hgb 11.5, . UA suggestive of possible infection. Given potassium 40mEq PO and 20mEq IV.     Code status discussed with patient -FULL CODE     Past Medical History  He has a past medical history of Atherosclerotic heart disease of native coronary artery without angina pectoris (12/14/2022), Benign neoplasm of meninges, unspecified, Benign prostatic hyperplasia without lower urinary tract symptoms (02/28/2022), Body mass index (BMI) 33.0-33.9, adult (07/02/2021), Body mass index (BMI) 34.0-34.9, adult (11/09/2021), Body mass index (BMI) 34.0-34.9, adult (09/08/2021), Body mass index (BMI) 35.0-35.9, adult (12/29/2021), Body mass index (BMI) 35.0-35.9, adult (02/28/2022), Elevated blood-pressure reading, without diagnosis of hypertension, Encounter for immunization (10/27/2015), Hyperlipidemia, unspecified (11/15/2021), Other conditions influencing health status, Pacemaker, Personal history of other diseases of the musculoskeletal system and connective tissue, Personal history of other drug therapy, Personal " history of other specified conditions (10/25/2022), Rectal bleeding, and Vitamin D deficiency, unspecified (10/08/2020).    Surgical History  He has a past surgical history that includes Other surgical history (04/03/2013); Other surgical history (04/03/2013); Other surgical history (04/03/2013); Eye surgery (04/03/2013); Tonsillectomy (04/03/2013); Total knee arthroplasty (08/03/2016); Other surgical history (06/11/2019); Back surgery (04/18/2016); Lithotripsy (07/22/2013); Other surgical history (07/22/2013); Colonoscopy (07/03/2013); Other surgical history (07/03/2013); Knee arthroscopy w/ debridement (07/03/2013); Knee arthroscopy w/ debridement (07/03/2013); Other surgical history (07/03/2013); Other surgical history (07/03/2013); Coronary angioplasty with stent (08/18/2020); CT angio head w and wo IV contrast (9/25/2021); CT angio neck (9/25/2021); MR angio head wo IV contrast (3/23/2021); and MR angio neck wo IV contrast (3/23/2021).     Social History  He reports that he quit smoking about 31 years ago. His smoking use included cigarettes. He has never used smokeless tobacco. He reports that he does not currently use alcohol. He reports that he does not use drugs.    Family History  Family History   Problem Relation Name Age of Onset    Diabetes Mother      Emphysema Father      Heart disease Father          Allergies  Other, Ranolazine, and Cyclobenzaprine    Review of Systems   Constitutional:  Negative for fever.   HENT:  Negative for congestion and trouble swallowing.    Respiratory:  Negative for cough and shortness of breath.    Cardiovascular:  Positive for leg swelling. Negative for chest pain.   Gastrointestinal:  Negative for abdominal pain, constipation, diarrhea and vomiting.   Genitourinary:  Positive for decreased urine volume and difficulty urinating.   Musculoskeletal:  Positive for gait problem.   Neurological:  Positive for weakness.   Psychiatric/Behavioral:  Negative for confusion.          Physical Exam  Constitutional:       General: He is not in acute distress.     Appearance: He is ill-appearing (chronically). He is not toxic-appearing.      Comments: Tired appearing   HENT:      Head: Normocephalic.      Mouth/Throat:      Pharynx: Oropharynx is clear.   Eyes:      General: No scleral icterus.  Cardiovascular:      Rate and Rhythm: Normal rate.   Pulmonary:      Effort: No respiratory distress.      Breath sounds: No wheezing.      Comments: Room air  Abdominal:      General: There is no distension.      Palpations: Abdomen is soft.   Musculoskeletal:      Right lower leg: Edema present.      Left lower leg: Edema present.   Neurological:      Mental Status: He is alert and oriented to person, place, and time.   Psychiatric:         Behavior: Behavior normal.          Last Recorded Vitals  /70 (BP Location: Right arm, Patient Position: Sitting)   Pulse 62   Temp 37 °C (98.6 °F) (Tympanic)   Resp 16   Wt 98 kg (216 lb)   SpO2 100%     Relevant Results             Assessment/Plan   Assessment & Plan  Frequent falls  Due to generalized weakness  PT/OT  Fall precautions  Patient requesting SNF placement -> TCC consulted  Hypokalemia  Suspect due to use of lasix  Replace PRN  Atrial fibrillation (Multi)  Continue home metoprolol and eliquis  Lower extremity edema    Echo 2/2024 with EF 50-55%  Hold home lasix for now given hypokalemia  Consult cardiology   Stage 3a chronic kidney disease (Multi)  Baseline Cr 1.5-1.7  Stable at baseline  UTI (urinary tract infection)  Recent urine culture on 1/15/25 growing proteus mirabilis -> was prescribed macrobid as outpatient but sensitivities are showing resistance to macrobid -> suspect this was inadequately treated   Start cefazolin based on prior sensitivities  Follow up repeat urine culture   BPH (benign prostatic hyperplasia)  Continue home finasteride and flomax  Leukopenia  Chronic   Consider outpatient referral to hematology      Plan:  Admit to RNF  Consult cardiology  Cefazolin for UTI, follow up urine culture   PT/OT  Anticipate SNF placement at discharge        Eugenia Rodriguez MD

## 2025-01-31 NOTE — ASSESSMENT & PLAN NOTE
- concern for mild heart failure exacerbation   Echo 2/2024 with EF 50-55%  Consult cardiology   Diuresis per cardiology   HF navigator consulted

## 2025-01-31 NOTE — PROGRESS NOTES
01/31/25 1310   ACS Disability Status   Are you deaf or do you have serious difficulty hearing? Y   Are you blind or do you have serious difficulty seeing, even when wearing glasses? N   Because of a physical, mental, or emotional condition, do you have serious difficulty concentrating, remembering, or making decisions? (5 years old or older) N   Do you have serious difficulty walking or climbing stairs? Y   Do you have serious difficulty dressing or bathing? N   Because of a physical, mental, or emotional condition, do you have serious difficulty doing errands alone such as visiting the doctor? Y

## 2025-01-31 NOTE — PROGRESS NOTES
01/31/25 1306   Physical Activity   On average, how many days per week do you engage in moderate to strenuous exercise (like a brisk walk)? 0 days   On average, how many minutes do you engage in exercise at this level? 0 min   Financial Resource Strain   How hard is it for you to pay for the very basics like food, housing, medical care, and heating? Not very   Housing Stability   In the last 12 months, was there a time when you were not able to pay the mortgage or rent on time? N   In the past 12 months, how many times have you moved where you were living? 0   At any time in the past 12 months, were you homeless or living in a shelter (including now)? N   Transportation Needs   In the past 12 months, has lack of transportation kept you from medical appointments or from getting medications? no   In the past 12 months, has lack of transportation kept you from meetings, work, or from getting things needed for daily living? No   Food Insecurity   Within the past 12 months, you worried that your food would run out before you got the money to buy more. Never true   Within the past 12 months, the food you bought just didn't last and you didn't have money to get more. Never true   Stress   Do you feel stress - tense, restless, nervous, or anxious, or unable to sleep at night because your mind is troubled all the time - these days? Not at all   Social Connections   In a typical week, how many times do you talk on the phone with family, friends, or neighbors? More than 3   How often do you get together with friends or relatives? Never   How often do you attend Advent or Yarsanism services? Never   Do you belong to any clubs or organizations such as Advent groups, unions, fraternal or athletic groups, or school groups? No   How often do you attend meetings of the clubs or organizations you belong to? Never   Are you , , , , never , or living with a partner?    Intimate Partner  Violence   Within the last year, have you been afraid of your partner or ex-partner? No   Within the last year, have you been humiliated or emotionally abused in other ways by your partner or ex-partner? No   Within the last year, have you been kicked, hit, slapped, or otherwise physically hurt by your partner or ex-partner? No   Within the last year, have you been raped or forced to have any kind of sexual activity by your partner or ex-partner? No   Alcohol Use   Q1: How often do you have a drink containing alcohol? Never   Q2: How many drinks containing alcohol do you have on a typical day when you are drinking? None   Q3: How often do you have six or more drinks on one occasion? Never   Utilities   In the past 12 months has the electric, gas, oil, or water company threatened to shut off services in your home? No   Health Literacy   How often do you need to have someone help you when you read instructions, pamphlets, or other written material from your doctor or pharmacy? Never

## 2025-01-31 NOTE — PROGRESS NOTES
Angel Carroll is a 79 y.o. male on day 1 of admission presenting with Hypokalemia.      Subjective   States he feels okay this morning. Sitting up in chair. States he worked with therapy this morning and it went well. Tolerating PO. Had small amount of hematuria in depends this morning. Denies constipation.        Objective     Last Recorded Vitals  /61 (BP Location: Right arm, Patient Position: Lying)   Pulse 63   Temp 36.6 °C (97.9 °F) (Oral)   Resp 16   Wt 98 kg (216 lb)   SpO2 96%   Intake/Output last 3 Shifts:    Intake/Output Summary (Last 24 hours) at 1/31/2025 1100  Last data filed at 1/31/2025 0924  Gross per 24 hour   Intake 390 ml   Output 125 ml   Net 265 ml       Admission Weight  Weight: 98 kg (216 lb) (01/30/25 1542)    Daily Weight  01/30/25 : 98 kg (216 lb)    Image Results  ECG 12 lead  AV dual-paced rhythm with prolonged AV conduction  Abnormal ECG  When compared with ECG of 28-OCT-2024 13:54,  Previous ECG has undetermined rhythm, needs review      Physical Exam  Constitutional:       General: He is not in acute distress.     Appearance: He is ill-appearing (chronically). He is not toxic-appearing.   HENT:      Head: Normocephalic.      Mouth/Throat:      Pharynx: Oropharynx is clear.   Eyes:      General: No scleral icterus.  Cardiovascular:      Rate and Rhythm: Normal rate.   Pulmonary:      Effort: No respiratory distress.      Breath sounds: No wheezing.      Comments: 2L NC  Abdominal:      General: There is no distension.      Palpations: Abdomen is soft.   Musculoskeletal:      Right lower leg: Edema present.      Left lower leg: Edema present.   Neurological:      Mental Status: He is alert.   Psychiatric:         Behavior: Behavior normal.         Relevant Results               Assessment/Plan        Assessment & Plan  Frequent falls  Due to generalized weakness  PT/OT  Fall precautions  Patient requesting SNF placement -> TCC consulted  Hypokalemia  Suspect due to use of  lasix  Replace PRN  Atrial fibrillation (Multi)  Continue home metoprolol and eliquis  Lower extremity edema   - concern for mild heart failure exacerbation   Echo 2/2024 with EF 50-55%  Consult cardiology   Diuresis per cardiology   HF navigator consulted   Stage 3a chronic kidney disease (Multi)  Baseline Cr 1.5-1.7  Stable at baseline  UTI (urinary tract infection)  Recent urine culture on 1/15/25 growing proteus mirabilis -> was prescribed macrobid as outpatient but sensitivities are showing resistance to macrobid -> suspect this was inadequately treated   Start cefazolin based on prior sensitivities  Follow up repeat urine culture   BPH (benign prostatic hyperplasia)  Continue home finasteride and flomax  Leukopenia  Chronic   Consider outpatient referral to hematology   COPD (chronic obstructive pulmonary disease) (Multi)  With chronic hypoxic respiratory failure  On 2L NC at baseline, stable  Not in exacerbation     Plan:  Diuresis per cardiology  Echo ordered by cardiology   Continue cefazolin, follow up urine culture  PT/OT/OOB  Planning for SNF placement when cleared by cardiology               Eugenia Rodriguez MD

## 2025-01-31 NOTE — CONSULTS
Heart Failure Nurse Navigator    The role of the HF nurse navigator is to (1) characterize risk profiles of patients with heart failure transitioning from [id number] after hospitalization, (2) recommend interventions to improve care and reduce risks of worse post-hospitalization outcomes. Potential recommendations may touch base on patient/family education, additional consults that may bring value, and appointment scheduling.    History    I met with Angel Carroll at the bedside, and my impressions include: Pt admitted for Heart Failure Exacerbation and hypokalemia. Pt has had a recent mechanical fall 1 week ago. Past medical history of hypertension, hyperlipidemia, CKD, depression, BPH, CAD, atrial fibrillation on apixaban, sick sinus syndrome with PPM in 1/2023, COPD wearing 2L O2 and carotid endarterectomy s/p bovine patch angioplasty in 3/2021. An echo was done 2/2024 showing an EF of 50-55%, abnormal septal motion due to right ventricular pacemaker.  There was aortic valve sclerosis moderate pulmonic and tricuspid valve insufficiency and borderline pulmonary hypertension, estimated pressure of 39 mmHg.   Pt is AV paced. BNP upon admission 727.     Patients Cardiologist(s): Dr. Martin    Patients Primary Care Provider: Dr. Tao    1. Medical Domain  What is the patient's most recent LVEF? Last EF 50-55%, TTE pending as of this note.  HFrEF (LVEF <= 40%) Quadruple therapy recommended  HFmrEF (LVEF 41-49%) Quadruple therapy recommended  HFpEF (LVEF >= 50%) Minimum recommendations: SGLT2i and MRA  Is the patient on OP GDMT for their condition?   ARNI/ACEI/ARB: No   BB: Yes Metoprolol succinate 25 mg daily  MRA: No   SGLT2i: No   Is the patient prescribed a diuretic? Yes  Furosemide 20 mg BID  Does this patient have an implanted device (ie cardioMEMS, ICD, CRT-D)?  Device type: AF Paced  Could this patient have advanced heart failure (Stage D heart failure)?: No   If yes, the potential markers of  "advanced heart failure include:     REFERENCE: Potential markers of advanced HF   Inotrope (dobutamine or milrinone) used during this admission?   LVEF<=25%?   >=2 hospitalizations for ADHF in the last year?   Severe symptoms of HF (fatigue, dyspnea, confusion, edema) despite medical therapy?   Downtitration of GDMT as compared to home medications?   Discontinuation of GDMT because of hypotension or renal intolerance?   Recurrent arrhythmias (AF, VT with ICD shocks)?   Cardiac cachexia (i.e., unintentional weight loss due to HF)?   High-risk biomarker profile (e.g., hyponatremia [Na<135], very elevated BNP, worsening kidney function)   Escalating doses of diuretics or persistent edema despite escalation     2. Mind and Emotion  Does this patient have possible cognitive impairment?: No (The Mini-Cog score 0/5)  Ask the patient to memorize these 3 words: banana, sunrise, chair  Ask the patient to draw a clock with hands pointing at \"20 minutes after 8\"  Ask the patient to recall the 3 words  Score: Add number of words recalled + clock drawing (0 points for any errors, 2 points if correct)  Interpretation: A score of 0-2 suggests cognitive impairment is present, a score of 3-5 suggests cognitive impairment is absent  Does this patient have major depression?: No (PH-Q2 score 0/6)  Over the last 2 weeks: Little interest or pleasure in doing things? (Not at all +0, Several days +1, More than half the days +2, Nearly every day +3)  Over the last 2 weeks: Feeling down, depressed or hopeless? (Not at all +0, Several days +1, More than half the days +2, Nearly every day +3)  Score: Add points  Interpretation: A score of 3 or more suggests that major depression is likely.     3. Physical Function  Could this patient be frail?: No   Defined by presence of all of these: slowness, weakness, shrinking, inactivity, exhaustion  Is this patient at risk for falling?: Yes   Defined by having experienced a fall in the last 12 " months.    4. Social Determinants of Health  Transportation deficits?: No   Lack of insurance?: No   Poor financial resources?: No   Living conditions (homelessness, unstable home)?: No   Poor family/social support?: No   Poor personal care?: No   Food insecurity?: No   Lack of HCPOA?: No    I provided the following heart failure education:  - Living With Heart Failure book provided.  - HF signs and symptoms, heart failure zones and when to call cardiologist.   - Controlling Heart Failure at Home: medication adherence, following up with cardiologist at least once yearly, staying healthy and active, limiting sodium and fluid intake as directed by cardiologist.  - Daily Weight Education    Assessment  Met with pt in his room. Pleasant gentleman sitting in chair at bedside. I did follow this pt for HF in 2022 with success. We talked about low Na diet and the importance of daily weights to catch fluid retention. He is aware of HF symptoms to look out for. He states he will be going to [facility] for rehab when he is DC. He asked that if I need to call out to him, to call his daughter Reed as she helps care for him and his wife.    DC Medications TBD at DC      Recommendations/ Plan  Daily weights.  Low sodium diet.  Follow up with Cards and PCP      *Cardiology Discharge Appointment Follow-up Plan: Pt will have family make his Cards follow up so they can schedule on their days off.    Génesis SYLVESTER RN  [facility] Clinical Nurse Navigator, CHF  [phone]

## 2025-01-31 NOTE — CARE PLAN
The patient's goals for the shift include remain safe and get rest    The clinical goals for the shift include patient to remain safe from falling this shift.    Over the shift, the patient did not make progress toward the following goals. Barriers to progression include . Recommendations to address these barriers include   Problem: Pain - Adult  Goal: Verbalizes/displays adequate comfort level or baseline comfort level  Outcome: Progressing     Problem: Safety - Adult  Goal: Free from fall injury  Outcome: Progressing     Problem: Nutrition  Goal: Nutrient intake appropriate for maintaining nutritional needs  Outcome: Progressing   .

## 2025-01-31 NOTE — CARE PLAN
Pt said that his wife Geneva is his POA--Both POA and Living Will documents are on file  ADOD: 3 days    Pt lives at home with his wife in a 2 story house. Their bed/bath are on the 1st floor  Pt wears 2-3 L of home 02, no cpap/bipap/nebulizer  He uses a walker, cane, rollator, and a wheelchair and he has had multiple falls  Pt does not drive; one of his two dtrs drive him to his appointment and the do the shopping, cooking and cleaning. He and his wife are able to do a little cleaning  He wears glasses and hearing aids. He said that he can read and comprehend what he is reading  Denies depression and anxiety. His PCP is Dr. Tao and he uses [facility] on [address] for his meds, and he can afford them  Pt is here with generalized weakness, worsening lower extremity edema, and frequent falls  Discussed discharge planning with pt. [facility]  Referral sent to [facility]. Pt will need 3 midnight stay; [facility] will have a bed on Tuesday  13:34 Phoned pts dtrs and updated them on the discharge plan,   Also started 7000 in HENS    [facility]DO NOT DISCHARGE PATIENT BEFORE SPEAKING WITH CARE COORDINATION; MUST HAVE GOLDENROD COMPLETED AND CARE COORDINATION MUST COMPLETE THE 7000 PRIOR TO DISCHARGE.

## 2025-01-31 NOTE — PROGRESS NOTES
Heart Failure Nurse Navigator Consult    See inpatient consult note dated 1/31/25.    Génesis SYLVESTER RN  [facility] Clinical Nurse Navigator, CHF  [phone]

## 2025-01-31 NOTE — ASSESSMENT & PLAN NOTE
Recent urine culture on 1/15/25 growing proteus mirabilis -> was prescribed macrobid as outpatient but sensitivities are showing resistance to macrobid -> suspect this was inadequately treated   Start cefazolin based on prior sensitivities  Follow up repeat urine culture

## 2025-01-31 NOTE — CONSULTS
Inpatient consult to Cardiology  Consult performed by: Felicitas Key, RON-CNP  Consult ordered by: Eugenia Rodriguez MD  Reason for consult: Heart Failure Exacerbation          Reason For Consult  Heart Failure Exacerbation     History Of Present Illness  Angel Carroll is a 79 y.o. male presented to the ED for generalized weakness and worsening lower extremity edema. He reports worsening lower extremity edema over the last week and states at baseline the RLE is worse than LLE.  Patient reports a worsening feeling of unsteadiness.  States that he uses furniture around the house to steady himself as he gets around his home. He states to having a minor fall that occurred a week ago.  He denies feelings of dizziness or losing consciousness.  Patient denies chest pain, palpitations, pressure. Past medical history of hypertension, hyperlipidemia, CKD, depression, BPH, CAD, atrial fibrillation on apixaban, sick sinus syndrome with PPM in 1/2023, COPD wearing 2L O2 and carotid endarterectomy s/p bovine patch angioplasty in 3/2021. An echo was done 2/2024 showing an EF of 50-55%, abnormal septal motion due to right ventricular pacemaker.  There was aortic valve sclerosis moderate pulmonic and tricuspid valve insufficiency and borderline pulmonary hypertension, estimated pressure of 39 mmHg.  EKG obtained in the ED showed AV paced rhythm.  Chest x-ray was obtained and was unremarkable; no consolidation, pneumothorax, or significant effusion.  X-ray of the hip was obtained; No acute displaced fracture, there are mild degenerative changes are noted of the hip. No hip dislocation.  X-ray of the spine was also obtained and was also unremarkable; no acute displaced fracture and there are multilevel presumed chronic or degenerative endplate changes. Morning labs show sodium 143, potassium 3.1, bicarb 33, BUN 16, creatinine 1.34, GFR 53, WBC 2.9, hemoglobin/hematocrit 10.6/33.8, platelets 116, ,  troponin 11,11.   Primary  team restarted patient's amiodarone, apixaban, atorvastatin, metoprolol succinate XL, and gave a one-time dose of potassium chloride 40 mill equivalents for hypokalemia.  The primary team also started the patient on cefazolin for urinary tract infection. Patient sees cardiologist Dr. Cullen.       Past Medical History  He has a past medical history of Atherosclerotic heart disease of native coronary artery without angina pectoris (12/14/2022), Benign neoplasm of meninges, unspecified, Benign prostatic hyperplasia without lower urinary tract symptoms (02/28/2022), Body mass index (BMI) 33.0-33.9, adult (07/02/2021), Body mass index (BMI) 34.0-34.9, adult (11/09/2021), Body mass index (BMI) 34.0-34.9, adult (09/08/2021), Body mass index (BMI) 35.0-35.9, adult (12/29/2021), Body mass index (BMI) 35.0-35.9, adult (02/28/2022), Elevated blood-pressure reading, without diagnosis of hypertension, Encounter for immunization (10/27/2015), Hyperlipidemia, unspecified (11/15/2021), Other conditions influencing health status, Pacemaker, Personal history of other diseases of the musculoskeletal system and connective tissue, Personal history of other drug therapy, Personal history of other specified conditions (10/25/2022), Rectal bleeding, and Vitamin D deficiency, unspecified (10/08/2020).    Surgical History  He has a past surgical history that includes Other surgical history (04/03/2013); Other surgical history (04/03/2013); Other surgical history (04/03/2013); Eye surgery (04/03/2013); Tonsillectomy (04/03/2013); Total knee arthroplasty (08/03/2016); Other surgical history (06/11/2019); Back surgery (04/18/2016); Lithotripsy (07/22/2013); Other surgical history (07/22/2013); Colonoscopy (07/03/2013); Other surgical history (07/03/2013); Knee arthroscopy w/ debridement (07/03/2013); Knee arthroscopy w/ debridement (07/03/2013); Other surgical history (07/03/2013); Other surgical history (07/03/2013); Coronary angioplasty with  stent (08/18/2020); CT angio head w and wo IV contrast (9/25/2021); CT angio neck (9/25/2021); MR angio head wo IV contrast (3/23/2021); and MR angio neck wo IV contrast (3/23/2021).     Social History  He reports that he quit smoking about 31 years ago. His smoking use included cigarettes. He has never used smokeless tobacco. He reports that he does not currently use alcohol. He reports that he does not use drugs.    Family History  Family History   Problem Relation Name Age of Onset    Diabetes Mother      Emphysema Father      Heart disease Father          Allergies  Other, Ranolazine, and Cyclobenzaprine      Physical Exam  Vitals and nursing note reviewed.   Constitutional:       Appearance: He is ill-appearing.      Comments: Patient sitting at the side of the bed, with physical therapy   HENT:      Head: Normocephalic and atraumatic.      Mouth/Throat:      Mouth: Mucous membranes are moist.      Pharynx: Oropharynx is clear.   Eyes:      Extraocular Movements: Extraocular movements intact.   Cardiovascular:      Rate and Rhythm: Normal rate and regular rhythm.      Heart sounds: No murmur heard.     No friction rub. No gallop.      Comments: PPM   Pulmonary:      Breath sounds: No stridor. No wheezing or rhonchi.      Comments: On 3 L nasal cannula, mild conversational dyspnea.  Bilateral upper lobes clear, bilateral lower lobes clear/diminished.   Chest:      Chest wall: No tenderness.   Abdominal:      General: Bowel sounds are normal.      Palpations: Abdomen is soft.   Musculoskeletal:      Cervical back: Normal range of motion and neck supple.      Right lower leg: Edema present.      Left lower leg: Edema present.      Comments: +1 pitting edema   Skin:     General: Skin is warm and dry.      Capillary Refill: Capillary refill takes less than 2 seconds.   Neurological:      Mental Status: He is alert and oriented to person, place, and time.   Psychiatric:         Mood and Affect: Mood normal.          Behavior: Behavior normal.         Thought Content: Thought content normal.         Judgment: Judgment normal.          Last Recorded Vitals  Blood pressure 133/61, pulse 63, temperature 36.6 °C (97.9 °F), temperature source Oral, resp. rate 16, height 1.829 m (6'), weight 98 kg (216 lb), SpO2 96%.    Results for orders placed or performed during the hospital encounter of 01/30/25 (from the past 24 hours)   CBC and Auto Differential   Result Value Ref Range    WBC 2.5 (L) 4.4 - 11.3 x10*3/uL    nRBC 0.0 0.0 - 0.0 /100 WBCs    RBC 3.78 (L) 4.50 - 5.90 x10*6/uL    Hemoglobin 11.5 (L) 13.5 - 17.5 g/dL    Hematocrit 37.4 (L) 41.0 - 52.0 %    MCV 99 80 - 100 fL    MCH 30.4 26.0 - 34.0 pg    MCHC 30.7 (L) 32.0 - 36.0 g/dL    RDW 15.5 (H) 11.5 - 14.5 %    Platelets 128 (L) 150 - 450 x10*3/uL    Neutrophils % 64.9 40.0 - 80.0 %    Immature Granulocytes %, Automated 0.4 0.0 - 0.9 %    Lymphocytes % 22.0 13.0 - 44.0 %    Monocytes % 9.1 2.0 - 10.0 %    Eosinophils % 2.8 0.0 - 6.0 %    Basophils % 0.8 0.0 - 2.0 %    Neutrophils Absolute 1.65 1.60 - 5.50 x10*3/uL    Immature Granulocytes Absolute, Automated 0.01 0.00 - 0.50 x10*3/uL    Lymphocytes Absolute 0.56 (L) 0.80 - 3.00 x10*3/uL    Monocytes Absolute 0.23 0.05 - 0.80 x10*3/uL    Eosinophils Absolute 0.07 0.00 - 0.40 x10*3/uL    Basophils Absolute 0.02 0.00 - 0.10 x10*3/uL   Comprehensive metabolic panel   Result Value Ref Range    Glucose 93 74 - 99 mg/dL    Sodium 143 136 - 145 mmol/L    Potassium 2.9 (LL) 3.5 - 5.3 mmol/L    Chloride 101 98 - 107 mmol/L    Bicarbonate 35 (H) 21 - 32 mmol/L    Anion Gap 10 10 - 20 mmol/L    Urea Nitrogen 16 6 - 23 mg/dL    Creatinine 1.54 (H) 0.50 - 1.30 mg/dL    eGFR 45 (L) >60 mL/min/1.73m*2    Calcium 8.7 8.6 - 10.3 mg/dL    Albumin 3.5 3.4 - 5.0 g/dL    Alkaline Phosphatase 77 33 - 136 U/L    Total Protein 6.4 6.4 - 8.2 g/dL    AST 14 9 - 39 U/L    Bilirubin, Total 1.0 0.0 - 1.2 mg/dL    ALT 5 (L) 10 - 52 U/L   B-type natriuretic  peptide   Result Value Ref Range     (H) 0 - 99 pg/mL   Troponin I, High Sensitivity, Initial   Result Value Ref Range    Troponin I, High Sensitivity 11 0 - 20 ng/L   ECG 12 lead   Result Value Ref Range    Ventricular Rate 60 BPM    Atrial Rate 60 BPM    DE Interval 272 ms    QRS Duration 194 ms    QT Interval 584 ms    QTC Calculation(Bazett) 584 ms    P Axis -83 degrees    R Axis -78 degrees    T Axis 105 degrees    QRS Count 9 beats    Q Onset 181 ms    T Offset 473 ms    QTC Fredericia 584 ms   Urinalysis with Reflex Culture and Microscopic   Result Value Ref Range    Color, Urine Light-Orange (N) Light-Yellow, Yellow, Dark-Yellow    Appearance, Urine Ex.Turbid (N) Clear    Specific Gravity, Urine 1.017 1.005 - 1.035    pH, Urine 7.5 5.0, 5.5, 6.0, 6.5, 7.0, 7.5, 8.0    Protein, Urine 300 (3+) (A) NEGATIVE, 10 (TRACE), 20 (TRACE) mg/dL    Glucose, Urine Normal Normal mg/dL    Blood, Urine 1.0 (3+) (A) NEGATIVE    Ketones, Urine NEGATIVE NEGATIVE mg/dL    Bilirubin, Urine NEGATIVE NEGATIVE    Urobilinogen, Urine 2 (1+) (A) Normal mg/dL    Nitrite, Urine 2+ (A) NEGATIVE    Leukocyte Esterase, Urine 500 Ruth/uL (A) NEGATIVE   Extra Urine Gray Tube   Result Value Ref Range    Extra Tube Hold for add-ons.    Microscopic Only, Urine   Result Value Ref Range    WBC, Urine >50 (A) 1-5, NONE /HPF    RBC, Urine >20 (A) NONE, 1-2, 3-5 /HPF    Squamous Epithelial Cells, Urine 1-9 (SPARSE) Reference range not established. /HPF    Bacteria, Urine 1+ (A) NONE SEEN /HPF    Mucus, Urine FEW Reference range not established. /LPF    Triple Phosphate Crystals, Urine 1+ NONE, 1+ /HPF   Troponin, High Sensitivity, 1 Hour   Result Value Ref Range    Troponin I, High Sensitivity 11 0 - 20 ng/L   Protime-INR   Result Value Ref Range    Protime 12.8 (H) 9.3 - 12.7 seconds    INR 1.2 0.9 - 1.2   Basic metabolic panel   Result Value Ref Range    Glucose 86 74 - 99 mg/dL    Sodium 143 136 - 145 mmol/L    Potassium 3.1 (L) 3.5 - 5.3  mmol/L    Chloride 103 98 - 107 mmol/L    Bicarbonate 33 (H) 21 - 32 mmol/L    Anion Gap 10 10 - 20 mmol/L    Urea Nitrogen 16 6 - 23 mg/dL    Creatinine 1.34 (H) 0.50 - 1.30 mg/dL    eGFR 53 (L) >60 mL/min/1.73m*2    Calcium 8.3 (L) 8.6 - 10.3 mg/dL   CBC   Result Value Ref Range    WBC 2.9 (L) 4.4 - 11.3 x10*3/uL    nRBC 0.0 0.0 - 0.0 /100 WBCs    RBC 3.48 (L) 4.50 - 5.90 x10*6/uL    Hemoglobin 10.6 (L) 13.5 - 17.5 g/dL    Hematocrit 33.8 (L) 41.0 - 52.0 %    MCV 97 80 - 100 fL    MCH 30.5 26.0 - 34.0 pg    MCHC 31.4 (L) 32.0 - 36.0 g/dL    RDW 15.5 (H) 11.5 - 14.5 %    Platelets 116 (L) 150 - 450 x10*3/uL     *Note: Due to a large number of results and/or encounters for the requested time period, some results have not been displayed. A complete set of results can be found in Results Review.         Assessment/Plan     Acute on chronic diastolic Heart Failure   Hypokalemia   Paroxysmal Atrial Fibrillation on Apixaban  Sick sinus syndrome with PPM  Hypertension disorder   CKD  Hyperlipidemia  CAD  COPD  Depression    1/31: As stated above, patient came to the ED for increased generalized weakness and worsening lower extremity edema.  Patient states he has not had any changes in his medications.  He does however state that he has not been adhering to a low sodium diet and has not watched his dietary intake. Patient does appear to be fluid overloaded, with +1 pitting edema on bilateral lower extremities. Morning vitals; heart rate 63 blood pressure 133/61 satting 96% on 3 L nasal cannula.  Denies chest pain, pressure, palpitations.  Baseline patient wears 2 L oxygen at home, does not report worsening shortness of breath at this moment.  States he does have increased work of breathing when ambulating in which he increases his oxygen to 3 L. Ordered transthoracic echocardiogram to be completed.  Patient received a one-time dose of potassium chloride this morning; I ordered an additional 40 mEq of potassium to be given  this morning and daily due to patients hypokalemia. I also ordered one-time dose of 40 mg of IV Lasix to manage his fluid overload.  Will check a serum potassium this afternoon.  Modified patient's diet to a 2L fluid restriction.  Monitoring strict I's and O's.  Consulted heart failure nurse navigator to see patient during this admission. Will continue to follow with you.         I spent 60 minutes in the professional and overall care of this patient.

## 2025-01-31 NOTE — PROGRESS NOTES
Occupational Therapy    Evaluation    Patient Name: Angel Carroll  MRN: [id number]  Department: [address]  Room: [address] [place] [address]  Today's Date: 1/31/2025  Time Calculation  Start Time: 0805  Stop Time: 0820  Time Calculation (min): 15 min        Assessment:  OT Assessment: Pt presents on eval with increased overall weakness, RLE pain/limitations, decreased activity tolerance, and poor standing balance affecting self-care and functional transfers/mobility. Pt will benefit from continued skilled OT to address these deficits and facilitate returning to functional baseline.  Prognosis: Fair  Barriers to Discharge Home: Caregiver assistance, Physical needs  Caregiver Assistance: Patient lives alone and/or does not have reliable caregiver assistance  Physical Needs: Ambulating household distances limited by function/safety, 24hr mobility assistance needed, Intermittent ADL assistance needed, High falls risk due to function or environment  Evaluation/Treatment Tolerance: Patient limited by fatigue  End of Session Communication: Bedside nurse  End of Session Patient Position: Up in chair, Alarm on (Needs in reach.)  OT Assessment Results: Decreased ADL status, Decreased upper extremity range of motion, Decreased upper extremity strength, Decreased safe judgment during ADL, Decreased endurance, Decreased functional mobility, Decreased trunk control for functional activities    Plan:  Treatment Interventions: ADL retraining, Functional transfer training, UE strengthening/ROM, Endurance training, Patient/family training, Equipment evaluation/education, Neuromuscular reeducation, Compensatory technique education  OT Frequency: 4 times per week  OT Discharge Recommendations: Moderate intensity level of continued care  Equipment Recommended upon Discharge: Wheeled walker  OT Recommended Transfer Status: Moderate assist, Assist of 2  OT - OK to Discharge: Yes      Subjective     General:  General  Reason for Referral: weakness, impaired  ADL's/mobility  Referred By: Chitra Cobos PA-C  Past Medical History Relevant to Rehab: a-fib, CKD, HTN, BPH, AHD  Family/Caregiver Present: No  Co-Treatment: PT  Co-Treatment Reason: Physically complex eval requiring 2 skilled Therapists  Prior to Session Communication: Bedside nurse  Patient Position Received: Bed, 3 rail up, Alarm on  General Comment: Pt is an 78 yo male admitted to the hospital with hypokalemia and a UTI.    Precautions:  Hearing/Visual Limitations: wears glasses, [place](has hearing aids)  Medical Precautions: Fall precautions, Oxygen therapy device and L/min (3L 02)    Pain:  Pain Assessment  Pain Assessment: 0-10  0-10 (Numeric) Pain Score: 4  Pain Type: Acute pain  Pain Location: Leg  Pain Orientation: Right (with muscle spasms)  Pain Interventions: Repositioned, Other (Comment) (Nurse aware.)    Objective     Cognition:  Overall Cognitive Status: Within Functional Limits  Orientation Level: Oriented X4  Cognition Comments: Pt pleasant and talkative.    Home Living:  Type of Home: House  Lives With: Spouse  Home Adaptive Equipment: Walker rolling or standard, Cane, Wheelchair-manual, Other (Comment) (rollator)  Home Layout: Multi-level, Stairs to alternate level with rails  Alternate Level Stairs-Rails:  (unilateral)  Alternate Level Stairs-Number of Steps: flight  Home Access: Other (Comment) (4 step entry with 2 rails)  Bathroom Shower/Tub: Tub/shower unit  Bathroom Toilet: Standard  Bathroom Equipment: Grab bars in shower, Tub transfer bench    Prior Function:  Level of [place]: Independent with ADLs and functional transfers, Needs assistance with homemaking  Receives Help From: Family  ADL Assistance: Independent  Homemaking Assistance: Needs assistance  Ambulatory Assistance: Independent (Pt reports using his W/C recently due to RLE pain/spasms, but was using a RW prior to that.)  Vocational: Retired  Hand Dominance: Left    ADL:  Eating Assistance: Independent  Grooming Assistance:  Moderate  Grooming Deficit: Steadying, Supervision/safety, Standing with assistive device  Bathing Assistance: Moderate  UE Dressing Assistance: Minimal  LE Dressing Assistance: Maximal  Toileting Assistance with Device: Total  Toileting Deficit: Incontinent (of urine requiring a brief change)    Activity Tolerance:  Activity Tolerance Comments: Fair-    Bed Mobility/Transfers: Bed Mobility  Bed Mobility:  (Pt completed supine to sit in bed with max A x2 for trunk up and moving his RLE.)    Transfers  Transfer:  (Pt completed sit<>stand with mod A x2 for balance/safety and due to overall weakness. Important to note that the bed was slightly elevated.)    Functional Mobility:  Functional Mobility  Functional Mobility Performed:  (Pt only tolerated taking a few steps from the bed to the bedside chair using a RW with mod A x2 for balance/safety and intermittent verbal cues for sequencing.)    Sitting Balance:  Static Sitting Balance  Static Sitting-Balance Support: Bilateral upper extremity supported, Feet supported  Static Sitting-Level of Assistance: Contact guard  Dynamic Sitting Balance  Dynamic Sitting-Balance Support: Bilateral upper extremity supported, Feet supported  Dynamic Sitting-Level of Assistance: Moderate assistance  Dynamic Sitting-Comments: Pt became retropulsive when assessing his BUE's.    Standing Balance:  Static Standing Balance  Static Standing-Balance Support: Bilateral upper extremity supported  Static Standing-Level of Assistance: Moderate assistance (x2)     Sensation:  Sensation Comment: BUCORI's WFL    Strength:  Strength Comments: BUE shoulders 3-/5, distally 3+/5    Coordination:  Coordination Comment: BUCORI's WFL     Hand Function:  Gross Grasp: Functional  Coordination: Functional      Outcome Measures:[facility] Daily Activity  Putting on and taking off regular lower body clothing: A lot  Bathing (including washing, rinsing, drying): A lot  Putting on and taking off regular upper body clothing:  A little  Toileting, which includes using toilet, bedpan or urinal: Total  Taking care of personal grooming such as brushing teeth: A lot  Eating Meals: None  Daily Activity - Total Score: 14      Education Documentation  No documentation found.  Education Comments  No comments found.      Goals:  Encounter Problems       Encounter Problems (Active)       OT Goals       Pt will complete all grooming tasks with close S to setup in standing. (Progressing)       Start:  01/31/25    Expected End:  02/28/25            Pt will complete UB dressing/bathing with setup and LB dressing/bathing with min A using adaptive equipment as needed.  (Progressing)       Start:  01/31/25    Expected End:  02/28/25            Pt will complete all toileting tasks with min A. (Progressing)       Start:  01/31/25    Expected End:  02/28/25            Pt will complete all functional transfers and mobility with min A using a RW. (Progressing)       Start:  01/31/25    Expected End:  02/28/25

## 2025-01-31 NOTE — ASSESSMENT & PLAN NOTE
Due to generalized weakness  PT/OT  Fall precautions  Patient requesting SNF placement -> TCC consulted

## 2025-01-31 NOTE — CARE PLAN
The patient's goals for the shift include remain safe and get rest    The clinical goals for the shift include patient to remain safe from falling this shift.

## 2025-02-01 ENCOUNTER — APPOINTMENT (OUTPATIENT)
Dept: CARDIOLOGY | Facility: HOSPITAL | Age: 80
End: 2025-02-01
Payer: MEDICARE

## 2025-02-01 PROBLEM — I50.33 ACUTE ON CHRONIC DIASTOLIC HEART FAILURE: Status: ACTIVE | Noted: 2024-07-26

## 2025-02-01 LAB
ANION GAP SERPL CALCULATED.3IONS-SCNC: 8 MMOL/L (ref 10–20)
BUN SERPL-MCNC: 17 MG/DL (ref 6–23)
CALCIUM SERPL-MCNC: 8.5 MG/DL (ref 8.6–10.3)
CHLORIDE SERPL-SCNC: 103 MMOL/L (ref 98–107)
CO2 SERPL-SCNC: 35 MMOL/L (ref 21–32)
CREAT SERPL-MCNC: 1.51 MG/DL (ref 0.5–1.3)
EGFRCR SERPLBLD CKD-EPI 2021: 46 ML/MIN/1.73M*2
ERYTHROCYTE [DISTWIDTH] IN BLOOD BY AUTOMATED COUNT: 15.5 % (ref 11.5–14.5)
GLUCOSE SERPL-MCNC: 96 MG/DL (ref 74–99)
HCT VFR BLD AUTO: 33.5 % (ref 41–52)
HGB BLD-MCNC: 10.5 G/DL (ref 13.5–17.5)
MCH RBC QN AUTO: 30.6 PG (ref 26–34)
MCHC RBC AUTO-ENTMCNC: 31.3 G/DL (ref 32–36)
MCV RBC AUTO: 98 FL (ref 80–100)
NRBC BLD-RTO: 0 /100 WBCS (ref 0–0)
PLATELET # BLD AUTO: 119 X10*3/UL (ref 150–450)
POTASSIUM SERPL-SCNC: 3.5 MMOL/L (ref 3.5–5.3)
RBC # BLD AUTO: 3.43 X10*6/UL (ref 4.5–5.9)
SODIUM SERPL-SCNC: 142 MMOL/L (ref 136–145)
WBC # BLD AUTO: 2.9 X10*3/UL (ref 4.4–11.3)

## 2025-02-01 PROCEDURE — 36415 COLL VENOUS BLD VENIPUNCTURE: CPT | Performed by: STUDENT IN AN ORGANIZED HEALTH CARE EDUCATION/TRAINING PROGRAM

## 2025-02-01 PROCEDURE — 93306 TTE W/DOPPLER COMPLETE: CPT | Performed by: INTERNAL MEDICINE

## 2025-02-01 PROCEDURE — 2500000004 HC RX 250 GENERAL PHARMACY W/ HCPCS (ALT 636 FOR OP/ED)

## 2025-02-01 PROCEDURE — 2500000002 HC RX 250 W HCPCS SELF ADMINISTERED DRUGS (ALT 637 FOR MEDICARE OP, ALT 636 FOR OP/ED)

## 2025-02-01 PROCEDURE — 99232 SBSQ HOSP IP/OBS MODERATE 35: CPT

## 2025-02-01 PROCEDURE — 80048 BASIC METABOLIC PNL TOTAL CA: CPT | Performed by: STUDENT IN AN ORGANIZED HEALTH CARE EDUCATION/TRAINING PROGRAM

## 2025-02-01 PROCEDURE — 85027 COMPLETE CBC AUTOMATED: CPT | Performed by: STUDENT IN AN ORGANIZED HEALTH CARE EDUCATION/TRAINING PROGRAM

## 2025-02-01 PROCEDURE — 2500000002 HC RX 250 W HCPCS SELF ADMINISTERED DRUGS (ALT 637 FOR MEDICARE OP, ALT 636 FOR OP/ED): Performed by: STUDENT IN AN ORGANIZED HEALTH CARE EDUCATION/TRAINING PROGRAM

## 2025-02-01 PROCEDURE — 99232 SBSQ HOSP IP/OBS MODERATE 35: CPT | Performed by: STUDENT IN AN ORGANIZED HEALTH CARE EDUCATION/TRAINING PROGRAM

## 2025-02-01 PROCEDURE — 2500000001 HC RX 250 WO HCPCS SELF ADMINISTERED DRUGS (ALT 637 FOR MEDICARE OP): Performed by: STUDENT IN AN ORGANIZED HEALTH CARE EDUCATION/TRAINING PROGRAM

## 2025-02-01 PROCEDURE — 93306 TTE W/DOPPLER COMPLETE: CPT

## 2025-02-01 PROCEDURE — 2500000004 HC RX 250 GENERAL PHARMACY W/ HCPCS (ALT 636 FOR OP/ED): Performed by: STUDENT IN AN ORGANIZED HEALTH CARE EDUCATION/TRAINING PROGRAM

## 2025-02-01 PROCEDURE — 1200000002 HC GENERAL ROOM WITH TELEMETRY DAILY

## 2025-02-01 RX ORDER — FUROSEMIDE 10 MG/ML
40 INJECTION INTRAMUSCULAR; INTRAVENOUS
Status: COMPLETED | OUTPATIENT
Start: 2025-02-01 | End: 2025-02-01

## 2025-02-01 RX ADMIN — POTASSIUM CHLORIDE 40 MEQ: 1500 TABLET, EXTENDED RELEASE ORAL at 08:15

## 2025-02-01 RX ADMIN — METOPROLOL SUCCINATE 25 MG: 25 TABLET, FILM COATED, EXTENDED RELEASE ORAL at 08:16

## 2025-02-01 RX ADMIN — ATORVASTATIN CALCIUM 20 MG: 20 TABLET, FILM COATED ORAL at 21:01

## 2025-02-01 RX ADMIN — AMIODARONE HYDROCHLORIDE 200 MG: 200 TABLET ORAL at 08:15

## 2025-02-01 RX ADMIN — FUROSEMIDE 40 MG: 10 INJECTION, SOLUTION INTRAMUSCULAR; INTRAVENOUS at 11:26

## 2025-02-01 RX ADMIN — FUROSEMIDE 40 MG: 10 INJECTION, SOLUTION INTRAMUSCULAR; INTRAVENOUS at 16:42

## 2025-02-01 RX ADMIN — TAMSULOSIN HYDROCHLORIDE 0.8 MG: 0.4 CAPSULE ORAL at 21:01

## 2025-02-01 RX ADMIN — FERROUS SULFATE TAB 325 MG (65 MG ELEMENTAL FE) 325 MG: 325 (65 FE) TAB at 08:16

## 2025-02-01 RX ADMIN — FOLIC ACID 1 MG: 1 TABLET ORAL at 08:16

## 2025-02-01 RX ADMIN — PANTOPRAZOLE SODIUM 40 MG: 40 TABLET, DELAYED RELEASE ORAL at 08:15

## 2025-02-01 RX ADMIN — PAROXETINE HYDROCHLORIDE 20 MG: 20 TABLET, FILM COATED ORAL at 08:15

## 2025-02-01 RX ADMIN — CEFAZOLIN SODIUM 1 G: 1 INJECTION, SOLUTION INTRAVENOUS at 08:16

## 2025-02-01 RX ADMIN — CEFAZOLIN SODIUM 1 G: 1 INJECTION, SOLUTION INTRAVENOUS at 16:42

## 2025-02-01 RX ADMIN — FINASTERIDE 5 MG: 5 TABLET, FILM COATED ORAL at 08:15

## 2025-02-01 RX ADMIN — APIXABAN 2.5 MG: 2.5 TABLET, FILM COATED ORAL at 21:02

## 2025-02-01 RX ADMIN — APIXABAN 2.5 MG: 2.5 TABLET, FILM COATED ORAL at 08:15

## 2025-02-01 RX ADMIN — GABAPENTIN 100 MG: 100 CAPSULE ORAL at 21:01

## 2025-02-01 ASSESSMENT — COGNITIVE AND FUNCTIONAL STATUS - GENERAL
MOBILITY SCORE: 15
DRESSING REGULAR LOWER BODY CLOTHING: A LOT
MOVING TO AND FROM BED TO CHAIR: A LOT
PERSONAL GROOMING: A LOT
WALKING IN HOSPITAL ROOM: A LOT
CLIMB 3 TO 5 STEPS WITH RAILING: TOTAL
DAILY ACTIVITIY SCORE: 14
TOILETING: A LOT
DRESSING REGULAR UPPER BODY CLOTHING: A LOT
HELP NEEDED FOR BATHING: A LOT
STANDING UP FROM CHAIR USING ARMS: A LOT

## 2025-02-01 ASSESSMENT — PAIN SCALES - GENERAL
PAINLEVEL_OUTOF10: 0 - NO PAIN
PAINLEVEL_OUTOF10: 0 - NO PAIN

## 2025-02-01 ASSESSMENT — PAIN - FUNCTIONAL ASSESSMENT: PAIN_FUNCTIONAL_ASSESSMENT: 0-10

## 2025-02-01 NOTE — PROGRESS NOTES
Angel Carroll is a 79 y.o. male on day 2 of admission presenting with Hypokalemia.      Subjective   Awake, alert. States he feels tired this morning. Not very hungry, has not eaten breakfast yet. Thinks lower extremity edema is improving but still a little swollen. Feels a little short of breath. Currently on 3L NC       Objective     Last Recorded Vitals  /66 (BP Location: Right arm, Patient Position: Lying)   Pulse 62   Temp 36.9 °C (98.4 °F) (Oral)   Resp 16   Wt 98 kg (216 lb)   SpO2 98%   Intake/Output last 3 Shifts:    Intake/Output Summary (Last 24 hours) at 2/1/2025 1107  Last data filed at 2/1/2025 0636  Gross per 24 hour   Intake 400 ml   Output 600 ml   Net -200 ml       Admission Weight  Weight: 98 kg (216 lb) (01/30/25 1542)    Daily Weight  01/30/25 : 98 kg (216 lb)    Image Results  ECG 12 lead  AV dual-paced rhythm with prolonged AV conduction  Abnormal ECG  When compared with ECG of 28-OCT-2024 13:54,  Previous ECG has undetermined rhythm, needs review  Confirmed by Hans Reyes ([id number]) on 1/31/2025 12:27:59 PM      Physical Exam  Constitutional:       General: He is not in acute distress.     Appearance: He is not toxic-appearing.   HENT:      Head: Normocephalic.      Ears:      Comments: Hard of hearing     Mouth/Throat:      Pharynx: Oropharynx is clear.   Eyes:      General: No scleral icterus.  Cardiovascular:      Rate and Rhythm: Normal rate.   Pulmonary:      Effort: No respiratory distress.      Breath sounds: No wheezing.   Abdominal:      General: There is no distension.      Palpations: Abdomen is soft.   Musculoskeletal:      Right lower leg: Edema present.      Left lower leg: Edema present.   Neurological:      Mental Status: He is alert.   Psychiatric:         Behavior: Behavior normal.         Relevant Results               Assessment/Plan          Assessment & Plan  Acute on chronic diastolic heart failure   - concern for mild heart failure exacerbation   Echo  2/2024 with EF 50-55%  Consult cardiology   Diuresis per cardiology   HF navigator consulted   Frequent falls  Due to generalized weakness  PT/OT  Fall precautions  Patient requesting SNF placement -> TCC consulted  Hypokalemia  Suspect due to use of lasix  Replace PRN  Atrial fibrillation (Multi)  Continue home metoprolol and eliquis  Stage 3a chronic kidney disease (Multi)  Baseline Cr 1.5-1.7  Stable at baseline  UTI (urinary tract infection)  Recent urine culture on 1/15/25 growing proteus mirabilis -> was prescribed macrobid as outpatient but sensitivities are showing resistance to macrobid -> suspect this was inadequately treated   Start cefazolin based on prior sensitivities  Urine culture positive for gram negative bacilli, will await final result and sensitivities  BPH (benign prostatic hyperplasia)  Continue home finasteride and flomax  Leukopenia  Chronic   Consider outpatient referral to hematology   COPD (chronic obstructive pulmonary disease) (Multi)  With chronic hypoxic respiratory failure  On 2L NC at baseline, stable  Not in exacerbation     Plan:  Echo to be completed today, follow up results  Diuresis per cardiology   Continue cefazolin for UTI, follow up final urine culture and sensitivities  PT/OT/OOB as able  Planning for discharge to SNF, bed will be available at [facility] on Tuesday 2/4/25             Eugenia Rodriguez MD

## 2025-02-01 NOTE — ASSESSMENT & PLAN NOTE
Recent urine culture on 1/15/25 growing proteus mirabilis -> was prescribed macrobid as outpatient but sensitivities are showing resistance to macrobid -> suspect this was inadequately treated   Start cefazolin based on prior sensitivities  Urine culture positive for gram negative bacilli, will await final result and sensitivities

## 2025-02-01 NOTE — CARE PLAN
Problem: Pain - Adult  Goal: Verbalizes/displays adequate comfort level or baseline comfort level  Outcome: Progressing     Problem: Chronic Conditions and Co-morbidities  Goal: Patient's chronic conditions and co-morbidity symptoms are monitored and maintained or improved  Outcome: Progressing     Problem: Nutrition  Goal: Nutrient intake appropriate for maintaining nutritional needs  Outcome: Progressing     Problem: Skin  Goal: Decreased wound size/increased tissue granulation at next dressing change  Outcome: Progressing  Goal: Participates in plan/prevention/treatment measures  Outcome: Progressing  Goal: Prevent/manage excess moisture  Outcome: Progressing  Goal: Prevent/minimize sheer/friction injuries  Outcome: Progressing  Goal: Promote/optimize nutrition  Outcome: Progressing  Goal: Promote skin healing  Outcome: Progressing   The patient's goals for the shift include remain safe and get rest    The clinical goals for the shift include maintain safety

## 2025-02-01 NOTE — CARE PLAN
Problem: Pain - Adult  Goal: Verbalizes/displays adequate comfort level or baseline comfort level  Outcome: Progressing     Problem: Safety - Adult  Goal: Free from fall injury  Outcome: Progressing     Problem: Discharge Planning  Goal: Discharge to home or other facility with appropriate resources  Outcome: Progressing     Problem: Chronic Conditions and Co-morbidities  Goal: Patient's chronic conditions and co-morbidity symptoms are monitored and maintained or improved  Outcome: Progressing     Problem: Nutrition  Goal: Nutrient intake appropriate for maintaining nutritional needs  Outcome: Progressing     Problem: Skin  Goal: Decreased wound size/increased tissue granulation at next dressing change  Outcome: Progressing  Flowsheets (Taken 2/1/2025 0056)  Decreased wound size/increased tissue granulation at next dressing change:   Promote sleep for wound healing   Protective dressings over bony prominences   Utilize specialty bed per algorithm  Goal: Participates in plan/prevention/treatment measures  Outcome: Progressing  Flowsheets (Taken 2/1/2025 0056)  Participates in plan/prevention/treatment measures:   Discuss with provider PT/OT consult   Elevate heels   Increase activity/out of bed for meals  Goal: Prevent/manage excess moisture  Outcome: Progressing  Flowsheets (Taken 2/1/2025 0056)  Prevent/manage excess moisture:   Cleanse incontinence/protect with barrier cream   Follow provider orders for dressing changes   Moisturize dry skin   Monitor for/manage infection if present   Use wicking fabric (obtain order)  Goal: Prevent/minimize sheer/friction injuries  Outcome: Progressing  Flowsheets (Taken 2/1/2025 0056)  Prevent/minimize sheer/friction injuries:   Complete micro-shifts as needed if patient unable. Adjust patient position to relieve pressure points, not a full turn   HOB 30 degrees or less   Turn/reposition every 2 hours/use positioning/transfer devices   Increase activity/out of bed for meals    Use pull sheet   Utilize specialty bed per algorithm  Goal: Promote/optimize nutrition  Outcome: Progressing  Flowsheets (Taken 2/1/2025 0056)  Promote/optimize nutrition:   Assist with feeding   Discuss with provider if NPO > 2 days   Consume > 50% meals/supplements   Monitor/record intake including meals   Offer water/supplements/favorite foods   Reassess MST if dietician not consulted  Goal: Promote skin healing  Outcome: Progressing  Flowsheets (Taken 2/1/2025 0056)  Promote skin healing:   Assess skin/pad under line(s)/device(s)   Ensure correct size (line/device) and apply per  instructions   Protective dressings over bony prominences   Rotate device position/do not position patient on device   Turn/reposition every 2 hours/use positioning/transfer devices   The patient's goals for the shift include remain safe and get rest    The clinical goals for the shift include SAFETY    Over the shift, the patient did not make progress toward the following goals. Barriers to progression include . Recommendations to address these barriers include .

## 2025-02-01 NOTE — PROGRESS NOTES
Angel Carrlol is a 79 y.o. male on day 2 of admission presenting with Hypokalemia.    Subjective   Patient awake, slightly lethargic this morning laying in his bed.  Complains some SOB this morning, on 3L nasal cannula.  Denies chest pain, palpitations, pressure.        Objective     Physical Exam  Vitals and nursing note reviewed.   HENT:      Head: Normocephalic and atraumatic.      Mouth/Throat:      Mouth: Mucous membranes are dry.      Pharynx: Oropharynx is clear.   Eyes:      Extraocular Movements: Extraocular movements intact.   Cardiovascular:      Rate and Rhythm: Regular rhythm.      Pulses: Normal pulses.      Heart sounds: Normal heart sounds. No murmur heard.     No friction rub. No gallop.      Comments: AV paced rhythm  Pulmonary:      Breath sounds: No stridor. No wheezing or rhonchi.      Comments: Diminished sounds in all lobes; right upper lobe clear/diminished  Abdominal:      General: Bowel sounds are normal.      Palpations: Abdomen is soft.   Musculoskeletal:      Cervical back: Normal range of motion and neck supple.      Right lower leg: Edema present.      Left lower leg: Edema present.      Comments: +2 pitting edema on bilateral lower extremities - around the foot and ankle   Skin:     General: Skin is warm and dry.      Capillary Refill: Capillary refill takes less than 2 seconds.   Neurological:      Mental Status: He is alert.   Psychiatric:         Behavior: Behavior normal.         Last Recorded Vitals  Blood pressure 128/66, pulse 62, temperature 36.9 °C (98.4 °F), temperature source Oral, resp. rate 16, height 1.829 m (6'), weight 98 kg (216 lb), SpO2 98%.  Intake/Output last 3 Shifts:  I/O last 3 completed shifts:  In: 790 (8.1 mL/kg) [P.O.:490; IV Piggyback:300]  Out: 725 (7.4 mL/kg) [Urine:725 (0.2 mL/kg/hr)]  Weight: 98 kg     Results for orders placed or performed during the hospital encounter of 01/30/25 (from the past 24 hours)   Potassium   Result Value Ref Range    Potassium  3.6 3.5 - 5.3 mmol/L   CBC   Result Value Ref Range    WBC 2.9 (L) 4.4 - 11.3 x10*3/uL    nRBC 0.0 0.0 - 0.0 /100 WBCs    RBC 3.43 (L) 4.50 - 5.90 x10*6/uL    Hemoglobin 10.5 (L) 13.5 - 17.5 g/dL    Hematocrit 33.5 (L) 41.0 - 52.0 %    MCV 98 80 - 100 fL    MCH 30.6 26.0 - 34.0 pg    MCHC 31.3 (L) 32.0 - 36.0 g/dL    RDW 15.5 (H) 11.5 - 14.5 %    Platelets 119 (L) 150 - 450 x10*3/uL   Basic metabolic panel   Result Value Ref Range    Glucose 96 74 - 99 mg/dL    Sodium 142 136 - 145 mmol/L    Potassium 3.5 3.5 - 5.3 mmol/L    Chloride 103 98 - 107 mmol/L    Bicarbonate 35 (H) 21 - 32 mmol/L    Anion Gap 8 (L) 10 - 20 mmol/L    Urea Nitrogen 17 6 - 23 mg/dL    Creatinine 1.51 (H) 0.50 - 1.30 mg/dL    eGFR 46 (L) >60 mL/min/1.73m*2    Calcium 8.5 (L) 8.6 - 10.3 mg/dL     *Note: Due to a large number of results and/or encounters for the requested time period, some results have not been displayed. A complete set of results can be found in Results Review.          Assessment/Plan   Assessment & Plan  Hypokalemia    BPH (benign prostatic hyperplasia)    Atrial fibrillation (Multi)    Lower extremity edema    Frequent falls    UTI (urinary tract infection)    Stage 3a chronic kidney disease (Multi)    Leukopenia    COPD (chronic obstructive pulmonary disease) (Multi)    Acute on chronic diastolic Heart Failure   Hypokalemia   Persistent Atrial Fibrillation on Apixaban  Sick sinus syndrome with PPM  Hypertension disorder   CKD  Hyperlipidemia  CAD  COPD  Depression     1/31: As stated above, patient came to the ED for increased generalized weakness and worsening lower extremity edema.  Patient states he has not had any changes in his medications.  He does however state that he has not been adhering to a low sodium diet and has not watched his dietary intake. Patient does appear to be fluid overloaded, with +1 pitting edema on bilateral lower extremities. Morning vitals; heart rate 63 blood pressure 133/61 satting 96% on 3 L  nasal cannula.  Denies chest pain, pressure, palpitations.  Baseline patient wears 2 L oxygen at home, does not report worsening shortness of breath at this moment.  States he does have increased work of breathing when ambulating in which he increases his oxygen to 3 L. Ordered transthoracic echocardiogram to be completed.  Patient received a one-time dose of potassium chloride this morning; I ordered an additional 40 mEq of potassium to be given this morning and daily due to patients hypokalemia. I also ordered one-time dose of 40 mg of IV Lasix to manage his fluid overload.  Will check a serum potassium this afternoon.  Modified patient's diet to a 2L fluid restriction.  Monitoring strict I's and O's.  Consulted heart failure nurse navigator to see patient during this admission. Will continue to follow with you.     2/1:  Patient does appear to be fluid overloaded, with slightly worsening edema, +2, of the bilateral lower extremities -around the ankle and feet. Current vitals this morning heart rate 62, blood pressure 128/66, and satting 98% on 3 L nasal cannula.  On telemetry patient is AV paced. Denies chest pain, pressure, palpitations.  Baseline patient wears 2 L oxygen at home, he does report some mild shortness of breath at this moment.  With the worsening bilateral lower extremity edema and the mild shortness of breath, I ordered IV frusemide 40 mg to be given this morning and this afternoon.  According to the charting the patient's I/O was +190 this morning.  Upon my assessment the patient had an external catheter. Continue to monitor strict I's and O's. Current labs this morning sodium 142, potassium 3.5, BUN 17, creatinine 1.51, WBC 2.9, hemoglobin/hematocrit 10.5/33.5, platelets 119.  Will closely monitor patient's creatinine, with this addition of Lasix given today.  transthoracic echocardiogram to be completed today.  We will continue to follow with you.    I spent 35 minutes in the professional and  overall care of this patient.      Felicitas Key, APRN-CNP

## 2025-02-01 NOTE — PROGRESS NOTES
Spiritual Care Visit  Spiritual Care Request    Reason for Visit:  Routine Visit: Introduction     Request Received From:       Focus of Care:  Visited With: Patient         Refer to :          Spiritual Care Assessment    Spiritual Assessment:                      Care Provided:  Intended Effects: Build relationship of care and support, Demonstrate caring and concern    Sense of Community and or Alevism Affiliation:  Moravian   Values/Beliefs  Spiritual Requests During Hospitalization: Angel asked to be anointed and o have Communion today.     Addressed Needs/Concerns and/or [place] Through:     Sacramental Encounters  Communion: Patient wants communion  Communion Given Indicator: Yes  Sacrament of Sick-Anointing: Anointed    Outcome:        Advance Directives:         Spiritual Care Annotation    Annotation:  Angel asked to be anointed and to have Communion today.  Jacinto Grijalva

## 2025-02-02 VITALS
SYSTOLIC BLOOD PRESSURE: 127 MMHG | OXYGEN SATURATION: 97 % | BODY MASS INDEX: 29.26 KG/M2 | TEMPERATURE: 99 F | WEIGHT: 216 LBS | HEART RATE: 60 BPM | HEIGHT: 72 IN | RESPIRATION RATE: 18 BRPM | DIASTOLIC BLOOD PRESSURE: 50 MMHG

## 2025-02-02 LAB
ANION GAP SERPL CALCULATED.3IONS-SCNC: 11 MMOL/L (ref 10–20)
AORTIC VALVE PEAK VELOCITY: 1.17 M/S
AV PEAK GRADIENT: 5 MMHG
AVA (PEAK VEL): 1.85 CM2
BACTERIA UR CULT: ABNORMAL
BUN SERPL-MCNC: 15 MG/DL (ref 6–23)
CALCIUM SERPL-MCNC: 8.3 MG/DL (ref 8.6–10.3)
CHLORIDE SERPL-SCNC: 102 MMOL/L (ref 98–107)
CO2 SERPL-SCNC: 34 MMOL/L (ref 21–32)
CREAT SERPL-MCNC: 1.52 MG/DL (ref 0.5–1.3)
EGFRCR SERPLBLD CKD-EPI 2021: 46 ML/MIN/1.73M*2
EJECTION FRACTION APICAL 4 CHAMBER: 45.2
EJECTION FRACTION: 40 %
ERYTHROCYTE [DISTWIDTH] IN BLOOD BY AUTOMATED COUNT: 15.7 % (ref 11.5–14.5)
GLUCOSE SERPL-MCNC: 85 MG/DL (ref 74–99)
HCT VFR BLD AUTO: 34.8 % (ref 41–52)
HGB BLD-MCNC: 10.7 G/DL (ref 13.5–17.5)
LEFT ATRIUM VOLUME AREA LENGTH INDEX BSA: 38.7 ML/M2
LEFT VENTRICLE INTERNAL DIMENSION DIASTOLE: 6.13 CM (ref 3.5–6)
LEFT VENTRICULAR OUTFLOW TRACT DIAMETER: 2.2 CM
LV EJECTION FRACTION BIPLANE: 47 %
MCH RBC QN AUTO: 30.4 PG (ref 26–34)
MCHC RBC AUTO-ENTMCNC: 30.7 G/DL (ref 32–36)
MCV RBC AUTO: 99 FL (ref 80–100)
MITRAL VALVE E/A RATIO: 1.76
NRBC BLD-RTO: 0 /100 WBCS (ref 0–0)
PLATELET # BLD AUTO: 141 X10*3/UL (ref 150–450)
POTASSIUM SERPL-SCNC: 3.8 MMOL/L (ref 3.5–5.3)
RBC # BLD AUTO: 3.52 X10*6/UL (ref 4.5–5.9)
RIGHT VENTRICLE FREE WALL PEAK S': 6.31 CM/S
RIGHT VENTRICLE PEAK SYSTOLIC PRESSURE: 44.2 MMHG
SODIUM SERPL-SCNC: 143 MMOL/L (ref 136–145)
TRICUSPID ANNULAR PLANE SYSTOLIC EXCURSION: 1.3 CM
WBC # BLD AUTO: 4.4 X10*3/UL (ref 4.4–11.3)

## 2025-02-02 PROCEDURE — 2500000002 HC RX 250 W HCPCS SELF ADMINISTERED DRUGS (ALT 637 FOR MEDICARE OP, ALT 636 FOR OP/ED)

## 2025-02-02 PROCEDURE — 2500000004 HC RX 250 GENERAL PHARMACY W/ HCPCS (ALT 636 FOR OP/ED)

## 2025-02-02 PROCEDURE — 2500000001 HC RX 250 WO HCPCS SELF ADMINISTERED DRUGS (ALT 637 FOR MEDICARE OP): Performed by: STUDENT IN AN ORGANIZED HEALTH CARE EDUCATION/TRAINING PROGRAM

## 2025-02-02 PROCEDURE — 2500000004 HC RX 250 GENERAL PHARMACY W/ HCPCS (ALT 636 FOR OP/ED): Performed by: STUDENT IN AN ORGANIZED HEALTH CARE EDUCATION/TRAINING PROGRAM

## 2025-02-02 PROCEDURE — 2500000001 HC RX 250 WO HCPCS SELF ADMINISTERED DRUGS (ALT 637 FOR MEDICARE OP)

## 2025-02-02 PROCEDURE — 80048 BASIC METABOLIC PNL TOTAL CA: CPT | Performed by: STUDENT IN AN ORGANIZED HEALTH CARE EDUCATION/TRAINING PROGRAM

## 2025-02-02 PROCEDURE — 36415 COLL VENOUS BLD VENIPUNCTURE: CPT | Performed by: STUDENT IN AN ORGANIZED HEALTH CARE EDUCATION/TRAINING PROGRAM

## 2025-02-02 PROCEDURE — 99232 SBSQ HOSP IP/OBS MODERATE 35: CPT | Performed by: STUDENT IN AN ORGANIZED HEALTH CARE EDUCATION/TRAINING PROGRAM

## 2025-02-02 PROCEDURE — 85027 COMPLETE CBC AUTOMATED: CPT | Performed by: STUDENT IN AN ORGANIZED HEALTH CARE EDUCATION/TRAINING PROGRAM

## 2025-02-02 PROCEDURE — 1200000002 HC GENERAL ROOM WITH TELEMETRY DAILY

## 2025-02-02 PROCEDURE — 99232 SBSQ HOSP IP/OBS MODERATE 35: CPT

## 2025-02-02 PROCEDURE — 2500000002 HC RX 250 W HCPCS SELF ADMINISTERED DRUGS (ALT 637 FOR MEDICARE OP, ALT 636 FOR OP/ED): Performed by: STUDENT IN AN ORGANIZED HEALTH CARE EDUCATION/TRAINING PROGRAM

## 2025-02-02 RX ORDER — FUROSEMIDE 10 MG/ML
40 INJECTION INTRAMUSCULAR; INTRAVENOUS ONCE
Status: COMPLETED | OUTPATIENT
Start: 2025-02-02 | End: 2025-02-02

## 2025-02-02 RX ORDER — LISINOPRIL 5 MG/1
5 TABLET ORAL DAILY
Status: DISCONTINUED | OUTPATIENT
Start: 2025-02-02 | End: 2025-02-03

## 2025-02-02 RX ADMIN — APIXABAN 2.5 MG: 2.5 TABLET, FILM COATED ORAL at 21:18

## 2025-02-02 RX ADMIN — FUROSEMIDE 40 MG: 10 INJECTION, SOLUTION INTRAMUSCULAR; INTRAVENOUS at 12:05

## 2025-02-02 RX ADMIN — FERROUS SULFATE TAB 325 MG (65 MG ELEMENTAL FE) 325 MG: 325 (65 FE) TAB at 09:33

## 2025-02-02 RX ADMIN — CEFAZOLIN SODIUM 1 G: 1 INJECTION, SOLUTION INTRAVENOUS at 00:26

## 2025-02-02 RX ADMIN — PANTOPRAZOLE SODIUM 40 MG: 40 TABLET, DELAYED RELEASE ORAL at 09:34

## 2025-02-02 RX ADMIN — ACETAMINOPHEN 650 MG: 325 TABLET ORAL at 21:18

## 2025-02-02 RX ADMIN — FOLIC ACID 1 MG: 1 TABLET ORAL at 09:33

## 2025-02-02 RX ADMIN — LISINOPRIL 5 MG: 5 TABLET ORAL at 13:06

## 2025-02-02 RX ADMIN — FINASTERIDE 5 MG: 5 TABLET, FILM COATED ORAL at 09:33

## 2025-02-02 RX ADMIN — GABAPENTIN 100 MG: 100 CAPSULE ORAL at 21:18

## 2025-02-02 RX ADMIN — AMIODARONE HYDROCHLORIDE 200 MG: 200 TABLET ORAL at 09:33

## 2025-02-02 RX ADMIN — PAROXETINE HYDROCHLORIDE 20 MG: 20 TABLET, FILM COATED ORAL at 09:33

## 2025-02-02 RX ADMIN — ATORVASTATIN CALCIUM 20 MG: 20 TABLET, FILM COATED ORAL at 21:18

## 2025-02-02 RX ADMIN — APIXABAN 2.5 MG: 2.5 TABLET, FILM COATED ORAL at 09:34

## 2025-02-02 RX ADMIN — CEFAZOLIN SODIUM 1 G: 1 INJECTION, SOLUTION INTRAVENOUS at 09:40

## 2025-02-02 RX ADMIN — TAMSULOSIN HYDROCHLORIDE 0.8 MG: 0.4 CAPSULE ORAL at 21:18

## 2025-02-02 RX ADMIN — POTASSIUM CHLORIDE 40 MEQ: 1500 TABLET, EXTENDED RELEASE ORAL at 09:33

## 2025-02-02 RX ADMIN — CEFAZOLIN SODIUM 1 G: 1 INJECTION, SOLUTION INTRAVENOUS at 16:09

## 2025-02-02 ASSESSMENT — PAIN DESCRIPTION - DESCRIPTORS: DESCRIPTORS: ACHING

## 2025-02-02 ASSESSMENT — COGNITIVE AND FUNCTIONAL STATUS - GENERAL
TOILETING: A LOT
DRESSING REGULAR LOWER BODY CLOTHING: A LOT
WALKING IN HOSPITAL ROOM: A LOT
HELP NEEDED FOR BATHING: A LOT
MOBILITY SCORE: 15
TOILETING: A LOT
PERSONAL GROOMING: A LOT
DRESSING REGULAR LOWER BODY CLOTHING: A LOT
HELP NEEDED FOR BATHING: A LOT
DAILY ACTIVITIY SCORE: 14
MOVING TO AND FROM BED TO CHAIR: A LOT
WALKING IN HOSPITAL ROOM: A LOT
CLIMB 3 TO 5 STEPS WITH RAILING: TOTAL
DRESSING REGULAR UPPER BODY CLOTHING: A LOT
CLIMB 3 TO 5 STEPS WITH RAILING: TOTAL
PERSONAL GROOMING: A LOT
MOBILITY SCORE: 15
MOVING TO AND FROM BED TO CHAIR: A LOT
DRESSING REGULAR UPPER BODY CLOTHING: A LOT
STANDING UP FROM CHAIR USING ARMS: A LOT
DAILY ACTIVITIY SCORE: 14
STANDING UP FROM CHAIR USING ARMS: A LOT

## 2025-02-02 ASSESSMENT — PAIN - FUNCTIONAL ASSESSMENT
PAIN_FUNCTIONAL_ASSESSMENT: FLACC (FACE, LEGS, ACTIVITY, CRY, CONSOLABILITY)
PAIN_FUNCTIONAL_ASSESSMENT: 0-10

## 2025-02-02 ASSESSMENT — PAIN SCALES - GENERAL
PAINLEVEL_OUTOF10: 3
PAINLEVEL_OUTOF10: 0 - NO PAIN

## 2025-02-02 ASSESSMENT — PAIN DESCRIPTION - ORIENTATION: ORIENTATION: RIGHT;LEFT

## 2025-02-02 NOTE — ASSESSMENT & PLAN NOTE
- concern for mild heart failure exacerbation   Echo 2/2024 with EF 50-55%  Consult cardiology   Diuresis per cardiology   Echo 2/1/25 with EF 40%  HF navigator consulted

## 2025-02-02 NOTE — PROGRESS NOTES
Angel Carroll is a 79 y.o. male on day 3 of admission presenting with Hypokalemia.      Subjective   States he feels okay this morning. Breathing is better. Currently on 2L which is at baseline. Tolerating PO. Feels like LE edema is improving.        Objective     Last Recorded Vitals  /54   Pulse 60   Temp 37 °C (98.6 °F) (Oral)   Resp 16   Wt 98 kg (216 lb)   SpO2 97%   Intake/Output last 3 Shifts:    Intake/Output Summary (Last 24 hours) at 2/2/2025 1142  Last data filed at 2/2/2025 1035  Gross per 24 hour   Intake 461 ml   Output 4250 ml   Net -3789 ml       Admission Weight  Weight: 98 kg (216 lb) (01/30/25 1542)    Daily Weight  01/30/25 : 98 kg (216 lb)    Image Results  Transthoracic Echo (TTE) [place] [address]             Phone [phone]    TRANSTHORACIC ECHOCARDIOGRAM REPORT    Patient Name:       ANGEL CARROLL           Reading Physician:    [id number] Sadie Hollis MD  Study Date:         2/1/2025             Ordering Provider:    [id number] HALEY SCHMIDT  MRN/PID:            [id number]             Fellow:  Accession#:         [id number]         Nurse:  Date of Birth/Age:  1945 / 79 years Sonographer:          Monisha Rios RDCS  Gender Assigned at  M                    Additional Staff:  Birth:  Height:             182.88 cm            Admit Date:  Weight:             97.98 kg             Admission Status:     Inpatient -                                                                 Routine  BSA / BMI:          2.20 m2 / 29.30      Department Location:  [facility]                      kg/m2  Blood Pressure: 123 /69 mmHg    Study Type:    TRANSTHORACIC ECHO (TTE) COMPLETE  Diagnosis/ICD: Heart failure, unspecified-I50.9; Shortness  of breath-R06.02  Indication:    CHF, SOB  CPT Codes:     Echo Complete w Full Doppler-[id number]    Patient History:  Pertinent History: CVA, A-Fib, Hyperlipidemia, HTN, CHF, Dyspnea, ASCVD, Sinus                     Bradycardia and Chest Pain.    Study Detail: The following Echo studies were performed: 2D, M-Mode, Doppler and                color flow. Patient has a pacemaker.       PHYSICIAN INTERPRETATION:  Left Ventricle: The left ventricular systolic function is moderately decreased, with a visually estimated ejection fraction of 40%. There are no regional wall motion abnormalities. The left ventricular cavity size is normal. There is normal septal and normal posterior left ventricular wall thickness. Abnormal (paradoxical) septal motion, consistent with left bundle branch block. Spectral Doppler shows a normal pattern of left ventricular diastolic filling.  Left Atrium: The left atrial size is mildly dilated.  Right Ventricle: The right ventricle is normal in size. There is reduced right ventricular systolic function.  Right Atrium: The right atrial size is mild to moderately dilated.  Aortic Valve: The aortic valve is trileaflet. There is mild aortic valve thickening. There is evidence of mildly elevated transaortic gradients consistent with sclerosis of the aortic valve.  There is no evidence of aortic valve regurgitation. The peak instantaneous gradient of the aortic valve is 5 mmHg.  Mitral Valve: The mitral valve is normal in structure. There is mild mitral valve regurgitation.  Tricuspid Valve: The tricuspid valve is structurally normal. There is mild tricuspid regurgitation. The Doppler estimated RVSP is mildly elevated right ventricular systolic pressure at 44.2 mmHg.  Pulmonic Valve: The pulmonic valve is structurally normal. There is physiologic pulmonic valve regurgitation.  Pericardium: No pericardial effusion noted.  Aorta: The aortic root is normal.  Systemic Veins: The inferior vena cava appears  normal in size, with IVC inspiratory collapse less than 50%.       CONCLUSIONS:   1. The left ventricular systolic function is moderately decreased, with a visually estimated ejection fraction of 40%.   2. Abnormal septal motion consistent with left bundle branch block.   3. There is reduced right ventricular systolic function.   4. The right atrial size is mild to moderately dilated.   5. Mild mitral valve regurgitation.   6. Mild tricuspid regurgitation is visualized.   7. Mildly elevated right ventricular systolic pressure.   8. Aortic valve sclerosis.    QUANTITATIVE DATA SUMMARY:     2D MEASUREMENTS:             Normal Ranges:  LAs:             3.60 cm     (2.7-4.0cm)  IVSd:            0.67 cm     (0.6-1.1cm)  LVPWd:           0.70 cm     (0.6-1.1cm)  LVIDd:           6.13 cm     (3.9-5.9cm)  LVIDs:           4.51 cm  LV Mass Index:   73.1 g/m2  LVEDV Index:     67.28 ml/m2  LV % FS          26.4 %       LEFT ATRIUM:                  Normal Ranges:  LA Vol A4C:        83.9 ml    (22+/-6mL/m2)  LA Vol A2C:        85.8 ml  LA Vol BP:         85.2 ml  LA Vol Index A4C:  38.1ml/m2  LA Vol Index A2C:  39.0 ml/m2  LA Vol Index BP:   38.7 ml/m2  LA Area A4C:       24.6 cm2  LA Area A2C:       24.8 cm2  LA Major Axis A4C: 6.1 cm  LA Major Axis A2C: 6.1 cm  LA Volume Index:   35.4 ml/m2  LA Vol A4C:        77.7 ml  LA Vol A2C:        79.1 ml  LA Vol Index BSA:  35.6 ml/m2       RIGHT ATRIUM:          Normal Ranges:  RA Area A4C:  43.5 cm2       M-MODE MEASUREMENTS:         Normal Ranges:  Ao Root:             2.90 cm (2.0-3.7cm)       AORTA MEASUREMENTS:         Normal Ranges:  Asc Ao, d:          3.30 cm (2.1-3.4cm)       LV SYSTOLIC FUNCTION:                       Normal Ranges:  EF-A4C View:    45 % (>=55%)  EF-A2C View:    45 %  EF-Biplane:     47 %  EF-Visual:      40 %  LV EF Reported: 40 %       LV DIASTOLIC FUNCTION:            Normal Ranges:  MV Peak E:             0.84 m/s   (0.7-1.2 m/s)  MV Peak A:              0.48 m/s   (0.42-0.7 m/s)  E/A Ratio:             1.76       (1.0-2.2)  MV e'                  0.039 m/s  (>8.0)  MV lateral e'          0.04 m/s  MV medial e'           0.04 m/s  E/e' Ratio:            21.89      (<8.0)  PulmV Sys Ross:         31.30 cm/s  PulmV Toussaint Ross:        53.00 cm/s  PulmV S/D Ross:         0.60       MITRAL VALVE:          Normal Ranges:  MV DT:        205 msec (150-240msec)       AORTIC VALVE:           Normal Ranges:  AoV Vmax:      1.17 m/s (<=1.7m/s)  AoV Peak P.5 mmHg (<20mmHg)  LVOT Max Ross:  0.57 m/s (<=1.1m/s)  LVOT Diameter: 2.20 cm  (1.8-2.4cm)  AoV Area,Vmax: 1.85 cm2 (2.5-4.5cm2)       RIGHT VENTRICLE:  RV Basal 4.63 cm  RV Mid   3.04 cm  RV Major 8.3 cm  TAPSE:   13.2 mm  RV s'    0.06 m/s       TRICUSPID VALVE/RVSP:          Normal Ranges:  Peak TR Velocity:     3.21 m/s  RV Syst Pressure:     44 mmHg  (< 30mmHg)  IVC Diam:             1.54 cm       PULMONARY VEINS:  PulmV Toussaint Ross: 53.00 cm/s  PulmV S/D Ross:  0.60  PulmV Sys Ross:  31.30 cm/s       [id number] Sadie Hollis MD  Electronically signed on 2025 at 9:08:14 AM       ** Final **      Physical Exam  Constitutional:       General: He is not in acute distress.     Appearance: He is not toxic-appearing.   HENT:      Head: Normocephalic.      Mouth/Throat:      Pharynx: Oropharynx is clear.   Eyes:      General: No scleral icterus.  Cardiovascular:      Rate and Rhythm: Normal rate.   Pulmonary:      Effort: No respiratory distress.      Breath sounds: No wheezing.      Comments: 2L NC  Abdominal:      General: There is no distension.      Palpations: Abdomen is soft.   Musculoskeletal:      Right lower leg: Edema present.      Left lower leg: Edema present.   Neurological:      Mental Status: He is alert.   Psychiatric:         Behavior: Behavior normal.         Relevant Results               Assessment/Plan          Assessment & Plan  Acute on chronic diastolic heart failure   - concern for mild heart failure  exacerbation   Echo 2/2024 with EF 50-55%  Consult cardiology   Diuresis per cardiology   Echo 2/1/25 with EF 40%  HF navigator consulted   Frequent falls  Due to generalized weakness  PT/OT  Fall precautions  Patient requesting SNF placement -> TCC consulted  Hypokalemia  Suspect due to use of lasix  Replace PRN  Atrial fibrillation (Multi)  Continue home metoprolol and eliquis  Stage 3a chronic kidney disease (Multi)  Baseline Cr 1.5-1.7  Stable at baseline  UTI (urinary tract infection)  Recent urine culture on 1/15/25 growing proteus mirabilis -> was prescribed macrobid as outpatient but sensitivities are showing resistance to macrobid -> suspect this was inadequately treated   Urine culture positive for proteus mirabilis   Continue cefazolin, can transition to PO augmentin at discharge   BPH (benign prostatic hyperplasia)  Continue home finasteride and flomax  Leukopenia  Chronic   Consider outpatient referral to hematology   COPD (chronic obstructive pulmonary disease) (Multi)  With chronic hypoxic respiratory failure  On 2L NC at baseline, stable  Not in exacerbation     Plan:  Continue diuresis per cardiology  Continue antibiotics for UTI, can transition to PO at discharge  PT/OT/OOB  Planning for discharge to SNF, [facility]/4/[facility]              Eugenia Rodriguez MD

## 2025-02-02 NOTE — CARE PLAN
The patient's goals for the shift include remain safe and get rest    The clinical goals for the shift include safety      Problem: Pain - Adult  Goal: Verbalizes/displays adequate comfort level or baseline comfort level  Outcome: Progressing     Problem: Safety - Adult  Goal: Free from fall injury  Outcome: Progressing     Problem: Discharge Planning  Goal: Discharge to home or other facility with appropriate resources  Outcome: Progressing     Problem: Chronic Conditions and Co-morbidities  Goal: Patient's chronic conditions and co-morbidity symptoms are monitored and maintained or improved  Outcome: Progressing     Problem: Nutrition  Goal: Nutrient intake appropriate for maintaining nutritional needs  Outcome: Progressing     Problem: Skin  Goal: Decreased wound size/increased tissue granulation at next dressing change  Outcome: Progressing  Flowsheets (Taken 2/2/2025 1102)  Decreased wound size/increased tissue granulation at next dressing change:   Protective dressings over bony prominences   Promote sleep for wound healing  Goal: Participates in plan/prevention/treatment measures  Outcome: Progressing  Flowsheets (Taken 2/1/2025 1954 by Leigha Miller RN)  Participates in plan/prevention/treatment measures:   Discuss with provider PT/OT consult   Elevate heels   Increase activity/out of bed for meals  Goal: Prevent/manage excess moisture  Outcome: Progressing  Flowsheets (Taken 2/2/2025 1102)  Prevent/manage excess moisture:   Cleanse incontinence/protect with barrier cream   Monitor for/manage infection if present  Goal: Prevent/minimize sheer/friction injuries  Outcome: Progressing  Flowsheets (Taken 2/2/2025 1102)  Prevent/minimize sheer/friction injuries:   Complete micro-shifts as needed if patient unable. Adjust patient position to relieve pressure points, not a full turn   Use pull sheet  Goal: Promote/optimize nutrition  Outcome: Progressing  Flowsheets (Taken 2/2/2025  1102)  Promote/optimize nutrition:   Assist with feeding   Consume > 50% meals/supplements   Monitor/record intake including meals  Goal: Promote skin healing  Outcome: Progressing  Flowsheets (Taken 2/2/2025 1102)  Promote skin healing:   Assess skin/pad under line(s)/device(s)   Protective dressings over bony prominences

## 2025-02-02 NOTE — ASSESSMENT & PLAN NOTE
Recent urine culture on 1/15/25 growing proteus mirabilis -> was prescribed macrobid as outpatient but sensitivities are showing resistance to macrobid -> suspect this was inadequately treated   Urine culture positive for proteus mirabilis   Continue cefazolin, can transition to PO augmentin at discharge

## 2025-02-02 NOTE — CARE PLAN
Problem: Pain - Adult  Goal: Verbalizes/displays adequate comfort level or baseline comfort level  Outcome: Progressing     Problem: Safety - Adult  Goal: Free from fall injury  Outcome: Progressing     Problem: Discharge Planning  Goal: Discharge to home or other facility with appropriate resources  Outcome: Progressing     Problem: Chronic Conditions and Co-morbidities  Goal: Patient's chronic conditions and co-morbidity symptoms are monitored and maintained or improved  Outcome: Progressing     Problem: Nutrition  Goal: Nutrient intake appropriate for maintaining nutritional needs  Outcome: Progressing     Problem: Skin  Goal: Decreased wound size/increased tissue granulation at next dressing change  Outcome: Progressing  Flowsheets (Taken 2/1/2025 1954)  Decreased wound size/increased tissue granulation at next dressing change:   Promote sleep for wound healing   Protective dressings over bony prominences   Utilize specialty bed per algorithm  Goal: Participates in plan/prevention/treatment measures  Outcome: Progressing  Flowsheets (Taken 2/1/2025 1954)  Participates in plan/prevention/treatment measures:   Discuss with provider PT/OT consult   Elevate heels   Increase activity/out of bed for meals  Goal: Prevent/manage excess moisture  Outcome: Progressing  Flowsheets (Taken 2/1/2025 1954)  Prevent/manage excess moisture:   Cleanse incontinence/protect with barrier cream   Follow provider orders for dressing changes   Moisturize dry skin   Monitor for/manage infection if present   Use wicking fabric (obtain order)  Goal: Prevent/minimize sheer/friction injuries  Outcome: Progressing  Flowsheets (Taken 2/1/2025 1954)  Prevent/minimize sheer/friction injuries:   Complete micro-shifts as needed if patient unable. Adjust patient position to relieve pressure points, not a full turn   HOB 30 degrees or less   Increase activity/out of bed for meals   Turn/reposition every 2 hours/use positioning/transfer devices    Use pull sheet   Utilize specialty bed per algorithm  Goal: Promote/optimize nutrition  Outcome: Progressing  Flowsheets (Taken 2/1/2025 1954)  Promote/optimize nutrition:   Assist with feeding   Consume > 50% meals/supplements   Discuss with provider if NPO > 2 days   Monitor/record intake including meals   Offer water/supplements/favorite foods   Reassess MST if dietician not consulted  Goal: Promote skin healing  Outcome: Progressing  Flowsheets (Taken 2/1/2025 1954)  Promote skin healing:   Assess skin/pad under line(s)/device(s)   Ensure correct size (line/device) and apply per  instructions   Protective dressings over bony prominences   Rotate device position/do not position patient on device   Turn/reposition every 2 hours/use positioning/transfer devices   The patient's goals for the shift include remain safe and get rest    The clinical goals for the shift include Safety    Over the shift, the patient did not make progress toward the following goals. Barriers to progression include . Recommendations to address these barriers include .

## 2025-02-02 NOTE — PROGRESS NOTES
Angel Carroll is a 79 y.o. male on day 3 of admission presenting with Hypokalemia.    Subjective   Patient resting comfortably in bed. Denies chest pain or pressure.        Objective     Physical Exam  Cardiovascular:      Rate and Rhythm: Normal rate and regular rhythm.      Heart sounds: No murmur heard.     No friction rub. No gallop.   Pulmonary:      Effort: Pulmonary effort is normal.      Breath sounds: Normal breath sounds. No wheezing, rhonchi or rales.   Abdominal:      General: Bowel sounds are normal.      Palpations: Abdomen is soft.   Musculoskeletal:      Right lower leg: Edema present.      Left lower leg: Edema present.   Skin:     General: Skin is warm and dry.      Capillary Refill: Capillary refill takes less than 2 seconds.   Neurological:      Mental Status: He is alert.   Psychiatric:         Mood and Affect: Mood normal.         Behavior: Behavior normal.         Last Recorded Vitals  Blood pressure 127/54, pulse 60, temperature 37 °C (98.6 °F), temperature source Oral, resp. rate 16, height 1.829 m (6'), weight 98 kg (216 lb), SpO2 97%.  Intake/Output last 3 Shifts:    Intake/Output Summary (Last 24 hours) at 2/2/2025 1126  Last data filed at 2/2/2025 1035  Gross per 24 hour   Intake 461 ml   Output 4250 ml   Net -3789 ml         Relevant Results  Results for orders placed or performed during the hospital encounter of 01/30/25 (from the past 24 hours)   Transthoracic Echo (TTE) Complete   Result Value Ref Range    AV pk carlos 1.17 m/s    LVOT diam 2.20 cm    MV E/A ratio 1.76     Tricuspid annular plane systolic excursion 1.3 cm    LV Biplane EF 47 %    LA vol index A/L 38.7 ml/m2    LV EF 40 %    RV free wall pk S' 6.31 cm/s    RVSP 44.2 mmHg    LVIDd 6.13 cm    AV pk grad 5 mmHg    Aortic Valve Area by Continuity of Peak Velocity 1.85 cm2    LV A4C EF 45.2    Basic metabolic panel   Result Value Ref Range    Glucose 85 74 - 99 mg/dL    Sodium 143 136 - 145 mmol/L    Potassium 3.8 3.5 - 5.3  mmol/L    Chloride 102 98 - 107 mmol/L    Bicarbonate 34 (H) 21 - 32 mmol/L    Anion Gap 11 10 - 20 mmol/L    Urea Nitrogen 15 6 - 23 mg/dL    Creatinine 1.52 (H) 0.50 - 1.30 mg/dL    eGFR 46 (L) >60 mL/min/1.73m*2    Calcium 8.3 (L) 8.6 - 10.3 mg/dL   CBC   Result Value Ref Range    WBC 4.4 4.4 - 11.3 x10*3/uL    nRBC 0.0 0.0 - 0.0 /100 WBCs    RBC 3.52 (L) 4.50 - 5.90 x10*6/uL    Hemoglobin 10.7 (L) 13.5 - 17.5 g/dL    Hematocrit 34.8 (L) 41.0 - 52.0 %    MCV 99 80 - 100 fL    MCH 30.4 26.0 - 34.0 pg    MCHC 30.7 (L) 32.0 - 36.0 g/dL    RDW 15.7 (H) 11.5 - 14.5 %    Platelets 141 (L) 150 - 450 x10*3/uL     *Note: Due to a large number of results and/or encounters for the requested time period, some results have not been displayed. A complete set of results can be found in Results Review.                 Assessment/Plan   Assessment & Plan  Hypokalemia    BPH (benign prostatic hyperplasia)    Atrial fibrillation (Multi)    Acute on chronic diastolic heart failure    Frequent falls    UTI (urinary tract infection)    Stage 3a chronic kidney disease (Multi)    Leukopenia    COPD (chronic obstructive pulmonary disease) (Multi)    Acute on chronic diastolic Heart Failure   Hypokalemia   Persistent Atrial Fibrillation on Apixaban  Sick sinus syndrome with PPM  Hypertension disorder   CKD  Hyperlipidemia  CAD  COPD  Depression     1/31: As stated above, patient came to the ED for increased generalized weakness and worsening lower extremity edema.  Patient states he has not had any changes in his medications.  He does however state that he has not been adhering to a low sodium diet and has not watched his dietary intake. Patient does appear to be fluid overloaded, with +1 pitting edema on bilateral lower extremities. Morning vitals; heart rate 63 blood pressure 133/61 satting 96% on 3 L nasal cannula.  Denies chest pain, pressure, palpitations.  Baseline patient wears 2 L oxygen at home, does not report worsening  shortness of breath at this moment.  States he does have increased work of breathing when ambulating in which he increases his oxygen to 3 L. Ordered transthoracic echocardiogram to be completed.  Patient received a one-time dose of potassium chloride this morning; I ordered an additional 40 mEq of potassium to be given this morning and daily due to patients hypokalemia. I also ordered one-time dose of 40 mg of IV Lasix to manage his fluid overload.  Will check a serum potassium this afternoon.  Modified patient's diet to a 2L fluid restriction.  Monitoring strict I's and O's.  Consulted heart failure nurse navigator to see patient during this admission. Will continue to follow with you.      2/1:  Patient does appear to be fluid overloaded, with slightly worsening edema, +2, of the bilateral lower extremities -around the ankle and feet. Current vitals this morning heart rate 62, blood pressure 128/66, and satting 98% on 3 L nasal cannula.  On telemetry patient is AV paced. Denies chest pain, pressure, palpitations.  Baseline patient wears 2 L oxygen at home, he does report some mild shortness of breath at this moment.  With the worsening bilateral lower extremity edema and the mild shortness of breath, I ordered IV frusemide 40 mg to be given this morning and this afternoon.  According to the charting the patient's I/O was +190 this morning.  Upon my assessment the patient had an external catheter. Continue to monitor strict I's and O's. Current labs this morning sodium 142, potassium 3.5, BUN 17, creatinine 1.51, WBC 2.9, hemoglobin/hematocrit 10.5/33.5, platelets 119.  Will closely monitor patient's creatinine, with this addition of Lasix given today.  transthoracic echocardiogram to be completed today.  We will continue to follow with you.    2/2: As above.  Patient resting comfortably in bed this morning eating breakfast.  He is on 2 L nasal cannula which is his baseline at home.  The patient diuresed very well  over the last 24 hours with an approximate -3.7 L fluid balance. He does remain with some +2 pitting lower extremity edema.  Reports that his breathing feels improved since admission.  Denies chest pain or pressure.  Blood pressures overall normotensive last recorded 127/54.  Remains in a and AV paced rhythm on telemetry without significant ectopy. Lab work this morning revealing a sodium of 143, potassium 3.8, creatinine 1.52 and hemoglobin of 10.7.  Echocardiogram completed yesterday revealing a decreased ejection fraction of 40%.  Reviewed these images with my collaborating physician Dr. Hollis and compared to previous echocardiogram there are not significant changes in the left ventricular systolic function.  No regional wall motion abnormalities were noted on this echocardiogram. I am going to begin him on 5 mg lisinopril daily as part of guideline directed medical therapy for systolic heart failure.  Additionally, I am going to give 1 more dose of IV Lasix 40 mg today and will likely transition to oral diuretic therapy tomorrow.  Potential plans for discharge to skilled nursing facility tomorrow.  Will follow with you.        Annamaria Pa, APRN-CNP

## 2025-02-03 ENCOUNTER — DOCUMENTATION (OUTPATIENT)
Dept: RESEARCH | Age: 80
End: 2025-02-03
Payer: MEDICARE

## 2025-02-03 LAB
ANION GAP SERPL CALCULATED.3IONS-SCNC: 9 MMOL/L (ref 10–20)
BUN SERPL-MCNC: 19 MG/DL (ref 6–23)
CALCIUM SERPL-MCNC: 8.5 MG/DL (ref 8.6–10.3)
CHLORIDE SERPL-SCNC: 103 MMOL/L (ref 98–107)
CO2 SERPL-SCNC: 34 MMOL/L (ref 21–32)
CREAT SERPL-MCNC: 1.48 MG/DL (ref 0.5–1.3)
EGFRCR SERPLBLD CKD-EPI 2021: 47 ML/MIN/1.73M*2
ERYTHROCYTE [DISTWIDTH] IN BLOOD BY AUTOMATED COUNT: 15.4 % (ref 11.5–14.5)
GLUCOSE SERPL-MCNC: 101 MG/DL (ref 74–99)
HCT VFR BLD AUTO: 31.8 % (ref 41–52)
HGB BLD-MCNC: 9.8 G/DL (ref 13.5–17.5)
MCH RBC QN AUTO: 30 PG (ref 26–34)
MCHC RBC AUTO-ENTMCNC: 30.8 G/DL (ref 32–36)
MCV RBC AUTO: 97 FL (ref 80–100)
NRBC BLD-RTO: 0 /100 WBCS (ref 0–0)
PLATELET # BLD AUTO: 120 X10*3/UL (ref 150–450)
POTASSIUM SERPL-SCNC: 3.9 MMOL/L (ref 3.5–5.3)
RBC # BLD AUTO: 3.27 X10*6/UL (ref 4.5–5.9)
SODIUM SERPL-SCNC: 142 MMOL/L (ref 136–145)
WBC # BLD AUTO: 3.7 X10*3/UL (ref 4.4–11.3)

## 2025-02-03 PROCEDURE — 2500000001 HC RX 250 WO HCPCS SELF ADMINISTERED DRUGS (ALT 637 FOR MEDICARE OP): Performed by: STUDENT IN AN ORGANIZED HEALTH CARE EDUCATION/TRAINING PROGRAM

## 2025-02-03 PROCEDURE — 2500000004 HC RX 250 GENERAL PHARMACY W/ HCPCS (ALT 636 FOR OP/ED): Performed by: STUDENT IN AN ORGANIZED HEALTH CARE EDUCATION/TRAINING PROGRAM

## 2025-02-03 PROCEDURE — 97530 THERAPEUTIC ACTIVITIES: CPT | Mod: GP

## 2025-02-03 PROCEDURE — 2500000001 HC RX 250 WO HCPCS SELF ADMINISTERED DRUGS (ALT 637 FOR MEDICARE OP)

## 2025-02-03 PROCEDURE — 36415 COLL VENOUS BLD VENIPUNCTURE: CPT | Performed by: STUDENT IN AN ORGANIZED HEALTH CARE EDUCATION/TRAINING PROGRAM

## 2025-02-03 PROCEDURE — 2500000004 HC RX 250 GENERAL PHARMACY W/ HCPCS (ALT 636 FOR OP/ED): Performed by: NURSE PRACTITIONER

## 2025-02-03 PROCEDURE — 85027 COMPLETE CBC AUTOMATED: CPT | Performed by: STUDENT IN AN ORGANIZED HEALTH CARE EDUCATION/TRAINING PROGRAM

## 2025-02-03 PROCEDURE — 2500000002 HC RX 250 W HCPCS SELF ADMINISTERED DRUGS (ALT 637 FOR MEDICARE OP, ALT 636 FOR OP/ED): Performed by: STUDENT IN AN ORGANIZED HEALTH CARE EDUCATION/TRAINING PROGRAM

## 2025-02-03 PROCEDURE — 2500000002 HC RX 250 W HCPCS SELF ADMINISTERED DRUGS (ALT 637 FOR MEDICARE OP, ALT 636 FOR OP/ED)

## 2025-02-03 PROCEDURE — 97110 THERAPEUTIC EXERCISES: CPT | Mod: GO

## 2025-02-03 PROCEDURE — 80048 BASIC METABOLIC PNL TOTAL CA: CPT | Performed by: STUDENT IN AN ORGANIZED HEALTH CARE EDUCATION/TRAINING PROGRAM

## 2025-02-03 PROCEDURE — 99232 SBSQ HOSP IP/OBS MODERATE 35: CPT | Performed by: INTERNAL MEDICINE

## 2025-02-03 PROCEDURE — 1200000002 HC GENERAL ROOM WITH TELEMETRY DAILY

## 2025-02-03 PROCEDURE — 99232 SBSQ HOSP IP/OBS MODERATE 35: CPT | Performed by: NURSE PRACTITIONER

## 2025-02-03 RX ORDER — FUROSEMIDE 40 MG/1
40 TABLET ORAL DAILY
Status: DISCONTINUED | OUTPATIENT
Start: 2025-02-04 | End: 2025-02-04 | Stop reason: HOSPADM

## 2025-02-03 RX ORDER — SACUBITRIL AND VALSARTAN 24; 26 MG/1; MG/1
1 TABLET, FILM COATED ORAL 2 TIMES DAILY
Status: DISCONTINUED | OUTPATIENT
Start: 2025-02-05 | End: 2025-02-04 | Stop reason: HOSPADM

## 2025-02-03 RX ORDER — FUROSEMIDE 10 MG/ML
20 INJECTION INTRAMUSCULAR; INTRAVENOUS ONCE
Status: COMPLETED | OUTPATIENT
Start: 2025-02-03 | End: 2025-02-03

## 2025-02-03 RX ADMIN — FINASTERIDE 5 MG: 5 TABLET, FILM COATED ORAL at 09:16

## 2025-02-03 RX ADMIN — AMIODARONE HYDROCHLORIDE 200 MG: 200 TABLET ORAL at 09:16

## 2025-02-03 RX ADMIN — FOLIC ACID 1 MG: 1 TABLET ORAL at 09:16

## 2025-02-03 RX ADMIN — CEFAZOLIN SODIUM 1 G: 1 INJECTION, SOLUTION INTRAVENOUS at 16:44

## 2025-02-03 RX ADMIN — ATORVASTATIN CALCIUM 20 MG: 20 TABLET, FILM COATED ORAL at 20:33

## 2025-02-03 RX ADMIN — PAROXETINE HYDROCHLORIDE 20 MG: 20 TABLET, FILM COATED ORAL at 09:16

## 2025-02-03 RX ADMIN — FERROUS SULFATE TAB 325 MG (65 MG ELEMENTAL FE) 325 MG: 325 (65 FE) TAB at 09:19

## 2025-02-03 RX ADMIN — FUROSEMIDE 20 MG: 10 INJECTION, SOLUTION INTRAMUSCULAR; INTRAVENOUS at 10:09

## 2025-02-03 RX ADMIN — CEFAZOLIN SODIUM 1 G: 1 INJECTION, SOLUTION INTRAVENOUS at 09:19

## 2025-02-03 RX ADMIN — APIXABAN 2.5 MG: 2.5 TABLET, FILM COATED ORAL at 09:16

## 2025-02-03 RX ADMIN — LISINOPRIL 5 MG: 5 TABLET ORAL at 09:16

## 2025-02-03 RX ADMIN — METOPROLOL SUCCINATE 25 MG: 25 TABLET, FILM COATED, EXTENDED RELEASE ORAL at 09:16

## 2025-02-03 RX ADMIN — TAMSULOSIN HYDROCHLORIDE 0.8 MG: 0.4 CAPSULE ORAL at 20:33

## 2025-02-03 RX ADMIN — POTASSIUM CHLORIDE 40 MEQ: 1500 TABLET, EXTENDED RELEASE ORAL at 09:16

## 2025-02-03 RX ADMIN — APIXABAN 2.5 MG: 2.5 TABLET, FILM COATED ORAL at 20:33

## 2025-02-03 RX ADMIN — CEFAZOLIN SODIUM 1 G: 1 INJECTION, SOLUTION INTRAVENOUS at 00:34

## 2025-02-03 RX ADMIN — GABAPENTIN 100 MG: 100 CAPSULE ORAL at 20:33

## 2025-02-03 RX ADMIN — PANTOPRAZOLE SODIUM 40 MG: 40 TABLET, DELAYED RELEASE ORAL at 09:16

## 2025-02-03 ASSESSMENT — COGNITIVE AND FUNCTIONAL STATUS - GENERAL
WALKING IN HOSPITAL ROOM: A LOT
TOILETING: A LOT
WALKING IN HOSPITAL ROOM: A LOT
DRESSING REGULAR UPPER BODY CLOTHING: A LOT
WALKING IN HOSPITAL ROOM: A LOT
TOILETING: TOTAL
DAILY ACTIVITIY SCORE: 14
PERSONAL GROOMING: A LOT
DRESSING REGULAR UPPER BODY CLOTHING: A LOT
MOVING TO AND FROM BED TO CHAIR: A LOT
CLIMB 3 TO 5 STEPS WITH RAILING: TOTAL
DRESSING REGULAR LOWER BODY CLOTHING: A LOT
MOBILITY SCORE: 15
CLIMB 3 TO 5 STEPS WITH RAILING: TOTAL
PERSONAL GROOMING: A LOT
STANDING UP FROM CHAIR USING ARMS: A LOT
STANDING UP FROM CHAIR USING ARMS: A LOT
CLIMB 3 TO 5 STEPS WITH RAILING: TOTAL
PERSONAL GROOMING: A LOT
MOVING TO AND FROM BED TO CHAIR: A LOT
DRESSING REGULAR LOWER BODY CLOTHING: A LOT
TURNING FROM BACK TO SIDE WHILE IN FLAT BAD: A LOT
HELP NEEDED FOR BATHING: A LOT
CLIMB 3 TO 5 STEPS WITH RAILING: TOTAL
MOBILITY SCORE: 11
STANDING UP FROM CHAIR USING ARMS: A LOT
MOBILITY SCORE: 15
MOBILITY SCORE: 11
HELP NEEDED FOR BATHING: A LOT
MOVING FROM LYING ON BACK TO SITTING ON SIDE OF FLAT BED WITH BEDRAILS: A LOT
TURNING FROM BACK TO SIDE WHILE IN FLAT BAD: A LOT
DAILY ACTIVITIY SCORE: 14
TOILETING: A LOT
MOVING TO AND FROM BED TO CHAIR: A LOT
MOVING TO AND FROM BED TO CHAIR: A LOT
MOVING FROM LYING ON BACK TO SITTING ON SIDE OF FLAT BED WITH BEDRAILS: A LOT
DRESSING REGULAR UPPER BODY CLOTHING: A LITTLE
WALKING IN HOSPITAL ROOM: A LOT
DRESSING REGULAR LOWER BODY CLOTHING: A LOT
HELP NEEDED FOR BATHING: A LOT
STANDING UP FROM CHAIR USING ARMS: A LOT
DAILY ACTIVITIY SCORE: 14

## 2025-02-03 ASSESSMENT — PAIN SCALES - GENERAL
PAINLEVEL_OUTOF10: 0 - NO PAIN
PAINLEVEL_OUTOF10: 4
PAINLEVEL_OUTOF10: 0 - NO PAIN
PAINLEVEL_OUTOF10: 3
PAINLEVEL_OUTOF10: 0 - NO PAIN

## 2025-02-03 ASSESSMENT — PAIN - FUNCTIONAL ASSESSMENT
PAIN_FUNCTIONAL_ASSESSMENT: 0-10

## 2025-02-03 NOTE — RESEARCH NOTES
Artificial Intelligence Monitoring in Nursing (AIMS Nursing) Study    Principle Investigator - Dr. Abhilash Woo  Research Coordinator - SELENA Zuñiga     Patient Name - Angel Carroll  Date - 2/3/2025 3:06 PM  Location - [facility], [place]    Angel Carroll was approached by SELENA Zuñiga to talk about participating in the AIMS Nursing Study. The consent form was signed by the patient and they were given a copy for their records. Study protocol was followed and patient was given study contact information.     SELENA Zuñiga

## 2025-02-03 NOTE — CARE PLAN
Problem: PT Problem  Goal: Strength  Description: The patient will demonstrate an overall strength of 4/5 in BLE to assist with completion of functional mobility.    Outcome: Progressing  Goal: Functional Mobility  Description: The patient will complete functional mobility (bed mobility, transfers, etc.) at a distant supervision level with LRAD by DC.    Outcome: Progressing  Goal: Balance  Description: The patient will demonstrate good dynamic standing balance during activity with LRAD.   Outcome: Progressing      behavioral health/cardiovascular

## 2025-02-03 NOTE — CARE PLAN
The patient's goals for the shift include remain safe and get rest    The clinical goals for the shift include safety, increase OOB      Problem: Pain - Adult  Goal: Verbalizes/displays adequate comfort level or baseline comfort level  Outcome: Progressing     Problem: Safety - Adult  Goal: Free from fall injury  Outcome: Progressing     Problem: Discharge Planning  Goal: Discharge to home or other facility with appropriate resources  Outcome: Progressing     Problem: Chronic Conditions and Co-morbidities  Goal: Patient's chronic conditions and co-morbidity symptoms are monitored and maintained or improved  Outcome: Progressing     Problem: Nutrition  Goal: Nutrient intake appropriate for maintaining nutritional needs  Outcome: Progressing     Problem: Skin  Goal: Decreased wound size/increased tissue granulation at next dressing change  Outcome: Progressing  Goal: Participates in plan/prevention/treatment measures  Outcome: Progressing  Goal: Prevent/manage excess moisture  Outcome: Progressing  Goal: Prevent/minimize sheer/friction injuries  Outcome: Progressing  Goal: Promote/optimize nutrition  Outcome: Progressing  Goal: Promote skin healing  Outcome: Progressing     Problem: Heart Failure  Goal: Improved gas exchange this shift  Outcome: Progressing  Goal: Improved urinary output this shift  Outcome: Progressing  Goal: Reduction in peripheral edema within 24 hours  Outcome: Progressing  Goal: Report improvement of dyspnea/breathlessness this shift  Outcome: Progressing  Goal: Weight from fluid excess reduced over 2-3 days, then stabilize  Outcome: Progressing  Goal: Increase self care and/or family involvement in 24 hours  Outcome: Progressing

## 2025-02-03 NOTE — CARE PLAN
Problem: Pain - Adult  Goal: Verbalizes/displays adequate comfort level or baseline comfort level  Outcome: Progressing     Problem: Safety - Adult  Goal: Free from fall injury  Outcome: Progressing     Problem: Discharge Planning  Goal: Discharge to home or other facility with appropriate resources  Outcome: Progressing     Problem: Chronic Conditions and Co-morbidities  Goal: Patient's chronic conditions and co-morbidity symptoms are monitored and maintained or improved  Outcome: Progressing     Problem: Nutrition  Goal: Nutrient intake appropriate for maintaining nutritional needs  Outcome: Progressing     Problem: Skin  Goal: Decreased wound size/increased tissue granulation at next dressing change  Outcome: Progressing  Flowsheets (Taken 2/2/2025 2043)  Decreased wound size/increased tissue granulation at next dressing change:   Promote sleep for wound healing   Protective dressings over bony prominences   Utilize specialty bed per algorithm  Goal: Participates in plan/prevention/treatment measures  Outcome: Progressing  Flowsheets (Taken 2/2/2025 2043)  Participates in plan/prevention/treatment measures:   Discuss with provider PT/OT consult   Elevate heels   Increase activity/out of bed for meals  Goal: Prevent/manage excess moisture  Outcome: Progressing  Flowsheets (Taken 2/2/2025 2043)  Prevent/manage excess moisture:   Cleanse incontinence/protect with barrier cream   Follow provider orders for dressing changes   Moisturize dry skin   Monitor for/manage infection if present   Use wicking fabric (obtain order)  Goal: Prevent/minimize sheer/friction injuries  Outcome: Progressing  Flowsheets (Taken 2/2/2025 2043)  Prevent/minimize sheer/friction injuries:   Complete micro-shifts as needed if patient unable. Adjust patient position to relieve pressure points, not a full turn   HOB 30 degrees or less   Increase activity/out of bed for meals   Turn/reposition every 2 hours/use positioning/transfer devices    Use pull sheet   Utilize specialty bed per algorithm  Goal: Promote/optimize nutrition  Outcome: Progressing  Flowsheets (Taken 2/2/2025 2043)  Promote/optimize nutrition:   Assist with feeding   Consume > 50% meals/supplements   Discuss with provider if NPO > 2 days   Monitor/record intake including meals   Offer water/supplements/favorite foods   Reassess MST if dietician not consulted  Goal: Promote skin healing  Outcome: Progressing  Flowsheets (Taken 2/2/2025 2043)  Promote skin healing:   Ensure correct size (line/device) and apply per  instructions   Assess skin/pad under line(s)/device(s)   Protective dressings over bony prominences   Rotate device position/do not position patient on device   Turn/reposition every 2 hours/use positioning/transfer devices   The patient's goals for the shift include remain safe and get rest    The clinical goals for the shift include safety    Over the shift, the patient did not make progress toward the following goals. Barriers to progression include . Recommendations to address these barriers include .

## 2025-02-03 NOTE — PROGRESS NOTES
Angel Carroll is a 79 y.o. male on day 4 of admission presenting with bilateral leg swelling with acute on chronic diastolic heart failure.     Subjective   He was asleep, arousable. He was on O2 at 2 L/min which is his baseline.   He was to receive IV lasix today.     Objective     Physical Exam  General: awake, alert, oriented, responsive  Cardiovascular: regular, normal S1 and S2  Lungs: good air entry bilaterally  Abdomen: soft, nontender, bowel sounds present, normoactive  Extremities: no peripheral cyanosis, no pedal edema  Neuro: alert, oriented x 3, no focal weakness      Last Recorded Vitals  Blood pressure 126/64, pulse 61, temperature 36.8 °C (98.2 °F), temperature source Oral, resp. rate 16, height 1.829 m (6'), weight 98 kg (216 lb), SpO2 99%.  Intake/Output last 3 Shifts:  I/O last 3 completed shifts:  In: 1336 (13.6 mL/kg) [P.O.:1236; IV Piggyback:100]  Out: 4150 (42.4 mL/kg) [Urine:4150 (1.2 mL/kg/hr)]  Weight: 98 kg     Relevant Results  Lab Results   Component Value Date    WBC 3.7 (L) 02/03/2025    HGB 9.8 (L) 02/03/2025    HCT 31.8 (L) 02/03/2025    MCV 97 02/03/2025     (L) 02/03/2025     Lab Results   Component Value Date    GLUCOSE 101 (H) 02/03/2025    CALCIUM 8.5 (L) 02/03/2025     02/03/2025    K 3.9 02/03/2025    CO2 34 (H) 02/03/2025     02/03/2025    BUN 19 02/03/2025    CREATININE 1.48 (H) 02/03/2025     Scheduled medications  amiodarone, 200 mg, oral, Daily  apixaban, 2.5 mg, oral, BID  atorvastatin, 20 mg, oral, Nightly  ceFAZolin, 1 g, intravenous, q8h  ferrous sulfate (325 mg ferrous sulfate), 65 mg of iron, oral, Daily with breakfast  finasteride, 5 mg, oral, Daily  folic acid, 1 mg, oral, Daily  furosemide, 20 mg, intravenous, Once  [Held by provider] furosemide, 20 mg, oral, BID  gabapentin, 100 mg, oral, Nightly  lisinopril, 5 mg, oral, Daily  metoprolol succinate XL, 25 mg, oral, Daily  pantoprazole, 40 mg, oral, Daily   Or  pantoprazole, 40 mg, intravenous,  Daily  PARoxetine, 20 mg, oral, Daily  polyethylene glycol, 17 g, oral, Daily  potassium chloride CR, 40 mEq, oral, Daily  tamsulosin, 0.8 mg, oral, Nightly      Continuous medications  oxygen, 2 L/min      PRN medications  PRN medications: acetaminophen **OR** acetaminophen **OR** acetaminophen, ondansetron ODT **OR** ondansetron        Assessment/Plan   Assessment & Plan    Acute on chronic diastolic heart failure   - concern for mild heart failure exacerbation   Echo 2/2024 with EF 50-55%  Diuresis per cardiology   Echo 2/1/25 with EF 40%  HF navigator consulted   Frequent falls  Due to generalized weakness  PT/OT  Fall precautions  Patient requesting SNF placement -> TCC consulted  Hypokalemia  Suspect due to use of lasix  Replace PRN  Atrial fibrillation  Continue home metoprolol and eliquis  Stage 3a chronic kidney disease  Baseline Cr 1.5-1.7  Stable at baseline  UTI (urinary tract infection)  Recent urine culture on 1/15/25 growing proteus mirabilis -> was prescribed macrobid as outpatient but sensitivities are showing resistance to macrobid -> suspect this was inadequately treated   Urine culture positive for proteus mirabilis   Continue cefazolin, can transition to PO augmentin at discharge   BPH (benign prostatic hyperplasia)  Continue home finasteride and flomax  Leukopenia  Chronic   COPD (chronic obstructive pulmonary disease)  With chronic hypoxic respiratory failure  On 2L NC at baseline, stable  Not in exacerbation     Anticipate discharge to [facility] tomorrow.     Scottie Yuan MD

## 2025-02-03 NOTE — NURSING NOTE
AM shift assessment unchanged, pt is A&Ox4 in no acute distress, resting in bed with call light in reach, no new events/concerns this shift. Bed alarm on           27-Sep-2018 14:34

## 2025-02-03 NOTE — PROGRESS NOTES
Physical Therapy    Physical Therapy Treatment    Patient Name: Angel Carroll  MRN: [id number]  Department: [address]  Room: [address] [place] [address]-  Today's Date: 2/3/2025  Time Calculation  Start Time: 1301  Stop Time: 1320  Time Calculation (min): 19 min         Assessment/Plan   PT Assessment  Rehab Prognosis: Good  Barriers to Discharge Home: Caregiver assistance, Physical needs  Caregiver Assistance: Caregiver assistance needed per identified barriers - however, level of patient's required assistance exceeds assistance available at home  Physical Needs: 24hr mobility assistance needed  Evaluation/Treatment Tolerance: Patient limited by fatigue  End of Session Communication: Bedside nurse  Assessment Comment: Fatigue limiting transfer this treatment  End of Session Patient Position: Bed, 3 rail up, Alarm on  PT Plan  Inpatient/Swing Bed or Outpatient: Inpatient  PT Plan  Treatment/Interventions: Bed mobility, Gait training, Transfer training, Balance training, Neuromuscular re-education, Strengthening, Endurance training, Range of motion, Therapeutic exercise, Therapeutic activity, Home exercise program  PT Plan: Ongoing PT  PT Frequency: 4 times per week  PT Discharge Recommendations: Moderate intensity level of continued care  Equipment Recommended upon Discharge: Wheeled walker  PT Recommended Transfer Status: Assist x2  PT - OK to Discharge: Yes (with skilled physical therapy services at next level of care)      General Visit Information:   PT  Visit  PT Received On: 02/03/25  General  Co-Treatment: OT  Co-Treatment Reason: Need for 2nd skilled person for therapeutic handling during functional mobility to optimize safety and outcomes  Prior to Session Communication: Bedside nurse  Patient Position Received: Up in chair, Alarm on  General Comment: Assist nursing with transfer back to bed.  Patient reports agreeable to transfer back to bed.    Subjective   Precautions:  Precautions  Medical Precautions: Fall precautions             Objective   Pain:  Pain Assessment  Pain Assessment: 0-10  0-10 (Numeric) Pain Score: 0 - No pain  Pain Type: Chronic pain  Pain Location: Elbow  Pain Orientation: Right  Pain Interventions:  (Repositioning)  Response to Interventions: No change in pain  Cognition:  Cognition  Overall Cognitive Status: Within Functional Limits (slow rate of speech)  Coordination:  Movements are Fluid and Coordinated: No  Lower Body Coordination: Slower rate of movement miguelito LE (right greater than left)  Postural Control:  Static Sitting Balance  Static Sitting-Balance Support: Bilateral upper extremity supported  Static Sitting-Level of Assistance: Minimum assistance  Static Sitting-Comment/Number of Minutes: Intermittent episodes of assist required to maintain midline while sitting on side of bed due to decreased balance posterior  Static Standing Balance  Static Standing-Balance Support: Bilateral upper extremity supported  Static Standing-Level of Assistance: Moderate assistance (x 2)  Static Standing-Comment/Number of Minutes: Assist with trunk support and balance during static standing with rolling walker       Activity Tolerance:  Activity Tolerance  Endurance: Decreased tolerance for upright activites  Activity Tolerance Comments: [place]  Treatments:  Therapeutic Exercise  Therapeutic Exercise Performed: Yes  Therapeutic Exercise Activity 1: Ankle pumps, heel slides, hip abduction miguelito LE 5 reps x 1 set              Bed Mobility  Bed Mobility: Yes  Bed Mobility 1  Bed Mobility 1: Sitting to supine  Level of Assistance 1: Maximum assistance  Bed Mobility Comments 1: Assist with trunk and miguelito LE    Ambulation/Gait Training  Ambulation/Gait Training Performed: No  Transfers  Transfer: Yes  Transfer 1  Transfer From 1: Chair with arms to  Transfer to 1: Bed  Technique 1: Squat pivot  Transfer Device 1: Walker  Transfer Level of Assistance 1: Maximum assistance  Trials/Comments 1: Assist with elevating buttocks from sitting  "surface, positioning COG over MU, and pivoting during transfer;  patient had difficulty with supporting body weight with miguelito LE during transfer.  Transfers 2  Transfer From 2: Stand to  Transfer to 2: Sit  Technique 2: Stand to sit  Transfer Device 2: Walker  Transfer Level of Assistance 2: Arm in arm assistance, Moderate assistance (x 2)  Trials/Comments 2: Assist with trunk support and balance;  decreased eccentric control  Transfers 3  Transfer From 3: Bed to  Transfer to 3: Chair with arms  Technique 3: Stand pivot  Transfer Device 3: Walker  Transfer Level of Assistance 3: Arm in arm assistance, Moderate assistance (x 2)  Trials/Comments 3: Assist with trunk support and balance;  patient had 2 episodes of decreased strength right LE knee \"buckling\";  patient required increased time and effort to weightshift and advance miguelito LE during pivot portion of transfer.    Stairs  Stairs: No         Outcome Measures:  [facility] Basic Mobility  Turning from your back to your side while in a flat bed without using bedrails: A lot  Moving from lying on your back to sitting on the side of a flat bed without using bedrails: A lot  Moving to and from bed to chair (including a wheelchair): A lot  Standing up from a chair using your arms (e.g. wheelchair or bedside chair): A lot  To walk in hospital room: A lot  Climbing 3-5 steps with railing: Total  Basic Mobility - Total Score: 11    Education Documentation  No documentation found.  Education Comments  No comments found.        OP EDUCATION:       Encounter Problems       Encounter Problems (Active)       PT Problem       Strength (Progressing)       Start:  01/31/25    Expected End:  02/28/25       The patient will demonstrate an overall strength of 4/5 in BLE to assist with completion of functional mobility.           Functional Mobility (Progressing)       Start:  01/31/25    Expected End:  02/28/25       The patient will complete functional mobility (bed mobility, transfers, " etc.) at a distant supervision level with LRAD by DC.           Ambulation (Progressing)       Start:  01/31/25    Expected End:  02/28/25       The patient will be able to ambulate at a distant supervision level for 30ftx1 with RW.          Balance (Progressing)       Start:  01/31/25    Expected End:  02/28/25       The patient will demonstrate good dynamic standing balance during activity with LRAD.             Pain - Adult

## 2025-02-03 NOTE — PROGRESS NOTES
Physical Therapy    Physical Therapy Treatment    Patient Name: Angel Carroll  MRN: [id number]  Department: [address]  Room: [address] [place] [address]-  Today's Date: 2/3/2025  Time Calculation  Start Time: 1002  Stop Time: 1029  Time Calculation (min): 27 min         Assessment/Plan   PT Assessment  Rehab Prognosis: Good  Barriers to Discharge Home: Caregiver assistance, Physical needs  Caregiver Assistance: Caregiver assistance needed per identified barriers - however, level of patient's required assistance exceeds assistance available at home  Physical Needs: 24hr mobility assistance needed  Evaluation/Treatment Tolerance: Patient limited by fatigue  End of Session Communication: Bedside nurse  Assessment Comment: Progressing slowly with functional mobility;  tolerance to activity improving slowly;  fall risk  End of Session Patient Position: Up in chair, Alarm on  PT Plan  Inpatient/Swing Bed or Outpatient: Inpatient  PT Plan  Treatment/Interventions: Bed mobility, Gait training, Transfer training, Balance training, Neuromuscular re-education, Strengthening, Endurance training, Range of motion, Therapeutic exercise, Therapeutic activity, Home exercise program  PT Plan: Ongoing PT  PT Frequency: 4 times per week  PT Discharge Recommendations: Moderate intensity level of continued care  Equipment Recommended upon Discharge: Wheeled walker  PT Recommended Transfer Status: Assist x2  PT - OK to Discharge: Yes (with skilled physical therapy services at next level of care.)      General Visit Information:   PT  Visit  PT Received On: 02/03/25  General  Co-Treatment: OT  Co-Treatment Reason: Need for 2nd skilled person for therapeutic handling during functional mobility to optimize safety and outcomes  Prior to Session Communication: Bedside nurse  Patient Position Received: Bed, 3 rail up, Alarm on  General Comment: RN cleared patient for treatment.  Patient reports agreeable to treatment.    Subjective   Precautions:  Precautions  Medical  Precautions: Fall precautions     Date/Time Vitals Session Patient Position Pulse Resp SpO2 BP MAP (mmHg)    02/03/25 1002 --  Sitting  --  --  --  120/57  77                 Objective   Pain:  Pain Assessment  Pain Assessment: 0-10  0-10 (Numeric) Pain Score: 3  Pain Type: Chronic pain  Pain Location: Elbow  Pain Orientation: Right  Pain Interventions:  (Repositioning)  Response to Interventions: No change in pain  Cognition:  Cognition  Overall Cognitive Status: Within Functional Limits (Slow rate of speech)  Coordination:  Movements are Fluid and Coordinated: No  Lower Body Coordination: Slower rate of movement miguelito LE (right greater than left)  Postural Control:  Static Sitting Balance  Static Sitting-Balance Support: Bilateral upper extremity supported  Static Sitting-Level of Assistance: Minimum assistance  Static Sitting-Comment/Number of Minutes: Intermittent episodes of assist required to maintain midline while sitting on side of bed due to decreased balance posterior  Static Standing Balance  Static Standing-Balance Support: Bilateral upper extremity supported  Static Standing-Level of Assistance: Moderate assistance (x 2)  Static Standing-Comment/Number of Minutes: Assist with trunk support and balance during static standing with rolling walker          Activity Tolerance:  Activity Tolerance  Endurance: Decreased tolerance for upright activites  Activity Tolerance Comments: Fatigue  Treatments:  Therapeutic Exercise  Therapeutic Exercise Performed: Yes  Therapeutic Exercise Activity 1: Ankle pumps, heel slides, hip abduction miguelito LE 5 reps x 1 set              Bed Mobility  Bed Mobility: Yes  Bed Mobility 1  Bed Mobility 1: Supine to sitting  Level of Assistance 1: Moderate assistance (x 2)  Bed Mobility Comments 1: Assist with trunk-up and miguelito LE;  assist with scooting hips to edge of bed during supine to sit    Ambulation/Gait Training  Ambulation/Gait Training Performed: No  Transfers  Transfer:  "Yes  Transfer 1  Transfer From 1: Sit to  Transfer to 1: Stand  Technique 1: Sit to stand  Transfer Device 1: Walker  Transfer Level of Assistance 1: Arm in arm assistance, Moderate assistance (x 2)  Trials/Comments 1: Assist with elevating buttocks from sitting surface and positioning COG over MU during sit to stand;  verbal cues for hand placement;  increased time and effort required to achieve sit to stand  Transfers 2  Transfer From 2: Stand to  Transfer to 2: Sit  Technique 2: Stand to sit  Transfer Device 2: Walker  Transfer Level of Assistance 2: Arm in arm assistance, Moderate assistance (x 2)  Trials/Comments 2: Assist with trunk support and balance;  decreased eccentric control  Transfers 3  Transfer From 3: Bed to  Transfer to 3: Chair with arms  Technique 3: Stand pivot  Transfer Device 3: Walker  Transfer Level of Assistance 3: Arm in arm assistance, Moderate assistance (x 2)  Trials/Comments 3: Assist with trunk support and balance;  patient had 2 episodes of decreased strength right LE knee \"buckling\";  patient required increased time and effort to weightshift and advance miguelito LE during pivot portion of transfer.    Stairs  Stairs: No         Outcome Measures:  [facility] Basic Mobility  Turning from your back to your side while in a flat bed without using bedrails: A lot  Moving from lying on your back to sitting on the side of a flat bed without using bedrails: A lot  Moving to and from bed to chair (including a wheelchair): A lot  Standing up from a chair using your arms (e.g. wheelchair or bedside chair): A lot  To walk in hospital room: A lot  Climbing 3-5 steps with railing: Total  Basic Mobility - Total Score: 11    Education Documentation  No documentation found.  Education Comments  No comments found.        OP EDUCATION:       Encounter Problems       Encounter Problems (Active)       PT Problem       Strength (Progressing)       Start:  01/31/25    Expected End:  02/28/25       The patient will " demonstrate an overall strength of 4/5 in BLE to assist with completion of functional mobility.           Functional Mobility (Progressing)       Start:  01/31/25    Expected End:  02/28/25       The patient will complete functional mobility (bed mobility, transfers, etc.) at a distant supervision level with LRAD by DC.           Ambulation (Progressing)       Start:  01/31/25    Expected End:  02/28/25       The patient will be able to ambulate at a distant supervision level for 30ftx1 with RW.          Balance (Progressing)       Start:  01/31/25    Expected End:  02/28/25       The patient will demonstrate good dynamic standing balance during activity with LRAD.             Pain - Adult

## 2025-02-03 NOTE — PROGRESS NOTES
Occupational Therapy    OT Treatment    Patient Name: Angel Carroll  MRN: [id number]  Department: [facility]  Room: [address] [place] [address]  Today's Date: 2/3/2025  Time Calculation  Start Time: 1005  Stop Time: 1030  Time Calculation (min): 25 min        Assessment:  OT Assessment: Pt continues to present with significant overall weakness, but is demonstrating minimal progress with functional mobility tolerance. Continue POC.  Prognosis: Fair  Barriers to Discharge Home: Caregiver assistance, Physical needs  Caregiver Assistance: Patient lives alone and/or does not have reliable caregiver assistance  Physical Needs: Ambulating household distances limited by function/safety, 24hr mobility assistance needed, Intermittent ADL assistance needed, High falls risk due to function or environment  Evaluation/Treatment Tolerance: Patient limited by fatigue  End of Session Communication: Bedside nurse  End of Session Patient Position: Up in chair, Alarm on (Needs in reach.)    Plan:  Treatment Interventions: ADL retraining, Functional transfer training, UE strengthening/ROM, Endurance training, Patient/family training, Equipment evaluation/education, Neuromuscular reeducation, Compensatory technique education  OT Frequency: 4 times per week  OT Discharge Recommendations: Moderate intensity level of continued care  Equipment Recommended upon Discharge: Wheeled walker  OT Recommended Transfer Status: Moderate assist, Assist of 2  OT - OK to Discharge: Yes      Subjective     Previous Visit Info:  OT Last Visit  OT Received On: 02/03/25    General:  General  Family/Caregiver Present: No  Co-Treatment: PT  Co-Treatment Reason: Physically complex treatment requiring 2 skilled Therapists  Prior to Session Communication: Bedside nurse  Patient Position Received: Bed, 3 rail up, Alarm on  General Comment: Cleared by nurse prior to session and pt agreeable to OT treatment, but presents lethargic.    Precautions:  Hearing/Visual Limitations: wears glasses,  [place](has hearing aids)  Medical Precautions: Fall precautions, Oxygen therapy device and L/min (2L 02)  Precautions Comment: h/o recent fall    Vital Signs Comment: Pulse ox 95%, HR 60, 's/50's in chair     Pain:  Pain Assessment  Pain Assessment: 0-10  0-10 (Numeric) Pain Score: 4  Pain Type: Acute pain  Pain Location: Elbow  Pain Orientation: Right  Pain Interventions: Repositioned, Other (Comment) (Nurse aware.)    Objective      Cognition:  Cognition  Overall Cognitive Status: Within Functional Limits  Orientation Level: Oriented X4  Cognition Comments: Pt initially lethargic and has delayed overall processing.  Processing Speed: Delayed    Bed Mobility/Transfers: Bed Mobility  Bed Mobility: Yes  Bed Mobility 1  Bed Mobility 1: Supine to sitting  Level of Assistance 1: Moderate assistance, +2, Minimal verbal cues  Bed Mobility Comments 1: Increased tactile assist for trunk up and moving his RLE.    Transfers  Transfer: Yes  Transfer 1  Transfer From 1: Bed to  Transfer to 1: Stand  Technique 1: Sit to stand  Transfer Device 1: Walker  Transfer Level of Assistance 1: Moderate assistance, +2, Minimal verbal cues, Arm in arm assistance  Trials/Comments 1: Increased tactile assist to lift to stand and for balance as well as the bed being elevated.  Transfers 2  Transfer From 2: Stand to  Transfer to 2: Chair with arms  Technique 2: Stand to sit  Transfer Device 2: Walker  Transfer Level of Assistance 2: Arm in arm assistance, Moderate assistance, +2, Minimal verbal cues  Trials/Comments 2: Increased tactile assist for balance and to lower to chair safely.    Functional Mobility:  Functional Mobility  Functional Mobility Performed: Yes  Functional Mobility 1  Device 1: Rolling walker  Assistance 1: Moderate assistance, Minimal verbal cues (x2)  Comments 1: Pt completed short functional mobility using a RW with mod A x2 for balance/safety and due to increased overall weakness. Pt with R knee buckling  intermittently.    Therapy/Activity: Therapeutic Exercise  Therapeutic Exercise Performed: Yes (Pt participated in BUE AROM exercises in bed in prep for functional mobility.)  Therapeutic Exercise Activity 1: Shoulder flexion for 10 reps x1  Therapeutic Exercise Activity 2: Bicep curls for 10 reps x1  Therapeutic Exercise Activity 3: Tricep presses for 10 reps x1      Outcome Measures:[facility] Daily Activity  Putting on and taking off regular lower body clothing: A lot  Bathing (including washing, rinsing, drying): A lot  Putting on and taking off regular upper body clothing: A little  Toileting, which includes using toilet, bedpan or urinal: Total  Taking care of personal grooming such as brushing teeth: A lot  Eating Meals: None  Daily Activity - Total Score: 14    Education Documentation  No documentation found.  Education Comments  No comments found.    Goals:  Encounter Problems       Encounter Problems (Active)       OT Goals       Pt will complete all grooming tasks with close S to setup in standing. (Progressing)       Start:  01/31/25    Expected End:  02/28/25            Pt will complete UB dressing/bathing with setup and LB dressing/bathing with min A using adaptive equipment as needed.  (Progressing)       Start:  01/31/25    Expected End:  02/28/25            Pt will complete all toileting tasks with min A. (Progressing)       Start:  01/31/25    Expected End:  02/28/25            Pt will complete all functional transfers and mobility with min A using a RW. (Progressing)       Start:  01/31/25    Expected End:  02/28/25

## 2025-02-03 NOTE — PROGRESS NOTES
Angel Carroll is a 79 y.o. male on day 4 of admission presenting with Hypokalemia.    Subjective   Patient resting in bed. Denies any chest discomfort or SOB.        Objective     Physical Exam  Constitutional:       General: He is not in acute distress.     Appearance: Normal appearance. He is normal weight. He is not ill-appearing, toxic-appearing or diaphoretic.   HENT:      Head: Normocephalic and atraumatic.      Nose: Nose normal.      Mouth/Throat:      Mouth: Mucous membranes are moist.   Eyes:      Extraocular Movements: Extraocular movements intact.   Cardiovascular:      Rate and Rhythm: Normal rate and regular rhythm.      Pulses: Normal pulses.      Heart sounds: Normal heart sounds. No murmur heard.  Pulmonary:      Effort: Pulmonary effort is normal.      Breath sounds: Normal breath sounds.   Abdominal:      General: Bowel sounds are normal. There is no distension.      Palpations: Abdomen is soft.      Tenderness: There is no abdominal tenderness.   Musculoskeletal:         General: Normal range of motion.      Cervical back: Normal range of motion and neck supple.      Right lower leg: Edema present.      Left lower leg: Edema present.   Skin:     General: Skin is warm and dry.      Capillary Refill: Capillary refill takes less than 2 seconds.   Neurological:      General: No focal deficit present.      Mental Status: He is alert and oriented to person, place, and time.   Psychiatric:         Mood and Affect: Mood normal.         Behavior: Behavior normal.         Last Recorded Vitals  Blood pressure 126/64, pulse 61, temperature 36.8 °C (98.2 °F), temperature source Oral, resp. rate 16, height 1.829 m (6'), weight 98 kg (216 lb), SpO2 99%.  Intake/Output last 3 Shifts:  I/O last 3 completed shifts:  In: 1336 (13.6 mL/kg) [P.O.:1236; IV Piggyback:100]  Out: 4150 (42.4 mL/kg) [Urine:4150 (1.2 mL/kg/hr)]  Weight: 98 kg     Relevant Results            Scheduled medications  amiodarone, 200 mg, oral,  Daily  apixaban, 2.5 mg, oral, BID  atorvastatin, 20 mg, oral, Nightly  ceFAZolin, 1 g, intravenous, q8h  ferrous sulfate (325 mg ferrous sulfate), 65 mg of iron, oral, Daily with breakfast  finasteride, 5 mg, oral, Daily  folic acid, 1 mg, oral, Daily  [Held by provider] furosemide, 20 mg, oral, BID  gabapentin, 100 mg, oral, Nightly  lisinopril, 5 mg, oral, Daily  metoprolol succinate XL, 25 mg, oral, Daily  pantoprazole, 40 mg, oral, Daily   Or  pantoprazole, 40 mg, intravenous, Daily  PARoxetine, 20 mg, oral, Daily  polyethylene glycol, 17 g, oral, Daily  potassium chloride CR, 40 mEq, oral, Daily  tamsulosin, 0.8 mg, oral, Nightly      Continuous medications  oxygen, 2 L/min      PRN medications  PRN medications: acetaminophen **OR** acetaminophen **OR** acetaminophen, ondansetron ODT **OR** ondansetron   Results for orders placed or performed during the hospital encounter of 01/30/25 (from the past 24 hours)   Basic metabolic panel   Result Value Ref Range    Glucose 101 (H) 74 - 99 mg/dL    Sodium 142 136 - 145 mmol/L    Potassium 3.9 3.5 - 5.3 mmol/L    Chloride 103 98 - 107 mmol/L    Bicarbonate 34 (H) 21 - 32 mmol/L    Anion Gap 9 (L) 10 - 20 mmol/L    Urea Nitrogen 19 6 - 23 mg/dL    Creatinine 1.48 (H) 0.50 - 1.30 mg/dL    eGFR 47 (L) >60 mL/min/1.73m*2    Calcium 8.5 (L) 8.6 - 10.3 mg/dL   CBC   Result Value Ref Range    WBC 3.7 (L) 4.4 - 11.3 x10*3/uL    nRBC 0.0 0.0 - 0.0 /100 WBCs    RBC 3.27 (L) 4.50 - 5.90 x10*6/uL    Hemoglobin 9.8 (L) 13.5 - 17.5 g/dL    Hematocrit 31.8 (L) 41.0 - 52.0 %    MCV 97 80 - 100 fL    MCH 30.0 26.0 - 34.0 pg    MCHC 30.8 (L) 32.0 - 36.0 g/dL    RDW 15.4 (H) 11.5 - 14.5 %    Platelets 120 (L) 150 - 450 x10*3/uL     *Note: Due to a large number of results and/or encounters for the requested time period, some results have not been displayed. A complete set of results can be found in Results Review.      Transthoracic Echo (TTE) Complete    Result Date: 2/2/2025            [place] [id number]            Phone [phone] TRANSTHORACIC ECHOCARDIOGRAM REPORT Patient Name:       MICHEL COLEY           Reading Physician:    [id number] Sadie Hollis MD Study Date:         2/1/2025             Ordering Provider:    [id number] HALEY SCHMIDT MRN/PID:            [id number]             Fellow: Accession#:         [id number]         Nurse: Date of Birth/Age:  1945 / 79 years Sonographer:          Monisha Rios RDCS Gender Assigned at                      Additional Staff: Birth: Height:             182.88 cm            Admit Date: Weight:             97.98 kg             Admission Status:     Inpatient -                                                                Routine BSA / BMI:          2.20 m2 / 29.30      Department Location:  [facility]                     kg/m2 Blood Pressure: 123 /69 mmHg Study Type:    TRANSTHORACIC ECHO (TTE) COMPLETE Diagnosis/ICD: Heart failure, unspecified-I50.9; Shortness of breath-R06.02 Indication:    CHF, SOB CPT Codes:     Echo Complete w Full [id number] Patient History: Pertinent History: CVA, A-Fib, Hyperlipidemia, HTN, CHF, Dyspnea, ASCVD, Sinus                    Bradycardia and Chest Pain. Study Detail: The following Echo studies were performed: 2D, M-Mode, Doppler and               color flow. Patient has a pacemaker.  PHYSICIAN INTERPRETATION: Left Ventricle: The left ventricular systolic function is moderately decreased, with a visually estimated ejection fraction of 40%. There are no regional wall motion abnormalities. The left ventricular cavity size is normal. There is normal septal and normal posterior left ventricular wall thickness. Abnormal (paradoxical) septal motion, consistent with left bundle branch  block. Spectral Doppler shows a normal pattern of left ventricular diastolic filling. Left Atrium: The left atrial size is mildly dilated. Right Ventricle: The right ventricle is normal in size. There is reduced right ventricular systolic function. Right Atrium: The right atrial size is mild to moderately dilated. Aortic Valve: The aortic valve is trileaflet. There is mild aortic valve thickening. There is evidence of mildly elevated transaortic gradients consistent with sclerosis of the aortic valve. There is no evidence of aortic valve regurgitation. The peak instantaneous gradient of the aortic valve is 5 mmHg. Mitral Valve: The mitral valve is normal in structure. There is mild mitral valve regurgitation. Tricuspid Valve: The tricuspid valve is structurally normal. There is mild tricuspid regurgitation. The Doppler estimated RVSP is mildly elevated right ventricular systolic pressure at 44.2 mmHg. Pulmonic Valve: The pulmonic valve is structurally normal. There is physiologic pulmonic valve regurgitation. Pericardium: No pericardial effusion noted. Aorta: The aortic root is normal. Systemic Veins: The inferior vena cava appears normal in size, with IVC inspiratory collapse less than 50%.  CONCLUSIONS:  1. The left ventricular systolic function is moderately decreased, with a visually estimated ejection fraction of 40%.  2. Abnormal septal motion consistent with left bundle branch block.  3. There is reduced right ventricular systolic function.  4. The right atrial size is mild to moderately dilated.  5. Mild mitral valve regurgitation.  6. Mild tricuspid regurgitation is visualized.  7. Mildly elevated right ventricular systolic pressure.  8. Aortic valve sclerosis. QUANTITATIVE DATA SUMMARY:  2D MEASUREMENTS:             Normal Ranges: LAs:             3.60 cm     (2.7-4.0cm) IVSd:            0.67 cm     (0.6-1.1cm) LVPWd:           0.70 cm     (0.6-1.1cm) LVIDd:           6.13 cm     (3.9-5.9cm) LVIDs:            4.51 cm LV Mass Index:   73.1 g/m2 LVEDV Index:     67.28 ml/m2 LV % FS          26.4 %  LEFT ATRIUM:                  Normal Ranges: LA Vol A4C:        83.9 ml    (22+/-6mL/m2) LA Vol A2C:        85.8 ml LA Vol BP:         85.2 ml LA Vol Index A4C:  38.1ml/m2 LA Vol Index A2C:  39.0 ml/m2 LA Vol Index BP:   38.7 ml/m2 LA Area A4C:       24.6 cm2 LA Area A2C:       24.8 cm2 LA Major Axis A4C: 6.1 cm LA Major Axis A2C: 6.1 cm LA Volume Index:   35.4 ml/m2 LA Vol A4C:        77.7 ml LA Vol A2C:        79.1 ml LA Vol Index BSA:  35.6 ml/m2  RIGHT ATRIUM:          Normal Ranges: RA Area A4C:  43.5 cm2  M-MODE MEASUREMENTS:         Normal Ranges: Ao Root:             2.90 cm (2.0-3.7cm)  AORTA MEASUREMENTS:         Normal Ranges: Asc Ao, d:          3.30 cm (2.1-3.4cm)  LV SYSTOLIC FUNCTION:                      Normal Ranges: EF-A4C View:    45 % (>=55%) EF-A2C View:    45 % EF-Biplane:     47 % EF-Visual:      40 % LV EF Reported: 40 %  LV DIASTOLIC FUNCTION:            Normal Ranges: MV Peak E:             0.84 m/s   (0.7-1.2 m/s) MV Peak A:             0.48 m/s   (0.42-0.7 m/s) E/A Ratio:             1.76       (1.0-2.2) MV e'                  0.039 m/s  (>8.0) MV lateral e'          0.04 m/s MV medial e'           0.04 m/s E/e' Ratio:            21.89      (<8.0) PulmV Sys Ross:         31.30 cm/s PulmV Toussaint Ross:        53.00 cm/s PulmV S/D Ross:         0.60  MITRAL VALVE:          Normal Ranges: MV DT:        205 msec (150-240msec)  AORTIC VALVE:           Normal Ranges: AoV Vmax:      1.17 m/s (<=1.7m/s) AoV Peak P.5 mmHg (<20mmHg) LVOT Max Ross:  0.57 m/s (<=1.1m/s) LVOT Diameter: 2.20 cm  (1.8-2.4cm) AoV Area,Vmax: 1.85 cm2 (2.5-4.5cm2)  RIGHT VENTRICLE: RV Basal 4.63 cm RV Mid   3.04 cm RV Major 8.3 cm TAPSE:   13.2 mm RV s'    0.06 m/s  TRICUSPID VALVE/RVSP:          Normal Ranges: Peak TR Velocity:     3.21 m/s RV Syst Pressure:     44 mmHg  (< 30mmHg) IVC Diam:             1.54 cm  PULMONARY VEINS:  PulmV Toussaint Ross: 53.00 cm/s PulmV S/D Ross:  0.60 PulmV Sys Ross:  31.30 cm/s  [id number] Sadie Hollis MD Electronically signed on 2/2/2025 at 9:08:14 AM  ** Final **                 Assessment/Plan   Assessment & Plan  Hypokalemia    BPH (benign prostatic hyperplasia)    Atrial fibrillation (Multi)    Acute on chronic diastolic heart failure    Frequent falls    UTI (urinary tract infection)    Stage 3a chronic kidney disease (Multi)    Leukopenia    COPD (chronic obstructive pulmonary disease) (Multi)    Acute on chronic diastolic Heart Failure   Hypokalemia   Persistent Atrial Fibrillation on Apixaban  Sick sinus syndrome with PPM  Hypertension disorder   CKD  Hyperlipidemia  CAD  COPD  Depression     1/31: As stated above, patient came to the ED for increased generalized weakness and worsening lower extremity edema.  Patient states he has not had any changes in his medications.  He does however state that he has not been adhering to a low sodium diet and has not watched his dietary intake. Patient does appear to be fluid overloaded, with +1 pitting edema on bilateral lower extremities. Morning vitals; heart rate 63 blood pressure 133/61 satting 96% on 3 L nasal cannula.  Denies chest pain, pressure, palpitations.  Baseline patient wears 2 L oxygen at home, does not report worsening shortness of breath at this moment.  States he does have increased work of breathing when ambulating in which he increases his oxygen to 3 L. Ordered transthoracic echocardiogram to be completed.  Patient received a one-time dose of potassium chloride this morning; I ordered an additional 40 mEq of potassium to be given this morning and daily due to patients hypokalemia. I also ordered one-time dose of 40 mg of IV Lasix to manage his fluid overload.  Will check a serum potassium this afternoon.  Modified patient's diet to a 2L fluid restriction.  Monitoring strict I's and O's.  Consulted heart failure nurse navigator to see patient during  this admission. Will continue to follow with you.      2/1:  Patient does appear to be fluid overloaded, with slightly worsening edema, +2, of the bilateral lower extremities -around the ankle and feet. Current vitals this morning heart rate 62, blood pressure 128/66, and satting 98% on 3 L nasal cannula.  On telemetry patient is AV paced. Denies chest pain, pressure, palpitations.  Baseline patient wears 2 L oxygen at home, he does report some mild shortness of breath at this moment.  With the worsening bilateral lower extremity edema and the mild shortness of breath, I ordered IV frusemide 40 mg to be given this morning and this afternoon.  According to the charting the patient's I/O was +190 this morning.  Upon my assessment the patient had an external catheter. Continue to monitor strict I's and O's. Current labs this morning sodium 142, potassium 3.5, BUN 17, creatinine 1.51, WBC 2.9, hemoglobin/hematocrit 10.5/33.5, platelets 119.  Will closely monitor patient's creatinine, with this addition of Lasix given today.  transthoracic echocardiogram to be completed today.  We will continue to follow with you.     2/2: As above.  Patient resting comfortably in bed this morning eating breakfast.  He is on 2 L nasal cannula which is his baseline at home.  The patient diuresed very well over the last 24 hours with an approximate -3.7 L fluid balance. He does remain with some +2 pitting lower extremity edema.  Reports that his breathing feels improved since admission.  Denies chest pain or pressure.  Blood pressures overall normotensive last recorded 127/54.  Remains in a and AV paced rhythm on telemetry without significant ectopy. Lab work this morning revealing a sodium of 143, potassium 3.8, creatinine 1.52 and hemoglobin of 10.7.  Echocardiogram completed yesterday revealing a decreased ejection fraction of 40%.  Reviewed these images with my collaborating physician Dr. Hollis and compared to previous echocardiogram  there are not significant changes in the left ventricular systolic function.  No regional wall motion abnormalities were noted on this echocardiogram. I am going to begin him on 5 mg lisinopril daily as part of guideline directed medical therapy for systolic heart failure.  Additionally, I am going to give 1 more dose of IV Lasix 40 mg today and will likely transition to oral diuretic therapy tomorrow.  Potential plans for discharge to skilled nursing facility tomorrow.  Will follow with you.    2/3: Patient denies any SOB or chest discomfort. Still with some BLE edema. -735 mL fluid balance. Blood pressures remain normotensive with most recent read of 126/64. SpO2 99% on baseline 2L NC. Telemetry with AV paced rhythm without significant ectopy. Labs today with sodium 142, potassium 3.9, creatinine 1.48, hemoglobin 9.8. Discharge plan is for SNF on 2/4, when bed is available. Will given another dose of IV Lasix 20 mg x 1 today with plans to resume PO Lasix tomorrow upon DC. Continue GDM with lisinopril and metoprolol. Continue amiodarone and Eliquis for atrial fibrillation management. Will discuss with my collaborating physician Dr. Cullen. We will continue to follow.            I spent 35 minutes in the professional and overall care of this patient.      Faith Barrios, RON-CNP

## 2025-02-04 VITALS
DIASTOLIC BLOOD PRESSURE: 58 MMHG | OXYGEN SATURATION: 98 % | BODY MASS INDEX: 30.64 KG/M2 | WEIGHT: 226.19 LBS | TEMPERATURE: 97.7 F | RESPIRATION RATE: 18 BRPM | HEIGHT: 72 IN | HEART RATE: 59 BPM | SYSTOLIC BLOOD PRESSURE: 122 MMHG

## 2025-02-04 LAB
ANION GAP SERPL CALCULATED.3IONS-SCNC: 9 MMOL/L (ref 10–20)
BUN SERPL-MCNC: 21 MG/DL (ref 6–23)
CALCIUM SERPL-MCNC: 8.4 MG/DL (ref 8.6–10.3)
CHLORIDE SERPL-SCNC: 102 MMOL/L (ref 98–107)
CO2 SERPL-SCNC: 33 MMOL/L (ref 21–32)
CREAT SERPL-MCNC: 1.39 MG/DL (ref 0.5–1.3)
EGFRCR SERPLBLD CKD-EPI 2021: 51 ML/MIN/1.73M*2
ERYTHROCYTE [DISTWIDTH] IN BLOOD BY AUTOMATED COUNT: 15.3 % (ref 11.5–14.5)
GLUCOSE SERPL-MCNC: 87 MG/DL (ref 74–99)
HCT VFR BLD AUTO: 34.2 % (ref 41–52)
HGB BLD-MCNC: 10.5 G/DL (ref 13.5–17.5)
MCH RBC QN AUTO: 30.3 PG (ref 26–34)
MCHC RBC AUTO-ENTMCNC: 30.7 G/DL (ref 32–36)
MCV RBC AUTO: 99 FL (ref 80–100)
NRBC BLD-RTO: 0 /100 WBCS (ref 0–0)
PLATELET # BLD AUTO: 136 X10*3/UL (ref 150–450)
POTASSIUM SERPL-SCNC: 4.2 MMOL/L (ref 3.5–5.3)
RBC # BLD AUTO: 3.47 X10*6/UL (ref 4.5–5.9)
SODIUM SERPL-SCNC: 140 MMOL/L (ref 136–145)
WBC # BLD AUTO: 2.8 X10*3/UL (ref 4.4–11.3)

## 2025-02-04 PROCEDURE — 36415 COLL VENOUS BLD VENIPUNCTURE: CPT | Performed by: STUDENT IN AN ORGANIZED HEALTH CARE EDUCATION/TRAINING PROGRAM

## 2025-02-04 PROCEDURE — 85027 COMPLETE CBC AUTOMATED: CPT | Performed by: STUDENT IN AN ORGANIZED HEALTH CARE EDUCATION/TRAINING PROGRAM

## 2025-02-04 PROCEDURE — 80048 BASIC METABOLIC PNL TOTAL CA: CPT | Performed by: STUDENT IN AN ORGANIZED HEALTH CARE EDUCATION/TRAINING PROGRAM

## 2025-02-04 PROCEDURE — 2500000001 HC RX 250 WO HCPCS SELF ADMINISTERED DRUGS (ALT 637 FOR MEDICARE OP): Performed by: STUDENT IN AN ORGANIZED HEALTH CARE EDUCATION/TRAINING PROGRAM

## 2025-02-04 PROCEDURE — 2500000002 HC RX 250 W HCPCS SELF ADMINISTERED DRUGS (ALT 637 FOR MEDICARE OP, ALT 636 FOR OP/ED)

## 2025-02-04 PROCEDURE — 99239 HOSP IP/OBS DSCHRG MGMT >30: CPT | Performed by: INTERNAL MEDICINE

## 2025-02-04 PROCEDURE — 2500000002 HC RX 250 W HCPCS SELF ADMINISTERED DRUGS (ALT 637 FOR MEDICARE OP, ALT 636 FOR OP/ED): Performed by: STUDENT IN AN ORGANIZED HEALTH CARE EDUCATION/TRAINING PROGRAM

## 2025-02-04 PROCEDURE — 2500000004 HC RX 250 GENERAL PHARMACY W/ HCPCS (ALT 636 FOR OP/ED): Performed by: STUDENT IN AN ORGANIZED HEALTH CARE EDUCATION/TRAINING PROGRAM

## 2025-02-04 PROCEDURE — 99232 SBSQ HOSP IP/OBS MODERATE 35: CPT

## 2025-02-04 PROCEDURE — 2500000001 HC RX 250 WO HCPCS SELF ADMINISTERED DRUGS (ALT 637 FOR MEDICARE OP): Performed by: INTERNAL MEDICINE

## 2025-02-04 RX ORDER — POTASSIUM CHLORIDE 20 MEQ/1
20 TABLET, EXTENDED RELEASE ORAL DAILY
Start: 2025-02-04

## 2025-02-04 RX ORDER — AMOXICILLIN AND CLAVULANATE POTASSIUM 875; 125 MG/1; MG/1
875 TABLET, FILM COATED ORAL 2 TIMES DAILY
Qty: 12 TABLET | Refills: 0 | Status: SHIPPED | OUTPATIENT
Start: 2025-02-04 | End: 2025-02-10

## 2025-02-04 RX ORDER — SACUBITRIL AND VALSARTAN 24; 26 MG/1; MG/1
1 TABLET, FILM COATED ORAL 2 TIMES DAILY
Start: 2025-02-05

## 2025-02-04 RX ORDER — FUROSEMIDE 40 MG/1
40 TABLET ORAL DAILY
Start: 2025-02-04

## 2025-02-04 RX ORDER — FUROSEMIDE 40 MG/1
40 TABLET ORAL 2 TIMES DAILY
Status: DISCONTINUED | OUTPATIENT
Start: 2025-02-04 | End: 2025-02-04

## 2025-02-04 RX ADMIN — APIXABAN 2.5 MG: 2.5 TABLET, FILM COATED ORAL at 10:03

## 2025-02-04 RX ADMIN — METOPROLOL SUCCINATE 25 MG: 25 TABLET, FILM COATED, EXTENDED RELEASE ORAL at 10:03

## 2025-02-04 RX ADMIN — PAROXETINE HYDROCHLORIDE 20 MG: 20 TABLET, FILM COATED ORAL at 10:03

## 2025-02-04 RX ADMIN — FINASTERIDE 5 MG: 5 TABLET, FILM COATED ORAL at 10:03

## 2025-02-04 RX ADMIN — FERROUS SULFATE TAB 325 MG (65 MG ELEMENTAL FE) 325 MG: 325 (65 FE) TAB at 10:03

## 2025-02-04 RX ADMIN — FOLIC ACID 1 MG: 1 TABLET ORAL at 10:02

## 2025-02-04 RX ADMIN — CEFAZOLIN SODIUM 1 G: 1 INJECTION, SOLUTION INTRAVENOUS at 10:06

## 2025-02-04 RX ADMIN — PANTOPRAZOLE SODIUM 40 MG: 40 TABLET, DELAYED RELEASE ORAL at 10:02

## 2025-02-04 RX ADMIN — CEFAZOLIN SODIUM 1 G: 1 INJECTION, SOLUTION INTRAVENOUS at 01:12

## 2025-02-04 RX ADMIN — AMIODARONE HYDROCHLORIDE 200 MG: 200 TABLET ORAL at 10:02

## 2025-02-04 RX ADMIN — POTASSIUM CHLORIDE 40 MEQ: 1500 TABLET, EXTENDED RELEASE ORAL at 10:03

## 2025-02-04 RX ADMIN — FUROSEMIDE 40 MG: 40 TABLET ORAL at 10:03

## 2025-02-04 NOTE — PROGRESS NOTES
Angel Carroll is a 79 y.o. male on day 5 of admission presenting with Hypokalemia.    Subjective   Patient resting comfortably in bed this morning.  Denies chest pain, pressure or palpitations.       Objective     Physical Exam  Vitals reviewed.   Constitutional:       General: He is not in acute distress.     Comments: Drowsy   Cardiovascular:      Rate and Rhythm: Normal rate and regular rhythm.      Heart sounds: No murmur heard.     No friction rub. No gallop.   Pulmonary:      Effort: Pulmonary effort is normal.      Breath sounds: Normal breath sounds. No wheezing, rhonchi or rales.   Abdominal:      General: Bowel sounds are normal.      Palpations: Abdomen is soft.   Musculoskeletal:      Comments: Trace bilateral lower extremity edema   Skin:     General: Skin is warm and dry.      Capillary Refill: Capillary refill takes less than 2 seconds.   Neurological:      Mental Status: He is oriented to person, place, and time.   Psychiatric:         Mood and Affect: Mood normal.         Behavior: Behavior normal.         Last Recorded Vitals  Blood pressure 122/58, pulse 59, temperature 36.5 °C (97.7 °F), temperature source Oral, resp. rate 18, height 1.829 m (6'), weight 103 kg (226 lb 3.1 oz), SpO2 98%.  Intake/Output last 3 Shifts:  I/O last 3 completed shifts:  In: 940 (9.2 mL/kg) [P.O.:790; IV Piggyback:150]  Out: 2100 (20.5 mL/kg) [Urine:2100 (0.6 mL/kg/hr)]  Weight: 102.6 kg     Relevant Results  Results for orders placed or performed during the hospital encounter of 01/30/25 (from the past 24 hours)   Basic metabolic panel   Result Value Ref Range    Glucose 87 74 - 99 mg/dL    Sodium 140 136 - 145 mmol/L    Potassium 4.2 3.5 - 5.3 mmol/L    Chloride 102 98 - 107 mmol/L    Bicarbonate 33 (H) 21 - 32 mmol/L    Anion Gap 9 (L) 10 - 20 mmol/L    Urea Nitrogen 21 6 - 23 mg/dL    Creatinine 1.39 (H) 0.50 - 1.30 mg/dL    eGFR 51 (L) >60 mL/min/1.73m*2    Calcium 8.4 (L) 8.6 - 10.3 mg/dL   CBC   Result Value Ref  Range    WBC 2.8 (L) 4.4 - 11.3 x10*3/uL    nRBC 0.0 0.0 - 0.0 /100 WBCs    RBC 3.47 (L) 4.50 - 5.90 x10*6/uL    Hemoglobin 10.5 (L) 13.5 - 17.5 g/dL    Hematocrit 34.2 (L) 41.0 - 52.0 %    MCV 99 80 - 100 fL    MCH 30.3 26.0 - 34.0 pg    MCHC 30.7 (L) 32.0 - 36.0 g/dL    RDW 15.3 (H) 11.5 - 14.5 %    Platelets 136 (L) 150 - 450 x10*3/uL     *Note: Due to a large number of results and/or encounters for the requested time period, some results have not been displayed. A complete set of results can be found in Results Review.                 Assessment/Plan   Assessment & Plan  Hypokalemia    BPH (benign prostatic hyperplasia)    Atrial fibrillation (Multi)    Acute on chronic diastolic heart failure    Frequent falls    UTI (urinary tract infection)    Stage 3a chronic kidney disease (Multi)    Leukopenia    COPD (chronic obstructive pulmonary disease) (Multi)      Acute on chronic diastolic Heart Failure   Hypokalemia   Persistent Atrial Fibrillation on Apixaban  Sick sinus syndrome with PPM  Hypertension disorder   CKD  Hyperlipidemia  CAD  COPD  Depression     1/31: As stated above, patient came to the ED for increased generalized weakness and worsening lower extremity edema.  Patient states he has not had any changes in his medications.  He does however state that he has not been adhering to a low sodium diet and has not watched his dietary intake. Patient does appear to be fluid overloaded, with +1 pitting edema on bilateral lower extremities. Morning vitals; heart rate 63 blood pressure 133/61 satting 96% on 3 L nasal cannula.  Denies chest pain, pressure, palpitations.  Baseline patient wears 2 L oxygen at home, does not report worsening shortness of breath at this moment.  States he does have increased work of breathing when ambulating in which he increases his oxygen to 3 L. Ordered transthoracic echocardiogram to be completed.  Patient received a one-time dose of potassium chloride this morning; I ordered an  additional 40 mEq of potassium to be given this morning and daily due to patients hypokalemia. I also ordered one-time dose of 40 mg of IV Lasix to manage his fluid overload.  Will check a serum potassium this afternoon.  Modified patient's diet to a 2L fluid restriction.  Monitoring strict I's and O's.  Consulted heart failure nurse navigator to see patient during this admission. Will continue to follow with you.      2/1:  Patient does appear to be fluid overloaded, with slightly worsening edema, +2, of the bilateral lower extremities -around the ankle and feet. Current vitals this morning heart rate 62, blood pressure 128/66, and satting 98% on 3 L nasal cannula.  On telemetry patient is AV paced. Denies chest pain, pressure, palpitations.  Baseline patient wears 2 L oxygen at home, he does report some mild shortness of breath at this moment.  With the worsening bilateral lower extremity edema and the mild shortness of breath, I ordered IV frusemide 40 mg to be given this morning and this afternoon.  According to the charting the patient's I/O was +190 this morning.  Upon my assessment the patient had an external catheter. Continue to monitor strict I's and O's. Current labs this morning sodium 142, potassium 3.5, BUN 17, creatinine 1.51, WBC 2.9, hemoglobin/hematocrit 10.5/33.5, platelets 119.  Will closely monitor patient's creatinine, with this addition of Lasix given today.  transthoracic echocardiogram to be completed today.  We will continue to follow with you.     2/2: As above.  Patient resting comfortably in bed this morning eating breakfast.  He is on 2 L nasal cannula which is his baseline at home.  The patient diuresed very well over the last 24 hours with an approximate -3.7 L fluid balance. He does remain with some +2 pitting lower extremity edema.  Reports that his breathing feels improved since admission.  Denies chest pain or pressure.  Blood pressures overall normotensive last recorded 127/54.   Remains in a and AV paced rhythm on telemetry without significant ectopy. Lab work this morning revealing a sodium of 143, potassium 3.8, creatinine 1.52 and hemoglobin of 10.7.  Echocardiogram completed yesterday revealing a decreased ejection fraction of 40%.  Reviewed these images with my collaborating physician Dr. Hollis and compared to previous echocardiogram there are not significant changes in the left ventricular systolic function.  No regional wall motion abnormalities were noted on this echocardiogram. I am going to begin him on 5 mg lisinopril daily as part of guideline directed medical therapy for systolic heart failure.  Additionally, I am going to give 1 more dose of IV Lasix 40 mg today and will likely transition to oral diuretic therapy tomorrow.  Potential plans for discharge to skilled nursing facility tomorrow.  Will follow with you.     2/3: Patient denies any SOB or chest discomfort. Still with some BLE edema. -735 mL fluid balance. Blood pressures remain normotensive with most recent read of 126/64. SpO2 99% on baseline 2L NC. Telemetry with AV paced rhythm without significant ectopy. Labs today with sodium 142, potassium 3.9, creatinine 1.48, hemoglobin 9.8. Discharge plan is for SNF on 2/4, when bed is available. Will given another dose of IV Lasix 20 mg x 1 today with plans to resume PO Lasix tomorrow upon DC. Continue GDM with lisinopril and metoprolol. Continue amiodarone and Eliquis for atrial fibrillation management. Will discuss with my collaborating physician Dr. Cullen. We will continue to follow.    2/4: Resting comfortably in bed this morning.  Denies chest pain or pressure.  Blood pressure normotensive with last recorded at 122/58. Patient is currently on 2L NC which is his baseline with adequate pulse oximetry.  He appears rather euvolemic on examination.  His lower extremity edema has improved significantly since admission.  This morning we have transitioned him to oral diuretic  therapy with 40 mg of furosemide daily.  He will continue on metoprolol succinate 25 mg daily and will be started on Entresto therapy this evening per Dr. Cullen's recommendations.  Continue Eliquis 2 and half milligrams twice daily for stroke risk reduction in the setting of paroxysmal atrial fibrillation as well as amiodarone for rhythm control of his atrial fibrillation.  Plan for discharge to skilled nursing facility this afternoon. Discussed with Dr. Yuan from the admitting service. Patient should follow up with Dr. Martin in the outpatient setting in 2-3 weeks.         Annamaria Pa, RON-CNP

## 2025-02-04 NOTE — PROGRESS NOTES
Angel Carroll is a 79 y.o. male on day 4 of admission presenting with bilateral leg swelling with acute on chronic diastolic heart failure.     Subjective   He was awake, feels pretty good.  He was on O2 at 2 L/min which is his baseline.     Objective     Physical Exam  General: awake, alert, oriented, responsive  Cardiovascular: regular, normal S1 and S2  Lungs: good air entry bilaterally  Abdomen: soft, nontender, bowel sounds present, normoactive  Extremities: no peripheral cyanosis, no pedal edema  Neuro: alert, oriented x 3, no focal weakness      Last Recorded Vitals  Blood pressure 122/58, pulse 59, temperature 36.5 °C (97.7 °F), temperature source Oral, resp. rate 18, height 1.829 m (6'), weight 103 kg (226 lb 3.1 oz), SpO2 98%.  Intake/Output last 3 Shifts:  I/O last 3 completed shifts:  In: 940 (9.2 mL/kg) [P.O.:790; IV Piggyback:150]  Out: 2100 (20.5 mL/kg) [Urine:2100 (0.6 mL/kg/hr)]  Weight: 102.6 kg     Relevant Results  Lab Results   Component Value Date    WBC 2.8 (L) 02/04/2025    HGB 10.5 (L) 02/04/2025    HCT 34.2 (L) 02/04/2025    MCV 99 02/04/2025     (L) 02/04/2025     Lab Results   Component Value Date    GLUCOSE 87 02/04/2025    CALCIUM 8.4 (L) 02/04/2025     02/04/2025    K 4.2 02/04/2025    CO2 33 (H) 02/04/2025     02/04/2025    BUN 21 02/04/2025    CREATININE 1.39 (H) 02/04/2025     Scheduled medications  amiodarone, 200 mg, oral, Daily  apixaban, 2.5 mg, oral, BID  atorvastatin, 20 mg, oral, Nightly  ceFAZolin, 1 g, intravenous, q8h  ferrous sulfate (325 mg ferrous sulfate), 65 mg of iron, oral, Daily with breakfast  finasteride, 5 mg, oral, Daily  folic acid, 1 mg, oral, Daily  [Held by provider] furosemide, 20 mg, oral, BID  furosemide, 40 mg, oral, Daily  gabapentin, 100 mg, oral, Nightly  metoprolol succinate XL, 25 mg, oral, Daily  pantoprazole, 40 mg, oral, Daily   Or  pantoprazole, 40 mg, intravenous, Daily  PARoxetine, 20 mg, oral, Daily  polyethylene glycol, 17  g, oral, Daily  potassium chloride CR, 40 mEq, oral, Daily  [START ON 2/5/2025] sacubitriL-valsartan, 1 tablet, oral, BID  tamsulosin, 0.8 mg, oral, Nightly      Continuous medications  oxygen, 2 L/min      PRN medications  PRN medications: acetaminophen **OR** acetaminophen **OR** acetaminophen, ondansetron ODT **OR** ondansetron        Assessment/Plan   Assessment & Plan    Acute on chronic diastolic heart failure   - concern for mild heart failure exacerbation   Echo 2/2024 with EF 50-55%  Diuresis per cardiology   Echo 2/1/25 with EF 40%  HF navigator consulted   Frequent falls  Due to generalized weakness  PT/OT  Fall precautions  SNF at discharge.   Hypokalemia  Suspect due to use of lasix  Replace PRN  Atrial fibrillation  Continue home metoprolol and eliquis  Stage 3a chronic kidney disease  Baseline Cr 1.5-1.7  Stable at baseline  UTI (urinary tract infection)  Recent urine culture on 1/15/25 growing proteus mirabilis -> was prescribed macrobid as outpatient but sensitivities are showing resistance to macrobid -> suspect this was inadequately treated   Urine culture positive for proteus mirabilis   Continue cefazolin, can transition to PO augmentin at discharge   BPH (benign prostatic hyperplasia)  Continue home finasteride and flomax  Leukopenia  Chronic   COPD (chronic obstructive pulmonary disease)  With chronic hypoxic respiratory failure  On 2L NC at baseline, stable  Not in exacerbation     Discharge to SNF today.     Scottie Yuan MD

## 2025-02-04 NOTE — CARE PLAN
Problem: Skin  Goal: Decreased wound size/increased tissue granulation at next dressing change  Outcome: Progressing   The patient's goals for the shift include Rest well & safety    The clinical goals for the shift include Continue ABX TX.    Over the shift, the patient did not make progress toward the following goals. Barriers to progression include WDL. Recommendations to address these barriers include WDL.

## 2025-02-04 NOTE — CARE PLAN
The patient's goals for the shift include Rest well & safety    The clinical goals for the shift include Continue ABX TX.    Over the shift, the patient did not make progress toward the following goals. Barriers to progression include monitor labs & vitals. Recommendations to address these barriers include continue plan of care.

## 2025-02-04 NOTE — DISCHARGE SUMMARY
Discharge Diagnosis  Acute on chronic diastolic heart failure  Recurrent falls  Hypokalemia  Chronic kidney disease  Urinary tract infection  Atrial fibrillation    Issues Requiring Follow-Up      Discharge Meds     Medication List      START taking these medications     amoxicillin-pot clavulanate 875-125 mg tablet; Commonly known as:   Augmentin; Take 1 tablet (875 mg) by mouth 2 times a day for 6 days.   potassium chloride CR 20 mEq ER tablet; Commonly known as: Klor-Con M20;   Take 1 tablet (20 mEq) by mouth once daily. Do not crush or chew.   sacubitriL-valsartan 24-26 mg tablet; Commonly known as: Entresto; Take   1 tablet by mouth 2 times a day. Do not fill before February 5, 2025.;   Start taking on: February 5, 2025     CHANGE how you take these medications     ferrous sulfate 250 mg (50 mg iron) tablet extended release; Take 1   tablet (250 mg) by mouth 2 times a day.; What changed: how much to take   furosemide 40 mg tablet; Commonly known as: Lasix; Take 1 tablet (40 mg)   by mouth once daily.; What changed: how much to take, when to take this   Protonix 40 mg EC tablet; Generic drug: pantoprazole; What changed:   Another medication with the same name was removed. Continue taking this   medication, and follow the directions you see here.     CONTINUE taking these medications     acetaminophen 325 mg tablet; Commonly known as: Tylenol   amiodarone 200 mg tablet; Commonly known as: Pacerone; TAKE 1 TABLET ONE   TIME DAILY   atorvastatin 20 mg tablet; Commonly known as: Lipitor; TAKE 1 TABLET   EVERY DAY   cyanocobalamin 500 mcg tablet; Commonly known as: Vitamin B-12   Eliquis 2.5 mg tablet; Generic drug: apixaban; Take 1 tablet (2.5 mg) by   mouth 2 times a day.   ergocalciferol 1.25 MG (18822 UT) capsule; Commonly known as: Vitamin   D-2; Take 1 capsule (50,000 Units) by mouth 1 (one) time per week.   fenofibrate 48 mg tablet; Commonly known as: Tricor; TAKE 1 AND 1/2   TABLETS ONE TIME DAILY    finasteride 5 mg tablet; Commonly known as: Proscar; Take 1 tablet (5   mg) by mouth once daily.   folic acid 1 mg tablet; Commonly known as: Folvite; TAKE 1 TABLET EVERY   DAY   gabapentin 100 mg capsule; Commonly known as: Neurontin; Take 1 capsule   (100 mg) by mouth once daily at bedtime.   metoprolol succinate XL 25 mg 24 hr tablet; Commonly known as:   Toprol-XL; Take 1 tablet (25 mg) by mouth once daily. Do not crush or   chew.   oxygen gas therapy; Commonly known as: O2   PARoxetine 20 mg tablet; Commonly known as: Paxil; TAKE 1 TABLET EVERY   DAY   tamsulosin 0.4 mg 24 hr capsule; Commonly known as: Flomax; Take 2   capsules (0.8 mg) by mouth once daily at bedtime.     STOP taking these medications     nitrofurantoin (macrocrystal-monohydrate) 100 mg capsule; Commonly known   as: Macrobid   psyllium 0.4 gram capsule; Commonly known as: Metamucil   psyllium husk (with sugar) 2 gram wafer       Test Results Pending At Discharge  Pending Labs       No current pending labs.            Hospital Course   79-year-old male patient with a history of atrial fibrillation on Eliquis, coronary artery disease, diastolic heart failure, presented to the hospital with worsening lower extremity edema and generalized weakness.  He has also had frequent falls.  He had hypokalemia and potassium was 2.9.  He was admitted to the floor, potassium was replaced, he received intravenous diuresis with furosemide and leg swelling improved.  He was evaluated by physical therapy.  Arrangements were completed for discharge to a skilled nursing facility.  He has been transitioned to oral furosemide.  He was [facility] to [facility].  The time spent arranging and completing and documenting discharge was 35 minutes.      Outpatient Follow-Up  Future Appointments   Date Time Provider Department Center   2/24/2025  2:30 PM SAMUEL Daugherty [id number] [facility]   2/25/2025  2:45 PM Drew Jacobson OD [id number]  [facility]   3/20/2025 11:40 AM Lety Avalos APRN-CNP [id number] [facility]   4/4/2025  1:20 PM Jung Tao MD [id number] [facility]   5/27/2025  1:40 PM Tiana Juarez MD [id number] [facility]   6/27/2025  2:00 PM Annamaria Burgess, PharmD [id number] [facility]   10/1/2025  1:00 PM Jose Mccormick MD [id number] [facility]         Scottie Yuan MD

## 2025-02-07 ENCOUNTER — PATIENT OUTREACH (OUTPATIENT)
Dept: CASE MANAGEMENT | Facility: HOSPITAL | Age: 80
End: 2025-02-07

## 2025-02-07 ENCOUNTER — APPOINTMENT (OUTPATIENT)
Dept: UROLOGY | Facility: CLINIC | Age: 80
End: 2025-02-07
Payer: MEDICARE

## 2025-02-07 NOTE — PROGRESS NOTES
Heart Failure Nurse Navigator Transition of Care Phone Call    The role of the HF nurse navigator is to (1) characterize risk profiles of patients with heart failure transitioning from ynbprygx-fa-aoklilira after hospitalization, (2) recommend interventions to improve care and reduce risks of worse post-hospitalization outcomes.     This HF Nurse Navigator called out to [facility] to ensure patient is on HF protocol, low sodium diet and daily wts, and check on progress.     No answer, no voicemail to leave msg.     Génesis DIEGON RN  [facility] Clinical Nurse Navigator, CHF  [phone]

## 2025-02-14 ENCOUNTER — PATIENT OUTREACH (OUTPATIENT)
Dept: CASE MANAGEMENT | Facility: HOSPITAL | Age: 80
End: 2025-02-14
Payer: MEDICARE

## 2025-02-14 NOTE — PROGRESS NOTES
Heart Failure Nurse Navigator Transition of Care Phone Call    The role of the HF nurse navigator is to (1) characterize risk profiles of patients with heart failure transitioning from [id number]-[id number]-zvnynszos after hospitalization, (2) recommend interventions to improve care and reduce risks of worse post-hospitalization outcomes.     This HF Nurse Navigator called out to [facility] to ensure patient is on HF protocol, low sodium diet and daily wts, and check on progress.     Txf to nurses station, no answer, no voicemail to leave ms. This is my second attempt to reach SNF.    Génesis DIEGON RN  [facility] Clinical Nurse Navigator, CHF  [phone]

## 2025-02-20 PROBLEM — R26.2 DIFFICULTY WALKING: Status: ACTIVE | Noted: 2025-02-20

## 2025-02-21 ENCOUNTER — PATIENT OUTREACH (OUTPATIENT)
Dept: CASE MANAGEMENT | Facility: HOSPITAL | Age: 80
End: 2025-02-21
Payer: MEDICARE

## 2025-02-21 NOTE — PROGRESS NOTES
Heart Failure Nurse Navigator Transition of Care Phone Call    The role of the HF nurse navigator is to (1) characterize risk profiles of patients with heart failure transitioning from cihvnvor-jw-lpqcplvci after hospitalization, (2) recommend interventions to improve care and reduce risks of worse post-hospitalization outcomes.     This HF Nurse Navigator called out to [facility] to ensure patient is on HF protocol, low sodium diet and daily wts, and check on progress.     No answer, unable to leave msg.    Génesis DIEGON RN  [facility] Clinical Nurse Navigator, CHF  [phone]

## 2025-02-24 ENCOUNTER — OFFICE VISIT (OUTPATIENT)
Dept: CARDIOLOGY | Facility: CLINIC | Age: 80
End: 2025-02-24
Payer: MEDICARE

## 2025-02-24 VITALS
DIASTOLIC BLOOD PRESSURE: 60 MMHG | HEART RATE: 58 BPM | OXYGEN SATURATION: 99 % | WEIGHT: 212 LBS | SYSTOLIC BLOOD PRESSURE: 104 MMHG | BODY MASS INDEX: 28.75 KG/M2

## 2025-02-24 DIAGNOSIS — I48.11 LONGSTANDING PERSISTENT ATRIAL FIBRILLATION (MULTI): ICD-10-CM

## 2025-02-24 DIAGNOSIS — I48.0 PAROXYSMAL ATRIAL FIBRILLATION (MULTI): Primary | ICD-10-CM

## 2025-02-24 DIAGNOSIS — I95.1 ORTHOSTATIC HYPOTENSION: ICD-10-CM

## 2025-02-24 DIAGNOSIS — I25.10 ARTERIOSCLEROTIC CARDIOVASCULAR DISEASE (ASCVD): ICD-10-CM

## 2025-02-24 DIAGNOSIS — Z95.0 PACEMAKER: ICD-10-CM

## 2025-02-24 DIAGNOSIS — I48.20 CHRONIC ATRIAL FIBRILLATION (MULTI): ICD-10-CM

## 2025-02-24 PROCEDURE — 99215 OFFICE O/P EST HI 40 MIN: CPT | Performed by: NURSE PRACTITIONER

## 2025-02-24 PROCEDURE — 3074F SYST BP LT 130 MM HG: CPT | Performed by: NURSE PRACTITIONER

## 2025-02-24 PROCEDURE — 1111F DSCHRG MED/CURRENT MED MERGE: CPT | Performed by: NURSE PRACTITIONER

## 2025-02-24 PROCEDURE — 93005 ELECTROCARDIOGRAM TRACING: CPT | Performed by: NURSE PRACTITIONER

## 2025-02-24 PROCEDURE — 3078F DIAST BP <80 MM HG: CPT | Performed by: NURSE PRACTITIONER

## 2025-02-24 PROCEDURE — 1160F RVW MEDS BY RX/DR IN RCRD: CPT | Performed by: NURSE PRACTITIONER

## 2025-02-24 PROCEDURE — 1036F TOBACCO NON-USER: CPT | Performed by: NURSE PRACTITIONER

## 2025-02-24 PROCEDURE — 1157F ADVNC CARE PLAN IN RCRD: CPT | Performed by: NURSE PRACTITIONER

## 2025-02-24 PROCEDURE — 1125F AMNT PAIN NOTED PAIN PRSNT: CPT | Performed by: NURSE PRACTITIONER

## 2025-02-24 PROCEDURE — 1159F MED LIST DOCD IN RCRD: CPT | Performed by: NURSE PRACTITIONER

## 2025-02-24 ASSESSMENT — PATIENT HEALTH QUESTIONNAIRE - PHQ9
2. FEELING DOWN, DEPRESSED OR HOPELESS: NOT AT ALL
SUM OF ALL RESPONSES TO PHQ9 QUESTIONS 1 AND 2: 0
1. LITTLE INTEREST OR PLEASURE IN DOING THINGS: NOT AT ALL

## 2025-02-24 ASSESSMENT — PAIN SCALES - GENERAL: PAINLEVEL_OUTOF10: 5

## 2025-02-24 NOTE — PROGRESS NOTES
Subjective   Angel Carroll is a 79 y.o. male.    Chief Complaint:  Hospital Follow-up. LIGHTHEADEDNESS, FATIGUE AND LOW BP    HPI  Mr. Carroll is seen for a 4 month followup and was also discharged from [facility] 2/4/2025 having been admitted for weakness, CHF and hypokalemia.  He is seen in collaboration with Dr. Martin. The patient is accompanied by his wife and daughter.  He is residing at a rehabilitation facility however looking forward to going home on Thursday. He is fatigued and continues to struggle with being off balance.  He denies any recurrent fluid retention or heart failure symptoms.  He has no symptoms referable to angina.  He is exercising with PT.  He is compliant with medication and was recently started on Midodrine for low BP and Entresto for reduced EF as part of GDMT. He is unsure when his last device interrogation was.      Review of Systems   Constitutional: Positive for malaise/fatigue.   Cardiovascular: Negative.    Respiratory: Negative.     Musculoskeletal:  Positive for arthritis.   Neurological:  Positive for loss of balance.   All other systems reviewed and are negative.      Objective   VS and LABS REVIEWED  HOSPITAL notes reviewed    GENERAL: frail appearing, cooperative, pleasant, in no acute distress, sitting in wheelchair  SKIN: warm, dry, no rash.  NECK: no JVD  CARDIAC: Regular rate and rhythm with no rubs, murmurs, or gallops  CHEST: Normal respiratory efforts, lungs clear to auscultation bilaterally. O2 NC  ABDOMEN: soft, nontender, nondistended  EXTREMITIES: no edema  NEURO: Alert and oriented x 3.  Grossly normal.  Moves all 4 extremities.    Lab Review:   Lab Results   Component Value Date     02/04/2025    K 4.2 02/04/2025     02/04/2025    CO2 33 (H) 02/04/2025    BUN 21 02/04/2025    CREATININE 1.39 (H) 02/04/2025    GLUCOSE 87 02/04/2025    CALCIUM 8.4 (L) 02/04/2025     Lab Results   Component Value Date    WBC 2.8 (L) 02/04/2025    HGB 10.5 (L)  02/04/2025    HCT 34.2 (L) 02/04/2025    MCV 99 02/04/2025     (L) 02/04/2025     Lab Results   Component Value Date    CHOL 117 (L) 11/14/2022    TRIG 81 11/14/2022    HDL 31 (L) 11/14/2022     ECG obtained and reviewed shows AV paced rhythm with VR of 66 BPM    Assessment/Plan   Mr. Carroll is a pleasant 79-year-old male with a past medical history significant for hypertension, hyperlipidemia, CKD, COPD on continuous O2, CAD s/p multiple PCI, patent by repeat cath 12/2022, atrial fibrillation s/p ablation on Eliquis anticoagulation, PPM secondary to sick sinus syndrome 1/2023, CVA in the setting of subtherapeutic INR with a near subtotal occlusion of the right ICA and carotid endarterectomy s/p bovine patch angioplasty 3/2021. Echocardiogram 2/2025 showed an EF of 40% with mild MR and mild elevation of RV SP.  He presents following recent hospital admission.  He continues to off balance and fatigued.  Weight is stable. He appears euvolemic on exam however BP is low normal and unable to obtain orthostatics. ECG is stable.  He is nodding off during our visit.  I would like him to reduce Amiodarone to 100 mg daily given his age and symptoms.  Additionally, he will reduce furosemide to 20 mg daily and stop Midodrine. He will followup in 1 month and will followup with device clinic as well.  I asked that he and his daughter review the need for both Flomax and Finasteride with his urologist as they too can contribute to hypotension.  They know to call with any interim concern or questions.     *medication changes were written on the physician order sheet to be returned by the family to the patients rehab center

## 2025-02-25 ENCOUNTER — APPOINTMENT (OUTPATIENT)
Dept: OPHTHALMOLOGY | Facility: CLINIC | Age: 80
End: 2025-02-25
Payer: MEDICARE

## 2025-02-25 DIAGNOSIS — H01.02B SQUAMOUS BLEPHARITIS OF UPPER AND LOWER EYELIDS OF BOTH EYES: ICD-10-CM

## 2025-02-25 DIAGNOSIS — H25.813 COMBINED FORMS OF AGE-RELATED CATARACT OF BOTH EYES: Primary | ICD-10-CM

## 2025-02-25 DIAGNOSIS — D31.31 CHOROIDAL NEVUS, RIGHT EYE: ICD-10-CM

## 2025-02-25 DIAGNOSIS — H52.03 HYPEROPIA WITH PRESBYOPIA OF BOTH EYES: ICD-10-CM

## 2025-02-25 DIAGNOSIS — H01.02A SQUAMOUS BLEPHARITIS OF UPPER AND LOWER EYELIDS OF BOTH EYES: ICD-10-CM

## 2025-02-25 DIAGNOSIS — H35.363 DRUSEN OF MACULA OF BOTH EYES: ICD-10-CM

## 2025-02-25 DIAGNOSIS — H52.4 BILATERAL PRESBYOPIA: ICD-10-CM

## 2025-02-25 DIAGNOSIS — H53.041 AMBLYOPIA SUSPECT, RIGHT EYE: ICD-10-CM

## 2025-02-25 DIAGNOSIS — B88.0 INFESTATION BY DEMODEX: ICD-10-CM

## 2025-02-25 DIAGNOSIS — H52.4 HYPEROPIA WITH PRESBYOPIA OF BOTH EYES: ICD-10-CM

## 2025-02-25 PROBLEM — H53.001 AMBLYOPIA OF RIGHT EYE: Status: RESOLVED | Noted: 2023-09-12 | Resolved: 2025-02-25

## 2025-02-25 PROBLEM — H53.9 ABNORMAL VISION: Status: RESOLVED | Noted: 2023-09-12 | Resolved: 2025-02-25

## 2025-02-25 PROBLEM — H25.9 SENILE CATARACT: Status: RESOLVED | Noted: 2023-09-12 | Resolved: 2025-02-25

## 2025-02-25 PROBLEM — H54.40 BLINDNESS OF LEFT EYE: Status: RESOLVED | Noted: 2023-09-12 | Resolved: 2025-02-25

## 2025-02-25 PROBLEM — Z97.3 WEARS GLASSES: Status: RESOLVED | Noted: 2023-09-12 | Resolved: 2025-02-25

## 2025-02-25 PROBLEM — S01.119A LACERATION OF EYEBROW: Status: RESOLVED | Noted: 2024-07-26 | Resolved: 2025-02-25

## 2025-02-25 LAB
ATRIAL RATE: 64 BPM
PR INTERVAL: 280 MS
Q ONSET: 188 MS
QRS COUNT: 11 BEATS
QRS DURATION: 200 MS
QT INTERVAL: 546 MS
QTC CALCULATION(BAZETT): 572 MS
QTC FREDERICIA: 564 MS
R AXIS: -88 DEGREES
T AXIS: 86 DEGREES
T OFFSET: 461 MS
VENTRICULAR RATE: 66 BPM

## 2025-02-25 PROCEDURE — 92015 DETERMINE REFRACTIVE STATE: CPT | Performed by: STUDENT IN AN ORGANIZED HEALTH CARE EDUCATION/TRAINING PROGRAM

## 2025-02-25 PROCEDURE — 92014 COMPRE OPH EXAM EST PT 1/>: CPT | Performed by: STUDENT IN AN ORGANIZED HEALTH CARE EDUCATION/TRAINING PROGRAM

## 2025-02-25 PROCEDURE — 92134 CPTRZ OPH DX IMG PST SGM RTA: CPT | Performed by: STUDENT IN AN ORGANIZED HEALTH CARE EDUCATION/TRAINING PROGRAM

## 2025-02-25 RX ORDER — FUROSEMIDE 40 MG/1
20 TABLET ORAL DAILY
Qty: 45 TABLET | Refills: 1 | Status: SHIPPED | OUTPATIENT
Start: 2025-02-25 | End: 2026-02-25

## 2025-02-25 RX ORDER — AMIODARONE HYDROCHLORIDE 200 MG/1
100 TABLET ORAL DAILY
Qty: 90 TABLET | Refills: 0 | Status: SHIPPED | OUTPATIENT
Start: 2025-02-25

## 2025-02-25 RX ORDER — LOTILANER OPHTHALMIC SOLUTION 2.5 MG/ML
1 SOLUTION/ DROPS OPHTHALMIC EVERY 12 HOURS
Qty: 10 ML | Refills: 0 | Status: SHIPPED | OUTPATIENT
Start: 2025-02-25 | End: 2025-04-08

## 2025-02-25 ASSESSMENT — REFRACTION_MANIFEST
OD_ADD: +2.75
OS_ADD: +2.75
OS_CYLINDER: +1.75
OS_AXIS: 176
OS_SPHERE: -0.25
OS_SPHERE: -0.25
OD_AXIS: 003
METHOD_AUTOREFRACTION: 1
OD_CYLINDER: +1.00
OS_AXIS: 170
OD_SPHERE: +2.50
OS_CYLINDER: +1.75
OD_CYLINDER: SPHERE
OD_SPHERE: +2.25

## 2025-02-25 ASSESSMENT — EXTERNAL EXAM - RIGHT EYE: OD_EXAM: NORMAL

## 2025-02-25 ASSESSMENT — ENCOUNTER SYMPTOMS
LOSS OF BALANCE: 1
PSYCHIATRIC NEGATIVE: 0
GASTROINTESTINAL NEGATIVE: 0
HEMATOLOGIC/LYMPHATIC NEGATIVE: 0
ALLERGIC/IMMUNOLOGIC NEGATIVE: 0
CONSTITUTIONAL NEGATIVE: 0
ENDOCRINE NEGATIVE: 0
NEUROLOGICAL NEGATIVE: 0
MUSCULOSKELETAL NEGATIVE: 0
RESPIRATORY NEGATIVE: 0
CARDIOVASCULAR NEGATIVE: 0
CARDIOVASCULAR NEGATIVE: 1
EYES NEGATIVE: 0
RESPIRATORY NEGATIVE: 1

## 2025-02-25 ASSESSMENT — EXTERNAL EXAM - LEFT EYE: OS_EXAM: NORMAL

## 2025-02-25 ASSESSMENT — TONOMETRY
IOP_METHOD: GOLDMANN APPLANATION
OS_IOP_MMHG: 08
OD_IOP_MMHG: 08

## 2025-02-25 ASSESSMENT — VISUAL ACUITY
CORRECTION_TYPE: GLASSES
METHOD: SNELLEN - LINEAR
OD_PH_CC: 20/70
OS_CC: 20/60
OS_PH_CC+: +2
OD_CC+: -1
OS_PH_CC: 20/30
OS_CC+: +2
OD_PH_CC+: -2
OD_CC: 20/200

## 2025-02-25 ASSESSMENT — REFRACTION_WEARINGRX
OS_ADD: +2.50
SPECS_TYPE: BIFOCAL
OS_AXIS: 080
OD_SPHERE: +2.75
OD_CYLINDER: SPHER
OS_CYLINDER: -1.50
OD_ADD: +2.50
OS_SPHERE: +2.00

## 2025-02-25 ASSESSMENT — CUP TO DISC RATIO
OD_RATIO: 0.3
OS_RATIO: 0.3

## 2025-02-25 ASSESSMENT — CONF VISUAL FIELD
OD_SUPERIOR_TEMPORAL_RESTRICTION: 0
OS_SUPERIOR_NASAL_RESTRICTION: 0
OD_NORMAL: 1
OS_INFERIOR_TEMPORAL_RESTRICTION: 0
OD_SUPERIOR_NASAL_RESTRICTION: 0
OD_INFERIOR_TEMPORAL_RESTRICTION: 0
OS_SUPERIOR_TEMPORAL_RESTRICTION: 0
OS_NORMAL: 1
OS_INFERIOR_NASAL_RESTRICTION: 0
OD_INFERIOR_NASAL_RESTRICTION: 0

## 2025-02-25 NOTE — PROGRESS NOTES
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 Louis 10/24/23 lost to f/u  - s/p Carotid Endarterectomy Right  - history of amblyopia OD  - DFE show normal vessel caliber; MP retinal hemorrhages essentially resolved with 2 small hemes noted  - ? choroidal lesion at ST ARC - no visible on OCT (observe)  - OCT show area of INL thinning both eyes denoting previous episodes of ischemia (patient is cardiac and hypertensive on treatment)  - Observe with retina 6 months  - RTC 6 month    Choroidal nevus, right eye  Flat pigmented lesion superior to ST ARC - no drusen, OP or SRF  Stable  Observe with retina 6 months    Amblyopia suspect, right eyeH53.041  -patient reports hx of strab sx 2 seperate times as a child (1 after trauma to the eye)  -reports his vision has been reduced OD longstanding  -BCVA overall stable OD 20/80 OS 20/30    Combined forms of age related cataracts bilateral  Hyperopia Presbyopia Bilateral   -BCVA today reduced 1 line to OD 20/80 OS 20/30 due to cataract progression  -overall patient does not notice vision affecting ADL's  -discussed with patient today-will have patient f/u with retina as scheduled 6 months-can refer for cataract sx eval at that time    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 Instill 1 drop in each eye 2x/day for 6 weeks. On slit lamp examination there are greater than 10 collarettes along the lashes of each eyelid margin. The patient is symptomatic for eyelid erythema, crustiness of eyelids, and lid irritation . The patient has tried other OTC treatments such as lid hygiene with tea tree oil. This medication is being prescribed for and FDA approved diagnosis.  Rx sent to [facility] [address] [place] [address] or [facility] [id number] [place] [address].       RTC retina for eval 6 months

## 2025-02-25 NOTE — PATIENT INSTRUCTIONS
REDUCE AMIODARONE  MG DAILY    REDUCE FUROSEMIDE TO 20 MG DAILY    STOP MIDODRINE    ASK UROLOGIST ABOUT CONTINUING FINASTERIDE AND FLOMAX    CONTINUE ALL OTHER MEDICATION    RETURN IN 1 MONTH

## 2025-02-26 ENCOUNTER — DOCUMENTATION (OUTPATIENT)
Dept: HOME HEALTH SERVICES | Facility: HOME HEALTH | Age: 80
End: 2025-02-26
Payer: MEDICARE

## 2025-02-26 ENCOUNTER — HOME HEALTH ADMISSION (OUTPATIENT)
Dept: HOME HEALTH SERVICES | Facility: HOME HEALTH | Age: 80
End: 2025-02-26
Payer: MEDICARE

## 2025-02-26 NOTE — HH CARE COORDINATION
Home Care received a Referral for Nursing, Physical Therapy, Occupational Therapy, and Speech Language Pathology. We have processed the referral for a Start of Care on 24-48 HOURS POST DC .     If you have any questions or concerns, please feel free to contact us at [phone]. Follow the prompts, enter your five digit zip code, and you will be directed to your care team on EAST 1.

## 2025-02-27 ENCOUNTER — TELEPHONE (OUTPATIENT)
Dept: PRIMARY CARE | Facility: CLINIC | Age: 80
End: 2025-02-27
Payer: MEDICARE

## 2025-02-28 DIAGNOSIS — I51.9 LV DYSFUNCTION: Primary | ICD-10-CM

## 2025-03-01 ENCOUNTER — HOME CARE VISIT (OUTPATIENT)
Dept: HOME HEALTH SERVICES | Facility: HOME HEALTH | Age: 80
End: 2025-03-01
Payer: MEDICARE

## 2025-03-01 VITALS
RESPIRATION RATE: 20 BRPM | SYSTOLIC BLOOD PRESSURE: 118 MMHG | HEART RATE: 64 BPM | TEMPERATURE: 97.4 F | DIASTOLIC BLOOD PRESSURE: 66 MMHG | OXYGEN SATURATION: 99 %

## 2025-03-01 PROCEDURE — G0299 HHS/HOSPICE OF RN EA 15 MIN: HCPCS | Mod: HHH

## 2025-03-01 PROCEDURE — 169592 NO-PAY CLAIM PROCEDURE

## 2025-03-01 SDOH — ECONOMIC STABILITY: HOUSING INSECURITY: EVIDENCE OF SMOKING MATERIAL: 0

## 2025-03-01 SDOH — HEALTH STABILITY: MENTAL HEALTH: SMOKING IN HOME: 0

## 2025-03-01 ASSESSMENT — ENCOUNTER SYMPTOMS
PAIN LOCATION - EXACERBATING FACTORS: NOT MOVING
AGITATION: 1
LAST BOWEL MOVEMENT: 67265
DRY SKIN: 1
STOOL FREQUENCY: DAILY
PAIN LOCATION: RIGHT LEG
APPETITE LEVEL: GOOD
DYSPNEA ACTIVITY LEVEL: WHILE SPEAKING
DIZZINESS: 1
PAIN LOCATION - PAIN FREQUENCY: CONSTANT
SUBJECTIVE PAIN PROGRESSION: UNCHANGED
LOSS OF SENSATION IN FEET: 0
SPUTUM AMOUNT: SCANT
FATIGUE: 1
COUGH CHARACTERISTICS: PRODUCTIVE
LOWER EXTREMITY EDEMA: 1
PERSON REPORTING PAIN: PATIENT
SPUTUM COLOR: CLEAR
PAIN LOCATION - PAIN SEVERITY: 7/10
PAIN: 1
FORGETFULNESS: 1
PAIN LOCATION - PAIN DURATION: VARIES
DEPRESSION: 0
OCCASIONAL FEELINGS OF UNSTEADINESS: 1
MUSCLE WEAKNESS: 1
SPUTUM CONSISTENCY: THICK
LOWEST PAIN SEVERITY IN PAST 24 HOURS: 3/10
BOWEL PATTERN NORMAL: 1
DESCRIPTION OF MEMORY LOSS: SHORT TERM
PAIN LOCATION - RELIEVING FACTORS: MOVEMENT
SPUTUM PRODUCTION: 1
TREMORS: 1
SHORTNESS OF BREATH: 1
HOARSE VOICE: 1
PAIN LOCATION - PAIN QUALITY: ACHE
CHANGE IN APPETITE: UNCHANGED
COUGH: 1
PAIN SEVERITY GOAL: 2/10

## 2025-03-01 ASSESSMENT — ACTIVITIES OF DAILY LIVING (ADL)
ENTERING_EXITING_HOME: MAXIMUM ASSIST
OASIS_M1830: 03
CURRENT_FUNCTION: ONE PERSON
AMBULATION ASSISTANCE: ONE PERSON

## 2025-03-01 ASSESSMENT — LIFESTYLE VARIABLES: SMOKING_STATUS: 0

## 2025-03-03 DIAGNOSIS — I10 PRIMARY HYPERTENSION: Primary | ICD-10-CM

## 2025-03-04 ENCOUNTER — HOME CARE VISIT (OUTPATIENT)
Dept: HOME HEALTH SERVICES | Facility: HOME HEALTH | Age: 80
End: 2025-03-04
Payer: MEDICARE

## 2025-03-04 VITALS
HEART RATE: 60 BPM | SYSTOLIC BLOOD PRESSURE: 104 MMHG | OXYGEN SATURATION: 98 % | TEMPERATURE: 97.5 F | DIASTOLIC BLOOD PRESSURE: 60 MMHG

## 2025-03-04 PROCEDURE — G0151 HHCP-SERV OF PT,EA 15 MIN: HCPCS | Mod: HHH

## 2025-03-04 SDOH — HEALTH STABILITY: PHYSICAL HEALTH
EXERCISE COMMENTS: REVIEWED PREVIOUS HEP. SITTING LAQ MARCHING AP HIP ABD  SUPINE HEEL SLIDES HIP ABD AP. PT FAMILIAR WITH HEP

## 2025-03-04 ASSESSMENT — ENCOUNTER SYMPTOMS
HIGHEST PAIN SEVERITY IN PAST 24 HOURS: 8/10
LOSS OF SENSATION IN FEET: 0
OCCASIONAL FEELINGS OF UNSTEADINESS: 1
DEPRESSION: 0
PAIN LOCATION: RIGHT KNEE
PAIN LOCATION - PAIN SEVERITY: 2/10
PERSON REPORTING PAIN: PATIENT
PAIN: 1
LOWEST PAIN SEVERITY IN PAST 24 HOURS: 2/10

## 2025-03-05 ENCOUNTER — HOME CARE VISIT (OUTPATIENT)
Dept: HOME HEALTH SERVICES | Facility: HOME HEALTH | Age: 80
End: 2025-03-05
Payer: MEDICARE

## 2025-03-05 VITALS
HEART RATE: 62 BPM | SYSTOLIC BLOOD PRESSURE: 120 MMHG | DIASTOLIC BLOOD PRESSURE: 58 MMHG | OXYGEN SATURATION: 99 % | TEMPERATURE: 97.9 F | RESPIRATION RATE: 20 BRPM

## 2025-03-05 PROCEDURE — G0300 HHS/HOSPICE OF LPN EA 15 MIN: HCPCS | Mod: HHH

## 2025-03-05 SDOH — HEALTH STABILITY: MENTAL HEALTH: SMOKING IN HOME: 0

## 2025-03-05 SDOH — ECONOMIC STABILITY: HOUSING INSECURITY: EVIDENCE OF SMOKING MATERIAL: 0

## 2025-03-05 ASSESSMENT — ENCOUNTER SYMPTOMS
MUSCLE WEAKNESS: 1
DENIES PAIN: 1
APPETITE LEVEL: GOOD
CHANGE IN APPETITE: UNCHANGED
LAST BOWEL MOVEMENT: 67267

## 2025-03-06 ENCOUNTER — HOME CARE VISIT (OUTPATIENT)
Dept: HOME HEALTH SERVICES | Facility: HOME HEALTH | Age: 80
End: 2025-03-06
Payer: MEDICARE

## 2025-03-06 VITALS
TEMPERATURE: 98.2 F | SYSTOLIC BLOOD PRESSURE: 100 MMHG | HEART RATE: 70 BPM | DIASTOLIC BLOOD PRESSURE: 48 MMHG | RESPIRATION RATE: 22 BRPM | OXYGEN SATURATION: 99 %

## 2025-03-06 LAB
ANION GAP SERPL CALCULATED.4IONS-SCNC: 13 MMOL/L (CALC) (ref 7–17)
BASOPHILS # BLD AUTO: 29 CELLS/UL (ref 0–200)
BASOPHILS NFR BLD AUTO: 0.9 %
BUN SERPL-MCNC: 30 MG/DL (ref 7–25)
BUN/CREAT SERPL: 17 (CALC) (ref 6–22)
CALCIUM SERPL-MCNC: 8.7 MG/DL (ref 8.6–10.3)
CHLORIDE SERPL-SCNC: 104 MMOL/L (ref 98–110)
CO2 SERPL-SCNC: 26 MMOL/L (ref 20–32)
CREAT SERPL-MCNC: 1.81 MG/DL (ref 0.7–1.22)
EGFRCR SERPLBLD CKD-EPI 2021: 37 ML/MIN/1.73M2
EOSINOPHIL # BLD AUTO: 122 CELLS/UL (ref 15–500)
EOSINOPHIL NFR BLD AUTO: 3.8 %
ERYTHROCYTE [DISTWIDTH] IN BLOOD BY AUTOMATED COUNT: 13 % (ref 11–15)
GLUCOSE SERPL-MCNC: 110 MG/DL (ref 65–99)
HCT VFR BLD AUTO: 40.5 % (ref 38.5–50)
HGB BLD-MCNC: 13.2 G/DL (ref 13.2–17.1)
LYMPHOCYTES # BLD AUTO: 730 CELLS/UL (ref 850–3900)
LYMPHOCYTES NFR BLD AUTO: 22.8 %
MAGNESIUM SERPL-MCNC: 2.2 MG/DL (ref 1.5–2.5)
MCH RBC QN AUTO: 30.3 PG (ref 27–33)
MCHC RBC AUTO-ENTMCNC: 32.6 G/DL (ref 32–36)
MCV RBC AUTO: 93.1 FL (ref 80–100)
MONOCYTES # BLD AUTO: 314 CELLS/UL (ref 200–950)
MONOCYTES NFR BLD AUTO: 9.8 %
NEUTROPHILS # BLD AUTO: 2006 CELLS/UL (ref 1500–7800)
NEUTROPHILS NFR BLD AUTO: 62.7 %
PLATELET # BLD AUTO: 188 THOUSAND/UL (ref 140–400)
PMV BLD REES-ECKER: 10.2 FL (ref 7.5–12.5)
POTASSIUM SERPL-SCNC: 4.1 MMOL/L (ref 3.5–5.3)
RBC # BLD AUTO: 4.35 MILLION/UL (ref 4.2–5.8)
SODIUM SERPL-SCNC: 143 MMOL/L (ref 135–146)
WBC # BLD AUTO: 3.2 THOUSAND/UL (ref 3.8–10.8)

## 2025-03-06 PROCEDURE — G0299 HHS/HOSPICE OF RN EA 15 MIN: HCPCS | Mod: HHH

## 2025-03-07 ENCOUNTER — HOME CARE VISIT (OUTPATIENT)
Dept: HOME HEALTH SERVICES | Facility: HOME HEALTH | Age: 80
End: 2025-03-07
Payer: MEDICARE

## 2025-03-07 VITALS — OXYGEN SATURATION: 97 % | HEART RATE: 58 BPM | RESPIRATION RATE: 18 BRPM | TEMPERATURE: 96.4 F

## 2025-03-07 PROCEDURE — G0152 HHCP-SERV OF OT,EA 15 MIN: HCPCS | Mod: HHH

## 2025-03-07 SDOH — ECONOMIC STABILITY: HOUSING INSECURITY: EVIDENCE OF SMOKING MATERIAL: 0

## 2025-03-07 SDOH — HEALTH STABILITY: MENTAL HEALTH: SMOKING IN HOME: 0

## 2025-03-07 ASSESSMENT — PAIN SCALES - PAIN ASSESSMENT IN ADVANCED DEMENTIA (PAINAD)
BODYLANGUAGE: 0
FACIALEXPRESSION: 0
CONSOLABILITY: 0
TOTALSCORE: 0
BREATHING: 0
CONSOLABILITY: 0 - NO NEED TO CONSOLE.
NEGVOCALIZATION: 0
BODYLANGUAGE: 0 - RELAXED.
NEGVOCALIZATION: 0 - NONE.
FACIALEXPRESSION: 0 - SMILING OR INEXPRESSIVE.

## 2025-03-07 ASSESSMENT — ENCOUNTER SYMPTOMS
PERSON REPORTING PAIN: PATIENT
DENIES PAIN: 1

## 2025-03-07 ASSESSMENT — ACTIVITIES OF DAILY LIVING (ADL)
BATHING ASSESSED: 1
FEEDING_WITHIN_DEFINED_LIMITS: 1
GROOMING_WITHIN_DEFINED_LIMITS: 1
DRESSING_LB_CURRENT_FUNCTION: MINIMUM ASSIST
BATHING_CURRENT_FUNCTION: MINIMUM ASSIST

## 2025-03-09 SDOH — HEALTH STABILITY: MENTAL HEALTH: SMOKING IN HOME: 0

## 2025-03-09 SDOH — ECONOMIC STABILITY: HOUSING INSECURITY: EVIDENCE OF SMOKING MATERIAL: 0

## 2025-03-09 ASSESSMENT — ENCOUNTER SYMPTOMS
PAIN: 1
PERSON REPORTING PAIN: PATIENT
LOWER EXTREMITY EDEMA: 1
PAIN LOCATION - PAIN SEVERITY: 3/10
PAIN LOCATION: GENERALIZED
CHANGE IN APPETITE: UNCHANGED
MUSCLE WEAKNESS: 1
FATIGUES EASILY: 1
APPETITE LEVEL: FAIR

## 2025-03-10 ENCOUNTER — PATIENT OUTREACH (OUTPATIENT)
Dept: CASE MANAGEMENT | Facility: HOSPITAL | Age: 80
End: 2025-03-10

## 2025-03-10 ENCOUNTER — APPOINTMENT (OUTPATIENT)
Dept: PHARMACY | Facility: HOSPITAL | Age: 80
End: 2025-03-10
Payer: MEDICARE

## 2025-03-10 DIAGNOSIS — I48.0 PAROXYSMAL ATRIAL FIBRILLATION (MULTI): Primary | ICD-10-CM

## 2025-03-10 DIAGNOSIS — I51.9 LV DYSFUNCTION: ICD-10-CM

## 2025-03-10 RX ORDER — SACUBITRIL AND VALSARTAN 24; 26 MG/1; MG/1
1 TABLET, FILM COATED ORAL 2 TIMES DAILY
Qty: 180 TABLET | Refills: 3 | Status: SHIPPED | OUTPATIENT
Start: 2025-03-10

## 2025-03-10 NOTE — PROGRESS NOTES
Heart Failure Nurse Navigator Transition of Care Phone Call    The role of the HF nurse navigator is to (1) characterize risk profiles of patients with heart failure transitioning from jauyhcpk-fd-towaxsrbo after hospitalization, (2) recommend interventions to improve care and reduce risks of worse post-hospitalization outcomes.     Assessment  Call attempted to family. Spoke with patient and obtained the following information.    HF Symptoms  Chest pain? No  Shortness of breath? none  Orthopnea? No  Paroxysmal nocturnal dyspnea? No  Edema? No  Weight gain >2lbs in 3 days or 5lbs in 1 week? Unknown    Medications  Is the patient prescribed the following medications?  ARNi/ACEi/ARB: Sacubitril-valsartan 24-26 mg BID  BB: Metoprolol succinate 25 mg daily  MRA: None  SGLT2i: None  Diuretic: Furosemide 20 mg daily  Is the patient adherent to prescribed medications? YES    Management    Is the patient obtaining daily weights? NO Reed is not sure if he is doing wts.  If yes, current weight?   Is the patient following diet limitations (2-3g Na, fluid restriction)? YES  Does the patient have a  cardiology follow-up scheduled? YES/Complete  If yes, appointment details?    If no, what is the reason?   Does the patient require HF education reinforcement? No  If yes, what was discussed?     Recommendations/Comments:  Per Reed, pt's daughter, pt is doing good. No issues/questions. He was DC from [facility] on 2/4/25 to SNF and returned home on 2/28/25. No readmission in 30 days post DC. I am closing his case at this time.     Génesis Rivera RN BSN  [facility] Clinical Nurse Navigator, CHF  [phone]

## 2025-03-10 NOTE — Clinical Note
Cam Hatfield,  Today I spoke with Reed about Angel's Eliquis and heart medications. Reed states patient is doing well on anticoagulation therapy. Reed reports adherence, no adverse effects and denies s/s of bleeding. Reed reports after Angel first started on Entresto he was hypotensive and renal function slightly increased, Reed reports patient BP and kidney function has since improved. Angel takes his BP daily, home reported BP was 119/46. Reed denies s/s of worsening heart failure with Angel and states she checks his legs often for swelling. Plan to add Entresto to  PAP and follow up in 6 weeks. Thank you!

## 2025-03-10 NOTE — PROGRESS NOTES
Pharmacist Clinic: Cardiology Management    Angel Carroll is a 79 y.o. male was referred to Clinical Pharmacy Team for anticoagulation and heart failure management.     *Referring provider agreeable for clinical pharmacy to manage heart failure*    Referring Provider: Alysia Sheets APRN-CNP    THIS IS A FOLLOW UP PATIENT APPOINTMENT. AT LAST VISIT ON 01/24/2025 WITH PHARMACIST (Annamaria Burgess).    Appointment was completed by Reed who was reached at [phone].    REVIEW OF PAST APPNT (IF APPLICABLE):   Today was a follow up visit conducted with Reed (dtr). Reed states patient is doing well on anticoagulation therapy. Patient reports adherence, no adverse effects, and denies s/s of bleeding.     Allergies Reviewed? No    Allergies   Allergen Reactions    Other Unknown     Anti-Inflammatories    Ranolazine Unknown    Nsaids (Non-Steroidal Anti-Inflammatory Drug) Unknown    Cyclobenzaprine Hallucinations, Unknown and Psychosis       Past Medical History:   Diagnosis Date    Atherosclerotic heart disease of native coronary artery without angina pectoris 12/14/2022    Arteriosclerotic cardiovascular disease (ASCVD)    Benign neoplasm of meninges, unspecified     Meningioma    Benign prostatic hyperplasia without lower urinary tract symptoms 02/28/2022    BPH without obstruction/lower urinary tract symptoms    Body mass index (BMI) 33.0-33.9, adult 07/02/2021    BMI 33.0-33.9,adult    Body mass index (BMI) 34.0-34.9, adult 11/09/2021    BMI 34.0-34.9,adult    Body mass index (BMI) 34.0-34.9, adult 09/08/2021    BMI 34.0-34.9,adult    Body mass index (BMI) 35.0-35.9, adult 12/29/2021    BMI 35.0-35.9,adult    Body mass index (BMI) 35.0-35.9, adult 02/28/2022    BMI 35.0-35.9,adult    Elevated blood-pressure reading, without diagnosis of hypertension     Prehypertension    Encounter for immunization 10/27/2015    Need for prophylactic vaccination and inoculation against influenza    Hyperlipidemia, unspecified  11/15/2021    Hyperlipidemia    Other conditions influencing health status     Nephrolithiasis    Pacemaker     Personal history of other diseases of the musculoskeletal system and connective tissue     History of tendinitis    Personal history of other drug therapy     History of influenza vaccination    Personal history of other specified conditions 10/25/2022    History of bradycardia    Rectal bleeding     Vitamin D deficiency, unspecified 10/08/2020    Vitamin D deficiency       Current Outpatient Medications on File Prior to Visit   Medication Sig Dispense Refill    metoprolol succinate XL (Toprol-XL) 25 mg 24 hr tablet Take 1 tablet (25 mg) by mouth once daily. Do not crush or chew. 90 tablet 1    acetaminophen (Tylenol) 325 mg tablet Take 2 tablets by mouth every 6 hours if needed for mild pain (1 - 3).      amiodarone (Pacerone) 200 mg tablet Take 0.5 tablets (100 mg) by mouth once daily. 90 tablet 0    apixaban (Eliquis) 2.5 mg tablet Take 1 tablet (2.5 mg) by mouth 2 times a day. 180 tablet 3    atorvastatin (Lipitor) 20 mg tablet TAKE 1 TABLET EVERY DAY (Patient taking differently: TAKE 1 TABLET EVERY DAY by mouth) 90 tablet 3    cyanocobalamin (Vitamin B-12) 500 mcg tablet Take 1 tablet (500 mcg) by mouth once daily.      ergocalciferol (Vitamin D-2) 1.25 MG (54374 UT) capsule Take 1 capsule (50,000 Units) by mouth 1 (one) time per week. (Patient taking differently: Take 1 capsule by mouth 1 (one) time per week. take on Sundays  Indications: prevention of vitamin D deficiency) 12 capsule 2    fenofibrate (Tricor) 48 mg tablet TAKE 1 AND 1/2 TABLETS ONE TIME DAILY (Patient taking differently: TAKE 1 AND 1/2 TABLETS ONE TIME DAILY by mouth) 135 tablet 3    ferrous sulfate 250 mg (50 mg iron) tablet extended release Take 2 tablets by mouth 2 times a day. Indications: anemia from inadequate iron      finasteride (Proscar) 5 mg tablet Take 1 tablet (5 mg) by mouth once daily. 90 tablet 3    folic acid  (Folvite) 1 mg tablet TAKE 1 TABLET EVERY DAY 90 tablet 3    folic acid (Folvite) 1 mg tablet Take 1 mg by mouth once daily. Indications: inadequate folic acid      furosemide (Lasix) 40 mg tablet Take 0.5 tablets (20 mg) by mouth once daily. 45 tablet 1    gabapentin (Neurontin) 100 mg capsule Take 1 capsule (100 mg) by mouth once daily at bedtime. 90 capsule 1    magnesium hydroxide (Milk of Magnesia) 400 mg/5 mL suspension Take 30 mL by mouth once daily as needed for constipation. Indications: constipation      oxygen (O2) gas therapy Inhale 2 L/min continuously.      pantoprazole (Protonix) 40 mg EC tablet Take 1 tablet (40 mg) by mouth once daily in the morning. Take before meals. (Patient taking differently: Take 20 mg by mouth once daily in the morning. Take before meals.)      PARoxetine (Paxil) 20 mg tablet TAKE 1 TABLET EVERY DAY (Patient taking differently: TAKE 1 TABLET EVERY DAY by mouth) 90 tablet 3    potassium chloride CR (Klor-Con M20) 20 mEq ER tablet Take 1 tablet (20 mEq) by mouth once daily. Do not crush or chew.      sacubitriL-valsartan (Entresto) 24-26 mg tablet Take 1 tablet by mouth 2 times a day. Do not fill before February 5, 2025.      tamsulosin (Flomax) 0.4 mg 24 hr capsule Take 2 capsules (0.8 mg) by mouth once daily at bedtime. (Patient taking differently: Take 1 capsule by mouth at 3:00 in the morning. Indications: enlarged prostate with urination problem) 180 capsule 3    Xdemvy 0.25 % drops Administer 1 drop into affected eye(s) every 12 hours. Please contact daughter: Reed Beltran [phone] for any needed information 10 mL 0     No current facility-administered medications on file prior to visit.         RELEVANT LAB RESULTS  Lab Results   Component Value Date    BILITOT 1.0 01/30/2025    CALCIUM 8.7 03/05/2025    CO2 26 03/05/2025     03/05/2025    CREATININE 1.81 (H) 03/05/2025    GLUCOSE 110 (H) 03/05/2025    ALKPHOS 77 01/30/2025    K 4.1 03/05/2025    PROT 6.4  "01/30/2025     03/05/2025    AST 14 01/30/2025    ALT 5 (L) 01/30/2025    BUN 30 (H) 03/05/2025    ANIONGAP 13 03/05/2025    MG 2.2 03/05/2025    PHOS 2.9 06/11/2024    ALBUMIN 3.5 01/30/2025    GFRMALE 28 (A) 09/01/2023     Lab Results   Component Value Date    TRIG 81 11/14/2022    CHOL 117 (L) 11/14/2022    LDLCALC 70 11/14/2022    HDL 31 (L) 11/14/2022     No results found for: \"BMCBC\", \"CBCDIF\"     PHARMACEUTICAL ASSESSMENT    MEDICATION RECONCILIATION    Drug Interactions? No    Medication Documentation Review Audit       Reviewed by Evelina Valencia RN (Registered Nurse) on 03/01/25 at 1456      Medication Order Taking? Sig Documenting Provider Last Dose Status   acetaminophen (Tylenol) 325 mg tablet [id number] Yes Take 2 tablets by mouth every 6 hours if needed for mild pain (1 - 3). Historical Provider, MD 1/30/2025 Active   amiodarone (Pacerone) 200 mg tablet [id number] Yes Take 0.5 tablets (100 mg) by mouth once daily. SAMUEL Daugherty  Active   apixaban (Eliquis) 2.5 mg tablet [id number] Yes Take 1 tablet (2.5 mg) by mouth 2 times a day. SAMUEL Daugherty 1/30/2025 Active   atorvastatin (Lipitor) 20 mg tablet [id number] Yes TAKE 1 TABLET EVERY DAY   Patient taking differently: TAKE 1 TABLET EVERY DAY by mouth    Jung Tao MD  Active   cyanocobalamin (Vitamin B-12) 500 mcg tablet [id number] Yes Take 1 tablet (500 mcg) by mouth once daily. Historical Provider, MD 1/30/2025 Active   ergocalciferol (Vitamin D-2) 1.25 MG (54472 UT) capsule [id number] Yes Take 1 capsule (50,000 Units) by mouth 1 (one) time per week.   Patient taking differently: Take 1 capsule by mouth 1 (one) time per week. take on Sundays  Indications: prevention of vitamin D deficiency    Jung Tao MD Past Week Active   fenofibrate (Tricor) 48 mg tablet [id number] Yes TAKE 1 AND 1/2 TABLETS ONE TIME DAILY   Patient taking differently: TAKE 1 AND 1/2 TABLETS ONE TIME DAILY by mouth    Alysia Sheets, RON-CNP 1/29/2025 " Active   ferrous sulfate 250 mg (50 mg iron) tablet extended release [id number] Yes Take 2 tablets by mouth 2 times a day. Indications: anemia from inadequate iron Historical Provider, MD  Active   finasteride (Proscar) 5 mg tablet [id number] Yes Take 1 tablet (5 mg) by mouth once daily. Lety Avalos, APRN-CNP 1/30/2025 Active   folic acid (Folvite) 1 mg tablet [id number] No TAKE 1 TABLET EVERY DAY Jung Tao MD 1/30/2025 Active   folic acid (Folvite) 1 mg tablet [id number] Yes Take 1 mg by mouth once daily. Indications: inadequate folic acid Historical Provider, MD  Active   furosemide (Lasix) 40 mg tablet [id number] Yes Take 0.5 tablets (20 mg) by mouth once daily. Alysia Sheets, APRN-CNP  Active   gabapentin (Neurontin) 100 mg capsule [id number] Yes Take 1 capsule (100 mg) by mouth once daily at bedtime. Jung Tao MD  Active   magnesium hydroxide (Milk of Magnesia) 400 mg/5 mL suspension [id number] Yes Take 30 mL by mouth once daily as needed for constipation. Indications: constipation Historical Provider, MD  Active   metoprolol succinate XL (Toprol-XL) 25 mg 24 hr tablet [id number] Yes Take 1 tablet (25 mg) by mouth once daily. Do not crush or chew.   Patient taking differently: Take 1 tablet by mouth once daily. Do not crush or chew. Hold for SBP less than 110 or HR less than 60  Indications: ventricular rate control in atrial fibrillation    Jung Tao MD 1/30/2025 Active   oxygen (O2) gas therapy [id number] Yes Inhale 2 L/min continuously. Grey Provider, MD Taking Active   pantoprazole (Protonix) 40 mg EC tablet [id number] Yes Take 1 tablet (40 mg) by mouth once daily in the morning. Take before meals.   Patient taking differently: Take 20 mg by mouth once daily in the morning. Take before meals.    Grey Ascencio MD 1/30/2025 Active   PARoxetine (Paxil) 20 mg tablet [id number] Yes TAKE 1 TABLET EVERY DAY   Patient taking differently: TAKE 1 TABLET EVERY DAY by mouth    Jung Tao MD  1/30/2025 Active   potassium chloride CR (Klor-Con M20) 20 mEq ER tablet [id number] Yes Take 1 tablet (20 mEq) by mouth once daily. Do not crush or chew. Scottie Yuan MD  Active   sacubitriL-valsartan (Entresto) 24-26 mg tablet [id number] Yes Take 1 tablet by mouth 2 times a day. Do not fill before February 5, 2025. Scottie Yuan MD  Active   tamsulosin (Flomax) 0.4 mg 24 hr capsule [id number] No Take 2 capsules (0.8 mg) by mouth once daily at bedtime.   Patient taking differently: Take 1 capsule by mouth at 3:00 in the morning. Indications: enlarged prostate with urination problem    Hanny Moe MD 1/30/2025 Active   Xdemvy 0.25 % drops [id number]  Administer 1 drop into affected eye(s) every 12 hours. Please contact daughter: Reed Beltran [phone] for any needed information Drew Jacobson OD  Active                    DISEASE MANAGEMENT ASSESSMENT:     ANTICOAGULATION ASSESSMENT    The ASCVD Risk score (Naomi HODGSON, et al., 2019) failed to calculate for the following reasons:    The 2019 ASCVD risk score is only valid for ages 40 to 79    Risk score cannot be calculated because patient has a medical history suggesting prior/existing ASCVD    DIAGNOSIS: prevention of nonvalvular atrial fibrilliation stroke and systemic embolism  - Patient is projected to be on anticoagulation long term  - [id number] Score: [4] (only included if diagnosis is atrial fibrillation)   Age: [<65 (0)] [65-74 (+1)] [> 75 (+2)]: 2  Sex: [Male/Female (+1)]: 0  CHF history: [No/Yes(+1)]: 1  Hypertension history: [No/Yes(+1)]: 1  Stroke/TIA/thromboembolism history: [No/Yes(+2)]: 0  Vascular disease history (prior MI, peripheral artery disease, aortic plaque): [No/Yes(+1)]: 0  Diabetes history: [No/Yes(+1)]: 0    CURRENT PHARMACOTHERAPY:   Eliquis 2.5mg twice daily  78yo  96.2kg  Scr 1.81 (03/05/2025)    RELEVANT PAST MEDICAL HISTORY:   Afib, HTN, HF    Affordability/Accessibility:  PAP  Adherence/Organization:  Daughter Reed puts medicine into his pillbox  Adverse Reactions: none reported, denies s/s of bleeding  Recent Hospitalizations: Yes; hypokalemia  Recent Falls/Trauma: none since hospital discharge; reported minor fall prior to hospitals admission    EDUCATION/COUNSELING:   - Counseled patient on MOA, expectations, duration of therapy, contraindications, administration, and monitoring parameters  - Counseled patient of side effects that are indicative of bleeding such as dark tarry stool, unexplainable bruising, or vomiting up a coffee ground like substance     CHF ASSESSMENT     Symptom/Staging:  -Most recent ejection fraction: 40%    Results for orders placed during the hospital encounter of 01/30/25    Transthoracic Echo (TTE) Complete    Narrative  [place] [id number]  Phone [phone]    TRANSTHORACIC ECHOCARDIOGRAM REPORT    Patient Name:       MICHEL Sosa Physician:    [id number] Sadie Hollis MD  Study Date:         2/1/2025             Ordering Provider:    [id number] HALEY SCHMIDT  MRN/PID:            [id number]             Fellow:  Accession#:         [id number]         Nurse:  Date of Birth/Age:  1945 / 79 years Sonographer:          Monisha Rios  [facility]  Gender Assigned at  M                    Additional Staff:  Birth:  Height:             182.88 cm            Admit Date:  Weight:             97.98 kg             Admission Status:     Inpatient -  Routine  BSA / BMI:          2.20 m2 / 29.30      Department Location:  [facility]  kg/m2  Blood Pressure: 123 /69 mmHg    Study Type:    TRANSTHORACIC ECHO (TTE) COMPLETE  Diagnosis/ICD: Heart failure, unspecified-I50.9; Shortness of breath-R06.02  Indication:    CHF, SOB  CPT Codes:     Echo Complete w Full Doppler-11082    Patient History:  Pertinent History: CVA, A-Fib, Hyperlipidemia, HTN, CHF, Dyspnea, ASCVD, Sinus  Bradycardia and Chest Pain.    Study Detail: The following Echo  studies were performed: 2D, M-Mode, Doppler and  color flow. Patient has a pacemaker.      PHYSICIAN INTERPRETATION:  Left Ventricle: The left ventricular systolic function is moderately decreased, with a visually estimated ejection fraction of 40%. There are no regional wall motion abnormalities. The left ventricular cavity size is normal. There is normal septal and normal posterior left ventricular wall thickness. Abnormal (paradoxical) septal motion, consistent with left bundle branch block. Spectral Doppler shows a normal pattern of left ventricular diastolic filling.  Left Atrium: The left atrial size is mildly dilated.  Right Ventricle: The right ventricle is normal in size. There is reduced right ventricular systolic function.  Right Atrium: The right atrial size is mild to moderately dilated.  Aortic Valve: The aortic valve is trileaflet. There is mild aortic valve thickening. There is evidence of mildly elevated transaortic gradients consistent with sclerosis of the aortic valve.  There is no evidence of aortic valve regurgitation. The peak instantaneous gradient of the aortic valve is 5 mmHg.  Mitral Valve: The mitral valve is normal in structure. There is mild mitral valve regurgitation.  Tricuspid Valve: The tricuspid valve is structurally normal. There is mild tricuspid regurgitation. The Doppler estimated RVSP is mildly elevated right ventricular systolic pressure at 44.2 mmHg.  Pulmonic Valve: The pulmonic valve is structurally normal. There is physiologic pulmonic valve regurgitation.  Pericardium: No pericardial effusion noted.  Aorta: The aortic root is normal.  Systemic Veins: The inferior vena cava appears normal in size, with IVC inspiratory collapse less than 50%.      CONCLUSIONS:  1. The left ventricular systolic function is moderately decreased, with a visually estimated ejection fraction of 40%.  2. Abnormal septal motion consistent with left bundle branch block.  3. There is reduced right  ventricular systolic function.  4. The right atrial size is mild to moderately dilated.  5. Mild mitral valve regurgitation.  6. Mild tricuspid regurgitation is visualized.  7. Mildly elevated right ventricular systolic pressure.  8. Aortic valve sclerosis.    QUANTITATIVE DATA SUMMARY:    2D MEASUREMENTS:             Normal Ranges:  LAs:             3.60 cm     (2.7-4.0cm)  IVSd:            0.67 cm     (0.6-1.1cm)  LVPWd:           0.70 cm     (0.6-1.1cm)  LVIDd:           6.13 cm     (3.9-5.9cm)  LVIDs:           4.51 cm  LV Mass Index:   73.1 g/m2  LVEDV Index:     67.28 ml/m2  LV % FS          26.4 %      LEFT ATRIUM:                  Normal Ranges:  LA Vol A4C:        83.9 ml    (22+/-6mL/m2)  LA Vol A2C:        85.8 ml  LA Vol BP:         85.2 ml  LA Vol Index A4C:  38.1ml/m2  LA Vol Index A2C:  39.0 ml/m2  LA Vol Index BP:   38.7 ml/m2  LA Area A4C:       24.6 cm2  LA Area A2C:       24.8 cm2  LA Major Axis A4C: 6.1 cm  LA Major Axis A2C: 6.1 cm  LA Volume Index:   35.4 ml/m2  LA Vol A4C:        77.7 ml  LA Vol A2C:        79.1 ml  LA Vol Index BSA:  35.6 ml/m2      RIGHT ATRIUM:          Normal Ranges:  RA Area A4C:  43.5 cm2      M-MODE MEASUREMENTS:         Normal Ranges:  Ao Root:             2.90 cm (2.0-3.7cm)      AORTA MEASUREMENTS:         Normal Ranges:  Asc Ao, d:          3.30 cm (2.1-3.4cm)      LV SYSTOLIC FUNCTION:  Normal Ranges:  EF-A4C View:    45 % (>=55%)  EF-A2C View:    45 %  EF-Biplane:     47 %  EF-Visual:      40 %  LV EF Reported: 40 %      LV DIASTOLIC FUNCTION:            Normal Ranges:  MV Peak E:             0.84 m/s   (0.7-1.2 m/s)  MV Peak A:             0.48 m/s   (0.42-0.7 m/s)  E/A Ratio:             1.76       (1.0-2.2)  MV e'                  0.039 m/s  (>8.0)  MV lateral e'          0.04 m/s  MV medial e'           0.04 m/s  E/e' Ratio:            21.89      (<8.0)  PulmV Sys Ross:         31.30 cm/s  PulmV Toussaint Ross:        53.00 cm/s  PulmV S/D Ross:          0.60      MITRAL VALVE:          Normal Ranges:  MV DT:        205 msec (150-240msec)      AORTIC VALVE:           Normal Ranges:  AoV Vmax:      1.17 m/s (<=1.7m/s)  AoV Peak P.5 mmHg (<20mmHg)  LVOT Max Ross:  0.57 m/s (<=1.1m/s)  LVOT Diameter: 2.20 cm  (1.8-2.4cm)  AoV Area,Vmax: 1.85 cm2 (2.5-4.5cm2)      RIGHT VENTRICLE:  RV Basal 4.63 cm  RV Mid   3.04 cm  RV Major 8.3 cm  TAPSE:   13.2 mm  RV s'    0.06 m/s      TRICUSPID VALVE/RVSP:          Normal Ranges:  Peak TR Velocity:     3.21 m/s  RV Syst Pressure:     44 mmHg  (< 30mmHg)  IVC Diam:             1.54 cm      PULMONARY VEINS:  PulmV Toussaint Ross: 53.00 cm/s  PulmV S/D Ross:  0.60  PulmV Sys Ross:  31.30 cm/s      [id number] Sadie Hollis MD  Electronically signed on 2025 at 9:08:14 AM        ** Final **      Guideline-Directed Medical Therapy:  -ARNI: Yes, describe: Entresto 24/26mg twice daily  -Beta Blocker: Yes, describe: Metoprolol succinate 25mg daily  -MRA: No  -SGLT2i: No    Secondary Prevention:  -The ASCVD Risk score (Naomi DK, et al., 2019) failed to calculate for the following reasons:    The 2019 ASCVD risk score is only valid for ages 40 to 79    Risk score cannot be calculated because patient has a medical history suggesting prior/existing ASCVD   -Aspirin 81mg? no  -Statin?: Yes, describe: Atorvastatin 20mg daily  -HTN?: Yes, describe: Controlled at last OV    CURRENT PHARMACOTHERAPY:   Entresto 24/26mg twice daily  Metoprolol succinate 25mg daily  Furosemide 20mg daily    Affordability:  PAP  Adherence/Compliance: reports adherence, daughter Reed puts medicine into his pillbox  Adverse Effects: no concerns currently    Monitoring Weights at Home: Yes  Home Weight Recordings: unable to assess during today's visit    Past In Office Weight Readings:   Wt Readings from Last 6 Encounters:   25 96.2 kg (212 lb)   25 103 kg (226 lb 3.1 oz)   24 98 kg (216 lb 1.6 oz)   11/15/24 102 kg (225 lb)   10/28/24 104 kg (229 lb 4.8  oz)   10/04/24 104 kg (229 lb)       Monitoring Blood Pressure at Home: Yes; daily   Home BP Recordings: 119/46    Past In Office BP Readings:   BP Readings from Last 6 Encounters:   03/06/25 (!) 100/48   03/05/25 120/58   03/04/25 104/60   03/01/25 118/66   02/24/25 104/60   02/04/25 122/58       HEALTH MANAGEMENT    Maintaining fluid restriction (<2 L/day): N/A, patient watches sodium intake (2g/daily)  Edema/swelling: No  Shortness of breath: No  Trouble sleeping/lying down: No  Dry/hacking cough: No  Recent Hospitalizations: Yes, describe: hypokalemia    EDUCATION/COUNSELING:   - Counseled patient on MOA, expectations, duration of therapy, contraindications, administration, and monitoring parameters  - Counseled patient on lifestyle modifications that can decrease your risk of having complications (smoking cessation, losing weight, daily weights, vaccines)  - Counseled patient on fluid intake and weight management. Recommended to not consume more than 2 liters of fliuids per day. If they have gained more than 2-3 pounds within a 24 hours period (or 5 pounds in a week), contact their cardiologist  - Answered all patient questions and concerns     DISCUSSION/NOTES:   Today was a follow up visit conducted with Reed (dtr). Reed states patient is doing well on anticoagulation therapy. Reed reports adherence, no adverse effects and denies s/s of bleeding. Patient was hospitalized at the end of January, Reed reports no falls since discharge.   Upon hospital discharge patient was started on Entresto. Reed reports patient is now tolerating Entresto with no issues. She states after Angel first started on Entresto he was hypotensive and renal function slightly increased, Reed reports patient BP and kidney function has since improved. Angel takes his BP daily, home reported BP was 119/46. Reed denies s/s of worsening heart failure with Angel and states she checks his legs often for swelling. Plan to add  Entresto to  PAP.    ASSESSMENT AND PLAN:    Assessment/Plan   Problem List Items Addressed This Visit       LV dysfunction     Patient on 2 of 4 GDMT. Reported home /46. Renal function and potassium level appropriate to continue current regimen.     Labs (03/05/2025): Scr-1.81 eGFR-37 K-4.1         Relevant Orders    Referral to Clinical Pharmacy    Paroxysmal atrial fibrillation (Multi) - Primary     Patient age, weight and renal function appropriate to continue Eliquis 2.5mg twice daily.          Relevant Orders    Referral to Clinical Pharmacy         RECOMMENDATIONS/PLAN    CONTINUE  Eliquis 2.5mg twice daily  Entresto 24/26mg twice daily  Metoprolol succinate 25mg daily    Last Appnt with Referring Provider: 02/24/2025  Next Appnt with Referring Provider: 03/24/2025  Clinical Pharmacist follow up: 1 month  VAF/Application Expiration: Yes    Date: 09/30/2025  Type of Encounter: Sal Burgess, PharmD    Verbal consent to manage patient's drug therapy was obtained from the patient . They were informed they may decline to participate or withdraw from participation in pharmacy services at any time.    Continue all meds under the continuation of care with the referring provider and clinical pharmacy team.

## 2025-03-10 NOTE — ASSESSMENT & PLAN NOTE
Patient on 2 of 4 GDMT. Reported home /46. Renal function and potassium level appropriate to continue current regimen.     Labs (03/05/2025): Scr-1.81 eGFR-37 K-4.1

## 2025-03-11 ENCOUNTER — HOME CARE VISIT (OUTPATIENT)
Dept: HOME HEALTH SERVICES | Facility: HOME HEALTH | Age: 80
End: 2025-03-11
Payer: MEDICARE

## 2025-03-11 PROCEDURE — RXMED WILLOW AMBULATORY MEDICATION CHARGE

## 2025-03-11 PROCEDURE — G0153 HHCP-SVS OF S/L PATH,EA 15MN: HCPCS | Mod: HHH

## 2025-03-11 ASSESSMENT — ENCOUNTER SYMPTOMS
PAIN LOCATION - PAIN FREQUENCY: INTERMITTENT
PAIN LOCATION: RIGHT HIP
PAIN LOCATION - PAIN SEVERITY: 3/10
PAIN: 1
PERSON REPORTING PAIN: PATIENT
PAIN LOCATION - PAIN SEVERITY: 3/10
PAIN LOCATION - PAIN FREQUENCY: INTERMITTENT
PAIN LOCATION: RIGHT LEG

## 2025-03-12 ENCOUNTER — HOME CARE VISIT (OUTPATIENT)
Dept: HOME HEALTH SERVICES | Facility: HOME HEALTH | Age: 80
End: 2025-03-12
Payer: MEDICARE

## 2025-03-12 VITALS
TEMPERATURE: 97.9 F | RESPIRATION RATE: 16 BRPM | OXYGEN SATURATION: 98 % | HEART RATE: 56 BPM | DIASTOLIC BLOOD PRESSURE: 50 MMHG | SYSTOLIC BLOOD PRESSURE: 110 MMHG

## 2025-03-12 VITALS — RESPIRATION RATE: 18 BRPM | OXYGEN SATURATION: 99 % | HEART RATE: 65 BPM | TEMPERATURE: 97.3 F

## 2025-03-12 DIAGNOSIS — N18.32 STAGE 3B CHRONIC KIDNEY DISEASE (MULTI): Primary | ICD-10-CM

## 2025-03-12 PROCEDURE — G0300 HHS/HOSPICE OF LPN EA 15 MIN: HCPCS | Mod: HHH

## 2025-03-12 PROCEDURE — G0152 HHCP-SERV OF OT,EA 15 MIN: HCPCS | Mod: HHH

## 2025-03-12 SDOH — ECONOMIC STABILITY: HOUSING INSECURITY: EVIDENCE OF SMOKING MATERIAL: 0

## 2025-03-12 SDOH — HEALTH STABILITY: MENTAL HEALTH: SMOKING IN HOME: 0

## 2025-03-12 ASSESSMENT — ENCOUNTER SYMPTOMS
APPETITE LEVEL: GOOD
DENIES PAIN: 1
LAST BOWEL MOVEMENT: 67275
DENIES PAIN: 1
CHANGE IN APPETITE: UNCHANGED
PERSON REPORTING PAIN: PATIENT

## 2025-03-12 ASSESSMENT — ACTIVITIES OF DAILY LIVING (ADL)
FEEDING_WITHIN_DEFINED_LIMITS: 1
DRESSING_LB_CURRENT_FUNCTION: MINIMUM ASSIST
BATHING_CURRENT_FUNCTION: MINIMUM ASSIST
BATHING ASSESSED: 1

## 2025-03-12 ASSESSMENT — PAIN SCALES - PAIN ASSESSMENT IN ADVANCED DEMENTIA (PAINAD)
NEGVOCALIZATION: 0 - NONE.
CONSOLABILITY: 0
BODYLANGUAGE: 0
BODYLANGUAGE: 0 - RELAXED.
BREATHING: 0
CONSOLABILITY: 0 - NO NEED TO CONSOLE.
TOTALSCORE: 0
FACIALEXPRESSION: 0
FACIALEXPRESSION: 0 - SMILING OR INEXPRESSIVE.
NEGVOCALIZATION: 0

## 2025-03-13 ENCOUNTER — PHARMACY VISIT (OUTPATIENT)
Dept: PHARMACY | Facility: CLINIC | Age: 80
End: 2025-03-13
Payer: COMMERCIAL

## 2025-03-13 LAB
ANION GAP SERPL CALCULATED.4IONS-SCNC: 12 MMOL/L (CALC) (ref 7–17)
BASOPHILS # BLD AUTO: 32 CELLS/UL (ref 0–200)
BASOPHILS NFR BLD AUTO: 0.7 %
BUN SERPL-MCNC: 23 MG/DL (ref 7–25)
BUN/CREAT SERPL: 13 (CALC) (ref 6–22)
CALCIUM SERPL-MCNC: 8.8 MG/DL (ref 8.6–10.3)
CHLORIDE SERPL-SCNC: 102 MMOL/L (ref 98–110)
CO2 SERPL-SCNC: 27 MMOL/L (ref 20–32)
CREAT SERPL-MCNC: 1.77 MG/DL (ref 0.7–1.22)
EGFRCR SERPLBLD CKD-EPI 2021: 38 ML/MIN/1.73M2
EOSINOPHIL # BLD AUTO: 133 CELLS/UL (ref 15–500)
EOSINOPHIL NFR BLD AUTO: 2.9 %
ERYTHROCYTE [DISTWIDTH] IN BLOOD BY AUTOMATED COUNT: 13.1 % (ref 11–15)
GLUCOSE SERPL-MCNC: 84 MG/DL (ref 65–99)
HCT VFR BLD AUTO: 38.1 % (ref 38.5–50)
HGB BLD-MCNC: 12.3 G/DL (ref 13.2–17.1)
LYMPHOCYTES # BLD AUTO: 819 CELLS/UL (ref 850–3900)
LYMPHOCYTES NFR BLD AUTO: 17.8 %
MAGNESIUM SERPL-MCNC: 2.3 MG/DL (ref 1.5–2.5)
MCH RBC QN AUTO: 30 PG (ref 27–33)
MCHC RBC AUTO-ENTMCNC: 32.3 G/DL (ref 32–36)
MCV RBC AUTO: 92.9 FL (ref 80–100)
MONOCYTES # BLD AUTO: 474 CELLS/UL (ref 200–950)
MONOCYTES NFR BLD AUTO: 10.3 %
NEUTROPHILS # BLD AUTO: 3142 CELLS/UL (ref 1500–7800)
NEUTROPHILS NFR BLD AUTO: 68.3 %
PLATELET # BLD AUTO: 176 THOUSAND/UL (ref 140–400)
PMV BLD REES-ECKER: 9.7 FL (ref 7.5–12.5)
POTASSIUM SERPL-SCNC: 4.3 MMOL/L (ref 3.5–5.3)
RBC # BLD AUTO: 4.1 MILLION/UL (ref 4.2–5.8)
SODIUM SERPL-SCNC: 141 MMOL/L (ref 135–146)
WBC # BLD AUTO: 4.6 THOUSAND/UL (ref 3.8–10.8)

## 2025-03-14 ENCOUNTER — HOME CARE VISIT (OUTPATIENT)
Dept: HOME HEALTH SERVICES | Facility: HOME HEALTH | Age: 80
End: 2025-03-14
Payer: MEDICARE

## 2025-03-14 PROCEDURE — G0153 HHCP-SVS OF S/L PATH,EA 15MN: HCPCS | Mod: HHH

## 2025-03-15 ASSESSMENT — ENCOUNTER SYMPTOMS
PAIN LOCATION - PAIN SEVERITY: 4/10
PAIN LOCATION - PAIN SEVERITY: 4/10
PERSON REPORTING PAIN: PATIENT
PAIN LOCATION - RELIEVING FACTORS: TYLENOL ARTHRITIS
PAIN LOCATION - RELIEVING FACTORS: TYLENOL ARTHRITIS
PAIN: 1
PAIN LOCATION: RIGHT HAND
PAIN LOCATION - PAIN FREQUENCY: INTERMITTENT
PAIN LOCATION: RIGHT LEG
PAIN LOCATION - PAIN FREQUENCY: INTERMITTENT

## 2025-03-18 ENCOUNTER — HOME CARE VISIT (OUTPATIENT)
Dept: HOME HEALTH SERVICES | Facility: HOME HEALTH | Age: 80
End: 2025-03-18
Payer: MEDICARE

## 2025-03-18 VITALS — HEART RATE: 60 BPM | SYSTOLIC BLOOD PRESSURE: 98 MMHG | DIASTOLIC BLOOD PRESSURE: 52 MMHG | OXYGEN SATURATION: 98 %

## 2025-03-18 PROCEDURE — G0153 HHCP-SVS OF S/L PATH,EA 15MN: HCPCS | Mod: HHH

## 2025-03-18 ASSESSMENT — ENCOUNTER SYMPTOMS
DENIES PAIN: 1
PERSON REPORTING PAIN: PATIENT

## 2025-03-19 ENCOUNTER — HOME CARE VISIT (OUTPATIENT)
Dept: HOME HEALTH SERVICES | Facility: HOME HEALTH | Age: 80
End: 2025-03-19
Payer: MEDICARE

## 2025-03-19 VITALS
DIASTOLIC BLOOD PRESSURE: 46 MMHG | OXYGEN SATURATION: 97 % | TEMPERATURE: 97.5 F | SYSTOLIC BLOOD PRESSURE: 90 MMHG | HEART RATE: 60 BPM | RESPIRATION RATE: 20 BRPM

## 2025-03-19 PROCEDURE — G0299 HHS/HOSPICE OF RN EA 15 MIN: HCPCS | Mod: HHH

## 2025-03-20 ENCOUNTER — APPOINTMENT (OUTPATIENT)
Dept: UROLOGY | Facility: CLINIC | Age: 80
End: 2025-03-20
Payer: MEDICARE

## 2025-03-20 DIAGNOSIS — N13.8 BPH WITH OBSTRUCTION/LOWER URINARY TRACT SYMPTOMS: Primary | ICD-10-CM

## 2025-03-20 DIAGNOSIS — N39.0 RECURRENT UTI: ICD-10-CM

## 2025-03-20 DIAGNOSIS — N40.1 BPH WITH OBSTRUCTION/LOWER URINARY TRACT SYMPTOMS: Primary | ICD-10-CM

## 2025-03-20 LAB
ANION GAP SERPL CALCULATED.4IONS-SCNC: 10 MMOL/L (CALC) (ref 7–17)
BASOPHILS # BLD AUTO: 32 CELLS/UL (ref 0–200)
BASOPHILS NFR BLD AUTO: 0.9 %
BUN SERPL-MCNC: 32 MG/DL (ref 7–25)
BUN/CREAT SERPL: 16 (CALC) (ref 6–22)
CALCIUM SERPL-MCNC: 8.9 MG/DL (ref 8.6–10.3)
CHLORIDE SERPL-SCNC: 104 MMOL/L (ref 98–110)
CO2 SERPL-SCNC: 27 MMOL/L (ref 20–32)
CREAT SERPL-MCNC: 2.03 MG/DL (ref 0.7–1.22)
EGFRCR SERPLBLD CKD-EPI 2021: 32 ML/MIN/1.73M2
EOSINOPHIL # BLD AUTO: 112 CELLS/UL (ref 15–500)
EOSINOPHIL NFR BLD AUTO: 3.2 %
ERYTHROCYTE [DISTWIDTH] IN BLOOD BY AUTOMATED COUNT: 13.8 % (ref 11–15)
GLUCOSE SERPL-MCNC: 98 MG/DL (ref 65–99)
HCT VFR BLD AUTO: 40.4 % (ref 38.5–50)
HGB BLD-MCNC: 12.7 G/DL (ref 13.2–17.1)
LYMPHOCYTES # BLD AUTO: 781 CELLS/UL (ref 850–3900)
LYMPHOCYTES NFR BLD AUTO: 22.3 %
MCH RBC QN AUTO: 29.5 PG (ref 27–33)
MCHC RBC AUTO-ENTMCNC: 31.4 G/DL (ref 32–36)
MCV RBC AUTO: 94 FL (ref 80–100)
MONOCYTES # BLD AUTO: 406 CELLS/UL (ref 200–950)
MONOCYTES NFR BLD AUTO: 11.6 %
NEUTROPHILS # BLD AUTO: 2170 CELLS/UL (ref 1500–7800)
NEUTROPHILS NFR BLD AUTO: 62 %
PLATELET # BLD AUTO: 189 THOUSAND/UL (ref 140–400)
PMV BLD REES-ECKER: 10.1 FL (ref 7.5–12.5)
POTASSIUM SERPL-SCNC: 4.3 MMOL/L (ref 3.5–5.3)
RBC # BLD AUTO: 4.3 MILLION/UL (ref 4.2–5.8)
SODIUM SERPL-SCNC: 141 MMOL/L (ref 135–146)
WBC # BLD AUTO: 3.5 THOUSAND/UL (ref 3.8–10.8)

## 2025-03-20 PROCEDURE — 1159F MED LIST DOCD IN RCRD: CPT | Performed by: NURSE PRACTITIONER

## 2025-03-20 PROCEDURE — 99213 OFFICE O/P EST LOW 20 MIN: CPT | Performed by: NURSE PRACTITIONER

## 2025-03-20 PROCEDURE — 1160F RVW MEDS BY RX/DR IN RCRD: CPT | Performed by: NURSE PRACTITIONER

## 2025-03-20 PROCEDURE — 1036F TOBACCO NON-USER: CPT | Performed by: NURSE PRACTITIONER

## 2025-03-20 PROCEDURE — 1157F ADVNC CARE PLAN IN RCRD: CPT | Performed by: NURSE PRACTITIONER

## 2025-03-20 PROCEDURE — 51798 US URINE CAPACITY MEASURE: CPT | Performed by: NURSE PRACTITIONER

## 2025-03-20 PROCEDURE — G2211 COMPLEX E/M VISIT ADD ON: HCPCS | Performed by: NURSE PRACTITIONER

## 2025-03-20 NOTE — PROGRESS NOTES
[facility]  Outpatient Clinic Note    Subjective   Angel Carroll is a 79 y.o. male    History of Present Illness   Patient presenting today with his daughter for FUV. History of nephrolithiasis s/p right ULLS on 02/11/2016, gross hematuria with nephrolithiasis found on workup, Recurrent UTI, BPH taking Tamsulosin 0.8 mg daily. NTF 2 to 3x. Today's visit patient has no urinary complaints. No dysuria, gross hematuria, urgency, frequency, fevers, n/v, or flank pain.     CT A&P from 11/15/2023 which shows generalized wall thickening and stranding of the perivesical fat with underlying cystitis of concern. Right kidney demonstrates nonobstructing nephrolithiasis in the inferior pole measuring 9 mm. Left kidney demonstrates nonobstructing nephrolithiasis with a few  stones demonstrated largest measuring 4 mm in the midpole posteriorly. No   hydronephrosis.     3/18/2024 Cystoscopy with Dr. Moe: IMPRESSION:  Large distended bladder, no mass or lesions. No obstructing median lobe, lateral hypertrophy and good sphincter coaptation.    I reviewed CT Pelvis wo Contrast from 8/8/2024  There is a 6 mm calculus along the right side at the base of the  urinary bladder.  The prostate measures 5.1 cm in transverse diameter.      Lab Results   Component Value Date    URINECULTURE >=100,000 CFU/mL Proteus mirabilis (A) 01/30/2025    URINECULTURE >=100,000 CFU/mL Proteus mirabilis (A) 01/15/2025    URINECULTURE >100,000 Enterococcus faecalis (A) 08/09/2024       Past Medical History and Surgical History   Past Medical History:   Diagnosis Date    Atherosclerotic heart disease of native coronary artery without angina pectoris 12/14/2022    Arteriosclerotic cardiovascular disease (ASCVD)    Benign neoplasm of meninges, unspecified     Meningioma    Benign prostatic hyperplasia without lower urinary tract symptoms 02/28/2022    BPH without obstruction/lower urinary tract symptoms    Body mass index (BMI) 33.0-33.9, adult  Consent: The patient's consent was obtained including but not limited to risks of crusting, scabbing, blistering, scarring, darker or lighter pigmentary change, recurrence, incomplete removal and infection. 07/02/2021    BMI 33.0-33.9,adult    Body mass index (BMI) 34.0-34.9, adult 11/09/2021    BMI 34.0-34.9,adult    Body mass index (BMI) 34.0-34.9, adult 09/08/2021    BMI 34.0-34.9,adult    Body mass index (BMI) 35.0-35.9, adult 12/29/2021    BMI 35.0-35.9,adult    Body mass index (BMI) 35.0-35.9, adult 02/28/2022    BMI 35.0-35.9,adult    Elevated blood-pressure reading, without diagnosis of hypertension     Prehypertension    Encounter for immunization 10/27/2015    Need for prophylactic vaccination and inoculation against influenza    Hyperlipidemia, unspecified 11/15/2021    Hyperlipidemia    Other conditions influencing health status     Nephrolithiasis    Pacemaker     Personal history of other diseases of the musculoskeletal system and connective tissue     History of tendinitis    Personal history of other drug therapy     History of influenza vaccination    Personal history of other specified conditions 10/25/2022    History of bradycardia    Rectal bleeding     Vitamin D deficiency, unspecified 10/08/2020    Vitamin D deficiency     Past Surgical History:   Procedure Laterality Date    BACK SURGERY  04/18/2016    Back Surgery    COLONOSCOPY  07/03/2013    Complete Colonoscopy    CORONARY ANGIOPLASTY WITH STENT PLACEMENT  08/18/2020    Cath Stent Placement    CT ANGIO NECK  9/25/2021    CT NECK ANGIO W AND WO IV CONTRAST 9/25/2021 [facility]    CT HEAD ANGIO W AND WO IV CONTRAST  9/25/2021    CT HEAD ANGIO W AND WO IV CONTRAST 9/25/2021 [facility] CL[facility]GAC[facility]    EYE SURGERY  04/03/2013    Eye Surgery    KNEE ARTHROSCOPY W/ DEBRIDEMENT  07/03/2013    Arthroscopy Knee Right    KNEE ARTHROSCOPY W/ DEBRIDEMENT  07/03/2013    Arthroscopy Knee Left    LITHOTRIPSY  07/22/2013    Renal Lithotripsy    MR HEAD ANGIO WO IV CONTRAST  3/23/2021    MR HEAD ANGIO WO IV CONTRAST [facility]    MR NECK ANGIO WO IV CONTRAST  3/23/2021    MR NECK ANGIO WO IV CONTRAST [facility]    OTHER SURGICAL  Price (Use Numbers Only, No Special Characters Or $): 50.00 HISTORY  04/03/2013    Cardiac Cath Procedure Outcome: Successful    OTHER SURGICAL HISTORY  04/03/2013    Neuroplasty With Transposition Of Ulnar Nerve - At Elbow    OTHER SURGICAL HISTORY  04/03/2013    General Surgery    OTHER SURGICAL HISTORY  06/11/2019    Knee replacement    OTHER SURGICAL HISTORY  07/22/2013    Cystoscopy With Insertion Of Ureteral Stent Bilateral    OTHER SURGICAL HISTORY  07/03/2013    Wrist Carpectomy    OTHER SURGICAL HISTORY  07/03/2013    Brain Surgery    OTHER SURGICAL HISTORY  07/03/2013    Ulnar Osteotomy    TONSILLECTOMY  04/03/2013    Tonsillectomy    TOTAL KNEE ARTHROPLASTY  08/03/2016    Total Knee Arthroplasty       Medications  Current Outpatient Medications on File Prior to Visit   Medication Sig Dispense Refill    acetaminophen (Tylenol) 325 mg tablet Take 2 tablets by mouth every 6 hours if needed for mild pain (1 - 3).      amiodarone (Pacerone) 200 mg tablet Take 0.5 tablets (100 mg) by mouth once daily. 90 tablet 0    apixaban (Eliquis) 2.5 mg tablet Take 1 tablet (2.5 mg) by mouth 2 times a day. 180 tablet 3    atorvastatin (Lipitor) 20 mg tablet TAKE 1 TABLET EVERY DAY (Patient taking differently: TAKE 1 TABLET EVERY DAY by mouth) 90 tablet 3    cyanocobalamin (Vitamin B-12) 500 mcg tablet Take 1 tablet (500 mcg) by mouth once daily.      ergocalciferol (Vitamin D-2) 1.25 MG (49792 UT) capsule Take 1 capsule (50,000 Units) by mouth 1 (one) time per week. (Patient taking differently: Take 1 capsule by mouth 1 (one) time per week. take on Sundays  Indications: prevention of vitamin D deficiency) 12 capsule 2    fenofibrate (Tricor) 48 mg tablet TAKE 1 AND 1/2 TABLETS ONE TIME DAILY (Patient taking differently: TAKE 1 AND 1/2 TABLETS ONE TIME DAILY by mouth) 135 tablet 3    ferrous sulfate 250 mg (50 mg iron) tablet extended release Take 2 tablets by mouth 2 times a day. Indications: anemia from inadequate iron      finasteride (Proscar) 5 mg tablet Take 1 tablet (5 mg) by  Post-Care Instructions: I reviewed with the patient in detail post-care instructions. Patient is to wear sunprotection, and avoid picking at any of the treated lesions. Pt may apply Vaseline to crusted or scabbing areas. Detail Level: Detailed mouth once daily. 90 tablet 3    folic acid (Folvite) 1 mg tablet TAKE 1 TABLET EVERY DAY 90 tablet 3    folic acid (Folvite) 1 mg tablet Take 1 mg by mouth once daily. Indications: inadequate folic acid      furosemide (Lasix) 40 mg tablet Take 0.5 tablets (20 mg) by mouth once daily. 45 tablet 1    gabapentin (Neurontin) 100 mg capsule Take 1 capsule (100 mg) by mouth once daily at bedtime. 90 capsule 1    magnesium hydroxide (Milk of Magnesia) 400 mg/5 mL suspension Take 30 mL by mouth once daily as needed for constipation. Indications: constipation      metoprolol succinate XL (Toprol-XL) 25 mg 24 hr tablet Take 1 tablet (25 mg) by mouth once daily. Do not crush or chew. 90 tablet 1    oxygen (O2) gas therapy Inhale 2 L/min continuously.      pantoprazole (Protonix) 40 mg EC tablet Take 1 tablet (40 mg) by mouth once daily in the morning. Take before meals. (Patient taking differently: Take 20 mg by mouth once daily in the morning. Take before meals.)      PARoxetine (Paxil) 20 mg tablet TAKE 1 TABLET EVERY DAY (Patient taking differently: TAKE 1 TABLET EVERY DAY by mouth) 90 tablet 3    potassium chloride CR (Klor-Con M20) 20 mEq ER tablet Take 1 tablet (20 mEq) by mouth once daily. Do not crush or chew.      sacubitriL-valsartan (Entresto) 24-26 mg tablet Take 1 tablet by mouth 2 times a day. 180 tablet 3    tamsulosin (Flomax) 0.4 mg 24 hr capsule Take 2 capsules (0.8 mg) by mouth once daily at bedtime. (Patient taking differently: Take 1 capsule by mouth at 3:00 in the morning. Indications: enlarged prostate with urination problem) 180 capsule 3    Xdemvy 0.25 % drops Administer 1 drop into affected eye(s) every 12 hours. Please contact daughter: Reed Beltran [phone] for any needed information 10 mL 0     No current facility-administered medications on file prior to visit.       Objective   Physicial Exam  General: Well developed, well nourished, alert and cooperative, appears in no acute  distress  Eyes: Non-injected conjunctiva, sclera clear, no proptosis  Cardiac: Extremities are warm and well perfused. No edema, cyanosis or pallor.   Lungs: Breathing is easy, non-labored. Speaking in clear and complete sentences. Normal diaphragmatic movement.  MSK: Ambulatory with steady gait, unassisted  Neuro: alert and oriented to person, place and time  Psych: Demonstrates good judgement and reason, without hallucinations, abnormal affect or abnormal behaviors.  Skin: no obvious lesions, no rashes.    Orders Only on 03/19/2025   Component Date Value Ref Range Status    GLUCOSE 03/19/2025 98  65 - 99 mg/dL Final    Comment:               Fasting reference interval         UREA NITROGEN (BUN) 03/19/2025 32 (H)  7 - 25 mg/dL Final    CREATININE 03/19/2025 2.03 (H)  0.70 - 1.22 mg/dL Final    EGFR 03/19/2025 32 (L)  > OR = 60 mL/min/1.73m2 Final    BUN/CREATININE RATIO 03/19/2025 16  6 - 22 (calc) Final    SODIUM 03/19/2025 141  135 - 146 mmol/L Final    POTASSIUM 03/19/2025 4.3  3.5 - 5.3 mmol/L Final    CHLORIDE 03/19/2025 104  98 - 110 mmol/L Final    CARBON DIOXIDE 03/19/2025 27  20 - 32 mmol/L Final    ELECTROLYTE BALANCE 03/19/2025 10  7 - 17 mmol/L (calc) Final    CALCIUM 03/19/2025 8.9  8.6 - 10.3 mg/dL Final    WHITE BLOOD CELL COUNT 03/19/2025 3.5 (L)  3.8 - 10.8 Thousand/uL Final    RED BLOOD CELL COUNT 03/19/2025 4.30  4.20 - 5.80 Million/uL Final    HEMOGLOBIN 03/19/2025 12.7 (L)  13.2 - 17.1 g/dL Final    HEMATOCRIT 03/19/2025 40.4  38.5 - 50.0 % Final    MCV 03/19/2025 94.0  80.0 - 100.0 fL Final    MCH 03/19/2025 29.5  27.0 - 33.0 pg Final    MCHC 03/19/2025 31.4 (L)  32.0 - 36.0 g/dL Final    Comment: For adults, a slight decrease in the calculated MCHC  value (in the range of 30 to 32 g/dL) is most likely  not clinically significant; however, it should be  interpreted with caution in correlation with other  red cell parameters and the patient's clinical  condition.      RDW 03/19/2025 13.8  11.0  - 15.0 % Final    PLATELET COUNT 03/19/2025 189  140 - 400 Thousand/uL Final    MPV 03/19/2025 10.1  7.5 - 12.5 fL Final    ABSOLUTE NEUTROPHILS 03/19/2025 2,170  1,500 - 7,800 cells/uL Final    ABSOLUTE LYMPHOCYTES 03/19/2025 781 (L)  850 - 3,900 cells/uL Final    ABSOLUTE MONOCYTES 03/19/2025 406  200 - 950 cells/uL Final    ABSOLUTE EOSINOPHILS 03/19/2025 112  15 - 500 cells/uL Final    ABSOLUTE BASOPHILS 03/19/2025 32  0 - 200 cells/uL Final    NEUTROPHILS 03/19/2025 62  % Final    LYMPHOCYTES 03/19/2025 22.3  % Final    MONOCYTES 03/19/2025 11.6  % Final    EOSINOPHILS 03/19/2025 3.2  % Final    BASOPHILS 03/19/2025 0.9  % Final   Orders Only on 03/12/2025   Component Date Value Ref Range Status    MAGNESIUM 03/12/2025 2.3  1.5 - 2.5 mg/dL Final    GLUCOSE 03/12/2025 84  65 - 99 mg/dL Final    Comment:               Fasting reference interval         UREA NITROGEN (BUN) 03/12/2025 23  7 - 25 mg/dL Final    CREATININE 03/12/2025 1.77 (H)  0.70 - 1.22 mg/dL Final    EGFR 03/12/2025 38 (L)  > OR = 60 mL/min/1.73m2 Final    BUN/CREATININE RATIO 03/12/2025 13  6 - 22 (calc) Final    SODIUM 03/12/2025 141  135 - 146 mmol/L Final    POTASSIUM 03/12/2025 4.3  3.5 - 5.3 mmol/L Final    CHLORIDE 03/12/2025 102  98 - 110 mmol/L Final    CARBON DIOXIDE 03/12/2025 27  20 - 32 mmol/L Final    ELECTROLYTE BALANCE 03/12/2025 12  7 - 17 mmol/L (calc) Final    CALCIUM 03/12/2025 8.8  8.6 - 10.3 mg/dL Final    WHITE BLOOD CELL COUNT 03/12/2025 4.6  3.8 - 10.8 Thousand/uL Final    RED BLOOD CELL COUNT 03/12/2025 4.10 (L)  4.20 - 5.80 Million/uL Final    HEMOGLOBIN 03/12/2025 12.3 (L)  13.2 - 17.1 g/dL Final    HEMATOCRIT 03/12/2025 38.1 (L)  38.5 - 50.0 % Final    MCV 03/12/2025 92.9  80.0 - 100.0 fL Final    MCH 03/12/2025 30.0  27.0 - 33.0 pg Final    MCHC 03/12/2025 32.3  32.0 - 36.0 g/dL Final    Comment: For adults, a slight decrease in the calculated MCHC  value (in the range of 30 to 32 g/dL) is most likely  not clinically  significant; however, it should be  interpreted with caution in correlation with other  red cell parameters and the patient's clinical  condition.      RDW 03/12/2025 13.1  11.0 - 15.0 % Final    PLATELET COUNT 03/12/2025 176  140 - 400 Thousand/uL Final    MPV 03/12/2025 9.7  7.5 - 12.5 fL Final    ABSOLUTE NEUTROPHILS 03/12/2025 3,142  1,500 - 7,800 cells/uL Final    ABSOLUTE LYMPHOCYTES 03/12/2025 819 (L)  850 - 3,900 cells/uL Final    ABSOLUTE MONOCYTES 03/12/2025 474  200 - 950 cells/uL Final    ABSOLUTE EOSINOPHILS 03/12/2025 133  15 - 500 cells/uL Final    ABSOLUTE BASOPHILS 03/12/2025 32  0 - 200 cells/uL Final    NEUTROPHILS 03/12/2025 68.3  % Final    LYMPHOCYTES 03/12/2025 17.8  % Final    MONOCYTES 03/12/2025 10.3  % Final    EOSINOPHILS 03/12/2025 2.9  % Final    BASOPHILS 03/12/2025 0.7  % Final   Orders Only on 03/05/2025   Component Date Value Ref Range Status    MAGNESIUM 03/05/2025 2.2  1.5 - 2.5 mg/dL Final    GLUCOSE 03/05/2025 110 (H)  65 - 99 mg/dL Final    Comment:               Fasting reference interval     For someone without known diabetes, a glucose value  between 100 and 125 mg/dL is consistent with  prediabetes and should be confirmed with a  follow-up test.         UREA NITROGEN (BUN) 03/05/2025 30 (H)  7 - 25 mg/dL Final    CREATININE 03/05/2025 1.81 (H)  0.70 - 1.22 mg/dL Final    EGFR 03/05/2025 37 (L)  > OR = 60 mL/min/1.73m2 Final    BUN/CREATININE RATIO 03/05/2025 17  6 - 22 (calc) Final    SODIUM 03/05/2025 143  135 - 146 mmol/L Final    POTASSIUM 03/05/2025 4.1  3.5 - 5.3 mmol/L Final    CHLORIDE 03/05/2025 104  98 - 110 mmol/L Final    CARBON DIOXIDE 03/05/2025 26  20 - 32 mmol/L Final    ELECTROLYTE BALANCE 03/05/2025 13  7 - 17 mmol/L (calc) Final    CALCIUM 03/05/2025 8.7  8.6 - 10.3 mg/dL Final    WHITE BLOOD CELL COUNT 03/05/2025 3.2 (L)  3.8 - 10.8 Thousand/uL Final    RED BLOOD CELL COUNT 03/05/2025 4.35  4.20 - 5.80 Million/uL Final    HEMOGLOBIN 03/05/2025 13.2  13.2  - 17.1 g/dL Final    HEMATOCRIT 03/05/2025 40.5  38.5 - 50.0 % Final    MCV 03/05/2025 93.1  80.0 - 100.0 fL Final    MCH 03/05/2025 30.3  27.0 - 33.0 pg Final    MCHC 03/05/2025 32.6  32.0 - 36.0 g/dL Final    Comment: For adults, a slight decrease in the calculated MCHC  value (in the range of 30 to 32 g/dL) is most likely  not clinically significant; however, it should be  interpreted with caution in correlation with other  red cell parameters and the patient's clinical  condition.      RDW 03/05/2025 13.0  11.0 - 15.0 % Final    PLATELET COUNT 03/05/2025 188  140 - 400 Thousand/uL Final    MPV 03/05/2025 10.2  7.5 - 12.5 fL Final    ABSOLUTE NEUTROPHILS 03/05/2025 2,006  1,500 - 7,800 cells/uL Final    ABSOLUTE LYMPHOCYTES 03/05/2025 730 (L)  850 - 3,900 cells/uL Final    ABSOLUTE MONOCYTES 03/05/2025 314  200 - 950 cells/uL Final    ABSOLUTE EOSINOPHILS 03/05/2025 122  15 - 500 cells/uL Final    ABSOLUTE BASOPHILS 03/05/2025 29  0 - 200 cells/uL Final    NEUTROPHILS 03/05/2025 62.7  % Final    LYMPHOCYTES 03/05/2025 22.8  % Final    MONOCYTES 03/05/2025 9.8  % Final    EOSINOPHILS 03/05/2025 3.8  % Final    BASOPHILS 03/05/2025 0.9  % Final    Comment:            Your request to have a duplicate copy faxed has been acknowledged.                              Queued to:  [phone]      Office Visit on 02/24/2025   Component Date Value Ref Range Status    Ventricular Rate 02/24/2025 66  BPM Final    Atrial Rate 02/24/2025 64  BPM Final    FL Interval 02/24/2025 280  ms Final    QRS Duration 02/24/2025 200  ms Final    QT Interval 02/24/2025 546  ms Final    QTC Calculation(Bazett) 02/24/2025 572  ms Final    R Axis 02/24/2025 -88  degrees Final    T Axis 02/24/2025 86  degrees Final    QRS Count 02/24/2025 11  beats Final    Q Onset 02/24/2025 188  ms Final    T Offset 02/24/2025 461  ms Final    QTC Fredericia 02/24/2025 564  ms Final      Review of Systems  All other systems have been reviewed and are negative  for complaint.      Assessment and Plan         All questions and concerns were addressed. Patient verbalizes understanding and has no other questions at this time. If you have any questions about your care, do not hesitate to call and leave a message, we return calls in a timely manner.    Lety Avalos-- SOFY VELASQUEZ  Office Phone:  [phone]

## 2025-03-21 ENCOUNTER — HOME CARE VISIT (OUTPATIENT)
Dept: HOME HEALTH SERVICES | Facility: HOME HEALTH | Age: 80
End: 2025-03-21
Payer: MEDICARE

## 2025-03-21 VITALS — RESPIRATION RATE: 18 BRPM | HEART RATE: 61 BPM | TEMPERATURE: 97.2 F | OXYGEN SATURATION: 100 %

## 2025-03-21 PROCEDURE — G0153 HHCP-SVS OF S/L PATH,EA 15MN: HCPCS | Mod: HHH

## 2025-03-21 PROCEDURE — G0152 HHCP-SERV OF OT,EA 15 MIN: HCPCS | Mod: HHH

## 2025-03-21 SDOH — ECONOMIC STABILITY: HOUSING INSECURITY: EVIDENCE OF SMOKING MATERIAL: 0

## 2025-03-21 SDOH — HEALTH STABILITY: MENTAL HEALTH: SMOKING IN HOME: 0

## 2025-03-21 ASSESSMENT — PAIN SCALES - PAIN ASSESSMENT IN ADVANCED DEMENTIA (PAINAD)
BODYLANGUAGE: 0 - RELAXED.
FACIALEXPRESSION: 0
NEGVOCALIZATION: 0
TOTALSCORE: 0
BREATHING: 0
CONSOLABILITY: 0 - NO NEED TO CONSOLE.
NEGVOCALIZATION: 0 - NONE.
BODYLANGUAGE: 0
CONSOLABILITY: 0
FACIALEXPRESSION: 0 - SMILING OR INEXPRESSIVE.

## 2025-03-21 ASSESSMENT — ACTIVITIES OF DAILY LIVING (ADL)
BATHING_CURRENT_FUNCTION: CONTACT GUARD ASSIST
DRESSING_LB_CURRENT_FUNCTION: CONTACT GUARD ASSIST
BATHING ASSESSED: 1

## 2025-03-21 ASSESSMENT — ENCOUNTER SYMPTOMS
DENIES PAIN: 1
PERSON REPORTING PAIN: PATIENT

## 2025-03-22 VITALS — DIASTOLIC BLOOD PRESSURE: 46 MMHG | HEART RATE: 60 BPM | SYSTOLIC BLOOD PRESSURE: 94 MMHG | OXYGEN SATURATION: 93 %

## 2025-03-22 SDOH — HEALTH STABILITY: MENTAL HEALTH: SMOKING IN HOME: 0

## 2025-03-22 ASSESSMENT — ENCOUNTER SYMPTOMS
DENIES PAIN: 1
PERSON REPORTING PAIN: PATIENT

## 2025-03-23 SDOH — HEALTH STABILITY: MENTAL HEALTH: SMOKING IN HOME: 0

## 2025-03-23 SDOH — ECONOMIC STABILITY: HOUSING INSECURITY: EVIDENCE OF SMOKING MATERIAL: 0

## 2025-03-23 ASSESSMENT — ENCOUNTER SYMPTOMS
PAIN LOCATION - PAIN SEVERITY: 3/10
PAIN LOCATION: GENERALIZED
APPETITE LEVEL: GOOD
PAIN: 1
CHANGE IN APPETITE: UNCHANGED
MUSCLE WEAKNESS: 1
LOWER EXTREMITY EDEMA: 1
PERSON REPORTING PAIN: PATIENT
FATIGUES EASILY: 1

## 2025-03-24 ENCOUNTER — HOME CARE VISIT (OUTPATIENT)
Dept: HOME HEALTH SERVICES | Facility: HOME HEALTH | Age: 80
End: 2025-03-24
Payer: MEDICARE

## 2025-03-24 ENCOUNTER — APPOINTMENT (OUTPATIENT)
Dept: CARDIOLOGY | Facility: HOSPITAL | Age: 80
End: 2025-03-24
Payer: COMMERCIAL

## 2025-03-24 ENCOUNTER — OFFICE VISIT (OUTPATIENT)
Dept: CARDIOLOGY | Facility: CLINIC | Age: 80
End: 2025-03-24
Payer: MEDICARE

## 2025-03-24 VITALS
BODY MASS INDEX: 28.7 KG/M2 | OXYGEN SATURATION: 100 % | SYSTOLIC BLOOD PRESSURE: 90 MMHG | HEIGHT: 72 IN | DIASTOLIC BLOOD PRESSURE: 53 MMHG | WEIGHT: 211.9 LBS | HEART RATE: 75 BPM

## 2025-03-24 DIAGNOSIS — Z95.0 PACEMAKER: ICD-10-CM

## 2025-03-24 DIAGNOSIS — I25.10 ARTERIOSCLEROTIC CARDIOVASCULAR DISEASE (ASCVD): Primary | ICD-10-CM

## 2025-03-24 DIAGNOSIS — I48.20 CHRONIC ATRIAL FIBRILLATION (MULTI): ICD-10-CM

## 2025-03-24 DIAGNOSIS — E78.00 PURE HYPERCHOLESTEROLEMIA: ICD-10-CM

## 2025-03-24 DIAGNOSIS — I95.89 OTHER SPECIFIED HYPOTENSION: ICD-10-CM

## 2025-03-24 PROCEDURE — 1157F ADVNC CARE PLAN IN RCRD: CPT | Performed by: NURSE PRACTITIONER

## 2025-03-24 PROCEDURE — RXMED WILLOW AMBULATORY MEDICATION CHARGE

## 2025-03-24 PROCEDURE — 93005 ELECTROCARDIOGRAM TRACING: CPT

## 2025-03-24 PROCEDURE — 3074F SYST BP LT 130 MM HG: CPT | Performed by: NURSE PRACTITIONER

## 2025-03-24 PROCEDURE — 99214 OFFICE O/P EST MOD 30 MIN: CPT | Performed by: NURSE PRACTITIONER

## 2025-03-24 PROCEDURE — 3078F DIAST BP <80 MM HG: CPT | Performed by: NURSE PRACTITIONER

## 2025-03-24 PROCEDURE — 1126F AMNT PAIN NOTED NONE PRSNT: CPT | Performed by: NURSE PRACTITIONER

## 2025-03-24 PROCEDURE — 93010 ELECTROCARDIOGRAM REPORT: CPT | Performed by: INTERNAL MEDICINE

## 2025-03-24 PROCEDURE — 1036F TOBACCO NON-USER: CPT | Performed by: NURSE PRACTITIONER

## 2025-03-24 PROCEDURE — 1160F RVW MEDS BY RX/DR IN RCRD: CPT | Performed by: NURSE PRACTITIONER

## 2025-03-24 PROCEDURE — 1159F MED LIST DOCD IN RCRD: CPT | Performed by: NURSE PRACTITIONER

## 2025-03-24 ASSESSMENT — ENCOUNTER SYMPTOMS
RESPIRATORY NEGATIVE: 1
CONSTITUTIONAL NEGATIVE: 1
LOSS OF SENSATION IN FEET: 0
DEPRESSION: 0
OCCASIONAL FEELINGS OF UNSTEADINESS: 1
LIGHT-HEADEDNESS: 1
CARDIOVASCULAR NEGATIVE: 1

## 2025-03-24 ASSESSMENT — PAIN SCALES - GENERAL: PAINLEVEL_OUTOF10: 0-NO PAIN

## 2025-03-24 NOTE — PATIENT INSTRUCTIONS
Stop furosemide and potassium    Weigh yourself daily and call for any weight gain of 3 pounds in 3 days    Continue homehealth care    Continue all other medication    Return in 4 weeks    I will call you after I talk with urology

## 2025-03-24 NOTE — PROGRESS NOTES
Subjective   Angel Carroll is a 79 y.o. male.    Chief Complaint:  LIGHTHEADEDNESS    HPI  Mr. Carroll is seen for a 1 month appointment.  He is accompanied by his daughter.  I have spoke with his other daughter and [facility] nurse in the past 2 weeks regarding hypotension and lightheadedness.  He believes that lowering his diuretic dose at our last visit was helpful in regard to the lightheadedness however some does remain.  His home BP is low normal and relatively stable with the exception of two readings below 90 systolic. His BUN/Cr are elevated.  He has lost a few pounds since our last visit. (There is no recorded weights for that visit).  He denies any angina or HF symptoms.  He does not need any prescription refills.       Review of Systems   Constitutional: Negative.   Cardiovascular: Negative.    Respiratory: Negative.     Neurological:  Positive for light-headedness.   All other systems reviewed and are negative.      Objective     VS and LABS REVIEWED    GENERAL: frail appearing, cooperative, pleasant, in no acute distress, sitting in wheelchair  SKIN: warm, dry, no rash.  NECK: no JVD  CARDIAC: Regular rate and rhythm with no rubs, murmurs, or gallops  CHEST: Normal respiratory efforts, lungs clear to auscultation bilaterally. O2 NC  ABDOMEN: soft, nontender, nondistended  EXTREMITIES: no edema, seated in wheelchair  NEURO: Alert and oriented x 3.  Moves all 4 extremities.      Lab Review:   Lab Results   Component Value Date     03/19/2025    K 4.3 03/19/2025     03/19/2025    CO2 27 03/19/2025    BUN 32 (H) 03/19/2025    CREATININE 2.03 (H) 03/19/2025    GLUCOSE 98 03/19/2025    CALCIUM 8.9 03/19/2025     Lab Results   Component Value Date    WBC 3.5 (L) 03/19/2025    HGB 12.7 (L) 03/19/2025    HCT 40.4 03/19/2025    MCV 94.0 03/19/2025     03/19/2025     Lab Results   Component Value Date    CHOL 117 (L) 11/14/2022    TRIG 81 11/14/2022    HDL 31 (L) 11/14/2022     ECG obtained and reviewed  shows an AV paced rhythm with a VR of 60 bpm    Assessment/Plan   Mr. Carroll is a very pleasant 79-year-old male with a past medical history significant for hypertension, hyperlipidemia, CKD, COPD on continuous O2, CAD s/p multiple PCI, patent by repeat cath 12/2022, atrial fibrillation s/p ablation on Eliquis anticoagulation, PPM secondary to sick sinus syndrome 1/2023, CVA in the setting of subtherapeutic INR with a near subtotal occlusion of the right ICA and carotid endarterectomy s/p bovine patch angioplasty 3/2021. Echocardiogram 2/2025 showed an EF of 40% with mild MR and mild elevation of RVSP.  He presents for 1 month followup with improved lightheadedness on a lower diuretic dose however he continues to have some lightheaded episodes and is fatigued.  He appears euvolemic on exam however BP is low normal and dry by BMP. ECG is stable.  I would like him to stop furosemide and supplemental potassium.  I have messaged urology to review the need for both Flomax and Finasteride and am awaiting a response.  We discussed the importance of daily weight and the symptoms of volume overload to be mindful off.  He will continue his weekly [facility] visits and return for an appointment in another 1 month.  They know to call with any interim concern or questions.

## 2025-03-26 ENCOUNTER — HOME CARE VISIT (OUTPATIENT)
Dept: HOME HEALTH SERVICES | Facility: HOME HEALTH | Age: 80
End: 2025-03-26
Payer: MEDICARE

## 2025-03-26 ENCOUNTER — PHARMACY VISIT (OUTPATIENT)
Dept: PHARMACY | Facility: CLINIC | Age: 80
End: 2025-03-26
Payer: COMMERCIAL

## 2025-03-26 VITALS
TEMPERATURE: 97.8 F | DIASTOLIC BLOOD PRESSURE: 54 MMHG | HEART RATE: 60 BPM | RESPIRATION RATE: 18 BRPM | OXYGEN SATURATION: 99 % | SYSTOLIC BLOOD PRESSURE: 110 MMHG

## 2025-03-26 DIAGNOSIS — I48.11 LONGSTANDING PERSISTENT ATRIAL FIBRILLATION (MULTI): ICD-10-CM

## 2025-03-26 PROCEDURE — G0299 HHS/HOSPICE OF RN EA 15 MIN: HCPCS | Mod: HHH

## 2025-03-26 RX ORDER — AMIODARONE HYDROCHLORIDE 200 MG/1
100 TABLET ORAL DAILY
Qty: 45 TABLET | Refills: 1 | Status: SHIPPED | OUTPATIENT
Start: 2025-03-26

## 2025-03-26 SDOH — ECONOMIC STABILITY: HOUSING INSECURITY: EVIDENCE OF SMOKING MATERIAL: 0

## 2025-03-26 SDOH — HEALTH STABILITY: MENTAL HEALTH: SMOKING IN HOME: 0

## 2025-03-26 ASSESSMENT — ENCOUNTER SYMPTOMS
APPETITE LEVEL: GOOD
CHANGE IN APPETITE: UNCHANGED
PERSON REPORTING PAIN: PATIENT
FATIGUES EASILY: 1
DENIES PAIN: 1

## 2025-03-27 LAB
ANION GAP SERPL CALCULATED.4IONS-SCNC: 6 MMOL/L (CALC) (ref 7–17)
BASOPHILS # BLD AUTO: 30 CELLS/UL (ref 0–200)
BASOPHILS NFR BLD AUTO: 0.9 %
BUN SERPL-MCNC: 25 MG/DL (ref 7–25)
BUN/CREAT SERPL: 15 (CALC) (ref 6–22)
CALCIUM SERPL-MCNC: 8.7 MG/DL (ref 8.6–10.3)
CHLORIDE SERPL-SCNC: 105 MMOL/L (ref 98–110)
CO2 SERPL-SCNC: 33 MMOL/L (ref 20–32)
CREAT SERPL-MCNC: 1.67 MG/DL (ref 0.7–1.22)
EGFRCR SERPLBLD CKD-EPI 2021: 41 ML/MIN/1.73M2
EOSINOPHIL # BLD AUTO: 129 CELLS/UL (ref 15–500)
EOSINOPHIL NFR BLD AUTO: 3.9 %
ERYTHROCYTE [DISTWIDTH] IN BLOOD BY AUTOMATED COUNT: 13.7 % (ref 11–15)
GLUCOSE SERPL-MCNC: 78 MG/DL (ref 65–99)
HCT VFR BLD AUTO: 37.5 % (ref 38.5–50)
HGB BLD-MCNC: 12.2 G/DL (ref 13.2–17.1)
LYMPHOCYTES # BLD AUTO: 644 CELLS/UL (ref 850–3900)
LYMPHOCYTES NFR BLD AUTO: 19.5 %
MCH RBC QN AUTO: 30.4 PG (ref 27–33)
MCHC RBC AUTO-ENTMCNC: 32.5 G/DL (ref 32–36)
MCV RBC AUTO: 93.5 FL (ref 80–100)
MONOCYTES # BLD AUTO: 366 CELLS/UL (ref 200–950)
MONOCYTES NFR BLD AUTO: 11.1 %
NEUTROPHILS # BLD AUTO: 2132 CELLS/UL (ref 1500–7800)
NEUTROPHILS NFR BLD AUTO: 64.6 %
PLATELET # BLD AUTO: 156 THOUSAND/UL (ref 140–400)
PMV BLD REES-ECKER: 9.8 FL (ref 7.5–12.5)
POTASSIUM SERPL-SCNC: 4.5 MMOL/L (ref 3.5–5.3)
RBC # BLD AUTO: 4.01 MILLION/UL (ref 4.2–5.8)
SODIUM SERPL-SCNC: 144 MMOL/L (ref 135–146)
WBC # BLD AUTO: 3.3 THOUSAND/UL (ref 3.8–10.8)

## 2025-03-29 ENCOUNTER — HOME CARE VISIT (OUTPATIENT)
Dept: HOME HEALTH SERVICES | Facility: HOME HEALTH | Age: 80
End: 2025-03-29

## 2025-03-29 ENCOUNTER — APPOINTMENT (OUTPATIENT)
Dept: HOME HEALTH SERVICES | Facility: HOME HEALTH | Age: 80
End: 2025-03-29
Payer: MEDICARE

## 2025-03-31 ENCOUNTER — HOME CARE VISIT (OUTPATIENT)
Dept: HOME HEALTH SERVICES | Facility: HOME HEALTH | Age: 80
End: 2025-03-31
Payer: MEDICARE

## 2025-03-31 PROCEDURE — G0152 HHCP-SERV OF OT,EA 15 MIN: HCPCS | Mod: HHH

## 2025-03-31 SDOH — HEALTH STABILITY: MENTAL HEALTH: SMOKING IN HOME: 0

## 2025-03-31 SDOH — ECONOMIC STABILITY: HOUSING INSECURITY: EVIDENCE OF SMOKING MATERIAL: 0

## 2025-03-31 ASSESSMENT — ACTIVITIES OF DAILY LIVING (ADL)
OASIS_M1830: 01
FEEDING_WITHIN_DEFINED_LIMITS: 1
GROOMING_WITHIN_DEFINED_LIMITS: 1
HOME_HEALTH_OASIS: 01

## 2025-03-31 ASSESSMENT — PAIN SCALES - PAIN ASSESSMENT IN ADVANCED DEMENTIA (PAINAD)
NEGVOCALIZATION: 0 - NONE.
CONSOLABILITY: 0 - NO NEED TO CONSOLE.
BREATHING: 0
FACIALEXPRESSION: 0
BODYLANGUAGE: 0
BODYLANGUAGE: 0 - RELAXED.
TOTALSCORE: 0
FACIALEXPRESSION: 0 - SMILING OR INEXPRESSIVE.
NEGVOCALIZATION: 0
CONSOLABILITY: 0

## 2025-03-31 ASSESSMENT — ENCOUNTER SYMPTOMS
DENIES PAIN: 1
PERSON REPORTING PAIN: PATIENT

## 2025-04-04 ENCOUNTER — APPOINTMENT (OUTPATIENT)
Dept: PRIMARY CARE | Facility: CLINIC | Age: 80
End: 2025-04-04
Payer: MEDICARE

## 2025-04-04 VITALS
SYSTOLIC BLOOD PRESSURE: 99 MMHG | BODY MASS INDEX: 28.85 KG/M2 | OXYGEN SATURATION: 98 % | HEART RATE: 47 BPM | WEIGHT: 213 LBS | DIASTOLIC BLOOD PRESSURE: 57 MMHG | HEIGHT: 72 IN

## 2025-04-04 DIAGNOSIS — Z79.01 LONG TERM (CURRENT) USE OF ANTICOAGULANTS: ICD-10-CM

## 2025-04-04 DIAGNOSIS — I48.0 PAROXYSMAL ATRIAL FIBRILLATION (MULTI): ICD-10-CM

## 2025-04-04 DIAGNOSIS — D32.9 MENINGIOMA (MULTI): ICD-10-CM

## 2025-04-04 DIAGNOSIS — J44.9 CHRONIC OBSTRUCTIVE PULMONARY DISEASE, UNSPECIFIED COPD TYPE (MULTI): ICD-10-CM

## 2025-04-04 DIAGNOSIS — N40.0 BENIGN PROSTATIC HYPERPLASIA WITHOUT LOWER URINARY TRACT SYMPTOMS: ICD-10-CM

## 2025-04-04 DIAGNOSIS — N18.32 STAGE 3B CHRONIC KIDNEY DISEASE (MULTI): ICD-10-CM

## 2025-04-04 DIAGNOSIS — I50.33 ACUTE ON CHRONIC DIASTOLIC HEART FAILURE: ICD-10-CM

## 2025-04-04 DIAGNOSIS — M17.12 PRIMARY OSTEOARTHRITIS OF LEFT KNEE: ICD-10-CM

## 2025-04-04 DIAGNOSIS — Z00.00 ROUTINE GENERAL MEDICAL EXAMINATION AT HEALTH CARE FACILITY: Primary | ICD-10-CM

## 2025-04-04 DIAGNOSIS — D64.9 ANEMIA, UNSPECIFIED TYPE: ICD-10-CM

## 2025-04-04 DIAGNOSIS — I25.5 ISCHEMIC CARDIOMYOPATHY: ICD-10-CM

## 2025-04-04 DIAGNOSIS — I77.9 DISORDER OF CAROTID ARTERY: ICD-10-CM

## 2025-04-04 DIAGNOSIS — N21.0 BLADDER STONE: ICD-10-CM

## 2025-04-04 DIAGNOSIS — G47.33 OBSTRUCTIVE SLEEP APNEA: ICD-10-CM

## 2025-04-04 DIAGNOSIS — E04.1 THYROID NODULE: ICD-10-CM

## 2025-04-04 DIAGNOSIS — I63.511: ICD-10-CM

## 2025-04-04 DIAGNOSIS — F32.A DEPRESSION, UNSPECIFIED DEPRESSION TYPE: ICD-10-CM

## 2025-04-04 DIAGNOSIS — Z95.0 PACEMAKER: ICD-10-CM

## 2025-04-04 DIAGNOSIS — I10 BENIGN ESSENTIAL HYPERTENSION: ICD-10-CM

## 2025-04-04 DIAGNOSIS — M19.90 ARTHRITIS: ICD-10-CM

## 2025-04-04 DIAGNOSIS — E55.9 VITAMIN D DEFICIENCY: ICD-10-CM

## 2025-04-04 RX ORDER — GABAPENTIN 100 MG/1
100 CAPSULE ORAL NIGHTLY
Qty: 90 CAPSULE | Refills: 1 | Status: SHIPPED | OUTPATIENT
Start: 2025-04-04 | End: 2026-04-04

## 2025-04-04 ASSESSMENT — ENCOUNTER SYMPTOMS
DEPRESSION: 0
LOSS OF SENSATION IN FEET: 0
OCCASIONAL FEELINGS OF UNSTEADINESS: 1

## 2025-04-04 NOTE — PROGRESS NOTES
Subjective   Patient ID: Angel Carroll is a 79 y.o. male who presents for Follow-up.    HPI patient presents to clinic after his discharge from [facility].  He was treated for acute on chronic diastolic heart failure and urinary tract infection.  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     Review of Systems   Constitutional:  Positive for fatigue.   HENT: Negative.     Eyes: Negative.    Respiratory: Negative.     Cardiovascular: Negative.    Gastrointestinal: Negative.    Endocrine: Negative.    Genitourinary: Negative.    Musculoskeletal: Negative.    Skin: Negative.    Allergic/Immunologic: Negative.    Neurological: Negative.    Hematological: Negative.    Psychiatric/Behavioral: Negative.         Objective   BP 99/57 (BP Location: Right arm, Patient Position: Sitting)   Pulse (!) 47   Ht 1.829 m (6')   Wt 96.6 kg (213 lb)   SpO2 98%   BMI 28.89 kg/m²     Physical Exam  Constitutional:       Appearance: Normal appearance. He is normal weight.   HENT:      Right Ear: Tympanic membrane normal.      Left Ear: Tympanic membrane and ear canal normal.      Nose: Nose normal.   Neck:      Vascular: No carotid bruit.   Cardiovascular:      Rate and Rhythm: Normal rate.   Pulmonary:      Effort: No respiratory distress.      Breath sounds: No stridor. No wheezing.    Abdominal:      Palpations: Abdomen is soft.      Tenderness: There is no abdominal tenderness. There is no guarding or rebound.   Skin:     Coloration: Skin is not jaundiced.      Findings: No bruising.   Neurological:      General: No focal deficit present.      Mental Status: He is alert and oriented to person, place, and time.   Psychiatric:         Mood and Affect: Mood normal.         Assessment/Plan   Assessment & Plan  Acute on chronic diastolic heart failure  Patient will continue Entresto, furosemide and metoprolol regarding history of diastolic heart failure.  He will also continue to follow-up with cardiology clinic.  He will return to clinic in 3 months for follow-up visit.  Orders:    CBC and Auto Differential; Future    Paroxysmal atrial fibrillation (Multi)  He will continue apixaban, amiodarone and metoprolol.  Orders:    CBC and Auto Differential; Future    Basic metabolic panel; Future    Benign essential hypertension  He will continue furosemide and metoprolol regarding history of hypertension.       Disorder of carotid artery  To continue atorvastatin       Long term (current) use of anticoagulants  He will continue apixaban # regarding history of atrial fibrillation and will monitor complete blood count.  He has not had any epistaxis, hematuria and GI bleeding from anticoagulant use.       Thyroid nodule  We will check TSH and ultrasound of the thyroid.  Orders:    TSH; Future    US thyroid; Future    Vitamin D deficiency  He will continue vitamin D 50,000 units weekly.       Benign prostatic hyperplasia without lower urinary tract symptoms  He is advised to continue tamsulosin regarding BPH.   I will see    Stage 3b chronic kidney disease (Multi)  He is advised to avoid nephrotoxic medication and will monitor renal function test.    Orders:    Vitamin D 25-Hydroxy,Total (for eval of Vitamin D levels); Future    Urinalysis with Reflex Microscopic; Future    Follow Up In Primary Care -  Established; Future    Anemia, unspecified type  Recheck CBC       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 (Multi)  He will continue to follow-up with neurology clinic and continue apixaban regarding history of atrial fibrillation and prevention of CVA.  Orders:    CBC and Auto Differential; Future    Depression, unspecified depression type  He will continue paroxetine regarding history of depression       Obstructive sleep apnea  He is encouraged to use oxygen and CPAP regarding history of obstructive sleep apnea.       Primary osteoarthritis of left knee  .  Advised to take Tylenol arthritis regarding osteoarthritis       Bladder stone  He has not had any recurrence of kidney or bladder stones.  Will continue to monitor urinalysis.       Pacemaker  He will follow-up with cardiology clinic regarding pacemaker monitoring.       Meningioma (Multi)  He will continue to follow-up with neurosurgery regarding history of meningioma.       Ischemic cardiomyopathy  We will continue Entresto and furosemide regarding history of cardiomyopathy and will follow-up with cardiology clinic.       Arthritis  He will continue gabapentin regarding arthritis and history of neuropathy.  Orders:    gabapentin (Neurontin) 100 mg capsule; Take 1 capsule (100 mg) by mouth once daily at bedtime.    Routine general medical examination at health care facility  Entered in adder  Orders:    1 Year Follow Up In Primary Care - Wellness Exam    Chronic obstructive pulmonary disease, unspecified COPD type (Multi)  He is encouraged to continue using oxygen and inhalers regarding COPD.

## 2025-04-04 NOTE — ASSESSMENT & PLAN NOTE
He will continue apixaban, amiodarone and metoprolol.  Orders:    CBC and Auto Differential; Future    Basic metabolic panel; Future

## 2025-04-04 NOTE — ASSESSMENT & PLAN NOTE
Patient will continue Entresto, furosemide and metoprolol regarding history of diastolic heart failure.  He will also continue to follow-up with cardiology clinic.  He will return to clinic in 3 months for follow-up visit.  Orders:    CBC and Auto Differential; Future

## 2025-04-04 NOTE — ASSESSMENT & PLAN NOTE
He will continue apixaban # regarding history of atrial fibrillation and will monitor complete blood count.  He has not had any epistaxis, hematuria and GI bleeding from anticoagulant use.

## 2025-04-04 NOTE — ASSESSMENT & PLAN NOTE
We will continue Entresto and furosemide regarding history of cardiomyopathy and will follow-up with cardiology clinic.

## 2025-04-04 NOTE — ASSESSMENT & PLAN NOTE
He will continue to follow-up with neurology clinic and continue apixaban regarding history of atrial fibrillation and prevention of CVA.  Orders:    CBC and Auto Differential; Future

## 2025-04-04 NOTE — ASSESSMENT & PLAN NOTE
He is advised to avoid nephrotoxic medication and will monitor renal function test.    Orders:    Vitamin D 25-Hydroxy,Total (for eval of Vitamin D levels); Future    Urinalysis with Reflex Microscopic; Future    Follow Up In Primary Care - Established; Future

## 2025-04-04 NOTE — ASSESSMENT & PLAN NOTE
He has not had any recurrence of kidney or bladder stones.  Will continue to monitor urinalysis.

## 2025-04-05 ASSESSMENT — ENCOUNTER SYMPTOMS
MUSCULOSKELETAL NEGATIVE: 1
ALLERGIC/IMMUNOLOGIC NEGATIVE: 1
GASTROINTESTINAL NEGATIVE: 1
ENDOCRINE NEGATIVE: 1
PSYCHIATRIC NEGATIVE: 1
HEMATOLOGIC/LYMPHATIC NEGATIVE: 1
CARDIOVASCULAR NEGATIVE: 1
EYES NEGATIVE: 1
FATIGUE: 1
NEUROLOGICAL NEGATIVE: 1
RESPIRATORY NEGATIVE: 1

## 2025-04-11 ENCOUNTER — HOSPITAL ENCOUNTER (OUTPATIENT)
Dept: RADIOLOGY | Facility: HOSPITAL | Age: 80
Discharge: HOME | End: 2025-04-11
Payer: MEDICARE

## 2025-04-11 ENCOUNTER — APPOINTMENT (OUTPATIENT)
Dept: RADIOLOGY | Facility: HOSPITAL | Age: 80
End: 2025-04-11
Payer: MEDICARE

## 2025-04-11 DIAGNOSIS — N39.0 RECURRENT UTI: ICD-10-CM

## 2025-04-11 DIAGNOSIS — E04.1 THYROID NODULE: ICD-10-CM

## 2025-04-11 PROCEDURE — 76536 US EXAM OF HEAD AND NECK: CPT

## 2025-04-15 ENCOUNTER — OFFICE VISIT (OUTPATIENT)
Dept: URGENT CARE | Age: 80
End: 2025-04-15
Payer: MEDICARE

## 2025-04-15 ENCOUNTER — ANCILLARY PROCEDURE (OUTPATIENT)
Dept: URGENT CARE | Age: 80
End: 2025-04-15
Payer: MEDICARE

## 2025-04-15 ENCOUNTER — TELEPHONE (OUTPATIENT)
Dept: ORTHOPEDIC SURGERY | Facility: CLINIC | Age: 80
End: 2025-04-15
Payer: COMMERCIAL

## 2025-04-15 VITALS
OXYGEN SATURATION: 96 % | DIASTOLIC BLOOD PRESSURE: 64 MMHG | HEART RATE: 61 BPM | WEIGHT: 221 LBS | RESPIRATION RATE: 18 BRPM | TEMPERATURE: 97.4 F | HEIGHT: 72 IN | BODY MASS INDEX: 29.93 KG/M2 | SYSTOLIC BLOOD PRESSURE: 135 MMHG

## 2025-04-15 DIAGNOSIS — R52 PAIN: ICD-10-CM

## 2025-04-15 DIAGNOSIS — M70.21 OLECRANON BURSITIS OF RIGHT ELBOW: Primary | ICD-10-CM

## 2025-04-15 PROCEDURE — 73080 X-RAY EXAM OF ELBOW: CPT | Mod: RIGHT SIDE | Performed by: PHYSICIAN ASSISTANT

## 2025-04-15 NOTE — TELEPHONE ENCOUNTER
Daughter called and pt was seen in [facility] on 04/14/2025 .  Set up for rt elbow drain with you in [facility] 4/16/2025  at 1pm.    I that ok?

## 2025-04-15 NOTE — PROGRESS NOTES
Subjective   Patient ID: Angel Carroll is a 79 y.o. male. They present today with a chief complaint of Elbow Pain (Pt fell last week. Not sure if he hurt his elbow then but he has a large know on olecranon of right elbow).    History of Present Illness  atient is a very pleasant 79-year-old white male, past medical Struve hypertension, hyperlipidemia, atrial fibrillation on Eliquis, presenting to the clinic for to complaint of right elbow swelling.  Patient and daughter at bedside are reporting 3-day history of increasing localized swelling on the posterior aspect of his right elbow.  They are reporting a fall 1 week ago in which no injuries are reported and is unsure if he bumped it at that time.  He denies any pain with range of motion of the elbow.  No numbness or tingling distal from the area.  Denies any redness or bruising around the area.  No further complaints.      Elbow Pain      Past Medical History  Allergies as of 04/15/2025 - Reviewed 04/15/2025   Allergen Reaction Noted    Other Unknown 09/12/2023    Ranolazine Unknown 09/12/2023    Nsaids (non-steroidal anti-inflammatory drug) Unknown 02/24/2025    Cyclobenzaprine Hallucinations, Unknown, and Psychosis 07/30/2018       (Not in a hospital admission)         Past Medical History:   Diagnosis Date    Atherosclerotic heart disease of native coronary artery without angina pectoris 12/14/2022    Arteriosclerotic cardiovascular disease (ASCVD)    Benign neoplasm of meninges, unspecified     Meningioma    Benign prostatic hyperplasia without lower urinary tract symptoms 02/28/2022    BPH without obstruction/lower urinary tract symptoms    Body mass index (BMI) 33.0-33.9, adult 07/02/2021    BMI 33.0-33.9,adult    Body mass index (BMI) 34.0-34.9, adult 11/09/2021    BMI 34.0-34.9,adult    Body mass index (BMI) 34.0-34.9, adult 09/08/2021    BMI 34.0-34.9,adult    Body mass index (BMI) 35.0-35.9, adult 12/29/2021    BMI 35.0-35.9,adult    Body mass index (BMI)  35.0-35.9, adult 02/28/2022    BMI 35.0-35.9,adult    Elevated blood-pressure reading, without diagnosis of hypertension     Prehypertension    Encounter for immunization 10/27/2015    Need for prophylactic vaccination and inoculation against influenza    Hyperlipidemia, unspecified 11/15/2021    Hyperlipidemia    Other conditions influencing health status     Nephrolithiasis    Pacemaker     Personal history of other diseases of the musculoskeletal system and connective tissue     History of tendinitis    Personal history of other drug therapy     History of influenza vaccination    Personal history of other specified conditions 10/25/2022    History of bradycardia    Rectal bleeding     Vitamin D deficiency, unspecified 10/08/2020    Vitamin D deficiency       Past Surgical History:   Procedure Laterality Date    BACK SURGERY  04/18/2016    Back Surgery    COLONOSCOPY  07/03/2013    Complete Colonoscopy    CORONARY ANGIOPLASTY WITH STENT PLACEMENT  08/18/2020    Cath Stent Placement    CT ANGIO NECK  9/25/2021    CT NECK ANGIO W AND WO IV CONTRAST 9/25/2021 [facility]    CT HEAD ANGIO W AND WO IV CONTRAST  9/25/2021    CT HEAD ANGIO W AND WO IV CONTRAST 9/25/2021 [facility]    EYE SURGERY  04/03/2013    Eye Surgery    KNEE ARTHROSCOPY W/ DEBRIDEMENT  07/03/2013    Arthroscopy Knee Right    KNEE ARTHROSCOPY W/ DEBRIDEMENT  07/03/2013    Arthroscopy Knee Left    LITHOTRIPSY  07/22/2013    Renal Lithotripsy    MR HEAD ANGIO WO IV CONTRAST  3/23/2021    MR HEAD ANGIO WO IV CONTRAST [facility]    MR NECK ANGIO WO IV CONTRAST  3/23/2021    MR NECK ANGIO WO IV CONTRAST [facility]    OTHER SURGICAL HISTORY  04/03/2013    Cardiac Cath Procedure Outcome: Successful    OTHER SURGICAL HISTORY  04/03/2013    Neuroplasty With Transposition Of Ulnar Nerve - At Elbow    OTHER SURGICAL HISTORY  04/03/2013    General Surgery    OTHER SURGICAL HISTORY  06/11/2019    Knee replacement    OTHER  SURGICAL HISTORY  07/22/2013    Cystoscopy With Insertion Of Ureteral Stent Bilateral    OTHER SURGICAL HISTORY  07/03/2013    Wrist Carpectomy    OTHER SURGICAL HISTORY  07/03/2013    Brain Surgery    OTHER SURGICAL HISTORY  07/03/2013    Ulnar Osteotomy    TONSILLECTOMY  04/03/2013    Tonsillectomy    TOTAL KNEE ARTHROPLASTY  08/03/2016    Total Knee Arthroplasty        reports that he quit smoking about 31 years ago. His smoking use included cigarettes. He has never used smokeless tobacco. He reports that he does not currently use alcohol. He reports that he does not use drugs.    Review of Systems  Review of Systems                               Objective    Vitals:    04/15/25 1235   BP: 135/64   Pulse: 61   Resp: 18   Temp: 36.3 °C (97.4 °F)   SpO2: 96%   Weight: 100 kg (221 lb)   Height: 1.829 m (6')     No LMP for male patient.    Physical Exam  General: Vitals Noted. No distress. Normocephalic.     HEENT: TMs normal, EOMI, normal conjunctiva, patent nares, Normal OP    Neck: Supple with no adenopathy.     Cardiac: Regular Rate and Rhythm. No murmur.     Pulmonary: Equal breath sounds bilaterally. No wheezes, rhonchi, or rales.    Abdomen: Soft, non-tender, with normal bowel sounds.     Musculoskeletal: Evaluation of the right elbow does reveal marked localized swelling over the olecranon of the right elbow.  He has full strength with flexion extension of the elbow without pain.  He is neurovascular tact distally with cap refill less than 2 seconds full sensation and full  strength.  Otherwise moves all extremities, no effusion, no edema.     Skin: No obvious rashes.  Procedures    Point of Care Test & Imaging Results from this visit    Imaging  XR elbow right 3+ views    Result Date: 4/15/2025  Soft tissue swelling in the posterior elbow, correlate clinically for olecranon bursitis.   Moderate elbow joint osteoarthritis. Ring osteophytes in the radial head without definite acute fracture.     MACRO:  None   Signed by: Kelechi Chopra 4/15/2025 1:01 PM Dictation workstation:   [id number]     Cardiology, Vascular, and Other Imaging  No other imaging results found for the past 2 days      Diagnostic study results (if any) were reviewed by Doyle Hoffman PA-C.    Assessment/Plan   Allergies, medications, history, and pertinent labs/EKGs/Imaging reviewed by Doyle Hoffman PA-C.     Medical Decision Making  Patient was seen eval in the clinic for to complaint of 2 to 3-day history of increasing swelling associated the posterior right elbow.  On exam patient is nontoxic very well-appearing respect comfortably no acute distress.  Vital signs are stable, afebrile.  Chest is clear, heart is regular, belly is diffusely soft and nontender.  Posterior right elbow as above most concerning for olecranon bursitis.  X-ray imaging the elbow was obtained due to history of fall which reveals no underlying acute osseous normalities however does reveal the obvious soft tissue swelling to the posterior elbow.  Ace wrap was applied for compression.  Advised the patient and daughter at bedside to follow-up with his orthopedist in the short-term as this may require needle aspiration.  Advised Tylenol as needed for pain ice and elevation.  Minimal concern for septic bursitis as there is no overlying warmth or erythema and he has no pain with flexion or extension of the elbow.  He be discharged home at this time.  Reviewed my impression, plan, strict return was report to ED precautions with the patient.  He expresses understanding and agreement plan of care.    Orders and Diagnoses  Diagnoses and all orders for this visit:  Pain  -     XR elbow right 3+ views; Future        Medical Admin Record      Follow Up Instructions  No follow-ups on file.    Patient disposition: Home    Electronically signed by Doyle Hoffman PA-C  1:14 PM

## 2025-04-16 ENCOUNTER — OFFICE VISIT (OUTPATIENT)
Dept: ORTHOPEDIC SURGERY | Facility: CLINIC | Age: 80
End: 2025-04-16
Payer: MEDICARE

## 2025-04-16 VITALS — WEIGHT: 220 LBS | HEIGHT: 72 IN | BODY MASS INDEX: 29.8 KG/M2

## 2025-04-16 DIAGNOSIS — M19.021 PRIMARY OSTEOARTHRITIS OF RIGHT ELBOW: ICD-10-CM

## 2025-04-16 DIAGNOSIS — M25.521 RIGHT ELBOW PAIN: Primary | ICD-10-CM

## 2025-04-16 DIAGNOSIS — M70.21 OLECRANON BURSITIS, RIGHT ELBOW: ICD-10-CM

## 2025-04-16 PROCEDURE — 1160F RVW MEDS BY RX/DR IN RCRD: CPT | Performed by: PHYSICIAN ASSISTANT

## 2025-04-16 PROCEDURE — 99213 OFFICE O/P EST LOW 20 MIN: CPT | Mod: 25 | Performed by: PHYSICIAN ASSISTANT

## 2025-04-16 PROCEDURE — 99213 OFFICE O/P EST LOW 20 MIN: CPT | Performed by: PHYSICIAN ASSISTANT

## 2025-04-16 PROCEDURE — 1036F TOBACCO NON-USER: CPT | Performed by: PHYSICIAN ASSISTANT

## 2025-04-16 PROCEDURE — 1157F ADVNC CARE PLAN IN RCRD: CPT | Performed by: PHYSICIAN ASSISTANT

## 2025-04-16 PROCEDURE — 20605 DRAIN/INJ JOINT/BURSA W/O US: CPT | Mod: RT | Performed by: PHYSICIAN ASSISTANT

## 2025-04-16 PROCEDURE — 2500000004 HC RX 250 GENERAL PHARMACY W/ HCPCS (ALT 636 FOR OP/ED): Performed by: PHYSICIAN ASSISTANT

## 2025-04-16 PROCEDURE — 1159F MED LIST DOCD IN RCRD: CPT | Performed by: PHYSICIAN ASSISTANT

## 2025-04-16 PROCEDURE — 1123F ACP DISCUSS/DSCN MKR DOCD: CPT | Performed by: PHYSICIAN ASSISTANT

## 2025-04-16 PROCEDURE — 1126F AMNT PAIN NOTED NONE PRSNT: CPT | Performed by: PHYSICIAN ASSISTANT

## 2025-04-16 RX ORDER — LIDOCAINE HYDROCHLORIDE 10 MG/ML
2 INJECTION, SOLUTION INFILTRATION; PERINEURAL
Status: COMPLETED | OUTPATIENT
Start: 2025-04-16 | End: 2025-04-16

## 2025-04-16 RX ADMIN — LIDOCAINE HYDROCHLORIDE 2 ML: 10 INJECTION, SOLUTION INFILTRATION; PERINEURAL at 13:35

## 2025-04-16 ASSESSMENT — LIFESTYLE VARIABLES
SKIP TO QUESTIONS 9-10: 1
AUDIT TOTAL SCORE: 0
HOW OFTEN DO YOU HAVE SIX OR MORE DRINKS ON ONE OCCASION: NEVER
HAS A RELATIVE, FRIEND, DOCTOR, OR ANOTHER HEALTH PROFESSIONAL EXPRESSED CONCERN ABOUT YOUR DRINKING OR SUGGESTED YOU CUT DOWN: NO
HOW OFTEN DURING THE LAST YEAR HAVE YOU HAD A FEELING OF GUILT OR REMORSE AFTER DRINKING: NEVER
HOW OFTEN DO YOU HAVE A DRINK CONTAINING ALCOHOL: NEVER
HOW MANY STANDARD DRINKS CONTAINING ALCOHOL DO YOU HAVE ON A TYPICAL DAY: PATIENT DOES NOT DRINK
AUDIT-C TOTAL SCORE: 0
HOW OFTEN DURING THE LAST YEAR HAVE YOU FOUND THAT YOU WERE NOT ABLE TO STOP DRINKING ONCE YOU HAD STARTED: NEVER
HAVE YOU OR SOMEONE ELSE BEEN INJURED AS A RESULT OF YOUR DRINKING: NO
HOW OFTEN DURING THE LAST YEAR HAVE YOU FAILED TO DO WHAT WAS NORMALLY EXPECTED FROM YOU BECAUSE OF DRINKING: NEVER
HOW OFTEN DURING THE LAST YEAR HAVE YOU BEEN UNABLE TO REMEMBER WHAT HAPPENED THE NIGHT BEFORE BECAUSE YOU HAD BEEN DRINKING: NEVER
HOW OFTEN DURING THE LAST YEAR HAVE YOU NEEDED AN ALCOHOLIC DRINK FIRST THING IN THE MORNING TO GET YOURSELF GOING AFTER A NIGHT OF HEAVY DRINKING: NEVER

## 2025-04-16 ASSESSMENT — PAIN - FUNCTIONAL ASSESSMENT: PAIN_FUNCTIONAL_ASSESSMENT: NO/DENIES PAIN

## 2025-04-16 ASSESSMENT — ENCOUNTER SYMPTOMS
LOSS OF SENSATION IN FEET: 0
DEPRESSION: 0
OCCASIONAL FEELINGS OF UNSTEADINESS: 0

## 2025-04-16 ASSESSMENT — PAIN SCALES - GENERAL: PAINLEVEL_OUTOF10: 0-NO PAIN

## 2025-04-16 NOTE — PROGRESS NOTES
Subjective      Chief Complaint   Patient presents with    Right Elbow - Pain        No surgery found     HPI  This 79 year old patient presents for evaluation of right elbow pain and swelling. He was initially evaluated in  Urgent Care in [place] for his right elbow pain and swelling on 4/15/25. He states that he had a fall approximately 1 week ago but denies any injury at that time; however, states that he may have bumped his right elbow when he fell. He has a 4 day history of increased right elbow swelling and pain. He is on Eliquis for atrial fibrillation. X-rays taken in urgent care of the right elbow were negative for fracture. An ace wrap was applied to the right elbow and he was advised to take Tylenol as needed for pain management and was referred to this office for further evaluation and treatment.     He rates right elbow pain at 5/10 currently and states his right elbow pain is worse when he leans or puts pressure on the posterior aspect of his right elbow.     CARDIOLOGY:   Negative for chest pain, shortness of breath.   RESPIRATORY:   Negative for chest pain, shortness of breath.   MUSCULOSKELETAL:   See HPI for details.   NEUROLOGY:   Negative for tingling, numbness, weakness.    Objective    There were no vitals filed for this visit.    Physical Exam  GENERAL:          General Appearance:  This is a pleasant patient with appropriate affect, in no acute distress.   DERMATOLOGY:          Skin: skin at the neck, upper and lower back, and trunk is intact. There is no evidence of skin rash, skin breakdown or ulceration, or atrophic skin change.   EXTREMITIES:          Vascular:  Right, left hands and feet are warm with good color and pulses. Right and left calf and thigh are nontender and nonswollen.   NEUROLOGICAL:          Orientation:  Patient is alert and oriented to person, place, time and situation. Right and left upper and lower extremity motor and sensory examinations are intact.       MUSCULOSKELETAL: Neck: Nontender. No pain with range of motion.  Right elbow: There is diffuse tenderness and swelling over the right olecranon bursa.  There is an effusion present.  There is no evidence of cellulitis or infection at the right elbow.  No drainage foul smell or erythema.  The patient has no pain with gentle range of motion.  Compartments are soft.  Radial pulse readily palpable.    Imaging  XR elbow right 3+ views  Result Date: 4/15/2025  Soft tissue swelling in the posterior elbow, correlate clinically for olecranon bursitis.   Moderate elbow joint osteoarthritis. Ring osteophytes in the radial head without definite acute fracture.     MACRO: None   Signed by: Kelechi Chopra 4/15/2025 1:01 PM Dictation workstation:   [id number]    US thyroid  Result Date: 4/12/2025  Right lobe thyroid nodule as described and apparently smaller since the prior exam.   Please note that these statements are based on the recommendations of the American College [employer] Radiology   TI-RADS grading system. ACR TI-RADS recommendations (apply to nodules which have NOT been biopsied):   TR5 (?7 points) highly suspicious - FNA if ? 1cm, follow-up if 0.5 -0.9 cm every year for 5 years. Aggregate cancer risk 35%. TR4 (4-6 points) moderately suspicious - FNA if ? 1.5cm, follow-up if 1 -1.4 cm in 1, 2, 3 and 5 years. Aggregate cancer risk 9.1% TR3 (3 points) mildly suspicious - FNA if ? 2.5cm, follow-up if 1.5 -2.4 cm in 1, 3 and 5 years. Aggregate cancer risk 4.8% TR2 (2 points) not suspicious. No FNA or follow-up.Aggregate cancer risk 1.5% TR1 (0 points) benign - No FNA or follow-up. Aggregate cancer risk 0.3%   MACRO: None     Signed by: Natalee Ruiz 4/12/2025 1:00 PM Dictation workstation:   [id number]      Patient ID: Angel Carroll is a 79 y.o. male.    M Inj/Asp: R olecranon bursa on 4/16/2025 1:35 PM  Indications: pain  Details: 22 G needle, posterior approach  Medications: 2 mL lidocaine 10 mg/mL (1 %)  Aspirate: 25 mL  blood-tinged; sent for lab analysis  Outcome: tolerated well, no immediate complications  Procedure, treatment alternatives, risks and benefits explained, specific risks discussed. Immediately prior to procedure a time out was called to verify the correct patient, procedure, equipment, support staff and site/side marked as required. Patient was prepped and draped in the usual sterile fashion.           Cardiology, Vascular, and Other Imaging  No other imaging results found for the past 7 days       Angel was seen today for pain.  Diagnoses and all orders for this visit:  Right elbow pain (Primary)  -     Sterile Fluid Culture/Smear  Olecranon bursitis, right elbow  -     Sterile Fluid Culture/Smear     Options are discussed with the patient in detail. The patient is instructed regarding activity modification, ice, provider directed at home gentle strengthening and ROM exercises, and the appropriate use of Tylenol as needed for pain with its potential adverse reactions and side effects. The patient understands. The patient states that despite all the treatment listed above that this right elbow pain is debilitating and  requests a discussion of further options.  Aspiration of the right elbow olecranon bursa is discussed in office today and this is done in office today.  25 ml of bloody fluid is removed and sent to the lab for gram stain and culture. We will defer cortisone injection at this time and the fluid is sent to the lab for Gram stain and culture to rule out any infectious origin.  This is done in the office today. See procedures below. Return as needed, Please note that this report has been produced using speech recognition software.  It may contain errors related to grammar, punctuation or spelling.  Electronically signed, but not reviewed.    Tana Bates PA-C

## 2025-04-19 ENCOUNTER — HOSPITAL ENCOUNTER (INPATIENT)
Facility: HOSPITAL | Age: 80
DRG: 178 | End: 2025-04-19
Attending: STUDENT IN AN ORGANIZED HEALTH CARE EDUCATION/TRAINING PROGRAM | Admitting: INTERNAL MEDICINE
Payer: MEDICARE

## 2025-04-19 ENCOUNTER — APPOINTMENT (OUTPATIENT)
Dept: CARDIOLOGY | Facility: HOSPITAL | Age: 80
DRG: 178 | End: 2025-04-19
Payer: MEDICARE

## 2025-04-19 ENCOUNTER — APPOINTMENT (OUTPATIENT)
Dept: RADIOLOGY | Facility: HOSPITAL | Age: 80
DRG: 178 | End: 2025-04-19
Payer: MEDICARE

## 2025-04-19 DIAGNOSIS — R53.1 GENERALIZED WEAKNESS: ICD-10-CM

## 2025-04-19 DIAGNOSIS — U07.1 ACUTE COVID-19: Primary | ICD-10-CM

## 2025-04-19 DIAGNOSIS — N39.0 ACUTE UTI: ICD-10-CM

## 2025-04-19 DIAGNOSIS — N40.0 BPH WITHOUT OBSTRUCTION/LOWER URINARY TRACT SYMPTOMS: ICD-10-CM

## 2025-04-19 LAB
ALBUMIN SERPL BCP-MCNC: 3.5 G/DL (ref 3.4–5)
ALP SERPL-CCNC: 68 U/L (ref 33–136)
ALT SERPL W P-5'-P-CCNC: 26 U/L (ref 10–52)
ANION GAP SERPL CALCULATED.3IONS-SCNC: 11 MMOL/L (ref 10–20)
APPEARANCE UR: ABNORMAL
AST SERPL W P-5'-P-CCNC: 33 U/L (ref 9–39)
BACTERIA #/AREA URNS AUTO: ABNORMAL /HPF
BACTERIA SPEC AEROBE CULT: ABNORMAL
BACTERIA SPEC ANAEROBE CULT: ABNORMAL
BASOPHILS # BLD AUTO: 0.02 X10*3/UL (ref 0–0.1)
BASOPHILS NFR BLD AUTO: 0.4 %
BILIRUB SERPL-MCNC: 1 MG/DL (ref 0–1.2)
BILIRUB UR STRIP.AUTO-MCNC: NEGATIVE MG/DL
BUN SERPL-MCNC: 17 MG/DL (ref 6–23)
CALCIUM SERPL-MCNC: 8.3 MG/DL (ref 8.6–10.3)
CHLORIDE SERPL-SCNC: 104 MMOL/L (ref 98–107)
CO2 SERPL-SCNC: 28 MMOL/L (ref 21–32)
COLOR UR: ABNORMAL
CREAT SERPL-MCNC: 1.68 MG/DL (ref 0.5–1.3)
EGFRCR SERPLBLD CKD-EPI 2021: 41 ML/MIN/1.73M*2
EOSINOPHIL # BLD AUTO: 0.02 X10*3/UL (ref 0–0.4)
EOSINOPHIL NFR BLD AUTO: 0.4 %
ERYTHROCYTE [DISTWIDTH] IN BLOOD BY AUTOMATED COUNT: 17.2 % (ref 11.5–14.5)
FLUAV RNA RESP QL NAA+PROBE: NOT DETECTED
FLUBV RNA RESP QL NAA+PROBE: NOT DETECTED
GLUCOSE SERPL-MCNC: 94 MG/DL (ref 74–99)
GLUCOSE UR STRIP.AUTO-MCNC: NORMAL MG/DL
HCT VFR BLD AUTO: 33 % (ref 41–52)
HGB BLD-MCNC: 10.7 G/DL (ref 13.5–17.5)
IMM GRANULOCYTES # BLD AUTO: 0.02 X10*3/UL (ref 0–0.5)
IMM GRANULOCYTES NFR BLD AUTO: 0.4 % (ref 0–0.9)
KETONES UR STRIP.AUTO-MCNC: ABNORMAL MG/DL
LACTATE SERPL-SCNC: 1 MMOL/L (ref 0.4–2)
LEUKOCYTE ESTERASE UR QL STRIP.AUTO: ABNORMAL
LYMPHOCYTES # BLD AUTO: 0.51 X10*3/UL (ref 0.8–3)
LYMPHOCYTES NFR BLD AUTO: 10.4 %
MCH RBC QN AUTO: 31.2 PG (ref 26–34)
MCHC RBC AUTO-ENTMCNC: 32.4 G/DL (ref 32–36)
MCV RBC AUTO: 96 FL (ref 80–100)
MONOCYTES # BLD AUTO: 0.42 X10*3/UL (ref 0.05–0.8)
MONOCYTES NFR BLD AUTO: 8.5 %
MUCOUS THREADS #/AREA URNS AUTO: ABNORMAL /LPF
NEUTROPHILS # BLD AUTO: 3.93 X10*3/UL (ref 1.6–5.5)
NEUTROPHILS NFR BLD AUTO: 79.9 %
NITRITE UR QL STRIP.AUTO: NEGATIVE
NRBC BLD-RTO: 0 /100 WBCS (ref 0–0)
PH UR STRIP.AUTO: 8.5 [PH]
PLATELET # BLD AUTO: 142 X10*3/UL (ref 150–450)
POTASSIUM SERPL-SCNC: 3.6 MMOL/L (ref 3.5–5.3)
PROT SERPL-MCNC: 6.1 G/DL (ref 6.4–8.2)
PROT UR STRIP.AUTO-MCNC: ABNORMAL MG/DL
RBC # BLD AUTO: 3.43 X10*6/UL (ref 4.5–5.9)
RBC # UR STRIP.AUTO: ABNORMAL MG/DL
RBC #/AREA URNS AUTO: ABNORMAL /HPF
RSV RNA RESP QL NAA+PROBE: NOT DETECTED
SARS-COV-2 RNA RESP QL NAA+PROBE: DETECTED
SODIUM SERPL-SCNC: 139 MMOL/L (ref 136–145)
SP GR UR STRIP.AUTO: 1.02
SQUAMOUS #/AREA URNS AUTO: ABNORMAL /HPF
UROBILINOGEN UR STRIP.AUTO-MCNC: ABNORMAL MG/DL
WBC # BLD AUTO: 4.9 X10*3/UL (ref 4.4–11.3)
WBC #/AREA URNS AUTO: >50 /HPF

## 2025-04-19 PROCEDURE — 93010 ELECTROCARDIOGRAM REPORT: CPT | Performed by: INTERNAL MEDICINE

## 2025-04-19 PROCEDURE — 87086 URINE CULTURE/COLONY COUNT: CPT | Mod: WESLAB | Performed by: STUDENT IN AN ORGANIZED HEALTH CARE EDUCATION/TRAINING PROGRAM

## 2025-04-19 PROCEDURE — 85025 COMPLETE CBC W/AUTO DIFF WBC: CPT | Performed by: STUDENT IN AN ORGANIZED HEALTH CARE EDUCATION/TRAINING PROGRAM

## 2025-04-19 PROCEDURE — 80053 COMPREHEN METABOLIC PANEL: CPT | Performed by: STUDENT IN AN ORGANIZED HEALTH CARE EDUCATION/TRAINING PROGRAM

## 2025-04-19 PROCEDURE — 99285 EMERGENCY DEPT VISIT HI MDM: CPT | Performed by: STUDENT IN AN ORGANIZED HEALTH CARE EDUCATION/TRAINING PROGRAM

## 2025-04-19 PROCEDURE — 2500000001 HC RX 250 WO HCPCS SELF ADMINISTERED DRUGS (ALT 637 FOR MEDICARE OP): Performed by: STUDENT IN AN ORGANIZED HEALTH CARE EDUCATION/TRAINING PROGRAM

## 2025-04-19 PROCEDURE — 83605 ASSAY OF LACTIC ACID: CPT | Performed by: STUDENT IN AN ORGANIZED HEALTH CARE EDUCATION/TRAINING PROGRAM

## 2025-04-19 PROCEDURE — 87637 SARSCOV2&INF A&B&RSV AMP PRB: CPT | Performed by: STUDENT IN AN ORGANIZED HEALTH CARE EDUCATION/TRAINING PROGRAM

## 2025-04-19 PROCEDURE — 96365 THER/PROPH/DIAG IV INF INIT: CPT

## 2025-04-19 PROCEDURE — 81003 URINALYSIS AUTO W/O SCOPE: CPT | Performed by: STUDENT IN AN ORGANIZED HEALTH CARE EDUCATION/TRAINING PROGRAM

## 2025-04-19 PROCEDURE — 71045 X-RAY EXAM CHEST 1 VIEW: CPT

## 2025-04-19 PROCEDURE — 81001 URINALYSIS AUTO W/SCOPE: CPT | Performed by: STUDENT IN AN ORGANIZED HEALTH CARE EDUCATION/TRAINING PROGRAM

## 2025-04-19 PROCEDURE — 71045 X-RAY EXAM CHEST 1 VIEW: CPT | Performed by: RADIOLOGY

## 2025-04-19 PROCEDURE — 36415 COLL VENOUS BLD VENIPUNCTURE: CPT | Performed by: STUDENT IN AN ORGANIZED HEALTH CARE EDUCATION/TRAINING PROGRAM

## 2025-04-19 PROCEDURE — 2500000004 HC RX 250 GENERAL PHARMACY W/ HCPCS (ALT 636 FOR OP/ED): Mod: JZ | Performed by: STUDENT IN AN ORGANIZED HEALTH CARE EDUCATION/TRAINING PROGRAM

## 2025-04-19 PROCEDURE — 93005 ELECTROCARDIOGRAM TRACING: CPT

## 2025-04-19 RX ORDER — ACETAMINOPHEN 500 MG
1000 TABLET ORAL ONCE
Status: COMPLETED | OUTPATIENT
Start: 2025-04-19 | End: 2025-04-19

## 2025-04-19 RX ORDER — CEFTRIAXONE 1 G/50ML
1 INJECTION, SOLUTION INTRAVENOUS ONCE
Status: COMPLETED | OUTPATIENT
Start: 2025-04-19 | End: 2025-04-20

## 2025-04-19 RX ADMIN — ACETAMINOPHEN 1000 MG: 500 TABLET ORAL at 22:05

## 2025-04-19 RX ADMIN — CEFTRIAXONE SODIUM 1 G: 1 INJECTION, SOLUTION INTRAVENOUS at 23:37

## 2025-04-20 VITALS
TEMPERATURE: 97.9 F | OXYGEN SATURATION: 97 % | DIASTOLIC BLOOD PRESSURE: 53 MMHG | HEART RATE: 59 BPM | SYSTOLIC BLOOD PRESSURE: 140 MMHG | HEIGHT: 72 IN | BODY MASS INDEX: 29.8 KG/M2 | RESPIRATION RATE: 19 BRPM | WEIGHT: 220 LBS

## 2025-04-20 PROBLEM — N18.9 CKD (CHRONIC KIDNEY DISEASE): Status: ACTIVE | Noted: 2025-04-20

## 2025-04-20 PROBLEM — R82.90 ABNORMAL FINDING ON URINALYSIS: Status: ACTIVE | Noted: 2025-04-20

## 2025-04-20 PROBLEM — I50.32 CHRONIC DIASTOLIC HEART FAILURE: Status: ACTIVE | Noted: 2024-07-26

## 2025-04-20 PROBLEM — U07.1 ACUTE COVID-19: Status: ACTIVE | Noted: 2025-04-20

## 2025-04-20 LAB
ALBUMIN SERPL BCP-MCNC: 3.4 G/DL (ref 3.4–5)
ALP SERPL-CCNC: 65 U/L (ref 33–136)
ALT SERPL W P-5'-P-CCNC: 25 U/L (ref 10–52)
ANION GAP SERPL CALCULATED.3IONS-SCNC: 13 MMOL/L (ref 10–20)
ANION GAP SERPL CALCULATED.3IONS-SCNC: 14 MMOL/L (ref 10–20)
AST SERPL W P-5'-P-CCNC: 33 U/L (ref 9–39)
BASOPHILS # BLD AUTO: 0.02 X10*3/UL (ref 0–0.1)
BASOPHILS NFR BLD AUTO: 0.3 %
BILIRUB SERPL-MCNC: 1 MG/DL (ref 0–1.2)
BUN SERPL-MCNC: 18 MG/DL (ref 6–23)
BUN SERPL-MCNC: 18 MG/DL (ref 6–23)
CALCIUM SERPL-MCNC: 8.5 MG/DL (ref 8.6–10.3)
CALCIUM SERPL-MCNC: 8.6 MG/DL (ref 8.6–10.3)
CHLORIDE SERPL-SCNC: 105 MMOL/L (ref 98–107)
CHLORIDE SERPL-SCNC: 105 MMOL/L (ref 98–107)
CO2 SERPL-SCNC: 25 MMOL/L (ref 21–32)
CO2 SERPL-SCNC: 27 MMOL/L (ref 21–32)
CREAT SERPL-MCNC: 1.62 MG/DL (ref 0.5–1.3)
CREAT SERPL-MCNC: 1.66 MG/DL (ref 0.5–1.3)
EGFRCR SERPLBLD CKD-EPI 2021: 41 ML/MIN/1.73M*2
EGFRCR SERPLBLD CKD-EPI 2021: 42 ML/MIN/1.73M*2
EOSINOPHIL # BLD AUTO: 0.06 X10*3/UL (ref 0–0.4)
EOSINOPHIL NFR BLD AUTO: 1 %
ERYTHROCYTE [DISTWIDTH] IN BLOOD BY AUTOMATED COUNT: 17.3 % (ref 11.5–14.5)
ERYTHROCYTE [DISTWIDTH] IN BLOOD BY AUTOMATED COUNT: 17.3 % (ref 11.5–14.5)
GLUCOSE SERPL-MCNC: 88 MG/DL (ref 74–99)
GLUCOSE SERPL-MCNC: 92 MG/DL (ref 74–99)
HCT VFR BLD AUTO: 35.7 % (ref 41–52)
HCT VFR BLD AUTO: 35.7 % (ref 41–52)
HGB BLD-MCNC: 11.3 G/DL (ref 13.5–17.5)
HGB BLD-MCNC: 11.3 G/DL (ref 13.5–17.5)
HOLD SPECIMEN: NORMAL
IMM GRANULOCYTES # BLD AUTO: 0.02 X10*3/UL (ref 0–0.5)
IMM GRANULOCYTES NFR BLD AUTO: 0.3 % (ref 0–0.9)
LYMPHOCYTES # BLD AUTO: 0.46 X10*3/UL (ref 0.8–3)
LYMPHOCYTES NFR BLD AUTO: 7.8 %
MAGNESIUM SERPL-MCNC: 1.77 MG/DL (ref 1.6–2.4)
MCH RBC QN AUTO: 30.8 PG (ref 26–34)
MCH RBC QN AUTO: 30.8 PG (ref 26–34)
MCHC RBC AUTO-ENTMCNC: 31.7 G/DL (ref 32–36)
MCHC RBC AUTO-ENTMCNC: 31.7 G/DL (ref 32–36)
MCV RBC AUTO: 97 FL (ref 80–100)
MCV RBC AUTO: 97 FL (ref 80–100)
MONOCYTES # BLD AUTO: 0.5 X10*3/UL (ref 0.05–0.8)
MONOCYTES NFR BLD AUTO: 8.5 %
NEUTROPHILS # BLD AUTO: 4.85 X10*3/UL (ref 1.6–5.5)
NEUTROPHILS NFR BLD AUTO: 82.1 %
NRBC BLD-RTO: 0 /100 WBCS (ref 0–0)
NRBC BLD-RTO: 0 /100 WBCS (ref 0–0)
PLATELET # BLD AUTO: 125 X10*3/UL (ref 150–450)
PLATELET # BLD AUTO: 125 X10*3/UL (ref 150–450)
POTASSIUM SERPL-SCNC: 3.6 MMOL/L (ref 3.5–5.3)
POTASSIUM SERPL-SCNC: 3.6 MMOL/L (ref 3.5–5.3)
PROT SERPL-MCNC: 6.1 G/DL (ref 6.4–8.2)
RBC # BLD AUTO: 3.67 X10*6/UL (ref 4.5–5.9)
RBC # BLD AUTO: 3.67 X10*6/UL (ref 4.5–5.9)
SODIUM SERPL-SCNC: 140 MMOL/L (ref 136–145)
SODIUM SERPL-SCNC: 141 MMOL/L (ref 136–145)
WBC # BLD AUTO: 5.8 X10*3/UL (ref 4.4–11.3)
WBC # BLD AUTO: 5.8 X10*3/UL (ref 4.4–11.3)

## 2025-04-20 PROCEDURE — 99223 1ST HOSP IP/OBS HIGH 75: CPT | Performed by: INTERNAL MEDICINE

## 2025-04-20 PROCEDURE — 80053 COMPREHEN METABOLIC PANEL: CPT | Performed by: INTERNAL MEDICINE

## 2025-04-20 PROCEDURE — 36415 COLL VENOUS BLD VENIPUNCTURE: CPT | Performed by: INTERNAL MEDICINE

## 2025-04-20 PROCEDURE — 83735 ASSAY OF MAGNESIUM: CPT | Performed by: INTERNAL MEDICINE

## 2025-04-20 PROCEDURE — 1200000002 HC GENERAL ROOM WITH TELEMETRY DAILY

## 2025-04-20 PROCEDURE — XW033E5 INTRODUCTION OF REMDESIVIR ANTI-INFECTIVE INTO PERIPHERAL VEIN, PERCUTANEOUS APPROACH, NEW TECHNOLOGY GROUP 5: ICD-10-PCS | Performed by: INTERNAL MEDICINE

## 2025-04-20 PROCEDURE — 2500000002 HC RX 250 W HCPCS SELF ADMINISTERED DRUGS (ALT 637 FOR MEDICARE OP, ALT 636 FOR OP/ED): Performed by: INTERNAL MEDICINE

## 2025-04-20 PROCEDURE — 2500000001 HC RX 250 WO HCPCS SELF ADMINISTERED DRUGS (ALT 637 FOR MEDICARE OP): Performed by: INTERNAL MEDICINE

## 2025-04-20 PROCEDURE — 85025 COMPLETE CBC W/AUTO DIFF WBC: CPT | Performed by: INTERNAL MEDICINE

## 2025-04-20 PROCEDURE — 80048 BASIC METABOLIC PNL TOTAL CA: CPT | Performed by: INTERNAL MEDICINE

## 2025-04-20 PROCEDURE — 80048 BASIC METABOLIC PNL TOTAL CA: CPT | Mod: CCI | Performed by: INTERNAL MEDICINE

## 2025-04-20 PROCEDURE — 2500000004 HC RX 250 GENERAL PHARMACY W/ HCPCS (ALT 636 FOR OP/ED): Mod: JZ | Performed by: INTERNAL MEDICINE

## 2025-04-20 RX ORDER — TAMSULOSIN HYDROCHLORIDE 0.4 MG/1
0.4 CAPSULE ORAL NIGHTLY
Status: DISCONTINUED | OUTPATIENT
Start: 2025-04-20 | End: 2025-04-23 | Stop reason: HOSPADM

## 2025-04-20 RX ORDER — ONDANSETRON 4 MG/1
4 TABLET, FILM COATED ORAL EVERY 8 HOURS PRN
Status: DISCONTINUED | OUTPATIENT
Start: 2025-04-20 | End: 2025-04-23 | Stop reason: HOSPADM

## 2025-04-20 RX ORDER — PSYLLIUM HUSK 0.4 G
2 CAPSULE ORAL DAILY
COMMUNITY

## 2025-04-20 RX ORDER — METOPROLOL SUCCINATE 25 MG/1
25 TABLET, EXTENDED RELEASE ORAL DAILY
Status: DISCONTINUED | OUTPATIENT
Start: 2025-04-20 | End: 2025-04-23 | Stop reason: HOSPADM

## 2025-04-20 RX ORDER — ACETAMINOPHEN 650 MG/1
650 SUPPOSITORY RECTAL EVERY 4 HOURS PRN
Status: DISCONTINUED | OUTPATIENT
Start: 2025-04-20 | End: 2025-04-23 | Stop reason: HOSPADM

## 2025-04-20 RX ORDER — ACETAMINOPHEN 160 MG/5ML
650 SOLUTION ORAL EVERY 4 HOURS PRN
Status: DISCONTINUED | OUTPATIENT
Start: 2025-04-20 | End: 2025-04-23 | Stop reason: HOSPADM

## 2025-04-20 RX ORDER — ATORVASTATIN CALCIUM 20 MG/1
20 TABLET, FILM COATED ORAL DAILY
Status: DISCONTINUED | OUTPATIENT
Start: 2025-04-20 | End: 2025-04-23 | Stop reason: HOSPADM

## 2025-04-20 RX ORDER — GABAPENTIN 100 MG/1
100 CAPSULE ORAL NIGHTLY
Status: DISCONTINUED | OUTPATIENT
Start: 2025-04-20 | End: 2025-04-23 | Stop reason: HOSPADM

## 2025-04-20 RX ORDER — FENOFIBRATE 54 MG/1
54 TABLET ORAL DAILY
Status: DISCONTINUED | OUTPATIENT
Start: 2025-04-20 | End: 2025-04-23 | Stop reason: HOSPADM

## 2025-04-20 RX ORDER — ACETAMINOPHEN 325 MG/1
650 TABLET ORAL EVERY 4 HOURS PRN
Status: DISCONTINUED | OUTPATIENT
Start: 2025-04-20 | End: 2025-04-23 | Stop reason: HOSPADM

## 2025-04-20 RX ORDER — POLYETHYLENE GLYCOL 3350 17 G/17G
17 POWDER, FOR SOLUTION ORAL DAILY PRN
Status: DISCONTINUED | OUTPATIENT
Start: 2025-04-20 | End: 2025-04-23 | Stop reason: HOSPADM

## 2025-04-20 RX ORDER — CEFTRIAXONE 1 G/50ML
1 INJECTION, SOLUTION INTRAVENOUS EVERY 24 HOURS
Status: DISCONTINUED | OUTPATIENT
Start: 2025-04-20 | End: 2025-04-20

## 2025-04-20 RX ORDER — ERGOCALCIFEROL 1.25 MG/1
1.25 CAPSULE ORAL
Status: DISCONTINUED | OUTPATIENT
Start: 2025-04-20 | End: 2025-04-23 | Stop reason: HOSPADM

## 2025-04-20 RX ORDER — PAROXETINE HYDROCHLORIDE 20 MG/1
20 TABLET, FILM COATED ORAL DAILY
Status: DISCONTINUED | OUTPATIENT
Start: 2025-04-20 | End: 2025-04-23 | Stop reason: HOSPADM

## 2025-04-20 RX ORDER — FINASTERIDE 5 MG/1
5 TABLET, FILM COATED ORAL DAILY
Status: DISCONTINUED | OUTPATIENT
Start: 2025-04-20 | End: 2025-04-23 | Stop reason: HOSPADM

## 2025-04-20 RX ORDER — AMIODARONE HYDROCHLORIDE 100 MG/1
100 TABLET ORAL DAILY
Status: DISCONTINUED | OUTPATIENT
Start: 2025-04-20 | End: 2025-04-23 | Stop reason: HOSPADM

## 2025-04-20 RX ORDER — FERROUS SULFATE 325(65) MG
65 TABLET ORAL DAILY
Status: DISCONTINUED | OUTPATIENT
Start: 2025-04-20 | End: 2025-04-23 | Stop reason: HOSPADM

## 2025-04-20 RX ORDER — FUROSEMIDE 20 MG/1
20 TABLET ORAL DAILY
Status: DISCONTINUED | OUTPATIENT
Start: 2025-04-20 | End: 2025-04-20

## 2025-04-20 RX ORDER — ONDANSETRON HYDROCHLORIDE 2 MG/ML
4 INJECTION, SOLUTION INTRAVENOUS EVERY 8 HOURS PRN
Status: DISCONTINUED | OUTPATIENT
Start: 2025-04-20 | End: 2025-04-23 | Stop reason: HOSPADM

## 2025-04-20 RX ORDER — FOLIC ACID 1 MG/1
1 TABLET ORAL DAILY
Status: DISCONTINUED | OUTPATIENT
Start: 2025-04-20 | End: 2025-04-23 | Stop reason: HOSPADM

## 2025-04-20 RX ORDER — VIT C/E/ZN/COPPR/LUTEIN/ZEAXAN 250MG-90MG
500 CAPSULE ORAL DAILY
Status: DISCONTINUED | OUTPATIENT
Start: 2025-04-20 | End: 2025-04-23 | Stop reason: HOSPADM

## 2025-04-20 RX ORDER — TALC
3 POWDER (GRAM) TOPICAL NIGHTLY PRN
Status: DISCONTINUED | OUTPATIENT
Start: 2025-04-20 | End: 2025-04-23 | Stop reason: HOSPADM

## 2025-04-20 RX ADMIN — APIXABAN 2.5 MG: 2.5 TABLET, FILM COATED ORAL at 21:50

## 2025-04-20 RX ADMIN — AMPICILLIN SODIUM AND SULBACTAM SODIUM 3 G: 2; 1 INJECTION, POWDER, FOR SOLUTION INTRAMUSCULAR; INTRAVENOUS at 17:07

## 2025-04-20 RX ADMIN — FOLIC ACID 1 MG: 1 TABLET ORAL at 09:04

## 2025-04-20 RX ADMIN — CYANOCOBALAMIN TAB 500 MCG 500 MCG: 500 TAB at 09:04

## 2025-04-20 RX ADMIN — AMPICILLIN SODIUM AND SULBACTAM SODIUM 3 G: 2; 1 INJECTION, POWDER, FOR SOLUTION INTRAMUSCULAR; INTRAVENOUS at 11:48

## 2025-04-20 RX ADMIN — FUROSEMIDE 20 MG: 20 TABLET ORAL at 09:04

## 2025-04-20 RX ADMIN — PAROXETINE HYDROCHLORIDE 20 MG: 20 TABLET, FILM COATED ORAL at 09:05

## 2025-04-20 RX ADMIN — SACUBITRIL AND VALSARTAN 1 TABLET: 24; 26 TABLET, FILM COATED ORAL at 09:05

## 2025-04-20 RX ADMIN — SACUBITRIL AND VALSARTAN 1 TABLET: 24; 26 TABLET, FILM COATED ORAL at 21:50

## 2025-04-20 RX ADMIN — TAMSULOSIN HYDROCHLORIDE 0.4 MG: 0.4 CAPSULE ORAL at 21:50

## 2025-04-20 RX ADMIN — AMIODARONE HYDROCHLORIDE 100 MG: 100 TABLET ORAL at 09:04

## 2025-04-20 RX ADMIN — ACETAMINOPHEN 650 MG: 325 TABLET ORAL at 21:50

## 2025-04-20 RX ADMIN — FERROUS SULFATE TAB 325 MG (65 MG ELEMENTAL FE) 325 MG: 325 (65 FE) TAB at 09:04

## 2025-04-20 RX ADMIN — GABAPENTIN 100 MG: 100 CAPSULE ORAL at 21:50

## 2025-04-20 RX ADMIN — REMDESIVIR 200 MG: 100 INJECTION, POWDER, LYOPHILIZED, FOR SOLUTION INTRAVENOUS at 15:13

## 2025-04-20 RX ADMIN — APIXABAN 2.5 MG: 2.5 TABLET, FILM COATED ORAL at 09:05

## 2025-04-20 RX ADMIN — ERGOCALCIFEROL 1.25 MG: 1.25 CAPSULE ORAL at 09:04

## 2025-04-20 RX ADMIN — FINASTERIDE 5 MG: 5 TABLET, FILM COATED ORAL at 09:05

## 2025-04-20 RX ADMIN — FENOFIBRATE 54 MG: 54 TABLET ORAL at 11:48

## 2025-04-20 RX ADMIN — METOPROLOL SUCCINATE 25 MG: 25 TABLET, EXTENDED RELEASE ORAL at 09:04

## 2025-04-20 RX ADMIN — ATORVASTATIN CALCIUM 20 MG: 20 TABLET, FILM COATED ORAL at 09:04

## 2025-04-20 SDOH — ECONOMIC STABILITY: FOOD INSECURITY: WITHIN THE PAST 12 MONTHS, THE FOOD YOU BOUGHT JUST DIDN'T LAST AND YOU DIDN'T HAVE MONEY TO GET MORE.: NEVER TRUE

## 2025-04-20 SDOH — SOCIAL STABILITY: SOCIAL INSECURITY: ARE YOU OR HAVE YOU BEEN THREATENED OR ABUSED PHYSICALLY, EMOTIONALLY, OR SEXUALLY BY ANYONE?: NO

## 2025-04-20 SDOH — SOCIAL STABILITY: SOCIAL INSECURITY: WITHIN THE LAST YEAR, HAVE YOU BEEN HUMILIATED OR EMOTIONALLY ABUSED IN OTHER WAYS BY YOUR PARTNER OR EX-PARTNER?: NO

## 2025-04-20 SDOH — ECONOMIC STABILITY: INCOME INSECURITY: IN THE PAST 12 MONTHS HAS THE ELECTRIC, GAS, OIL, OR WATER COMPANY THREATENED TO SHUT OFF SERVICES IN YOUR HOME?: NO

## 2025-04-20 SDOH — SOCIAL STABILITY: SOCIAL INSECURITY: WERE YOU ABLE TO COMPLETE ALL THE BEHAVIORAL HEALTH SCREENINGS?: YES

## 2025-04-20 SDOH — SOCIAL STABILITY: SOCIAL INSECURITY: WITHIN THE LAST YEAR, HAVE YOU BEEN AFRAID OF YOUR PARTNER OR EX-PARTNER?: NO

## 2025-04-20 SDOH — ECONOMIC STABILITY: FOOD INSECURITY: WITHIN THE PAST 12 MONTHS, YOU WORRIED THAT YOUR FOOD WOULD RUN OUT BEFORE YOU GOT THE MONEY TO BUY MORE.: NEVER TRUE

## 2025-04-20 SDOH — SOCIAL STABILITY: SOCIAL INSECURITY: ARE THERE ANY APPARENT SIGNS OF INJURIES/BEHAVIORS THAT COULD BE RELATED TO ABUSE/NEGLECT?: NO

## 2025-04-20 SDOH — SOCIAL STABILITY: SOCIAL INSECURITY: HAS ANYONE EVER THREATENED TO HURT YOUR FAMILY OR YOUR PETS?: NO

## 2025-04-20 SDOH — SOCIAL STABILITY: SOCIAL INSECURITY: DOES ANYONE TRY TO KEEP YOU FROM HAVING/CONTACTING OTHER FRIENDS OR DOING THINGS OUTSIDE YOUR HOME?: NO

## 2025-04-20 SDOH — SOCIAL STABILITY: SOCIAL INSECURITY: HAVE YOU HAD THOUGHTS OF HARMING ANYONE ELSE?: NO

## 2025-04-20 SDOH — SOCIAL STABILITY: SOCIAL INSECURITY: DO YOU FEEL UNSAFE GOING BACK TO THE PLACE WHERE YOU ARE LIVING?: NO

## 2025-04-20 SDOH — SOCIAL STABILITY: SOCIAL INSECURITY: DO YOU FEEL ANYONE HAS EXPLOITED OR TAKEN ADVANTAGE OF YOU FINANCIALLY OR OF YOUR PERSONAL PROPERTY?: NO

## 2025-04-20 SDOH — SOCIAL STABILITY: SOCIAL INSECURITY: HAVE YOU HAD ANY THOUGHTS OF HARMING ANYONE ELSE?: NO

## 2025-04-20 SDOH — SOCIAL STABILITY: SOCIAL INSECURITY: ABUSE: ADULT

## 2025-04-20 ASSESSMENT — LIFESTYLE VARIABLES
AUDIT-C TOTAL SCORE: 0
HOW OFTEN DO YOU HAVE 6 OR MORE DRINKS ON ONE OCCASION: NEVER
HOW MANY STANDARD DRINKS CONTAINING ALCOHOL DO YOU HAVE ON A TYPICAL DAY: PATIENT DOES NOT DRINK
HOW OFTEN DO YOU HAVE A DRINK CONTAINING ALCOHOL: NEVER
AUDIT-C TOTAL SCORE: 0
SKIP TO QUESTIONS 9-10: 1

## 2025-04-20 ASSESSMENT — COGNITIVE AND FUNCTIONAL STATUS - GENERAL
STANDING UP FROM CHAIR USING ARMS: A LITTLE
HELP NEEDED FOR BATHING: A LOT
STANDING UP FROM CHAIR USING ARMS: A LITTLE
MOVING TO AND FROM BED TO CHAIR: A LITTLE
DRESSING REGULAR UPPER BODY CLOTHING: A LITTLE
CLIMB 3 TO 5 STEPS WITH RAILING: TOTAL
TURNING FROM BACK TO SIDE WHILE IN FLAT BAD: A LITTLE
WALKING IN HOSPITAL ROOM: A LITTLE
DRESSING REGULAR LOWER BODY CLOTHING: A LITTLE
TURNING FROM BACK TO SIDE WHILE IN FLAT BAD: A LITTLE
DAILY ACTIVITIY SCORE: 22
MOBILITY SCORE: 18
WALKING IN HOSPITAL ROOM: A LOT
PATIENT BASELINE BEDBOUND: NO
DRESSING REGULAR LOWER BODY CLOTHING: A LITTLE
DAILY ACTIVITIY SCORE: 19
TOILETING: A LITTLE
PERSONAL GROOMING: A LITTLE
MOVING TO AND FROM BED TO CHAIR: A LITTLE
MOBILITY SCORE: 16
CLIMB 3 TO 5 STEPS WITH RAILING: A LOT

## 2025-04-20 ASSESSMENT — ACTIVITIES OF DAILY LIVING (ADL)
DRESSING YOURSELF: INDEPENDENT
ASSISTIVE_DEVICE: CANE;WHEELCHAIR
FEEDING YOURSELF: INDEPENDENT
ADEQUATE_TO_COMPLETE_ADL: YES
HEARING - LEFT EAR: HEARING AID
HEARING - RIGHT EAR: HEARING AID
JUDGMENT_ADEQUATE_SAFELY_COMPLETE_DAILY_ACTIVITIES: YES
TOILETING: NEEDS ASSISTANCE
PATIENT'S MEMORY ADEQUATE TO SAFELY COMPLETE DAILY ACTIVITIES?: YES
BATHING: INDEPENDENT
GROOMING: NEEDS ASSISTANCE
WALKS IN HOME: NEEDS ASSISTANCE
LACK_OF_TRANSPORTATION: NO
LACK_OF_TRANSPORTATION: NO

## 2025-04-20 ASSESSMENT — ENCOUNTER SYMPTOMS
COUGH: 1
ABDOMINAL PAIN: 0
SHORTNESS OF BREATH: 1
FEVER: 0
DIARRHEA: 0
FATIGUE: 1
CHILLS: 0
VOMITING: 0
NAUSEA: 0

## 2025-04-20 ASSESSMENT — PAIN - FUNCTIONAL ASSESSMENT: PAIN_FUNCTIONAL_ASSESSMENT: 0-10

## 2025-04-20 ASSESSMENT — PAIN SCALES - GENERAL: PAINLEVEL_OUTOF10: 0 - NO PAIN

## 2025-04-20 NOTE — ED NOTES
Pt give oral fluids and repositioned in bed.  Pt states he is confinable and that back is ok.  Pt shows no current sob.  Call bell in reach.       Rhea Cleary RN  04/20/25 1349

## 2025-04-20 NOTE — PROGRESS NOTES
"Angel Carroll is a 79 y.o. male on day 0 of admission presenting with Acute COVID-19.      Subjective   States he feels okay this morning, mostly just tired. States his breathing feels a little \"off\" at times but denies SOB currently.        Objective     Last Recorded Vitals  /64 (BP Location: Left arm, Patient Position: Lying)   Pulse 65   Temp 37 °C (98.6 °F) (Oral)   Resp 20   Wt 99.8 kg (220 lb)   SpO2 93%   Intake/Output last 3 Shifts:    Intake/Output Summary (Last 24 hours) at 4/20/2025 1458  Last data filed at 4/20/2025 1300  Gross per 24 hour   Intake 150 ml   Output 1 ml   Net 149 ml       Admission Weight  Weight: 99.8 kg (220 lb) (04/20/25 0130)    Daily Weight  04/20/25 : 99.8 kg (220 lb)    Image Results  XR chest 1 view  Narrative: Interpreted By:  Sen Hardwick,   STUDY:  XR CHEST 1 VIEW;  4/19/2025 8:35 pm      INDICATION:  Signs/Symptoms:cough, congestion.      COMPARISON:  Chest x-ray 01/30/2025      ACCESSION NUMBER(S):  [id number]      ORDERING CLINICIAN:  ISAAC SHEA      FINDINGS:  Left-sided dual lead pacer is stable in position. Overlying leads  noted.      CARDIOMEDIASTINAL SILHOUETTE:  Cardiac silhouette is enlarged. Diffuse interstitial prominence.  Atherosclerotic calcification of the aorta. In the      LUNGS:  No consolidation, pleural effusion or pneumothorax.      ABDOMEN:  No remarkable upper abdominal findings.      BONES:  Multilevel degenerative changes of the spine.      Impression: Cardiomegaly with diffuse interstitial prominence which could be  chronic or relate to component of developing interstitial edema.  Correlate clinically.      MACRO:  None      Signed by: Sen Hardwick 4/19/2025 8:46 PM  Dictation workstation:   [id number]      Physical Exam  Constitutional:       General: He is not in acute distress.     Appearance: He is not toxic-appearing.   HENT:      Head: Normocephalic.      Mouth/Throat:      Pharynx: Oropharynx is clear.   Eyes:      " General: No scleral icterus.  Cardiovascular:      Rate and Rhythm: Normal rate.   Pulmonary:      Effort: No respiratory distress.      Breath sounds: No wheezing.      Comments: 3L NC  Abdominal:      General: There is no distension.      Tenderness: There is no abdominal tenderness.   Musculoskeletal:      Right lower leg: No edema.      Left lower leg: No edema.   Neurological:      Mental Status: He is alert.   Psychiatric:         Behavior: Behavior normal.         Relevant Results                              Assessment & Plan  Acute COVID-19  ID consulted  On remdesivir  Isolation precautions  Chronic hypoxic respiratory failure  Baseline 2-3L NC  Not in exacerbation   Atrial fibrillation (Multi)  Continue metoprolol, amiodarone, and eliquis  Chronic diastolic heart failure  Continue entresto and lasix   Abnormal finding on urinalysis  UTI vs pyuria  Continue empiric unasyn  Follow up urine culture  CKD (chronic kidney disease)  Baseline Cr 1.5-1.7  Stable at baseline    Plan:  ID note/recommendations reviewed - continue remdesivir and unasyn, follow up urine culture  Continue isolation precautions   PT/OT/OOB as able  Dispo pending therapy recommendations            Eugenia Rodriguez MD

## 2025-04-20 NOTE — CARE PLAN
The patient's goals for the shift include      The clinical goals for the shift include safety    Problem: Pain - Adult  Goal: Verbalizes/displays adequate comfort level or baseline comfort level  Outcome: Progressing     Problem: Safety - Adult  Goal: Free from fall injury  Outcome: Progressing     Problem: Discharge Planning  Goal: Discharge to home or other facility with appropriate resources  Outcome: Progressing     Problem: Chronic Conditions and Co-morbidities  Goal: Patient's chronic conditions and co-morbidity symptoms are monitored and maintained or improved  Outcome: Progressing     Problem: Nutrition  Goal: Nutrient intake appropriate for maintaining nutritional needs  Outcome: Progressing

## 2025-04-20 NOTE — ED NOTES
Resting with easy resps and eyes closed.  Pt as call bell in lap.  Pending admit bed     Rhea Cleary RN  04/20/25 [id number]

## 2025-04-20 NOTE — ED PROVIDER NOTES
History of Present Illness     History provided by: Patient  Limitations to History: None  External Records Reviewed with Brief Summary:  Prior inpatient notes    HPI:  Angel Carroll is a 79 y.o. male who presents with weakness.  Subjective fevers at home.  Associated light headedness for 2 days.  Notes productive cough.  Associated congestion, chills.  No nausea, vomiting, diarrhea.  Had difficulty standing up from the toilet which prompted him to come to the ED    Physical Exam   Triage vitals:  T 37.3 °C (99.1 °F)  HR 60  /58  RR (!) 30  O2 95 %      Physical Exam  Vitals and nursing note reviewed.   Constitutional:       General: He is not in acute distress.     Appearance: He is not ill-appearing.   HENT:      Head: Normocephalic and atraumatic.      Mouth/Throat:      Mouth: Mucous membranes are moist.      Pharynx: Oropharynx is clear.   Eyes:      Extraocular Movements: Extraocular movements intact.      Conjunctiva/sclera: Conjunctivae normal.      Pupils: Pupils are equal, round, and reactive to light.   Cardiovascular:      Rate and Rhythm: Normal rate and regular rhythm.   Pulmonary:      Effort: Pulmonary effort is normal. Tachypnea present. No respiratory distress.      Breath sounds: Normal breath sounds.   Abdominal:      General: There is no distension.      Palpations: Abdomen is soft.      Tenderness: There is no abdominal tenderness.   Musculoskeletal:         General: No swelling or deformity. Normal range of motion.      Cervical back: Normal range of motion and neck supple.   Skin:     General: Skin is warm and dry.      Capillary Refill: Capillary refill takes less than 2 seconds.   Neurological:      General: No focal deficit present.      Mental Status: He is alert and oriented to person, place, and time. Mental status is at baseline.      Motor: Weakness present.   Psychiatric:         Mood and Affect: Mood normal.         Behavior: Behavior normal.          Medical Decision Making  & ED Course   Medical Decision Makin y.o. male presents with weakness. I have considered the following conditions in my assessment of this patient's weakness: Stroke, TIA, electrolyte abnormalities (hypo/hyperkalemia, hypo/hypernatremia, hypo/hyperglycemia, hypomagnesemia, hypo/hypercalcemia), volume depletion, anemia, metabolic causes, infectious etiology, medication side effect.   Patient found to be COVID-positive, likely source of patient's fever and generalized weakness.  Patient also found to have a urinary tract infection, treated with antibiotics.  Patient is not a candidate for Paxlovid given medication interactions.  No leukocytosis.  Baseline anemia.  Renal function is at baseline as well.  Negative lactate.  Chest x-ray without any evidence of bacterial pneumonia but early evidence of pulmonary edema.  Patient significantly weak, unable to roll within the gurney without assistance.  Normally patient is ambulatory with various assistance devices such as cane, walker, rollator.  Given significant amount of weakness, patient would benefit from admission for further management.    ----      Social Determinants of Health which Significantly Impact Care: None identified     EKG Independent Interpretation: EKG interpreted by myself. Please see ED Course for full interpretation.    Independent Result Review and Interpretation: Relevant laboratory and radiographic results were reviewed and independently interpreted by myself.  As necessary, they are commented on in the ED Course.    Chronic conditions affecting the patient's care: As documented above in [facility]    The patient was discussed with the following consultants/services: Hospitalist/Admitting Provider who accepted the patient for admission    Care Considerations: As documented above in [facility]    ED Course:  ED Course as of 25 0301   Sat  ECG 12 lead  Performed at  , HR of 60, paced, NAD, QTc 502, no sign of STEMI, no Q wave or  T wave abnormality noted.    Reviewed and interpreted by me at time performed   [JM]      ED Course User Index  [JM] Mary Alice Godfrey MD         Diagnoses as of 04/20/25 0301   Acute COVID-19   Acute UTI   Generalized weakness       Procedures   Procedures    Mary Alice Godfrey MD  Emergency Medicine     Mary Alice Godfrey MD  04/20/25 0303

## 2025-04-20 NOTE — CONSULTS
Inpatient consult to Infectious Diseases  Consult performed by: Ron Yuan MD  Consult ordered by: Scottie Yuan MD            Primary MD: Jung Tao MD    Reason For Consult  Coronavirus infection    History Of Present Illness  Angel Carroll is a 79 y.o. male presenting with generalized weakness and fever.  He has history of chronic respiratory failure, on about 3 L of oxygen.  Onset was about a day prior to presentation, gradual, constant, with interval worsening.  He has difficulty with getting up.  He has cough productive of phlegm.  He denies any chest pain.  No nausea vomiting or diarrhea.  He tested positive for coronavirus.  Chest x-ray was markable for diffuse interstitial prominence.  His UA with micro is marked for pyuria.  His chart was reviewed, has history of positive urine culture for Enterococcus, Aerococcus and Proteus.  He is on ceftriaxone.  He remains afebrile, with no chills.      Past Medical History  He has a past medical history of Atherosclerotic heart disease of native coronary artery without angina pectoris (12/14/2022), Benign neoplasm of meninges, unspecified, BPH (benign prostatic hyperplasia), Chronic diastolic heart failure, Chronic kidney disease, Chronic obstructive pulmonary disease (COPD) (Multi), Chronic respiratory failure, Elevated blood-pressure reading, without diagnosis of hypertension, Hyperlipidemia, Meningioma (Multi), Nephrolithiasis, Rectal bleeding, Sick sinus syndrome (Multi), and Vitamin D deficiency.    Surgical History  He has a past surgical history that includes Other surgical history (04/03/2013); Other surgical history (04/03/2013); Eye surgery (04/03/2013); Total knee arthroplasty; Back surgery (04/18/2016); Lithotripsy (07/22/2013); Colonoscopy (07/03/2013); Other surgical history (07/03/2013); Knee arthroscopy w/ debridement (07/03/2013); Knee arthroscopy w/ debridement (07/03/2013); Other surgical history (07/03/2013); Coronary  angioplasty with stent (2020); CT angio head w and wo IV contrast (2021); CT angio neck (2021); MR angio head wo IV contrast (2021); MR angio neck wo IV contrast (2021); Tonsillectomy; Brain surgery; Cystoscopy w/ ureteral stent placement; and pacemaker placement.     Social History     Occupational History    Not on file   Tobacco Use    Smoking status: Former     Current packs/day: 0.00     Types: Cigarettes     Quit date:      Years since quittin.3    Smokeless tobacco: Never   Vaping Use    Vaping status: Never Used   Substance and Sexual Activity    Alcohol use: Not Currently    Drug use: Never    Sexual activity: Not Currently     Travel History   Travel since 23    No documented travel since 23         Family History  Family History[1]  Allergies  Other, Ranolazine, Nsaids (non-steroidal anti-inflammatory drug), and Cyclobenzaprine     Immunization History   Administered Date(s) Administered    COVID-19, mRNA, LNP-S, PF, 30 mcg/0.3 mL dose 10/11/2021    Flu vaccine (IIV4), preservative free *Check age/dose* 2014    Flu vaccine, quadrivalent, high-dose, preservative free, age 65y+ (FLUZONE) 2021    Flu vaccine, trivalent, preservative free, HIGH-DOSE, age 65y+ (Fluzone) 10/27/2015, 11/15/2016, 10/25/2017, 10/15/2019, 10/08/2020, 09/15/2022, 2023, 10/04/2024    Influenza, Unspecified 2009, 10/25/2010, 2011, 10/09/2012, 10/03/2013    Influenza, seasonal, injectable 2009, 10/25/2010, 2011, 10/09/2012, 10/03/2013, 10/27/2015    Novel influenza-H1N1-09, preservative-free 2009    Pfizer COVID-19 vaccine, 12 years and older, (30mcg/0.3mL) (Comirnaty) 2024    Pfizer Purple Cap SARS-CoV-2 2021, 2021    Pneumococcal conjugate vaccine, 13-valent (PREVNAR 13) 2016    Pneumococcal polysaccharide vaccine, 23-valent, age 2 years and older (PNEUMOVAX 23) 2012    RSV, 60 Years And Older (AREXVY)  01/05/2024    Tdap vaccine, age 7 year and older (BOOSTRIX, ADACEL) 01/12/2022    Zoster, live 12/24/2015     Medications  Home medications:  Prescriptions Prior to Admission[2]  Current medications:  Scheduled medications  Scheduled Medications[3]  Continuous medications  Continuous Medications[4]  PRN medications  PRN Medications[5]    Review of Systems   Constitutional:  Positive for fatigue. Negative for chills and fever.   Respiratory:  Positive for cough and shortness of breath.    Cardiovascular:  Negative for leg swelling.   Gastrointestinal:  Negative for abdominal pain, diarrhea, nausea and vomiting.   All other systems reviewed and are negative.       Objective  Range of Vitals (last 24 hours)  Heart Rate:  [60-62]   Temperature:  [36.8 °C (98.2 °F)-37.3 °C (99.1 °F)]   Respirations:  [16-30]   BP: (120-141)/(54-68)   Height:  [182.9 cm (6')]   Weight:  [99.8 kg (220 lb)]   Pulse Ox:  [91 %-98 %]   Daily Weight  04/20/25 : 99.8 kg (220 lb)    Body mass index is 29.84 kg/m².     Physical Exam  Constitutional:       Appearance: Normal appearance.   HENT:      Head: Normocephalic and atraumatic.      Nose: Nose normal.   Eyes:      General: No scleral icterus.     Extraocular Movements: Extraocular movements intact.      Conjunctiva/sclera: Conjunctivae normal.   Cardiovascular:      Rate and Rhythm: Normal rate and regular rhythm.      Heart sounds: Normal heart sounds.   Pulmonary:      Breath sounds: Decreased breath sounds present.   Abdominal:      General: Bowel sounds are normal.      Palpations: Abdomen is soft.      Tenderness: There is no abdominal tenderness.   Musculoskeletal:      Cervical back: Normal range of motion and neck supple.      Right lower leg: No edema.      Left lower leg: No edema.   Skin:     General: Skin is warm and dry.   Neurological:      Mental Status: He is alert.   Psychiatric:         Behavior: Behavior normal. Behavior is cooperative.          Relevant Results  Outside  "Hospital Results    Labs  Results from last 72 hours   Lab Units 04/20/25  0726 04/19/25 2022   WBC AUTO x10*3/uL 5.8 4.9   HEMOGLOBIN g/dL 11.3* 10.7*   HEMATOCRIT % 35.7* 33.0*   PLATELETS AUTO x10*3/uL 125* 142*   NEUTROS PCT AUTO %  --  79.9   LYMPHS PCT AUTO %  --  10.4   MONOS PCT AUTO %  --  8.5   EOS PCT AUTO %  --  0.4     Results from last 72 hours   Lab Units 04/19/25 2022   SODIUM mmol/L 139   POTASSIUM mmol/L 3.6   CHLORIDE mmol/L 104   CO2 mmol/L 28   BUN mg/dL 17   CREATININE mg/dL 1.68*   GLUCOSE mg/dL 94   CALCIUM mg/dL 8.3*   ANION GAP mmol/L 11   EGFR mL/min/1.73m*2 41*     Results from last 72 hours   Lab Units 04/19/25 2022   ALK PHOS U/L 68   BILIRUBIN TOTAL mg/dL 1.0   PROTEIN TOTAL g/dL 6.1*   ALT U/L 26   AST U/L 33   ALBUMIN g/dL 3.5     Estimated Creatinine Clearance: 42.2 mL/min (A) (by C-G formula based on SCr of 1.68 mg/dL (H)).  No results found for: \"CRP\", \"SEDRATE\"  No results found for: \"HIV1X2\", \"HIVCONF\", \"IXQYPY2ZI\"  No results found for: \"HEPCABINIT\", \"HEPCAB\", \"HCVPCRQUANT\"  Microbiology  Susceptibility data from last 90 days.  Collected Specimen Info Organism Ampicillin Cefazolin Cefazolin (uncomplicated UTIs only) Ciprofloxacin Gentamicin Nitrofurantoin Piperacillin/Tazobactam Tetracycline Trimethoprim/Sulfamethoxazole   01/30/25 Urine from Clean Catch/Voided Proteus mirabilis  S  S  S  S  S  R  S  R  S       Imaging  XR chest 1 view  Result Date: 4/19/2025  Interpreted By:  Sen Hardwick, STUDY: XR CHEST 1 VIEW;  4/19/2025 8:35 pm   INDICATION: Signs/Symptoms:cough, congestion.   COMPARISON: Chest x-ray 01/30/2025   ACCESSION NUMBER(S): [id number]   ORDERING CLINICIAN: ISAAC SHEA   FINDINGS: Left-sided dual lead pacer is stable in position. Overlying leads noted.   CARDIOMEDIASTINAL SILHOUETTE: Cardiac silhouette is enlarged. Diffuse interstitial prominence. Atherosclerotic calcification of the aorta. In the   LUNGS: No consolidation, pleural effusion or " pneumothorax.   ABDOMEN: No remarkable upper abdominal findings.   BONES: Multilevel degenerative changes of the spine.       Cardiomegaly with diffuse interstitial prominence which could be chronic or relate to component of developing interstitial edema. Correlate clinically.   MACRO: None   Signed by: Sen Hardwick 4/19/2025 8:46 PM Dictation workstation:   [id number]    XR elbow right 3+ views  Result Date: 4/15/2025  Interpreted By:  Kelechi Chopra, STUDY: XR ELBOW RIGHT 3+ VIEWS; ;  4/15/2025 12:50 pm   INDICATION: Signs/Symptoms:pain.   ,R52 Pain, unspecified   COMPARISON: None.   ACCESSION NUMBER(S): [id number]   ORDERING CLINICIAN: ANU ALVARADO   FINDINGS: No acute fracture or dislocation. Small olecranon enthesophyte. Posterior elbow soft tissue swelling. 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       Soft tissue swelling in the posterior elbow, correlate clinically for olecranon bursitis.   Moderate elbow joint osteoarthritis. Ring osteophytes in the radial head without definite acute fracture.     MACRO: None   Signed by: Kelechi Chopra 4/15/2025 1:01 PM Dictation workstation:   [id number]    US thyroid  Result Date: 4/12/2025  Interpreted By:  Natalee Ruiz, STUDY: US THYROID;  4/11/2025 2:21 pm   INDICATION: Signs/Symptoms:thyroid nodule.   ,E04.1 Nontoxic single thyroid nodule   COMPARISON: 09/01/2022   ACCESSION NUMBER(S): [id number]   ORDERING CLINICIAN: MARYBEL CHRISTOPHER   TECHNIQUE: Multiple ultrasonographic images of the thyroid gland were obtained.   FINDINGS:     RIGHT LOBE: The right lobe of the thyroid measures 2.7 cm x 2.8 cm x 5.3 cm. The right lobe of the thyroid is homogeneous in echotexture and demonstrates an isoechoic solid nodule. This is CR 3 measuring 15 x 22 x 13 mm and apparently smaller since the prior exam. Another hypoechoic likely solid nodule measuring 12 x 13 x 11 mm and TR 4. This is also smaller since the prior exam.. It appears  more hypoechoic since the prior exam however.   LEFT LOBE: The left lobe of the thyroid measures 1.6 cm x 2.5 cm x 5.8 cm. The left lobe of the thyroid is homogeneous in echotexture and demonstrates no nodules.   ISTHMUS: The isthmus measures approximately 0.4 cm and is homogeneous in echotexture and without any identifiable nodules.   CERVICAL LYMPH NODES: No significant adenopathy.       Right lobe thyroid nodule as described and apparently smaller since the prior exam.   Please note that these statements are based on the recommendations of the American College of Radiology   TI-RADS grading system. ACR TI-RADS recommendations (apply to nodules which have NOT been biopsied):   TR5 (?7 points) highly suspicious - FNA if ? 1cm, follow-up if 0.5 -0.9 cm every year for 5 years. Aggregate cancer risk 35%. TR4 (4-6 points) moderately suspicious - FNA if ? 1.5cm, follow-up if 1 -1.4 cm in 1, 2, 3 and 5 years. Aggregate cancer risk 9.1% TR3 (3 points) mildly suspicious - FNA if ? 2.5cm, follow-up if 1.5 -2.4 cm in 1, 3 and 5 years. Aggregate cancer risk 4.8% TR2 (2 points) not suspicious. No FNA or follow-up.Aggregate cancer risk 1.5% TR1 (0 points) benign - No FNA or follow-up. Aggregate cancer risk 0.3%   MACRO: None     Signed by: Natalee Ruiz 4/12/2025 1:00 PM Dictation workstation:   [id number]      Assessment/Plan   Chronic respiratory failure, at baseline  Coronavirus bronchitis-patient at risk for progression   Pyuria versus Urinary tract infection-previous urine cultures are growing Enterococcus, Aerococcus and Proteus-all susceptible to ampicillin  Chronic kidney disease stage III, possible acute component      Remdesivir-risk reduction  Unasyn-empiric  Discontinue Rocephin  Follow-up urine culture  Oxygen as needed  Droplet plus precautions  Supportive care  Monitor temperature and WBC  Monitor renal function      I spent 30 minutes in the professional and overall care of this patient.      Ron Yuan,  MD         [1]   Family History  Problem Relation Name Age of Onset    Diabetes Mother      Emphysema Father      Heart disease Father     [2] (Not in a hospital admission)  [3] amiodarone, 100 mg, oral, Daily  apixaban, 2.5 mg, oral, BID  atorvastatin, 20 mg, oral, Daily  cefTRIAXone, 1 g, intravenous, q24h  cyanocobalamin, 500 mcg, oral, Daily  ergocalciferol, 1.25 mg, oral, Every Sunday  fenofibrate, 54 mg, oral, Daily  ferrous sulfate, 65 mg of iron, oral, Daily  finasteride, 5 mg, oral, Daily  folic acid, 1 mg, oral, Daily  furosemide, 20 mg, oral, Daily  gabapentin, 100 mg, oral, Nightly  metoprolol succinate XL, 25 mg, oral, Daily  PARoxetine, 20 mg, oral, Daily  sacubitriL-valsartan, 1 tablet, oral, BID  tamsulosin, 0.4 mg, oral, Nightly    [4]    [5] PRN medications: acetaminophen **OR** acetaminophen **OR** acetaminophen, melatonin, ondansetron **OR** ondansetron, polyethylene glycol

## 2025-04-21 LAB
ATRIAL RATE: 60 BPM
PR INTERVAL: 288 MS
Q ONSET: 198 MS
QRS COUNT: 10 BEATS
QRS DURATION: 134 MS
QT INTERVAL: 502 MS
QTC CALCULATION(BAZETT): 502 MS
QTC FREDERICIA: 502 MS
R AXIS: -73 DEGREES
T AXIS: 100 DEGREES
T OFFSET: 449 MS
VENTRICULAR RATE: 60 BPM

## 2025-04-21 PROCEDURE — 97162 PT EVAL MOD COMPLEX 30 MIN: CPT | Mod: GP

## 2025-04-21 PROCEDURE — 2500000001 HC RX 250 WO HCPCS SELF ADMINISTERED DRUGS (ALT 637 FOR MEDICARE OP): Performed by: INTERNAL MEDICINE

## 2025-04-21 PROCEDURE — 1200000002 HC GENERAL ROOM WITH TELEMETRY DAILY

## 2025-04-21 PROCEDURE — 2500000002 HC RX 250 W HCPCS SELF ADMINISTERED DRUGS (ALT 637 FOR MEDICARE OP, ALT 636 FOR OP/ED): Performed by: INTERNAL MEDICINE

## 2025-04-21 PROCEDURE — 2500000004 HC RX 250 GENERAL PHARMACY W/ HCPCS (ALT 636 FOR OP/ED): Mod: JZ | Performed by: INTERNAL MEDICINE

## 2025-04-21 PROCEDURE — 97166 OT EVAL MOD COMPLEX 45 MIN: CPT | Mod: GO

## 2025-04-21 PROCEDURE — 97535 SELF CARE MNGMENT TRAINING: CPT | Mod: GO

## 2025-04-21 PROCEDURE — 99232 SBSQ HOSP IP/OBS MODERATE 35: CPT | Performed by: STUDENT IN AN ORGANIZED HEALTH CARE EDUCATION/TRAINING PROGRAM

## 2025-04-21 RX ADMIN — PAROXETINE HYDROCHLORIDE 20 MG: 20 TABLET, FILM COATED ORAL at 08:56

## 2025-04-21 RX ADMIN — FENOFIBRATE 54 MG: 54 TABLET ORAL at 08:56

## 2025-04-21 RX ADMIN — FERROUS SULFATE TAB 325 MG (65 MG ELEMENTAL FE) 325 MG: 325 (65 FE) TAB at 08:56

## 2025-04-21 RX ADMIN — ATORVASTATIN CALCIUM 20 MG: 20 TABLET, FILM COATED ORAL at 08:56

## 2025-04-21 RX ADMIN — APIXABAN 2.5 MG: 2.5 TABLET, FILM COATED ORAL at 08:56

## 2025-04-21 RX ADMIN — METOPROLOL SUCCINATE 25 MG: 25 TABLET, EXTENDED RELEASE ORAL at 08:56

## 2025-04-21 RX ADMIN — APIXABAN 2.5 MG: 2.5 TABLET, FILM COATED ORAL at 21:27

## 2025-04-21 RX ADMIN — AMIODARONE HYDROCHLORIDE 100 MG: 100 TABLET ORAL at 08:56

## 2025-04-21 RX ADMIN — AMPICILLIN SODIUM AND SULBACTAM SODIUM 3 G: 2; 1 INJECTION, POWDER, FOR SOLUTION INTRAMUSCULAR; INTRAVENOUS at 12:27

## 2025-04-21 RX ADMIN — GABAPENTIN 100 MG: 100 CAPSULE ORAL at 21:27

## 2025-04-21 RX ADMIN — FOLIC ACID 1 MG: 1 TABLET ORAL at 08:56

## 2025-04-21 RX ADMIN — REMDESIVIR 100 MG: 100 INJECTION, POWDER, LYOPHILIZED, FOR SOLUTION INTRAVENOUS at 16:08

## 2025-04-21 RX ADMIN — AMPICILLIN SODIUM AND SULBACTAM SODIUM 3 G: 2; 1 INJECTION, POWDER, FOR SOLUTION INTRAMUSCULAR; INTRAVENOUS at 05:24

## 2025-04-21 RX ADMIN — FINASTERIDE 5 MG: 5 TABLET, FILM COATED ORAL at 08:56

## 2025-04-21 RX ADMIN — CYANOCOBALAMIN TAB 500 MCG 500 MCG: 500 TAB at 08:56

## 2025-04-21 RX ADMIN — SACUBITRIL AND VALSARTAN 1 TABLET: 24; 26 TABLET, FILM COATED ORAL at 08:56

## 2025-04-21 RX ADMIN — AMPICILLIN SODIUM AND SULBACTAM SODIUM 3 G: 2; 1 INJECTION, POWDER, FOR SOLUTION INTRAMUSCULAR; INTRAVENOUS at 00:00

## 2025-04-21 RX ADMIN — SACUBITRIL AND VALSARTAN 1 TABLET: 24; 26 TABLET, FILM COATED ORAL at 21:27

## 2025-04-21 RX ADMIN — AMPICILLIN SODIUM AND SULBACTAM SODIUM 3 G: 2; 1 INJECTION, POWDER, FOR SOLUTION INTRAMUSCULAR; INTRAVENOUS at 21:27

## 2025-04-21 RX ADMIN — TAMSULOSIN HYDROCHLORIDE 0.4 MG: 0.4 CAPSULE ORAL at 21:27

## 2025-04-21 SDOH — ECONOMIC STABILITY: HOUSING INSECURITY: IN THE LAST 12 MONTHS, WAS THERE A TIME WHEN YOU WERE NOT ABLE TO PAY THE MORTGAGE OR RENT ON TIME?: NO

## 2025-04-21 SDOH — SOCIAL STABILITY: SOCIAL INSECURITY
WITHIN THE LAST YEAR, HAVE YOU BEEN RAPED OR FORCED TO HAVE ANY KIND OF SEXUAL ACTIVITY BY YOUR PARTNER OR EX-PARTNER?: PATIENT UNABLE TO ANSWER

## 2025-04-21 SDOH — HEALTH STABILITY: PHYSICAL HEALTH: ON AVERAGE, HOW MANY MINUTES DO YOU ENGAGE IN EXERCISE AT THIS LEVEL?: 0 MIN

## 2025-04-21 SDOH — HEALTH STABILITY: MENTAL HEALTH: HOW OFTEN DO YOU HAVE SIX OR MORE DRINKS ON ONE OCCASION?: NEVER

## 2025-04-21 SDOH — HEALTH STABILITY: MENTAL HEALTH: HOW OFTEN DO YOU HAVE A DRINK CONTAINING ALCOHOL?: NEVER

## 2025-04-21 SDOH — ECONOMIC STABILITY: FOOD INSECURITY: WITHIN THE PAST 12 MONTHS, THE FOOD YOU BOUGHT JUST DIDN'T LAST AND YOU DIDN'T HAVE MONEY TO GET MORE.: NEVER TRUE

## 2025-04-21 SDOH — ECONOMIC STABILITY: FOOD INSECURITY: HOW HARD IS IT FOR YOU TO PAY FOR THE VERY BASICS LIKE FOOD, HOUSING, MEDICAL CARE, AND HEATING?: NOT VERY HARD

## 2025-04-21 SDOH — ECONOMIC STABILITY: FOOD INSECURITY: WITHIN THE PAST 12 MONTHS, YOU WORRIED THAT YOUR FOOD WOULD RUN OUT BEFORE YOU GOT THE MONEY TO BUY MORE.: NEVER TRUE

## 2025-04-21 SDOH — ECONOMIC STABILITY: HOUSING INSECURITY: AT ANY TIME IN THE PAST 12 MONTHS, WERE YOU HOMELESS OR LIVING IN A SHELTER (INCLUDING NOW)?: NO

## 2025-04-21 SDOH — SOCIAL STABILITY: SOCIAL INSECURITY: ARE YOU MARRIED, WIDOWED, DIVORCED, SEPARATED, NEVER MARRIED, OR LIVING WITH A PARTNER?: MARRIED

## 2025-04-21 SDOH — SOCIAL STABILITY: SOCIAL INSECURITY: WITHIN THE LAST YEAR, HAVE YOU BEEN AFRAID OF YOUR PARTNER OR EX-PARTNER?: PATIENT UNABLE TO ANSWER

## 2025-04-21 SDOH — SOCIAL STABILITY: SOCIAL NETWORK: HOW OFTEN DO YOU ATTEND MEETINGS OF THE CLUBS OR ORGANIZATIONS YOU BELONG TO?: NEVER

## 2025-04-21 SDOH — HEALTH STABILITY: MENTAL HEALTH: HOW MANY DRINKS CONTAINING ALCOHOL DO YOU HAVE ON A TYPICAL DAY WHEN YOU ARE DRINKING?: PATIENT DOES NOT DRINK

## 2025-04-21 SDOH — ECONOMIC STABILITY: HOUSING INSECURITY: IN THE PAST 12 MONTHS, HOW MANY TIMES HAVE YOU MOVED WHERE YOU WERE LIVING?: 0

## 2025-04-21 SDOH — SOCIAL STABILITY: SOCIAL INSECURITY
WITHIN THE LAST YEAR, HAVE YOU BEEN KICKED, HIT, SLAPPED, OR OTHERWISE PHYSICALLY HURT BY YOUR PARTNER OR EX-PARTNER?: PATIENT UNABLE TO ANSWER

## 2025-04-21 SDOH — SOCIAL STABILITY: SOCIAL NETWORK: HOW OFTEN DO YOU GET TOGETHER WITH FRIENDS OR RELATIVES?: MORE THAN THREE TIMES A WEEK

## 2025-04-21 SDOH — HEALTH STABILITY: PHYSICAL HEALTH: ON AVERAGE, HOW MANY DAYS PER WEEK DO YOU ENGAGE IN MODERATE TO STRENUOUS EXERCISE (LIKE A BRISK WALK)?: 0 DAYS

## 2025-04-21 SDOH — HEALTH STABILITY: PHYSICAL HEALTH
HOW OFTEN DO YOU NEED TO HAVE SOMEONE HELP YOU WHEN YOU READ INSTRUCTIONS, PAMPHLETS, OR OTHER WRITTEN MATERIAL FROM YOUR DOCTOR OR PHARMACY?: OFTEN

## 2025-04-21 SDOH — SOCIAL STABILITY: SOCIAL INSECURITY
WITHIN THE LAST YEAR, HAVE YOU BEEN HUMILIATED OR EMOTIONALLY ABUSED IN OTHER WAYS BY YOUR PARTNER OR EX-PARTNER?: PATIENT UNABLE TO ANSWER

## 2025-04-21 SDOH — ECONOMIC STABILITY: INCOME INSECURITY: IN THE PAST 12 MONTHS HAS THE ELECTRIC, GAS, OIL, OR WATER COMPANY THREATENED TO SHUT OFF SERVICES IN YOUR HOME?: NO

## 2025-04-21 SDOH — SOCIAL STABILITY: SOCIAL NETWORK: HOW OFTEN DO YOU ATTEND CHURCH OR RELIGIOUS SERVICES?: NEVER

## 2025-04-21 SDOH — ECONOMIC STABILITY: TRANSPORTATION INSECURITY: IN THE PAST 12 MONTHS, HAS LACK OF TRANSPORTATION KEPT YOU FROM MEDICAL APPOINTMENTS OR FROM GETTING MEDICATIONS?: NO

## 2025-04-21 ASSESSMENT — COGNITIVE AND FUNCTIONAL STATUS - GENERAL
MOBILITY SCORE: 16
DRESSING REGULAR UPPER BODY CLOTHING: A LITTLE
TOILETING: A LITTLE
TURNING FROM BACK TO SIDE WHILE IN FLAT BAD: A LITTLE
DRESSING REGULAR LOWER BODY CLOTHING: A LITTLE
WALKING IN HOSPITAL ROOM: A LOT
MOBILITY SCORE: 16
WALKING IN HOSPITAL ROOM: A LOT
TURNING FROM BACK TO SIDE WHILE IN FLAT BAD: A LITTLE
HELP NEEDED FOR BATHING: A LITTLE
PERSONAL GROOMING: A LITTLE
MOVING FROM LYING ON BACK TO SITTING ON SIDE OF FLAT BED WITH BEDRAILS: A LITTLE
EATING MEALS: A LITTLE
STANDING UP FROM CHAIR USING ARMS: A LITTLE
WALKING IN HOSPITAL ROOM: A LOT
MOVING TO AND FROM BED TO CHAIR: A LITTLE
DRESSING REGULAR LOWER BODY CLOTHING: A LOT
DRESSING REGULAR UPPER BODY CLOTHING: A LITTLE
DAILY ACTIVITIY SCORE: 14
CLIMB 3 TO 5 STEPS WITH RAILING: TOTAL
CLIMB 3 TO 5 STEPS WITH RAILING: A LOT
DRESSING REGULAR UPPER BODY CLOTHING: A LITTLE
EATING MEALS: A LITTLE
TOILETING: TOTAL
HELP NEEDED FOR BATHING: A LOT
MOVING TO AND FROM BED TO CHAIR: A LITTLE
TURNING FROM BACK TO SIDE WHILE IN FLAT BAD: A LITTLE
HELP NEEDED FOR BATHING: A LOT
STANDING UP FROM CHAIR USING ARMS: A LITTLE
STANDING UP FROM CHAIR USING ARMS: A LITTLE
PERSONAL GROOMING: A LITTLE
DAILY ACTIVITIY SCORE: 16
MOBILITY SCORE: 16
CLIMB 3 TO 5 STEPS WITH RAILING: A LOT
MOVING FROM LYING ON BACK TO SITTING ON SIDE OF FLAT BED WITH BEDRAILS: A LITTLE
DAILY ACTIVITIY SCORE: 19
DRESSING REGULAR LOWER BODY CLOTHING: A LITTLE
TOILETING: TOTAL
MOVING TO AND FROM BED TO CHAIR: A LITTLE

## 2025-04-21 ASSESSMENT — ENCOUNTER SYMPTOMS
CONSTIPATION: 0
FEVER: 0
SHORTNESS OF BREATH: 0
ABDOMINAL PAIN: 0
DIARRHEA: 0
CHILLS: 0
COUGH: 1

## 2025-04-21 ASSESSMENT — PAIN SCALES - GENERAL
PAINLEVEL_OUTOF10: 0 - NO PAIN

## 2025-04-21 ASSESSMENT — PAIN - FUNCTIONAL ASSESSMENT
PAIN_FUNCTIONAL_ASSESSMENT: 0-10

## 2025-04-21 ASSESSMENT — ACTIVITIES OF DAILY LIVING (ADL)
BATHING_ASSISTANCE: MODERATE
ADL_ASSISTANCE: INDEPENDENT
LACK_OF_TRANSPORTATION: NO
ADL_ASSISTANCE: INDEPENDENT
ADLS_ADDRESSED: NO
HOME_MANAGEMENT_TIME_ENTRY: 10
LACK_OF_TRANSPORTATION: NO

## 2025-04-21 ASSESSMENT — PAIN SCALES - WONG BAKER: WONGBAKER_NUMERICALRESPONSE: NO HURT

## 2025-04-21 ASSESSMENT — LIFESTYLE VARIABLES
SKIP TO QUESTIONS 9-10: 1
AUDIT-C TOTAL SCORE: 0

## 2025-04-21 NOTE — NURSING NOTE
Hospitalist Dr Rodriguez bedside for triad rounds. Pt agreeable to go to SNF. Pt updated on POC. Deneis any needs.

## 2025-04-21 NOTE — PROGRESS NOTES
Spiritual Care Visit  Spiritual Care Request    Reason for Visit:  Routine Visit: Introduction     Request Received From:       Focus of Care:  Visited With: Patient         Refer to :          Spiritual Care Assessment    Spiritual Assessment:                      Care Provided:       Sense of Community and or Gnosticism Affiliation:  Oriental orthodox   Values/Beliefs  Spiritual Requests During Hospitalization: I gave Angel the anointing outside her room due to the virus.     Addressed Needs/Concerns and/or [place] Through:     Sacramental Encounters  Sacrament of Sick-Anointing: Anointed    Outcome:        Advance Directives:         Spiritual Care Annotation    Annotation:  I gave Angel the anointing outside her room due to the virus today.  Jacinto Grijalva

## 2025-04-21 NOTE — CARE PLAN
No The patient's goals for the shift include      The clinical goals for the shift include hds    Over the shift, the patient did not make progress toward the following goals. Barriers to progression include n/a. Recommendations to address these barriers include n/a.

## 2025-04-21 NOTE — CARE PLAN
The patient's goals for the shift include      The clinical goals for the shift include safety    Ove

## 2025-04-21 NOTE — PROGRESS NOTES
Angel Carroll is a 79 y.o. male on day 1 of admission presenting with Acute COVID-19.      Subjective   States he feels well this morning. Last night had some loose/soft stools which he said is somewhat normal for him. He has occasional episodes of soft/loose stools at home. Reports mild SOB last night but denies SOB this morning. Tolerating PO.        Objective     Last Recorded Vitals  /50 (BP Location: Left arm, Patient Position: Lying)   Pulse 60   Temp 36.3 °C (97.3 °F) (Oral)   Resp 19   Wt 99.8 kg (220 lb)   SpO2 97%   Intake/Output last 3 Shifts:    Intake/Output Summary (Last 24 hours) at 4/21/2025 1035  Last data filed at 4/21/2025 0900  Gross per 24 hour   Intake 590 ml   Output 601 ml   Net -11 ml       Admission Weight  Weight: 99.8 kg (220 lb) (04/20/25 0130)    Daily Weight  04/20/25 : 99.8 kg (220 lb)    Image Results  ECG 12 lead  AV dual-paced rhythm with prolonged AV conduction  Abnormal ECG  When compared with ECG of 24-MAR-2025 13:37, (unconfirmed)  Electronic ventricular pacemaker has replaced Electronic atrial pacemaker      Physical Exam  Constitutional:       General: He is not in acute distress.     Appearance: He is not toxic-appearing.   HENT:      Head: Normocephalic.      Mouth/Throat:      Pharynx: Oropharynx is clear.   Eyes:      General: No scleral icterus.  Cardiovascular:      Rate and Rhythm: Normal rate.   Pulmonary:      Effort: No respiratory distress.      Breath sounds: No wheezing.      Comments: 3L NC  Abdominal:      General: There is no distension.      Palpations: Abdomen is soft.   Musculoskeletal:      Right lower leg: No edema.      Left lower leg: No edema.   Neurological:      Mental Status: He is alert.   Psychiatric:         Behavior: Behavior normal.         Relevant Results                              Assessment & Plan  Acute COVID-19  ID consulted  On remdesivir  Isolation precautions  Chronic hypoxic respiratory failure  Baseline 2-3L NC  Not in  exacerbation   Atrial fibrillation (Multi)  Continue metoprolol, amiodarone, and eliquis  Chronic diastolic heart failure  Continue entresto   Abnormal finding on urinalysis  UTI vs pyuria  Continue empiric unasyn  Follow up urine culture  CKD (chronic kidney disease)  Baseline Cr 1.5-1.7  Stable at baseline    Plan:  Doing well, no significant changes from yesterday  Continue remdesivir and unasyn per ID  Monitor for diarrhea   Follow up urine culture results  Check labs in the AM  Isolation precautions  PT/OT/OOB as able  Anticipate discharge to SNF - patient agreeable to SNF placement, states he would prefer to return to [facility] if possible - TCC notified.            Eugenia Rodriguez MD

## 2025-04-21 NOTE — NURSING NOTE
Assumed care of pt. Pt sitting up on edge of bed. PCA in to clean pt up, as he was incont of stool. Pt denies pain. NAD. 3LNC. Alert X4. [place]. BSSR completed

## 2025-04-21 NOTE — PROGRESS NOTES
Occupational Therapy    Evaluation/Treatment    Patient Name: Angel Carroll  MRN: [id number]  Department: [address]  Room: [address] [place] [address]  Today's Date: 04/21/25  Time Calculation  Start Time: 0923  Stop Time: 0950  Time Calculation (min): 27 min       Assessment:  OT Assessment: OT order received, chart reviewed, evaluation completed. Pt demonstrated generalized weakness and incontinence of stool wihtout awareness. Pt required assist for functional transfers and ADLs. Pt would benefit from acute OT services to facilitate return to [facility].  Prognosis: Good  Barriers to Discharge Home: Caregiver assistance, Physical needs  Caregiver Assistance: Patient lives alone and/or does not have reliable caregiver assistance  Physical Needs: Intermittent mobility assistance needed, Intermittent ADL assistance needed, High falls risk due to function or environment  Evaluation/Treatment Tolerance: Patient limited by fatigue  Medical Staff Made Aware: Yes  End of Session Communication: Bedside nurse  End of Session Patient Position: Bed, 3 rail up, Alarm on  OT Assessment Results: Decreased ADL status, Decreased upper extremity strength, Decreased endurance, Decreased functional mobility, Decreased IADLs  Prognosis: Good  Evaluation/Treatment Tolerance: Patient limited by fatigue  Medical Staff Made Aware: Yes  Strengths: Ability to acquire knowledge  Barriers to Participation: Comorbidities  Plan:  Treatment Interventions: ADL retraining, Functional transfer training, UE strengthening/ROM, Endurance training, Patient/family training, Equipment evaluation/education, Compensatory technique education  OT Frequency: 3 times per week  OT Discharge Recommendations: Moderate intensity level of continued care  Equipment Recommended upon Discharge: Wheeled walker  OT Recommended Transfer Status: Moderate assist  OT - OK to Discharge: Yes  Treatment Interventions: ADL retraining, Functional transfer training, UE strengthening/ROM, Endurance training,  Patient/family training, Equipment evaluation/education, Compensatory technique education    Subjective   Current Problem:  1. Acute COVID-19        2. Acute UTI        3. Generalized weakness          General:   OT Received On: 04/21/25  General  Reason for Referral: activities of daily living, COVID 19  Referred By: Scottie Yuan MD  Past Medical History Relevant to Rehab: CAD, CHF, HLD, pacemaker, h/o tobacco, afib, CKD, BPH, CVA, HTN, depression, anxiety, shingles, R TKR, PCI, lumbar surgery with post lami syndrome, COPD  Family/Caregiver Present: No  Co-Treatment: PT  Co-Treatment Reason: Optimization of patient outcomes  Prior to Session Communication: Bedside nurse  Patient Position Received: Bed, 3 rail up, Alarm off, not on at start of session  Preferred Learning Style: verbal, visual  General Comment: 79 y.o. male admitted with increasing weakness/difficulty getting off the toilet at home, fever, and lightheadedness. pt found to be positive for UTI and COVID.   Precautions:  Hearing/Visual Limitations: wears glasses, very [place]-has bilateral hearing aids but not currently using them  Medical Precautions: Fall precautions, Infection precautions, Oxygen therapy device and L/min (4L O2, COVID 19)  Precautions Comment: +COVID      Vital Signs Comment: 96% on 4L O2 via nc     Pain:  Pain Assessment  Pain Assessment: 0-10  0-10 (Numeric) Pain Score: 0 - No pain    Objective   Cognition:  Overall Cognitive Status: Within Functional Limits  Orientation Level: Oriented X4           Home Living:  Type of Home: House  Lives With: Spouse  Home Adaptive Equipment: Walker rolling or standard, Cane, Wheelchair-manual  Home Layout: Two level, Full bath main level, Able to live on main level with bedroom/bathroom  Home Access: Stairs to enter with rails  Entrance Stairs-Rails: Both  Entrance Stairs-Number of Steps: 4  Bathroom Shower/Tub: Tub/shower unit  Bathroom Toilet: Standard  Bathroom Equipment: Grab bars in  shower, Tub transfer bench  Home Living Comments: does not go upstairs  Prior Function:  Level of [place]: Independent with ADLs and functional transfers, Needs assistance with homemaking  Receives Help From: Family (daughters)  ADL Assistance: Independent  Homemaking Assistance: Needs assistance (family provides transportations and assists with house cleaning, shopping, laundry)  Ambulatory Assistance: Independent (cane v. rolling walker v. wheelchair)  Vocational: Retired  Hand Dominance: Left  Prior Function Comments: pt reports ability to do some cooking/cleaning and moves around better than his spouse  IADL History:     ADL:  Eating Deficit: Setup (Assist opening packages, pt able to cut food and feed self)  Grooming Assistance: Minimal  Grooming Deficit:  (Pt able to wash hands with setup initially but required min A for thoroughness due to syrup.)  Bathing Assistance: Moderate  Bathing Deficit:  (Projected due to current functional status)  UE Dressing Assistance: Minimal  UE Dressing Deficit: Thread RUE, Thread LUE, Pull around back (for gown)  LE Dressing Assistance: Moderate  LE Dressing Deficit:  (assist donning slip on shoes, pt able to slip off. Would need assist for pants)  Toileting Assistance with Device: Total  Toileting Deficit: Incontinent, Perineal hygiene (Pt incontinent of liquid stool, no awareness, dep for extensive hygiene in supine. pt able to assistwith rolling with cues)    Activity Tolerance:  Endurance: Tolerates 10 - 20 min exercise with multiple rests  Activity Tolerance Comments: fair, mildly lightheaded when standing  Rate of Perceived Exertion (RPE): 4/10  Functional Standing Tolerance:     Bed Mobility/Transfers: Bed Mobility  Bed Mobility: Yes  Bed Mobility 1  Bed Mobility 1: Supine to sitting, Sitting to supine  Level of Assistance 1: Contact guard  Bed Mobility Comments 1: Cues for safe hand placement HOB elevated, heavy use of bed rail.    Transfers  Transfer:  Yes  Transfer 1  Transfer From 1: Bed to, Stand to  Transfer to 1: Stand, Bed  Technique 1: Sit to stand, Stand to sit  Transfer Device 1: Walker  Transfer Level of Assistance 1: Minimum assistance  Trials/Comments 1: Cues for safe hand placement not heeded, pulls up from RW, min A for balance and safety, returned to EOB with min A to control descent    Functional Mobility:  Functional Mobility  Functional Mobility Performed: Yes  Functional Mobility 1  Surface 1: Level tile  Device 1: Rolling walker  Assistance 1: Moderate assistance  Comments 1: Pt able to take several steps to HOB with mod A and cues, assist for RW management.    Sensation:  Light Touch: No apparent deficits  Sensation Comment: denies paresthesia  Strength:  Strength Comments: B UEs 3/5 overall    Coordination:  Movements are Fluid and Coordinated: Yes (for upper body)   Hand Function:  Hand Function  Gross Grasp: Functional  Coordination: Functional  Extremities: RUE   RUE : Within Functional Limits and LUE   LUE: Within Functional Limits    Outcome Measures: [facility] Daily Activity  Putting on and taking off regular lower body clothing: A lot  Bathing (including washing, rinsing, drying): A lot  Putting on and taking off regular upper body clothing: A little  Toileting, which includes using toilet, bedpan or urinal: Total  Taking care of personal grooming such as brushing teeth: A little  Eating Meals: A little  Daily Activity - Total Score: 14        Education Documentation  ADL Training, taught by Dee Dee Huff OT at 4/21/2025 10:38 AM.  Learner: Patient  Readiness: Acceptance  Method: Explanation  Response: Verbalizes Understanding  Comment: Pt edu on OT POC    Education Comments  Pt educated on occupational therapy plan of care, safety/fall precautions, call light use.       Goals:  Encounter Problems       Encounter Problems (Active)       OT Goals       ADLs       Start:  04/21/25    Expected End:  05/09/25       Pt will complete ADL tasks  with Mod I, using AE as needed, in order to complete self-care tasks.           Functional transfers       Start:  04/21/25    Expected End:  05/09/25       Pt will perform functional transfers at mod in level with RW           Functional mobility       Start:  04/21/25    Expected End:  05/09/25       Pt will perform functional mobility household distance at mod ind level with RW

## 2025-04-21 NOTE — PROGRESS NOTES
TCC spoke to patient's daughter on the phone. Assessment complete. Patient lives in a house with his spouse. Patient is independent. Patient has a cane, rollator and wheelchair. Patient wears 2 liters of oxygen at all times. Patient no longer drives, his daughters transport. Patient and spouse receive Meals on Wheels M-F. Patient follows Dr. Jung Tao. Patient fills prescriptions at [facility] in [place]. Patient and daughter agreeable for SNF and their choice is [facility]. Referral was sent but they cannot accept until patient is day 10 isolation for covid. Will get a new choice. No precert needed. Will follow.      04/21/25 1216   Discharge Planning   Living Arrangements Spouse/significant other   Support Systems Spouse/significant other;Children   Type of Residence Private residence   Home or Post Acute Services Post acute facilities (Rehab/SNF/etc)   Type of Post Acute Facility Services Rehab   Expected Discharge Disposition SNF  (TBD)   Does the patient need discharge transport arranged? Yes   RoundTrip coordination needed? Yes   Financial Resource Strain   How hard is it for you to pay for the very basics like food, housing, medical care, and heating? Not very   Housing Stability   In the last 12 months, was there a time when you were not able to pay the mortgage or rent on time? N   In the past 12 months, how many times have you moved where you were living? 0   At any time in the past 12 months, were you homeless or living in a shelter (including now)? N   Transportation Needs   In the past 12 months, has lack of transportation kept you from medical appointments or from getting medications? no   In the past 12 months, has lack of transportation kept you from meetings, work, or from getting things needed for daily living? No   Patient Choice   Provider Choice list and CMS website (https://medicare.gov/care-compare#search) for post-acute Quality and Resource Measure Data were provided and reviewed with:  Patient;Family   Patient / Family choosing to utilize agency / facility established prior to hospitalization No

## 2025-04-21 NOTE — PROGRESS NOTES
04/21/25 1136   Physical Activity   On average, how many days per week do you engage in moderate to strenuous exercise (like a brisk walk)? 0 days   On average, how many minutes do you engage in exercise at this level? 0 min   Financial Resource Strain   How hard is it for you to pay for the very basics like food, housing, medical care, and heating? Not very   Housing Stability   In the last 12 months, was there a time when you were not able to pay the mortgage or rent on time? N   In the past 12 months, how many times have you moved where you were living? 0   At any time in the past 12 months, were you homeless or living in a shelter (including now)? N   Transportation Needs   In the past 12 months, has lack of transportation kept you from medical appointments or from getting medications? no   In the past 12 months, has lack of transportation kept you from meetings, work, or from getting things needed for daily living? No   Food Insecurity   Within the past 12 months, you worried that your food would run out before you got the money to buy more. Never true   Within the past 12 months, the food you bought just didn't last and you didn't have money to get more. Never true   Stress   Do you feel stress - tense, restless, nervous, or anxious, or unable to sleep at night because your mind is troubled all the time - these days? Not at all   Social Connections   In a typical week, how many times do you talk on the phone with family, friends, or neighbors? More than 3   How often do you get together with friends or relatives? More than 3   How often do you attend Nondenominational or Spiritism services? Never   Do you belong to any clubs or organizations such as Nondenominational groups, unions, fraternal or athletic groups, or school groups? No   How often do you attend meetings of the clubs or organizations you belong to? Never   Are you , , , , never , or living with a partner?    Intimate  Partner Violence   Within the last year, have you been afraid of your partner or ex-partner? Pt Unable   Within the last year, have you been humiliated or emotionally abused in other ways by your partner or ex-partner? Pt Unable   Within the last year, have you been kicked, hit, slapped, or otherwise physically hurt by your partner or ex-partner? Pt Unable   Within the last year, have you been raped or forced to have any kind of sexual activity by your partner or ex-partner? Pt Unable   Alcohol Use   Q1: How often do you have a drink containing alcohol? Never   Q2: How many drinks containing alcohol do you have on a typical day when you are drinking? None   Q3: How often do you have six or more drinks on one occasion? Never   Utilities   In the past 12 months has the electric, gas, oil, or water company threatened to shut off services in your home? No   Health Literacy   How often do you need to have someone help you when you read instructions, pamphlets, or other written material from your doctor or pharmacy? Often

## 2025-04-21 NOTE — PROGRESS NOTES
Angel Carroll is a 79 y.o. male on day 1 of admission presenting with Acute COVID-19.    Subjective   Interval History:   Afebrile, no chills  Remains on 3 L of oxygen  Cough productive of phlegm  No chest pain  No shortness of breath at rest  No nausea vomiting or diarrhea  Right elbow swelling    Review of Systems   Constitutional:  Negative for chills and fever.   Respiratory:  Positive for cough. Negative for shortness of breath.    Cardiovascular:  Negative for chest pain.   Gastrointestinal:  Negative for abdominal pain, constipation and diarrhea.       Objective   Range of Vitals (last 24 hours)  Heart Rate:  [59-60]   Temp:  [36.3 °C (97.3 °F)-37 °C (98.6 °F)]   Resp:  [19-20]   BP: (135-152)/(50-60)   SpO2:  [97 %-99 %]   Daily Weight  04/20/25 : 99.8 kg (220 lb)    Body mass index is 29.84 kg/m².    Physical Exam  Constitutional:       Appearance: Normal appearance.   HENT:      Head: Normocephalic and atraumatic.   Eyes:      General: No scleral icterus.     Extraocular Movements: Extraocular movements intact.      Conjunctiva/sclera: Conjunctivae normal.   Cardiovascular:      Rate and Rhythm: Normal rate and regular rhythm.      Heart sounds: Normal heart sounds, S1 normal and S2 normal.   Pulmonary:      Breath sounds: Decreased breath sounds present.   Abdominal:      General: Bowel sounds are normal.      Palpations: Abdomen is soft.      Tenderness: There is no abdominal tenderness.   Musculoskeletal:      Right elbow: Swelling present.      Cervical back: Normal range of motion and neck supple.      Right lower leg: No edema.      Left lower leg: No edema.      Comments: Firm, mobile mass   Neurological:      Mental Status: He is alert.         Antibiotics  ampicillin-sulbactam - 3 gram/100 mL    Relevant Results  Labs  Results from last 72 hours   Lab Units 04/20/25  0726 04/19/25 2022   WBC AUTO x10*3/uL 5.8  5.8 4.9   HEMOGLOBIN g/dL 11.3*  11.3* 10.7*   HEMATOCRIT % 35.7*  35.7* 33.0*  "  PLATELETS AUTO x10*3/uL 125*  125* 142*   NEUTROS PCT AUTO % 82.1 79.9   LYMPHS PCT AUTO % 7.8 10.4   MONOS PCT AUTO % 8.5 8.5   EOS PCT AUTO % 1.0 0.4     Results from last 72 hours   Lab Units 04/20/25  0726 04/19/25 2022   SODIUM mmol/L 140  141 139   POTASSIUM mmol/L 3.6  3.6 3.6   CHLORIDE mmol/L 105  105 104   CO2 mmol/L 25  27 28   BUN mg/dL 18  18 17   CREATININE mg/dL 1.66*  1.62* 1.68*   GLUCOSE mg/dL 88  92 94   CALCIUM mg/dL 8.6  8.5* 8.3*   ANION GAP mmol/L 14  13 11   EGFR mL/min/1.73m*2 41*  42* 41*     Results from last 72 hours   Lab Units 04/20/25  0726 04/19/25 2022   ALK PHOS U/L 65 68   BILIRUBIN TOTAL mg/dL 1.0 1.0   PROTEIN TOTAL g/dL 6.1* 6.1*   ALT U/L 25 26   AST U/L 33 33   ALBUMIN g/dL 3.4 3.5     Estimated Creatinine Clearance: 43.8 mL/min (A) (by C-G formula based on SCr of 1.62 mg/dL (H)).  No results found for: \"CRP\"  Microbiology  Reviewed-blood and urine cultures pending  Imaging  ECG 12 lead  Result Date: 4/21/2025  AV dual-paced rhythm with prolonged AV conduction Abnormal ECG When compared with ECG of 24-MAR-2025 13:37, (unconfirmed) Electronic ventricular pacemaker has replaced Electronic atrial pacemaker Confirmed by Sadie Hollis ([id number]) on 4/21/2025 2:26:08 PM    XR chest 1 view  Result Date: 4/19/2025  Interpreted By:  Sen Hardwick, STUDY: XR CHEST 1 VIEW;  4/19/2025 8:35 pm   INDICATION: Signs/Symptoms:cough, congestion.   COMPARISON: Chest x-ray 01/30/2025   ACCESSION NUMBER(S): [id number]   ORDERING CLINICIAN: ISAAC SHEA   FINDINGS: Left-sided dual lead pacer is stable in position. Overlying leads noted.   CARDIOMEDIASTINAL SILHOUETTE: Cardiac silhouette is enlarged. Diffuse interstitial prominence. Atherosclerotic calcification of the aorta. In the   LUNGS: No consolidation, pleural effusion or pneumothorax.   ABDOMEN: No remarkable upper abdominal findings.   BONES: Multilevel degenerative changes of the spine.       Cardiomegaly with " diffuse interstitial prominence which could be chronic or relate to component of developing interstitial edema. Correlate clinically.   MACRO: None   Signed by: Sen Hardwick 4/19/2025 8:46 PM Dictation workstation:   [id number]    XR elbow right 3+ views  Result Date: 4/15/2025  Interpreted By:  Kelechi Chopra, STUDY: XR ELBOW RIGHT 3+ VIEWS; ;  4/15/2025 12:50 pm   INDICATION: Signs/Symptoms:pain.   ,R52 Pain, unspecified   COMPARISON: None.   ACCESSION NUMBER(S): [id number]   ORDERING CLINICIAN: ANU ALVARADO   FINDINGS: No acute fracture or dislocation. Small olecranon enthesophyte. Posterior elbow soft tissue swelling. 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       Soft tissue swelling in the posterior elbow, correlate clinically for olecranon bursitis.   Moderate elbow joint osteoarthritis. Ring osteophytes in the radial head without definite acute fracture.     MACRO: None   Signed by: Kelechi Chopra 4/15/2025 1:01 PM Dictation workstation:   [id number]    US thyroid  Result Date: 4/12/2025  Interpreted By:  Natalee Ruiz, STUDY: US THYROID;  4/11/2025 2:21 pm   INDICATION: Signs/Symptoms:thyroid nodule.   ,E04.1 Nontoxic single thyroid nodule   COMPARISON: 09/01/2022   ACCESSION NUMBER(S): [id number]   ORDERING CLINICIAN: MARYBEL CHRISTOPHER   TECHNIQUE: Multiple ultrasonographic images of the thyroid gland were obtained.   FINDINGS:     RIGHT LOBE: The right lobe of the thyroid measures 2.7 cm x 2.8 cm x 5.3 cm. The right lobe of the thyroid is homogeneous in echotexture and demonstrates an isoechoic solid nodule. This is CR 3 measuring 15 x 22 x 13 mm and apparently smaller since the prior exam. Another hypoechoic likely solid nodule measuring 12 x 13 x 11 mm and TR 4. This is also smaller since the prior exam.. It appears more hypoechoic since the prior exam however.   LEFT LOBE: The left lobe of the thyroid measures 1.6 cm x 2.5 cm x 5.8 cm. The left lobe of the  thyroid is homogeneous in echotexture and demonstrates no nodules.   ISTHMUS: The isthmus measures approximately 0.4 cm and is homogeneous in echotexture and without any identifiable nodules.   CERVICAL LYMPH NODES: No significant adenopathy.       Right lobe thyroid nodule as described and apparently smaller since the prior exam.   Please note that these statements are based on the recommendations of the American College [employer]   TI-RADS grading system. ACR TI-RADS recommendations (apply to nodules which have NOT been biopsied):   TR5 (?7 points) highly suspicious - FNA if ? 1cm, follow-up if 0.5 -0.9 cm every year for 5 years. Aggregate cancer risk 35%. TR4 (4-6 points) moderately suspicious - FNA if ? 1.5cm, follow-up if 1 -1.4 cm in 1, 2, 3 and 5 years. Aggregate cancer risk 9.1% TR3 (3 points) mildly suspicious - FNA if ? 2.5cm, follow-up if 1.5 -2.4 cm in 1, 3 and 5 years. Aggregate cancer risk 4.8% TR2 (2 points) not suspicious. No FNA or follow-up.Aggregate cancer risk 1.5% TR1 (0 points) benign - No FNA or follow-up. Aggregate cancer risk 0.3%   MACRO: None     Signed by: Natalee Ruiz 4/12/2025 1:00 PM Dictation workstation:   [id number]      Assessment/Plan   Chronic respiratory failure, at baseline  Coronavirus bronchitis-patient at risk for progression   Pyuria versus Urinary tract infection-previous urine cultures are growing Enterococcus, Aerococcus and Proteus-all susceptible to ampicillin  Chronic kidney disease stage III, possible acute component  Recent right olecranon bursitis, no clinical evidence of active infection     Remdesivir-risk reduction  Unasyn-empiric  Follow-up urine culture  Oxygen as needed  Droplet plus precautions  Supportive care  Monitor temperature and WBC  Monitor renal function  Further recommendations based on culture results        I spent 30 minutes in the professional and overall care of this patient.      Ron Yuan MD

## 2025-04-21 NOTE — PROGRESS NOTES
Physical Therapy    Physical Therapy Evaluation    Patient Name: Angel Carroll  MRN: [id number]  Department: [address]  Room: [address] [place], [place] [address]  Today's Date: 4/21/2025   Time Calculation  Start Time: 0922  Stop Time: 0945  Time Calculation (min): 23 min    Assessment/Plan   PT Assessment  PT Assessment Results: Decreased strength, Decreased range of motion, Decreased endurance, Impaired balance, Decreased mobility, Decreased coordination, Decreased safety awareness  Rehab Prognosis: Good  Barriers to Discharge Home: Caregiver assistance, Physical needs  Caregiver Assistance: Caregiver assistance needed per identified barriers - however, level of patient's required assistance exceeds assistance available at home  Physical Needs: Stair navigation into home limited by function/safety, Intermittent mobility assistance needed, 24hr ADL assistance needed  Evaluation/Treatment Tolerance: Patient limited by fatigue  Medical Staff Made Aware: Yes  Strengths: Premorbid level of function  Barriers to Participation: Comorbidities  End of Session Communication: Bedside nurse  Assessment Comment: pt with decreased functional mobility, increased weakness, impaired tolerance to activity, and poor safety awareness. pt is functioning below his baseline and will benefit from continued skilled therapy services to improve functional performance and maximize safety.  End of Session Patient Position: Bed, 3 rail up, Alarm on  IP OR SWING BED PT PLAN  Inpatient or Swing Bed: Inpatient  PT Plan  Treatment/Interventions: Bed mobility, Transfer training, Gait training, Stair training, Balance training, Strengthening, Endurance training, Range of motion, Therapeutic exercise, Therapeutic activity  PT Plan: Ongoing PT  PT Frequency: 3 times per week  PT Discharge Recommendations: Moderate intensity level of continued care  Equipment Recommended upon Discharge: Wheeled walker  PT Recommended Transfer Status: Assist x1, Assistive device  PT - OK to Discharge:  Yes    Subjective   General Visit Information:  General  Reason for Referral: Impaired Mobility, Gait Training  Referred By: Scottie Yuan MD  Past Medical History Relevant to Rehab: CAD, CHF, HLD, pacemaker, h/o tobacco, afib, CKD, BPH, CVA, HTN, depression, anxiety, shingles, R TKR, PCI, lumbar surgery with post lami syndrome, COPD  Family/Caregiver Present: No  Co-Treatment: OT  Co-Treatment Reason: Optimization of patient outcomes  Prior to Session Communication: Bedside nurse  Patient Position Received: Bed, 3 rail up, Alarm off, not on at start of session  Preferred Learning Style: verbal, visual  General Comment: 79 y.o. male admitted with increasing weakness/difficulty getting off the toilet at home, fever, and lightheadedness. pt found to be positive for UTI and COVID.  Home Living:  Home Living  Type of Home: House  Lives With: Spouse  Home Adaptive Equipment: Walker rolling or standard, Cane, Wheelchair-manual  Home Layout: Two level, Full bath main level, Able to live on main level with bedroom/bathroom  Home Access: Stairs to enter with rails  Entrance Stairs-Rails: Both  Entrance Stairs-Number of Steps: 4  Bathroom Shower/Tub: Tub/shower unit  Bathroom Toilet: Standard  Bathroom Equipment: Grab bars in shower, Tub transfer bench  Home Living Comments: does not go upstairs  Prior Level of Function:  Prior Function Per Pt/Caregiver Report  Level of [place]: Independent with ADLs and functional transfers, Needs assistance with homemaking  Receives Help From: Family (daughters)  ADL Assistance: Independent  Homemaking Assistance:  (family provides transportations and assists with house cleaning, shopping, laundry)  Ambulatory Assistance: Independent (cane v. rolling walker v. wheelchair)  Vocational: Retired  Hand Dominance: Left  Prior Function Comments: pt reports ability to do some cooking/cleaning and moves around better than his spouse  Precautions:  Precautions  Hearing/Visual  Limitations: wears glasses, very [place]-has bilateral hearing aids but not currently using them  Medical Precautions: Fall precautions, Infection precautions, Oxygen therapy device and L/min (4L O2 via NC, droplet plus precautions)  Precautions Comment: +COVID     Vital Signs Comment: 96% on 4L O2 via nc     Objective   Pain:  Pain Assessment  Pain Assessment: 0-10  0-10 (Numeric) Pain Score: 0 - No pain  Cognition:  Cognition  Overall Cognitive Status: Within Functional Limits  Orientation Level: Oriented X4    General Assessments:  General Observation  General Observation: skin redness buttocks and periarea, noted during dependent hygiene    Activity Tolerance  Endurance: Decreased tolerance for upright activites  Activity Tolerance Comments: fair, mildly lightheaded when standing  Rate of Perceived Exertion (RPE): 4    Sensation  Sensation Comment: denies paresthesias    Strength  Strength Comments: B LE 4-/5, assessed via function  Coordination  Coordination Comment: increased time to complete tasks    Postural Control  Posture Comment: rounded shoulders, forward head posture    Static Sitting Balance  Static Sitting-Balance Support: Bilateral upper extremity supported, Feet supported  Static Sitting-Level of Assistance: Close supervision  Static Sitting-Comment/Number of Minutes: good balance seated on EOB    Static Standing Balance  Static Standing-Balance Support: Bilateral upper extremity supported  Static Standing-Level of Assistance: Contact guard  Static Standing-Comment/Number of Minutes: good- with static stance at the bedside with rolling walker  Dynamic Standing Balance  Dynamic Standing-Balance Support: Bilateral upper extremity supported  Dynamic Standing-Level of Assistance: Minimum assistance  Dynamic Standing-Comments: fair+ balance with side stepping to HOB with the rolling walker, min A  Functional Assessments:  ADL  ADL's Addressed: No    Bed Mobility  Bed Mobility: Yes  Bed Mobility 1  Bed  Mobility 1: Supine to sitting  Level of Assistance 1: Contact guard  Bed Mobility Comments 1: HOB elevated and heavy use of bed rail  Bed Mobility 2  Bed Mobility  2: Sitting to supine  Level of Assistance 2: Contact guard  Bed Mobility Comments 2: verbal cues for positioning in center of bed  Bed Mobility 3  Bed Mobility 3: Rolling right, Rolling left  Level of Assistance 3: Minimum assistance  Bed Mobility Comments 3: verbal cues for log rolling, able to hold bed rial to maintin sidelying position for dependent hygiene  Bed Mobility 4  Bed Mobility 4: Scooting  Level of Assistance 4: Maximum assistance, +2  Bed Mobility Comments 4: verbal cues to flex knees, cross arms over chest, and lift head, max A x 2 using draw sheet to boost to HOB.    Transfers  Transfer: Yes  Transfer 1  Transfer From 1: Bed to, Stand to  Transfer to 1: Stand, Bed  Technique 1: Sit to stand, Stand to sit  Transfer Device 1: Walker  Transfer Level of Assistance 1: Minimum assistance  Trials/Comments 1: pt pulling on stabilized wlaker to stand with min A for balance. verbal cues for hand placement, CGA for eccentric control in sitting.    Ambulation/Gait Training  Ambulation/Gait Training Performed:  (Amb 4 side steps to the head of the bed with the rolling walker, min A for balance and safety, forward flexed posture.)    Stairs  Stairs: No  Extremity/Trunk Assessments:  RLE   RLE :  (slight weakness with decreased full ROM at end ranges hip and knee)  LLE   LLE :  (slight weakness with decreased full ROM at end ranges hip and knee)  Outcome Measures:  [facility] Basic Mobility  Turning from your back to your side while in a flat bed without using bedrails: A little  Moving from lying on your back to sitting on the side of a flat bed without using bedrails: A little  Moving to and from bed to chair (including a wheelchair): A little  Standing up from a chair using your arms (e.g. wheelchair or bedside chair): A little  To walk in hospital room: A  lot  Climbing 3-5 steps with railing: A lot  Basic Mobility - Total Score: 16    Encounter Problems       Encounter Problems (Active)       PT STG Problem       Patient will transfer supine to sit and sit to supine with independent assist to facilitate mobility. (Progressing)       Start:  04/21/25    Expected End:  05/12/25            Patient will transfer sit to stand and stand to sit with independent assist to facilitate mobility. (Progressing)       Start:  04/21/25    Expected End:  05/12/25            Patient will transfer bed to chair and chair to bed with independent assist to facilitate mobility. (Progressing)       Start:  04/21/25    Expected End:  05/12/25            Patient will amb 50 feet rolling walker device including two turns on even surface with independent assist to facilitate safe mobility.   (Progressing)       Start:  04/21/25    Expected End:  05/12/25            Patient will negotiate 4 stairs with two rail(s) and supervision} assist with no device for in home and community. (Not Progressing)       Start:  04/21/25    Expected End:  05/12/25                   Education Documentation  Mobility Training, taught by Mindy Quiroga PT at 4/21/2025 10:25 AM.  Learner: Patient  Readiness: Acceptance  Method: Explanation  Response: Verbalizes Understanding    Education Comments  04/21/25 1025 Mindy Quiroga PT  Educated on PT POC

## 2025-04-22 ENCOUNTER — TELEPHONE (OUTPATIENT)
Dept: ORTHOPEDIC SURGERY | Facility: CLINIC | Age: 80
End: 2025-04-22
Payer: MEDICARE

## 2025-04-22 LAB
ANION GAP SERPL CALCULATED.3IONS-SCNC: 9 MMOL/L (ref 10–20)
ATRIAL RATE: 288 BPM
BACTERIA SPEC AEROBE CULT: ABNORMAL
BACTERIA SPEC ANAEROBE CULT: ABNORMAL
BACTERIA UR CULT: NORMAL
BASOPHILS # BLD AUTO: 0.02 X10*3/UL (ref 0–0.1)
BASOPHILS NFR BLD AUTO: 0.8 %
BUN SERPL-MCNC: 19 MG/DL (ref 6–23)
CALCIUM SERPL-MCNC: 7.8 MG/DL (ref 8.6–10.3)
CHLORIDE SERPL-SCNC: 108 MMOL/L (ref 98–107)
CO2 SERPL-SCNC: 29 MMOL/L (ref 21–32)
CREAT SERPL-MCNC: 1.25 MG/DL (ref 0.5–1.3)
EGFRCR SERPLBLD CKD-EPI 2021: 58 ML/MIN/1.73M*2
EOSINOPHIL # BLD AUTO: 0.08 X10*3/UL (ref 0–0.4)
EOSINOPHIL NFR BLD AUTO: 3.2 %
ERYTHROCYTE [DISTWIDTH] IN BLOOD BY AUTOMATED COUNT: 17.3 % (ref 11.5–14.5)
GLUCOSE SERPL-MCNC: 84 MG/DL (ref 74–99)
HCT VFR BLD AUTO: 33.4 % (ref 41–52)
HGB BLD-MCNC: 10.6 G/DL (ref 13.5–17.5)
IMM GRANULOCYTES # BLD AUTO: 0.02 X10*3/UL (ref 0–0.5)
IMM GRANULOCYTES NFR BLD AUTO: 0.8 % (ref 0–0.9)
LYMPHOCYTES # BLD AUTO: 0.5 X10*3/UL (ref 0.8–3)
LYMPHOCYTES NFR BLD AUTO: 20.2 %
MCH RBC QN AUTO: 30.4 PG (ref 26–34)
MCHC RBC AUTO-ENTMCNC: 31.7 G/DL (ref 32–36)
MCV RBC AUTO: 96 FL (ref 80–100)
MONOCYTES # BLD AUTO: 0.35 X10*3/UL (ref 0.05–0.8)
MONOCYTES NFR BLD AUTO: 14.1 %
NEUTROPHILS # BLD AUTO: 1.51 X10*3/UL (ref 1.6–5.5)
NEUTROPHILS NFR BLD AUTO: 60.9 %
NRBC BLD-RTO: 0 /100 WBCS (ref 0–0)
PLATELET # BLD AUTO: 109 X10*3/UL (ref 150–450)
POTASSIUM SERPL-SCNC: 3.1 MMOL/L (ref 3.5–5.3)
PR INTERVAL: 266 MS
Q ONSET: 188 MS
QRS COUNT: 10 BEATS
QRS DURATION: 180 MS
QT INTERVAL: 544 MS
QTC CALCULATION(BAZETT): 544 MS
QTC FREDERICIA: 544 MS
R AXIS: -87 DEGREES
RBC # BLD AUTO: 3.49 X10*6/UL (ref 4.5–5.9)
SODIUM SERPL-SCNC: 143 MMOL/L (ref 136–145)
T AXIS: 82 DEGREES
T OFFSET: 460 MS
VENTRICULAR RATE: 60 BPM
WBC # BLD AUTO: 2.5 X10*3/UL (ref 4.4–11.3)

## 2025-04-22 PROCEDURE — 36415 COLL VENOUS BLD VENIPUNCTURE: CPT | Performed by: STUDENT IN AN ORGANIZED HEALTH CARE EDUCATION/TRAINING PROGRAM

## 2025-04-22 PROCEDURE — 97110 THERAPEUTIC EXERCISES: CPT | Mod: GO,CO

## 2025-04-22 PROCEDURE — 2500000002 HC RX 250 W HCPCS SELF ADMINISTERED DRUGS (ALT 637 FOR MEDICARE OP, ALT 636 FOR OP/ED): Performed by: STUDENT IN AN ORGANIZED HEALTH CARE EDUCATION/TRAINING PROGRAM

## 2025-04-22 PROCEDURE — 99232 SBSQ HOSP IP/OBS MODERATE 35: CPT | Performed by: STUDENT IN AN ORGANIZED HEALTH CARE EDUCATION/TRAINING PROGRAM

## 2025-04-22 PROCEDURE — 80048 BASIC METABOLIC PNL TOTAL CA: CPT | Performed by: STUDENT IN AN ORGANIZED HEALTH CARE EDUCATION/TRAINING PROGRAM

## 2025-04-22 PROCEDURE — 1200000002 HC GENERAL ROOM WITH TELEMETRY DAILY

## 2025-04-22 PROCEDURE — 97530 THERAPEUTIC ACTIVITIES: CPT | Mod: GO,CO

## 2025-04-22 PROCEDURE — 85025 COMPLETE CBC W/AUTO DIFF WBC: CPT | Performed by: STUDENT IN AN ORGANIZED HEALTH CARE EDUCATION/TRAINING PROGRAM

## 2025-04-22 PROCEDURE — 97535 SELF CARE MNGMENT TRAINING: CPT | Mod: GO,CO

## 2025-04-22 PROCEDURE — 2500000002 HC RX 250 W HCPCS SELF ADMINISTERED DRUGS (ALT 637 FOR MEDICARE OP, ALT 636 FOR OP/ED): Performed by: INTERNAL MEDICINE

## 2025-04-22 PROCEDURE — 2500000001 HC RX 250 WO HCPCS SELF ADMINISTERED DRUGS (ALT 637 FOR MEDICARE OP): Performed by: INTERNAL MEDICINE

## 2025-04-22 PROCEDURE — 2500000004 HC RX 250 GENERAL PHARMACY W/ HCPCS (ALT 636 FOR OP/ED): Mod: JZ | Performed by: INTERNAL MEDICINE

## 2025-04-22 RX ORDER — POTASSIUM CHLORIDE 20 MEQ/1
40 TABLET, EXTENDED RELEASE ORAL ONCE
Status: COMPLETED | OUTPATIENT
Start: 2025-04-22 | End: 2025-04-22

## 2025-04-22 RX ADMIN — FINASTERIDE 5 MG: 5 TABLET, FILM COATED ORAL at 09:06

## 2025-04-22 RX ADMIN — CYANOCOBALAMIN TAB 500 MCG 500 MCG: 500 TAB at 09:06

## 2025-04-22 RX ADMIN — AMPICILLIN SODIUM AND SULBACTAM SODIUM 3 G: 2; 1 INJECTION, POWDER, FOR SOLUTION INTRAMUSCULAR; INTRAVENOUS at 03:48

## 2025-04-22 RX ADMIN — APIXABAN 2.5 MG: 2.5 TABLET, FILM COATED ORAL at 20:24

## 2025-04-22 RX ADMIN — PAROXETINE HYDROCHLORIDE 20 MG: 20 TABLET, FILM COATED ORAL at 09:06

## 2025-04-22 RX ADMIN — POTASSIUM CHLORIDE 40 MEQ: 1500 TABLET, EXTENDED RELEASE ORAL at 09:06

## 2025-04-22 RX ADMIN — FOLIC ACID 1 MG: 1 TABLET ORAL at 09:06

## 2025-04-22 RX ADMIN — AMPICILLIN SODIUM AND SULBACTAM SODIUM 3 G: 2; 1 INJECTION, POWDER, FOR SOLUTION INTRAMUSCULAR; INTRAVENOUS at 09:06

## 2025-04-22 RX ADMIN — REMDESIVIR 100 MG: 100 INJECTION, POWDER, LYOPHILIZED, FOR SOLUTION INTRAVENOUS at 16:14

## 2025-04-22 RX ADMIN — FERROUS SULFATE TAB 325 MG (65 MG ELEMENTAL FE) 325 MG: 325 (65 FE) TAB at 09:06

## 2025-04-22 RX ADMIN — ATORVASTATIN CALCIUM 20 MG: 20 TABLET, FILM COATED ORAL at 09:06

## 2025-04-22 RX ADMIN — SACUBITRIL AND VALSARTAN 1 TABLET: 24; 26 TABLET, FILM COATED ORAL at 20:24

## 2025-04-22 RX ADMIN — FENOFIBRATE 54 MG: 54 TABLET ORAL at 09:05

## 2025-04-22 RX ADMIN — APIXABAN 2.5 MG: 2.5 TABLET, FILM COATED ORAL at 09:06

## 2025-04-22 RX ADMIN — METOPROLOL SUCCINATE 25 MG: 25 TABLET, EXTENDED RELEASE ORAL at 09:06

## 2025-04-22 RX ADMIN — AMIODARONE HYDROCHLORIDE 100 MG: 100 TABLET ORAL at 09:06

## 2025-04-22 RX ADMIN — GABAPENTIN 100 MG: 100 CAPSULE ORAL at 20:24

## 2025-04-22 RX ADMIN — TAMSULOSIN HYDROCHLORIDE 0.4 MG: 0.4 CAPSULE ORAL at 20:24

## 2025-04-22 RX ADMIN — AMPICILLIN SODIUM AND SULBACTAM SODIUM 3 G: 2; 1 INJECTION, POWDER, FOR SOLUTION INTRAMUSCULAR; INTRAVENOUS at 21:00

## 2025-04-22 RX ADMIN — AMPICILLIN SODIUM AND SULBACTAM SODIUM 3 G: 2; 1 INJECTION, POWDER, FOR SOLUTION INTRAMUSCULAR; INTRAVENOUS at 15:41

## 2025-04-22 RX ADMIN — SACUBITRIL AND VALSARTAN 1 TABLET: 24; 26 TABLET, FILM COATED ORAL at 09:06

## 2025-04-22 RX ADMIN — ACETAMINOPHEN 650 MG: 325 TABLET ORAL at 20:55

## 2025-04-22 ASSESSMENT — COGNITIVE AND FUNCTIONAL STATUS - GENERAL
HELP NEEDED FOR BATHING: A LOT
EATING MEALS: A LITTLE
TOILETING: A LOT
DRESSING REGULAR UPPER BODY CLOTHING: A LITTLE
DRESSING REGULAR UPPER BODY CLOTHING: A LITTLE
DAILY ACTIVITIY SCORE: 18
CLIMB 3 TO 5 STEPS WITH RAILING: A LOT
DAILY ACTIVITIY SCORE: 15
MOVING TO AND FROM BED TO CHAIR: A LITTLE
TURNING FROM BACK TO SIDE WHILE IN FLAT BAD: A LITTLE
STANDING UP FROM CHAIR USING ARMS: A LOT
WALKING IN HOSPITAL ROOM: A LOT
MOBILITY SCORE: 15
MOVING TO AND FROM BED TO CHAIR: A LITTLE
PERSONAL GROOMING: A LITTLE
DRESSING REGULAR LOWER BODY CLOTHING: A LOT
DAILY ACTIVITIY SCORE: 18
MOBILITY SCORE: 15
CLIMB 3 TO 5 STEPS WITH RAILING: A LOT
EATING MEALS: A LITTLE
HELP NEEDED FOR BATHING: A LITTLE
STANDING UP FROM CHAIR USING ARMS: A LOT
TOILETING: A LITTLE
WALKING IN HOSPITAL ROOM: A LOT
PERSONAL GROOMING: A LITTLE
MOVING FROM LYING ON BACK TO SITTING ON SIDE OF FLAT BED WITH BEDRAILS: A LITTLE
EATING MEALS: A LITTLE
DRESSING REGULAR UPPER BODY CLOTHING: A LITTLE
TOILETING: A LITTLE
TURNING FROM BACK TO SIDE WHILE IN FLAT BAD: A LITTLE
MOVING FROM LYING ON BACK TO SITTING ON SIDE OF FLAT BED WITH BEDRAILS: A LITTLE
DRESSING REGULAR LOWER BODY CLOTHING: A LITTLE
DRESSING REGULAR LOWER BODY CLOTHING: A LITTLE
PERSONAL GROOMING: A LITTLE
HELP NEEDED FOR BATHING: A LITTLE

## 2025-04-22 ASSESSMENT — PAIN - FUNCTIONAL ASSESSMENT
PAIN_FUNCTIONAL_ASSESSMENT: 0-10
PAIN_FUNCTIONAL_ASSESSMENT: 0-10

## 2025-04-22 ASSESSMENT — PAIN SCALES - GENERAL
PAINLEVEL_OUTOF10: 0 - NO PAIN

## 2025-04-22 ASSESSMENT — ACTIVITIES OF DAILY LIVING (ADL): HOME_MANAGEMENT_TIME_ENTRY: 15

## 2025-04-22 NOTE — CARE PLAN
Problem: Safety - Adult  Goal: Free from fall injury  Outcome: Progressing     Problem: Discharge Planning  Goal: Discharge to home or other facility with appropriate resources  Outcome: Progressing     Problem: Chronic Conditions and Co-morbidities  Goal: Patient's chronic conditions and co-morbidity symptoms are monitored and maintained or improved  Outcome: Progressing     Problem: Nutrition  Goal: Nutrient intake appropriate for maintaining nutritional needs  Outcome: Progressing   The patient's goals for the shift include      The clinical goals for the shift include safety& comfort

## 2025-04-22 NOTE — CARE PLAN
Problem: Safety - Adult  Goal: Free from fall injury  Outcome: Progressing     Problem: Discharge Planning  Goal: Discharge to home or other facility with appropriate resources  Outcome: Progressing     Problem: Chronic Conditions and Co-morbidities  Goal: Patient's chronic conditions and co-morbidity symptoms are monitored and maintained or improved  Outcome: Progressing     Problem: Nutrition  Goal: Nutrient intake appropriate for maintaining nutritional needs  Outcome: Progressing   The patient's goals for the shift include      The clinical goals for the shift include hds    Over the shift, the patient did not make progress toward the following goals. Barriers to progression include none. Recommendations to address these barriers include none.

## 2025-04-22 NOTE — PROGRESS NOTES
Occupational Therapy    OT Treatment    Patient Name: Angel Carroll  MRN: [id number]  Department: [facility]  Room: [address] [place] [address]  Today's Date: 4/22/2025  Time Calculation  Start Time: 1424  Stop Time: 1503  Time Calculation (min): 39 min        Assessment:  OT Assessment: Gradual progress made towards OT goals. Continue with current OT POC to increase strength, balance and functional tolerance to maximize safety and independence during ADLs.  Barriers to Discharge Home: Caregiver assistance, Physical needs  Caregiver Assistance: Patient lives alone and/or does not have reliable caregiver assistance  Physical Needs: Intermittent mobility assistance needed, Intermittent ADL assistance needed, High falls risk due to function or environment  Evaluation/Treatment Tolerance: Patient limited by fatigue  End of Session Communication: Bedside nurse  End of Session Patient Position: Bed, 3 rail up, Alarm on (all needs in reach)  OT Assessment Results: Decreased ADL status, Decreased upper extremity strength, Decreased endurance, Decreased functional mobility, Decreased IADLs  Barriers to Discharge: Decreased caregiver support  Evaluation/Treatment Tolerance: Patient limited by fatigue  Plan:  Treatment Interventions: ADL retraining, Functional transfer training, UE strengthening/ROM, Endurance training, Patient/family training, Equipment evaluation/education, Compensatory technique education  OT Frequency: 3 times per week  OT Discharge Recommendations: Moderate intensity level of continued care  Equipment Recommended upon Discharge: Wheeled walker  OT Recommended Transfer Status: Moderate assist  OT - OK to Discharge: Yes  Treatment Interventions: ADL retraining, Functional transfer training, UE strengthening/ROM, Endurance training, Patient/family training, Equipment evaluation/education, Compensatory technique education    Subjective   Previous Visit Info:  OT Last Visit  OT Received On: 04/22/25  General:  General  Reason for  Referral: impaired ADLs, Covid+  Past Medical History Relevant to Rehab: CAD, CHF, HLD, pacemaker, h/o tobacco, afib, CKD, BPH, CVA, HTN, depression, anxiety, shingles, R TKR, PCI, lumbar surgery with post lami syndrome, COPD  Prior to Session Communication: Bedside nurse  Patient Position Received: Bed, 3 rail up, Alarm off, not on at start of session  General Comment: Pt cleared for OT session per nursing, pleasant and cooperative this date. Pt reports increased dizziness with positional changes.  Precautions:  Hearing/Visual Limitations: glasses, [place] with B hearing aids  Medical Precautions: Fall precautions, Infection precautions, Oxygen therapy device and L/min (Covid+, 3L O2 via NC)  Precautions Comment: Covid+, telemetry, male purewick      Vital Signs Comment: RN aware of vitals and pt reports of dizziness with positional changes     Pain:  Pain Assessment  Pain Assessment: 0-10  0-10 (Numeric) Pain Score: 0 - No pain  Clinical Progression: Not changed    Objective    Cognition:  Cognition  Overall Cognitive Status: Within Functional Limits  Orientation Level: Oriented X4  Safety/Judgement: Exceptions to WFL  Insight: Mild  Impulsive: Mildly  Task Initiation: Initiates with cues  Processing Speed: Delayed (d/t hearing)  Coordination:  Movements are Fluid and Coordinated: No  Upper Body Coordination: slower rate of movement  Lower Body Coordination: slower rate of movement  Activities of Daily Living: Grooming  Grooming Comments: pt pleasantly declined participation in ADLs d/t reports of task completion prior to OT session.    Toileting  Toileting Comments: pt with bowel incontinence requiring use of bedpan while seated EOB. MaxA required for posterior hygiene in supported stand position with use of FWW  Functional Standing Tolerance:  Time: ~3min x4 trials  Activity: static standing  Functional Standing Tolerance Comments: Standing tasks completed this date with FWW and CGA to increase functional tolerance,  strength and challenge balance to maximize safety and independence during ADLs. Pt reports mild dizziness during standing trials however symptoms lessen with PLB.  Bed Mobility/Transfers: Bed Mobility  Bed Mobility: Yes  Bed Mobility 1  Bed Mobility 1: Supine to sitting, Sitting to supine  Bed Mobility Comments 1: HOB moderately elevated. Pt required Johnny for trunk elevation during supine>sit however requiring modA for BLE management onto bed during sit>supine. Increased time and effort required for task completion with use of bed rails. Education provided on importance of pressure relief throughout day using bed rails to decrease risk of skin breakdown.    Transfers  Transfer: Yes  Transfer 1  Trials/Comments 1: repeated sit<>stand trials completed from minimally elevated EOB height with FWW and Johnny- verbal/ tactile cues required for proper hand placement to increase safety and decrease risk of falls. Pt pleasantly declined sitting in bedside chair at this time citing fatigue.    Therapy/Activity: Therapeutic Exercise  Therapeutic Exercise Performed: Yes  Therapeutic Exercise Activity 1: BUE exercises completed 2x10 with min resistance for following exercises: wrist flexion/ extension, pronation/supination, bicep curl, triceps extension, and shoulder press  Therapeutic Exercise Activity 2: Exercises completed this date to increase strength and functional tolerance for safety and ease of ADL/ADL transfer completion. Verbal instruction and demonstration provided to ensure proper muscle recruitment.    Therapeutic Activity  Therapeutic Activity Performed: Yes  Therapeutic Activity 1: EOB sitting trials completed >20min with close supervision to complete functional tasks. Fair+ balance observed this date with unilateral UE support. EOB sitting trials completed to increase functional tolerance, strength and challenge sitting balance to maximize potential during ADLs.    Other Activity:  Other Activity Performed:  Yes  Other Activity 1: Observed bullae on R buttocks- RN made aware. Education provided to pt on importance of pressure relief throughout day to decrease risk of skin breakdown. Pt able to verbalize and demonstrate understanding of education provided using bed rails to roll R<>L in bed with close supervision.      Outcome Measures:[facility] Daily Activity  Putting on and taking off regular lower body clothing: A lot  Bathing (including washing, rinsing, drying): A lot  Putting on and taking off regular upper body clothing: A little  Toileting, which includes using toilet, bedpan or urinal: A lot  Taking care of personal grooming such as brushing teeth: A little  Eating Meals: A little  Daily Activity - Total Score: 15        Education Documentation  ADL Training, taught by GODFREY Marquez at 4/22/2025  5:23 PM.  Learner: Patient  Readiness: Acceptance  Method: Explanation, Demonstration  Response: Needs Reinforcement  Comment: safety awareness throughout functional tasks/ transfers    Education Comments  Education provided on role of OT/POC, safety awareness throughout functional tasks/transfers, importance of activity/ rest routine, EC/WS techniques, and use of call light for assistance. Questions, comments and concerns addressed regarding OT.      Goals:  Encounter Problems       Encounter Problems (Active)       OT Goals       ADLs (Progressing)       Start:  04/21/25    Expected End:  05/09/25       Pt will complete ADL tasks with Mod I, using AE as needed, in order to complete self-care tasks.           Functional transfers (Progressing)       Start:  04/21/25    Expected End:  05/09/25       Pt will perform functional transfers at mod in level with RW           Functional mobility (Progressing)       Start:  04/21/25    Expected End:  05/09/25       Pt will perform functional mobility household distance at mod ind level with RW

## 2025-04-22 NOTE — TELEPHONE ENCOUNTER
Daughter called, patient IN house due to COVID.  DX the day after he was here,   they are wondering about the elbow cultures that were taken.

## 2025-04-22 NOTE — PROGRESS NOTES
Angel Carroll is a 79 y.o. male on day 2 of admission presenting with Acute COVID-19.    Subjective   Interval History:   Patient seen  Room not entered-limit exposure  Afebrile  Awake, alert  Lying comfortably    Review of Systems    Objective   Range of Vitals (last 24 hours)  Heart Rate:  [59-60]   Temp:  [36.7 °C (98.1 °F)-36.8 °C (98.2 °F)]   Resp:  [18-19]   BP: (130-152)/(51-55)   SpO2:  [96 %-98 %]   Daily Weight  04/20/25 : 99.8 kg (220 lb)    Body mass index is 29.84 kg/m².    Physical Exam  Constitutional:       Appearance: Normal appearance.   HENT:      Nose: Nose normal.   Pulmonary:      Effort: Pulmonary effort is normal.   Neurological:      Mental Status: He is alert.           Antibiotics  ampicillin-sulbactam - 3 gram/100 mL    Relevant Results  Labs  Results from last 72 hours   Lab Units 04/22/25  0608 04/20/25  0726 04/19/25 2022   WBC AUTO x10*3/uL 2.5* 5.8  5.8 4.9   HEMOGLOBIN g/dL 10.6* 11.3*  11.3* 10.7*   HEMATOCRIT % 33.4* 35.7*  35.7* 33.0*   PLATELETS AUTO x10*3/uL 109* 125*  125* 142*   NEUTROS PCT AUTO % 60.9 82.1 79.9   LYMPHS PCT AUTO % 20.2 7.8 10.4   MONOS PCT AUTO % 14.1 8.5 8.5   EOS PCT AUTO % 3.2 1.0 0.4     Results from last 72 hours   Lab Units 04/22/25  0608 04/20/25  0726 04/19/25 2022   SODIUM mmol/L 143 140  141 139   POTASSIUM mmol/L 3.1* 3.6  3.6 3.6   CHLORIDE mmol/L 108* 105  105 104   CO2 mmol/L 29 25  27 28   BUN mg/dL 19 18  18 17   CREATININE mg/dL 1.25 1.66*  1.62* 1.68*   GLUCOSE mg/dL 84 88  92 94   CALCIUM mg/dL 7.8* 8.6  8.5* 8.3*   ANION GAP mmol/L 9* 14  13 11   EGFR mL/min/1.73m*2 58* 41*  42* 41*     Results from last 72 hours   Lab Units 04/20/25  0726 04/19/25 2022   ALK PHOS U/L 65 68   BILIRUBIN TOTAL mg/dL 1.0 1.0   PROTEIN TOTAL g/dL 6.1* 6.1*   ALT U/L 25 26   AST U/L 33 33   ALBUMIN g/dL 3.4 3.5     Estimated Creatinine Clearance: 56.7 mL/min (by C-G formula based on SCr of 1.25 mg/dL).  No results found for:  "\"CRP\"  Microbiology  Reviewed-negative urine culture  Imaging  Electrocardiogram, 12-lead PRN ACS symptoms  Result Date: 4/22/2025  A-V sequential pacemaker Confirmed by Doc Martin ([id number]) on 4/22/2025 2:14:41 PM    ECG 12 lead  Result Date: 4/21/2025  AV dual-paced rhythm with prolonged AV conduction Abnormal ECG When compared with ECG of 24-MAR-2025 13:37, (unconfirmed) Electronic ventricular pacemaker has replaced Electronic atrial pacemaker Confirmed by Sadie Hollis ([id number]) on 4/21/2025 2:26:08 PM    XR chest 1 view  Result Date: 4/19/2025  Interpreted By:  Sen Hardwick, STUDY: XR CHEST 1 VIEW;  4/19/2025 8:35 pm   INDICATION: Signs/Symptoms:cough, congestion.   COMPARISON: Chest x-ray 01/30/2025   ACCESSION NUMBER(S): [id number]   ORDERING CLINICIAN: ISAAC SHEA   FINDINGS: Left-sided dual lead pacer is stable in position. Overlying leads noted.   CARDIOMEDIASTINAL SILHOUETTE: Cardiac silhouette is enlarged. Diffuse interstitial prominence. Atherosclerotic calcification of the aorta. In the   LUNGS: No consolidation, pleural effusion or pneumothorax.   ABDOMEN: No remarkable upper abdominal findings.   BONES: Multilevel degenerative changes of the spine.       Cardiomegaly with diffuse interstitial prominence which could be chronic or relate to component of developing interstitial edema. Correlate clinically.   MACRO: None   Signed by: Sen Hardwick 4/19/2025 8:46 PM Dictation workstation:   [id number]    XR elbow right 3+ views  Result Date: 4/15/2025  Interpreted By:  Kelechi Chopra, STUDY: XR ELBOW RIGHT 3+ VIEWS; ;  4/15/2025 12:50 pm   INDICATION: Signs/Symptoms:pain.   ,R52 Pain, unspecified   COMPARISON: None.   ACCESSION NUMBER(S): [id number]   ORDERING CLINICIAN: ANU ALVARADO   FINDINGS: No acute fracture or dislocation. Small olecranon enthesophyte. Posterior elbow soft tissue swelling. 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       Soft " tissue swelling in the posterior elbow, correlate clinically for olecranon bursitis.   Moderate elbow joint osteoarthritis. Ring osteophytes in the radial head without definite acute fracture.     MACRO: None   Signed by: Kelechi Chopra 4/15/2025 1:01 PM Dictation workstation:   [id number]    US thyroid  Result Date: 4/12/2025  Interpreted By:  Natalee Ruiz, STUDY: US THYROID;  4/11/2025 2:21 pm   INDICATION: Signs/Symptoms:thyroid nodule.   ,E04.1 Nontoxic single thyroid nodule   COMPARISON: 09/01/2022   ACCESSION NUMBER(S): [id number]   ORDERING CLINICIAN: MARYBEL CHRISTOPHER   TECHNIQUE: Multiple ultrasonographic images of the thyroid gland were obtained.   FINDINGS:     RIGHT LOBE: The right lobe of the thyroid measures 2.7 cm x 2.8 cm x 5.3 cm. The right lobe of the thyroid is homogeneous in echotexture and demonstrates an isoechoic solid nodule. This is CR 3 measuring 15 x 22 x 13 mm and apparently smaller since the prior exam. Another hypoechoic likely solid nodule measuring 12 x 13 x 11 mm and TR 4. This is also smaller since the prior exam.. It appears more hypoechoic since the prior exam however.   LEFT LOBE: The left lobe of the thyroid measures 1.6 cm x 2.5 cm x 5.8 cm. The left lobe of the thyroid is homogeneous in echotexture and demonstrates no nodules.   ISTHMUS: The isthmus measures approximately 0.4 cm and is homogeneous in echotexture and without any identifiable nodules.   CERVICAL LYMPH NODES: No significant adenopathy.       Right lobe thyroid nodule as described and apparently smaller since the prior exam.   Please note that these statements are based on the recommendations of the American College of Radiology   TI-RADS grading system. ACR TI-RADS recommendations (apply to nodules which have NOT been biopsied):   TR5 (?7 points) highly suspicious - FNA if ? 1cm, follow-up if 0.5 -0.9 cm every year for 5 years. Aggregate cancer risk 35%. TR4 (4-6 points) moderately suspicious - FNA if ? 1.5cm,  follow-up if 1 -1.4 cm in 1, 2, 3 and 5 years. Aggregate cancer risk 9.1% TR3 (3 points) mildly suspicious - FNA if ? 2.5cm, follow-up if 1.5 -2.4 cm in 1, 3 and 5 years. Aggregate cancer risk 4.8% TR2 (2 points) not suspicious. No FNA or follow-up.Aggregate cancer risk 1.5% TR1 (0 points) benign - No FNA or follow-up. Aggregate cancer risk 0.3%   MACRO: None     Signed by: Natalee Ruiz 4/12/2025 1:00 PM Dictation workstation:   [id number]      Assessment/Plan   Chronic respiratory failure, at baseline  Coronavirus bronchitis-patient at risk for progression   Pyuria versus Urinary tract infection-previous urine cultures are growing Enterococcus, Aerococcus and Proteus-all susceptible to ampicillin  Chronic kidney disease stage III, possible acute component  Recent right olecranon bursitis, no clinical evidence of active infection     Remdesivir-risk reduction-completed 3 days  Unasyn-empiric-change to Augmentin to complete total of 14 days  Oxygen as needed  Droplet plus precautions  Supportive care  Monitor temperature and WBC  Monitor renal function  Discharge planning    Ron Yuan MD

## 2025-04-22 NOTE — PROGRESS NOTES
Angel Carroll is a 79 y.o. male on day 2 of admission presenting with Acute COVID-19.      Subjective   States he feels tired this morning but otherwise denies any complaints. Breathing is currently at baseline. Tolerating PO. Denies constipation.        Objective     Last Recorded Vitals  /69 (BP Location: Left arm, Patient Position: Lying)   Pulse 59   Temp 36.7 °C (98.1 °F) (Oral)   Resp 20   Wt 99.8 kg (220 lb)   SpO2 99%   Intake/Output last 3 Shifts:    Intake/Output Summary (Last 24 hours) at 4/22/2025 1156  Last data filed at 4/21/2025 2113  Gross per 24 hour   Intake 240 ml   Output 1200 ml   Net -960 ml       Admission Weight  Weight: 99.8 kg (220 lb) (04/20/25 0130)    Daily Weight  04/20/25 : 99.8 kg (220 lb)    Image Results  ECG 12 lead  AV dual-paced rhythm with prolonged AV conduction  Abnormal ECG  When compared with ECG of 24-MAR-2025 13:37, (unconfirmed)  Electronic ventricular pacemaker has replaced Electronic atrial pacemaker  Confirmed by Sadie Hollis ([id number]) on 4/21/2025 2:26:08 PM      Physical Exam  Constitutional:       General: He is not in acute distress.     Appearance: He is not toxic-appearing.      Comments: Tired appearing   HENT:      Head: Normocephalic.      Mouth/Throat:      Pharynx: Oropharynx is clear.   Eyes:      General: No scleral icterus.  Cardiovascular:      Rate and Rhythm: Normal rate.   Pulmonary:      Effort: No respiratory distress.      Breath sounds: No wheezing.   Abdominal:      General: There is no distension.      Palpations: Abdomen is soft.   Musculoskeletal:      Right lower leg: No edema.      Left lower leg: No edema.   Neurological:      Mental Status: He is alert.   Psychiatric:         Behavior: Behavior normal.         Relevant Results                    Assessment & Plan  Acute COVID-19  ID consulted  On remdesivir  Isolation precautions  Chronic hypoxic respiratory failure  Baseline 2-3L NC  Not in exacerbation   Atrial fibrillation  (Multi)  Continue metoprolol, amiodarone, and eliquis  Chronic diastolic heart failure  Continue entresto   Abnormal finding on urinalysis  UTI vs pyuria  Continue empiric unasyn  Follow up urine culture  CKD (chronic kidney disease)  Baseline Cr 1.5-1.7  Stable at baseline    Plan:  BMP showing K 3.1 -> replaced   Urine culture with contamination  Continue remdesivir, last dose today  Continue isolation precautions  Await final ID recommendations  Anticipate discharge to SNF once arrangements have been made             Eugenia Rodriguez MD

## 2025-04-23 VITALS
HEIGHT: 72 IN | DIASTOLIC BLOOD PRESSURE: 53 MMHG | TEMPERATURE: 97.2 F | RESPIRATION RATE: 20 BRPM | WEIGHT: 220 LBS | OXYGEN SATURATION: 97 % | BODY MASS INDEX: 29.8 KG/M2 | SYSTOLIC BLOOD PRESSURE: 127 MMHG | HEART RATE: 60 BPM

## 2025-04-23 LAB
ANION GAP SERPL CALCULATED.3IONS-SCNC: 10 MMOL/L (ref 10–20)
BASOPHILS # BLD AUTO: 0.02 X10*3/UL (ref 0–0.1)
BASOPHILS NFR BLD AUTO: 1 %
BUN SERPL-MCNC: 16 MG/DL (ref 6–23)
CALCIUM SERPL-MCNC: 8 MG/DL (ref 8.6–10.3)
CHLORIDE SERPL-SCNC: 108 MMOL/L (ref 98–107)
CO2 SERPL-SCNC: 29 MMOL/L (ref 21–32)
CREAT SERPL-MCNC: 1.01 MG/DL (ref 0.5–1.3)
EGFRCR SERPLBLD CKD-EPI 2021: 74 ML/MIN/1.73M*2
EOSINOPHIL # BLD AUTO: 0.09 X10*3/UL (ref 0–0.4)
EOSINOPHIL NFR BLD AUTO: 4.3 %
ERYTHROCYTE [DISTWIDTH] IN BLOOD BY AUTOMATED COUNT: 17.3 % (ref 11.5–14.5)
GLUCOSE SERPL-MCNC: 81 MG/DL (ref 74–99)
HCT VFR BLD AUTO: 33.7 % (ref 41–52)
HGB BLD-MCNC: 10.6 G/DL (ref 13.5–17.5)
IMM GRANULOCYTES # BLD AUTO: 0.02 X10*3/UL (ref 0–0.5)
IMM GRANULOCYTES NFR BLD AUTO: 1 % (ref 0–0.9)
LYMPHOCYTES # BLD AUTO: 0.59 X10*3/UL (ref 0.8–3)
LYMPHOCYTES NFR BLD AUTO: 28.2 %
MCH RBC QN AUTO: 30.2 PG (ref 26–34)
MCHC RBC AUTO-ENTMCNC: 31.5 G/DL (ref 32–36)
MCV RBC AUTO: 96 FL (ref 80–100)
MONOCYTES # BLD AUTO: 0.3 X10*3/UL (ref 0.05–0.8)
MONOCYTES NFR BLD AUTO: 14.4 %
NEUTROPHILS # BLD AUTO: 1.07 X10*3/UL (ref 1.6–5.5)
NEUTROPHILS NFR BLD AUTO: 51.1 %
NRBC BLD-RTO: 0 /100 WBCS (ref 0–0)
PLATELET # BLD AUTO: 114 X10*3/UL (ref 150–450)
POTASSIUM SERPL-SCNC: 3.5 MMOL/L (ref 3.5–5.3)
RBC # BLD AUTO: 3.51 X10*6/UL (ref 4.5–5.9)
SODIUM SERPL-SCNC: 143 MMOL/L (ref 136–145)
WBC # BLD AUTO: 2.1 X10*3/UL (ref 4.4–11.3)

## 2025-04-23 PROCEDURE — 85025 COMPLETE CBC W/AUTO DIFF WBC: CPT | Performed by: STUDENT IN AN ORGANIZED HEALTH CARE EDUCATION/TRAINING PROGRAM

## 2025-04-23 PROCEDURE — 36415 COLL VENOUS BLD VENIPUNCTURE: CPT | Performed by: STUDENT IN AN ORGANIZED HEALTH CARE EDUCATION/TRAINING PROGRAM

## 2025-04-23 PROCEDURE — 2500000004 HC RX 250 GENERAL PHARMACY W/ HCPCS (ALT 636 FOR OP/ED): Performed by: INTERNAL MEDICINE

## 2025-04-23 PROCEDURE — 80048 BASIC METABOLIC PNL TOTAL CA: CPT | Performed by: STUDENT IN AN ORGANIZED HEALTH CARE EDUCATION/TRAINING PROGRAM

## 2025-04-23 PROCEDURE — 2500000001 HC RX 250 WO HCPCS SELF ADMINISTERED DRUGS (ALT 637 FOR MEDICARE OP): Performed by: INTERNAL MEDICINE

## 2025-04-23 PROCEDURE — 2500000002 HC RX 250 W HCPCS SELF ADMINISTERED DRUGS (ALT 637 FOR MEDICARE OP, ALT 636 FOR OP/ED): Performed by: INTERNAL MEDICINE

## 2025-04-23 PROCEDURE — 99239 HOSP IP/OBS DSCHRG MGMT >30: CPT | Performed by: STUDENT IN AN ORGANIZED HEALTH CARE EDUCATION/TRAINING PROGRAM

## 2025-04-23 RX ORDER — AMOXICILLIN AND CLAVULANATE POTASSIUM 875; 125 MG/1; MG/1
1 TABLET, FILM COATED ORAL 2 TIMES DAILY
Start: 2025-04-23 | End: 2025-05-04

## 2025-04-23 RX ORDER — TAMSULOSIN HYDROCHLORIDE 0.4 MG/1
0.4 CAPSULE ORAL NIGHTLY
Start: 2025-04-23

## 2025-04-23 RX ADMIN — CYANOCOBALAMIN TAB 500 MCG 500 MCG: 500 TAB at 09:00

## 2025-04-23 RX ADMIN — METOPROLOL SUCCINATE 25 MG: 25 TABLET, EXTENDED RELEASE ORAL at 09:00

## 2025-04-23 RX ADMIN — ATORVASTATIN CALCIUM 20 MG: 20 TABLET, FILM COATED ORAL at 08:59

## 2025-04-23 RX ADMIN — FERROUS SULFATE TAB 325 MG (65 MG ELEMENTAL FE) 325 MG: 325 (65 FE) TAB at 09:00

## 2025-04-23 RX ADMIN — SACUBITRIL AND VALSARTAN 1 TABLET: 24; 26 TABLET, FILM COATED ORAL at 08:59

## 2025-04-23 RX ADMIN — AMPICILLIN SODIUM AND SULBACTAM SODIUM 3 G: 2; 1 INJECTION, POWDER, FOR SOLUTION INTRAMUSCULAR; INTRAVENOUS at 04:30

## 2025-04-23 RX ADMIN — FINASTERIDE 5 MG: 5 TABLET, FILM COATED ORAL at 09:00

## 2025-04-23 RX ADMIN — AMPICILLIN SODIUM AND SULBACTAM SODIUM 3 G: 2; 1 INJECTION, POWDER, FOR SOLUTION INTRAMUSCULAR; INTRAVENOUS at 09:00

## 2025-04-23 RX ADMIN — FOLIC ACID 1 MG: 1 TABLET ORAL at 09:00

## 2025-04-23 RX ADMIN — AMIODARONE HYDROCHLORIDE 100 MG: 100 TABLET ORAL at 08:59

## 2025-04-23 RX ADMIN — FENOFIBRATE 54 MG: 54 TABLET ORAL at 09:00

## 2025-04-23 RX ADMIN — PAROXETINE HYDROCHLORIDE 20 MG: 20 TABLET, FILM COATED ORAL at 08:59

## 2025-04-23 RX ADMIN — APIXABAN 2.5 MG: 2.5 TABLET, FILM COATED ORAL at 09:00

## 2025-04-23 ASSESSMENT — COGNITIVE AND FUNCTIONAL STATUS - GENERAL
TURNING FROM BACK TO SIDE WHILE IN FLAT BAD: A LITTLE
HELP NEEDED FOR BATHING: A LITTLE
DRESSING REGULAR UPPER BODY CLOTHING: A LITTLE
DAILY ACTIVITIY SCORE: 18
WALKING IN HOSPITAL ROOM: A LOT
TOILETING: A LITTLE
EATING MEALS: A LITTLE
MOBILITY SCORE: 15
CLIMB 3 TO 5 STEPS WITH RAILING: A LOT
DRESSING REGULAR LOWER BODY CLOTHING: A LITTLE
STANDING UP FROM CHAIR USING ARMS: A LOT
MOVING TO AND FROM BED TO CHAIR: A LITTLE
MOVING FROM LYING ON BACK TO SITTING ON SIDE OF FLAT BED WITH BEDRAILS: A LITTLE
PERSONAL GROOMING: A LITTLE

## 2025-04-23 ASSESSMENT — PAIN SCALES - GENERAL
PAINLEVEL_OUTOF10: 0 - NO PAIN
PAINLEVEL_OUTOF10: 0 - NO PAIN

## 2025-04-23 ASSESSMENT — PAIN - FUNCTIONAL ASSESSMENT
PAIN_FUNCTIONAL_ASSESSMENT: 0-10
PAIN_FUNCTIONAL_ASSESSMENT: 0-10

## 2025-04-23 NOTE — PROGRESS NOTES
Angel Carroll is a 80 y.o. male on day 3 of admission presenting with Acute COVID-19.      Subjective   Doing well today, denies any complaints currently.        Objective     Last Recorded Vitals  /61 (BP Location: Right arm, Patient Position: Lying)   Pulse 60   Temp 36.3 °C (97.3 °F) (Oral)   Resp 18   Wt 99.8 kg (220 lb)   SpO2 100%   Intake/Output last 3 Shifts:    Intake/Output Summary (Last 24 hours) at 4/23/2025 1231  Last data filed at 4/23/2025 0930  Gross per 24 hour   Intake 2280 ml   Output 1350 ml   Net 930 ml       Admission Weight  Weight: 99.8 kg (220 lb) (04/20/25 0130)    Daily Weight  04/20/25 : 99.8 kg (220 lb)    Image Results  Electrocardiogram, 12-lead PRN ACS symptoms  A-V sequential pacemaker  Confirmed by Doc Martin ([id number]) on 4/22/2025 2:14:41 PM      Physical Exam  Constitutional:       General: He is not in acute distress.     Appearance: He is not toxic-appearing.   HENT:      Head: Normocephalic.      Mouth/Throat:      Pharynx: Oropharynx is clear.   Eyes:      General: No scleral icterus.  Cardiovascular:      Rate and Rhythm: Normal rate.   Pulmonary:      Effort: No respiratory distress.      Breath sounds: No wheezing.   Abdominal:      General: There is no distension.      Palpations: Abdomen is soft.   Musculoskeletal:      Right lower leg: No edema.      Left lower leg: No edema.   Neurological:      Mental Status: He is alert and oriented to person, place, and time.   Psychiatric:         Behavior: Behavior normal.         Relevant Results                              Assessment & Plan  Acute COVID-19  ID consulted  On remdesivir  Isolation precautions  Chronic hypoxic respiratory failure  Baseline 2-3L NC  Not in exacerbation   Atrial fibrillation (Multi)  Continue metoprolol, amiodarone, and eliquis  Chronic diastolic heart failure  Continue entresto   Abnormal finding on urinalysis  UTI vs pyuria  Continue empiric unasyn  Follow up urine culture  CKD  (chronic kidney disease)  Baseline Cr 1.5-1.7  Stable at baseline    Plan:  Remains status quo  Continue antibiotics per ID - planning for augmentin at discharge for a total of 14 days  Medically cleared for discharge to SNF pending facility arrangements            Eugenia Rodriguez MD

## 2025-04-23 NOTE — CARE PLAN
Problem: Safety - Adult  Goal: Free from fall injury  Outcome: Progressing  Flowsheets (Taken 4/23/2025 1205)  Free from fall injury: Based on caregiver fall risk screen, instruct family/caregiver to ask for assistance with transferring infant if caregiver noted to have fall risk factors     Problem: Discharge Planning  Goal: Discharge to home or other facility with appropriate resources  Outcome: Progressing  Flowsheets (Taken 4/23/2025 1205)  Discharge to home or other facility with appropriate resources: Arrange for needed discharge resources and transportation as appropriate     Problem: Chronic Conditions and Co-morbidities  Goal: Patient's chronic conditions and co-morbidity symptoms are monitored and maintained or improved  Outcome: Progressing  Flowsheets (Taken 4/23/2025 1205)  Care Plan - Patient's Chronic Conditions and Co-Morbidity Symptoms are Monitored and Maintained or Improved: Monitor and assess patient's chronic conditions and comorbid symptoms for stability, deterioration, or improvement     Problem: Nutrition  Goal: Nutrient intake appropriate for maintaining nutritional needs  Outcome: Progressing

## 2025-04-23 NOTE — CARE PLAN
The patient's goals for the shift include      The clinical goals for the shift include safety& comfort

## 2025-04-23 NOTE — PROGRESS NOTES
Angel Carroll is a 80 y.o. male on day 3 of admission presenting with Acute COVID-19.    Subjective   Interval History:   Afebrile, no chills  Denies shortness of breath or chest pain  Reports an occasional nonproductive cough  Denies nausea, vomiting, diarrhea, abdominal pain      Objective   Range of Vitals (last 24 hours)  Heart Rate:  [60-61]   Temp:  [36.1 °C (97 °F)-36.5 °C (97.7 °F)]   Resp:  [18-20]   BP: (125-153)/(55-61)   SpO2:  [98 %-100 %]   Daily Weight  04/20/25 : 99.8 kg (220 lb)    Body mass index is 29.84 kg/m².    Physical Exam  Constitutional:       Appearance: Normal appearance.   HENT:      Head: Normocephalic and atraumatic.   Eyes:      Extraocular Movements: Extraocular movements intact.      Conjunctiva/sclera: Conjunctivae normal.   Cardiovascular:      Rate and Rhythm: Normal rate.   Pulmonary:      Effort: Pulmonary effort is normal.      Comments: Diminished bilaterally  Abdominal:      General: Bowel sounds are normal.      Palpations: Abdomen is soft.      Tenderness: There is no abdominal tenderness.   Musculoskeletal:         General: Normal range of motion.      Cervical back: Normal range of motion and neck supple.   Skin:     General: Skin is warm and dry.   Neurological:      General: No focal deficit present.      Mental Status: He is alert and oriented to person, place, and time.   Psychiatric:         Mood and Affect: Mood normal.         Behavior: Behavior normal.           Antibiotics  amoxicillin-clavulanate - 875-125 mg  ampicillin-sulbactam - 3 gram/100 mL    Relevant Results  Labs  Results from last 72 hours   Lab Units 04/23/25  0530 04/22/25  0608   WBC AUTO x10*3/uL 2.1* 2.5*   HEMOGLOBIN g/dL 10.6* 10.6*   HEMATOCRIT % 33.7* 33.4*   PLATELETS AUTO x10*3/uL 114* 109*   NEUTROS PCT AUTO % 51.1 60.9   LYMPHS PCT AUTO % 28.2 20.2   MONOS PCT AUTO % 14.4 14.1   EOS PCT AUTO % 4.3 3.2     Results from last 72 hours   Lab Units 04/23/25  0530 04/22/25  0608   SODIUM mmol/L  "143 143   POTASSIUM mmol/L 3.5 3.1*   CHLORIDE mmol/L 108* 108*   CO2 mmol/L 29 29   BUN mg/dL 16 19   CREATININE mg/dL 1.01 1.25   GLUCOSE mg/dL 81 84   CALCIUM mg/dL 8.0* 7.8*   ANION GAP mmol/L 10 9*   EGFR mL/min/1.73m*2 74 58*           Estimated Creatinine Clearance: 69 mL/min (by C-G formula based on SCr of 1.01 mg/dL).  No results found for: \"CRP\"  Microbiology  Reviewed-negative urine culture  Imaging  Electrocardiogram, 12-lead PRN ACS symptoms  Result Date: 4/22/2025  A-V sequential pacemaker Confirmed by Doc Martin ([id number]) on 4/22/2025 2:14:41 PM    ECG 12 lead  Result Date: 4/21/2025  AV dual-paced rhythm with prolonged AV conduction Abnormal ECG When compared with ECG of 24-MAR-2025 13:37, (unconfirmed) Electronic ventricular pacemaker has replaced Electronic atrial pacemaker Confirmed by Sadie Hollis ([id number]) on 4/21/2025 2:26:08 PM    XR chest 1 view  Result Date: 4/19/2025  Interpreted By:  Sen Hardwick, STUDY: XR CHEST 1 VIEW;  4/19/2025 8:35 pm   INDICATION: Signs/Symptoms:cough, congestion.   COMPARISON: Chest x-ray 01/30/2025   ACCESSION NUMBER(S): [id number]   ORDERING CLINICIAN: ISAAC SHEA   FINDINGS: Left-sided dual lead pacer is stable in position. Overlying leads noted.   CARDIOMEDIASTINAL SILHOUETTE: Cardiac silhouette is enlarged. Diffuse interstitial prominence. Atherosclerotic calcification of the aorta. In the   LUNGS: No consolidation, pleural effusion or pneumothorax.   ABDOMEN: No remarkable upper abdominal findings.   BONES: Multilevel degenerative changes of the spine.       Cardiomegaly with diffuse interstitial prominence which could be chronic or relate to component of developing interstitial edema. Correlate clinically.   MACRO: None   Signed by: Sen Hardwick 4/19/2025 8:46 PM Dictation workstation:   [id number]    XR elbow right 3+ views  Result Date: 4/15/2025  Interpreted By:  Kelechi Chopra, STUDY: XR ELBOW RIGHT 3+ VIEWS; ;  4/15/2025 12:50 pm  "  INDICATION: Signs/Symptoms:pain.   ,R52 Pain, unspecified   COMPARISON: None.   ACCESSION NUMBER(S): [id number]   ORDERING CLINICIAN: ANU ALVARADO   FINDINGS: No acute fracture or dislocation. Small olecranon enthesophyte. Posterior elbow soft tissue swelling. 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       Soft tissue swelling in the posterior elbow, correlate clinically for olecranon bursitis.   Moderate elbow joint osteoarthritis. Ring osteophytes in the radial head without definite acute fracture.     MACRO: None   Signed by: Kelechi Chopra 4/15/2025 1:01 PM Dictation workstation:   [id number]    US thyroid  Result Date: 4/12/2025  Interpreted By:  Natalee Ruiz, STUDY: US THYROID;  4/11/2025 2:21 pm   INDICATION: Signs/Symptoms:thyroid nodule.   ,E04.1 Nontoxic single thyroid nodule   COMPARISON: 09/01/2022   ACCESSION NUMBER(S): [id number]   ORDERING CLINICIAN: MARYBEL CHRISTOPHER   TECHNIQUE: Multiple ultrasonographic images of the thyroid gland were obtained.   FINDINGS:     RIGHT LOBE: The right lobe of the thyroid measures 2.7 cm x 2.8 cm x 5.3 cm. The right lobe of the thyroid is homogeneous in echotexture and demonstrates an isoechoic solid nodule. This is CR 3 measuring 15 x 22 x 13 mm and apparently smaller since the prior exam. Another hypoechoic likely solid nodule measuring 12 x 13 x 11 mm and TR 4. This is also smaller since the prior exam.. It appears more hypoechoic since the prior exam however.   LEFT LOBE: The left lobe of the thyroid measures 1.6 cm x 2.5 cm x 5.8 cm. The left lobe of the thyroid is homogeneous in echotexture and demonstrates no nodules.   ISTHMUS: The isthmus measures approximately 0.4 cm and is homogeneous in echotexture and without any identifiable nodules.   CERVICAL LYMPH NODES: No significant adenopathy.       Right lobe thyroid nodule as described and apparently smaller since the prior exam.   Please note that these statements are  based on the recommendations of the American College [employer]   TI-RADS grading system. ACR TI-RADS recommendations (apply to nodules which have NOT been biopsied):   TR5 (?7 points) highly suspicious - FNA if ? 1cm, follow-up if 0.5 -0.9 cm every year for 5 years. Aggregate cancer risk 35%. TR4 (4-6 points) moderately suspicious - FNA if ? 1.5cm, follow-up if 1 -1.4 cm in 1, 2, 3 and 5 years. Aggregate cancer risk 9.1% TR3 (3 points) mildly suspicious - FNA if ? 2.5cm, follow-up if 1.5 -2.4 cm in 1, 3 and 5 years. Aggregate cancer risk 4.8% TR2 (2 points) not suspicious. No FNA or follow-up.Aggregate cancer risk 1.5% TR1 (0 points) benign - No FNA or follow-up. Aggregate cancer risk 0.3%   MACRO: None     Signed by: Natalee Ruiz 4/12/2025 1:00 PM Dictation workstation:   [id number]        Assessment/Plan   Chronic respiratory failure, at baseline  Coronavirus bronchitis-patient at risk for progression   Pyuria versus Urinary tract infection-previous urine cultures are growing Enterococcus, Aerococcus and Proteus-all susceptible to ampicillin  Chronic kidney disease stage III, possible acute component  Recent right olecranon bursitis, no clinical evidence of active infection     Remdesivir-risk reduction-completed 3 days  Unasyn-empiric-change to Augmentin to complete total of 14 days  Oxygen as needed  Droplet plus precautions  Supportive care  Monitor temperature and WBC  Monitor renal function  Discharge planning    Total time spent caring for the patient today was 25 minutes.  This includes time spent before the visit reviewing the chart, time spent during the visit, and time spent after the visit on documentation.      Luci Sales, APRN-CNP

## 2025-04-23 NOTE — DISCHARGE SUMMARY
Discharge Diagnosis  Acute COVID-19       Issues Requiring Follow-Up  Debility, UTI - follow up with PCP     Discharge Meds     Medication List      START taking these medications     amoxicillin-clavulanate 875-125 mg tablet; Commonly known as: Augmentin;   Take 1 tablet by mouth 2 times a day for 11 days.     CONTINUE taking these medications     acetaminophen 325 mg tablet; Commonly known as: Tylenol   amiodarone 200 mg tablet; Commonly known as: Pacerone; Take 0.5 tablets   (100 mg) by mouth once daily.   atorvastatin 20 mg tablet; Commonly known as: Lipitor; TAKE 1 TABLET   EVERY DAY   cyanocobalamin 500 mcg tablet; Commonly known as: Vitamin B-12   Eliquis 2.5 mg tablet; Generic drug: apixaban; Take 1 tablet (2.5 mg) by   mouth 2 times a day.   Entresto 24-26 mg tablet; Generic drug: sacubitriL-valsartan; Take 1   tablet by mouth 2 times a day.   ergocalciferol 1250 mcg (50,000 units) capsule; Commonly known as:   Vitamin D-2; Take 1 capsule (50,000 Units) by mouth 1 (one) time per week.   fenofibrate 48 mg tablet; Commonly known as: Tricor; TAKE 1 AND 1/2   TABLETS ONE TIME DAILY   ferrous sulfate 250 mg (50 mg iron) tablet extended release; Take 1   tablet (250 mg) by mouth once daily.   finasteride 5 mg tablet; Commonly known as: Proscar; Take 1 tablet (5   mg) by mouth once daily.   folic acid 1 mg tablet; Commonly known as: Folvite   gabapentin 100 mg capsule; Commonly known as: Neurontin; Take 1 capsule   (100 mg) by mouth once daily at bedtime.   metoprolol succinate XL 25 mg 24 hr tablet; Commonly known as:   Toprol-XL; Take 1 tablet (25 mg) by mouth once daily. Do not crush or   chew.   Milk of Magnesia 400 mg/5 mL suspension; Generic drug: magnesium   hydroxide   NATURAL TEARS (PF) OPHT   oxygen gas therapy; Commonly known as: O2   PARoxetine 20 mg tablet; Commonly known as: Paxil; TAKE 1 TABLET EVERY   DAY   Protonix 40 mg EC tablet; Generic drug: pantoprazole   psyllium 0.4 gram capsule; Commonly  known as: Metamucil   tamsulosin 0.4 mg 24 hr capsule; Commonly known as: Flomax; Take 1   capsule (0.4 mg) by mouth once daily at bedtime.     STOP taking these medications     furosemide 40 mg tablet; Commonly known as: Lasix       Test Results Pending At Discharge  Pending Labs       No current pending labs.            Hospital Course   80yoM hx of atrial fibrillation, chronic diastolic HF, chronic hypoxic respiratory failure (2-3L at baseline), CKD who presented with generalized weakness and decreased appetite. Workup in the ED notable for UTI. Patient was also found to be positive for COVID19. ID consulted, started on antibiotics and remdesivir for risk reduction. Patient received a total of 3 doses remdesivir. Urine culture showing contamination. ID recommending augmentin for a total of 14 days. PT/OT recommending moderate intensity rehab; patient agreeable to SNF placement. Patient is now medically cleared for discharge to SNF.     Pertinent Physical Exam At Time of Discharge  Physical Exam  Constitutional:       General: He is not in acute distress.     Appearance: He is not toxic-appearing.   HENT:      Head: Normocephalic.      Mouth/Throat:      Pharynx: Oropharynx is clear.   Eyes:      General: No scleral icterus.  Cardiovascular:      Rate and Rhythm: Normal rate.   Pulmonary:      Effort: No respiratory distress.      Breath sounds: No wheezing.   Abdominal:      General: There is no distension.   Musculoskeletal:      Right lower leg: No edema.      Left lower leg: No edema.   Neurological:      Mental Status: He is alert and oriented to person, place, and time.   Psychiatric:         Behavior: Behavior normal.         Outpatient Follow-Up  Future Appointments   Date Time Provider Department Center   5/2/2025  1:00 PM Annamaria Burgess, Kaylie [id number] [facility]   5/27/2025  1:40 PM Tiana Juarez MD [id number] [facility]   6/23/2025 10:00 AM RON Daugherty-CNP [id number] [facility]   6/27/2025  2:00 PM  Annamaria Burgess, PharmD [id number] [facility]   7/11/2025  1:00 PM Jung Tao MD [id number] [facility]   9/16/2025  1:00 PM Royce Louis MD PhD [id number] [facility]   9/23/2025 11:20 AM RON Wilkes-CNP [id number] [facility]   10/1/2025  1:00 PM Jose Mccormick MD [id number] [facility]     Time spent on discharge: 35 minutes      Eugenia Rodriguez MD

## 2025-04-23 NOTE — PROGRESS NOTES
Patient with an active discharge. Patient accepted by [facility]. Transport set for 4:15 PM. Nurse to call report. Paperwork sent to facility. 7000 complete. Will follow as needed.      04/23/25 4805   Discharge Planning   Home or Post Acute Services Post acute facilities (Rehab/SNF/etc)   Type of Post Acute Facility Services Rehab   Expected Discharge Disposition SNF  ([facility])   Does the patient need discharge transport arranged? Yes   RoundTrip coordination needed? Yes

## 2025-04-24 ENCOUNTER — APPOINTMENT (OUTPATIENT)
Dept: ORTHOPEDIC SURGERY | Facility: CLINIC | Age: 80
End: 2025-04-24
Payer: MEDICARE

## 2025-04-24 ENCOUNTER — NURSING HOME VISIT (OUTPATIENT)
Dept: POST ACUTE CARE | Facility: EXTERNAL LOCATION | Age: 80
End: 2025-04-24
Payer: MEDICARE

## 2025-04-24 VITALS
DIASTOLIC BLOOD PRESSURE: 64 MMHG | WEIGHT: 229.6 LBS | HEART RATE: 63 BPM | OXYGEN SATURATION: 97 % | SYSTOLIC BLOOD PRESSURE: 132 MMHG | BODY MASS INDEX: 31.14 KG/M2 | TEMPERATURE: 97.5 F | RESPIRATION RATE: 18 BRPM

## 2025-04-24 DIAGNOSIS — N40.0 BENIGN PROSTATIC HYPERPLASIA WITHOUT LOWER URINARY TRACT SYMPTOMS: ICD-10-CM

## 2025-04-24 DIAGNOSIS — I50.32 CHRONIC DIASTOLIC HEART FAILURE: ICD-10-CM

## 2025-04-24 DIAGNOSIS — N39.0 URINARY TRACT INFECTION WITHOUT HEMATURIA, SITE UNSPECIFIED: ICD-10-CM

## 2025-04-24 DIAGNOSIS — I48.0 PAROXYSMAL ATRIAL FIBRILLATION (MULTI): ICD-10-CM

## 2025-04-24 DIAGNOSIS — N18.31 STAGE 3A CHRONIC KIDNEY DISEASE (MULTI): ICD-10-CM

## 2025-04-24 DIAGNOSIS — U07.1 ACUTE COVID-19: Primary | ICD-10-CM

## 2025-04-24 DIAGNOSIS — J96.11 CHRONIC HYPOXIC RESPIRATORY FAILURE: ICD-10-CM

## 2025-04-24 DIAGNOSIS — D50.9 IRON DEFICIENCY ANEMIA, UNSPECIFIED IRON DEFICIENCY ANEMIA TYPE: ICD-10-CM

## 2025-04-24 PROCEDURE — 99309 SBSQ NF CARE MODERATE MDM 30: CPT | Performed by: PHYSICIAN ASSISTANT

## 2025-04-24 ASSESSMENT — ENCOUNTER SYMPTOMS
DIARRHEA: 0
HEMATURIA: 0
NERVOUS/ANXIOUS: 0
COUGH: 1
CONFUSION: 0
DIZZINESS: 0
WEAKNESS: 0
HEADACHES: 0
DYSURIA: 0
TREMORS: 0
APPETITE CHANGE: 0
SHORTNESS OF BREATH: 0
ABDOMINAL PAIN: 0
NAUSEA: 0
CONSTIPATION: 0
FEVER: 0
WHEEZING: 0
FREQUENCY: 0
CHILLS: 0
VOMITING: 0

## 2025-04-24 NOTE — LETTER
Patient: Angel Carroll  : 1945    Encounter Date: 2025      Subjective  [id number] : Angel Carroll is a 80 y.o. male admitted to [facility] for rehab. No chief complaint on file..  HPI  Pt with a h/o Afib, chf, CKD 3 and chronic respiratory failure treated for Acute covid and UTI.  Pt treated with course of Remdesevir and antibiotics.  Pt seen while sitting up in wheelchair.  He is alert and pleasant.  He continues to have a productive cough.  He is stable on 2-3 liters of oxygen which is his baseline.  Seen by RT today and was 93% on room air.  Pt states that he does remove his oxygen at home sometimes as well.  He offers no new complaints or concerns.  He reports that he is eating and drinking well.  No n/v/d/c.  He has no lower leg edema.  He lives with his wife.      Review of Systems   Constitutional:  Negative for appetite change, chills and fever.   Respiratory:  Positive for cough. Negative for shortness of breath and wheezing.    Cardiovascular:  Negative for chest pain and leg swelling.   Gastrointestinal:  Negative for abdominal pain, constipation, diarrhea, nausea and vomiting.   Genitourinary:  Negative for dysuria, frequency and hematuria.   Neurological:  Negative for dizziness, tremors, weakness and headaches.   Psychiatric/Behavioral:  Negative for confusion. The patient is not nervous/anxious.    All other systems reviewed and are negative.      Objective  /64   Pulse 63   Temp 36.4 °C (97.5 °F)   Resp 18   Wt 104 kg (229 lb 9.6 oz)   SpO2 97%   BMI 31.14 kg/m²    Physical Exam  Constitutional:       General: He is not in acute distress.  Eyes:      Conjunctiva/sclera: Conjunctivae normal.      Pupils: Pupils are equal, round, and reactive to light.   Cardiovascular:      Rate and Rhythm: Normal rate and regular rhythm.      Heart sounds: No murmur heard.  Pulmonary:      Effort: Pulmonary effort is normal.      Breath sounds: No wheezing, rhonchi or rales.      Comments:  "Productive cough  Abdominal:      General: Abdomen is flat. Bowel sounds are normal. There is no distension.      Palpations: Abdomen is soft. There is no mass.      Tenderness: There is no abdominal tenderness.   Musculoskeletal:         General: No swelling. Normal range of motion.   Skin:     General: Skin is warm and dry.      Findings: No rash.   Neurological:      General: No focal deficit present.      Mental Status: He is alert and oriented to person, place, and time. Mental status is at baseline.     No results found. However, due to the size of the patient record, not all encounters were searched. Please check Results Review for a complete set of results.   No lab exists for component: \"CBC BMP\"  Assessment/Plan  Problem List Items Addressed This Visit           ICD-10-CM    BPH (benign prostatic hyperplasia) N40.0    Continue finasteride and tamsulosin.         Chronic hypoxic respiratory failure J96.11    On 2-3 liters of oxygen at home.  Currently stable on 3liters of oxygen.          Iron deficiency anemia D50.9    Continue iron supplement.  Monitor weekly labs.          Paroxysmal atrial fibrillation (Multi) I48.0    Rate controlled with amiodarone and metoprolol.  Anticoaguated with eliquis.           Chronic diastolic heart failure I50.32    Entresto.  Lasix was stopped in the hospital.   Appears euvolemic.  Monitor weights and labs.           UTI (urinary tract infection) N39.0    Complete course of augmentin         Stage 3a chronic kidney disease (Multi) N18.31    Monitor weekly labs.          Acute COVID-19 - Primary U07.1    Treated with course remdesevir.    Continues to have course cough.  Stable on 3liters of oxygen.  Add benzonatate prn for cough.                 Time spent: 30 min in review of chart, labs and orders, consultation with pt and documentation.     Electronically Signed By: Georgina Garg PA-C   4/24/25  4:21 PM  "

## 2025-04-24 NOTE — PROGRESS NOTES
Subjective   [id number] : Angel Carroll is a 80 y.o. male admitted to [facility] for rehab. No chief complaint on file..  HPI  Pt with a h/o Afib, chf, CKD 3 and chronic respiratory failure treated for Acute covid and UTI.  Pt treated with course of Remdesevir and antibiotics.  Pt seen while sitting up in wheelchair.  He is alert and pleasant.  He continues to have a productive cough.  He is stable on 2-3 liters of oxygen which is his baseline.  Seen by RT today and was 93% on room air.  Pt states that he does remove his oxygen at home sometimes as well.  He offers no new complaints or concerns.  He reports that he is eating and drinking well.  No n/v/d/c.  He has no lower leg edema.  He lives with his wife.      Review of Systems   Constitutional:  Negative for appetite change, chills and fever.   Respiratory:  Positive for cough. Negative for shortness of breath and wheezing.    Cardiovascular:  Negative for chest pain and leg swelling.   Gastrointestinal:  Negative for abdominal pain, constipation, diarrhea, nausea and vomiting.   Genitourinary:  Negative for dysuria, frequency and hematuria.   Neurological:  Negative for dizziness, tremors, weakness and headaches.   Psychiatric/Behavioral:  Negative for confusion. The patient is not nervous/anxious.    All other systems reviewed and are negative.      Objective   /64   Pulse 63   Temp 36.4 °C (97.5 °F)   Resp 18   Wt 104 kg (229 lb 9.6 oz)   SpO2 97%   BMI 31.14 kg/m²    Physical Exam  Constitutional:       General: He is not in acute distress.  Eyes:      Conjunctiva/sclera: Conjunctivae normal.      Pupils: Pupils are equal, round, and reactive to light.   Cardiovascular:      Rate and Rhythm: Normal rate and regular rhythm.      Heart sounds: No murmur heard.  Pulmonary:      Effort: Pulmonary effort is normal.      Breath sounds: No wheezing, rhonchi or rales.      Comments: Productive cough  Abdominal:      General: Abdomen is flat. Bowel  "sounds are normal. There is no distension.      Palpations: Abdomen is soft. There is no mass.      Tenderness: There is no abdominal tenderness.   Musculoskeletal:         General: No swelling. Normal range of motion.   Skin:     General: Skin is warm and dry.      Findings: No rash.   Neurological:      General: No focal deficit present.      Mental Status: He is alert and oriented to person, place, and time. Mental status is at baseline.     No results found. However, due to the size of the patient record, not all encounters were searched. Please check Results Review for a complete set of results.   No lab exists for component: \"CBC BMP\"  Assessment/Plan   Problem List Items Addressed This Visit           ICD-10-CM    BPH (benign prostatic hyperplasia) N40.0    Continue finasteride and tamsulosin.         Chronic hypoxic respiratory failure J96.11    On 2-3 liters of oxygen at home.  Currently stable on 3liters of oxygen.          Iron deficiency anemia D50.9    Continue iron supplement.  Monitor weekly labs.          Paroxysmal atrial fibrillation (Multi) I48.0    Rate controlled with amiodarone and metoprolol.  Anticoaguated with eliquis.           Chronic diastolic heart failure I50.32    Entresto.  Lasix was stopped in the hospital.   Appears euvolemic.  Monitor weights and labs.           UTI (urinary tract infection) N39.0    Complete course of augmentin         Stage 3a chronic kidney disease (Multi) N18.31    Monitor weekly labs.          Acute COVID-19 - Primary U07.1    Treated with course remdesevir.    Continues to have course cough.  Stable on 3liters of oxygen.  Add benzonatate prn for cough.                 Time spent: 30 min in review of chart, labs and orders, consultation with pt and documentation.   "

## 2025-04-24 NOTE — ASSESSMENT & PLAN NOTE
Treated with course remdesevir.    Continues to have course cough.  Stable on 3liters of oxygen.  Add benzonatate prn for cough.

## 2025-04-25 ENCOUNTER — NURSING HOME VISIT (OUTPATIENT)
Dept: POST ACUTE CARE | Facility: EXTERNAL LOCATION | Age: 80
End: 2025-04-25
Payer: MEDICARE

## 2025-04-25 DIAGNOSIS — N18.31 STAGE 3A CHRONIC KIDNEY DISEASE (MULTI): ICD-10-CM

## 2025-04-25 DIAGNOSIS — N40.0 BENIGN PROSTATIC HYPERPLASIA WITHOUT LOWER URINARY TRACT SYMPTOMS: ICD-10-CM

## 2025-04-25 DIAGNOSIS — I48.0 PAROXYSMAL ATRIAL FIBRILLATION (MULTI): ICD-10-CM

## 2025-04-25 DIAGNOSIS — N39.0 URINARY TRACT INFECTION WITHOUT HEMATURIA, SITE UNSPECIFIED: ICD-10-CM

## 2025-04-25 DIAGNOSIS — U07.1 ACUTE COVID-19: Primary | ICD-10-CM

## 2025-04-25 PROCEDURE — 99306 1ST NF CARE HIGH MDM 50: CPT | Performed by: INTERNAL MEDICINE

## 2025-04-25 ASSESSMENT — ENCOUNTER SYMPTOMS
SHORTNESS OF BREATH: 0
ABDOMINAL PAIN: 0
FEVER: 0

## 2025-04-25 NOTE — LETTER
Patient: Angel Carroll  : 1945    Encounter Date: 2025    HISTORY AND PHYSICAL    History of present illness:    Angel Carroll is a 80 y.o. male admitted to SNF after hospitalization for COVID-19 and urinary tract infection.  He was treated with remdesivir and antibiotics.  He says he is feeling much better on 2 to 3 L of oxygen.  No chest pain reported.  Appetite fair.  Bowels moving.    Medical History[1]     Surgical History[2]     Family History[3]    Social History     Socioeconomic History   • Marital status:      Spouse name: Not on file   • Number of children: Not on file   • Years of education: Not on file   • Highest education level: Not on file   Occupational History   • Not on file   Tobacco Use   • Smoking status: Former     Current packs/day: 0.00     Types: Cigarettes     Quit date:      Years since quittin.3   • Smokeless tobacco: Never   Vaping Use   • Vaping status: Never Used   Substance and Sexual Activity   • Alcohol use: Not Currently   • Drug use: Never   • Sexual activity: Not Currently   Other Topics Concern   • Not on file   Social History Narrative   • Not on file     Social Drivers of Health     Financial Resource Strain: Low Risk  (2025)    Overall Financial Resource Strain (CARDIA)    • Difficulty of Paying Living Expenses: Not very hard   Food Insecurity: No Food Insecurity (2025)    Hunger Vital Sign    • Worried About Running Out of Food in the Last Year: Never true    • Ran Out of Food in the Last Year: Never true   Transportation Needs: No Transportation Needs (2025)    PRAPARE - Transportation    • Lack of Transportation (Medical): No    • Lack of Transportation (Non-Medical): No   Physical Activity: Inactive (2025)    Exercise Vital Sign    • Days of Exercise per Week: 0 days    • Minutes of Exercise per Session: 0 min   Stress: No Stress Concern Present (2025)    [employer] - Occupational Stress  Questionnaire    • Feeling of Stress : Not at all   Social Connections: Moderately Isolated (4/21/2025)    Social Connection and Isolation Panel [NHANES]    • Frequency of Communication with Friends and Family: More than three times a week    • Frequency of Social Gatherings with Friends and Family: More than three times a week    • Attends Hoahaoism Services: Never    • Active Member of Clubs or Organizations: No    • Attends Club or Organization Meetings: Never    • Marital Status:    Intimate Partner Violence: Patient Unable To Answer (4/21/2025)    Humiliation, Afraid, Rape, and Kick questionnaire    • Fear of Current or Ex-Partner: Patient unable to answer    • Emotionally Abused: Patient unable to answer    • Physically Abused: Patient unable to answer    • Sexually Abused: Patient unable to answer   Housing Stability: Low Risk  (4/21/2025)    Housing Stability Vital Sign    • Unable to Pay for Housing in the Last Year: No    • Number of Times Moved in the Last Year: 0    • Homeless in the Last Year: No       Review of Systems   Constitutional:  Negative for fever.   Respiratory:  Negative for shortness of breath.    Cardiovascular:  Negative for chest pain.   Gastrointestinal:  Negative for abdominal pain.   All other systems reviewed and are negative.       Objective  Vital signs reviewed in [facility].    Physical Exam  Vitals reviewed.   Constitutional:       Appearance: Normal appearance.   Cardiovascular:      Rate and Rhythm: Normal rate and regular rhythm.      Heart sounds: No murmur heard.  Pulmonary:      Breath sounds: Normal breath sounds. No wheezing, rhonchi or rales.   Musculoskeletal:      Right lower leg: No edema.      Left lower leg: No edema.          Assessment/Plan  Diagnoses and all orders for this visit:  Acute COVID-19 (Primary)  Urinary tract infection without hematuria, site unspecified  Stage 3a chronic kidney disease (Multi)  Benign prostatic hyperplasia without lower  urinary tract symptoms  Paroxysmal atrial fibrillation (Multi)    COVID-19 treated with remdesivir.  Continue oxygen.  Tessalon as needed cough.    Chronic diastolic heart failure, continue the Entresto and the Lasix is on hold, monitor weights, volume status and electrolytes.    UTI, completed a course of Augmentin.    Chronic respiratory failure, oxygen, pulmonary toilet.    BPH on finasteride and tamsulosin.    The essential elements of the hospital History and Physical as well as the Discharge Summary have been confirmed to the best of my ability by means of interviewing the patient plus a review of the hospital records. Please note that this entry was created in a shared electronic medical record.     All available hospital and outpatient records have been reviewed. Will continue other current drug therapies as ordered on the continuation of care documents. Physical and Occupational Therapy will assess and treat per POC. Analgesia for identified pain symptoms. Continue the various medicines for bowel and bladder symptoms as well as the vitamins and supplements per hospital instructions. Will assess and provide local care to abnormalities of skin integrity. Drug to drug interaction data as noted by the pharmacy has been reviewed. The patient's condition warrants the continuation of these drugs as prescribed by the medical specialists. Discharge planning will be coordinated through the  department. I have reviewed any advanced directive documentation that is contained in the admission packet as directives indicating whether a surrogate decision maker has been identified.       Electronically Signed By: Doyle Enamorado MD   4/25/25  9:15 AM       [1]  Past Medical History:  Diagnosis Date   • Atherosclerotic heart disease of native coronary artery without angina pectoris 12/14/2022    Arteriosclerotic cardiovascular disease (ASCVD)   • Benign neoplasm of meninges, unspecified     Meningioma   •  BPH (benign prostatic hyperplasia)    • Chronic diastolic heart failure    • Chronic kidney disease    • Chronic obstructive pulmonary disease (COPD) (Multi)    • Chronic respiratory failure    • Elevated blood-pressure reading, without diagnosis of hypertension     Prehypertension   • Hyperlipidemia    • Meningioma (Multi)    • Nephrolithiasis    • Rectal bleeding    • Sick sinus syndrome (Multi)     s/p pacemaker placement   • Vitamin D deficiency    [2]  Past Surgical History:  Procedure Laterality Date   • BACK SURGERY  04/18/2016    Back Surgery   • BRAIN SURGERY     • COLONOSCOPY  07/03/2013    Complete Colonoscopy   • CORONARY ANGIOPLASTY WITH STENT PLACEMENT  08/18/2020    Cath Stent Placement   • CT ANGIO NECK  09/25/2021    CT NECK ANGIO W AND WO IV CONTRAST 9/25/2021 [facility]   • CT HEAD ANGIO W AND WO IV CONTRAST  09/25/2021    CT HEAD ANGIO W AND WO IV CONTRAST 9/25/2021 [facility]   • CYSTOSCOPY W/ URETERAL STENT PLACEMENT     • EYE SURGERY  04/03/2013    Eye Surgery   • KNEE ARTHROSCOPY W/ DEBRIDEMENT  07/03/2013    Arthroscopy Knee Right   • KNEE ARTHROSCOPY W/ DEBRIDEMENT  07/03/2013    Arthroscopy Knee Left   • LITHOTRIPSY  07/22/2013    Renal Lithotripsy   • MR HEAD ANGIO WO IV CONTRAST  03/23/2021    MR HEAD ANGIO WO IV CONTRAST [facility]   • MR NECK ANGIO WO IV CONTRAST  03/23/2021    MR NECK ANGIO WO IV CONTRAST [facility]   • OTHER SURGICAL HISTORY  04/03/2013    Cardiac Cath Procedure Outcome: Successful   • OTHER SURGICAL HISTORY  04/03/2013    Neuroplasty With Transposition Of Ulnar Nerve - At Elbow   • OTHER SURGICAL HISTORY  07/03/2013    Wrist Carpectomy   • OTHER SURGICAL HISTORY  07/03/2013    Ulnar Osteotomy   • PACEMAKER PLACEMENT     • TONSILLECTOMY     • TOTAL KNEE ARTHROPLASTY     [3]  Family History  Problem Relation Name Age of Onset   • Diabetes Mother     • Emphysema Father     • Heart disease Father

## 2025-04-25 NOTE — PROGRESS NOTES
HISTORY AND PHYSICAL    History of present illness:    Angel Carroll is a 80 y.o. male admitted to SNF after hospitalization for COVID-19 and urinary tract infection.  He was treated with remdesivir and antibiotics.  He says he is feeling much better on 2 to 3 L of oxygen.  No chest pain reported.  Appetite fair.  Bowels moving.    Medical History[1]     Surgical History[2]     Family History[3]    Social History     Socioeconomic History    Marital status:      Spouse name: Not on file    Number of children: Not on file    Years of education: Not on file    Highest education level: Not on file   Occupational History    Not on file   Tobacco Use    Smoking status: Former     Current packs/day: 0.00     Types: Cigarettes     Quit date:      Years since quittin.3    Smokeless tobacco: Never   Vaping Use    Vaping status: Never Used   Substance and Sexual Activity    Alcohol use: Not Currently    Drug use: Never    Sexual activity: Not Currently   Other Topics Concern    Not on file   Social History Narrative    Not on file     Social Drivers of Health     Financial Resource Strain: Low Risk  (2025)    Overall Financial Resource Strain (CARDIA)     Difficulty of Paying Living Expenses: Not very hard   Food Insecurity: No Food Insecurity (2025)    Hunger Vital Sign     Worried About Running Out of Food in the Last Year: Never true     Ran Out of Food in the Last Year: Never true   Transportation Needs: No Transportation Needs (2025)    PRAPARE - Transportation     Lack of Transportation (Medical): No     Lack of Transportation (Non-Medical): No   Physical Activity: Inactive (2025)    Exercise Vital Sign     Days of Exercise per Week: 0 days     Minutes of Exercise per Session: 0 min   Stress: No Stress Concern Present (2025)    [employer] - Occupational Stress Questionnaire     Feeling of Stress : Not at all   Social Connections: Moderately Isolated  (4/21/2025)    Social Connection and Isolation Panel [NHANES]     Frequency of Communication with Friends and Family: More than three times a week     Frequency of Social Gatherings with Friends and Family: More than three times a week     Attends Mandaeism Services: Never     Active Member of Clubs or Organizations: No     Attends Club or Organization Meetings: Never     Marital Status:    Intimate Partner Violence: Patient Unable To Answer (4/21/2025)    Humiliation, Afraid, Rape, and Kick questionnaire     Fear of Current or Ex-Partner: Patient unable to answer     Emotionally Abused: Patient unable to answer     Physically Abused: Patient unable to answer     Sexually Abused: Patient unable to answer   Housing Stability: Low Risk  (4/21/2025)    Housing Stability Vital Sign     Unable to Pay for Housing in the Last Year: No     Number of Times Moved in the Last Year: 0     Homeless in the Last Year: No       Review of Systems   Constitutional:  Negative for fever.   Respiratory:  Negative for shortness of breath.    Cardiovascular:  Negative for chest pain.   Gastrointestinal:  Negative for abdominal pain.   All other systems reviewed and are negative.       Objective   Vital signs reviewed in [facility].    Physical Exam  Vitals reviewed.   Constitutional:       Appearance: Normal appearance.   Cardiovascular:      Rate and Rhythm: Normal rate and regular rhythm.      Heart sounds: No murmur heard.  Pulmonary:      Breath sounds: Normal breath sounds. No wheezing, rhonchi or rales.   Musculoskeletal:      Right lower leg: No edema.      Left lower leg: No edema.          Assessment/Plan   Diagnoses and all orders for this visit:  Acute COVID-19 (Primary)  Urinary tract infection without hematuria, site unspecified  Stage 3a chronic kidney disease (Multi)  Benign prostatic hyperplasia without lower urinary tract symptoms  Paroxysmal atrial fibrillation (Multi)    COVID-19 treated with remdesivir.   Continue oxygen.  Tessalon as needed cough.    Chronic diastolic heart failure, continue the Entresto and the Lasix is on hold, monitor weights, volume status and electrolytes.    UTI, completed a course of Augmentin.    Chronic respiratory failure, oxygen, pulmonary toilet.    BPH on finasteride and tamsulosin.    The essential elements of the hospital History and Physical as well as the Discharge Summary have been confirmed to the best of my ability by means of interviewing the patient plus a review of the hospital records. Please note that this entry was created in a shared electronic medical record.     All available hospital and outpatient records have been reviewed. Will continue other current drug therapies as ordered on the continuation of care documents. Physical and Occupational Therapy will assess and treat per POC. Analgesia for identified pain symptoms. Continue the various medicines for bowel and bladder symptoms as well as the vitamins and supplements per hospital instructions. Will assess and provide local care to abnormalities of skin integrity. Drug to drug interaction data as noted by the pharmacy has been reviewed. The patient's condition warrants the continuation of these drugs as prescribed by the medical specialists. Discharge planning will be coordinated through the  department. I have reviewed any advanced directive documentation that is contained in the admission packet as directives indicating whether a surrogate decision maker has been identified.        [1]   Past Medical History:  Diagnosis Date    Atherosclerotic heart disease of native coronary artery without angina pectoris 12/14/2022    Arteriosclerotic cardiovascular disease (ASCVD)    Benign neoplasm of meninges, unspecified     Meningioma    BPH (benign prostatic hyperplasia)     Chronic diastolic heart failure     Chronic kidney disease     Chronic obstructive pulmonary disease (COPD) (Multi)     Chronic  respiratory failure     Elevated blood-pressure reading, without diagnosis of hypertension     Prehypertension    Hyperlipidemia     Meningioma (Multi)     Nephrolithiasis     Rectal bleeding     Sick sinus syndrome (Multi)     s/p pacemaker placement    Vitamin D deficiency    [2]   Past Surgical History:  Procedure Laterality Date    BACK SURGERY  04/18/2016    Back Surgery    BRAIN SURGERY      COLONOSCOPY  07/03/2013    Complete Colonoscopy    CORONARY ANGIOPLASTY WITH STENT PLACEMENT  08/18/2020    Cath Stent Placement    CT ANGIO NECK  09/25/2021    CT NECK ANGIO W AND WO IV CONTRAST 9/25/2021 [facility]    CT HEAD ANGIO W AND WO IV CONTRAST  09/25/2021    CT HEAD ANGIO W AND WO IV CONTRAST 9/25/2021 [facility]    CYSTOSCOPY W/ URETERAL STENT PLACEMENT      EYE SURGERY  04/03/2013    Eye Surgery    KNEE ARTHROSCOPY W/ DEBRIDEMENT  07/03/2013    Arthroscopy Knee Right    KNEE ARTHROSCOPY W/ DEBRIDEMENT  07/03/2013    Arthroscopy Knee Left    LITHOTRIPSY  07/22/2013    Renal Lithotripsy    MR HEAD ANGIO WO IV CONTRAST  03/23/2021    MR HEAD ANGIO WO IV CONTRAST [facility]    MR NECK ANGIO WO IV CONTRAST  03/23/2021    MR NECK ANGIO WO IV CONTRAST [facility]    OTHER SURGICAL HISTORY  04/03/2013    Cardiac Cath Procedure Outcome: Successful    OTHER SURGICAL HISTORY  04/03/2013    Neuroplasty With Transposition Of Ulnar Nerve - At Elbow    OTHER SURGICAL HISTORY  07/03/2013    Wrist Carpectomy    OTHER SURGICAL HISTORY  07/03/2013    Ulnar Osteotomy    PACEMAKER PLACEMENT      TONSILLECTOMY      TOTAL KNEE ARTHROPLASTY     [3]   Family History  Problem Relation Name Age of Onset    Diabetes Mother      Emphysema Father      Heart disease Father

## 2025-04-25 NOTE — TELEPHONE ENCOUNTER
Spoke to Rhea, patients daughter.  Explained the lab work for patient and that he had NO infection in elbow

## 2025-04-28 ENCOUNTER — APPOINTMENT (OUTPATIENT)
Dept: CARDIOLOGY | Facility: CLINIC | Age: 80
End: 2025-04-28
Payer: MEDICARE

## 2025-04-28 ENCOUNTER — NURSING HOME VISIT (OUTPATIENT)
Dept: POST ACUTE CARE | Facility: EXTERNAL LOCATION | Age: 80
End: 2025-04-28
Payer: MEDICARE

## 2025-04-28 VITALS
HEART RATE: 62 BPM | SYSTOLIC BLOOD PRESSURE: 141 MMHG | WEIGHT: 229.6 LBS | RESPIRATION RATE: 18 BRPM | DIASTOLIC BLOOD PRESSURE: 69 MMHG | TEMPERATURE: 97.2 F | BODY MASS INDEX: 31.14 KG/M2 | OXYGEN SATURATION: 98 %

## 2025-04-28 DIAGNOSIS — I50.32 CHRONIC DIASTOLIC HEART FAILURE: ICD-10-CM

## 2025-04-28 DIAGNOSIS — N40.0 BENIGN PROSTATIC HYPERPLASIA WITHOUT LOWER URINARY TRACT SYMPTOMS: ICD-10-CM

## 2025-04-28 DIAGNOSIS — D50.9 IRON DEFICIENCY ANEMIA, UNSPECIFIED IRON DEFICIENCY ANEMIA TYPE: ICD-10-CM

## 2025-04-28 DIAGNOSIS — N18.31 STAGE 3A CHRONIC KIDNEY DISEASE (MULTI): ICD-10-CM

## 2025-04-28 DIAGNOSIS — J96.11 CHRONIC HYPOXIC RESPIRATORY FAILURE: ICD-10-CM

## 2025-04-28 DIAGNOSIS — U07.1 ACUTE COVID-19: Primary | ICD-10-CM

## 2025-04-28 DIAGNOSIS — I48.0 PAROXYSMAL ATRIAL FIBRILLATION (MULTI): ICD-10-CM

## 2025-04-28 DIAGNOSIS — N39.0 URINARY TRACT INFECTION WITHOUT HEMATURIA, SITE UNSPECIFIED: ICD-10-CM

## 2025-04-28 PROCEDURE — 99309 SBSQ NF CARE MODERATE MDM 30: CPT | Performed by: PHYSICIAN ASSISTANT

## 2025-04-28 ASSESSMENT — ENCOUNTER SYMPTOMS
NAUSEA: 0
ABDOMINAL PAIN: 0
HEADACHES: 0
TREMORS: 0
FEVER: 0
CONFUSION: 0
COUGH: 1
DIZZINESS: 0
APPETITE CHANGE: 0
FREQUENCY: 0
DIARRHEA: 0
WHEEZING: 0
CONSTIPATION: 0
VOMITING: 0
DYSURIA: 0
HEMATURIA: 0
NERVOUS/ANXIOUS: 0
SHORTNESS OF BREATH: 0
CHILLS: 0
WEAKNESS: 0

## 2025-04-28 NOTE — PROGRESS NOTES
Subjective   [id number] : Angel Carroll is a 80 y.o. male admitted to [facility] for rehab. No chief complaint on file..  HPI  Pt with a h/o Afib, chf, CKD 3 and chronic respiratory failure treated for Acute covid and UTI.  Pt treated with course of Remdesevir and antibiotics.  Pt seen while sitting up in wheelchair.  He is alert and pleasant.   Cough seems to be improving some.   He continues to have a productive cough, but is stable on 2-3 liters of oxygen which is his baseline.  He offers no new complaints or concerns.  He reports that he is eating and drinking well.  No n/v/d/c.  He has no lower leg edema. He had routine labs which were reviewed and are stable.  He lives with his wife.      Review of Systems   Constitutional:  Negative for appetite change, chills and fever.   Respiratory:  Positive for cough. Negative for shortness of breath and wheezing.    Cardiovascular:  Negative for chest pain and leg swelling.   Gastrointestinal:  Negative for abdominal pain, constipation, diarrhea, nausea and vomiting.   Genitourinary:  Negative for dysuria, frequency and hematuria.   Neurological:  Negative for dizziness, tremors, weakness and headaches.   Psychiatric/Behavioral:  Negative for confusion. The patient is not nervous/anxious.    All other systems reviewed and are negative.      Objective   /69   Pulse 62   Temp 36.2 °C (97.2 °F)   Resp 18   Wt 104 kg (229 lb 9.6 oz)   SpO2 98%   BMI 31.14 kg/m²    Physical Exam  Constitutional:       General: He is not in acute distress.  Eyes:      Conjunctiva/sclera: Conjunctivae normal.      Pupils: Pupils are equal, round, and reactive to light.   Cardiovascular:      Rate and Rhythm: Normal rate and regular rhythm.      Heart sounds: No murmur heard.  Pulmonary:      Effort: Pulmonary effort is normal.      Breath sounds: No wheezing, rhonchi or rales.      Comments: Productive cough  Abdominal:      General: Abdomen is flat. Bowel sounds are normal.  "There is no distension.      Palpations: Abdomen is soft. There is no mass.      Tenderness: There is no abdominal tenderness.   Musculoskeletal:         General: No swelling. Normal range of motion.   Skin:     General: Skin is warm and dry.      Findings: No rash.   Neurological:      General: No focal deficit present.      Mental Status: He is alert and oriented to person, place, and time. Mental status is at baseline.       Results for orders placed or performed in visit on 04/28/25 (from the past 24 hours)   Basic Metabolic Panel   Result Value Ref Range    Glucose 82 74 - 99 mg/dL    Sodium 141 136 - 145 mmol/L    Potassium 3.9 3.5 - 5.3 mmol/L    Chloride 106 98 - 107 mmol/L    Bicarbonate 31 21 - 32 mmol/L    Anion Gap 8 (L) 10 - 20 mmol/L    Urea Nitrogen 14 6 - 23 mg/dL    Creatinine 1.26 0.50 - 1.30 mg/dL    eGFR 57 (L) >60 mL/min/1.73m*2    Calcium 8.2 (L) 8.6 - 10.3 mg/dL   CBC   Result Value Ref Range    WBC 3.3 (L) 4.4 - 11.3 x10*3/uL    nRBC 0.0 0.0 - 0.0 /100 WBCs    RBC 3.46 (L) 4.50 - 5.90 x10*6/uL    Hemoglobin 10.3 (L) 13.5 - 17.5 g/dL    Hematocrit 33.7 (L) 41.0 - 52.0 %    MCV 97 80 - 100 fL    MCH 29.8 26.0 - 34.0 pg    MCHC 30.6 (L) 32.0 - 36.0 g/dL    RDW 17.3 (H) 11.5 - 14.5 %    Platelets 154 150 - 450 x10*3/uL     *Note: Due to a large number of results and/or encounters for the requested time period, some results have not been displayed. A complete set of results can be found in Results Review.      No lab exists for component: \"CBC BMP\"  Assessment/Plan   Problem List Items Addressed This Visit    Non    BPH (benign prostatic hyperplasia) N40.0     Continue finasteride and tamsulosin.           Chronic hypoxic respiratory failure J96.11     On 2-3 liters of oxygen at home.  Currently stable on 3liters of oxygen.            Iron deficiency anemia D50.9     Continue iron supplement.  Monitor weekly labs.            Paroxysmal atrial fibrillation (Multi) I48.0     Rate controlled with " amiodarone and metoprolol.  Anticoaguated with eliquis.             Chronic diastolic heart failure I50.32     Entresto.  Lasix was stopped in the hospital.   Appears euvolemic.  Monitor weights and labs.             UTI (urinary tract infection) N39.0     Complete course of augmentin           Stage 3a chronic kidney disease (Multi) N18.31     Monitor weekly labs.            Acute COVID-19 - Primary U07.1     Treated with course remdesevir.    Continues to have course cough.  Stable on 3liters of oxygen.  Add benzonatate prn for cough.                    Time spent: 30 min in review of chart, labs and orders, consultation with pt and documentation.

## 2025-04-28 NOTE — LETTER
Patient: Angel Carroll  : 1945    Encounter Date: 2025      Subjective  [id number] : Angel Carroll is a 80 y.o. male admitted to [facility] for rehab. No chief complaint on file..  HPI  Pt with a h/o Afib, chf, CKD 3 and chronic respiratory failure treated for Acute covid and UTI.  Pt treated with course of Remdesevir and antibiotics.  Pt seen while sitting up in wheelchair.  He is alert and pleasant.   Cough seems to be improving some.   He continues to have a productive cough, but is stable on 2-3 liters of oxygen which is his baseline.  He offers no new complaints or concerns.  He reports that he is eating and drinking well.  No n/v/d/c.  He has no lower leg edema. He had routine labs which were reviewed and are stable.  He lives with his wife.      Review of Systems   Constitutional:  Negative for appetite change, chills and fever.   Respiratory:  Positive for cough. Negative for shortness of breath and wheezing.    Cardiovascular:  Negative for chest pain and leg swelling.   Gastrointestinal:  Negative for abdominal pain, constipation, diarrhea, nausea and vomiting.   Genitourinary:  Negative for dysuria, frequency and hematuria.   Neurological:  Negative for dizziness, tremors, weakness and headaches.   Psychiatric/Behavioral:  Negative for confusion. The patient is not nervous/anxious.    All other systems reviewed and are negative.      Objective  /69   Pulse 62   Temp 36.2 °C (97.2 °F)   Resp 18   Wt 104 kg (229 lb 9.6 oz)   SpO2 98%   BMI 31.14 kg/m²    Physical Exam  Constitutional:       General: He is not in acute distress.  Eyes:      Conjunctiva/sclera: Conjunctivae normal.      Pupils: Pupils are equal, round, and reactive to light.   Cardiovascular:      Rate and Rhythm: Normal rate and regular rhythm.      Heart sounds: No murmur heard.  Pulmonary:      Effort: Pulmonary effort is normal.      Breath sounds: No wheezing, rhonchi or rales.      Comments: Productive  "cough  Abdominal:      General: Abdomen is flat. Bowel sounds are normal. There is no distension.      Palpations: Abdomen is soft. There is no mass.      Tenderness: There is no abdominal tenderness.   Musculoskeletal:         General: No swelling. Normal range of motion.   Skin:     General: Skin is warm and dry.      Findings: No rash.   Neurological:      General: No focal deficit present.      Mental Status: He is alert and oriented to person, place, and time. Mental status is at baseline.       Results for orders placed or performed in visit on 04/28/25 (from the past 24 hours)   Basic Metabolic Panel   Result Value Ref Range    Glucose 82 74 - 99 mg/dL    Sodium 141 136 - 145 mmol/L    Potassium 3.9 3.5 - 5.3 mmol/L    Chloride 106 98 - 107 mmol/L    Bicarbonate 31 21 - 32 mmol/L    Anion Gap 8 (L) 10 - 20 mmol/L    Urea Nitrogen 14 6 - 23 mg/dL    Creatinine 1.26 0.50 - 1.30 mg/dL    eGFR 57 (L) >60 mL/min/1.73m*2    Calcium 8.2 (L) 8.6 - 10.3 mg/dL   CBC   Result Value Ref Range    WBC 3.3 (L) 4.4 - 11.3 x10*3/uL    nRBC 0.0 0.0 - 0.0 /100 WBCs    RBC 3.46 (L) 4.50 - 5.90 x10*6/uL    Hemoglobin 10.3 (L) 13.5 - 17.5 g/dL    Hematocrit 33.7 (L) 41.0 - 52.0 %    MCV 97 80 - 100 fL    MCH 29.8 26.0 - 34.0 pg    MCHC 30.6 (L) 32.0 - 36.0 g/dL    RDW 17.3 (H) 11.5 - 14.5 %    Platelets 154 150 - 450 x10*3/uL     *Note: Due to a large number of results and/or encounters for the requested time period, some results have not been displayed. A complete set of results can be found in Results Review.      No lab exists for component: \"CBC BMP\"  Assessment/Plan  Problem List Items Addressed This Visit    Non    BPH (benign prostatic hyperplasia) N40.0     Continue finasteride and tamsulosin.           Chronic hypoxic respiratory failure J96.11     On 2-3 liters of oxygen at home.  Currently stable on 3liters of oxygen.            Iron deficiency anemia D50.9     Continue iron supplement.  Monitor weekly labs.            " Paroxysmal atrial fibrillation (Multi) I48.0     Rate controlled with amiodarone and metoprolol.  Anticoaguated with eliquis.             Chronic diastolic heart failure I50.32     Entresto.  Lasix was stopped in the hospital.   Appears euvolemic.  Monitor weights and labs.             UTI (urinary tract infection) N39.0     Complete course of augmentin           Stage 3a chronic kidney disease (Multi) N18.31     Monitor weekly labs.            Acute COVID-19 - Primary U07.1     Treated with course remdesevir.    Continues to have course cough.  Stable on 3liters of oxygen.  Add benzonatate prn for cough.                    Time spent: 30 min in review of chart, labs and orders, consultation with pt and documentation.     Electronically Signed By: Georgina Garg PA-C   4/28/25  1:18 PM

## 2025-05-02 ENCOUNTER — NURSING HOME VISIT (OUTPATIENT)
Dept: POST ACUTE CARE | Facility: EXTERNAL LOCATION | Age: 80
End: 2025-05-02

## 2025-05-02 ENCOUNTER — APPOINTMENT (OUTPATIENT)
Dept: PHARMACY | Facility: HOSPITAL | Age: 80
End: 2025-05-02
Payer: MEDICARE

## 2025-05-02 VITALS
SYSTOLIC BLOOD PRESSURE: 132 MMHG | RESPIRATION RATE: 18 BRPM | BODY MASS INDEX: 31.65 KG/M2 | DIASTOLIC BLOOD PRESSURE: 61 MMHG | TEMPERATURE: 97.7 F | HEART RATE: 60 BPM | OXYGEN SATURATION: 100 % | WEIGHT: 233.4 LBS

## 2025-05-02 DIAGNOSIS — N40.0 BENIGN PROSTATIC HYPERPLASIA WITHOUT LOWER URINARY TRACT SYMPTOMS: ICD-10-CM

## 2025-05-02 DIAGNOSIS — I48.0 PAROXYSMAL ATRIAL FIBRILLATION (MULTI): ICD-10-CM

## 2025-05-02 DIAGNOSIS — U07.1 ACUTE COVID-19: Primary | ICD-10-CM

## 2025-05-02 DIAGNOSIS — N18.31 STAGE 3A CHRONIC KIDNEY DISEASE (MULTI): ICD-10-CM

## 2025-05-02 DIAGNOSIS — I50.32 CHRONIC DIASTOLIC HEART FAILURE: ICD-10-CM

## 2025-05-02 DIAGNOSIS — D50.9 IRON DEFICIENCY ANEMIA, UNSPECIFIED IRON DEFICIENCY ANEMIA TYPE: ICD-10-CM

## 2025-05-02 DIAGNOSIS — I51.9 LV DYSFUNCTION: ICD-10-CM

## 2025-05-02 DIAGNOSIS — J96.11 CHRONIC HYPOXIC RESPIRATORY FAILURE: ICD-10-CM

## 2025-05-02 DIAGNOSIS — N39.0 URINARY TRACT INFECTION WITHOUT HEMATURIA, SITE UNSPECIFIED: ICD-10-CM

## 2025-05-02 PROCEDURE — 99309 SBSQ NF CARE MODERATE MDM 30: CPT | Performed by: PHYSICIAN ASSISTANT

## 2025-05-02 ASSESSMENT — ENCOUNTER SYMPTOMS
APPETITE CHANGE: 0
FEVER: 0
DIARRHEA: 0
CONSTIPATION: 0
DIZZINESS: 0
CONFUSION: 0
HEMATURIA: 0
COUGH: 0
TREMORS: 0
ABDOMINAL PAIN: 0
WHEEZING: 0
NERVOUS/ANXIOUS: 0
VOMITING: 0
CHILLS: 0
NAUSEA: 0
SHORTNESS OF BREATH: 0
WEAKNESS: 0
FREQUENCY: 0
HEADACHES: 0
DYSURIA: 0

## 2025-05-02 NOTE — ASSESSMENT & PLAN NOTE
Patient on 2 of 4 GDMT. Unable to assess BP during today's visit-patient admitted to SNF, Reed denies any low BP readings at facility. Renal function and potassium level appropriate to continue current regimen.     Labs (04/28/2025): Scr-1.26 eGFR-57 K-3.9

## 2025-05-02 NOTE — PROGRESS NOTES
Subjective   [id number] : Angel Carroll is a 80 y.o. male admitted to [facility] for rehab. No chief complaint on file..  HPI  Pt with a h/o Afib, chf, CKD 3 and chronic respiratory failure treated for Acute covid and UTI.  Pt treated with course of Remdesevir and antibiotics.  Pt seen while resting in bed.   He is alert and pleasant.   Pt appears to be doing well.  Cough Cough has improved and he denies any shortness of breath.  He is stable on his baseline of 2-3 liters of oxygen.  He offers no new complaints or concerns. No n/v/d/c.  He has no lower leg edema.   He lives with his wife.      Review of Systems   Constitutional:  Negative for appetite change, chills and fever.   Respiratory:  Negative for cough, shortness of breath and wheezing.    Cardiovascular:  Negative for chest pain and leg swelling.   Gastrointestinal:  Negative for abdominal pain, constipation, diarrhea, nausea and vomiting.   Genitourinary:  Negative for dysuria, frequency and hematuria.   Neurological:  Negative for dizziness, tremors, weakness and headaches.   Psychiatric/Behavioral:  Negative for confusion. The patient is not nervous/anxious.    All other systems reviewed and are negative.      Objective   /61   Pulse 60   Temp 36.5 °C (97.7 °F)   Resp 18   Wt 106 kg (233 lb 6.4 oz)   SpO2 100%   BMI 31.65 kg/m²    Physical Exam  Constitutional:       General: He is not in acute distress.  Eyes:      Conjunctiva/sclera: Conjunctivae normal.      Pupils: Pupils are equal, round, and reactive to light.   Cardiovascular:      Rate and Rhythm: Normal rate and regular rhythm.      Heart sounds: No murmur heard.  Pulmonary:      Effort: Pulmonary effort is normal.      Breath sounds: Normal breath sounds. No wheezing, rhonchi or rales.   Abdominal:      General: Abdomen is flat. Bowel sounds are normal. There is no distension.      Palpations: Abdomen is soft. There is no mass.      Tenderness: There is no abdominal tenderness.  "  Musculoskeletal:         General: No swelling. Normal range of motion.   Skin:     General: Skin is warm and dry.      Findings: No rash.   Neurological:      General: No focal deficit present.      Mental Status: He is alert and oriented to person, place, and time. Mental status is at baseline.       No results found. However, due to the size of the patient record, not all encounters were searched. Please check Results Review for a complete set of results.     No lab exists for component: \"CBC BMP\"  Assessment/Plan   Problem List Items Addressed This Visit    Non    BPH (benign prostatic hyperplasia) N40.0     Continue finasteride and tamsulosin.           Chronic hypoxic respiratory failure J96.11     On 2-3 liters of oxygen at home.  Currently stable on 3liters of oxygen.            Iron deficiency anemia D50.9     Continue iron supplement.  Monitor weekly labs.            Paroxysmal atrial fibrillation (Multi) I48.0     Rate controlled with amiodarone and metoprolol.  Anticoaguated with eliquis.             Chronic diastolic heart failure I50.32     Entresto.  Lasix was stopped in the hospital.   Appears euvolemic.  Monitor weights and labs.             UTI (urinary tract infection) N39.0     Complete course of augmentin           Stage 3a chronic kidney disease (Multi) N18.31     Monitor weekly labs.            Acute COVID-19 - Primary U07.1     Treated with course remdesevir.    Symptoms improved.  Continue cough medication as needed.  Stable on 3liters of oxygen.                 Time spent: 30 min in review of chart, labs and orders, consultation with pt and documentation.   "

## 2025-05-02 NOTE — Clinical Note
Cam Hatfield,  Today I spoke with Reed about Angel's Eliquis, Entresto and metoprolol. Reed states patient is doing well on anticoagulation therapy. Reed reports adherence, no adverse effects and denies s/s of bleeding. Reed reports possibly 1 minor fall, patient's elbow was swollen and he went to urgent care prior to recent hospital admission for UTI/ COVID-19. Patient is currently at rehab where he continues on Entresto 24/26mg twice daily and metoprolol succinate 25mg daily. Dtr denies any low BP readings since being at the facility. Reed reports adherence, no adverse effects and denies s/s of worsening heart failure. Reed reports patient has some SOB and cough due to COVID. Plan to continue current regimen and follow up in 2 months. Thank you!

## 2025-05-02 NOTE — LETTER
Patient: Angel aCrroll  : 1945    Encounter Date: 2025      Subjective  [id number] : Angel Carroll is a 80 y.o. male admitted to [facility] for rehab. No chief complaint on file..  HPI  Pt with a h/o Afib, chf, CKD 3 and chronic respiratory failure treated for Acute covid and UTI.  Pt treated with course of Remdesevir and antibiotics.  Pt seen while resting in bed.   He is alert and pleasant.   Pt appears to be doing well.  Cough Cough has improved and he denies any shortness of breath.  He is stable on his baseline of 2-3 liters of oxygen.  He offers no new complaints or concerns. No n/v/d/c.  He has no lower leg edema.   He lives with his wife.      Review of Systems   Constitutional:  Negative for appetite change, chills and fever.   Respiratory:  Negative for cough, shortness of breath and wheezing.    Cardiovascular:  Negative for chest pain and leg swelling.   Gastrointestinal:  Negative for abdominal pain, constipation, diarrhea, nausea and vomiting.   Genitourinary:  Negative for dysuria, frequency and hematuria.   Neurological:  Negative for dizziness, tremors, weakness and headaches.   Psychiatric/Behavioral:  Negative for confusion. The patient is not nervous/anxious.    All other systems reviewed and are negative.      Objective  /61   Pulse 60   Temp 36.5 °C (97.7 °F)   Resp 18   Wt 106 kg (233 lb 6.4 oz)   SpO2 100%   BMI 31.65 kg/m²    Physical Exam  Constitutional:       General: He is not in acute distress.  Eyes:      Conjunctiva/sclera: Conjunctivae normal.      Pupils: Pupils are equal, round, and reactive to light.   Cardiovascular:      Rate and Rhythm: Normal rate and regular rhythm.      Heart sounds: No murmur heard.  Pulmonary:      Effort: Pulmonary effort is normal.      Breath sounds: Normal breath sounds. No wheezing, rhonchi or rales.   Abdominal:      General: Abdomen is flat. Bowel sounds are normal. There is no distension.      Palpations: Abdomen is soft.  "There is no mass.      Tenderness: There is no abdominal tenderness.   Musculoskeletal:         General: No swelling. Normal range of motion.   Skin:     General: Skin is warm and dry.      Findings: No rash.   Neurological:      General: No focal deficit present.      Mental Status: He is alert and oriented to person, place, and time. Mental status is at baseline.       No results found. However, due to the size of the patient record, not all encounters were searched. Please check Results Review for a complete set of results.     No lab exists for component: \"CBC BMP\"  Assessment/Plan  Problem List Items Addressed This Visit    Non    BPH (benign prostatic hyperplasia) N40.0     Continue finasteride and tamsulosin.           Chronic hypoxic respiratory failure J96.11     On 2-3 liters of oxygen at home.  Currently stable on 3liters of oxygen.            Iron deficiency anemia D50.9     Continue iron supplement.  Monitor weekly labs.            Paroxysmal atrial fibrillation (Multi) I48.0     Rate controlled with amiodarone and metoprolol.  Anticoaguated with eliquis.             Chronic diastolic heart failure I50.32     Entresto.  Lasix was stopped in the hospital.   Appears euvolemic.  Monitor weights and labs.             UTI (urinary tract infection) N39.0     Complete course of augmentin           Stage 3a chronic kidney disease (Multi) N18.31     Monitor weekly labs.            Acute COVID-19 - Primary U07.1     Treated with course remdesevir.    Symptoms improved.  Continue cough medication as needed.  Stable on 3liters of oxygen.                 Time spent: 30 min in review of chart, labs and orders, consultation with pt and documentation.     Electronically Signed By: Georgina Garg PA-C   5/2/25  1:31 PM  "

## 2025-05-02 NOTE — PROGRESS NOTES
Pharmacist Clinic: Cardiology Management    Angel Carroll is a 80 y.o. male was referred to Clinical Pharmacy Team for anticoagulation and heart failure management.     *Referring provider agreeable for clinical pharmacy to manage heart failure*    Referring Provider: Alysia Sheets, RON-CNP    THIS IS A FOLLOW UP PATIENT APPOINTMENT. AT LAST VISIT ON 03/10/2025 WITH PHARMACIST (Annamaria Burgess).    Appointment was completed by Reed who was reached at [phone].    REVIEW OF PAST APPNT (IF APPLICABLE):   Today was a follow up visit conducted with Reed (dtr). Reed states patient is doing well on anticoagulation therapy. Reed reports adherence, no adverse effects and denies s/s of bleeding. Patient was hospitalized at the end of January, Reed reports no falls since discharge.   Upon hospital discharge patient was started on Entresto. Reed reports patient is now tolerating Entresto with no issues. She states after Angel first started on Entresto he was hypotensive and renal function slightly increased, Reed reports patient BP and kidney function has since improved. Angel takes his BP daily, home reported BP was 119/46. Reed denies s/s of worsening heart failure with Angel and states she checks his legs often for swelling. Plan to add Entresto to  PAP.    Allergies Reviewed? No    Allergies   Allergen Reactions    Other Unknown     Anti-Inflammatories    Ranolazine Unknown    Nsaids (Non-Steroidal Anti-Inflammatory Drug) Unknown    Cyclobenzaprine Hallucinations, Unknown and Psychosis       Past Medical History:   Diagnosis Date    Atherosclerotic heart disease of native coronary artery without angina pectoris 12/14/2022    Arteriosclerotic cardiovascular disease (ASCVD)    Benign neoplasm of meninges, unspecified     Meningioma    BPH (benign prostatic hyperplasia)     Chronic diastolic heart failure     Chronic kidney disease     Chronic obstructive pulmonary disease (COPD) (Multi)     Chronic  respiratory failure     Elevated blood-pressure reading, without diagnosis of hypertension     Prehypertension    Hyperlipidemia     Meningioma (Multi)     Nephrolithiasis     Rectal bleeding     Sick sinus syndrome (Multi)     s/p pacemaker placement    Vitamin D deficiency        Current Outpatient Medications on File Prior to Visit   Medication Sig Dispense Refill    acetaminophen (Tylenol) 325 mg tablet Take 2 tablets (650 mg) by mouth every 6 hours if needed for mild pain (1 - 3).      amiodarone (Pacerone) 200 mg tablet Take 0.5 tablets (100 mg) by mouth once daily. 45 tablet 1    amoxicillin-clavulanate (Augmentin) 875-125 mg tablet Take 1 tablet by mouth 2 times a day for 11 days.      apixaban (Eliquis) 2.5 mg tablet Take 1 tablet (2.5 mg) by mouth 2 times a day. 180 tablet 3    atorvastatin (Lipitor) 20 mg tablet TAKE 1 TABLET EVERY DAY 90 tablet 3    cyanocobalamin (Vitamin B-12) 500 mcg tablet Take 1 tablet (500 mcg) by mouth once daily.      dextran 70/hypromellose/PF (NATURAL TEARS, PF, OPHT) Administer into affected eye(s).      ergocalciferol (Vitamin D-2) 1.25 MG (81380 UT) capsule Take 1 capsule (50,000 Units) by mouth 1 (one) time per week. 12 capsule 2    fenofibrate (Tricor) 48 mg tablet TAKE 1 AND 1/2 TABLETS ONE TIME DAILY 135 tablet 3    ferrous sulfate 250 mg (50 mg iron) tablet extended release Take 1 tablet (250 mg) by mouth once daily.      finasteride (Proscar) 5 mg tablet Take 1 tablet (5 mg) by mouth once daily. 90 tablet 3    folic acid (Folvite) 1 mg tablet Take 1 tablet (1 mg) by mouth once daily.      gabapentin (Neurontin) 100 mg capsule Take 1 capsule (100 mg) by mouth once daily at bedtime. 90 capsule 1    magnesium hydroxide (Milk of Magnesia) 400 mg/5 mL suspension Take 30 mL by mouth once daily as needed for constipation.      metoprolol succinate XL (Toprol-XL) 25 mg 24 hr tablet Take 1 tablet (25 mg) by mouth once daily. Do not crush or chew. 90 tablet 1    oxygen (O2) gas  "therapy Inhale 2 L/min continuously.      pantoprazole (Protonix) 40 mg EC tablet Take 1 tablet (40 mg) by mouth once daily in the morning. Take before meals.      PARoxetine (Paxil) 20 mg tablet TAKE 1 TABLET EVERY DAY 90 tablet 3    psyllium (Metamucil) 0.4 gram capsule Take 2 capsules by mouth once daily.      sacubitriL-valsartan (Entresto) 24-26 mg tablet Take 1 tablet by mouth 2 times a day. 180 tablet 3    tamsulosin (Flomax) 0.4 mg 24 hr capsule Take 1 capsule (0.4 mg) by mouth once daily at bedtime.       No current facility-administered medications on file prior to visit.         RELEVANT LAB RESULTS  Lab Results   Component Value Date    BILITOT 1.0 04/20/2025    CALCIUM 8.2 (L) 04/28/2025    CO2 31 04/28/2025     04/28/2025    CREATININE 1.26 04/28/2025    GLUCOSE 82 04/28/2025    ALKPHOS 65 04/20/2025    K 3.9 04/28/2025    PROT 6.1 (L) 04/20/2025     04/28/2025    AST 33 04/20/2025    ALT 25 04/20/2025    BUN 14 04/28/2025    ANIONGAP 8 (L) 04/28/2025    MG 1.77 04/20/2025    PHOS 2.9 06/11/2024    ALBUMIN 3.4 04/20/2025    GFRMALE 28 (A) 09/01/2023     Lab Results   Component Value Date    TRIG 81 11/14/2022    CHOL 117 (L) 11/14/2022    LDLCALC 70 11/14/2022    HDL 31 (L) 11/14/2022     No results found for: \"BMCBC\", \"CBCDIF\"     PHARMACEUTICAL ASSESSMENT    MEDICATION RECONCILIATION    Drug Interactions? No    Medication Documentation Review Audit       Reviewed by Stephanie Pedraza RN (Registered Nurse) on 04/20/25 at 0936      Medication Order Taking? Sig Documenting Provider Last Dose Status   acetaminophen (Tylenol) 325 mg tablet [id number]  Take 2 tablets (650 mg) by mouth every 6 hours if needed for mild pain (1 - 3). Historical Provider, MD  Active   amiodarone (Pacerone) 200 mg tablet [id number]  Take 0.5 tablets (100 mg) by mouth once daily. Alysia Sheets, RON-CNP  Active   apixaban (Eliquis) 2.5 mg tablet [id number]  Take 1 tablet (2.5 mg) by mouth 2 times a day. Alysia Sheets, " APRN-CNP  Active   atorvastatin (Lipitor) 20 mg tablet [id number]  TAKE 1 TABLET EVERY DAY Jung Tao MD  Active   cyanocobalamin (Vitamin B-12) 500 mcg tablet [id number]  Take 1 tablet (500 mcg) by mouth once daily. Historical Provider, MD  Active   dextran 70/hypromellose/PF (NATURAL TEARS, PF, OPHT) [id number]  Administer into affected eye(s). Historical Provider, MD  Active   ergocalciferol (Vitamin D-2) 1.25 MG (17925 UT) capsule [id number]  Take 1 capsule (50,000 Units) by mouth 1 (one) time per week. Jung Tao MD  Active   fenofibrate (Tricor) 48 mg tablet [id number]  TAKE 1 AND 1/2 TABLETS ONE TIME DAILY SAMUEL Daugherty  Active   ferrous sulfate 250 mg (50 mg iron) tablet extended release [id number]  Take 2 tablets (500 mg) by mouth 2 times a day.   Patient taking differently: Take 1 tablet (250 mg) by mouth once daily.    Historical Provider, MD  Active   finasteride (Proscar) 5 mg tablet [id number]  Take 1 tablet (5 mg) by mouth once daily. SAMUEL Wilkes  Active   folic acid (Folvite) 1 mg tablet [id number]  Take 1 tablet (1 mg) by mouth once daily. Historical Provider, MD  Active   furosemide (Lasix) 40 mg tablet [id number]  Take 0.5 tablets (20 mg) by mouth once daily. SAMUEL Daugherty  Active   gabapentin (Neurontin) 100 mg capsule [id number]  Take 1 capsule (100 mg) by mouth once daily at bedtime. Jung Tao MD  Active   magnesium hydroxide (Milk of Magnesia) 400 mg/5 mL suspension [id number]  Take 30 mL by mouth once daily as needed for constipation. Historical Provider, MD  Active   metoprolol succinate XL (Toprol-XL) 25 mg 24 hr tablet [id number]  Take 1 tablet (25 mg) by mouth once daily. Do not crush or chew. Jung Tao MD  Active   oxygen (O2) gas therapy [id number]  Inhale 2 L/min continuously. Historical Provider, MD  Active   pantoprazole (Protonix) 40 mg EC tablet [id number]  Take 1 tablet (40 mg) by mouth once daily in the morning. Take before meals.   Patient not  taking: Reported on 4/16/2025    Historical Provider, MD  Active   PARoxetine (Paxil) 20 mg tablet [id number]  TAKE 1 TABLET EVERY DAY Jung Tao MD  Active   sacubitriL-valsartan (Entresto) 24-26 mg tablet [id number]  Take 1 tablet by mouth 2 times a day. Alysia Sheets, APRN-CNP  Active   tamsulosin (Flomax) 0.4 mg 24 hr capsule [id number]  Take 2 capsules (0.8 mg) by mouth once daily at bedtime.   Patient taking differently: Take 1 capsule (0.4 mg) by mouth once daily at bedtime.    Hanny Moe MD  Active                    DISEASE MANAGEMENT ASSESSMENT:     ANTICOAGULATION ASSESSMENT    The ASCVD Risk score (Naomi DK, et al., 2019) failed to calculate for the following reasons:    The 2019 ASCVD risk score is only valid for ages 40 to 79    Risk score cannot be calculated because patient has a medical history suggesting prior/existing ASCVD    DIAGNOSIS: prevention of nonvalvular atrial fibrilliation stroke and systemic embolism  - Patient is projected to be on anticoagulation long term  - [id number] Score: [4] (only included if diagnosis is atrial fibrillation)   Age: [<65 (0)] [65-74 (+1)] [> 75 (+2)]: 2  Sex: [Male/Female (+1)]: 0  CHF history: [No/Yes(+1)]: 1  Hypertension history: [No/Yes(+1)]: 1  Stroke/TIA/thromboembolism history: [No/Yes(+2)]: 0  Vascular disease history (prior MI, peripheral artery disease, aortic plaque): [No/Yes(+1)]: 0  Diabetes history: [No/Yes(+1)]: 0    CURRENT PHARMACOTHERAPY:   Eliquis 2.5mg twice daily  79yo  106kg  Scr 1.26 (04/28/2025)    RELEVANT PAST MEDICAL HISTORY:   Afib, HTN, HF    Affordability/Accessibility:  PAP  Adherence/Organization: Daughter Reed puts medicine into his pillbox- currently getting meds at [facility]  Adverse Reactions: none reported, denies s/s of bleeding  Recent Hospitalizations: Yes; UTI, COVID; discharged to SNF  Recent Falls/Trauma: 1 possible minor fall on elbow, patient went to urgent care.    EDUCATION/COUNSELING:   - Counseled patient  on MOA, expectations, duration of therapy, contraindications, administration, and monitoring parameters  - Counseled patient of side effects that are indicative of bleeding such as dark tarry stool, unexplainable bruising, or vomiting up a coffee ground like substance     CHF ASSESSMENT     Symptom/Staging:  -Most recent ejection fraction: 40%    Results for orders placed during the hospital encounter of 01/30/25    Transthoracic Echo (TTE) Complete    Narrative  [place] [id number]  Phone [phone]    TRANSTHORACIC ECHOCARDIOGRAM REPORT    Patient Name:       MICHEL Sosa Physician:    [id number] Sadie Hollis MD  Study Date:         2/1/2025             Ordering Provider:    [id number] HALEY SCHMIDT  MRN/PID:            [id number]             Fellow:  Accession#:         [id number]         Nurse:  Date of Birth/Age:  1945 / 79 years Sonographer:          Monisha Rios RDCS  Gender Assigned at  M                    Additional Staff:  Birth:  Height:             182.88 cm            Admit Date:  Weight:             97.98 kg             Admission Status:     Inpatient -  Routine  BSA / BMI:          2.20 m2 / 29.30      Department Location:  [facility]  kg/m2  Blood Pressure: 123 /69 mmHg    Study Type:    TRANSTHORACIC ECHO (TTE) COMPLETE  Diagnosis/ICD: Heart failure, unspecified-I50.9; Shortness of breath-R06.02  Indication:    CHF, SOB  CPT Codes:     Echo Complete w Full Doppler-[id number]    Patient History:  Pertinent History: CVA, A-Fib, Hyperlipidemia, HTN, CHF, Dyspnea, ASCVD, Sinus  Bradycardia and Chest Pain.    Study Detail: The following Echo studies were performed: 2D, M-Mode, Doppler and  color flow. Patient has a pacemaker.      PHYSICIAN INTERPRETATION:  Left Ventricle: The left ventricular systolic function is moderately decreased, with a visually estimated ejection fraction of 40%. There are no regional wall motion  abnormalities. The left ventricular cavity size is normal. There is normal septal and normal posterior left ventricular wall thickness. Abnormal (paradoxical) septal motion, consistent with left bundle branch block. Spectral Doppler shows a normal pattern of left ventricular diastolic filling.  Left Atrium: The left atrial size is mildly dilated.  Right Ventricle: The right ventricle is normal in size. There is reduced right ventricular systolic function.  Right Atrium: The right atrial size is mild to moderately dilated.  Aortic Valve: The aortic valve is trileaflet. There is mild aortic valve thickening. There is evidence of mildly elevated transaortic gradients consistent with sclerosis of the aortic valve.  There is no evidence of aortic valve regurgitation. The peak instantaneous gradient of the aortic valve is 5 mmHg.  Mitral Valve: The mitral valve is normal in structure. There is mild mitral valve regurgitation.  Tricuspid Valve: The tricuspid valve is structurally normal. There is mild tricuspid regurgitation. The Doppler estimated RVSP is mildly elevated right ventricular systolic pressure at 44.2 mmHg.  Pulmonic Valve: The pulmonic valve is structurally normal. There is physiologic pulmonic valve regurgitation.  Pericardium: No pericardial effusion noted.  Aorta: The aortic root is normal.  Systemic Veins: The inferior vena cava appears normal in size, with IVC inspiratory collapse less than 50%.      CONCLUSIONS:  1. The left ventricular systolic function is moderately decreased, with a visually estimated ejection fraction of 40%.  2. Abnormal septal motion consistent with left bundle branch block.  3. There is reduced right ventricular systolic function.  4. The right atrial size is mild to moderately dilated.  5. Mild mitral valve regurgitation.  6. Mild tricuspid regurgitation is visualized.  7. Mildly elevated right ventricular systolic pressure.  8. Aortic valve sclerosis.    QUANTITATIVE DATA  SUMMARY:    2D MEASUREMENTS:             Normal Ranges:  LAs:             3.60 cm     (2.7-4.0cm)  IVSd:            0.67 cm     (0.6-1.1cm)  LVPWd:           0.70 cm     (0.6-1.1cm)  LVIDd:           6.13 cm     (3.9-5.9cm)  LVIDs:           4.51 cm  LV Mass Index:   73.1 g/m2  LVEDV Index:     67.28 ml/m2  LV % FS          26.4 %      LEFT ATRIUM:                  Normal Ranges:  LA Vol A4C:        83.9 ml    (22+/-6mL/m2)  LA Vol A2C:        85.8 ml  LA Vol BP:         85.2 ml  LA Vol Index A4C:  38.1ml/m2  LA Vol Index A2C:  39.0 ml/m2  LA Vol Index BP:   38.7 ml/m2  LA Area A4C:       24.6 cm2  LA Area A2C:       24.8 cm2  LA Major Axis A4C: 6.1 cm  LA Major Axis A2C: 6.1 cm  LA Volume Index:   35.4 ml/m2  LA Vol A4C:        77.7 ml  LA Vol A2C:        79.1 ml  LA Vol Index BSA:  35.6 ml/m2      RIGHT ATRIUM:          Normal Ranges:  RA Area A4C:  43.5 cm2      M-MODE MEASUREMENTS:         Normal Ranges:  Ao Root:             2.90 cm (2.0-3.7cm)      AORTA MEASUREMENTS:         Normal Ranges:  Asc Ao, d:          3.30 cm (2.1-3.4cm)      LV SYSTOLIC FUNCTION:  Normal Ranges:  EF-A4C View:    45 % (>=55%)  EF-A2C View:    45 %  EF-Biplane:     47 %  EF-Visual:      40 %  LV EF Reported: 40 %      LV DIASTOLIC FUNCTION:            Normal Ranges:  MV Peak E:             0.84 m/s   (0.7-1.2 m/s)  MV Peak A:             0.48 m/s   (0.42-0.7 m/s)  E/A Ratio:             1.76       (1.0-2.2)  MV e'                  0.039 m/s  (>8.0)  MV lateral e'          0.04 m/s  MV medial e'           0.04 m/s  E/e' Ratio:            21.89      (<8.0)  PulmV Sys Ross:         31.30 cm/s  PulmV Toussaint Ross:        53.00 cm/s  PulmV S/D Ross:         0.60      MITRAL VALVE:          Normal Ranges:  MV DT:        205 msec (150-240msec)      AORTIC VALVE:           Normal Ranges:  AoV Vmax:      1.17 m/s (<=1.7m/s)  AoV Peak P.5 mmHg (<20mmHg)  LVOT Max Ross:  0.57 m/s (<=1.1m/s)  LVOT Diameter: 2.20 cm  (1.8-2.4cm)  AoV Area,Vmax:  1.85 cm2 (2.5-4.5cm2)      RIGHT VENTRICLE:  RV Basal 4.63 cm  RV Mid   3.04 cm  RV Major 8.3 cm  TAPSE:   13.2 mm  RV s'    0.06 m/s      TRICUSPID VALVE/RVSP:          Normal Ranges:  Peak TR Velocity:     3.21 m/s  RV Syst Pressure:     44 mmHg  (< 30mmHg)  IVC Diam:             1.54 cm      PULMONARY VEINS:  PulmV Toussaint Ross: 53.00 cm/s  PulmV S/D Ross:  0.60  PulmV Sys Ross:  31.30 cm/s      [id number] Sadie Hollis MD  Electronically signed on 2/2/2025 at 9:08:14 AM        ** Final **      Guideline-Directed Medical Therapy:  -ARNI: Yes, describe: Entresto 24/26mg twice daily  -Beta Blocker: Yes, describe: Metoprolol succinate 25mg daily  -MRA: No  -SGLT2i: No    Secondary Prevention:  -The ASCVD Risk score (Naomi HODGSON, et al., 2019) failed to calculate for the following reasons:    The 2019 ASCVD risk score is only valid for ages 40 to 79    Risk score cannot be calculated because patient has a medical history suggesting prior/existing ASCVD   -Aspirin 81mg? no  -Statin?: Yes, describe: Atorvastatin 20mg daily  -HTN?: Yes, describe: 132/61 at last OV    CURRENT PHARMACOTHERAPY:   Entresto 24/26mg twice daily  Metoprolol succinate 25mg daily    Affordability: [facility]  Adherence/Compliance: reports adherence, daughter Reed puts medicine into his pillbox- currently getting meds at [facility]  Adverse Effects: none reported    Monitoring Weights at Home: Yes  Home Weight Recordings: unable to assess during today's visit-patient admitted to SNF    Past In Office Weight Readings:   Wt Readings from Last 6 Encounters:   05/02/25 106 kg (233 lb 6.4 oz)   04/28/25 104 kg (229 lb 9.6 oz)   04/24/25 104 kg (229 lb 9.6 oz)   04/20/25 99.8 kg (220 lb)   04/16/25 99.8 kg (220 lb)   04/15/25 100 kg (221 lb)       Monitoring Blood Pressure at Home: Yes; daily at home  Home BP Recordings: unable to assess during today's visit-patient admitted to SNF, Reed denies any low BP readings at facility.    Past In Office BP Readings:   BP Readings  from Last 6 Encounters:   05/02/25 132/61   04/28/25 141/69   04/24/25 132/64   04/23/25 127/53   04/15/25 135/64   04/04/25 99/57       HEALTH MANAGEMENT    Maintaining fluid restriction (<2 L/day): N/A, patient watches sodium intake (2g/daily)  Edema/swelling: No  Shortness of breath: No concerns related to heart function  Trouble sleeping/lying down: No  Dry/hacking cough: No concerns related to heart function  Recent Hospitalizations: Yes, describe: UTI, COVID-19    EDUCATION/COUNSELING:   - Counseled patient on MOA, expectations, duration of therapy, contraindications, administration, and monitoring parameters  - Counseled patient on lifestyle modifications that can decrease your risk of having complications (smoking cessation, losing weight, daily weights, vaccines)  - Counseled patient on fluid intake and weight management. Recommended to not consume more than 2 liters of fliuids per day. If they have gained more than 2-3 pounds within a 24 hours period (or 5 pounds in a week), contact their cardiologist  - Answered all patient questions and concerns     DISCUSSION/NOTES:   Today was a follow up visit conducted with Reed (dtr). Reed states patient is doing well on anticoagulation therapy. Reed reports adherence, no adverse effects and denies s/s of bleeding. Reed reports possibly 1 minor fall since last visit, patient's elbow was swollen and he went to urgent care.  Angel was recently admitted to the hospital for UTI/ COVID-19. Patient is currently at rehab where he continues on Entresto 24/26mg twice daily and metoprolol succinate 25mg daily. Reed reports adherence, no adverse effects and denies s/s of worsening heart failure. Reed reports patient has some SOB and cough due to COVID.    ASSESSMENT AND PLAN:    Assessment/Plan   Problem List Items Addressed This Visit       LV dysfunction    Patient on 2 of 4 GDMT. Unable to assess BP during today's visit-patient admitted to SNF, Reed  denies any low BP readings at facility. Renal function and potassium level appropriate to continue current regimen.     Labs (04/28/2025): Scr-1.26 eGFR-57 K-3.9         Relevant Orders    Referral to Clinical Pharmacy    Paroxysmal atrial fibrillation (Multi)    Patient age, weight and renal function appropriate to continue Eliquis 2.5mg twice daily.         Relevant Orders    Referral to Clinical Pharmacy           RECOMMENDATIONS/PLAN    CONTINUE  Eliquis 2.5mg twice daily  Entresto 24/26mg twice daily  Metoprolol succinate 25mg daily    Last Appnt with Referring Provider: 03/24/2025  Next Appnt with Referring Provider: 06/23/2025  Clinical Pharmacist follow up: 2 months  VAF/Application Expiration: Yes    Date: 09/30/2025  Type of Encounter: Sal Burgess PharmD    Verbal consent to manage patient's drug therapy was obtained from the patient . They were informed they may decline to participate or withdraw from participation in pharmacy services at any time.    Continue all meds under the continuation of care with the referring provider and clinical pharmacy team.

## 2025-05-05 ENCOUNTER — NURSING HOME VISIT (OUTPATIENT)
Dept: POST ACUTE CARE | Facility: EXTERNAL LOCATION | Age: 80
End: 2025-05-05
Payer: MEDICARE

## 2025-05-05 VITALS
WEIGHT: 233.4 LBS | DIASTOLIC BLOOD PRESSURE: 62 MMHG | BODY MASS INDEX: 31.65 KG/M2 | RESPIRATION RATE: 19 BRPM | HEART RATE: 87 BPM | TEMPERATURE: 97.9 F | OXYGEN SATURATION: 96 % | SYSTOLIC BLOOD PRESSURE: 127 MMHG

## 2025-05-05 DIAGNOSIS — I48.0 PAROXYSMAL ATRIAL FIBRILLATION (MULTI): ICD-10-CM

## 2025-05-05 DIAGNOSIS — U07.1 ACUTE COVID-19: Primary | ICD-10-CM

## 2025-05-05 DIAGNOSIS — N40.0 BENIGN PROSTATIC HYPERPLASIA WITHOUT LOWER URINARY TRACT SYMPTOMS: ICD-10-CM

## 2025-05-05 DIAGNOSIS — N18.31 STAGE 3A CHRONIC KIDNEY DISEASE (MULTI): ICD-10-CM

## 2025-05-05 DIAGNOSIS — J96.11 CHRONIC HYPOXIC RESPIRATORY FAILURE: ICD-10-CM

## 2025-05-05 DIAGNOSIS — D50.9 IRON DEFICIENCY ANEMIA, UNSPECIFIED IRON DEFICIENCY ANEMIA TYPE: ICD-10-CM

## 2025-05-05 DIAGNOSIS — I50.32 CHRONIC DIASTOLIC HEART FAILURE: ICD-10-CM

## 2025-05-05 DIAGNOSIS — N39.0 URINARY TRACT INFECTION WITHOUT HEMATURIA, SITE UNSPECIFIED: ICD-10-CM

## 2025-05-05 PROCEDURE — 99309 SBSQ NF CARE MODERATE MDM 30: CPT | Performed by: PHYSICIAN ASSISTANT

## 2025-05-05 ASSESSMENT — ENCOUNTER SYMPTOMS
VOMITING: 0
COUGH: 0
SHORTNESS OF BREATH: 0
WHEEZING: 0
DYSURIA: 0
WEAKNESS: 0
CHILLS: 0
FREQUENCY: 0
HEMATURIA: 0
FEVER: 0
DIARRHEA: 0
CONSTIPATION: 0
ABDOMINAL PAIN: 0
HEADACHES: 0
CONFUSION: 0
NAUSEA: 0
NERVOUS/ANXIOUS: 0
TREMORS: 0
APPETITE CHANGE: 0
DIZZINESS: 0

## 2025-05-05 NOTE — LETTER
Patient: Angel Carroll  : 1945    Encounter Date: 2025      Subjective  [id number] : Angel Carroll is a 80 y.o. male admitted to [facility] for rehab. No chief complaint on file..  HPI  Pt with a h/o Afib, chf, CKD 3 and chronic respiratory failure treated for Acute covid and UTI.   Pt seen while resting in bed.   He is alert and pleasant.  He continues to have an occasional cough but overall doing much better.  He deneis any shortness of breath and is stable on his 2-3 liters of oxygen.  He offers no new complaints or concerns. No n/v/d/c.  He has no lower leg edema.   He had routine labs this morning which were reviewed and are stable. He lives with his wife.      Review of Systems   Constitutional:  Negative for appetite change, chills and fever.   Respiratory:  Negative for cough, shortness of breath and wheezing.    Cardiovascular:  Negative for chest pain and leg swelling.   Gastrointestinal:  Negative for abdominal pain, constipation, diarrhea, nausea and vomiting.   Genitourinary:  Negative for dysuria, frequency and hematuria.   Neurological:  Negative for dizziness, tremors, weakness and headaches.   Psychiatric/Behavioral:  Negative for confusion. The patient is not nervous/anxious.    All other systems reviewed and are negative.      Objective  /62   Pulse 87   Temp 36.6 °C (97.9 °F)   Resp 19   Wt 106 kg (233 lb 6.4 oz)   SpO2 96%   BMI 31.65 kg/m²    Physical Exam  Constitutional:       General: He is not in acute distress.  Eyes:      Conjunctiva/sclera: Conjunctivae normal.      Pupils: Pupils are equal, round, and reactive to light.   Cardiovascular:      Rate and Rhythm: Normal rate and regular rhythm.      Heart sounds: No murmur heard.  Pulmonary:      Effort: Pulmonary effort is normal.      Breath sounds: Normal breath sounds. No wheezing, rhonchi or rales.   Abdominal:      General: Abdomen is flat. Bowel sounds are normal. There is no distension.      Palpations:  "Abdomen is soft. There is no mass.      Tenderness: There is no abdominal tenderness.   Musculoskeletal:         General: No swelling. Normal range of motion.   Skin:     General: Skin is warm and dry.      Findings: No rash.   Neurological:      General: No focal deficit present.      Mental Status: He is alert and oriented to person, place, and time. Mental status is at baseline.       No results found. However, due to the size of the patient record, not all encounters were searched. Please check Results Review for a complete set of results.    No lab exists for component: \"CBC BMP\"  Assessment/Plan  Problem List Items Addressed This Visit        BPH (benign prostatic hyperplasia) N40.0     Continue finasteride and tamsulosin.           Chronic hypoxic respiratory failure J96.11     On 2-3 liters of oxygen at home.  Currently stable on 3liters of oxygen.            Iron deficiency anemia D50.9     Continue iron supplement.  Monitor weekly labs.            Paroxysmal atrial fibrillation (Multi) I48.0     Rate controlled with amiodarone and metoprolol.  Anticoaguated with eliquis.             Chronic diastolic heart failure I50.32     Entresto.  Lasix was stopped in the hospital.   Appears euvolemic.  Monitor weights and labs.             UTI (urinary tract infection) N39.0     Complete course of augmentin           Stage 3a chronic kidney disease (Multi) N18.31     Monitor weekly labs.            Acute COVID-19 - Primary U07.1     Treated with course remdesevir.    Symptoms improved.  Continue cough medication as needed.  Stable on 3liters of oxygen.   discharge planning.               Time spent: 30 min in review of chart, labs and orders, consultation with pt and documentation.     Electronically Signed By: Georgina Garg PA-C   5/5/25  2:18 PM  "

## 2025-05-05 NOTE — PROGRESS NOTES
Subjective   [id number] : Angel Carroll is a 80 y.o. male admitted to [facility] for rehab. No chief complaint on file..  HPI  Pt with a h/o Afib, chf, CKD 3 and chronic respiratory failure treated for Acute covid and UTI.   Pt seen while resting in bed.   He is alert and pleasant.  He continues to have an occasional cough but overall doing much better.  He deneis any shortness of breath and is stable on his 2-3 liters of oxygen.  He offers no new complaints or concerns. No n/v/d/c.  He has no lower leg edema.   He had routine labs this morning which were reviewed and are stable. He lives with his wife.      Review of Systems   Constitutional:  Negative for appetite change, chills and fever.   Respiratory:  Negative for cough, shortness of breath and wheezing.    Cardiovascular:  Negative for chest pain and leg swelling.   Gastrointestinal:  Negative for abdominal pain, constipation, diarrhea, nausea and vomiting.   Genitourinary:  Negative for dysuria, frequency and hematuria.   Neurological:  Negative for dizziness, tremors, weakness and headaches.   Psychiatric/Behavioral:  Negative for confusion. The patient is not nervous/anxious.    All other systems reviewed and are negative.      Objective   /62   Pulse 87   Temp 36.6 °C (97.9 °F)   Resp 19   Wt 106 kg (233 lb 6.4 oz)   SpO2 96%   BMI 31.65 kg/m²    Physical Exam  Constitutional:       General: He is not in acute distress.  Eyes:      Conjunctiva/sclera: Conjunctivae normal.      Pupils: Pupils are equal, round, and reactive to light.   Cardiovascular:      Rate and Rhythm: Normal rate and regular rhythm.      Heart sounds: No murmur heard.  Pulmonary:      Effort: Pulmonary effort is normal.      Breath sounds: Normal breath sounds. No wheezing, rhonchi or rales.   Abdominal:      General: Abdomen is flat. Bowel sounds are normal. There is no distension.      Palpations: Abdomen is soft. There is no mass.      Tenderness: There is no abdominal  "tenderness.   Musculoskeletal:         General: No swelling. Normal range of motion.   Skin:     General: Skin is warm and dry.      Findings: No rash.   Neurological:      General: No focal deficit present.      Mental Status: He is alert and oriented to person, place, and time. Mental status is at baseline.       No results found. However, due to the size of the patient record, not all encounters were searched. Please check Results Review for a complete set of results.    No lab exists for component: \"CBC BMP\"  Assessment/Plan   Problem List Items Addressed This Visit        BPH (benign prostatic hyperplasia) N40.0     Continue finasteride and tamsulosin.           Chronic hypoxic respiratory failure J96.11     On 2-3 liters of oxygen at home.  Currently stable on 3liters of oxygen.            Iron deficiency anemia D50.9     Continue iron supplement.  Monitor weekly labs.            Paroxysmal atrial fibrillation (Multi) I48.0     Rate controlled with amiodarone and metoprolol.  Anticoaguated with eliquis.             Chronic diastolic heart failure I50.32     Entresto.  Lasix was stopped in the hospital.   Appears euvolemic.  Monitor weights and labs.             UTI (urinary tract infection) N39.0     Complete course of augmentin           Stage 3a chronic kidney disease (Multi) N18.31     Monitor weekly labs.            Acute COVID-19 - Primary U07.1     Treated with course remdesevir.    Symptoms improved.  Continue cough medication as needed.  Stable on 3liters of oxygen.   discharge planning.               Time spent: 30 min in review of chart, labs and orders, consultation with pt and documentation.   "

## 2025-05-08 DIAGNOSIS — N40.0 BPH WITHOUT OBSTRUCTION/LOWER URINARY TRACT SYMPTOMS: ICD-10-CM

## 2025-05-09 ENCOUNTER — NURSING HOME VISIT (OUTPATIENT)
Dept: POST ACUTE CARE | Facility: EXTERNAL LOCATION | Age: 80
End: 2025-05-09
Payer: MEDICARE

## 2025-05-09 VITALS
HEART RATE: 60 BPM | BODY MASS INDEX: 30.43 KG/M2 | OXYGEN SATURATION: 95 % | SYSTOLIC BLOOD PRESSURE: 115 MMHG | WEIGHT: 224.4 LBS | DIASTOLIC BLOOD PRESSURE: 61 MMHG | RESPIRATION RATE: 18 BRPM | TEMPERATURE: 97 F

## 2025-05-09 DIAGNOSIS — J96.11 CHRONIC HYPOXIC RESPIRATORY FAILURE: ICD-10-CM

## 2025-05-09 DIAGNOSIS — U07.1 ACUTE COVID-19: Primary | ICD-10-CM

## 2025-05-09 DIAGNOSIS — N18.31 STAGE 3A CHRONIC KIDNEY DISEASE (MULTI): ICD-10-CM

## 2025-05-09 DIAGNOSIS — I48.0 PAROXYSMAL ATRIAL FIBRILLATION (MULTI): ICD-10-CM

## 2025-05-09 DIAGNOSIS — D50.9 IRON DEFICIENCY ANEMIA, UNSPECIFIED IRON DEFICIENCY ANEMIA TYPE: ICD-10-CM

## 2025-05-09 DIAGNOSIS — N39.0 URINARY TRACT INFECTION WITHOUT HEMATURIA, SITE UNSPECIFIED: ICD-10-CM

## 2025-05-09 DIAGNOSIS — N40.0 BENIGN PROSTATIC HYPERPLASIA WITHOUT LOWER URINARY TRACT SYMPTOMS: ICD-10-CM

## 2025-05-09 DIAGNOSIS — I50.32 CHRONIC DIASTOLIC HEART FAILURE: ICD-10-CM

## 2025-05-09 DIAGNOSIS — N39.0 RECURRENT UTI: ICD-10-CM

## 2025-05-09 PROCEDURE — 99309 SBSQ NF CARE MODERATE MDM 30: CPT | Performed by: PHYSICIAN ASSISTANT

## 2025-05-09 RX ORDER — TAMSULOSIN HYDROCHLORIDE 0.4 MG/1
0.4 CAPSULE ORAL NIGHTLY
Qty: 90 CAPSULE | Refills: 0 | Status: SHIPPED | OUTPATIENT
Start: 2025-05-09 | End: 2025-08-07

## 2025-05-09 ASSESSMENT — ENCOUNTER SYMPTOMS
FEVER: 0
WHEEZING: 0
NAUSEA: 0
HEADACHES: 0
FREQUENCY: 0
CONSTIPATION: 0
CHILLS: 0
NERVOUS/ANXIOUS: 0
CONFUSION: 0
COUGH: 0
SHORTNESS OF BREATH: 0
HEMATURIA: 0
VOMITING: 0
WEAKNESS: 0
APPETITE CHANGE: 0
DYSURIA: 0
DIZZINESS: 0
ABDOMINAL PAIN: 0
TREMORS: 0
DIARRHEA: 0

## 2025-05-09 NOTE — PROGRESS NOTES
Subjective   [id number] : Angel Carroll is a 80 y.o. male admitted to [facility] for rehab. No chief complaint on file..  HPI  Pt with a h/o Afib, chf, CKD 3 and chronic respiratory failure treated for Acute covid and UTI.   Pt seen while resting in bed.   He is alert and pleasant.  Pt reports that he had upset stomach overnight.  No abdominal pain.  No emesis.  He was given mylanta which he reports was effective.  This morning he is eating breakfast and reports that he feels much better.  He denies any shortness of breath or cough.  He is stable on 2-3 liters of oxygen.  He offers no new complaints or concerns. No n/v/d/c.  He has no lower leg edema.    He lives with his wife.      Review of Systems   Constitutional:  Negative for appetite change, chills and fever.   Respiratory:  Negative for cough, shortness of breath and wheezing.    Cardiovascular:  Negative for chest pain and leg swelling.   Gastrointestinal:  Negative for abdominal pain, constipation, diarrhea, nausea and vomiting.   Genitourinary:  Negative for dysuria, frequency and hematuria.   Neurological:  Negative for dizziness, tremors, weakness and headaches.   Psychiatric/Behavioral:  Negative for confusion. The patient is not nervous/anxious.    All other systems reviewed and are negative.      Objective   /61   Pulse 60   Temp 36.1 °C (97 °F)   Resp 18   Wt 102 kg (224 lb 6.4 oz)   SpO2 95%   BMI 30.43 kg/m²    Physical Exam  Constitutional:       General: He is not in acute distress.  Eyes:      Conjunctiva/sclera: Conjunctivae normal.      Pupils: Pupils are equal, round, and reactive to light.   Cardiovascular:      Rate and Rhythm: Normal rate and regular rhythm.      Heart sounds: No murmur heard.  Pulmonary:      Effort: Pulmonary effort is normal.      Breath sounds: Normal breath sounds. No wheezing, rhonchi or rales.   Abdominal:      General: Abdomen is flat. Bowel sounds are normal. There is no distension.      Palpations:  "Abdomen is soft. There is no mass.      Tenderness: There is no abdominal tenderness.   Musculoskeletal:         General: No swelling. Normal range of motion.   Skin:     General: Skin is warm and dry.      Findings: No rash.   Neurological:      General: No focal deficit present.      Mental Status: He is alert and oriented to person, place, and time. Mental status is at baseline.       No results found. However, due to the size of the patient record, not all encounters were searched. Please check Results Review for a complete set of results.    No lab exists for component: \"CBC BMP\"  Assessment/Plan   Problem List Items Addressed This Visit        BPH (benign prostatic hyperplasia) N40.0     Continue finasteride and tamsulosin.           Chronic hypoxic respiratory failure J96.11     On 2-3 liters of oxygen at home.  Currently stable on 3liters of oxygen.            Iron deficiency anemia D50.9     Continue iron supplement.  Monitor weekly labs.            Paroxysmal atrial fibrillation (Multi) I48.0     Rate controlled with amiodarone and metoprolol.  Anticoaguated with eliquis.             Chronic diastolic heart failure I50.32     Entresto.  Lasix was stopped in the hospital.   Appears euvolemic.  Monitor weights and labs.             UTI (urinary tract infection) N39.0     Complete course of augmentin           Stage 3a chronic kidney disease (Multi) N18.31     Monitor weekly labs.            Acute COVID-19 - Primary U07.1     Treated with course remdesevir.    Symptoms improved.  Continue cough medication as needed.  Stable on 3liters of oxygen.   discharge planning.               Time spent: 30 min in review of chart, labs and orders, consultation with pt and documentation.   "

## 2025-05-09 NOTE — LETTER
Patient: Angel Carroll  : 1945    Encounter Date: 2025      Subjective  [id number] : Angel Carroll is a 80 y.o. male admitted to [facility] for rehab. No chief complaint on file..  HPI  Pt with a h/o Afib, chf, CKD 3 and chronic respiratory failure treated for Acute covid and UTI.   Pt seen while resting in bed.   He is alert and pleasant.  Pt reports that he had upset stomach overnight.  No abdominal pain.  No emesis.  He was given mylanta which he reports was effective.  This morning he is eating breakfast and reports that he feels much better.  He denies any shortness of breath or cough.  He is stable on 2-3 liters of oxygen.  He offers no new complaints or concerns. No n/v/d/c.  He has no lower leg edema.    He lives with his wife.      Review of Systems   Constitutional:  Negative for appetite change, chills and fever.   Respiratory:  Negative for cough, shortness of breath and wheezing.    Cardiovascular:  Negative for chest pain and leg swelling.   Gastrointestinal:  Negative for abdominal pain, constipation, diarrhea, nausea and vomiting.   Genitourinary:  Negative for dysuria, frequency and hematuria.   Neurological:  Negative for dizziness, tremors, weakness and headaches.   Psychiatric/Behavioral:  Negative for confusion. The patient is not nervous/anxious.    All other systems reviewed and are negative.      Objective  /61   Pulse 60   Temp 36.1 °C (97 °F)   Resp 18   Wt 102 kg (224 lb 6.4 oz)   SpO2 95%   BMI 30.43 kg/m²    Physical Exam  Constitutional:       General: He is not in acute distress.  Eyes:      Conjunctiva/sclera: Conjunctivae normal.      Pupils: Pupils are equal, round, and reactive to light.   Cardiovascular:      Rate and Rhythm: Normal rate and regular rhythm.      Heart sounds: No murmur heard.  Pulmonary:      Effort: Pulmonary effort is normal.      Breath sounds: Normal breath sounds. No wheezing, rhonchi or rales.   Abdominal:      General: Abdomen is  "flat. Bowel sounds are normal. There is no distension.      Palpations: Abdomen is soft. There is no mass.      Tenderness: There is no abdominal tenderness.   Musculoskeletal:         General: No swelling. Normal range of motion.   Skin:     General: Skin is warm and dry.      Findings: No rash.   Neurological:      General: No focal deficit present.      Mental Status: He is alert and oriented to person, place, and time. Mental status is at baseline.       No results found. However, due to the size of the patient record, not all encounters were searched. Please check Results Review for a complete set of results.    No lab exists for component: \"CBC BMP\"  Assessment/Plan  Problem List Items Addressed This Visit        BPH (benign prostatic hyperplasia) N40.0     Continue finasteride and tamsulosin.           Chronic hypoxic respiratory failure J96.11     On 2-3 liters of oxygen at home.  Currently stable on 3liters of oxygen.            Iron deficiency anemia D50.9     Continue iron supplement.  Monitor weekly labs.            Paroxysmal atrial fibrillation (Multi) I48.0     Rate controlled with amiodarone and metoprolol.  Anticoaguated with eliquis.             Chronic diastolic heart failure I50.32     Entresto.  Lasix was stopped in the hospital.   Appears euvolemic.  Monitor weights and labs.             UTI (urinary tract infection) N39.0     Complete course of augmentin           Stage 3a chronic kidney disease (Multi) N18.31     Monitor weekly labs.            Acute COVID-19 - Primary U07.1     Treated with course remdesevir.    Symptoms improved.  Continue cough medication as needed.  Stable on 3liters of oxygen.   discharge planning.               Time spent: 30 min in review of chart, labs and orders, consultation with pt and documentation.     Electronically Signed By: Georgina Garg PA-C   5/9/25  1:24 PM  "

## 2025-05-11 NOTE — PROGRESS NOTES
02/04/25 1046   Discharge Planning   Expected Discharge Disposition SNF  ([facility])   Has discharge transport been arranged? Yes   What day is the transport expected? 02/04/25   What time is the transport expected? 1200     7000 Completed   AVS and goldenrod sent to facility   Patient and Daughter briana bebe   RN made aware and given report number      Female

## 2025-05-12 ENCOUNTER — NURSING HOME VISIT (OUTPATIENT)
Dept: POST ACUTE CARE | Facility: EXTERNAL LOCATION | Age: 80
End: 2025-05-12
Payer: MEDICARE

## 2025-05-12 VITALS
TEMPERATURE: 97.1 F | RESPIRATION RATE: 18 BRPM | WEIGHT: 224.4 LBS | HEART RATE: 61 BPM | OXYGEN SATURATION: 100 % | SYSTOLIC BLOOD PRESSURE: 107 MMHG | DIASTOLIC BLOOD PRESSURE: 52 MMHG | BODY MASS INDEX: 30.43 KG/M2

## 2025-05-12 DIAGNOSIS — I50.32 CHRONIC DIASTOLIC HEART FAILURE: ICD-10-CM

## 2025-05-12 DIAGNOSIS — N40.0 BENIGN PROSTATIC HYPERPLASIA WITHOUT LOWER URINARY TRACT SYMPTOMS: ICD-10-CM

## 2025-05-12 DIAGNOSIS — J96.11 CHRONIC HYPOXIC RESPIRATORY FAILURE: ICD-10-CM

## 2025-05-12 DIAGNOSIS — N39.0 URINARY TRACT INFECTION WITHOUT HEMATURIA, SITE UNSPECIFIED: ICD-10-CM

## 2025-05-12 DIAGNOSIS — N18.31 STAGE 3A CHRONIC KIDNEY DISEASE (MULTI): ICD-10-CM

## 2025-05-12 DIAGNOSIS — U07.1 ACUTE COVID-19: Primary | ICD-10-CM

## 2025-05-12 DIAGNOSIS — D50.9 IRON DEFICIENCY ANEMIA, UNSPECIFIED IRON DEFICIENCY ANEMIA TYPE: ICD-10-CM

## 2025-05-12 DIAGNOSIS — I48.0 PAROXYSMAL ATRIAL FIBRILLATION (MULTI): ICD-10-CM

## 2025-05-12 PROCEDURE — 99309 SBSQ NF CARE MODERATE MDM 30: CPT | Performed by: PHYSICIAN ASSISTANT

## 2025-05-12 ASSESSMENT — ENCOUNTER SYMPTOMS
DYSURIA: 0
HEADACHES: 0
SHORTNESS OF BREATH: 0
CONSTIPATION: 0
VOMITING: 0
DIARRHEA: 0
WEAKNESS: 0
HEMATURIA: 0
FEVER: 0
DIZZINESS: 0
APPETITE CHANGE: 0
NERVOUS/ANXIOUS: 0
FREQUENCY: 0
COUGH: 0
WHEEZING: 0
TREMORS: 0
ABDOMINAL PAIN: 0
NAUSEA: 0
CHILLS: 0
CONFUSION: 0

## 2025-05-12 NOTE — LETTER
Patient: Angel Carroll  : 1945    Encounter Date: 2025      Subjective  [id number] : Angel Carroll is a 80 y.o. male admitted to [facility] for rehab. No chief complaint on file..  HPI  Pt with a h/o Afib, chf, CKD 3 and chronic respiratory failure treated for Acute covid and UTI.   Pt seen while sitting up in bed.   He is alert and pleasant. He offers no new complaints or concerns.  He denies any pain.  He is planning for discharge home this week.   No abdominal pain.  No n/v/d/c.  He denies any shortness of breath or cough.  He is stable on 2-3 liters of oxygen.  He offers no new complaints or concerns. No n/v/d/c.  He has no lower leg edema.    He lives with his wife.      Review of Systems   Constitutional:  Negative for appetite change, chills and fever.   Respiratory:  Negative for cough, shortness of breath and wheezing.    Cardiovascular:  Negative for chest pain and leg swelling.   Gastrointestinal:  Negative for abdominal pain, constipation, diarrhea, nausea and vomiting.   Genitourinary:  Negative for dysuria, frequency and hematuria.   Neurological:  Negative for dizziness, tremors, weakness and headaches.   Psychiatric/Behavioral:  Negative for confusion. The patient is not nervous/anxious.    All other systems reviewed and are negative.      Objective  /52   Pulse 61   Temp 36.2 °C (97.1 °F)   Resp 18   Wt 102 kg (224 lb 6.4 oz)   SpO2 100%   BMI 30.43 kg/m²    Physical Exam  Constitutional:       General: He is not in acute distress.  Eyes:      Conjunctiva/sclera: Conjunctivae normal.      Pupils: Pupils are equal, round, and reactive to light.   Cardiovascular:      Rate and Rhythm: Normal rate and regular rhythm.      Heart sounds: No murmur heard.  Pulmonary:      Effort: Pulmonary effort is normal.      Breath sounds: Normal breath sounds. No wheezing, rhonchi or rales.   Abdominal:      General: Abdomen is flat. Bowel sounds are normal. There is no distension.       "Palpations: Abdomen is soft. There is no mass.      Tenderness: There is no abdominal tenderness.   Musculoskeletal:         General: No swelling. Normal range of motion.   Skin:     General: Skin is warm and dry.      Findings: No rash.   Neurological:      General: No focal deficit present.      Mental Status: He is alert and oriented to person, place, and time. Mental status is at baseline.       No results found. However, due to the size of the patient record, not all encounters were searched. Please check Results Review for a complete set of results.    No lab exists for component: \"CBC BMP\"  Assessment/Plan  Problem List Items Addressed This Visit        BPH (benign prostatic hyperplasia) N40.0     Continue finasteride and tamsulosin.           Chronic hypoxic respiratory failure J96.11     On 2-3 liters of oxygen at home.  Currently stable on 3liters of oxygen.            Iron deficiency anemia D50.9     Continue iron supplement.  Monitor weekly labs.            Paroxysmal atrial fibrillation (Multi) I48.0     Rate controlled with amiodarone and metoprolol.  Anticoaguated with eliquis.             Chronic diastolic heart failure I50.32     Entresto.  Lasix was stopped in the hospital.   Appears euvolemic.  Monitor weights and labs.             UTI (urinary tract infection) N39.0     Complete course of augmentin           Stage 3a chronic kidney disease (Multi) N18.31     Monitor weekly labs.            Acute COVID-19 - Primary U07.1     Treated with course remdesevir.    Symptoms improved.  Continue cough medication as needed.  Stable on 3liters of oxygen.   discharge planning. Pt will discharge home on 5/15.  He has oxygen for home use.  Home Health for RN/PT/OT recommended.               Time spent: 30 min in review of chart, labs and orders, consultation with pt and documentation.     Electronically Signed By: Georgina Garg PA-C   5/12/25 12:59 PM  "

## 2025-05-12 NOTE — PROGRESS NOTES
Subjective   [id number] : Angel Carroll is a 80 y.o. male admitted to [facility] for rehab. No chief complaint on file..  HPI  Pt with a h/o Afib, chf, CKD 3 and chronic respiratory failure treated for Acute covid and UTI.   Pt seen while sitting up in bed.   He is alert and pleasant. He offers no new complaints or concerns.  He denies any pain.  He is planning for discharge home this week.   No abdominal pain.  No n/v/d/c.  He denies any shortness of breath or cough.  He is stable on 2-3 liters of oxygen.  He offers no new complaints or concerns. No n/v/d/c.  He has no lower leg edema.    He lives with his wife.      Review of Systems   Constitutional:  Negative for appetite change, chills and fever.   Respiratory:  Negative for cough, shortness of breath and wheezing.    Cardiovascular:  Negative for chest pain and leg swelling.   Gastrointestinal:  Negative for abdominal pain, constipation, diarrhea, nausea and vomiting.   Genitourinary:  Negative for dysuria, frequency and hematuria.   Neurological:  Negative for dizziness, tremors, weakness and headaches.   Psychiatric/Behavioral:  Negative for confusion. The patient is not nervous/anxious.    All other systems reviewed and are negative.      Objective   /52   Pulse 61   Temp 36.2 °C (97.1 °F)   Resp 18   Wt 102 kg (224 lb 6.4 oz)   SpO2 100%   BMI 30.43 kg/m²    Physical Exam  Constitutional:       General: He is not in acute distress.  Eyes:      Conjunctiva/sclera: Conjunctivae normal.      Pupils: Pupils are equal, round, and reactive to light.   Cardiovascular:      Rate and Rhythm: Normal rate and regular rhythm.      Heart sounds: No murmur heard.  Pulmonary:      Effort: Pulmonary effort is normal.      Breath sounds: Normal breath sounds. No wheezing, rhonchi or rales.   Abdominal:      General: Abdomen is flat. Bowel sounds are normal. There is no distension.      Palpations: Abdomen is soft. There is no mass.      Tenderness: There is  "no abdominal tenderness.   Musculoskeletal:         General: No swelling. Normal range of motion.   Skin:     General: Skin is warm and dry.      Findings: No rash.   Neurological:      General: No focal deficit present.      Mental Status: He is alert and oriented to person, place, and time. Mental status is at baseline.       No results found. However, due to the size of the patient record, not all encounters were searched. Please check Results Review for a complete set of results.    No lab exists for component: \"CBC BMP\"  Assessment/Plan   Problem List Items Addressed This Visit        BPH (benign prostatic hyperplasia) N40.0     Continue finasteride and tamsulosin.           Chronic hypoxic respiratory failure J96.11     On 2-3 liters of oxygen at home.  Currently stable on 3liters of oxygen.            Iron deficiency anemia D50.9     Continue iron supplement.  Monitor weekly labs.            Paroxysmal atrial fibrillation (Multi) I48.0     Rate controlled with amiodarone and metoprolol.  Anticoaguated with eliquis.             Chronic diastolic heart failure I50.32     Entresto.  Lasix was stopped in the hospital.   Appears euvolemic.  Monitor weights and labs.             UTI (urinary tract infection) N39.0     Complete course of augmentin           Stage 3a chronic kidney disease (Multi) N18.31     Monitor weekly labs.            Acute COVID-19 - Primary U07.1     Treated with course remdesevir.    Symptoms improved.  Continue cough medication as needed.  Stable on 3liters of oxygen.   discharge planning. Pt will discharge home on 5/15.  He has oxygen for home use.  Home Health for RN/PT/OT recommended.               Time spent: 30 min in review of chart, labs and orders, consultation with pt and documentation.   "

## 2025-05-14 ENCOUNTER — HOME HEALTH ADMISSION (OUTPATIENT)
Dept: HOME HEALTH SERVICES | Facility: HOME HEALTH | Age: 80
End: 2025-05-14
Payer: MEDICARE

## 2025-05-14 ENCOUNTER — DOCUMENTATION (OUTPATIENT)
Dept: HOME HEALTH SERVICES | Facility: HOME HEALTH | Age: 80
End: 2025-05-14
Payer: MEDICARE

## 2025-05-14 NOTE — HH CARE COORDINATION
Home Care received a Referral for Nursing, Physical Therapy, and Occupational Therapy. We have processed the referral for a Start of Care on 05/16-05/17.     If you have any questions or concerns, please feel free to contact us at [phone]. Follow the prompts, enter your five digit zip code, and you will be directed to your care team on [facility].

## 2025-05-15 ENCOUNTER — NURSING HOME VISIT (OUTPATIENT)
Dept: POST ACUTE CARE | Facility: EXTERNAL LOCATION | Age: 80
End: 2025-05-15
Payer: MEDICARE

## 2025-05-15 VITALS
BODY MASS INDEX: 30.71 KG/M2 | RESPIRATION RATE: 18 BRPM | TEMPERATURE: 97.3 F | OXYGEN SATURATION: 98 % | HEART RATE: 64 BPM | SYSTOLIC BLOOD PRESSURE: 135 MMHG | WEIGHT: 226.4 LBS | DIASTOLIC BLOOD PRESSURE: 62 MMHG

## 2025-05-15 DIAGNOSIS — I50.32 CHRONIC DIASTOLIC HEART FAILURE: ICD-10-CM

## 2025-05-15 DIAGNOSIS — N40.0 BENIGN PROSTATIC HYPERPLASIA WITHOUT LOWER URINARY TRACT SYMPTOMS: ICD-10-CM

## 2025-05-15 DIAGNOSIS — J96.11 CHRONIC HYPOXIC RESPIRATORY FAILURE: ICD-10-CM

## 2025-05-15 DIAGNOSIS — N18.31 STAGE 3A CHRONIC KIDNEY DISEASE (MULTI): ICD-10-CM

## 2025-05-15 DIAGNOSIS — D50.9 IRON DEFICIENCY ANEMIA, UNSPECIFIED IRON DEFICIENCY ANEMIA TYPE: ICD-10-CM

## 2025-05-15 DIAGNOSIS — U07.1 ACUTE COVID-19: Primary | ICD-10-CM

## 2025-05-15 DIAGNOSIS — N39.0 URINARY TRACT INFECTION WITHOUT HEMATURIA, SITE UNSPECIFIED: ICD-10-CM

## 2025-05-15 DIAGNOSIS — I48.0 PAROXYSMAL ATRIAL FIBRILLATION (MULTI): ICD-10-CM

## 2025-05-15 PROCEDURE — 99315 NF DSCHRG MGMT 30 MIN/LESS: CPT | Performed by: PHYSICIAN ASSISTANT

## 2025-05-15 ASSESSMENT — ENCOUNTER SYMPTOMS
HEADACHES: 0
DIARRHEA: 0
COUGH: 0
NERVOUS/ANXIOUS: 0
ABDOMINAL PAIN: 0
FREQUENCY: 0
DIZZINESS: 0
SHORTNESS OF BREATH: 0
FEVER: 0
WHEEZING: 0
CHILLS: 0
HEMATURIA: 0
VOMITING: 0
DYSURIA: 0
NAUSEA: 0
TREMORS: 0
APPETITE CHANGE: 0
CONFUSION: 0
WEAKNESS: 0
CONSTIPATION: 0

## 2025-05-15 NOTE — PROGRESS NOTES
Subjective   [id number] : Angel Carroll is a 80 y.o. male admitted to [facility] for rehab. No chief complaint on file..  HPI  Pt with a h/o Afib, chf, CKD 3 and chronic respiratory failure treated for Acute covid and UTI.   Pt seen while resting in bed.   He is alert and pleasant. He offers no new complaints or concerns.    He will discharge home with his wife today.  He denies any pain.   No abdominal pain.  No n/v/d/c.  He denies any shortness of breath or cough.  He is stable on 2-3 liters of oxygen. He has oxygen for  home use.   He offers no new complaints or concerns. No n/v/d/c.  He has no lower leg edema.    He lives with his wife.      Review of Systems   Constitutional:  Negative for appetite change, chills and fever.   Respiratory:  Negative for cough, shortness of breath and wheezing.    Cardiovascular:  Negative for chest pain and leg swelling.   Gastrointestinal:  Negative for abdominal pain, constipation, diarrhea, nausea and vomiting.   Genitourinary:  Negative for dysuria, frequency and hematuria.   Neurological:  Negative for dizziness, tremors, weakness and headaches.   Psychiatric/Behavioral:  Negative for confusion. The patient is not nervous/anxious.    All other systems reviewed and are negative.      Objective   /62   Pulse 64   Temp 36.3 °C (97.3 °F)   Resp 18   Wt 103 kg (226 lb 6.4 oz)   SpO2 98%   BMI 30.71 kg/m²    Physical Exam  Constitutional:       General: He is not in acute distress.  Eyes:      Conjunctiva/sclera: Conjunctivae normal.      Pupils: Pupils are equal, round, and reactive to light.   Cardiovascular:      Rate and Rhythm: Normal rate and regular rhythm.      Heart sounds: No murmur heard.  Pulmonary:      Effort: Pulmonary effort is normal.      Breath sounds: Normal breath sounds. No wheezing, rhonchi or rales.   Abdominal:      General: Abdomen is flat. Bowel sounds are normal. There is no distension.      Palpations: Abdomen is soft. There is no mass.  "     Tenderness: There is no abdominal tenderness.   Musculoskeletal:         General: No swelling. Normal range of motion.   Skin:     General: Skin is warm and dry.      Findings: No rash.   Neurological:      General: No focal deficit present.      Mental Status: He is alert and oriented to person, place, and time. Mental status is at baseline.       No results found. However, due to the size of the patient record, not all encounters were searched. Please check Results Review for a complete set of results.    No lab exists for component: \"CBC BMP\"  Assessment/Plan   Problem List Items Addressed This Visit        BPH (benign prostatic hyperplasia) N40.0     Continue finasteride and tamsulosin.           Chronic hypoxic respiratory failure J96.11     On 2-3 liters of oxygen at home.  Currently stable on 3liters of oxygen.            Iron deficiency anemia D50.9     Continue iron supplement.  Monitor weekly labs.            Paroxysmal atrial fibrillation (Multi) I48.0     Rate controlled with amiodarone and metoprolol.  Anticoaguated with eliquis.             Chronic diastolic heart failure I50.32     Entresto.  Lasix was stopped in the hospital.   Appears euvolemic.  Monitor weights and labs.             UTI (urinary tract infection) N39.0     Complete course of augmentin           Stage 3a chronic kidney disease (Multi) N18.31     Monitor weekly labs.            Acute COVID-19 - Primary U07.1     Treated with course remdesevir.    Resolved.  Continue cough medication as needed.  Stable on 3liters of oxygen.  Discharge home with his wife today.   He has oxygen for home use.  Home Health for RN/PT/OT recommended.               Time spent: 30 min in review of chart, labs and orders, consultation with pt and documentation.   "

## 2025-05-15 NOTE — LETTER
Patient: Angel Carroll  : 1945    Encounter Date: 05/15/2025      Subjective  [id number] : Angel Carroll is a 80 y.o. male admitted to [facility] for rehab. No chief complaint on file..  HPI  Pt with a h/o Afib, chf, CKD 3 and chronic respiratory failure treated for Acute covid and UTI.   Pt seen while resting in bed.   He is alert and pleasant. He offers no new complaints or concerns.    He will discharge home with his wife today.  He denies any pain.   No abdominal pain.  No n/v/d/c.  He denies any shortness of breath or cough.  He is stable on 2-3 liters of oxygen. He has oxygen for  home use.   He offers no new complaints or concerns. No n/v/d/c.  He has no lower leg edema.    He lives with his wife.      Review of Systems   Constitutional:  Negative for appetite change, chills and fever.   Respiratory:  Negative for cough, shortness of breath and wheezing.    Cardiovascular:  Negative for chest pain and leg swelling.   Gastrointestinal:  Negative for abdominal pain, constipation, diarrhea, nausea and vomiting.   Genitourinary:  Negative for dysuria, frequency and hematuria.   Neurological:  Negative for dizziness, tremors, weakness and headaches.   Psychiatric/Behavioral:  Negative for confusion. The patient is not nervous/anxious.    All other systems reviewed and are negative.      Objective  /62   Pulse 64   Temp 36.3 °C (97.3 °F)   Resp 18   Wt 103 kg (226 lb 6.4 oz)   SpO2 98%   BMI 30.71 kg/m²    Physical Exam  Constitutional:       General: He is not in acute distress.  Eyes:      Conjunctiva/sclera: Conjunctivae normal.      Pupils: Pupils are equal, round, and reactive to light.   Cardiovascular:      Rate and Rhythm: Normal rate and regular rhythm.      Heart sounds: No murmur heard.  Pulmonary:      Effort: Pulmonary effort is normal.      Breath sounds: Normal breath sounds. No wheezing, rhonchi or rales.   Abdominal:      General: Abdomen is flat. Bowel sounds are normal. There is  "no distension.      Palpations: Abdomen is soft. There is no mass.      Tenderness: There is no abdominal tenderness.   Musculoskeletal:         General: No swelling. Normal range of motion.   Skin:     General: Skin is warm and dry.      Findings: No rash.   Neurological:      General: No focal deficit present.      Mental Status: He is alert and oriented to person, place, and time. Mental status is at baseline.       No results found. However, due to the size of the patient record, not all encounters were searched. Please check Results Review for a complete set of results.    No lab exists for component: \"CBC BMP\"  Assessment/Plan  Problem List Items Addressed This Visit        BPH (benign prostatic hyperplasia) N40.0     Continue finasteride and tamsulosin.           Chronic hypoxic respiratory failure J96.11     On 2-3 liters of oxygen at home.  Currently stable on 3liters of oxygen.            Iron deficiency anemia D50.9     Continue iron supplement.  Monitor weekly labs.            Paroxysmal atrial fibrillation (Multi) I48.0     Rate controlled with amiodarone and metoprolol.  Anticoaguated with eliquis.             Chronic diastolic heart failure I50.32     Entresto.  Lasix was stopped in the hospital.   Appears euvolemic.  Monitor weights and labs.             UTI (urinary tract infection) N39.0     Complete course of augmentin           Stage 3a chronic kidney disease (Multi) N18.31     Monitor weekly labs.            Acute COVID-19 - Primary U07.1     Treated with course remdesevir.    Resolved.  Continue cough medication as needed.  Stable on 3liters of oxygen.  Discharge home with his wife today.   He has oxygen for home use.  Home Health for RN/PT/OT recommended.               Time spent: 30 min in review of chart, labs and orders, consultation with pt and documentation.     Electronically Signed By: Georgina Garg PA-C   5/15/25  2:29 PM  "

## 2025-05-18 ENCOUNTER — HOME CARE VISIT (OUTPATIENT)
Dept: HOME HEALTH SERVICES | Facility: HOME HEALTH | Age: 80
End: 2025-05-18
Payer: MEDICARE

## 2025-05-18 VITALS
OXYGEN SATURATION: 98 % | HEART RATE: 68 BPM | DIASTOLIC BLOOD PRESSURE: 62 MMHG | HEIGHT: 72 IN | SYSTOLIC BLOOD PRESSURE: 106 MMHG | BODY MASS INDEX: 30.61 KG/M2 | RESPIRATION RATE: 18 BRPM | WEIGHT: 226 LBS | TEMPERATURE: 97.9 F

## 2025-05-18 PROCEDURE — G0299 HHS/HOSPICE OF RN EA 15 MIN: HCPCS | Mod: HHH

## 2025-05-18 PROCEDURE — 169592 NO-PAY CLAIM PROCEDURE

## 2025-05-19 ENCOUNTER — TELEPHONE (OUTPATIENT)
Dept: CARDIOLOGY | Facility: CLINIC | Age: 80
End: 2025-05-19
Payer: MEDICARE

## 2025-05-20 ENCOUNTER — HOME CARE VISIT (OUTPATIENT)
Dept: HOME HEALTH SERVICES | Facility: HOME HEALTH | Age: 80
End: 2025-05-20
Payer: MEDICARE

## 2025-05-20 VITALS — TEMPERATURE: 96.9 F | OXYGEN SATURATION: 100 % | HEART RATE: 66 BPM | RESPIRATION RATE: 18 BRPM

## 2025-05-20 PROCEDURE — G0152 HHCP-SERV OF OT,EA 15 MIN: HCPCS | Mod: HHH

## 2025-05-20 SDOH — ECONOMIC STABILITY: HOUSING INSECURITY: EVIDENCE OF SMOKING MATERIAL: 0

## 2025-05-20 SDOH — HEALTH STABILITY: MENTAL HEALTH: SMOKING IN HOME: 0

## 2025-05-20 ASSESSMENT — PAIN SCALES - PAIN ASSESSMENT IN ADVANCED DEMENTIA (PAINAD)
TOTALSCORE: 0
BODYLANGUAGE: 0 - RELAXED.
CONSOLABILITY: 0
FACIALEXPRESSION: 0 - SMILING OR INEXPRESSIVE.
FACIALEXPRESSION: 0
BODYLANGUAGE: 0
BREATHING: 0
NEGVOCALIZATION: 0
NEGVOCALIZATION: 0 - NONE.
CONSOLABILITY: 0 - NO NEED TO CONSOLE.

## 2025-05-20 ASSESSMENT — ACTIVITIES OF DAILY LIVING (ADL)
ENTERING_EXITING_HOME: TWO PERSON
DRESSING_LB_CURRENT_FUNCTION: INDEPENDENT
BATHING_CURRENT_FUNCTION: INDEPENDENT
OASIS_M1830: 05
FEEDING_WITHIN_DEFINED_LIMITS: 1
TOILETING: INDEPENDENT
BATHING ASSESSED: 1
TOILETING: 1
GROOMING_WITHIN_DEFINED_LIMITS: 1

## 2025-05-20 ASSESSMENT — ENCOUNTER SYMPTOMS
PERSON REPORTING PAIN: PATIENT
CHANGE IN APPETITE: UNCHANGED
PAIN LOCATION: GENERALIZED
PERSON REPORTING PAIN: PATIENT
DENIES PAIN: 1
LOWER EXTREMITY EDEMA: 1
APPETITE LEVEL: FAIR
PAIN: 1
FATIGUES EASILY: 1
PAIN LOCATION - PAIN SEVERITY: 4/10
MUSCLE WEAKNESS: 1

## 2025-05-21 DIAGNOSIS — I48.20 CHRONIC ATRIAL FIBRILLATION (MULTI): ICD-10-CM

## 2025-05-21 RX ORDER — METOPROLOL SUCCINATE 25 MG/1
TABLET, EXTENDED RELEASE ORAL
Qty: 90 TABLET | Refills: 0 | Status: SHIPPED | OUTPATIENT
Start: 2025-05-21

## 2025-05-23 ENCOUNTER — LAB (OUTPATIENT)
Dept: LAB | Facility: HOSPITAL | Age: 80
End: 2025-05-23
Payer: MEDICARE

## 2025-05-23 DIAGNOSIS — N18.32 CHRONIC KIDNEY DISEASE, STAGE 3B (MULTI): Primary | ICD-10-CM

## 2025-05-23 DIAGNOSIS — I48.20 CHRONIC ATRIAL FIBRILLATION (MULTI): ICD-10-CM

## 2025-05-23 DIAGNOSIS — N18.32 STAGE 3B CHRONIC KIDNEY DISEASE (MULTI): ICD-10-CM

## 2025-05-23 LAB
25(OH)D3 SERPL-MCNC: 73 NG/ML (ref 30–100)
ALBUMIN SERPL BCP-MCNC: 3.9 G/DL (ref 3.4–5)
ANION GAP SERPL CALC-SCNC: 12 MMOL/L (ref 10–20)
APPEARANCE UR: ABNORMAL
BILIRUB UR STRIP.AUTO-MCNC: NEGATIVE MG/DL
BUN SERPL-MCNC: 15 MG/DL (ref 6–23)
CALCIUM SERPL-MCNC: 9.2 MG/DL (ref 8.6–10.6)
CHLORIDE SERPL-SCNC: 102 MMOL/L (ref 98–107)
CO2 SERPL-SCNC: 32 MMOL/L (ref 21–32)
COLOR UR: ABNORMAL
CREAT SERPL-MCNC: 1.53 MG/DL (ref 0.5–1.3)
CREAT UR-MCNC: 79.3 MG/DL (ref 20–370)
CREAT UR-MCNC: 79.3 MG/DL (ref 20–370)
EGFRCR SERPLBLD CKD-EPI 2021: 45 ML/MIN/1.73M*2
ERYTHROCYTE [DISTWIDTH] IN BLOOD BY AUTOMATED COUNT: 15.7 % (ref 11.5–14.5)
FERRITIN SERPL-MCNC: 146 NG/ML (ref 20–300)
GLUCOSE SERPL-MCNC: 89 MG/DL (ref 74–99)
GLUCOSE UR STRIP.AUTO-MCNC: NORMAL MG/DL
HCT VFR BLD AUTO: 39.7 % (ref 41–52)
HGB BLD-MCNC: 11.9 G/DL (ref 13.5–17.5)
IRON SATN MFR SERPL: 22 % (ref 25–45)
IRON SERPL-MCNC: 94 UG/DL (ref 35–150)
KETONES UR STRIP.AUTO-MCNC: NEGATIVE MG/DL
LEUKOCYTE ESTERASE UR QL STRIP.AUTO: ABNORMAL
MCH RBC QN AUTO: 30.1 PG (ref 26–34)
MCHC RBC AUTO-ENTMCNC: 30 G/DL (ref 32–36)
MCV RBC AUTO: 101 FL (ref 80–100)
MICROALBUMIN UR-MCNC: 227 MG/L
MICROALBUMIN/CREAT UR: 286.3 UG/MG CREAT
MUCOUS THREADS #/AREA URNS AUTO: ABNORMAL /LPF
NITRITE UR QL STRIP.AUTO: NEGATIVE
NRBC BLD-RTO: 0 /100 WBCS (ref 0–0)
PH UR STRIP.AUTO: 7.5 [PH]
PHOSPHATE SERPL-MCNC: 2.7 MG/DL (ref 2.5–4.9)
PLATELET # BLD AUTO: 171 X10*3/UL (ref 150–450)
POTASSIUM SERPL-SCNC: 4.2 MMOL/L (ref 3.5–5.3)
PROT UR STRIP.AUTO-MCNC: ABNORMAL MG/DL
PROT UR-ACNC: 61 MG/DL (ref 5–25)
PROT/CREAT UR: 0.77 MG/MG CREAT (ref 0–0.17)
PTH-INTACT SERPL-MCNC: 33.7 PG/ML (ref 18.5–88)
RBC # BLD AUTO: 3.95 X10*6/UL (ref 4.5–5.9)
RBC # UR STRIP.AUTO: ABNORMAL MG/DL
RBC #/AREA URNS AUTO: ABNORMAL /HPF
SODIUM SERPL-SCNC: 142 MMOL/L (ref 136–145)
SP GR UR STRIP.AUTO: 1.01
TIBC SERPL-MCNC: 426 UG/DL (ref 240–445)
UIBC SERPL-MCNC: 332 UG/DL (ref 110–370)
UROBILINOGEN UR STRIP.AUTO-MCNC: NORMAL MG/DL
WBC # BLD AUTO: 3.9 X10*3/UL (ref 4.4–11.3)
WBC #/AREA URNS AUTO: >50 /HPF

## 2025-05-23 PROCEDURE — 80069 RENAL FUNCTION PANEL: CPT

## 2025-05-23 PROCEDURE — 82043 UR ALBUMIN QUANTITATIVE: CPT

## 2025-05-23 PROCEDURE — 84156 ASSAY OF PROTEIN URINE: CPT

## 2025-05-23 PROCEDURE — 83970 ASSAY OF PARATHORMONE: CPT

## 2025-05-23 PROCEDURE — 82728 ASSAY OF FERRITIN: CPT

## 2025-05-23 PROCEDURE — 81001 URINALYSIS AUTO W/SCOPE: CPT

## 2025-05-23 PROCEDURE — 82306 VITAMIN D 25 HYDROXY: CPT

## 2025-05-23 PROCEDURE — 83540 ASSAY OF IRON: CPT

## 2025-05-23 PROCEDURE — 85027 COMPLETE CBC AUTOMATED: CPT

## 2025-05-23 PROCEDURE — 82570 ASSAY OF URINE CREATININE: CPT

## 2025-05-23 PROCEDURE — 83550 IRON BINDING TEST: CPT

## 2025-05-25 ENCOUNTER — HOME CARE VISIT (OUTPATIENT)
Dept: HOME HEALTH SERVICES | Facility: HOME HEALTH | Age: 80
End: 2025-05-25
Payer: MEDICARE

## 2025-05-25 VITALS
HEART RATE: 61 BPM | DIASTOLIC BLOOD PRESSURE: 74 MMHG | TEMPERATURE: 97.2 F | SYSTOLIC BLOOD PRESSURE: 147 MMHG | RESPIRATION RATE: 20 BRPM | OXYGEN SATURATION: 100 %

## 2025-05-25 PROCEDURE — G0151 HHCP-SERV OF PT,EA 15 MIN: HCPCS | Mod: HHH

## 2025-05-25 SDOH — HEALTH STABILITY: PHYSICAL HEALTH: EXERCISE TYPE: WRITTEN

## 2025-05-25 SDOH — HEALTH STABILITY: PHYSICAL HEALTH: PHYSICAL EXERCISE: 10

## 2025-05-25 ASSESSMENT — ENCOUNTER SYMPTOMS
PAIN LOCATION - RELIEVING FACTORS: REST
ARTHRALGIAS: 1
HIGHEST PAIN SEVERITY IN PAST 24 HOURS: 10/10
PAIN LOCATION: BACK
PAIN LOCATION - PAIN FREQUENCY: INTERMITTENT
MUSCLE WEAKNESS: 1
PAIN SEVERITY GOAL: 3/10
LOWEST PAIN SEVERITY IN PAST 24 HOURS: 0/10
PAIN LOCATION - EXACERBATING FACTORS: MVMT
PAIN LOCATION - PAIN DURATION: VARIES
PAIN: 1
PAIN LOCATION - PAIN SEVERITY: 6/10
SUBJECTIVE PAIN PROGRESSION: WAXING AND WANING
PAIN LOCATION - PAIN QUALITY: ACHING

## 2025-05-25 ASSESSMENT — GAIT ASSESSMENTS
WALKING STANCE: 0 - HEELS APART
PATH SCORE: 1
STEP CONTINUITY: 0 - STOPPING OR DISCONTINUITY BETWEEN STEPS
TRUNK SCORE: 1
STEP SYMMETRY: 0 - RIGHT AND LEFT STEP LENGTH NOT EQUAL
GAIT SCORE: 6
TRUNK: 1 - NO SWAY BUT FLEXION OF KNEES OR BACK OR SPREADS ARMS WHILE WALKING
BALANCE AND GAIT SCORE: 16
INITIATION OF GAIT IMMEDIATELY AFTER GO: 0 - ANY HESITANCY OR MULTIPLE ATTEMPTS TO START
PATH: 1 - MILD/MODERATE DEVIATION OR USES WALKING AID

## 2025-05-25 ASSESSMENT — ACTIVITIES OF DAILY LIVING (ADL)
AMBULATION ASSISTANCE ON FLAT SURFACES: 1
AMBULATION ASSISTANCE: STAND BY ASSIST
CURRENT_FUNCTION: STAND BY ASSIST
AMBULATION_DISTANCE/DURATION_TOLERATED: 50'
PHYSICAL TRANSFERS ASSESSED: 1
AMBULATION ASSISTANCE: 1

## 2025-05-25 ASSESSMENT — BALANCE ASSESSMENTS
NUDGED: 1 - STAGGERS, GRABS, CATCHES SELF
ARISING SCORE: 1
NUDGED SCORE: 1
ATTEMPTS TO ARISE: 1 - ABLE, REQUIRES MORE THAN ONE ATTEMPT
STANDING BALANCE: 1 - STEADY BUT WIDE STANCE AND USES CANE OR OTHER SUPPORT
ARISES: 1 - ABLE, USES ARMS TO HELP
SITTING DOWN: 1 - USES ARMS OR NOT SMOOTH MOTION
BALANCE SCORE: 10
SITTING BALANCE: 1 - STEADY, SAFE
EYES CLOSED AT MAXIMUM POSITION NUDGED: 1 - STEADY
IMMEDIATE STANDING BALANCE FIRST 5 SECONDS: 1 - STEADY BUT USES WALKER OR OTHER SUPPORT
TURNING 360 DEGREES STEPS: 1 - CONTINUOUS STEPS

## 2025-05-27 ENCOUNTER — APPOINTMENT (OUTPATIENT)
Dept: NEPHROLOGY | Facility: CLINIC | Age: 80
End: 2025-05-27
Payer: MEDICARE

## 2025-05-27 VITALS
HEIGHT: 72 IN | WEIGHT: 222 LBS | HEART RATE: 60 BPM | BODY MASS INDEX: 30.07 KG/M2 | SYSTOLIC BLOOD PRESSURE: 110 MMHG | DIASTOLIC BLOOD PRESSURE: 57 MMHG | TEMPERATURE: 97.5 F | OXYGEN SATURATION: 95 %

## 2025-05-27 DIAGNOSIS — I12.9 HYPERTENSIVE KIDNEY DISEASE WITH STAGE 3B CHRONIC KIDNEY DISEASE (MULTI): Primary | ICD-10-CM

## 2025-05-27 DIAGNOSIS — N39.0 URINARY TRACT INFECTION WITHOUT HEMATURIA, SITE UNSPECIFIED: ICD-10-CM

## 2025-05-27 DIAGNOSIS — N18.32 HYPERTENSIVE KIDNEY DISEASE WITH STAGE 3B CHRONIC KIDNEY DISEASE (MULTI): Primary | ICD-10-CM

## 2025-05-27 PROCEDURE — 3078F DIAST BP <80 MM HG: CPT | Performed by: INTERNAL MEDICINE

## 2025-05-27 PROCEDURE — 1160F RVW MEDS BY RX/DR IN RCRD: CPT | Performed by: INTERNAL MEDICINE

## 2025-05-27 PROCEDURE — 1159F MED LIST DOCD IN RCRD: CPT | Performed by: INTERNAL MEDICINE

## 2025-05-27 PROCEDURE — 3074F SYST BP LT 130 MM HG: CPT | Performed by: INTERNAL MEDICINE

## 2025-05-27 PROCEDURE — 99213 OFFICE O/P EST LOW 20 MIN: CPT | Performed by: INTERNAL MEDICINE

## 2025-05-27 NOTE — PROGRESS NOTES
Chief Complaint: Follow up CKD    Good and bad days  Out of rehab x 2 weeks  Last admission secondary to covid  Recently follow up with urology  Denies nausea, vomiting, chest pain  Difficulty emptying bladder; dripping instead of steady stream, wearing pullups    Frail, elderly, wheelchair  O2 per 2L NC  Sclera AI s inj  MMM, no sores  Swelling much improved, tr LE edema  No tremor  No rash    CKD stage 3b  HTN at goal  Proteinuria subnephrotic, much improved     Continue lasix 20 daily  Labs and follow up in 3 months

## 2025-05-29 ENCOUNTER — HOME CARE VISIT (OUTPATIENT)
Dept: HOME HEALTH SERVICES | Facility: HOME HEALTH | Age: 80
End: 2025-05-29
Payer: MEDICARE

## 2025-05-29 VITALS
RESPIRATION RATE: 20 BRPM | OXYGEN SATURATION: 98 % | HEART RATE: 64 BPM | TEMPERATURE: 98.7 F | SYSTOLIC BLOOD PRESSURE: 130 MMHG | DIASTOLIC BLOOD PRESSURE: 60 MMHG

## 2025-05-29 LAB
25(OH)D3+25(OH)D2 SERPL-MCNC: 61 NG/ML (ref 30–100)
APPEARANCE UR: ABNORMAL
BACTERIA #/AREA URNS HPF: ABNORMAL /HPF
BACTERIA UR CULT: ABNORMAL
BACTERIA UR CULT: ABNORMAL
BILIRUB UR QL STRIP: NEGATIVE
CAOX CRY #/AREA URNS HPF: ABNORMAL /HPF
COLOR UR: YELLOW
GLUCOSE UR QL STRIP: NEGATIVE
HGB UR QL STRIP: ABNORMAL
HYALINE CASTS #/AREA URNS LPF: ABNORMAL /LPF
KETONES UR QL STRIP: NEGATIVE
LEUKOCYTE ESTERASE UR QL STRIP: ABNORMAL
NITRITE UR QL STRIP: NEGATIVE
PH UR STRIP: 6 [PH] (ref 5–8)
PROT UR QL STRIP: ABNORMAL
PTH-INTACT SERPL-MCNC: 37 PG/ML (ref 16–77)
RBC #/AREA URNS HPF: ABNORMAL /HPF
SERVICE CMNT-IMP: ABNORMAL
SP GR UR STRIP: 1.01 (ref 1–1.03)
SQUAMOUS #/AREA URNS HPF: ABNORMAL /HPF
WBC #/AREA URNS HPF: ABNORMAL /HPF

## 2025-05-29 PROCEDURE — G0300 HHS/HOSPICE OF LPN EA 15 MIN: HCPCS | Mod: HHH

## 2025-05-29 SDOH — ECONOMIC STABILITY: HOUSING INSECURITY: EVIDENCE OF SMOKING MATERIAL: 0

## 2025-05-29 SDOH — HEALTH STABILITY: MENTAL HEALTH: SMOKING IN HOME: 0

## 2025-05-29 ASSESSMENT — ENCOUNTER SYMPTOMS
CHANGE IN APPETITE: UNCHANGED
DENIES PAIN: 1
LAST BOWEL MOVEMENT: 67353
APPETITE LEVEL: GOOD

## 2025-05-29 NOTE — H&P
History Of Present Illness  Angel Carroll is a 79 y.o. male presenting with weakness, fever.  He has a past medical history significant for coronary artery disease, hyperlipidemia, chronic diastolic heart failure and pacemaker placement.  He presented to the emergency department at [facility], [place], operated by [facility] on 4/19 because he had been feeling weak for several days.  Weakness had worsened over the past 24 hours and he had difficulty getting up.  His appetite was good.  There was no nausea or vomiting or diarrhea.  He has chronic respiratory failure on home oxygen and there was no change in his oxygen requirement.  He had a cough productive of some clear sputum.  He had a fever the day prior to presentation.  There was no loss of taste or smell.  He tested positive for COVID-19 in the ED.  He tested negative for influenza A/B and RSV.  He tested negative for influenza A/B and RSV.  A urinalysis was suggestive of urinary tract infection.     Past Medical History  Medical History[1]    Surgical History  Surgical History[2]     Social History  He reports that he quit smoking about 31 years ago. His smoking use included cigarettes. He has never used smokeless tobacco. He reports that he does not currently use alcohol. He reports that he does not use drugs.    Family History  Family History[3]     Allergies  Other, Ranolazine, Nsaids (non-steroidal anti-inflammatory drug), and Cyclobenzaprine    Review of Systems   General: As in the HPI   HEENT: Negative for headache, blurring of vision or double vision.    Cardiovascular: Negative for chest pain, palpitations or orthopnea.    Respiratory: As in the HPI   Gastrointestinal: Negative for nausea, vomiting, hematemesis, abdominal pain or diarrhea.   Genitourinary: Negative for dysuria, hematuria, frequency or nocturia.   Musculoskeletal: Negative for joint pain, joint swelling or deformity.   Skin: Negative for rash, itching, or jaundice.   Hematologic: Negative for bleeding or bruising.    I attempted to contact sleep center again. Lvm for them to call back   Neurologic: Negative for headache, loss of consciousness. syncope or seizures     Physical Exam   General.: Awake alert well-developed, responsive   HEENT: Normocephalic, not icteric, not pale, no facial asymmetry, no pharyngeal erythema.   Neck: Supple, no carotid bruit, no thyroid enlargement.   Cardiovascular: Regular heart rate and rhythm normal S1 and S2.   Respiratory: Good air entry bilaterally   Abdomen: Soft, nontender to palpation, bowel sounds present and normoactive.   Extremities: No peripheral cyanosis, trace pedal and lower leg edema   Neurologic: Alert and oriented to self, place and date, muscle strength 5/5 in all extremities.   Dermatologic: No rash, ecchymosis, or jaundice.   Psychological: Appropriate affect and behavior.     Last Recorded Vitals  Blood pressure 133/57, pulse 60, temperature 37.3 °C (99.1 °F), temperature source Rectal, resp. rate 16, height 1.829 m (6'), weight 99.8 kg (220 lb), SpO2 98%.    Relevant Results  .  Results for orders placed or performed during the hospital encounter of 04/19/25 (from the past 24 hours)   CBC with Differential   Result Value Ref Range    WBC 4.9 4.4 - 11.3 x10*3/uL    nRBC 0.0 0.0 - 0.0 /100 WBCs    RBC 3.43 (L) 4.50 - 5.90 x10*6/uL    Hemoglobin 10.7 (L) 13.5 - 17.5 g/dL    Hematocrit 33.0 (L) 41.0 - 52.0 %    MCV 96 80 - 100 fL    MCH 31.2 26.0 - 34.0 pg    MCHC 32.4 32.0 - 36.0 g/dL    RDW 17.2 (H) 11.5 - 14.5 %    Platelets 142 (L) 150 - 450 x10*3/uL    Neutrophils % 79.9 40.0 - 80.0 %    Immature Granulocytes %, Automated 0.4 0.0 - 0.9 %    Lymphocytes % 10.4 13.0 - 44.0 %    Monocytes % 8.5 2.0 - 10.0 %    Eosinophils % 0.4 0.0 - 6.0 %    Basophils % 0.4 0.0 - 2.0 %    Neutrophils Absolute 3.93 1.60 - 5.50 x10*3/uL    Immature Granulocytes Absolute, Automated 0.02 0.00 - 0.50 x10*3/uL    Lymphocytes Absolute 0.51 (L) 0.80 - 3.00 x10*3/uL    Monocytes Absolute 0.42 0.05 - 0.80 x10*3/uL    Eosinophils Absolute 0.02 0.00 - 0.40 x10*3/uL     Basophils Absolute 0.02 0.00 - 0.10 x10*3/uL   Comprehensive Metabolic Panel   Result Value Ref Range    Glucose 94 74 - 99 mg/dL    Sodium 139 136 - 145 mmol/L    Potassium 3.6 3.5 - 5.3 mmol/L    Chloride 104 98 - 107 mmol/L    Bicarbonate 28 21 - 32 mmol/L    Anion Gap 11 10 - 20 mmol/L    Urea Nitrogen 17 6 - 23 mg/dL    Creatinine 1.68 (H) 0.50 - 1.30 mg/dL    eGFR 41 (L) >60 mL/min/1.73m*2    Calcium 8.3 (L) 8.6 - 10.3 mg/dL    Albumin 3.5 3.4 - 5.0 g/dL    Alkaline Phosphatase 68 33 - 136 U/L    Total Protein 6.1 (L) 6.4 - 8.2 g/dL    AST 33 9 - 39 U/L    Bilirubin, Total 1.0 0.0 - 1.2 mg/dL    ALT 26 10 - 52 U/L   Lactate   Result Value Ref Range    Lactate 1.0 0.4 - 2.0 mmol/L   Sars-CoV-2, Influenza A/B and RSV PCR   Result Value Ref Range    Coronavirus 2019, PCR Detected (A) Not Detected    Flu A Result Not Detected Not Detected    Flu B Result Not Detected Not Detected    RSV PCR Not Detected Not Detected   Urinalysis with Reflex Culture and Microscopic   Result Value Ref Range    Color, Urine Light-Orange (N) Light-Yellow, Yellow, Dark-Yellow    Appearance, Urine Ex.Turbid (N) Clear    Specific Gravity, Urine 1.020 1.005 - 1.035    pH, Urine 8.5 (N) 5.0, 5.5, 6.0, 6.5, 7.0, 7.5, 8.0    Protein, Urine 200 (2+) (A) NEGATIVE, 10 (TRACE), 20 (TRACE) mg/dL    Glucose, Urine Normal Normal mg/dL    Blood, Urine 1.0 (3+) (A) NEGATIVE mg/dL    Ketones, Urine TRACE (A) NEGATIVE mg/dL    Bilirubin, Urine NEGATIVE NEGATIVE mg/dL    Urobilinogen, Urine 2 (1+) (A) Normal mg/dL    Nitrite, Urine NEGATIVE NEGATIVE    Leukocyte Esterase, Urine 500 Ruth/uL (A) NEGATIVE   Microscopic Only, Urine   Result Value Ref Range    WBC, Urine >50 (A) 1-5, NONE /HPF    RBC, Urine 6-10 (A) NONE, 1-2, 3-5 /HPF    Squamous Epithelial Cells, Urine 1-9 (SPARSE) Reference range not established. /HPF    Bacteria, Urine 1+ (A) NONE SEEN /HPF    Mucus, Urine FEW Reference range not established. /LPF     *Note: Due to a large number of  results and/or encounters for the requested time period, some results have not been displayed. A complete set of results can be found in Results Review.     XR chest 1 view  Result Date: 4/19/2025  Interpreted By:  Sen Hardwick, STUDY: XR CHEST 1 VIEW;  4/19/2025 8:35 pm   INDICATION: Signs/Symptoms:cough, congestion.   COMPARISON: Chest x-ray 01/30/2025   ACCESSION NUMBER(S): [id number]   ORDERING CLINICIAN: ISAAC SHEA   FINDINGS: Left-sided dual lead pacer is stable in position. Overlying leads noted.   CARDIOMEDIASTINAL SILHOUETTE: Cardiac silhouette is enlarged. Diffuse interstitial prominence. Atherosclerotic calcification of the aorta. In the   LUNGS: No consolidation, pleural effusion or pneumothorax.   ABDOMEN: No remarkable upper abdominal findings.   BONES: Multilevel degenerative changes of the spine.       Cardiomegaly with diffuse interstitial prominence which could be chronic or relate to component of developing interstitial edema. Correlate clinically.   MACRO: None   Signed by: Sen Hardwick 4/19/2025 8:46 PM Dictation workstation:   [id number]     Assessment & Plan    COVID-19  Symptomatic with fatigue  No apparent change in oxygen requirement  Supportive care  Infectious disease consult    Chronic respiratory failure  He has a diagnosis of COPD.   He is on nasal oxygen at 2 to 3 L/min at home  Continuous oximetry    Chronic diastolic heart failure  On furosemide, metoprolol    Urinary tract infection  Pyuria versus UTI  IV ceftriaxone.  Follow-up on urine culture    Atrial fibrillation  On metoprolol, amiodarone  On long-term anticoagulation with Eliquis    Chronic kidney disease  Monitor serum creatinine level    Presence of cardiac pacemaker  History of sick sinus syndrome status post pacemaker placement      Scottie Yuan MD         [1]   Past Medical History:  Diagnosis Date    Atherosclerotic heart disease of native coronary artery without angina pectoris 12/14/2022     Arteriosclerotic cardiovascular disease (ASCVD)    Benign neoplasm of meninges, unspecified     Meningioma    BPH (benign prostatic hyperplasia)     Chronic diastolic heart failure     Chronic kidney disease     Chronic obstructive pulmonary disease (COPD) (Multi)     Chronic respiratory failure     Elevated blood-pressure reading, without diagnosis of hypertension     Prehypertension    Hyperlipidemia     Meningioma (Multi)     Nephrolithiasis     Rectal bleeding     Sick sinus syndrome (Multi)     s/p pacemaker placement    Vitamin D deficiency    [2]   Past Surgical History:  Procedure Laterality Date    BACK SURGERY  04/18/2016    Back Surgery    BRAIN SURGERY      COLONOSCOPY  07/03/2013    Complete Colonoscopy    CORONARY ANGIOPLASTY WITH STENT PLACEMENT  08/18/2020    Cath Stent Placement    CT ANGIO NECK  09/25/2021    CT NECK ANGIO W AND WO IV CONTRAST 9/25/2021 [facility]    CT HEAD ANGIO W AND WO IV CONTRAST  09/25/2021    CT HEAD ANGIO W AND WO IV CONTRAST 9/25/2021 [facility]    CYSTOSCOPY W/ URETERAL STENT PLACEMENT      EYE SURGERY  04/03/2013    Eye Surgery    KNEE ARTHROSCOPY W/ DEBRIDEMENT  07/03/2013    Arthroscopy Knee Right    KNEE ARTHROSCOPY W/ DEBRIDEMENT  07/03/2013    Arthroscopy Knee Left    LITHOTRIPSY  07/22/2013    Renal Lithotripsy    MR HEAD ANGIO WO IV CONTRAST  03/23/2021    MR HEAD ANGIO WO IV CONTRAST [facility]    MR NECK ANGIO WO IV CONTRAST  03/23/2021    MR NECK ANGIO WO IV CONTRAST [facility]    OTHER SURGICAL HISTORY  04/03/2013    Cardiac Cath Procedure Outcome: Successful    OTHER SURGICAL HISTORY  04/03/2013    Neuroplasty With Transposition Of Ulnar Nerve - At Elbow    OTHER SURGICAL HISTORY  07/03/2013    Wrist Carpectomy    OTHER SURGICAL HISTORY  07/03/2013    Ulnar Osteotomy    PACEMAKER PLACEMENT      TONSILLECTOMY      TOTAL KNEE ARTHROPLASTY     [3]   Family History  Problem Relation Name Age of Onset    Diabetes Mother       Emphysema Father      Heart disease Father

## 2025-05-31 ENCOUNTER — HOME CARE VISIT (OUTPATIENT)
Dept: HOME HEALTH SERVICES | Facility: HOME HEALTH | Age: 80
End: 2025-05-31
Payer: MEDICARE

## 2025-05-31 VITALS
HEART RATE: 61 BPM | RESPIRATION RATE: 18 BRPM | SYSTOLIC BLOOD PRESSURE: 130 MMHG | OXYGEN SATURATION: 98 % | TEMPERATURE: 97.4 F | DIASTOLIC BLOOD PRESSURE: 83 MMHG

## 2025-05-31 PROCEDURE — G0151 HHCP-SERV OF PT,EA 15 MIN: HCPCS | Mod: HHH

## 2025-05-31 SDOH — HEALTH STABILITY: PHYSICAL HEALTH: EXERCISE TYPE: WRITTEN

## 2025-05-31 SDOH — HEALTH STABILITY: PHYSICAL HEALTH: PHYSICAL EXERCISE: 12

## 2025-05-31 ASSESSMENT — ENCOUNTER SYMPTOMS
PERSON REPORTING PAIN: PATIENT
PAIN LOCATION: BACK
PAIN LOCATION - PAIN DURATION: VARIES
PAIN LOCATION - RELIEVING FACTORS: REST
ARTHRALGIAS: 1
LOWEST PAIN SEVERITY IN PAST 24 HOURS: 0/10
MUSCLE WEAKNESS: 1
PAIN: 1
PAIN LOCATION - PAIN QUALITY: ACHING
PAIN LOCATION - PAIN SEVERITY: 6/10
PAIN LOCATION - EXACERBATING FACTORS: MVMT
SUBJECTIVE PAIN PROGRESSION: WAXING AND WANING
PAIN LOCATION - PAIN FREQUENCY: INTERMITTENT
PAIN SEVERITY GOAL: 3/10
HIGHEST PAIN SEVERITY IN PAST 24 HOURS: 10/10

## 2025-05-31 ASSESSMENT — ACTIVITIES OF DAILY LIVING (ADL)
AMBULATION ASSISTANCE: STAND BY ASSIST
AMBULATION ASSISTANCE: 1
AMBULATION_DISTANCE/DURATION_TOLERATED: 75'
CURRENT_FUNCTION: STAND BY ASSIST
PHYSICAL TRANSFERS ASSESSED: 1
AMBULATION ASSISTANCE ON FLAT SURFACES: 1

## 2025-06-04 ENCOUNTER — HOME CARE VISIT (OUTPATIENT)
Dept: HOME HEALTH SERVICES | Facility: HOME HEALTH | Age: 80
End: 2025-06-04
Payer: MEDICARE

## 2025-06-04 PROCEDURE — RXMED WILLOW AMBULATORY MEDICATION CHARGE

## 2025-06-05 ENCOUNTER — PHARMACY VISIT (OUTPATIENT)
Dept: PHARMACY | Facility: CLINIC | Age: 80
End: 2025-06-05
Payer: COMMERCIAL

## 2025-06-05 DIAGNOSIS — N18.32 STAGE 3B CHRONIC KIDNEY DISEASE (MULTI): ICD-10-CM

## 2025-06-05 RX ORDER — ERGOCALCIFEROL 1.25 MG/1
1.25 CAPSULE ORAL
Qty: 12 CAPSULE | Refills: 0 | Status: SHIPPED | OUTPATIENT
Start: 2025-06-08

## 2025-06-06 ENCOUNTER — HOME CARE VISIT (OUTPATIENT)
Dept: HOME HEALTH SERVICES | Facility: HOME HEALTH | Age: 80
End: 2025-06-06
Payer: MEDICARE

## 2025-06-06 VITALS
TEMPERATURE: 97.9 F | SYSTOLIC BLOOD PRESSURE: 110 MMHG | RESPIRATION RATE: 16 BRPM | HEART RATE: 64 BPM | OXYGEN SATURATION: 97 % | DIASTOLIC BLOOD PRESSURE: 62 MMHG

## 2025-06-06 DIAGNOSIS — N39.0 RECURRENT UTI: ICD-10-CM

## 2025-06-06 PROCEDURE — G0300 HHS/HOSPICE OF LPN EA 15 MIN: HCPCS | Mod: HHH

## 2025-06-06 SDOH — HEALTH STABILITY: MENTAL HEALTH: SMOKING IN HOME: 0

## 2025-06-06 SDOH — ECONOMIC STABILITY: HOUSING INSECURITY: EVIDENCE OF SMOKING MATERIAL: 0

## 2025-06-06 ASSESSMENT — ENCOUNTER SYMPTOMS
CHANGE IN APPETITE: UNCHANGED
LAST BOWEL MOVEMENT: 67361
APPETITE LEVEL: GOOD
DENIES PAIN: 1

## 2025-06-07 ENCOUNTER — HOME CARE VISIT (OUTPATIENT)
Dept: HOME HEALTH SERVICES | Facility: HOME HEALTH | Age: 80
End: 2025-06-07
Payer: MEDICARE

## 2025-06-07 VITALS
OXYGEN SATURATION: 97 % | SYSTOLIC BLOOD PRESSURE: 101 MMHG | HEART RATE: 61 BPM | DIASTOLIC BLOOD PRESSURE: 50 MMHG | RESPIRATION RATE: 18 BRPM | TEMPERATURE: 97.4 F

## 2025-06-07 PROCEDURE — G0151 HHCP-SERV OF PT,EA 15 MIN: HCPCS | Mod: HHH

## 2025-06-07 SDOH — HEALTH STABILITY: PHYSICAL HEALTH: EXERCISE TYPE: WRITTEN

## 2025-06-07 SDOH — HEALTH STABILITY: PHYSICAL HEALTH: PHYSICAL EXERCISE: 15

## 2025-06-07 ASSESSMENT — ENCOUNTER SYMPTOMS
HIGHEST PAIN SEVERITY IN PAST 24 HOURS: 10/10
PAIN LOCATION - EXACERBATING FACTORS: MVMT
LOWEST PAIN SEVERITY IN PAST 24 HOURS: 0/10
PAIN LOCATION - PAIN QUALITY: ACHING
PAIN LOCATION - RELIEVING FACTORS: REST
PAIN: 1
SUBJECTIVE PAIN PROGRESSION: WAXING AND WANING
PERSON REPORTING PAIN: PATIENT
PAIN SEVERITY GOAL: 3/10
PAIN LOCATION - PAIN SEVERITY: 5/10
MUSCLE WEAKNESS: 1
ARTHRALGIAS: 1
PAIN LOCATION - PAIN FREQUENCY: INTERMITTENT
PAIN LOCATION: BACK
PAIN LOCATION - PAIN DURATION: VARIES

## 2025-06-07 ASSESSMENT — ACTIVITIES OF DAILY LIVING (ADL)
AMBULATION ASSISTANCE ON FLAT SURFACES: 1
AMBULATION_DISTANCE/DURATION_TOLERATED: 100'
PHYSICAL TRANSFERS ASSESSED: 1
AMBULATION ASSISTANCE: STAND BY ASSIST
AMBULATION ASSISTANCE: 1
CURRENT_FUNCTION: STAND BY ASSIST

## 2025-06-10 ENCOUNTER — HOME CARE VISIT (OUTPATIENT)
Dept: HOME HEALTH SERVICES | Facility: HOME HEALTH | Age: 80
End: 2025-06-10
Payer: MEDICARE

## 2025-06-10 VITALS
OXYGEN SATURATION: 98 % | RESPIRATION RATE: 18 BRPM | TEMPERATURE: 97.9 F | SYSTOLIC BLOOD PRESSURE: 110 MMHG | DIASTOLIC BLOOD PRESSURE: 54 MMHG | HEART RATE: 64 BPM

## 2025-06-10 PROCEDURE — G0299 HHS/HOSPICE OF RN EA 15 MIN: HCPCS | Mod: HHH

## 2025-06-11 ENCOUNTER — HOME CARE VISIT (OUTPATIENT)
Dept: HOME HEALTH SERVICES | Facility: HOME HEALTH | Age: 80
End: 2025-06-11
Payer: MEDICARE

## 2025-06-11 VITALS
HEART RATE: 61 BPM | OXYGEN SATURATION: 98 % | RESPIRATION RATE: 20 BRPM | TEMPERATURE: 97.6 F | DIASTOLIC BLOOD PRESSURE: 60 MMHG | SYSTOLIC BLOOD PRESSURE: 125 MMHG

## 2025-06-11 PROCEDURE — G0151 HHCP-SERV OF PT,EA 15 MIN: HCPCS | Mod: HHH

## 2025-06-11 SDOH — HEALTH STABILITY: PHYSICAL HEALTH: EXERCISE TYPE: WRITTEN

## 2025-06-11 SDOH — HEALTH STABILITY: PHYSICAL HEALTH: PHYSICAL EXERCISE: 15

## 2025-06-11 ASSESSMENT — ENCOUNTER SYMPTOMS
ARTHRALGIAS: 1
PAIN LOCATION - PAIN DURATION: VARIES
LOWEST PAIN SEVERITY IN PAST 24 HOURS: 4/10
PAIN LOCATION - PAIN SEVERITY: 7/10
PAIN LOCATION - PAIN QUALITY: ACHING
PAIN LOCATION - PAIN FREQUENCY: INTERMITTENT
PAIN LOCATION: GENERALIZED
PAIN: 1
PAIN SEVERITY GOAL: 3/10
PERSON REPORTING PAIN: PATIENT
SUBJECTIVE PAIN PROGRESSION: GRADUALLY IMPROVING
HIGHEST PAIN SEVERITY IN PAST 24 HOURS: 10/10
MUSCLE WEAKNESS: 1
PAIN LOCATION - RELIEVING FACTORS: REST
PAIN LOCATION - EXACERBATING FACTORS: MVMT

## 2025-06-11 ASSESSMENT — ACTIVITIES OF DAILY LIVING (ADL)
PHYSICAL TRANSFERS ASSESSED: 1
AMBULATION ASSISTANCE ON FLAT SURFACES: 1
CURRENT_FUNCTION: STAND BY ASSIST
AMBULATION ASSISTANCE: 1
AMBULATION_DISTANCE/DURATION_TOLERATED: 100'
AMBULATION ASSISTANCE: STAND BY ASSIST

## 2025-06-13 SDOH — ECONOMIC STABILITY: HOUSING INSECURITY: EVIDENCE OF SMOKING MATERIAL: 0

## 2025-06-13 SDOH — HEALTH STABILITY: MENTAL HEALTH: SMOKING IN HOME: 0

## 2025-06-13 ASSESSMENT — ENCOUNTER SYMPTOMS
CHANGE IN APPETITE: UNCHANGED
FATIGUES EASILY: 1
PAIN LOCATION - PAIN SEVERITY: 3/10
MUSCLE WEAKNESS: 1
APPETITE LEVEL: GOOD
PERSON REPORTING PAIN: PATIENT
PAIN: 1
PAIN LOCATION: GENERALIZED

## 2025-06-14 ENCOUNTER — HOME CARE VISIT (OUTPATIENT)
Dept: HOME HEALTH SERVICES | Facility: HOME HEALTH | Age: 80
End: 2025-06-14
Payer: MEDICARE

## 2025-06-14 VITALS
HEART RATE: 64 BPM | TEMPERATURE: 97.4 F | DIASTOLIC BLOOD PRESSURE: 60 MMHG | OXYGEN SATURATION: 98 % | SYSTOLIC BLOOD PRESSURE: 114 MMHG | RESPIRATION RATE: 18 BRPM

## 2025-06-14 PROCEDURE — G0151 HHCP-SERV OF PT,EA 15 MIN: HCPCS | Mod: HHH

## 2025-06-14 SDOH — HEALTH STABILITY: PHYSICAL HEALTH: PHYSICAL EXERCISE: 17

## 2025-06-14 SDOH — HEALTH STABILITY: PHYSICAL HEALTH: EXERCISE TYPE: WRITTEN

## 2025-06-14 ASSESSMENT — ENCOUNTER SYMPTOMS
HIGHEST PAIN SEVERITY IN PAST 24 HOURS: 6/10
PAIN SEVERITY GOAL: 3/10
PAIN LOCATION - EXACERBATING FACTORS: MVMT
PAIN LOCATION - PAIN SEVERITY: 5/10
PAIN LOCATION: GENERALIZED
PAIN LOCATION - PAIN QUALITY: ACHING
MUSCLE WEAKNESS: 1
PAIN LOCATION - RELIEVING FACTORS: REST
LOWEST PAIN SEVERITY IN PAST 24 HOURS: 3/10
PAIN LOCATION - PAIN FREQUENCY: INTERMITTENT
PAIN: 1
PERSON REPORTING PAIN: PATIENT
SUBJECTIVE PAIN PROGRESSION: WAXING AND WANING
ARTHRALGIAS: 1
PAIN LOCATION - PAIN DURATION: VARIES

## 2025-06-14 ASSESSMENT — ACTIVITIES OF DAILY LIVING (ADL)
AMBULATION_DISTANCE/DURATION_TOLERATED: 150'
CURRENT_FUNCTION: SUPERVISION
AMBULATION ASSISTANCE: STAND BY ASSIST
AMBULATION ASSISTANCE ON FLAT SURFACES: 1
AMBULATION ASSISTANCE: 1
PHYSICAL TRANSFERS ASSESSED: 1

## 2025-06-16 ENCOUNTER — HOME CARE VISIT (OUTPATIENT)
Dept: HOME HEALTH SERVICES | Facility: HOME HEALTH | Age: 80
End: 2025-06-16
Payer: MEDICARE

## 2025-06-16 VITALS
TEMPERATURE: 97.5 F | RESPIRATION RATE: 18 BRPM | DIASTOLIC BLOOD PRESSURE: 58 MMHG | HEART RATE: 66 BPM | OXYGEN SATURATION: 97 % | SYSTOLIC BLOOD PRESSURE: 131 MMHG

## 2025-06-16 DIAGNOSIS — I48.20 CHRONIC ATRIAL FIBRILLATION (MULTI): Primary | ICD-10-CM

## 2025-06-16 PROCEDURE — G0151 HHCP-SERV OF PT,EA 15 MIN: HCPCS | Mod: HHH

## 2025-06-16 SDOH — HEALTH STABILITY: PHYSICAL HEALTH: EXERCISE TYPE: INDEPENDENT

## 2025-06-16 ASSESSMENT — BALANCE ASSESSMENTS
TURNING 360 DEGREES STEPS: 1 - CONTINUOUS STEPS
NUDGED: 2 - STEADY
NUDGED SCORE: 2
EYES CLOSED AT MAXIMUM POSITION NUDGED: 1 - STEADY
BALANCE SCORE: 15
ATTEMPTS TO ARISE: 2 - ABLE TO RISE, ONE ATTEMPT
ARISING SCORE: 2
IMMEDIATE STANDING BALANCE FIRST 5 SECONDS: 2 - STEADY WITHOUT WALKER OR OTHER SUPPORT
STANDING BALANCE: 2 - NARROW STANCE WITHOUT SUPPORT
SITTING BALANCE: 1 - STEADY, SAFE
ARISES: 2 - ABLE WITHOUT USING ARMS
SITTING DOWN: 1 - USES ARMS OR NOT SMOOTH MOTION

## 2025-06-16 ASSESSMENT — GAIT ASSESSMENTS
INITIATION OF GAIT IMMEDIATELY AFTER GO: 1 - NO HESITANCY
PATH: 1 - MILD/MODERATE DEVIATION OR USES WALKING AID
TRUNK: 1 - NO SWAY BUT FLEXION OF KNEES OR BACK OR SPREADS ARMS WHILE WALKING
TRUNK SCORE: 1
STEP CONTINUITY: 1 - STEPS APPEAR CONTINUOUS
GAIT SCORE: 9
STEP SYMMETRY: 1 - RIGHT AND LEFT STEP LENGTH APPEAR EQUAL
BALANCE AND GAIT SCORE: 24
PATH SCORE: 1
WALKING STANCE: 0 - HEELS APART

## 2025-06-16 ASSESSMENT — ACTIVITIES OF DAILY LIVING (ADL)
PHYSICAL TRANSFERS ASSESSED: 1
CURRENT_FUNCTION: INDEPENDENT
OASIS_M1830: 00
AMBULATION ASSISTANCE: 1
AMBULATION ASSISTANCE: INDEPENDENT
HOME_HEALTH_OASIS: 00

## 2025-06-16 ASSESSMENT — ENCOUNTER SYMPTOMS: DENIES PAIN: 1

## 2025-06-22 PROCEDURE — RXMED WILLOW AMBULATORY MEDICATION CHARGE

## 2025-06-23 ENCOUNTER — TELEPHONE (OUTPATIENT)
Dept: CARDIOLOGY | Facility: CLINIC | Age: 80
End: 2025-06-23

## 2025-06-23 ENCOUNTER — OFFICE VISIT (OUTPATIENT)
Dept: CARDIOLOGY | Facility: CLINIC | Age: 80
End: 2025-06-23
Payer: MEDICARE

## 2025-06-23 VITALS
HEART RATE: 81 BPM | DIASTOLIC BLOOD PRESSURE: 64 MMHG | BODY MASS INDEX: 29.43 KG/M2 | OXYGEN SATURATION: 96 % | SYSTOLIC BLOOD PRESSURE: 107 MMHG | WEIGHT: 217 LBS

## 2025-06-23 DIAGNOSIS — E78.49 OTHER HYPERLIPIDEMIA: ICD-10-CM

## 2025-06-23 DIAGNOSIS — I25.5 ISCHEMIC CARDIOMYOPATHY: ICD-10-CM

## 2025-06-23 DIAGNOSIS — I48.20 CHRONIC ATRIAL FIBRILLATION (MULTI): Primary | ICD-10-CM

## 2025-06-23 DIAGNOSIS — I25.10 ARTERIOSCLEROTIC CARDIOVASCULAR DISEASE (ASCVD): ICD-10-CM

## 2025-06-23 LAB
ATRIAL RATE: 60 BPM
Q ONSET: 191 MS
QRS COUNT: 10 BEATS
QRS DURATION: 178 MS
QT INTERVAL: 562 MS
QTC CALCULATION(BAZETT): 562 MS
QTC FREDERICIA: 562 MS
R AXIS: -88 DEGREES
T AXIS: 89 DEGREES
T OFFSET: 472 MS
VENTRICULAR RATE: 60 BPM

## 2025-06-23 PROCEDURE — 99214 OFFICE O/P EST MOD 30 MIN: CPT | Performed by: NURSE PRACTITIONER

## 2025-06-23 PROCEDURE — 99212 OFFICE O/P EST SF 10 MIN: CPT

## 2025-06-23 PROCEDURE — 3078F DIAST BP <80 MM HG: CPT | Performed by: NURSE PRACTITIONER

## 2025-06-23 PROCEDURE — 3074F SYST BP LT 130 MM HG: CPT | Performed by: NURSE PRACTITIONER

## 2025-06-23 PROCEDURE — 1160F RVW MEDS BY RX/DR IN RCRD: CPT | Performed by: NURSE PRACTITIONER

## 2025-06-23 PROCEDURE — 1126F AMNT PAIN NOTED NONE PRSNT: CPT | Performed by: NURSE PRACTITIONER

## 2025-06-23 PROCEDURE — 1036F TOBACCO NON-USER: CPT | Performed by: NURSE PRACTITIONER

## 2025-06-23 PROCEDURE — 1159F MED LIST DOCD IN RCRD: CPT | Performed by: NURSE PRACTITIONER

## 2025-06-23 PROCEDURE — 93010 ELECTROCARDIOGRAM REPORT: CPT | Performed by: INTERNAL MEDICINE

## 2025-06-23 PROCEDURE — 93005 ELECTROCARDIOGRAM TRACING: CPT | Performed by: NURSE PRACTITIONER

## 2025-06-23 RX ORDER — FUROSEMIDE 20 MG/1
20 TABLET ORAL DAILY
COMMUNITY

## 2025-06-23 ASSESSMENT — ENCOUNTER SYMPTOMS
RESPIRATORY NEGATIVE: 1
CARDIOVASCULAR NEGATIVE: 1
CONSTITUTIONAL NEGATIVE: 1

## 2025-06-23 ASSESSMENT — PAIN SCALES - GENERAL: PAINLEVEL_OUTOF10: 0-NO PAIN

## 2025-06-23 NOTE — TELEPHONE ENCOUNTER
I spoke with patients daughter, we discussed that patient had recently been placed on lasix during Covid infection after it was stopped due to elevated renal parameters (when taken in conjunction with Entresto).  We will repeat a BMP/Mg in the next day or two and determine whether to continue Lasix 20 mg daily.

## 2025-06-23 NOTE — PROGRESS NOTES
Subjective   Angel Carroll is a 80 y.o. male.    Chief Complaint:  Follow-up    HPI  Mr Carroll is seen for a followup visit in collaboration with Dr. Martin.  He was hospitalized with Covid in April and had to reschedule. He is accompanied by his daughter. He is recovered from Covid and feeling fairly well.  In fact, he was able to get up and down from the exam table and walk to his wheelchair, something he has not done at his last few visits.  He has no particular concerns today.  He is compliant with medication and appears to be tolerating them well although I believe we have an accurate list I am not certain.  I believe he is also taking 20 mg of furosemide daily.  He denies any HF symptoms or symptoms referable to angina.  He has stable dyspnea and uses supplemental O2. He denies any change in weight of more than 2 pounds. He has been receiving home health care since discharge from the hospital.  He brings in a list of daily weights and blood pressure.    Review of Systems   Constitutional: Negative.   Cardiovascular: Negative.    Respiratory: Negative.     Musculoskeletal:  Positive for arthritis.       Objective   Vitals reviewed.   Constitutional:       Appearance: Healthy appearance. Not in distress. Frail.   Neck:      Vascular: No JVR. JVD normal.   Pulmonary:      Effort: Pulmonary effort is normal.      Breath sounds: Normal breath sounds. No wheezing. No rhonchi. No rales.   Chest:      Chest wall: Not tender to palpatation.   Cardiovascular:      Normal rate. Regular rhythm. Normal S1. Normal S2.       Murmurs: There is no murmur.      No gallop.  No click. No rub.   Pulses:     Intact distal pulses.   Edema:     Ankle: bilateral trace edema of the ankle.  Abdominal:      Tenderness: There is no abdominal tenderness.   Musculoskeletal: Normal range of motion.         General: No tenderness. Skin:     General: Skin is warm and dry.   Neurological:      Mental Status: Alert and oriented to person, place  and time.         Lab Review:   Lab Results   Component Value Date     05/23/2025     03/26/2025    K 4.2 05/23/2025    K 4.5 03/26/2025     05/23/2025     03/26/2025    CO2 32 05/23/2025    CO2 33 (H) 03/26/2025    BUN 15 05/23/2025    BUN 25 03/26/2025    CREATININE 1.53 (H) 05/23/2025    CREATININE 1.67 (H) 03/26/2025    GLUCOSE 89 05/23/2025    GLUCOSE 78 03/26/2025    CALCIUM 9.2 05/23/2025    CALCIUM 8.7 03/26/2025     Lab Results   Component Value Date    WBC 3.9 (L) 05/23/2025    WBC 3.3 (L) 03/26/2025    HGB 11.9 (L) 05/23/2025    HGB 12.2 (L) 03/26/2025    HCT 39.7 (L) 05/23/2025    HCT 37.5 (L) 03/26/2025     (H) 05/23/2025    MCV 93.5 03/26/2025     05/23/2025     03/26/2025     Lab Results   Component Value Date    CHOL 117 (L) 11/14/2022    TRIG 81 11/14/2022    HDL 31 (L) 11/14/2022     ECG obtained and reviewed shows AV paced rhythm with VR of 60 bpm    Assessment/Plan   Mr. Carroll is a very pleasant 79-year-old male with a past medical history significant for hypertension, hyperlipidemia, CKD, COPD on continuous O2, CAD s/p multiple PCI, patent by repeat cath 12/2022, atrial fibrillation s/p ablation on Eliquis anticoagulation, PPM secondary to sick sinus syndrome 1/2023, CVA in the setting of subtherapeutic INR with a near subtotal occlusion of the right ICA and carotid endarterectomy s/p bovine patch angioplasty 3/2021. Echocardiogram 2/2025 showed an EF of 40% with mild MR and mild elevation of RVSP.  He appears euvolemic on exam with  BP low normal. His weight has been stable since recent hospital discharge at 215.  He is more active today than I have seen him in quite some time.  I will continue all medication unchanged.  He has a followup with his PCP in about 7 weeks.  He will followup with me in 10 weeks and in the interim has  care.  He and his daughter know to call with any interim concerns or questions.

## 2025-06-25 LAB
ANION GAP SERPL CALCULATED.4IONS-SCNC: 10 MMOL/L (CALC) (ref 7–17)
BUN SERPL-MCNC: 15 MG/DL (ref 7–25)
BUN/CREAT SERPL: 9 (CALC) (ref 6–22)
CALCIUM SERPL-MCNC: 9 MG/DL (ref 8.6–10.3)
CHLORIDE SERPL-SCNC: 104 MMOL/L (ref 98–110)
CO2 SERPL-SCNC: 29 MMOL/L (ref 20–32)
CREAT SERPL-MCNC: 1.67 MG/DL (ref 0.7–1.22)
EGFRCR SERPLBLD CKD-EPI 2021: 41 ML/MIN/1.73M2
GLUCOSE SERPL-MCNC: 86 MG/DL (ref 65–99)
MAGNESIUM SERPL-MCNC: 2.1 MG/DL (ref 1.5–2.5)
POTASSIUM SERPL-SCNC: 4.3 MMOL/L (ref 3.5–5.3)
SODIUM SERPL-SCNC: 143 MMOL/L (ref 135–146)

## 2025-06-26 NOTE — PROGRESS NOTES
Pharmacist Clinic: Cardiology Management    Angel Carroll is a 80 y.o. male was referred to Clinical Pharmacy Team for anticoagulation and heart failure management.     *Referring provider agreeable for clinical pharmacy to manage heart failure*    Referring Provider: Alysia Sheets, APRN-CNP    THIS IS A FOLLOW UP PATIENT APPOINTMENT. AT LAST VISIT ON 05/02/2025 WITH PHARMACIST (Annamaria Burgess).    Appointment was completed by Reed who was reached at [phone].    REVIEW OF PAST APPNT (IF APPLICABLE):   Follow up visit conducted with Reed (dtr). Reed states patient is doing well on anticoagulation therapy. Reed reports adherence, no adverse effects and denies s/s of bleeding. Reed reports possibly 1 minor fall since last visit, patient's elbow was swollen and he went to urgent care.  Angel was recently admitted to the hospital for UTI/ COVID-19. Patient is currently at rehab where he continues on Entresto 24/26mg twice daily and metoprolol succinate 25mg daily. Reed reports adherence, no adverse effects and denies s/s of worsening heart failure. Reed reports patient has some SOB and cough due to COVID.    Allergies Reviewed? No    Allergies   Allergen Reactions    Other Unknown     Anti-Inflammatories    Ranolazine Unknown    Nsaids (Non-Steroidal Anti-Inflammatory Drug) Unknown    Cyclobenzaprine Hallucinations, Unknown and Psychosis       Past Medical History:   Diagnosis Date    Atherosclerotic heart disease of native coronary artery without angina pectoris 12/14/2022    Arteriosclerotic cardiovascular disease (ASCVD)    Benign neoplasm of meninges, unspecified     Meningioma    BPH (benign prostatic hyperplasia)     Chronic diastolic heart failure     Chronic kidney disease     Chronic obstructive pulmonary disease (COPD) (Multi)     Chronic respiratory failure     Elevated blood-pressure reading, without diagnosis of hypertension     Prehypertension    Hyperlipidemia     Meningioma (Multi)      Nephrolithiasis     Rectal bleeding     Sick sinus syndrome (Multi)     s/p pacemaker placement    Vitamin D deficiency        Current Outpatient Medications on File Prior to Visit   Medication Sig Dispense Refill    acetaminophen (Tylenol) 325 mg tablet Take 2 tablets (650 mg) by mouth every 6 hours if needed for mild pain (1 - 3).      amiodarone (Pacerone) 200 mg tablet Take 0.5 tablets (100 mg) by mouth once daily. 45 tablet 1    apixaban (Eliquis) 2.5 mg tablet Take 1 tablet (2.5 mg) by mouth 2 times a day. 180 tablet 3    atorvastatin (Lipitor) 20 mg tablet TAKE 1 TABLET EVERY DAY 90 tablet 3    cyanocobalamin (Vitamin B-12) 500 mcg tablet Take 1 tablet (500 mcg) by mouth once daily.      dextran 70/hypromellose/PF (NATURAL TEARS, PF, OPHT) Administer into affected eye(s).      ergocalciferol (Vitamin D-2) 1250 mcg (50,000 units) capsule TAKE 1 CAPSULE EVERY WEEK 12 capsule 0    fenofibrate (Tricor) 48 mg tablet TAKE 1 AND 1/2 TABLETS ONE TIME DAILY 135 tablet 3    ferrous sulfate 250 mg (50 mg iron) tablet extended release Take 1 tablet (250 mg) by mouth once daily.      finasteride (Proscar) 5 mg tablet Take 1 tablet (5 mg) by mouth once daily. 90 tablet 3    folic acid (Folvite) 1 mg tablet Take 1 tablet (1 mg) by mouth once daily.      furosemide (Lasix) 20 mg tablet Take 1 tablet (20 mg) by mouth once daily.      gabapentin (Neurontin) 100 mg capsule Take 1 capsule (100 mg) by mouth once daily at bedtime. 90 capsule 1    magnesium hydroxide (Milk of Magnesia) 400 mg/5 mL suspension Take 30 mL by mouth once daily as needed for constipation.      metoprolol succinate XL (Toprol-XL) 25 mg 24 hr tablet TAKE 1 TABLET ONE TIME DAILY (DO NOT CRUSH OR CHEW) 90 tablet 0    oxygen (O2) gas therapy Inhale 2 L/min continuously.      pantoprazole (Protonix) 40 mg EC tablet Take 1 tablet (40 mg) by mouth once daily in the morning. Take before meals.      PARoxetine (Paxil) 20 mg tablet TAKE 1 TABLET EVERY DAY 90  "tablet 3    psyllium (Metamucil) 0.4 gram capsule Take 2 capsules by mouth once daily.      sacubitriL-valsartan (Entresto) 24-26 mg tablet Take 1 tablet by mouth 2 times a day. 180 tablet 3    tamsulosin (Flomax) 0.4 mg 24 hr capsule Take 1 capsule (0.4 mg) by mouth once daily at bedtime. 90 capsule 0     No current facility-administered medications on file prior to visit.         RELEVANT LAB RESULTS  Lab Results   Component Value Date    BILITOT 1.0 04/20/2025    CALCIUM 9.0 06/24/2025    CO2 29 06/24/2025     06/24/2025    CREATININE 1.67 (H) 06/24/2025    GLUCOSE 86 06/24/2025    ALKPHOS 65 04/20/2025    K 4.3 06/24/2025    PROT 6.1 (L) 04/20/2025     06/24/2025    AST 33 04/20/2025    ALT 25 04/20/2025    BUN 15 06/24/2025    ANIONGAP 10 06/24/2025    MG 2.1 06/24/2025    PHOS 2.7 05/23/2025    ALBUMIN 3.9 05/23/2025    GFRMALE 28 (A) 09/01/2023     Lab Results   Component Value Date    TRIG 81 11/14/2022    CHOL 117 (L) 11/14/2022    LDLCALC 70 11/14/2022    HDL 31 (L) 11/14/2022     No results found for: \"BMCBC\", \"CBCDIF\"     PHARMACEUTICAL ASSESSMENT    MEDICATION RECONCILIATION    Drug Interactions? No    Medication Documentation Review Audit       Reviewed by SAMUEL Daugherty (Nurse Practitioner) on 06/23/25 at 1024      Medication Order Taking? Sig Documenting Provider Last Dose Status   acetaminophen (Tylenol) 325 mg tablet [id number] Yes Take 2 tablets (650 mg) by mouth every 6 hours if needed for mild pain (1 - 3). Historical Provider, MD  Active   amiodarone (Pacerone) 200 mg tablet [id number] Yes Take 0.5 tablets (100 mg) by mouth once daily. SAMUEL Daugherty  Active   apixaban (Eliquis) 2.5 mg tablet [id number] Yes Take 1 tablet (2.5 mg) by mouth 2 times a day. RON Daugherty-CNP  Active   atorvastatin (Lipitor) 20 mg tablet [id number] Yes TAKE 1 TABLET EVERY DAY Jung Tao MD  Active   cyanocobalamin (Vitamin B-12) 500 mcg tablet [id number] Yes Take 1 tablet (500 mcg) " by mouth once daily. Grey Ascencio MD  Active   dextran 70/hypromellose/PF (NATURAL TEARS, PF, OPHT) [id number] Yes Administer into affected eye(s). Grey Ascencio MD  Active   ergocalciferol (Vitamin D-2) 1250 mcg (50,000 units) capsule [id number] Yes TAKE 1 CAPSULE EVERY WEEK Jung Tao MD  Active   fenofibrate (Tricor) 48 mg tablet [id number] Yes TAKE 1 AND 1/2 TABLETS ONE TIME DAILY SAMUEL Daugherty  Active   ferrous sulfate 250 mg (50 mg iron) tablet extended release [id number] Yes Take 1 tablet (250 mg) by mouth once daily. Eugenia Rodriguez MD  Active   finasteride (Proscar) 5 mg tablet [id number] Yes Take 1 tablet (5 mg) by mouth once daily. SAMUEL Wilkes  Active   folic acid (Folvite) 1 mg tablet [id number] Yes Take 1 tablet (1 mg) by mouth once daily. Historical Provider, MD  Active   gabapentin (Neurontin) 100 mg capsule [id number] Yes Take 1 capsule (100 mg) by mouth once daily at bedtime. Jung Tao MD  Active   magnesium hydroxide (Milk of Magnesia) 400 mg/5 mL suspension [id number] Yes Take 30 mL by mouth once daily as needed for constipation. Historical MD Silva  Active   metoprolol succinate XL (Toprol-XL) 25 mg 24 hr tablet [id number] Yes TAKE 1 TABLET ONE TIME DAILY (DO NOT CRUSH OR CHEW) SAMUEL Wallace  Active   oxygen (O2) gas therapy [id number] Yes Inhale 2 L/min continuously. Grey Ascencio MD  Active   pantoprazole (Protonix) 40 mg EC tablet [id number] Yes Take 1 tablet (40 mg) by mouth once daily in the morning. Take before meals. Grey Ascencio MD  Active   PARoxetine (Paxil) 20 mg tablet [id number] Yes TAKE 1 TABLET EVERY DAY Jung Tao MD  Active   psyllium (Metamucil) 0.4 gram capsule [id number] Yes Take 2 capsules by mouth once daily. Grey Ascencio MD  Active   sacubitriL-valsartan (Entresto) 24-26 mg tablet [id number] Yes Take 1 tablet by mouth 2 times a day. Alysia Sheets APRN-CNP  Active   tamsulosin (Flomax) 0.4 mg 24 hr  capsule [id number] Yes Take 1 capsule (0.4 mg) by mouth once daily at bedtime. RON Wilkes-SOFY  Active                    DISEASE MANAGEMENT ASSESSMENT:     ANTICOAGULATION ASSESSMENT    The ASCVD Risk score (Naomi HODGSON, et al., 2019) failed to calculate for the following reasons:    The 2019 ASCVD risk score is only valid for ages 40 to 79    Risk score cannot be calculated because patient has a medical history suggesting prior/existing ASCVD    DIAGNOSIS: prevention of nonvalvular atrial fibrilliation stroke and systemic embolism  - Patient is projected to be on anticoagulation long term  - [id number] Score: [4] (only included if diagnosis is atrial fibrillation)   Age: [<65 (0)] [65-74 (+1)] [> 75 (+2)]: 2  Sex: [Male/Female (+1)]: 0  CHF history: [No/Yes(+1)]: 1  Hypertension history: [No/Yes(+1)]: 1  Stroke/TIA/thromboembolism history: [No/Yes(+2)]: 0  Vascular disease history (prior MI, peripheral artery disease, aortic plaque): [No/Yes(+1)]: 0  Diabetes history: [No/Yes(+1)]: 0    CURRENT PHARMACOTHERAPY:   Eliquis 2.5mg twice daily  79yo  98.4kg  Scr 1.67 (06/24/2025)    RELEVANT PAST MEDICAL HISTORY:   Afib, HTN, HF    Affordability/Accessibility: [facility]  Adherence/Organization: Daughter Reed puts medicine into his pillbox- currently getting meds at [facility]  Adverse Reactions: none reported, denies s/s of bleeding  Recent Hospitalizations: none   Recent Falls/Trauma: Yes 1 fallReed unable to report details    EDUCATION/COUNSELING:   - Counseled patient on MOA, expectations, duration of therapy, contraindications, administration, and monitoring parameters  - Counseled patient of side effects that are indicative of bleeding such as dark tarry stool, unexplainable bruising, or vomiting up a coffee ground like substance     CHF ASSESSMENT     Symptom/Staging:  -Most recent ejection fraction: 40%    Results for orders placed during the hospital encounter of 01/30/25    Transthoracic Echo (TTE)  Complete    Narrative  [facility]  [id number] [place], [place] [address]  Phone [phone]    TRANSTHORACIC ECHOCARDIOGRAM REPORT    Patient Name:       MICHEL COLEY           Reading Physician:    [id number] Sadie Hollis MD  Study Date:         2/1/2025             Ordering Provider:    [id number] HALEYREBECCA SCHMIDT  MRN/PID:            [id number]             Fellow:  Accession#:         [id number]         Nurse:  Date of Birth/Age:  1945 / 79 years Sonographer:          Monisha Rios  [facility]  Gender Assigned at                      Additional Staff:  Birth:  Height:             182.88 cm            Admit Date:  Weight:             97.98 kg             Admission Status:     Inpatient -  Routine  BSA / BMI:          2.20 m2 / 29.30      Department Location:  [facility]  kg/m2  Blood Pressure: 123 /69 mmHg    Study Type:    TRANSTHORACIC ECHO (TTE) COMPLETE  Diagnosis/ICD: Heart failure, unspecified-I50.9; Shortness of breath-R06.02  Indication:    CHF, SOB  CPT Codes:     Echo Complete w Full Doppler-50616    Patient History:  Pertinent History: CVA, A-Fib, Hyperlipidemia, HTN, CHF, Dyspnea, ASCVD, Sinus  Bradycardia and Chest Pain.    Study Detail: The following Echo studies were performed: 2D, M-Mode, Doppler and  color flow. Patient has a pacemaker.      PHYSICIAN INTERPRETATION:  Left Ventricle: The left ventricular systolic function is moderately decreased, with a visually estimated ejection fraction of 40%. There are no regional wall motion abnormalities. The left ventricular cavity size is normal. There is normal septal and normal posterior left ventricular wall thickness. Abnormal (paradoxical) septal motion, consistent with left bundle branch block. Spectral Doppler shows a normal pattern of left ventricular diastolic filling.  Left Atrium: The left atrial size is mildly dilated.  Right Ventricle: The right ventricle is normal in size. There is reduced right ventricular systolic  function.  Right Atrium: The right atrial size is mild to moderately dilated.  Aortic Valve: The aortic valve is trileaflet. There is mild aortic valve thickening. There is evidence of mildly elevated transaortic gradients consistent with sclerosis of the aortic valve.  There is no evidence of aortic valve regurgitation. The peak instantaneous gradient of the aortic valve is 5 mmHg.  Mitral Valve: The mitral valve is normal in structure. There is mild mitral valve regurgitation.  Tricuspid Valve: The tricuspid valve is structurally normal. There is mild tricuspid regurgitation. The Doppler estimated RVSP is mildly elevated right ventricular systolic pressure at 44.2 mmHg.  Pulmonic Valve: The pulmonic valve is structurally normal. There is physiologic pulmonic valve regurgitation.  Pericardium: No pericardial effusion noted.  Aorta: The aortic root is normal.  Systemic Veins: The inferior vena cava appears normal in size, with IVC inspiratory collapse less than 50%.      CONCLUSIONS:  1. The left ventricular systolic function is moderately decreased, with a visually estimated ejection fraction of 40%.  2. Abnormal septal motion consistent with left bundle branch block.  3. There is reduced right ventricular systolic function.  4. The right atrial size is mild to moderately dilated.  5. Mild mitral valve regurgitation.  6. Mild tricuspid regurgitation is visualized.  7. Mildly elevated right ventricular systolic pressure.  8. Aortic valve sclerosis.    QUANTITATIVE DATA SUMMARY:    2D MEASUREMENTS:             Normal Ranges:  LAs:             3.60 cm     (2.7-4.0cm)  IVSd:            0.67 cm     (0.6-1.1cm)  LVPWd:           0.70 cm     (0.6-1.1cm)  LVIDd:           6.13 cm     (3.9-5.9cm)  LVIDs:           4.51 cm  LV Mass Index:   73.1 g/m2  LVEDV Index:     67.28 ml/m2  LV % FS          26.4 %      LEFT ATRIUM:                  Normal Ranges:  LA Vol A4C:        83.9 ml    (22+/-6mL/m2)  LA Vol A2C:        85.8  ml  LA Vol BP:         85.2 ml  LA Vol Index A4C:  38.1ml/m2  LA Vol Index A2C:  39.0 ml/m2  LA Vol Index BP:   38.7 ml/m2  LA Area A4C:       24.6 cm2  LA Area A2C:       24.8 cm2  LA Major Axis A4C: 6.1 cm  LA Major Axis A2C: 6.1 cm  LA Volume Index:   35.4 ml/m2  LA Vol A4C:        77.7 ml  LA Vol A2C:        79.1 ml  LA Vol Index BSA:  35.6 ml/m2      RIGHT ATRIUM:          Normal Ranges:  RA Area A4C:  43.5 cm2      M-MODE MEASUREMENTS:         Normal Ranges:  Ao Root:             2.90 cm (2.0-3.7cm)      AORTA MEASUREMENTS:         Normal Ranges:  Asc Ao, d:          3.30 cm (2.1-3.4cm)      LV SYSTOLIC FUNCTION:  Normal Ranges:  EF-A4C View:    45 % (>=55%)  EF-A2C View:    45 %  EF-Biplane:     47 %  EF-Visual:      40 %  LV EF Reported: 40 %      LV DIASTOLIC FUNCTION:            Normal Ranges:  MV Peak E:             0.84 m/s   (0.7-1.2 m/s)  MV Peak A:             0.48 m/s   (0.42-0.7 m/s)  E/A Ratio:             1.76       (1.0-2.2)  MV e'                  0.039 m/s  (>8.0)  MV lateral e'          0.04 m/s  MV medial e'           0.04 m/s  E/e' Ratio:            21.89      (<8.0)  PulmV Sys Ross:         31.30 cm/s  PulmV Toussaint Ross:        53.00 cm/s  PulmV S/D Ross:         0.60      MITRAL VALVE:          Normal Ranges:  MV DT:        205 msec (150-240msec)      AORTIC VALVE:           Normal Ranges:  AoV Vmax:      1.17 m/s (<=1.7m/s)  AoV Peak P.5 mmHg (<20mmHg)  LVOT Max Ross:  0.57 m/s (<=1.1m/s)  LVOT Diameter: 2.20 cm  (1.8-2.4cm)  AoV Area,Vmax: 1.85 cm2 (2.5-4.5cm2)      RIGHT VENTRICLE:  RV Basal 4.63 cm  RV Mid   3.04 cm  RV Major 8.3 cm  TAPSE:   13.2 mm  RV s'    0.06 m/s      TRICUSPID VALVE/RVSP:          Normal Ranges:  Peak TR Velocity:     3.21 m/s  RV Syst Pressure:     44 mmHg  (< 30mmHg)  IVC Diam:             1.54 cm      PULMONARY VEINS:  PulmV Toussaint Ross: 53.00 cm/s  PulmV S/D Ross:  0.60  PulmV Sys Ross:  31.30 cm/s      [id number] Sadie Hollis MD  Electronically signed on 2025 at  9:08:14 AM        ** Final **      Guideline-Directed Medical Therapy:  -ARNI: Yes, describe: Entresto 24/26mg twice daily  -Beta Blocker: Yes, describe: Metoprolol succinate 25mg daily  -MRA: No  -SGLT2i: No    Secondary Prevention:  -The ASCVD Risk score (Naomi HODGSON, et al., 2019) failed to calculate for the following reasons:    The 2019 ASCVD risk score is only valid for ages 40 to 79    Risk score cannot be calculated because patient has a medical history suggesting prior/existing ASCVD   -Aspirin 81mg? no  -Statin?: Yes, describe: Atorvastatin 20mg daily  -HTN?: Yes, describe: controlled at last OV    CURRENT PHARMACOTHERAPY:   Entresto 24/26mg twice daily  Metoprolol succinate 25mg daily  Furosemide 20mg M,W,F    Affordability: [facility]  Adherence/Compliance: reports adherence, daughter Reed puts medicine into his pillbox  Adverse Effects: none reported    Monitoring Weights at Home: Yes  Home Weight Recordings: unable to assess    Past In Office Weight Readings:   Wt Readings from Last 6 Encounters:   06/23/25 98.4 kg (217 lb)   05/27/25 101 kg (222 lb)   05/18/25 103 kg (226 lb)   05/15/25 103 kg (226 lb 6.4 oz)   05/12/25 102 kg (224 lb 6.4 oz)   05/09/25 102 kg (224 lb 6.4 oz)       Monitoring Blood Pressure at Home: Yes; daily at home  Home BP Recordings: unable to assess, controlled at recent OV    Past In Office BP Readings:   BP Readings from Last 6 Encounters:   06/23/25 107/64   06/16/25 131/58   06/14/25 114/60   06/11/25 125/60   06/10/25 110/54   06/07/25 101/50       HEALTH MANAGEMENT    Maintaining fluid restriction (<2 L/day): N/A, patient watches sodium intake (2g/daily)  Edema/swelling: unable to assess due to time constraints  Shortness of breath: unable to assess due to time constraints  Trouble sleeping/lying down: unable to assess due to time constraints  Dry/hacking cough: unable to assess due to time constraints  Recent Hospitalizations: No    EDUCATION/COUNSELING:   - Counseled  patient on MOA, expectations, duration of therapy, contraindications, administration, and monitoring parameters  - Counseled patient on lifestyle modifications that can decrease your risk of having complications (smoking cessation, losing weight, daily weights, vaccines)  - Counseled patient on fluid intake and weight management. Recommended to not consume more than 2 liters of fliuids per day. If they have gained more than 2-3 pounds within a 24 hours period (or 5 pounds in a week), contact their cardiologist  - Answered all patient questions and concerns     DISCUSSION/NOTES:   Today was a follow up visit conducted with Reed (dtr). Reed states patient is doing well on anticoagulation therapy. Reed reports adherence, no adverse effects and denies s/s of bleeding. Reed reports 1 fall, she is unable to explain details due to time constraints.  Patient continues on Entresto 24/26mg twice daily metoprolol succinate 25mg daily. Patient recently completed lab work and furosemide was changed from 20mg daily to 20mg every Monday, Wednesday, Friday due to renal function. Reed reports adherence and no adverse effects. Unable to assess BP and weight today due to time constraints.  Reed aware at next follow up visit we will be discussing  PAP renewal.     ASSESSMENT AND PLAN:    Assessment/Plan   Problem List Items Addressed This Visit       LV dysfunction - Primary    Patient on 2 of 4 GDMT. Unable to assess home BP. Renal function and potassium level appropriate to continue current regimen.     Labs (06/24/2025): Scr-1.67 eGFR-41 K-4.3         Paroxysmal atrial fibrillation (Multi)    Patient age, weight and renal function appropriate to continue Eliquis 2.5mg twice daily.            RECOMMENDATIONS/PLAN    CONTINUE  Eliquis 2.5mg twice daily  Entresto 24/26mg twice daily  Metoprolol succinate 25mg daily  Furosemide 20mg M, W, F    Last Appnt with Referring Provider: 06/23/2025  Next Appnt with Referring  Provider: 09/29/2025  Clinical Pharmacist follow up: 2 months  VAF/Application Expiration: Yes    Date: 09/30/2025  Type of Encounter: Sal Burgess PharmD    Verbal consent to manage patient's drug therapy was obtained from the patient . They were informed they may decline to participate or withdraw from participation in pharmacy services at any time.    Continue all meds under the continuation of care with the referring provider and clinical pharmacy team.

## 2025-06-27 ENCOUNTER — PHARMACY VISIT (OUTPATIENT)
Dept: PHARMACY | Facility: CLINIC | Age: 80
End: 2025-06-27
Payer: COMMERCIAL

## 2025-06-27 ENCOUNTER — APPOINTMENT (OUTPATIENT)
Dept: PHARMACY | Facility: HOSPITAL | Age: 80
End: 2025-06-27
Payer: MEDICARE

## 2025-06-27 DIAGNOSIS — I51.9 LV DYSFUNCTION: Primary | ICD-10-CM

## 2025-06-27 DIAGNOSIS — I48.0 PAROXYSMAL ATRIAL FIBRILLATION (MULTI): ICD-10-CM

## 2025-06-27 NOTE — ASSESSMENT & PLAN NOTE
Patient on 2 of 4 GDMT. Unable to assess home BP. Renal function and potassium level appropriate to continue current regimen.     Labs (06/24/2025): Scr-1.67 eGFR-41 K-4.3

## 2025-06-27 NOTE — Clinical Note
Reed Vital reports Angel is doing well on his heart failure and anticoagulation meds. She reports adherence, no adverse effects and overall no complaints. She reports nAgel fell once, unfortunately she was unable to go into detail as they were visiting [place] in a SNF and she was needed by a provider/nurse. I will follow up with her in 2 months to get him re-enrolled in  PAP. Thank you!

## 2025-07-04 DIAGNOSIS — E04.1 THYROID NODULE: ICD-10-CM

## 2025-07-04 DIAGNOSIS — R30.0 DYSURIA: Primary | ICD-10-CM

## 2025-07-04 DIAGNOSIS — N18.32 STAGE 3B CHRONIC KIDNEY DISEASE (MULTI): ICD-10-CM

## 2025-07-04 DIAGNOSIS — N39.0 RECURRENT UTI: ICD-10-CM

## 2025-07-04 DIAGNOSIS — I63.511: ICD-10-CM

## 2025-07-04 DIAGNOSIS — I50.33 ACUTE ON CHRONIC DIASTOLIC HEART FAILURE: ICD-10-CM

## 2025-07-04 DIAGNOSIS — I48.0 PAROXYSMAL ATRIAL FIBRILLATION (MULTI): ICD-10-CM

## 2025-07-08 ENCOUNTER — TELEPHONE (OUTPATIENT)
Dept: PRIMARY CARE | Facility: CLINIC | Age: 80
End: 2025-07-08
Payer: MEDICARE

## 2025-07-10 NOTE — PROGRESS NOTES
Angel Carroll is an 80 year old male  with bleeding internal hemorrhoids on anticoagulation.  Presents to office for sclerotherapy.        3/05/2024 Colonoscopy  Impression  Large hemorrhoids - given size and no other sources of bleeding identified, this is the likely etiology of blood loss.   One 10 mm polyp in the cecum; performed cold snare removal  2 subcentimeter polyps were removed with cold snare  Pathology:  A. COLON, CECUM, POLYP:   -- Fragments of tubular adenoma.     B. COLON, TRANSVERSE, POLYP:   -- Tubular adenoma.           6/11/2024 Office visit with Dr. Lewis for rectal bleeding.  Anoscope was inserted with grade 2 internal hemorrhoids primarily in the left and right posterior quadrants.  After consent was obtained hemorrhoid bases were injected with 10 cc of 50% dextrose solution in 3 cardinal locations.  Patient tolerated well.    5/23/2025 Hemoglobin/Hematocrit:  11.9/39.7      Medical History  CAD  BPH  CKD  COPD  HLD  Nephrolithiasis    Surgical History  Pacemaker  Back surgery  Coronary angioplasty with stent  Knee arthroscopy  Lithotripsy    Review of Systems  Constitutional: Negative for fever, chills, anorexia, weight loss, malaise             ENMT: Negative for nasal discharge, congestion, ear pain, mouth pain, throat pain                 Respiratory: Negative for cough, hemoptysis, wheezing, shortness of breath, (+)COPD ON OXYGEN                    Cardiac: Negative for chest pain, dyspnea on exertion, orthopnea, palpitations, syncope, (+)AFIB ON COUMADIN      Gastrointestinal: Negative for nausea, vomiting, diarrhea, constipation, abdominal pain, (+)RECTAL BLEEDING     Genitourinary: Negative for discharge, dysuria, flank pain, frequency, hematuria          Musculoskeletal: Negative for decreased ROM, pain, swelling, weakness          Neurological: Negative for dizziness, confusion, headache, seizures, syncope               Psychiatric: Negative for mood changes, anxiety, hallucinations,  sleep changes, suicidal ideas        Skin: Negative for mass, pain, itching, rash, ulcer            Endocrine: Negative for heat intolerance, cold intolerance, excessive sweating, polyuria, excess thirst         Hematologic/Lymph: Negative for anemia, bruising, easy bleeding, night sweats, petechiae, history of DVT/PE or cancer               Allergic/Immunologic: Negative for anaphylaxis, itchy/ teary eyes, itching, sneezing, swelling      Physical Exam  Constitutional: Well developed, awake/alert/oriented x3, no distress, alert and cooperative             Eyes: Sclera anicteric, no conjunctival inflammation, conjugate gaze    ENMT: mucous membranes moist, no apparent injury,            Head/Neck: Neck supple, no apparent injury, No JVD, trachea midline, no bruits              Respiratory/Thorax: Patent airways, CTAB, normal breath sounds with good chest expansion, thorax symmetric         Cardiovascular: Regular, rate and rhythm, no murmurs, normal S1 and S2         Gastrointestinal: Nondistended, soft, non-tender, no rebound tenderness or guarding, no masses palpable, no organomegaly, +BS, no bruits               Extremities: normal extremities, no cyanosis edema, contusions or wounds, 2+ femoral pulses B/L              Neurological: alert and oriented x3, normal strength, Normal gait          Lymphatic: No palpable inguinal lymphadenopathy   Psychological: Appropriate mood and behavior         Skin: Warm and dry, no lesions, no rashes                Anorectal:      Impression:      Plan:

## 2025-07-11 ENCOUNTER — TELEPHONE (OUTPATIENT)
Dept: PRIMARY CARE | Facility: CLINIC | Age: 80
End: 2025-07-11

## 2025-07-11 ENCOUNTER — APPOINTMENT (OUTPATIENT)
Dept: PRIMARY CARE | Facility: CLINIC | Age: 80
End: 2025-07-11
Payer: MEDICARE

## 2025-07-11 RX ORDER — NITROFURANTOIN 25; 75 MG/1; MG/1
100 CAPSULE ORAL DAILY
Qty: 10 CAPSULE | Refills: 0 | Status: ON HOLD | OUTPATIENT
Start: 2025-07-11 | End: 2025-07-21

## 2025-07-12 ENCOUNTER — HOSPITAL ENCOUNTER (INPATIENT)
Facility: HOSPITAL | Age: 80
End: 2025-07-12
Attending: EMERGENCY MEDICINE | Admitting: INTERNAL MEDICINE
Payer: MEDICARE

## 2025-07-12 ENCOUNTER — APPOINTMENT (OUTPATIENT)
Dept: CARDIOLOGY | Facility: HOSPITAL | Age: 80
End: 2025-07-12
Payer: MEDICARE

## 2025-07-12 ENCOUNTER — APPOINTMENT (OUTPATIENT)
Dept: RADIOLOGY | Facility: HOSPITAL | Age: 80
End: 2025-07-12
Payer: MEDICARE

## 2025-07-12 DIAGNOSIS — N17.9 AKI (ACUTE KIDNEY INJURY): Primary | ICD-10-CM
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
## 2025-07-12 DIAGNOSIS — S09.90XA CLOSED HEAD INJURY, INITIAL ENCOUNTER: ICD-10-CM

## 2025-07-12 LAB
ANION GAP SERPL CALCULATED.3IONS-SCNC: 11 MMOL/L (ref 10–20)
APPEARANCE UR: ABNORMAL
APPEARANCE UR: ABNORMAL
BACTERIA #/AREA URNS AUTO: ABNORMAL /HPF
BACTERIA #/AREA URNS HPF: ABNORMAL /HPF
BACTERIA UR CULT: NORMAL
BASOPHILS # BLD AUTO: 0.01 X10*3/UL (ref 0–0.1)
BASOPHILS NFR BLD AUTO: 0.2 %
BILIRUB UR QL STRIP: NEGATIVE
BILIRUB UR STRIP.AUTO-MCNC: NEGATIVE MG/DL
BUN SERPL-MCNC: 28 MG/DL (ref 6–23)
CALCIUM SERPL-MCNC: 8.6 MG/DL (ref 8.6–10.3)
CAOX CRY #/AREA URNS HPF: ABNORMAL /HPF
CHLORIDE SERPL-SCNC: 102 MMOL/L (ref 98–107)
CO2 SERPL-SCNC: 29 MMOL/L (ref 21–32)
COLOR UR: ABNORMAL
COLOR UR: ABNORMAL
CREAT SERPL-MCNC: 2.33 MG/DL (ref 0.5–1.3)
EGFRCR SERPLBLD CKD-EPI 2021: 27 ML/MIN/1.73M*2
EOSINOPHIL # BLD AUTO: 0.02 X10*3/UL (ref 0–0.4)
EOSINOPHIL NFR BLD AUTO: 0.4 %
ERYTHROCYTE [DISTWIDTH] IN BLOOD BY AUTOMATED COUNT: 14 % (ref 11.5–14.5)
FLUAV RNA RESP QL NAA+PROBE: NOT DETECTED
FLUBV RNA RESP QL NAA+PROBE: NOT DETECTED
GLUCOSE SERPL-MCNC: 115 MG/DL (ref 74–99)
GLUCOSE UR QL STRIP: NEGATIVE
GLUCOSE UR STRIP.AUTO-MCNC: NORMAL MG/DL
HCT VFR BLD AUTO: 36.8 % (ref 41–52)
HGB BLD-MCNC: 11.9 G/DL (ref 13.5–17.5)
HGB UR QL STRIP: ABNORMAL
HYALINE CASTS #/AREA URNS LPF: ABNORMAL /LPF
IMM GRANULOCYTES # BLD AUTO: 0.03 X10*3/UL (ref 0–0.5)
IMM GRANULOCYTES NFR BLD AUTO: 0.6 % (ref 0–0.9)
KETONES UR QL STRIP: NEGATIVE
KETONES UR STRIP.AUTO-MCNC: NEGATIVE MG/DL
LEUKOCYTE ESTERASE UR QL STRIP.AUTO: ABNORMAL
LEUKOCYTE ESTERASE UR QL STRIP: ABNORMAL
LYMPHOCYTES # BLD AUTO: 0.45 X10*3/UL (ref 0.8–3)
LYMPHOCYTES NFR BLD AUTO: 8.8 %
MCH RBC QN AUTO: 30.4 PG (ref 26–34)
MCHC RBC AUTO-ENTMCNC: 32.3 G/DL (ref 32–36)
MCV RBC AUTO: 94 FL (ref 80–100)
MONOCYTES # BLD AUTO: 0.45 X10*3/UL (ref 0.05–0.8)
MONOCYTES NFR BLD AUTO: 8.8 %
NEUTROPHILS # BLD AUTO: 4.17 X10*3/UL (ref 1.6–5.5)
NEUTROPHILS NFR BLD AUTO: 81.2 %
NITRITE UR QL STRIP.AUTO: NEGATIVE
NITRITE UR QL STRIP: NEGATIVE
NRBC BLD-RTO: 0 /100 WBCS (ref 0–0)
PH UR STRIP.AUTO: 7.5 [PH]
PH UR STRIP: ABNORMAL [PH] (ref 5–8)
PLATELET # BLD AUTO: 138 X10*3/UL (ref 150–450)
POTASSIUM SERPL-SCNC: 3.5 MMOL/L (ref 3.5–5.3)
PROT UR QL STRIP: ABNORMAL
PROT UR STRIP.AUTO-MCNC: ABNORMAL MG/DL
RBC # BLD AUTO: 3.92 X10*6/UL (ref 4.5–5.9)
RBC # UR STRIP.AUTO: ABNORMAL MG/DL
RBC #/AREA URNS AUTO: >20 /HPF
RBC #/AREA URNS HPF: ABNORMAL /HPF
RSV RNA RESP QL NAA+PROBE: NOT DETECTED
SARS-COV-2 RNA RESP QL NAA+PROBE: NOT DETECTED
SERVICE CMNT-IMP: ABNORMAL
SODIUM SERPL-SCNC: 138 MMOL/L (ref 136–145)
SP GR UR STRIP.AUTO: 1.01
SP GR UR STRIP: 1.01 (ref 1–1.03)
SQUAMOUS #/AREA URNS HPF: ABNORMAL /HPF
TRI-PHOS CRY #/AREA URNS HPF: ABNORMAL /HPF
UROBILINOGEN UR STRIP.AUTO-MCNC: NORMAL MG/DL
WBC # BLD AUTO: 5.1 X10*3/UL (ref 4.4–11.3)
WBC #/AREA URNS AUTO: >50 /HPF
WBC #/AREA URNS HPF: ABNORMAL /HPF
WBC CLUMPS #/AREA URNS AUTO: ABNORMAL /HPF

## 2025-07-12 PROCEDURE — 96365 THER/PROPH/DIAG IV INF INIT: CPT

## 2025-07-12 PROCEDURE — 73030 X-RAY EXAM OF SHOULDER: CPT | Mod: RT

## 2025-07-12 PROCEDURE — 81001 URINALYSIS AUTO W/SCOPE: CPT

## 2025-07-12 PROCEDURE — 70450 CT HEAD/BRAIN W/O DYE: CPT

## 2025-07-12 PROCEDURE — 72125 CT NECK SPINE W/O DYE: CPT

## 2025-07-12 PROCEDURE — 72125 CT NECK SPINE W/O DYE: CPT | Performed by: RADIOLOGY

## 2025-07-12 PROCEDURE — 73030 X-RAY EXAM OF SHOULDER: CPT | Mod: RIGHT SIDE | Performed by: RADIOLOGY

## 2025-07-12 PROCEDURE — 2500000005 HC RX 250 GENERAL PHARMACY W/O HCPCS: Performed by: INTERNAL MEDICINE

## 2025-07-12 PROCEDURE — 36415 COLL VENOUS BLD VENIPUNCTURE: CPT

## 2025-07-12 PROCEDURE — G0378 HOSPITAL OBSERVATION PER HR: HCPCS

## 2025-07-12 PROCEDURE — 93005 ELECTROCARDIOGRAM TRACING: CPT

## 2025-07-12 PROCEDURE — 2500000004 HC RX 250 GENERAL PHARMACY W/ HCPCS (ALT 636 FOR OP/ED): Performed by: EMERGENCY MEDICINE

## 2025-07-12 PROCEDURE — 96361 HYDRATE IV INFUSION ADD-ON: CPT

## 2025-07-12 PROCEDURE — 2500000004 HC RX 250 GENERAL PHARMACY W/ HCPCS (ALT 636 FOR OP/ED): Performed by: INTERNAL MEDICINE

## 2025-07-12 PROCEDURE — 2500000002 HC RX 250 W HCPCS SELF ADMINISTERED DRUGS (ALT 637 FOR MEDICARE OP, ALT 636 FOR OP/ED): Performed by: INTERNAL MEDICINE

## 2025-07-12 PROCEDURE — 87086 URINE CULTURE/COLONY COUNT: CPT | Mod: WESLAB

## 2025-07-12 PROCEDURE — 70450 CT HEAD/BRAIN W/O DYE: CPT | Performed by: STUDENT IN AN ORGANIZED HEALTH CARE EDUCATION/TRAINING PROGRAM

## 2025-07-12 PROCEDURE — 87637 SARSCOV2&INF A&B&RSV AMP PRB: CPT

## 2025-07-12 PROCEDURE — G0390 TRAUMA RESPONS W/HOSP CRITI: HCPCS

## 2025-07-12 PROCEDURE — 2500000001 HC RX 250 WO HCPCS SELF ADMINISTERED DRUGS (ALT 637 FOR MEDICARE OP): Performed by: INTERNAL MEDICINE

## 2025-07-12 PROCEDURE — 99222 1ST HOSP IP/OBS MODERATE 55: CPT | Performed by: INTERNAL MEDICINE

## 2025-07-12 PROCEDURE — 80048 BASIC METABOLIC PNL TOTAL CA: CPT

## 2025-07-12 PROCEDURE — 99285 EMERGENCY DEPT VISIT HI MDM: CPT | Mod: 25 | Performed by: EMERGENCY MEDICINE

## 2025-07-12 PROCEDURE — 71046 X-RAY EXAM CHEST 2 VIEWS: CPT | Performed by: STUDENT IN AN ORGANIZED HEALTH CARE EDUCATION/TRAINING PROGRAM

## 2025-07-12 PROCEDURE — 85025 COMPLETE CBC W/AUTO DIFF WBC: CPT

## 2025-07-12 PROCEDURE — 71046 X-RAY EXAM CHEST 2 VIEWS: CPT

## 2025-07-12 PROCEDURE — 93010 ELECTROCARDIOGRAM REPORT: CPT | Performed by: INTERNAL MEDICINE

## 2025-07-12 RX ORDER — PAROXETINE 20 MG/1
20 TABLET, FILM COATED ORAL DAILY
Status: DISCONTINUED | OUTPATIENT
Start: 2025-07-12 | End: 2025-07-17 | Stop reason: HOSPADM

## 2025-07-12 RX ORDER — FENOFIBRATE 54 MG/1
54 TABLET ORAL DAILY
Status: DISCONTINUED | OUTPATIENT
Start: 2025-07-13 | End: 2025-07-17 | Stop reason: HOSPADM

## 2025-07-12 RX ORDER — TALC
3 POWDER (GRAM) TOPICAL NIGHTLY PRN
Status: DISCONTINUED | OUTPATIENT
Start: 2025-07-12 | End: 2025-07-17 | Stop reason: HOSPADM

## 2025-07-12 RX ORDER — TAMSULOSIN HYDROCHLORIDE 0.4 MG/1
0.4 CAPSULE ORAL NIGHTLY
Status: DISCONTINUED | OUTPATIENT
Start: 2025-07-12 | End: 2025-07-17 | Stop reason: HOSPADM

## 2025-07-12 RX ORDER — AMIODARONE HYDROCHLORIDE 100 MG/1
100 TABLET ORAL DAILY
Status: DISCONTINUED | OUTPATIENT
Start: 2025-07-12 | End: 2025-07-17 | Stop reason: HOSPADM

## 2025-07-12 RX ORDER — CEFTRIAXONE 1 G/50ML
1 INJECTION, SOLUTION INTRAVENOUS EVERY 24 HOURS
Status: DISCONTINUED | OUTPATIENT
Start: 2025-07-13 | End: 2025-07-17 | Stop reason: HOSPADM

## 2025-07-12 RX ORDER — ERGOCALCIFEROL 1.25 MG/1
1.25 CAPSULE ORAL
Status: DISCONTINUED | OUTPATIENT
Start: 2025-07-13 | End: 2025-07-17 | Stop reason: HOSPADM

## 2025-07-12 RX ORDER — GABAPENTIN 100 MG/1
100 CAPSULE ORAL NIGHTLY
Status: DISCONTINUED | OUTPATIENT
Start: 2025-07-12 | End: 2025-07-17 | Stop reason: HOSPADM

## 2025-07-12 RX ORDER — FAMOTIDINE 20 MG/1
20 TABLET, FILM COATED ORAL DAILY
Status: DISCONTINUED | OUTPATIENT
Start: 2025-07-12 | End: 2025-07-12 | Stop reason: SDUPTHER

## 2025-07-12 RX ORDER — ONDANSETRON HYDROCHLORIDE 2 MG/ML
4 INJECTION, SOLUTION INTRAVENOUS EVERY 8 HOURS PRN
Status: DISCONTINUED | OUTPATIENT
Start: 2025-07-12 | End: 2025-07-17 | Stop reason: HOSPADM

## 2025-07-12 RX ORDER — CEFTRIAXONE 1 G/50ML
1 INJECTION, SOLUTION INTRAVENOUS ONCE
Status: COMPLETED | OUTPATIENT
Start: 2025-07-12 | End: 2025-07-12

## 2025-07-12 RX ORDER — HEPARIN SODIUM 5000 [USP'U]/ML
5000 INJECTION, SOLUTION INTRAVENOUS; SUBCUTANEOUS EVERY 8 HOURS
Status: DISCONTINUED | OUTPATIENT
Start: 2025-07-12 | End: 2025-07-12 | Stop reason: SDUPTHER

## 2025-07-12 RX ORDER — ACETAMINOPHEN 325 MG/1
650 TABLET ORAL EVERY 6 HOURS PRN
Status: DISCONTINUED | OUTPATIENT
Start: 2025-07-12 | End: 2025-07-12 | Stop reason: SDUPTHER

## 2025-07-12 RX ORDER — GUAIFENESIN/DEXTROMETHORPHAN 100-10MG/5
5 SYRUP ORAL EVERY 4 HOURS PRN
Status: DISCONTINUED | OUTPATIENT
Start: 2025-07-12 | End: 2025-07-17 | Stop reason: HOSPADM

## 2025-07-12 RX ORDER — FINASTERIDE 5 MG/1
5 TABLET, FILM COATED ORAL DAILY
Status: DISCONTINUED | OUTPATIENT
Start: 2025-07-12 | End: 2025-07-17 | Stop reason: HOSPADM

## 2025-07-12 RX ORDER — ATORVASTATIN CALCIUM 20 MG/1
20 TABLET, FILM COATED ORAL NIGHTLY
Status: DISCONTINUED | OUTPATIENT
Start: 2025-07-12 | End: 2025-07-17 | Stop reason: HOSPADM

## 2025-07-12 RX ORDER — GUAIFENESIN 600 MG/1
600 TABLET, EXTENDED RELEASE ORAL EVERY 12 HOURS PRN
Status: DISCONTINUED | OUTPATIENT
Start: 2025-07-12 | End: 2025-07-17 | Stop reason: HOSPADM

## 2025-07-12 RX ORDER — PANTOPRAZOLE SODIUM 40 MG/1
40 TABLET, DELAYED RELEASE ORAL
Status: DISCONTINUED | OUTPATIENT
Start: 2025-07-13 | End: 2025-07-17 | Stop reason: HOSPADM

## 2025-07-12 RX ORDER — DOCUSATE SODIUM 100 MG/1
100 CAPSULE, LIQUID FILLED ORAL 2 TIMES DAILY
Status: DISCONTINUED | OUTPATIENT
Start: 2025-07-12 | End: 2025-07-17 | Stop reason: HOSPADM

## 2025-07-12 RX ORDER — ACETAMINOPHEN 650 MG/1
650 SUPPOSITORY RECTAL EVERY 4 HOURS PRN
Status: DISCONTINUED | OUTPATIENT
Start: 2025-07-12 | End: 2025-07-17 | Stop reason: HOSPADM

## 2025-07-12 RX ORDER — FOLIC ACID 1 MG/1
1 TABLET ORAL DAILY
Status: DISCONTINUED | OUTPATIENT
Start: 2025-07-12 | End: 2025-07-17 | Stop reason: HOSPADM

## 2025-07-12 RX ORDER — ONDANSETRON 4 MG/1
4 TABLET, FILM COATED ORAL EVERY 8 HOURS PRN
Status: DISCONTINUED | OUTPATIENT
Start: 2025-07-12 | End: 2025-07-17 | Stop reason: HOSPADM

## 2025-07-12 RX ORDER — POLYETHYLENE GLYCOL 3350 17 G/17G
17 POWDER, FOR SOLUTION ORAL DAILY
Status: DISCONTINUED | OUTPATIENT
Start: 2025-07-12 | End: 2025-07-17 | Stop reason: HOSPADM

## 2025-07-12 RX ORDER — ACETAMINOPHEN 325 MG/1
650 TABLET ORAL EVERY 4 HOURS PRN
Status: DISCONTINUED | OUTPATIENT
Start: 2025-07-12 | End: 2025-07-17 | Stop reason: HOSPADM

## 2025-07-12 RX ORDER — FAMOTIDINE 10 MG/ML
20 INJECTION, SOLUTION INTRAVENOUS DAILY
Status: DISCONTINUED | OUTPATIENT
Start: 2025-07-12 | End: 2025-07-12 | Stop reason: SDUPTHER

## 2025-07-12 RX ORDER — NYSTATIN 100000 [USP'U]/G
POWDER TOPICAL EVERY 8 HOURS
Status: DISCONTINUED | OUTPATIENT
Start: 2025-07-12 | End: 2025-07-17 | Stop reason: HOSPADM

## 2025-07-12 RX ORDER — ADHESIVE BANDAGE
30 BANDAGE TOPICAL DAILY PRN
Status: DISCONTINUED | OUTPATIENT
Start: 2025-07-12 | End: 2025-07-17 | Stop reason: HOSPADM

## 2025-07-12 RX ORDER — METOPROLOL SUCCINATE 25 MG/1
25 TABLET, EXTENDED RELEASE ORAL DAILY
Status: DISCONTINUED | OUTPATIENT
Start: 2025-07-12 | End: 2025-07-17 | Stop reason: HOSPADM

## 2025-07-12 RX ORDER — SODIUM CHLORIDE 9 MG/ML
50 INJECTION, SOLUTION INTRAVENOUS CONTINUOUS
Status: ACTIVE | OUTPATIENT
Start: 2025-07-12 | End: 2025-07-13

## 2025-07-12 RX ORDER — VIT C/E/ZN/COPPR/LUTEIN/ZEAXAN 250MG-90MG
500 CAPSULE ORAL DAILY
Status: DISCONTINUED | OUTPATIENT
Start: 2025-07-12 | End: 2025-07-17 | Stop reason: HOSPADM

## 2025-07-12 RX ORDER — ACETAMINOPHEN 160 MG/5ML
650 SOLUTION ORAL EVERY 4 HOURS PRN
Status: DISCONTINUED | OUTPATIENT
Start: 2025-07-12 | End: 2025-07-17 | Stop reason: HOSPADM

## 2025-07-12 RX ADMIN — ATORVASTATIN CALCIUM 20 MG: 20 TABLET, FILM COATED ORAL at 20:10

## 2025-07-12 RX ADMIN — Medication 3 MG: at 23:38

## 2025-07-12 RX ADMIN — CEFTRIAXONE 1 G: 1 INJECTION, SOLUTION INTRAVENOUS at 13:06

## 2025-07-12 RX ADMIN — NYSTATIN: 100000 POWDER TOPICAL at 23:38

## 2025-07-12 RX ADMIN — Medication 2 L/MIN: at 15:16

## 2025-07-12 RX ADMIN — DOCUSATE SODIUM 100 MG: 100 CAPSULE, LIQUID FILLED ORAL at 20:10

## 2025-07-12 RX ADMIN — GABAPENTIN 100 MG: 100 CAPSULE ORAL at 20:10

## 2025-07-12 RX ADMIN — SODIUM CHLORIDE 50 ML/HR: 900 INJECTION, SOLUTION INTRAVENOUS at 15:36

## 2025-07-12 RX ADMIN — TAMSULOSIN HYDROCHLORIDE 0.4 MG: 0.4 CAPSULE ORAL at 20:10

## 2025-07-12 RX ADMIN — NYSTATIN: 100000 POWDER TOPICAL at 15:36

## 2025-07-12 RX ADMIN — SODIUM CHLORIDE 1000 ML: 900 INJECTION, SOLUTION INTRAVENOUS at 12:22

## 2025-07-12 RX ADMIN — ACETAMINOPHEN 650 MG: 160 SOLUTION ORAL at 23:38

## 2025-07-12 SDOH — SOCIAL STABILITY: SOCIAL INSECURITY: ARE THERE ANY APPARENT SIGNS OF INJURIES/BEHAVIORS THAT COULD BE RELATED TO ABUSE/NEGLECT?: NO

## 2025-07-12 SDOH — SOCIAL STABILITY: SOCIAL INSECURITY: HAS ANYONE EVER THREATENED TO HURT YOUR FAMILY OR YOUR PETS?: NO

## 2025-07-12 SDOH — SOCIAL STABILITY: SOCIAL INSECURITY: DO YOU FEEL ANYONE HAS EXPLOITED OR TAKEN ADVANTAGE OF YOU FINANCIALLY OR OF YOUR PERSONAL PROPERTY?: NO

## 2025-07-12 SDOH — HEALTH STABILITY: PHYSICAL HEALTH: ON AVERAGE, HOW MANY MINUTES DO YOU ENGAGE IN EXERCISE AT THIS LEVEL?: 0 MIN

## 2025-07-12 SDOH — ECONOMIC STABILITY: INCOME INSECURITY: IN THE PAST 12 MONTHS HAS THE ELECTRIC, GAS, OIL, OR WATER COMPANY THREATENED TO SHUT OFF SERVICES IN YOUR HOME?: NO

## 2025-07-12 SDOH — SOCIAL STABILITY: SOCIAL NETWORK: IN A TYPICAL WEEK, HOW MANY TIMES DO YOU TALK ON THE PHONE WITH FAMILY, FRIENDS, OR NEIGHBORS?: NEVER

## 2025-07-12 SDOH — ECONOMIC STABILITY: HOUSING INSECURITY: IN THE LAST 12 MONTHS, WAS THERE A TIME WHEN YOU WERE NOT ABLE TO PAY THE MORTGAGE OR RENT ON TIME?: NO

## 2025-07-12 SDOH — SOCIAL STABILITY: SOCIAL NETWORK: HOW OFTEN DO YOU ATTEND CHURCH OR RELIGIOUS SERVICES?: NEVER

## 2025-07-12 SDOH — SOCIAL STABILITY: SOCIAL NETWORK: HOW OFTEN DO YOU GET TOGETHER WITH FRIENDS OR RELATIVES?: NEVER

## 2025-07-12 SDOH — ECONOMIC STABILITY: TRANSPORTATION INSECURITY: IN THE PAST 12 MONTHS, HAS LACK OF TRANSPORTATION KEPT YOU FROM MEDICAL APPOINTMENTS OR FROM GETTING MEDICATIONS?: NO

## 2025-07-12 SDOH — ECONOMIC STABILITY: FOOD INSECURITY: WITHIN THE PAST 12 MONTHS, THE FOOD YOU BOUGHT JUST DIDN'T LAST AND YOU DIDN'T HAVE MONEY TO GET MORE.: NEVER TRUE

## 2025-07-12 SDOH — SOCIAL STABILITY: SOCIAL INSECURITY: WITHIN THE LAST YEAR, HAVE YOU BEEN AFRAID OF YOUR PARTNER OR EX-PARTNER?: NO

## 2025-07-12 SDOH — ECONOMIC STABILITY: HOUSING INSECURITY: AT ANY TIME IN THE PAST 12 MONTHS, WERE YOU HOMELESS OR LIVING IN A SHELTER (INCLUDING NOW)?: NO

## 2025-07-12 SDOH — ECONOMIC STABILITY: FOOD INSECURITY: WITHIN THE PAST 12 MONTHS, YOU WORRIED THAT YOUR FOOD WOULD RUN OUT BEFORE YOU GOT THE MONEY TO BUY MORE.: NEVER TRUE

## 2025-07-12 SDOH — SOCIAL STABILITY: SOCIAL INSECURITY: DO YOU FEEL UNSAFE GOING BACK TO THE PLACE WHERE YOU ARE LIVING?: NO

## 2025-07-12 SDOH — SOCIAL STABILITY: SOCIAL INSECURITY: HAVE YOU HAD ANY THOUGHTS OF HARMING ANYONE ELSE?: NO

## 2025-07-12 SDOH — SOCIAL STABILITY: SOCIAL INSECURITY: ARE YOU MARRIED, WIDOWED, DIVORCED, SEPARATED, NEVER MARRIED, OR LIVING WITH A PARTNER?: MARRIED

## 2025-07-12 SDOH — HEALTH STABILITY: PHYSICAL HEALTH: ON AVERAGE, HOW MANY DAYS PER WEEK DO YOU ENGAGE IN MODERATE TO STRENUOUS EXERCISE (LIKE A BRISK WALK)?: 0 DAYS

## 2025-07-12 SDOH — SOCIAL STABILITY: SOCIAL INSECURITY: WITHIN THE LAST YEAR, HAVE YOU BEEN HUMILIATED OR EMOTIONALLY ABUSED IN OTHER WAYS BY YOUR PARTNER OR EX-PARTNER?: NO

## 2025-07-12 SDOH — SOCIAL STABILITY: SOCIAL INSECURITY: ABUSE: ADULT

## 2025-07-12 SDOH — ECONOMIC STABILITY: FOOD INSECURITY: HOW HARD IS IT FOR YOU TO PAY FOR THE VERY BASICS LIKE FOOD, HOUSING, MEDICAL CARE, AND HEATING?: NOT VERY HARD

## 2025-07-12 SDOH — ECONOMIC STABILITY: HOUSING INSECURITY: IN THE PAST 12 MONTHS, HOW MANY TIMES HAVE YOU MOVED WHERE YOU WERE LIVING?: 0

## 2025-07-12 SDOH — SOCIAL STABILITY: SOCIAL INSECURITY: DOES ANYONE TRY TO KEEP YOU FROM HAVING/CONTACTING OTHER FRIENDS OR DOING THINGS OUTSIDE YOUR HOME?: NO

## 2025-07-12 SDOH — SOCIAL STABILITY: SOCIAL NETWORK: HOW OFTEN DO YOU ATTEND MEETINGS OF THE CLUBS OR ORGANIZATIONS YOU BELONG TO?: NEVER

## 2025-07-12 SDOH — SOCIAL STABILITY: SOCIAL INSECURITY: WERE YOU ABLE TO COMPLETE ALL THE BEHAVIORAL HEALTH SCREENINGS?: YES

## 2025-07-12 SDOH — SOCIAL STABILITY: SOCIAL INSECURITY: ARE YOU OR HAVE YOU BEEN THREATENED OR ABUSED PHYSICALLY, EMOTIONALLY, OR SEXUALLY BY ANYONE?: NO

## 2025-07-12 SDOH — SOCIAL STABILITY: SOCIAL INSECURITY: HAVE YOU HAD THOUGHTS OF HARMING ANYONE ELSE?: NO

## 2025-07-12 ASSESSMENT — ENCOUNTER SYMPTOMS
BACK PAIN: 0
HYPERACTIVE: 0
COLOR CHANGE: 0
TREMORS: 0
ANAL BLEEDING: 0
SORE THROAT: 0
ACTIVITY CHANGE: 0
DIZZINESS: 0
HEMATURIA: 0
CONFUSION: 0
WHEEZING: 0
DYSURIA: 0
VOMITING: 0
SHORTNESS OF BREATH: 0
LIGHT-HEADEDNESS: 0
SLEEP DISTURBANCE: 0
HEADACHES: 0
STRIDOR: 0
NECK PAIN: 0
EYE REDNESS: 0
FEVER: 0
APPETITE CHANGE: 0
FACIAL ASYMMETRY: 0
CHOKING: 0
FACIAL SWELLING: 0
POLYPHAGIA: 0
VOICE CHANGE: 0
SINUS PAIN: 0
SPEECH DIFFICULTY: 0
RECTAL PAIN: 0
ARTHRALGIAS: 0
NERVOUS/ANXIOUS: 0
FATIGUE: 1
CHILLS: 0
DYSPHORIC MOOD: 0
FREQUENCY: 0
SINUS PRESSURE: 0
NUMBNESS: 0
JOINT SWELLING: 0
FLANK PAIN: 0
EYE ITCHING: 0
BRUISES/BLEEDS EASILY: 0
POLYDIPSIA: 0
NAUSEA: 0
DIFFICULTY URINATING: 0
ABDOMINAL DISTENTION: 0
BLOOD IN STOOL: 0
CONSTIPATION: 0
NECK STIFFNESS: 0
DECREASED CONCENTRATION: 0
SEIZURES: 0
TROUBLE SWALLOWING: 0
WOUND: 0
EYE DISCHARGE: 0
DIARRHEA: 0
WEAKNESS: 0
EYE PAIN: 0
DIAPHORESIS: 0
APNEA: 0
PHOTOPHOBIA: 0
RHINORRHEA: 0
ABDOMINAL PAIN: 0
CHEST TIGHTNESS: 0
ADENOPATHY: 0
PALPITATIONS: 0
COUGH: 0
UNEXPECTED WEIGHT CHANGE: 0
HALLUCINATIONS: 0
MYALGIAS: 0
AGITATION: 0

## 2025-07-12 ASSESSMENT — ACTIVITIES OF DAILY LIVING (ADL)
ADEQUATE_TO_COMPLETE_ADL: YES
JUDGMENT_ADEQUATE_SAFELY_COMPLETE_DAILY_ACTIVITIES: YES
HEARING - LEFT EAR: DIFFICULTY WITH NOISE
BATHING: NEEDS ASSISTANCE
HEARING - RIGHT EAR: DIFFICULTY WITH NOISE
WALKS IN HOME: NEEDS ASSISTANCE
LACK_OF_TRANSPORTATION: NO
DRESSING YOURSELF: NEEDS ASSISTANCE
FEEDING YOURSELF: INDEPENDENT
PATIENT'S MEMORY ADEQUATE TO SAFELY COMPLETE DAILY ACTIVITIES?: YES
TOILETING: NEEDS ASSISTANCE
ASSISTIVE_DEVICE: WALKER
GROOMING: NEEDS ASSISTANCE

## 2025-07-12 ASSESSMENT — LIFESTYLE VARIABLES
AUDIT-C TOTAL SCORE: 0
HOW OFTEN DO YOU HAVE A DRINK CONTAINING ALCOHOL: NEVER
PRESCIPTION_ABUSE_PAST_12_MONTHS: NO
EVER HAD A DRINK FIRST THING IN THE MORNING TO STEADY YOUR NERVES TO GET RID OF A HANGOVER: NO
AUDIT-C TOTAL SCORE: 0
HOW MANY STANDARD DRINKS CONTAINING ALCOHOL DO YOU HAVE ON A TYPICAL DAY: PATIENT DOES NOT DRINK
TOTAL SCORE: 0
SKIP TO QUESTIONS 9-10: 1
HAVE PEOPLE ANNOYED YOU BY CRITICIZING YOUR DRINKING: NO
HOW OFTEN DO YOU HAVE 6 OR MORE DRINKS ON ONE OCCASION: NEVER
SUBSTANCE_ABUSE_PAST_12_MONTHS: NO
HAVE YOU EVER FELT YOU SHOULD CUT DOWN ON YOUR DRINKING: NO
EVER FELT BAD OR GUILTY ABOUT YOUR DRINKING: NO

## 2025-07-12 ASSESSMENT — COGNITIVE AND FUNCTIONAL STATUS - GENERAL
DRESSING REGULAR UPPER BODY CLOTHING: A LITTLE
MOVING FROM LYING ON BACK TO SITTING ON SIDE OF FLAT BED WITH BEDRAILS: A LITTLE
STANDING UP FROM CHAIR USING ARMS: A LITTLE
MOVING TO AND FROM BED TO CHAIR: A LITTLE
TURNING FROM BACK TO SIDE WHILE IN FLAT BAD: A LITTLE
DRESSING REGULAR LOWER BODY CLOTHING: A LITTLE
CLIMB 3 TO 5 STEPS WITH RAILING: A LOT
WALKING IN HOSPITAL ROOM: A LOT
PERSONAL GROOMING: A LITTLE
MOVING FROM LYING ON BACK TO SITTING ON SIDE OF FLAT BED WITH BEDRAILS: A LITTLE
DRESSING REGULAR LOWER BODY CLOTHING: A LITTLE
WALKING IN HOSPITAL ROOM: A LOT
CLIMB 3 TO 5 STEPS WITH RAILING: TOTAL
MOVING TO AND FROM BED TO CHAIR: A LITTLE
MOBILITY SCORE: 15
DAILY ACTIVITIY SCORE: 19
MOBILITY SCORE: 16
HELP NEEDED FOR BATHING: A LITTLE
DRESSING REGULAR UPPER BODY CLOTHING: A LITTLE
TOILETING: A LITTLE
PERSONAL GROOMING: A LITTLE
HELP NEEDED FOR BATHING: A LITTLE
CLIMB 3 TO 5 STEPS WITH RAILING: A LOT
WALKING IN HOSPITAL ROOM: A LOT
STANDING UP FROM CHAIR USING ARMS: A LITTLE
HELP NEEDED FOR BATHING: A LITTLE
TOILETING: A LITTLE
DAILY ACTIVITIY SCORE: 19
MOVING FROM LYING ON BACK TO SITTING ON SIDE OF FLAT BED WITH BEDRAILS: A LITTLE
DRESSING REGULAR LOWER BODY CLOTHING: A LITTLE
TOILETING: A LITTLE
PERSONAL GROOMING: A LITTLE
PATIENT BASELINE BEDBOUND: NO
MOVING TO AND FROM BED TO CHAIR: A LITTLE
TURNING FROM BACK TO SIDE WHILE IN FLAT BAD: A LITTLE
STANDING UP FROM CHAIR USING ARMS: A LITTLE
DRESSING REGULAR UPPER BODY CLOTHING: A LITTLE
TURNING FROM BACK TO SIDE WHILE IN FLAT BAD: A LITTLE
MOBILITY SCORE: 16
DAILY ACTIVITIY SCORE: 19

## 2025-07-12 ASSESSMENT — PAIN SCALES - GENERAL
PAINLEVEL_OUTOF10: 0 - NO PAIN
PAINLEVEL_OUTOF10: 5 - MODERATE PAIN
PAINLEVEL_OUTOF10: 3
PAINLEVEL_OUTOF10: 0 - NO PAIN

## 2025-07-12 ASSESSMENT — PAIN DESCRIPTION - LOCATION: LOCATION: SHOULDER

## 2025-07-12 ASSESSMENT — PAIN - FUNCTIONAL ASSESSMENT
PAIN_FUNCTIONAL_ASSESSMENT: 0-10

## 2025-07-12 ASSESSMENT — PATIENT HEALTH QUESTIONNAIRE - PHQ9
SUM OF ALL RESPONSES TO PHQ9 QUESTIONS 1 & 2: 0
2. FEELING DOWN, DEPRESSED OR HOPELESS: NOT AT ALL
1. LITTLE INTEREST OR PLEASURE IN DOING THINGS: NOT AT ALL

## 2025-07-12 ASSESSMENT — PAIN DESCRIPTION - DESCRIPTORS: DESCRIPTORS: SORE

## 2025-07-12 ASSESSMENT — PAIN DESCRIPTION - ORIENTATION: ORIENTATION: RIGHT

## 2025-07-12 NOTE — CARE PLAN
The patient's goals for the shift include fall free    The clinical goals for the shift include fall free and rest

## 2025-07-12 NOTE — ED PROVIDER NOTES
IN BRIEF    History: 80-year-old male presents after frequent falls.  He states that his knees occasionally give out from underneath him he believes this to be secondary to history of knee replacements.  He was recently started on Macrobid for a presumptive UTI.  His urine culture however just showed genital feliz and no acute causative bacteria.  He has no other acute complaints.  He is on a blood thinner.    Exam: Patient is frail appearing but in no acute distress.  He is resting comfortably on his baseline oxygen       ED COURSE   MDM: Work returned showing a an increase in the patient's creatinine compared to prior.  According to daughter the patient was given a decrease in his Lasix which improved his renal function.  The patient does endorse polyuria and urgency and frequency.  He is empirically given antibiotics here.  He has started on fluids as well.  Given the weakness as well as JASBIR on CKD he will be mated for further care.      I personally saw the patient and made/approved the management plan and take responsibility for the patient management.  Parts of this chart were completed with dictation software, please excuse any errors in transcription.     Priscilla Pickering DO, MPH  2:45 PM

## 2025-07-12 NOTE — ED PROVIDER NOTES
HPI   Chief Complaint   Patient presents with    Fall       Patient is an 80-year-old male presents emergency department for evaluation of a fall on Eliquis.  Patient states that he had a fall yesterday and had 2 falls today.  He states today he just felt very weak in the legs and stumbled and lost his balance and fell.  He hit his head but did not lose any consciousness.  He is currently on Eliquis.  He states that he is currently on antibiotics for possible urinary tract faction as he is at increased pain and frequency with urination.  He denies any associate abdominal pain, nausea, vomiting, fevers, chills.  He denies any preceding lightheadedness or dizziness from the fall.  He states he just felt somewhat weak and states his legs just gave out.  He does complain of some pain in his right shoulder but has good range of motion.  He also states he has had some slight cough and congestion over the last several days but no difficulty breathing or chest pain.  He notes that he wears 2 L oxygen at baseline.      History provided by:  Patient   used: No            Patient History   Medical History[1]  Surgical History[2]  Family History[3]  Social History[4]    Physical Exam   ED Triage Vitals [07/12/25 1122]   Temperature Heart Rate Respirations BP   36.8 °C (98.2 °F) 67 (!) 22 142/55      Pulse Ox Temp src Heart Rate Source Patient Position   99 % -- -- --      BP Location FiO2 (%)     -- --       Physical Exam  Constitutional:       Appearance: Normal appearance.   Cardiovascular:      Rate and Rhythm: Normal rate and regular rhythm.   Pulmonary:      Effort: Pulmonary effort is normal.      Breath sounds: Normal breath sounds.   Abdominal:      General: Abdomen is flat.      Palpations: Abdomen is soft.      Tenderness: There is no abdominal tenderness.   Musculoskeletal:         General: Tenderness and signs of injury present. Normal range of motion.      Comments: Ecchymosis to bilateral  anterior knees with range of motion intact.  Pain with range of motion of right shoulder with right upper extremity strong distal pulses and good  strength.   Skin:     General: Skin is warm and dry.   Neurological:      Mental Status: He is alert.           ED Course & MDM   ED Course as of 07/12/25 1641   Sat Jul 12, 2025   1348 EKG personally interpreted by me performed at 1124  Sinus rhythm with first-degree AV block ventricular rate 62  left axis deviation no acute ischemic changes [EF]      ED Course User Index  [EF] Priscilla Pickering,          Diagnoses as of 07/12/25 1641   JASBIR (acute kidney injury)   Closed head injury, initial encounter   Fall, initial encounter                 No data recorded     Gladys Coma Scale Score: 15 (07/12/25 1125 : Clara Cannon RN)                           Medical Decision Making  Patient is an 80-year-old male presents emergency department for evaluation of fall with head injury on Eliis with multiple falls over the last 2 days.    Lab work done today included BMP, CBC, and RSV/flu/COVID swabs.  Lab work with worsening renal insufficiency, evidence of urinary tract infection, anemia    Scans done today were interpreted/confirmed by radiologist and also interpreted by me which included CT without contrast, CT cervical without contrast, chest x-ray, x-ray right shoulder.  X-ray right shoulder shows degenerative disease with no acute fracture or dislocation with CT head and neck showing no acute fracture or dislocation cervical spine with no acute intracranial normality or evidence of skull fracture.    Medications given at today's visit include IV ceftriaxone, IV fluids    I saw this patient in conjunction with Dr. Pickering.  CT scans of head and neck showed no acute intracranial abnormality with no evidence for acute cervical spine fracture or dislocation with no traumatic injury to right shoulder or chest.  Patient was found to have worsening renal insufficiency  which is likely contributing to his generalized weakness and recurrent falls.  He also has evidence of urinary tract infection and will be treated with IV ceftriaxone.  Given his recurrent falls with weakness with significant worsening renal insufficiency as well as urinary tract infection patient be admitted for further care.  Spoke with medicine on-call Dr. Sandoval was agreeable to admission of patient for further management.    The patient/family was counseled on clinical impression, expectations, and plan along with recommendations to admission.  All questions were answered and involved parties were understanding and agreeable to course of treatment.  Case was discussed with admitting physician and any consultants. Bed type, ED treatment and further ED workup decided by joint decision making with admitting team and any consultants. Patient stable for admission per my assessment and further management of patient will be deferred to the inpatient setting.    ** Disclaimer:  Parts of this document were written utilizing a voice to text dictation software.  Note may contain minor transcription or typographical errors that were inadvertently transcribed by the computer software.        Procedure  Procedures       [1]   Past Medical History:  Diagnosis Date    Atherosclerotic heart disease of native coronary artery without angina pectoris 12/14/2022    Arteriosclerotic cardiovascular disease (ASCVD)    Benign neoplasm of meninges, unspecified     Meningioma    BPH (benign prostatic hyperplasia)     Chronic diastolic heart failure     Chronic kidney disease     Chronic obstructive pulmonary disease (COPD) (Multi)     Chronic respiratory failure     Elevated blood-pressure reading, without diagnosis of hypertension     Prehypertension    Hyperlipidemia     Meningioma (Multi)     Nephrolithiasis     Rectal bleeding     Sick sinus syndrome (Multi)     s/p pacemaker placement    Vitamin D deficiency    [2]   Past Surgical  History:  Procedure Laterality Date    BACK SURGERY  2016    Back Surgery    BRAIN SURGERY      COLONOSCOPY  2013    Complete Colonoscopy    CORONARY ANGIOPLASTY WITH STENT PLACEMENT  2020    Cath Stent Placement    CT ANGIO NECK  2021    CT NECK ANGIO W AND WO IV CONTRAST 2021 [facility]    CT HEAD ANGIO W AND WO IV CONTRAST  2021    CT HEAD ANGIO W AND WO IV CONTRAST 2021 [facility]    CYSTOSCOPY W/ URETERAL STENT PLACEMENT      EYE SURGERY  2013    Eye Surgery    KNEE ARTHROSCOPY W/ DEBRIDEMENT  2013    Arthroscopy Knee Right    KNEE ARTHROSCOPY W/ DEBRIDEMENT  2013    Arthroscopy Knee Left    LITHOTRIPSY  2013    Renal Lithotripsy    MR HEAD ANGIO WO IV CONTRAST  2021    MR HEAD ANGIO WO IV CONTRAST [facility]    MR NECK ANGIO WO IV CONTRAST  2021    MR NECK ANGIO WO IV CONTRAST [facility]    OTHER SURGICAL HISTORY  2013    Cardiac Cath Procedure Outcome: Successful    OTHER SURGICAL HISTORY  2013    Neuroplasty With Transposition Of Ulnar Nerve - At Elbow    OTHER SURGICAL HISTORY  2013    Wrist Carpectomy    OTHER SURGICAL HISTORY  2013    Ulnar Osteotomy    PACEMAKER PLACEMENT      TONSILLECTOMY      TOTAL KNEE ARTHROPLASTY     [3]   Family History  Problem Relation Name Age of Onset    Diabetes Mother      Emphysema Father      Heart disease Father     [4]   Social History  Tobacco Use    Smoking status: Former     Current packs/day: 0.00     Types: Cigarettes     Quit date:      Years since quittin.5    Smokeless tobacco: Never   Vaping Use    Vaping status: Never Used   Substance Use Topics    Alcohol use: Not Currently    Drug use: Never        Flavia Irwin PA-C  25 [id number]

## 2025-07-13 ENCOUNTER — APPOINTMENT (OUTPATIENT)
Dept: RADIOLOGY | Facility: HOSPITAL | Age: 80
End: 2025-07-13
Payer: MEDICARE

## 2025-07-13 VITALS
RESPIRATION RATE: 18 BRPM | HEART RATE: 62 BPM | SYSTOLIC BLOOD PRESSURE: 132 MMHG | TEMPERATURE: 97.2 F | DIASTOLIC BLOOD PRESSURE: 48 MMHG | OXYGEN SATURATION: 99 % | HEIGHT: 70 IN | BODY MASS INDEX: 31.07 KG/M2 | WEIGHT: 217 LBS

## 2025-07-13 LAB
ALBUMIN SERPL BCP-MCNC: 2.8 G/DL (ref 3.4–5)
ALP SERPL-CCNC: 47 U/L (ref 33–136)
ALT SERPL W P-5'-P-CCNC: 5 U/L (ref 10–52)
ANION GAP SERPL CALCULATED.3IONS-SCNC: 11 MMOL/L (ref 10–20)
AST SERPL W P-5'-P-CCNC: 23 U/L (ref 9–39)
BILIRUB SERPL-MCNC: 0.8 MG/DL (ref 0–1.2)
BUN SERPL-MCNC: 24 MG/DL (ref 6–23)
CALCIUM SERPL-MCNC: 8 MG/DL (ref 8.6–10.3)
CHLORIDE SERPL-SCNC: 108 MMOL/L (ref 98–107)
CO2 SERPL-SCNC: 23 MMOL/L (ref 21–32)
CREAT SERPL-MCNC: 1.93 MG/DL (ref 0.5–1.3)
EGFRCR SERPLBLD CKD-EPI 2021: 34 ML/MIN/1.73M*2
ERYTHROCYTE [DISTWIDTH] IN BLOOD BY AUTOMATED COUNT: 14 % (ref 11.5–14.5)
GLUCOSE SERPL-MCNC: 108 MG/DL (ref 74–99)
HCT VFR BLD AUTO: 33.9 % (ref 41–52)
HGB BLD-MCNC: 11.2 G/DL (ref 13.5–17.5)
MCH RBC QN AUTO: 30.3 PG (ref 26–34)
MCHC RBC AUTO-ENTMCNC: 33 G/DL (ref 32–36)
MCV RBC AUTO: 92 FL (ref 80–100)
NRBC BLD-RTO: 0 /100 WBCS (ref 0–0)
PLATELET # BLD AUTO: 136 X10*3/UL (ref 150–450)
POTASSIUM SERPL-SCNC: 2.9 MMOL/L (ref 3.5–5.3)
PROT SERPL-MCNC: 5.1 G/DL (ref 6.4–8.2)
RBC # BLD AUTO: 3.7 X10*6/UL (ref 4.5–5.9)
SODIUM SERPL-SCNC: 139 MMOL/L (ref 136–145)
WBC # BLD AUTO: 4.7 X10*3/UL (ref 4.4–11.3)

## 2025-07-13 PROCEDURE — 2500000001 HC RX 250 WO HCPCS SELF ADMINISTERED DRUGS (ALT 637 FOR MEDICARE OP): Performed by: INTERNAL MEDICINE

## 2025-07-13 PROCEDURE — 85027 COMPLETE CBC AUTOMATED: CPT | Performed by: INTERNAL MEDICINE

## 2025-07-13 PROCEDURE — 1200000002 HC GENERAL ROOM WITH TELEMETRY DAILY

## 2025-07-13 PROCEDURE — 76770 US EXAM ABDO BACK WALL COMP: CPT | Performed by: RADIOLOGY

## 2025-07-13 PROCEDURE — 99233 SBSQ HOSP IP/OBS HIGH 50: CPT | Performed by: INTERNAL MEDICINE

## 2025-07-13 PROCEDURE — 36415 COLL VENOUS BLD VENIPUNCTURE: CPT | Performed by: INTERNAL MEDICINE

## 2025-07-13 PROCEDURE — 2500000002 HC RX 250 W HCPCS SELF ADMINISTERED DRUGS (ALT 637 FOR MEDICARE OP, ALT 636 FOR OP/ED): Performed by: INTERNAL MEDICINE

## 2025-07-13 PROCEDURE — 2500000004 HC RX 250 GENERAL PHARMACY W/ HCPCS (ALT 636 FOR OP/ED): Performed by: INTERNAL MEDICINE

## 2025-07-13 PROCEDURE — 99223 1ST HOSP IP/OBS HIGH 75: CPT | Performed by: UROLOGY

## 2025-07-13 PROCEDURE — 80053 COMPREHEN METABOLIC PANEL: CPT | Performed by: INTERNAL MEDICINE

## 2025-07-13 PROCEDURE — 2500000005 HC RX 250 GENERAL PHARMACY W/O HCPCS: Performed by: INTERNAL MEDICINE

## 2025-07-13 PROCEDURE — 76770 US EXAM ABDO BACK WALL COMP: CPT

## 2025-07-13 RX ORDER — SODIUM CHLORIDE AND POTASSIUM CHLORIDE 150; 900 MG/100ML; MG/100ML
50 INJECTION, SOLUTION INTRAVENOUS CONTINUOUS
Status: DISCONTINUED | OUTPATIENT
Start: 2025-07-13 | End: 2025-07-17 | Stop reason: HOSPADM

## 2025-07-13 RX ADMIN — SODIUM CHLORIDE AND POTASSIUM CHLORIDE 50 ML/HR: 9; 1.49 INJECTION, SOLUTION INTRAVENOUS at 11:31

## 2025-07-13 RX ADMIN — CEFTRIAXONE 1 G: 1 INJECTION, SOLUTION INTRAVENOUS at 11:31

## 2025-07-13 RX ADMIN — TAMSULOSIN HYDROCHLORIDE 0.4 MG: 0.4 CAPSULE ORAL at 20:28

## 2025-07-13 RX ADMIN — DOCUSATE SODIUM 100 MG: 100 CAPSULE, LIQUID FILLED ORAL at 20:27

## 2025-07-13 RX ADMIN — GABAPENTIN 100 MG: 100 CAPSULE ORAL at 20:29

## 2025-07-13 RX ADMIN — Medication 3 MG: at 20:29

## 2025-07-13 RX ADMIN — PANTOPRAZOLE SODIUM 40 MG: 40 TABLET, DELAYED RELEASE ORAL at 06:32

## 2025-07-13 RX ADMIN — NYSTATIN: 100000 POWDER TOPICAL at 09:28

## 2025-07-13 RX ADMIN — METOPROLOL SUCCINATE 25 MG: 25 TABLET, EXTENDED RELEASE ORAL at 09:28

## 2025-07-13 RX ADMIN — FINASTERIDE 5 MG: 5 TABLET, FILM COATED ORAL at 09:28

## 2025-07-13 RX ADMIN — ERGOCALCIFEROL 1.25 MG: 1.25 CAPSULE ORAL at 09:28

## 2025-07-13 RX ADMIN — CYANOCOBALAMIN TAB 500 MCG 500 MCG: 500 TAB at 09:28

## 2025-07-13 RX ADMIN — PAROXETINE HYDROCHLORIDE 20 MG: 20 TABLET, FILM COATED ORAL at 09:28

## 2025-07-13 RX ADMIN — SODIUM CHLORIDE 50 ML/HR: 900 INJECTION, SOLUTION INTRAVENOUS at 09:33

## 2025-07-13 RX ADMIN — FOLIC ACID 1 MG: 1 TABLET ORAL at 09:28

## 2025-07-13 RX ADMIN — AMIODARONE HYDROCHLORIDE 100 MG: 100 TABLET ORAL at 09:28

## 2025-07-13 RX ADMIN — FENOFIBRATE 54 MG: 54 TABLET ORAL at 09:28

## 2025-07-13 RX ADMIN — ACETAMINOPHEN 650 MG: 325 TABLET ORAL at 20:26

## 2025-07-13 ASSESSMENT — COGNITIVE AND FUNCTIONAL STATUS - GENERAL
TURNING FROM BACK TO SIDE WHILE IN FLAT BAD: A LITTLE
MOVING FROM LYING ON BACK TO SITTING ON SIDE OF FLAT BED WITH BEDRAILS: A LITTLE
STANDING UP FROM CHAIR USING ARMS: A LITTLE
TOILETING: A LITTLE
DAILY ACTIVITIY SCORE: 19
WALKING IN HOSPITAL ROOM: A LITTLE
CLIMB 3 TO 5 STEPS WITH RAILING: A LOT
DRESSING REGULAR UPPER BODY CLOTHING: A LITTLE
HELP NEEDED FOR BATHING: A LITTLE
DRESSING REGULAR UPPER BODY CLOTHING: A LITTLE
DRESSING REGULAR LOWER BODY CLOTHING: A LITTLE
EATING MEALS: A LITTLE
MOBILITY SCORE: 22
MOVING FROM LYING ON BACK TO SITTING ON SIDE OF FLAT BED WITH BEDRAILS: A LITTLE
WALKING IN HOSPITAL ROOM: A LITTLE
MOBILITY SCORE: 17
DAILY ACTIVITIY SCORE: 22
DRESSING REGULAR LOWER BODY CLOTHING: A LITTLE
MOVING TO AND FROM BED TO CHAIR: A LITTLE

## 2025-07-13 ASSESSMENT — PAIN SCALES - GENERAL
PAINLEVEL_OUTOF10: 1
PAINLEVEL_OUTOF10: 0 - NO PAIN
PAINLEVEL_OUTOF10: 0 - NO PAIN

## 2025-07-13 ASSESSMENT — PAIN DESCRIPTION - DESCRIPTORS: DESCRIPTORS: SORE

## 2025-07-13 ASSESSMENT — PAIN - FUNCTIONAL ASSESSMENT
PAIN_FUNCTIONAL_ASSESSMENT: 0-10
PAIN_FUNCTIONAL_ASSESSMENT: 0-10

## 2025-07-13 NOTE — CARE PLAN
The patient's goals for the shift include fall free    The clinical goals for the shift include fall free and resk      Problem: Pain - Adult  Goal: Verbalizes/displays adequate comfort level or baseline comfort level  Outcome: Progressing     Problem: Safety - Adult  Goal: Free from fall injury  Outcome: Progressing     Problem: Discharge Planning  Goal: Discharge to home or other facility with appropriate resources  Outcome: Progressing     Problem: Chronic Conditions and Co-morbidities  Goal: Patient's chronic conditions and co-morbidity symptoms are monitored and maintained or improved  Outcome: Progressing     Problem: Nutrition  Goal: Nutrient intake appropriate for maintaining nutritional needs  Outcome: Progressing     Problem: Skin  Goal: Decreased wound size/increased tissue granulation at next dressing change  Outcome: Progressing  Goal: Participates in plan/prevention/treatment measures  Outcome: Progressing  Goal: Prevent/manage excess moisture  Outcome: Progressing  Goal: Prevent/minimize sheer/friction injuries  Outcome: Progressing  Goal: Promote/optimize nutrition  Outcome: Progressing  Goal: Promote skin healing  Outcome: Progressing     Problem: Fall/Injury  Goal: Not fall by end of shift  Outcome: Progressing  Goal: Be free from injury by end of the shift  Outcome: Progressing  Goal: Verbalize understanding of personal risk factors for fall in the hospital  Outcome: Progressing  Goal: Verbalize understanding of risk factor reduction measures to prevent injury from fall in the home  Outcome: Progressing  Goal: Use assistive devices by end of the shift  Outcome: Progressing  Goal: Pace activities to prevent fatigue by end of the shift  Outcome: Progressing

## 2025-07-13 NOTE — PROGRESS NOTES
"Angel Carroll is a 80 y.o. male on day 0 of admission presenting with JASBIR (acute kidney injury).    Subjective   Hematuria is doing little better.  He feels little better       Objective     Physical Exam  Vitals reviewed.   Constitutional:       Appearance: Normal appearance.   HENT:      Head: Normocephalic and atraumatic.      Right Ear: Tympanic membrane, ear canal and external ear normal.      Left Ear: Tympanic membrane, ear canal and external ear normal.      Nose: Nose normal.      Mouth/Throat:      Pharynx: Oropharynx is clear.   Eyes:      Extraocular Movements: Extraocular movements intact.      Conjunctiva/sclera: Conjunctivae normal.      Pupils: Pupils are equal, round, and reactive to light.   Cardiovascular:      Rate and Rhythm: Normal rate and regular rhythm.      Pulses: Normal pulses.      Heart sounds: Normal heart sounds.   Pulmonary:      Effort: Pulmonary effort is normal.      Breath sounds: Normal breath sounds.   Abdominal:      General: Abdomen is flat. Bowel sounds are normal.      Palpations: Abdomen is soft.   Musculoskeletal:      Cervical back: Normal range of motion and neck supple.   Skin:     General: Skin is warm and dry.   Neurological:      General: No focal deficit present.      Mental Status: He is alert and oriented to person, place, and time.   Psychiatric:         Mood and Affect: Mood normal.         Last Recorded Vitals  Blood pressure (!) 130/48, pulse 60, temperature 36.7 °C (98.1 °F), temperature source Oral, resp. rate 18, height 1.778 m (5' 10\"), weight 98.4 kg (217 lb), SpO2 100%.  Intake/Output last 3 Shifts:  I/O last 3 completed shifts:  In: 1352.5 (13.7 mL/kg) [I.V.:302.5 (3.1 mL/kg); IV Piggyback:1050]  Out: 400 (4.1 mL/kg) [Urine:400 (0.1 mL/kg/hr)]  Weight: 98.4 kg     Relevant Results               Results for orders placed or performed during the hospital encounter of 07/12/25 (from the past 24 hours)   CBC   Result Value Ref Range    WBC 4.7 4.4 - 11.3 " x10*3/uL    nRBC 0.0 0.0 - 0.0 /100 WBCs    RBC 3.70 (L) 4.50 - 5.90 x10*6/uL    Hemoglobin 11.2 (L) 13.5 - 17.5 g/dL    Hematocrit 33.9 (L) 41.0 - 52.0 %    MCV 92 80 - 100 fL    MCH 30.3 26.0 - 34.0 pg    MCHC 33.0 32.0 - 36.0 g/dL    RDW 14.0 11.5 - 14.5 %    Platelets 136 (L) 150 - 450 x10*3/uL   Comprehensive metabolic panel   Result Value Ref Range    Glucose 108 (H) 74 - 99 mg/dL    Sodium 139 136 - 145 mmol/L    Potassium 2.9 (LL) 3.5 - 5.3 mmol/L    Chloride 108 (H) 98 - 107 mmol/L    Bicarbonate 23 21 - 32 mmol/L    Anion Gap 11 10 - 20 mmol/L    Urea Nitrogen 24 (H) 6 - 23 mg/dL    Creatinine 1.93 (H) 0.50 - 1.30 mg/dL    eGFR 34 (L) >60 mL/min/1.73m*2    Calcium 8.0 (L) 8.6 - 10.3 mg/dL    Albumin 2.8 (L) 3.4 - 5.0 g/dL    Alkaline Phosphatase 47 33 - 136 U/L    Total Protein 5.1 (L) 6.4 - 8.2 g/dL    AST 23 9 - 39 U/L    Bilirubin, Total 0.8 0.0 - 1.2 mg/dL    ALT 5 (L) 10 - 52 U/L     *Note: Due to a large number of results and/or encounters for the requested time period, some results have not been displayed. A complete set of results can be found in Results Review.     XR chest 2 views  Result Date: 7/12/2025  Interpreted By:  Antwon Schultz, STUDY: XR CHEST 2 VIEWS;  7/12/2025 12:00 pm   INDICATION: Signs/Symptoms:fatigue, some cough.     COMPARISON: 04/19/2025   ACCESSION NUMBER(S): [id number]   ORDERING CLINICIAN: PHOEBE GALVAN   FINDINGS: PA and lateral radiographs of the chest were provided.       CARDIOMEDIASTINAL SILHOUETTE: Enlargement of the cardiopericardial silhouette similar to comparison. Dual lead left subclavian cardiac rhythm maintenance device in unchanged and satisfactory position   LUNGS: Interstitial coarsening diffusely throughout the lungs similar. No pneumothorax or pleural effusion.   ABDOMEN: Grossly unremarkable.   BONES: No acute osseous abnormality.       Diffuse interstitial coarsening is similar to comparison and may reflect pulmonary interstitial edema, scarring or  chronic interstitial lung disease.     MACRO: None   Signed by: Antwon Schultz 7/12/2025 4:39 PM Dictation workstation:   [id number]    XR shoulder right 2+ views  Result Date: 7/12/2025  Interpreted By:  Florencio Huff, STUDY: XR SHOULDER RIGHT 2+ VIEWS;  7/12/2025 12:00 pm   INDICATION: Signs/Symptoms:right shoulder pain, fall, injury.     COMPARISON: None.   ACCESSION NUMBER(S): [id number]   ORDERING CLINICIAN: PHOEBE GALVAN   TECHNIQUE: AP and lateral views  of the    right shoulder were obtained.   FINDINGS: Moderate AC joint hypertrophy and spur formation. Space between the acromion and the humeral head was preserved. Mild glenohumeral spur formation. No lytic or blastic destructive bone lesion. No acute fracture or dislocation. No opaque soft tissue foreign body. No periosteal reaction or erosion. The imaged lung was grossly clear.       DJD as described. No acute fracture or dislocation based on this exam.   MACRO: None   Signed by: Florencio Huff 7/12/2025 12:26 PM Dictation workstation:   [id number]    CT cervical spine wo IV contrast  Result Date: 7/12/2025  Interpreted By:  Florencio Huff, STUDY: CT CERVICAL SPINE WO IV CONTRAST;  7/12/2025 11:56 am   INDICATION: Signs/Symptoms:fall, head injury.     COMPARISON: Comparison study is from 08/08/2024.   ACCESSION NUMBER(S): [id number]   ORDERING CLINICIAN: PHOEBE GALVAN   TECHNIQUE: Thin section axial images were obtained from the skull base down through the thoracic inlet. Sagittal and coronal reconstruction images were generated. Soft tissue, lung, and bone windows were reviewed.   FINDINGS: VERTEBRAL BODIES AND POSTERIOR ELEMENTS: Straightening of normal cervical lordosis. Joint space loss with spurring in the anterior superior aspect of the atlantoaxial joint. Stable prominent calcification and thickening of the transverse ligament posterior to the dens. Grade 1 anterolisthesis of C3 over C4 and of C4 over C5. Disc space height is preserved at C3-4  but there is mild anterior osteophytosis. There is moderate disc space narrowing with endplate spurring at C5-6 and C6-7. There is scattered multilevel interfacet hypertrophy with spur formation. There is uncovertebral hypertrophy with spurring at C5-6 and C6-7. No cervical spine compression fracture. No posterior element fracture. No destructive bone lesion.     SPINAL CANAL: No gross disc herniation.   NECK SOFT TISSUES: Stable lateral right thyroid lobe hypodense nodule measuring 23 x 13 mm on image 84. Mild calcifications in the left carotid bulb.   LUNG APICES: Imaged portion of the lung apices are within normal limits.   SKULL BASE: Within normal limits.       DJD in the cervical spine as described. No CT evidence of cervical spine fracture in this exam.   Stable hypodense right thyroid lobe nodule. Please see above for details.   MACRO: None   Signed by: Florencio Huff 7/12/2025 12:23 PM Dictation workstation:   [id number]    CT head wo IV contrast  Result Date: 7/12/2025  Interpreted By:  Antwon Schultz, STUDY: CT HEAD WO IV CONTRAST;  7/12/2025 11:56 am   INDICATION: Signs/Symptoms:fall, head injury.     COMPARISON: 08/08/2024   ACCESSION NUMBER(S): [id number]   ORDERING CLINICIAN: PHOEBE GALVAN   TECHNIQUE: Noncontrast axial CT scan of head was performed. Angled reformats in brain and bone windows were generated. The images were reviewed in bone, brain, blood and soft tissue windows.   FINDINGS: Calvarium: Redemonstrated frontal craniotomy. No acute calvarial fracture.   Paranasal sinuses and mastoids: Visualized paranasal sinuses and mastoids are clear.   Parenchyma/CSF Spaces: No acute large vascular territory infarct.Encephalomalacia within the right frontal pole with dystrophic calcifications and a symmetric enlargement of the anterior horn of the right lateral ventricle similar to comparison. Remote bilateral basal ganglia infarcts. No mass effect or midline shift. No acute intracranial hemorrhage.  No hydrocephalus. Patchy low-attenuation within the periventricular and subcortical white matter commonly attributed to chronic microvascular ischemia in patients of this age.Global cerebral and cerebellar volume loss with ex vacuo ventricular dilatation is age appropriate. Intracranial atherosclerosis.           No CT evidence of acute intracranial abnormality. Unchanged relative to comparison CT head 08/08/2024           MACRO: None   Signed by: Antwon Schultz 7/12/2025 12:16 PM Dictation workstation:   [id number]                   Assessment & Plan  JASBIR (acute kidney injury)    UTI (urinary tract infection)    Hematuria    Stage 3b chronic kidney disease (Multi)    Paroxysmal atrial fibrillation (Multi)    Pacemaker    Ischemic cardiomyopathy    Hypertension    Hyperlipidemia    BPH (benign prostatic hyperplasia)    Recurrent falls    Replace potassium  Kidney functions doing little better  Continue hydration with IV fluids  Continue antibiotic for UTI cultures are pending  Urology input pending  Blood pressure stable      I spent  minutes in the professional and overall care of this patient.      Tasneem Sandoval MD

## 2025-07-13 NOTE — CARE PLAN
The patient's goals for the shift include fall free    The clinical goals for the shift include Pt will be HDS    Ove

## 2025-07-13 NOTE — CARE PLAN
Problem: Skin  Goal: Decreased wound size/increased tissue granulation at next dressing change  7/13/2025 1958 by Betty Hill RN  Outcome: Progressing  7/13/2025 1950 by Betty Hill RN  Outcome: Progressing  7/13/2025 1949 by Betty Hill RN  Outcome: Progressing   The patient's goals for the shift include fall free    The clinical goals for the shift include Pt will be HDS

## 2025-07-13 NOTE — H&P
History Of Present Illness  Angel Carroll is a 80 y.o. male presenting with recurrent fall.  He had a fall yesterday and 2.  He just felt weak in the  Lost his balance and fell.  He is on antibiotic for UTI.  Complaining of generalized weakness.  Some chest congestion he wears 2 L of oxygen.  Patient has history of coronary artery disease, hyperlipidemia, chronic diastolic heart failure, pacemaker present, history of nephrolithiasis, BPH, chronic kidney disease, COPD chronic respiratory failure, meningioma, CVA in the setting of subtherapeutic INR with near subtotal occlusion of right ICA s/p carotid endarterectomy in 2021.  He was recently tested positive for COVID     Past Medical History  Medical History[1]    Surgical History  Surgical History[2]     Social History  He reports that he quit smoking about 31 years ago. His smoking use included cigarettes. He has never used smokeless tobacco. He reports that he does not currently use alcohol. He reports that he does not use drugs.    Family History  Family History[3]     Allergies  Other, Ranolazine, Nsaids (non-steroidal anti-inflammatory drug), and Cyclobenzaprine    Review of Systems   Constitutional:  Positive for fatigue. Negative for activity change, appetite change, chills, diaphoresis, fever and unexpected weight change.   HENT:  Negative for congestion, dental problem, drooling, ear discharge, ear pain, facial swelling, hearing loss, mouth sores, nosebleeds, postnasal drip, rhinorrhea, sinus pressure, sinus pain, sneezing, sore throat, tinnitus, trouble swallowing and voice change.    Eyes:  Negative for photophobia, pain, discharge, redness, itching and visual disturbance.   Respiratory:  Negative for apnea, cough, choking, chest tightness, shortness of breath, wheezing and stridor.    Cardiovascular:  Negative for chest pain, palpitations and leg swelling.   Gastrointestinal:  Negative for abdominal distention, abdominal pain, anal bleeding, blood in stool,  constipation, diarrhea, nausea, rectal pain and vomiting.   Endocrine: Negative for cold intolerance, heat intolerance, polydipsia, polyphagia and polyuria.   Genitourinary:  Negative for decreased urine volume, difficulty urinating, dysuria, enuresis, flank pain, frequency, genital sores, hematuria and urgency.   Musculoskeletal:  Negative for arthralgias, back pain, gait problem, joint swelling, myalgias, neck pain and neck stiffness.   Skin:  Negative for color change, pallor, rash and wound.   Allergic/Immunologic: Negative for environmental allergies, food allergies and immunocompromised state.   Neurological:  Negative for dizziness, tremors, seizures, syncope, facial asymmetry, speech difficulty, weakness, light-headedness, numbness and headaches.   Hematological:  Negative for adenopathy. Does not bruise/bleed easily.   Psychiatric/Behavioral:  Negative for agitation, behavioral problems, confusion, decreased concentration, dysphoric mood, hallucinations, self-injury, sleep disturbance and suicidal ideas. The patient is not nervous/anxious and is not hyperactive.         Physical Exam  Vitals reviewed.   Constitutional:       Appearance: Normal appearance.   HENT:      Head: Normocephalic and atraumatic.      Right Ear: Tympanic membrane, ear canal and external ear normal.      Left Ear: Tympanic membrane, ear canal and external ear normal.      Nose: Nose normal.      Mouth/Throat:      Pharynx: Oropharynx is clear.   Eyes:      Extraocular Movements: Extraocular movements intact.      Conjunctiva/sclera: Conjunctivae normal.      Pupils: Pupils are equal, round, and reactive to light.   Cardiovascular:      Rate and Rhythm: Normal rate and regular rhythm.      Pulses: Normal pulses.      Heart sounds: Normal heart sounds.   Pulmonary:      Effort: Pulmonary effort is normal.      Breath sounds: Normal breath sounds.   Abdominal:      General: Abdomen is flat. Bowel sounds are normal.      Palpations:  "Abdomen is soft.   Musculoskeletal:      Cervical back: Normal range of motion and neck supple.   Skin:     General: Skin is warm and dry.   Neurological:      General: No focal deficit present.      Mental Status: He is alert and oriented to person, place, and time.   Psychiatric:         Mood and Affect: Mood normal.          Last Recorded Vitals  Blood pressure 126/52, pulse 66, temperature 36.7 °C (98.1 °F), temperature source Oral, resp. rate 18, height 1.778 m (5' 10\"), weight 98.4 kg (217 lb), SpO2 100%.    Relevant Results        Results for orders placed or performed during the hospital encounter of 07/12/25 (from the past 24 hours)   CBC and Auto Differential   Result Value Ref Range    WBC 5.1 4.4 - 11.3 x10*3/uL    nRBC 0.0 0.0 - 0.0 /100 WBCs    RBC 3.92 (L) 4.50 - 5.90 x10*6/uL    Hemoglobin 11.9 (L) 13.5 - 17.5 g/dL    Hematocrit 36.8 (L) 41.0 - 52.0 %    MCV 94 80 - 100 fL    MCH 30.4 26.0 - 34.0 pg    MCHC 32.3 32.0 - 36.0 g/dL    RDW 14.0 11.5 - 14.5 %    Platelets 138 (L) 150 - 450 x10*3/uL    Neutrophils % 81.2 40.0 - 80.0 %    Immature Granulocytes %, Automated 0.6 0.0 - 0.9 %    Lymphocytes % 8.8 13.0 - 44.0 %    Monocytes % 8.8 2.0 - 10.0 %    Eosinophils % 0.4 0.0 - 6.0 %    Basophils % 0.2 0.0 - 2.0 %    Neutrophils Absolute 4.17 1.60 - 5.50 x10*3/uL    Immature Granulocytes Absolute, Automated 0.03 0.00 - 0.50 x10*3/uL    Lymphocytes Absolute 0.45 (L) 0.80 - 3.00 x10*3/uL    Monocytes Absolute 0.45 0.05 - 0.80 x10*3/uL    Eosinophils Absolute 0.02 0.00 - 0.40 x10*3/uL    Basophils Absolute 0.01 0.00 - 0.10 x10*3/uL   Basic metabolic panel   Result Value Ref Range    Glucose 115 (H) 74 - 99 mg/dL    Sodium 138 136 - 145 mmol/L    Potassium 3.5 3.5 - 5.3 mmol/L    Chloride 102 98 - 107 mmol/L    Bicarbonate 29 21 - 32 mmol/L    Anion Gap 11 10 - 20 mmol/L    Urea Nitrogen 28 (H) 6 - 23 mg/dL    Creatinine 2.33 (H) 0.50 - 1.30 mg/dL    eGFR 27 (L) >60 mL/min/1.73m*2    Calcium 8.6 8.6 - 10.3 " mg/dL   Sars-CoV-2, Influenza A/B and RSV PCR   Result Value Ref Range    Coronavirus 2019, PCR Not Detected Not Detected    Flu A Result Not Detected Not Detected    Flu B Result Not Detected Not Detected    RSV PCR Not Detected Not Detected   Urinalysis with Reflex Culture and Microscopic   Result Value Ref Range    Color, Urine Light-Orange (N) Light-Yellow, Yellow, Dark-Yellow    Appearance, Urine Ex.Turbid (N) Clear    Specific Gravity, Urine 1.014 1.005 - 1.035    pH, Urine 7.5 5.0, 5.5, 6.0, 6.5, 7.0, 7.5, 8.0    Protein, Urine 200 (2+) (A) NEGATIVE, 10 (TRACE), 20 (TRACE) mg/dL    Glucose, Urine Normal Normal mg/dL    Blood, Urine 1.0 (3+) (A) NEGATIVE mg/dL    Ketones, Urine NEGATIVE NEGATIVE mg/dL    Bilirubin, Urine NEGATIVE NEGATIVE mg/dL    Urobilinogen, Urine Normal Normal mg/dL    Nitrite, Urine NEGATIVE NEGATIVE    Leukocyte Esterase, Urine 500 Ruth/uL (A) NEGATIVE   Microscopic Only, Urine   Result Value Ref Range    WBC, Urine >50 (A) 1-5, NONE /HPF    WBC Clumps, Urine MANY Reference range not established. /HPF    RBC, Urine >20 (A) NONE, 1-2, 3-5 /HPF    Bacteria, Urine 4+ (A) NONE SEEN /HPF     *Note: Due to a large number of results and/or encounters for the requested time period, some results have not been displayed. A complete set of results can be found in Results Review.     XR chest 2 views  Result Date: 7/12/2025  Interpreted By:  Antwon Schultz, STUDY: XR CHEST 2 VIEWS;  7/12/2025 12:00 pm   INDICATION: Signs/Symptoms:fatigue, some cough.     COMPARISON: 04/19/2025   ACCESSION NUMBER(S): [id number]   ORDERING CLINICIAN: PHOEBE GALVAN   FINDINGS: PA and lateral radiographs of the chest were provided.       CARDIOMEDIASTINAL SILHOUETTE: Enlargement of the cardiopericardial silhouette similar to comparison. Dual lead left subclavian cardiac rhythm maintenance device in unchanged and satisfactory position   LUNGS: Interstitial coarsening diffusely throughout the lungs similar. No  pneumothorax or pleural effusion.   ABDOMEN: Grossly unremarkable.   BONES: No acute osseous abnormality.       Diffuse interstitial coarsening is similar to comparison and may reflect pulmonary interstitial edema, scarring or chronic interstitial lung disease.     MACRO: None   Signed by: Antwon Schultz 7/12/2025 4:39 PM Dictation workstation:   [id number]    XR shoulder right 2+ views  Result Date: 7/12/2025  Interpreted By:  Florencio Huff, STUDY: XR SHOULDER RIGHT 2+ VIEWS;  7/12/2025 12:00 pm   INDICATION: Signs/Symptoms:right shoulder pain, fall, injury.     COMPARISON: None.   ACCESSION NUMBER(S): [id number]   ORDERING CLINICIAN: PHOEBE GALVAN   TECHNIQUE: AP and lateral views  of the    right shoulder were obtained.   FINDINGS: Moderate AC joint hypertrophy and spur formation. Space between the acromion and the humeral head was preserved. Mild glenohumeral spur formation. No lytic or blastic destructive bone lesion. No acute fracture or dislocation. No opaque soft tissue foreign body. No periosteal reaction or erosion. The imaged lung was grossly clear.       DJD as described. No acute fracture or dislocation based on this exam.   MACRO: None   Signed by: Florencio Huff 7/12/2025 12:26 PM Dictation workstation:   [id number]    CT cervical spine wo IV contrast  Result Date: 7/12/2025  Interpreted By:  Florencio Huff, STUDY: CT CERVICAL SPINE WO IV CONTRAST;  7/12/2025 11:56 am   INDICATION: Signs/Symptoms:fall, head injury.     COMPARISON: Comparison study is from 08/08/2024.   ACCESSION NUMBER(S): [id number]   ORDERING CLINICIAN: PHOEBE GALVAN   TECHNIQUE: Thin section axial images were obtained from the skull base down through the thoracic inlet. Sagittal and coronal reconstruction images were generated. Soft tissue, lung, and bone windows were reviewed.   FINDINGS: VERTEBRAL BODIES AND POSTERIOR ELEMENTS: Straightening of normal cervical lordosis. Joint space loss with spurring in the anterior superior  aspect of the atlantoaxial joint. Stable prominent calcification and thickening of the transverse ligament posterior to the dens. Grade 1 anterolisthesis of C3 over C4 and of C4 over C5. Disc space height is preserved at C3-4 but there is mild anterior osteophytosis. There is moderate disc space narrowing with endplate spurring at C5-6 and C6-7. There is scattered multilevel interfacet hypertrophy with spur formation. There is uncovertebral hypertrophy with spurring at C5-6 and C6-7. No cervical spine compression fracture. No posterior element fracture. No destructive bone lesion.     SPINAL CANAL: No gross disc herniation.   NECK SOFT TISSUES: Stable lateral right thyroid lobe hypodense nodule measuring 23 x 13 mm on image 84. Mild calcifications in the left carotid bulb.   LUNG APICES: Imaged portion of the lung apices are within normal limits.   SKULL BASE: Within normal limits.       DJD in the cervical spine as described. No CT evidence of cervical spine fracture in this exam.   Stable hypodense right thyroid lobe nodule. Please see above for details.   MACRO: None   Signed by: Florencio Huff 7/12/2025 12:23 PM Dictation workstation:   [id number]    CT head wo IV contrast  Result Date: 7/12/2025  Interpreted By:  Antwon Schultz, STUDY: CT HEAD WO IV CONTRAST;  7/12/2025 11:56 am   INDICATION: Signs/Symptoms:fall, head injury.     COMPARISON: 08/08/2024   ACCESSION NUMBER(S): [id number]   ORDERING CLINICIAN: PHOEBE GALVAN   TECHNIQUE: Noncontrast axial CT scan of head was performed. Angled reformats in brain and bone windows were generated. The images were reviewed in bone, brain, blood and soft tissue windows.   FINDINGS: Calvarium: Redemonstrated frontal craniotomy. No acute calvarial fracture.   Paranasal sinuses and mastoids: Visualized paranasal sinuses and mastoids are clear.   Parenchyma/CSF Spaces: No acute large vascular territory infarct.Encephalomalacia within the right frontal pole with dystrophic  calcifications and a symmetric enlargement of the anterior horn of the right lateral ventricle similar to comparison. Remote bilateral basal ganglia infarcts. No mass effect or midline shift. No acute intracranial hemorrhage. No hydrocephalus. Patchy low-attenuation within the periventricular and subcortical white matter commonly attributed to chronic microvascular ischemia in patients of this age.Global cerebral and cerebellar volume loss with ex vacuo ventricular dilatation is age appropriate. Intracranial atherosclerosis.           No CT evidence of acute intracranial abnormality. Unchanged relative to comparison CT head 08/08/2024           MACRO: None   Signed by: Antwon Schultz 7/12/2025 12:16 PM Dictation workstation:   [id number]         Assessment & Plan  JASBIR (acute kidney injury)    UTI (urinary tract infection)    Hematuria    Stage 3b chronic kidney disease (Multi)    Paroxysmal atrial fibrillation (Multi)    Pacemaker    Ischemic cardiomyopathy    Hypertension    Hyperlipidemia    BPH (benign prostatic hyperplasia)    Recurrent falls        Patient on antibiotic  IV fluids  Hold anticoagulants  Consult urology  Ultrasound kidney  Continue Flomax  Continue home medications  Continue oxygen and breathing treatment  PT OT for recurrent falls   consult if patient will need rehab    I spent  minutes in the professional and overall care of this patient.      Tasneem Sandoval MD         [1]   Past Medical History:  Diagnosis Date    Atherosclerotic heart disease of native coronary artery without angina pectoris 12/14/2022    Arteriosclerotic cardiovascular disease (ASCVD)    Benign neoplasm of meninges, unspecified     Meningioma    BPH (benign prostatic hyperplasia)     Chronic diastolic heart failure     Chronic kidney disease     Chronic obstructive pulmonary disease (COPD) (Multi)     Chronic respiratory failure     Elevated blood-pressure reading, without diagnosis of hypertension      Prehypertension    Hyperlipidemia     Meningioma (Multi)     Nephrolithiasis     Rectal bleeding     Sick sinus syndrome (Multi)     s/p pacemaker placement    Vitamin D deficiency    [2]   Past Surgical History:  Procedure Laterality Date    BACK SURGERY  04/18/2016    Back Surgery    BRAIN SURGERY      COLONOSCOPY  07/03/2013    Complete Colonoscopy    CORONARY ANGIOPLASTY WITH STENT PLACEMENT  08/18/2020    Cath Stent Placement    CT ANGIO NECK  09/25/2021    CT NECK ANGIO W AND WO IV CONTRAST 9/25/2021 [facility]    CT HEAD ANGIO W AND WO IV CONTRAST  09/25/2021    CT HEAD ANGIO W AND WO IV CONTRAST 9/25/2021 [facility]    CYSTOSCOPY W/ URETERAL STENT PLACEMENT      EYE SURGERY  04/03/2013    Eye Surgery    KNEE ARTHROSCOPY W/ DEBRIDEMENT  07/03/2013    Arthroscopy Knee Right    KNEE ARTHROSCOPY W/ DEBRIDEMENT  07/03/2013    Arthroscopy Knee Left    LITHOTRIPSY  07/22/2013    Renal Lithotripsy    MR HEAD ANGIO WO IV CONTRAST  03/23/2021    MR HEAD ANGIO WO IV CONTRAST [facility]    MR NECK ANGIO WO IV CONTRAST  03/23/2021    MR NECK ANGIO WO IV CONTRAST [facility]    OTHER SURGICAL HISTORY  04/03/2013    Cardiac Cath Procedure Outcome: Successful    OTHER SURGICAL HISTORY  04/03/2013    Neuroplasty With Transposition Of Ulnar Nerve - At Elbow    OTHER SURGICAL HISTORY  07/03/2013    Wrist Carpectomy    OTHER SURGICAL HISTORY  07/03/2013    Ulnar Osteotomy    PACEMAKER PLACEMENT      TONSILLECTOMY      TOTAL KNEE ARTHROPLASTY     [3]   Family History  Problem Relation Name Age of Onset    Diabetes Mother      Emphysema Father      Heart disease Father

## 2025-07-13 NOTE — CARE PLAN
The patient's goals for the shift include fall free    The clinical goals for the shift include rest    Ove

## 2025-07-13 NOTE — NURSING NOTE
Assumed care for patient at 1900. Patient in bed, no complaint of pain. Routine night meds given. Will continue to follow plan of care.

## 2025-07-13 NOTE — CARE PLAN
The patient's goals for the shift include fall free    The clinical goals for the shift include monitor safety and comfort.

## 2025-07-14 ENCOUNTER — APPOINTMENT (OUTPATIENT)
Dept: RADIOLOGY | Facility: HOSPITAL | Age: 80
End: 2025-07-14
Payer: MEDICARE

## 2025-07-14 ENCOUNTER — APPOINTMENT (OUTPATIENT)
Dept: CARDIOLOGY | Facility: HOSPITAL | Age: 80
End: 2025-07-14
Payer: MEDICARE

## 2025-07-14 PROBLEM — N13.2 HYDRONEPHROSIS WITH URINARY OBSTRUCTION DUE TO RENAL CALCULUS: Status: ACTIVE | Noted: 2025-07-14

## 2025-07-14 LAB
ANION GAP SERPL CALCULATED.3IONS-SCNC: 9 MMOL/L (ref 10–20)
BACTERIA UR CULT: NORMAL
BUN SERPL-MCNC: 21 MG/DL (ref 6–23)
CALCIUM SERPL-MCNC: 8.6 MG/DL (ref 8.6–10.3)
CHLORIDE SERPL-SCNC: 108 MMOL/L (ref 98–107)
CO2 SERPL-SCNC: 27 MMOL/L (ref 21–32)
CREAT SERPL-MCNC: 1.46 MG/DL (ref 0.5–1.3)
EGFRCR SERPLBLD CKD-EPI 2021: 48 ML/MIN/1.73M*2
ERYTHROCYTE [DISTWIDTH] IN BLOOD BY AUTOMATED COUNT: 13.9 % (ref 11.5–14.5)
GLUCOSE SERPL-MCNC: 96 MG/DL (ref 74–99)
HCT VFR BLD AUTO: 34.6 % (ref 41–52)
HGB BLD-MCNC: 11.3 G/DL (ref 13.5–17.5)
MCH RBC QN AUTO: 30.1 PG (ref 26–34)
MCHC RBC AUTO-ENTMCNC: 32.7 G/DL (ref 32–36)
MCV RBC AUTO: 92 FL (ref 80–100)
NRBC BLD-RTO: 0 /100 WBCS (ref 0–0)
PLATELET # BLD AUTO: 134 X10*3/UL (ref 150–450)
POTASSIUM SERPL-SCNC: 3 MMOL/L (ref 3.5–5.3)
RBC # BLD AUTO: 3.76 X10*6/UL (ref 4.5–5.9)
SODIUM SERPL-SCNC: 141 MMOL/L (ref 136–145)
WBC # BLD AUTO: 4.2 X10*3/UL (ref 4.4–11.3)

## 2025-07-14 PROCEDURE — 85027 COMPLETE CBC AUTOMATED: CPT | Performed by: INTERNAL MEDICINE

## 2025-07-14 PROCEDURE — 93005 ELECTROCARDIOGRAM TRACING: CPT

## 2025-07-14 PROCEDURE — 2500000004 HC RX 250 GENERAL PHARMACY W/ HCPCS (ALT 636 FOR OP/ED): Performed by: INTERNAL MEDICINE

## 2025-07-14 PROCEDURE — 2500000002 HC RX 250 W HCPCS SELF ADMINISTERED DRUGS (ALT 637 FOR MEDICARE OP, ALT 636 FOR OP/ED): Performed by: INTERNAL MEDICINE

## 2025-07-14 PROCEDURE — 99233 SBSQ HOSP IP/OBS HIGH 50: CPT | Performed by: INTERNAL MEDICINE

## 2025-07-14 PROCEDURE — 36415 COLL VENOUS BLD VENIPUNCTURE: CPT | Performed by: INTERNAL MEDICINE

## 2025-07-14 PROCEDURE — 80048 BASIC METABOLIC PNL TOTAL CA: CPT | Performed by: INTERNAL MEDICINE

## 2025-07-14 PROCEDURE — 1200000002 HC GENERAL ROOM WITH TELEMETRY DAILY

## 2025-07-14 PROCEDURE — 2500000001 HC RX 250 WO HCPCS SELF ADMINISTERED DRUGS (ALT 637 FOR MEDICARE OP): Performed by: INTERNAL MEDICINE

## 2025-07-14 PROCEDURE — 97166 OT EVAL MOD COMPLEX 45 MIN: CPT | Mod: GO

## 2025-07-14 PROCEDURE — 74176 CT ABD & PELVIS W/O CONTRAST: CPT | Performed by: RADIOLOGY

## 2025-07-14 PROCEDURE — 74176 CT ABD & PELVIS W/O CONTRAST: CPT

## 2025-07-14 PROCEDURE — 99233 SBSQ HOSP IP/OBS HIGH 50: CPT | Performed by: UROLOGY

## 2025-07-14 PROCEDURE — 97162 PT EVAL MOD COMPLEX 30 MIN: CPT | Mod: GP

## 2025-07-14 RX ORDER — POTASSIUM CHLORIDE 20 MEQ/1
40 TABLET, EXTENDED RELEASE ORAL ONCE
Status: COMPLETED | OUTPATIENT
Start: 2025-07-14 | End: 2025-07-14

## 2025-07-14 RX ADMIN — AMIODARONE HYDROCHLORIDE 100 MG: 100 TABLET ORAL at 09:56

## 2025-07-14 RX ADMIN — FOLIC ACID 1 MG: 1 TABLET ORAL at 09:55

## 2025-07-14 RX ADMIN — ATORVASTATIN CALCIUM 20 MG: 20 TABLET, FILM COATED ORAL at 20:47

## 2025-07-14 RX ADMIN — CYANOCOBALAMIN TAB 500 MCG 500 MCG: 500 TAB at 09:54

## 2025-07-14 RX ADMIN — TAMSULOSIN HYDROCHLORIDE 0.4 MG: 0.4 CAPSULE ORAL at 20:47

## 2025-07-14 RX ADMIN — PANTOPRAZOLE SODIUM 40 MG: 40 TABLET, DELAYED RELEASE ORAL at 06:22

## 2025-07-14 RX ADMIN — DOCUSATE SODIUM 100 MG: 100 CAPSULE, LIQUID FILLED ORAL at 09:55

## 2025-07-14 RX ADMIN — FINASTERIDE 5 MG: 5 TABLET, FILM COATED ORAL at 09:55

## 2025-07-14 RX ADMIN — FENOFIBRATE 54 MG: 54 TABLET ORAL at 09:55

## 2025-07-14 RX ADMIN — PAROXETINE HYDROCHLORIDE 20 MG: 20 TABLET, FILM COATED ORAL at 09:55

## 2025-07-14 RX ADMIN — NYSTATIN: 100000 POWDER TOPICAL at 09:55

## 2025-07-14 RX ADMIN — METOPROLOL SUCCINATE 25 MG: 25 TABLET, EXTENDED RELEASE ORAL at 09:55

## 2025-07-14 RX ADMIN — NYSTATIN 1 APPLICATION: 100000 POWDER TOPICAL at 00:02

## 2025-07-14 RX ADMIN — DOCUSATE SODIUM 100 MG: 100 CAPSULE, LIQUID FILLED ORAL at 20:47

## 2025-07-14 RX ADMIN — CEFTRIAXONE 1 G: 1 INJECTION, SOLUTION INTRAVENOUS at 12:36

## 2025-07-14 RX ADMIN — GABAPENTIN 100 MG: 100 CAPSULE ORAL at 20:47

## 2025-07-14 RX ADMIN — POTASSIUM CHLORIDE EXTENDED-RELEASE 40 MEQ: 1500 TABLET ORAL at 15:56

## 2025-07-14 SDOH — SOCIAL STABILITY: SOCIAL INSECURITY: WITHIN THE LAST YEAR, HAVE YOU BEEN AFRAID OF YOUR PARTNER OR EX-PARTNER?: PATIENT UNABLE TO ANSWER

## 2025-07-14 SDOH — ECONOMIC STABILITY: FOOD INSECURITY: WITHIN THE PAST 12 MONTHS, YOU WORRIED THAT YOUR FOOD WOULD RUN OUT BEFORE YOU GOT THE MONEY TO BUY MORE.: NEVER TRUE

## 2025-07-14 SDOH — ECONOMIC STABILITY: TRANSPORTATION INSECURITY: IN THE PAST 12 MONTHS, HAS LACK OF TRANSPORTATION KEPT YOU FROM MEDICAL APPOINTMENTS OR FROM GETTING MEDICATIONS?: NO

## 2025-07-14 SDOH — HEALTH STABILITY: MENTAL HEALTH: HOW OFTEN DO YOU HAVE A DRINK CONTAINING ALCOHOL?: NEVER

## 2025-07-14 SDOH — SOCIAL STABILITY: SOCIAL INSECURITY: ARE YOU MARRIED, WIDOWED, DIVORCED, SEPARATED, NEVER MARRIED, OR LIVING WITH A PARTNER?: MARRIED

## 2025-07-14 SDOH — ECONOMIC STABILITY: INCOME INSECURITY: IN THE PAST 12 MONTHS HAS THE ELECTRIC, GAS, OIL, OR WATER COMPANY THREATENED TO SHUT OFF SERVICES IN YOUR HOME?: NO

## 2025-07-14 SDOH — ECONOMIC STABILITY: HOUSING INSECURITY: IN THE PAST 12 MONTHS, HOW MANY TIMES HAVE YOU MOVED WHERE YOU WERE LIVING?: 0

## 2025-07-14 SDOH — HEALTH STABILITY: MENTAL HEALTH: HOW OFTEN DO YOU HAVE SIX OR MORE DRINKS ON ONE OCCASION?: NEVER

## 2025-07-14 SDOH — ECONOMIC STABILITY: FOOD INSECURITY: WITHIN THE PAST 12 MONTHS, THE FOOD YOU BOUGHT JUST DIDN'T LAST AND YOU DIDN'T HAVE MONEY TO GET MORE.: NEVER TRUE

## 2025-07-14 SDOH — SOCIAL STABILITY: SOCIAL NETWORK: HOW OFTEN DO YOU ATTEND CHURCH OR RELIGIOUS SERVICES?: 1 TO 4 TIMES PER YEAR

## 2025-07-14 SDOH — ECONOMIC STABILITY: HOUSING INSECURITY: IN THE LAST 12 MONTHS, WAS THERE A TIME WHEN YOU WERE NOT ABLE TO PAY THE MORTGAGE OR RENT ON TIME?: NO

## 2025-07-14 SDOH — ECONOMIC STABILITY: HOUSING INSECURITY: AT ANY TIME IN THE PAST 12 MONTHS, WERE YOU HOMELESS OR LIVING IN A SHELTER (INCLUDING NOW)?: NO

## 2025-07-14 SDOH — HEALTH STABILITY: MENTAL HEALTH: HOW MANY DRINKS CONTAINING ALCOHOL DO YOU HAVE ON A TYPICAL DAY WHEN YOU ARE DRINKING?: PATIENT DOES NOT DRINK

## 2025-07-14 SDOH — SOCIAL STABILITY: SOCIAL NETWORK: IN A TYPICAL WEEK, HOW MANY TIMES DO YOU TALK ON THE PHONE WITH FAMILY, FRIENDS, OR NEIGHBORS?: ONCE A WEEK

## 2025-07-14 SDOH — SOCIAL STABILITY: SOCIAL NETWORK: HOW OFTEN DO YOU ATTEND MEETINGS OF THE CLUBS OR ORGANIZATIONS YOU BELONG TO?: NEVER

## 2025-07-14 SDOH — ECONOMIC STABILITY: FOOD INSECURITY: HOW HARD IS IT FOR YOU TO PAY FOR THE VERY BASICS LIKE FOOD, HOUSING, MEDICAL CARE, AND HEATING?: NOT VERY HARD

## 2025-07-14 SDOH — HEALTH STABILITY: PHYSICAL HEALTH: ON AVERAGE, HOW MANY DAYS PER WEEK DO YOU ENGAGE IN MODERATE TO STRENUOUS EXERCISE (LIKE A BRISK WALK)?: 0 DAYS

## 2025-07-14 SDOH — HEALTH STABILITY: PHYSICAL HEALTH: ON AVERAGE, HOW MANY MINUTES DO YOU ENGAGE IN EXERCISE AT THIS LEVEL?: 0 MIN

## 2025-07-14 SDOH — SOCIAL STABILITY: SOCIAL NETWORK: HOW OFTEN DO YOU GET TOGETHER WITH FRIENDS OR RELATIVES?: THREE TIMES A WEEK

## 2025-07-14 ASSESSMENT — COGNITIVE AND FUNCTIONAL STATUS - GENERAL
HELP NEEDED FOR BATHING: A LOT
PERSONAL GROOMING: A LITTLE
MOVING TO AND FROM BED TO CHAIR: A LOT
TOILETING: A LOT
EATING MEALS: A LITTLE
PERSONAL GROOMING: A LITTLE
WALKING IN HOSPITAL ROOM: A LITTLE
WALKING IN HOSPITAL ROOM: A LITTLE
MOBILITY SCORE: 11
EATING MEALS: A LITTLE
TOILETING: A LITTLE
DRESSING REGULAR LOWER BODY CLOTHING: A LOT
DAILY ACTIVITIY SCORE: 21
MOVING TO AND FROM BED TO CHAIR: A LITTLE
PERSONAL GROOMING: A LITTLE
MOVING TO AND FROM BED TO CHAIR: A LITTLE
STANDING UP FROM CHAIR USING ARMS: A LOT
MOVING FROM LYING ON BACK TO SITTING ON SIDE OF FLAT BED WITH BEDRAILS: A LOT
CLIMB 3 TO 5 STEPS WITH RAILING: TOTAL
MOBILITY SCORE: 19
TURNING FROM BACK TO SIDE WHILE IN FLAT BAD: A LOT
STANDING UP FROM CHAIR USING ARMS: A LITTLE
STANDING UP FROM CHAIR USING ARMS: A LITTLE
CLIMB 3 TO 5 STEPS WITH RAILING: A LOT
WALKING IN HOSPITAL ROOM: A LOT
DRESSING REGULAR UPPER BODY CLOTHING: A LITTLE
DAILY ACTIVITIY SCORE: 21
MOBILITY SCORE: 19
DAILY ACTIVITIY SCORE: 16
TOILETING: A LITTLE
CLIMB 3 TO 5 STEPS WITH RAILING: A LOT

## 2025-07-14 ASSESSMENT — ACTIVITIES OF DAILY LIVING (ADL)
BATHING_ASSISTANCE: MODERATE
LACK_OF_TRANSPORTATION: NO
ADL_ASSISTANCE: INDEPENDENT
LACK_OF_TRANSPORTATION: NO

## 2025-07-14 ASSESSMENT — LIFESTYLE VARIABLES
AUDIT-C TOTAL SCORE: 0
SKIP TO QUESTIONS 9-10: 1

## 2025-07-14 ASSESSMENT — PAIN SCALES - GENERAL
PAINLEVEL_OUTOF10: 3
PAINLEVEL_OUTOF10: 0 - NO PAIN
PAINLEVEL_OUTOF10: 3

## 2025-07-14 ASSESSMENT — PAIN - FUNCTIONAL ASSESSMENT
PAIN_FUNCTIONAL_ASSESSMENT: 0-10

## 2025-07-14 NOTE — CARE PLAN
Problem: Skin  Goal: Decreased wound size/increased tissue granulation at next dressing change  Outcome: Progressing     Problem: Mobility  Goal: Transfers including sit to stand and stand to sit with min assist.  Outcome: Progressing  Goal: Ambulate 50 feet with rolling walker and min assist.  Outcome: Progressing

## 2025-07-14 NOTE — PROGRESS NOTES
"Angel Carroll is a 80 y.o. male on day 1 of admission presenting with JASBIR (acute kidney injury).    Subjective   Hematuria is doing little better.  He feels little better       Objective     Physical Exam  Vitals reviewed.   Constitutional:       Appearance: Normal appearance.   HENT:      Head: Normocephalic and atraumatic.      Right Ear: Tympanic membrane, ear canal and external ear normal.      Left Ear: Tympanic membrane, ear canal and external ear normal.      Nose: Nose normal.      Mouth/Throat:      Pharynx: Oropharynx is clear.   Eyes:      Extraocular Movements: Extraocular movements intact.      Conjunctiva/sclera: Conjunctivae normal.      Pupils: Pupils are equal, round, and reactive to light.   Cardiovascular:      Rate and Rhythm: Normal rate and regular rhythm.      Pulses: Normal pulses.      Heart sounds: Normal heart sounds.   Pulmonary:      Effort: Pulmonary effort is normal.      Breath sounds: Normal breath sounds.   Abdominal:      General: Abdomen is flat. Bowel sounds are normal.      Palpations: Abdomen is soft.   Musculoskeletal:      Cervical back: Normal range of motion and neck supple.   Skin:     General: Skin is warm and dry.   Neurological:      General: No focal deficit present.      Mental Status: He is alert and oriented to person, place, and time.   Psychiatric:         Mood and Affect: Mood normal.         Last Recorded Vitals  Blood pressure 135/53, pulse 60, temperature 36.5 °C (97.7 °F), temperature source Oral, resp. rate 18, height 1.778 m (5' 10\"), weight 98.4 kg (217 lb), SpO2 95%.  Intake/Output last 3 Shifts:  I/O last 3 completed shifts:  In: 3305.8 (33.6 mL/kg) [P.O.:240; I.V.:3015.8 (30.6 mL/kg); IV Piggyback:50]  Out: 400 (4.1 mL/kg) [Urine:400 (0.1 mL/kg/hr)]  Weight: 98.4 kg     Relevant Results               Results for orders placed or performed during the hospital encounter of 07/12/25 (from the past 24 hours)   CBC   Result Value Ref Range    WBC 4.2 (L) 4.4 - " 11.3 x10*3/uL    nRBC 0.0 0.0 - 0.0 /100 WBCs    RBC 3.76 (L) 4.50 - 5.90 x10*6/uL    Hemoglobin 11.3 (L) 13.5 - 17.5 g/dL    Hematocrit 34.6 (L) 41.0 - 52.0 %    MCV 92 80 - 100 fL    MCH 30.1 26.0 - 34.0 pg    MCHC 32.7 32.0 - 36.0 g/dL    RDW 13.9 11.5 - 14.5 %    Platelets 134 (L) 150 - 450 x10*3/uL   Basic Metabolic Panel   Result Value Ref Range    Glucose 96 74 - 99 mg/dL    Sodium 141 136 - 145 mmol/L    Potassium 3.0 (L) 3.5 - 5.3 mmol/L    Chloride 108 (H) 98 - 107 mmol/L    Bicarbonate 27 21 - 32 mmol/L    Anion Gap 9 (L) 10 - 20 mmol/L    Urea Nitrogen 21 6 - 23 mg/dL    Creatinine 1.46 (H) 0.50 - 1.30 mg/dL    eGFR 48 (L) >60 mL/min/1.73m*2    Calcium 8.6 8.6 - 10.3 mg/dL     *Note: Due to a large number of results and/or encounters for the requested time period, some results have not been displayed. A complete set of results can be found in Results Review.     CT abdomen pelvis wo IV contrast  Result Date: 7/14/2025  Interpreted By:  Holly Lomeli, STUDY: CT ABDOMEN PELVIS WO IV CONTRAST;  7/14/2025 11:37 am   INDICATION: Signs/Symptoms:right hydronephrosis.     COMPARISON: 11/15/2023   ACCESSION NUMBER(S): [id number]   ORDERING CLINICIAN: SHUN CARTER   TECHNIQUE: CT of the abdomen and pelvis was performed. Contiguous axial images were obtained at 3 mm slice thickness through the abdomen and pelvis. Coronal and sagittal reconstructions at 3 mm slice thickness were performed.  No intravenous or oral contrast was administered.   FINDINGS: Please note that the evaluation of vessels, lymph nodes and organs is limited without intravenous contrast.   LOWER CHEST: Bipolar pacemaker leads are seen within the right side of the heart. The heart at the upper limits of normal in size. Small bilateral pleural effusions are present. There is mild interstitial edema.   ABDOMEN:   LIVER: The liver is enlarged measuring 20 cm in superior to inferior dimension. No hepatic mass is present. The density of the hepatic  parenchyma is normal.   BILE DUCTS: The intrahepatic and extrahepatic ducts are not dilated.   GALLBLADDER: The gallbladder is contracted with no calcified gallstones observed.   PANCREAS: The pancreas appears unremarkable.   SPLEEN: Splenomegaly is seen with the spleen measuring 17.2 cm in greatest length. No splenic mass is identified.   ADRENAL GLANDS: Bilateral adrenal glands appear normal.   KIDNEYS AND URETERS: There are nonobstructing left renal calculi seen with the largest stone observed in the lower pole of the left kidney measuring 6-7 mm in size. On the right, there is a 9 mm stone in the lower pole with adjacent 6-7 mm calculus seen in the lower pole as well. There is a moderate degree of right-sided hydronephrosis and right hydroureter with a 7 mm stone in the distal right ureter just proximal to the ureterovesical junction identified. There is no left-sided hydronephrosis or hydroureter.   PELVIS:   BLADDER: The wall of the urinary bladder is thickened with induration of the adjacent fat.   REPRODUCTIVE ORGANS: The prostate gland is enlarged measuring 4.7 cm in transverse dimension and 5.3 cm in superior to inferior dimension.   BOWEL: The appendix is normal in appearance. There is no abnormal distention of the intestinal tract or abnormal bowel wall thickening.   VESSELS: There is calcified plaque within the proximal renal arteries bilaterally with atherosclerosis of the abdominal aorta and iliac arteries without aneurysmal dilatation.   PERITONEUM/RETROPERITONEUM/LYMPH NODES: There is a small amount of fluid within the left paracolic gutter with thickened left lateral conal fascia. On the right, there is thickening of the right lateral conal fascia with induration of the fat seen within the right paracolic gutter.   ABDOMINAL WALL: The abdominal wall is intact.   BONES: No acute osseous abnormality is seen. There is postoperative change due to decompressive laminectomy in the lower lumbar region.        1.  Right hydronephrosis and hydroureter which is moderate in degree with a 7 mm calculus in the distal right ureter just proximal to the ureterovesical junction. 2. Bilateral nonobstructing intrarenal calculi. 3. Thickening of the bladder wall with induration of the adjacent fat may indicate cystitis or could be due to an element of bladder outlet obstruction as the prostate gland is enlarged. 4. There is thickening of the lateral conal fascia bilaterally with small amount of fluid in left paracolic gutter and with induration of the fat within the right paracolic gutter. 5. Hepatosplenomegaly. 6. Small bilateral pleural effusions with mild interstitial edema.     MACRO: None   Signed by: Holly Lomeli 7/14/2025 1:43 PM Dictation workstation:   [id number]    US renal complete  Result Date: 7/13/2025  Interpreted By:  Holly Lomeli, STUDY: US RENAL COMPLETE; 7/13/2025 9:30 am   INDICATION: Acute renal insufficiency   COMPARISON: 02/29/2024   ACCESSION NUMBER(S): [id number]   ORDERING CLINICIAN: KERRY CONTI   TECHNIQUE: Grayscale and color Doppler imaging of the kidneys.   FINDINGS: The right kidney measures 12.1 cm in length. There is a mild-to-moderate degree of right-sided hydronephrosis seen on the current study. A right renal calculus seen on prior study within the lower pole is no longer evident. The left kidney measures 11.4 cm in length. No left-sided hydronephrosis is identified. Within the interpolar region of the left kidney, there is a hyperechoic focus with associated twinkle artifact and posterior acoustic shadowing representing a nonobstructing left renal calculus. There is no solid mass identified. The renal cortical thickness and echogenicity is normal.   There is echogenic material along the dependent portion of the urinary bladder most likely from debris. No bladder wall thickening or bladder calculus is observed. Neither the right nor left ureteral jet is identified.       New right-sided  hydronephrosis appearing mild-to-moderate in degree. A right renal calculus seen previously is no longer identified in the right kidney.   Nonobstructing calculus interpolar region of the left kidney.   Debris along the dependent portion of the urinary bladder.   MACRO: None.   Signed by: Holly Lomeli 7/13/2025 4:05 PM Dictation workstation:   [id number]                   Assessment & Plan  JASBIR (acute kidney injury)    UTI (urinary tract infection)    Hematuria    Stage 3b chronic kidney disease (Multi)    Paroxysmal atrial fibrillation (Multi)    Pacemaker    Ischemic cardiomyopathy    Hypertension    Hyperlipidemia    BPH (benign prostatic hyperplasia)    Recurrent falls    Hydronephrosis with urinary obstruction due to renal calculus    Ureteral stone    Ultrasound shows hydronephrosis  CT showed obstructive uropathy  Urology following the patient  Replace potassium  Kidney functions doing little better  Continue antibiotic for UTI   Urology input noted  Blood pressure stable  PT OT      I spent  minutes in the professional and overall care of this patient.      Tasneem Sandoval MD

## 2025-07-14 NOTE — H&P (VIEW-ONLY)
Reason For Consult  Hematuria    History Of Present Illness  Angel Carroll is a 80 y.o. male presenting after a fall.He had hematuria.  He has a history of BPH treated with tamsulosin and finasteride.  He has a history of kidney stones.  He was evaluated with cystoscopy in 3/2024 and a large distended bladder and lateral lobe enlargement.  He was being treated for a UTI.       Past Medical History  He has a past medical history of Atherosclerotic heart disease of native coronary artery without angina pectoris (12/14/2022), Benign neoplasm of meninges, unspecified, BPH (benign prostatic hyperplasia), Chronic diastolic heart failure, Chronic kidney disease, Chronic obstructive pulmonary disease (COPD) (Multi), Chronic respiratory failure, Elevated blood-pressure reading, without diagnosis of hypertension, Hyperlipidemia, Meningioma (Multi), Nephrolithiasis, Rectal bleeding, Sick sinus syndrome (Multi), and Vitamin D deficiency.    Surgical History  He has a past surgical history that includes Other surgical history (04/03/2013); Other surgical history (04/03/2013); Eye surgery (04/03/2013); Total knee arthroplasty; Back surgery (04/18/2016); Lithotripsy (07/22/2013); Colonoscopy (07/03/2013); Other surgical history (07/03/2013); Knee arthroscopy w/ debridement (07/03/2013); Knee arthroscopy w/ debridement (07/03/2013); Other surgical history (07/03/2013); Coronary angioplasty with stent (08/18/2020); CT angio head w and wo IV contrast (09/25/2021); CT angio neck (09/25/2021); MR angio head wo IV contrast (03/23/2021); MR angio neck wo IV contrast (03/23/2021); Tonsillectomy; Brain surgery; Cystoscopy w/ ureteral stent placement; and pacemaker placement.     Social History  He reports that he quit smoking about 31 years ago. His smoking use included cigarettes. He has never used smokeless tobacco. He reports that he does not currently use alcohol. He reports that he does not use drugs.    Family History  Family  "History[1]     Allergies  Other, Ranolazine, Nsaids (non-steroidal anti-inflammatory drug), and Cyclobenzaprine    Review of Systems  hematuria     Physical Exam  No distress  Normal resp effort  Abdomen soft, nontender     Last Recorded Vitals  Blood pressure (!) 132/48, pulse 62, temperature 36.2 °C (97.2 °F), temperature source Oral, resp. rate 18, height 1.778 m (5' 10\"), weight 98.4 kg (217 lb), SpO2 99%.    Relevant Results      Creatinine 1.93, creatinine 1.01 2 months ago  WBC 4.7  UA:  WBC >50, RBC >20, Bacteria 4+  Renal ultrasound viewed, right sided hydronephrosis present.       Assessment/Plan     80 year old had recent hematuria.  He appears to have a UTI, on Rocephin.  Urine culture pending.  Urine culture 7/9/25 was mixed    Renal ultrasound identified right hydronephrosis.  A CT is ordered for further evaluation.  He has a history of stones.    Creatinine elevated at 2.33.  Evaluate for obstruction.    He has BPH, on tamsulosin and finasteride.      Cristobal Newsome MD         [1]   Family History  Problem Relation Name Age of Onset    Diabetes Mother      Emphysema Father      Heart disease Father       "

## 2025-07-14 NOTE — PROGRESS NOTES
TCC spoke to patient's spouse, Geneva, on the phone. Assessment complete. Patient lives with his spouse in a house. Patient is independent. Patient and spouse get Meals on Wheels. Patient wears 2 liters of oxygen during the day, Fanny is the provider. Patient has had falls in the past 6 months. Patient uses a cane, walker and wheelchair. Patient's daughters transport him and his spouse. Patient follows Dr. Jung Tao. Patient fills prescriptions at [facility] in [place]. At this time there is not a safe discharge plan in place. Will follow.      07/14/25 1218   Discharge Planning   Living Arrangements Spouse/significant other   Support Systems Spouse/significant other;Children   Type of Residence Private residence   Home or Post Acute Services Other (Comment)   Expected Discharge Disposition [place]  (TBD)   Does the patient need discharge transport arranged? Yes   [place] coordination needed? Yes   Financial Resource Strain   How hard is it for you to pay for the very basics like food, housing, medical care, and heating? Not very   Housing Stability   In the last 12 months, was there a time when you were not able to pay the mortgage or rent on time? N   In the past 12 months, how many times have you moved where you were living? 0   At any time in the past 12 months, were you homeless or living in a shelter (including now)? N   Transportation Needs   In the past 12 months, has lack of transportation kept you from medical appointments or from getting medications? no   In the past 12 months, has lack of transportation kept you from meetings, work, or from getting things needed for daily living? No

## 2025-07-14 NOTE — CARE PLAN
The patient's goals for the shift include fall free    The clinical goals for the shift include Pt will be HDS

## 2025-07-14 NOTE — PROGRESS NOTES
Physical Therapy    Physical Therapy Evaluation & Treatment    Patient Name: Angel Carroll  MRN: [id number]  Department: [facility]  Room: [address] [place], [place] [address]  Today's Date: 7/14/2025   Time Calculation  Start Time: 0919  Stop Time: 0947  Time Calculation (min): 28 min    Assessment/Plan   PT Assessment  PT Assessment Results: Decreased strength, Decreased endurance, Impaired balance, Decreased mobility, Decreased safety awareness  Rehab Prognosis: Good  Barriers to Discharge Home: Caregiver assistance, Physical needs  Caregiver Assistance: Caregiver assistance needed per identified barriers - however, level of patient's required assistance exceeds assistance available at home  Physical Needs: 24hr mobility assistance needed  Evaluation/Treatment Tolerance: Patient limited by fatigue  End of Session Communication: Bedside nurse  Assessment Comment: 80 year old male presents with decline from baseline functional mobility, decreased strength miguelito LE, impaired balance, and decreased tolerance to activity;  patient would benefit from skilled physical therapy services to safely maximize functional mobility to modified independent levels.  End of Session Patient Position: Up in chair, Alarm on   IP OR SWING BED PT PLAN  Inpatient or Swing Bed: Inpatient  PT Plan  Treatment/Interventions: Bed mobility, Transfer training, Gait training, Balance training, Strengthening, Endurance training, Therapeutic exercise, Therapeutic activity  PT Plan: Ongoing PT  PT Frequency: 4 times per week  PT Discharge Recommendations: Moderate intensity level of continued care  PT Recommended Transfer Status: Assist x2  PT - OK to Discharge: Yes (with skilled physical therapy services at next level of care)      Subjective     PT Visit Info:  PT Received On: 07/14/25  General Visit Information:  General  Reason for Referral: Impaired mobility with SOCORRO  Past Medical History Relevant to Rehab: Home O2 at 2 liters, CAD, hyperlipid, CHF, pacemaker, nephrolithiasis,  BPH, CKD, COPD, meningioma, CVA, right carotid endarterectomy, ASCVD, back surgery, brain surgery, miguelito knee arthrosopies, elbow ulnar nerve transposition, wrist carpectomy, TKR  Co-Treatment: OT  Co-Treatment Reason: Limited tolerance to activity  Prior to Session Communication: Bedside nurse  Patient Position Received: Bed, 3 rail up, Alarm on  General Comment: 80 year old male admit from home with weakness and falls.  Home Living:  Home Living  Type of Home: House  Lives With: Spouse  Home Adaptive Equipment: Walker rolling or standard, Cane, Wheelchair-manual  Home Layout: Two level, Able to live on main level with bedroom/bathroom  Home Access: Stairs to enter with rails  Entrance Stairs-Rails: Both  Entrance Stairs-Number of Steps: 4  Bathroom Shower/Tub: Tub/shower unit  Bathroom Equipment: Tub transfer bench  Prior Level of Function:  Prior Function Per Pt/Caregiver Report  Ambulatory Assistance:  (Modified independent with rolling walker)  Precautions:  Precautions  Medical Precautions: Fall precautions  Precautions Comment: +O2 at 2 liters via nasal cannula           Objective   Pain:  Pain Assessment  Pain Assessment: 0-10  0-10 (Numeric) Pain Score: 3  Pain Type: Chronic pain  Pain Location: Shoulder  Pain Orientation: Left, Right  Pain Interventions:  (Therapeutic functional mobility)  Response to Interventions: No change in pain  Cognition:  Cognition  Overall Cognitive Status: Within Functional Limits    General Assessments:                  Activity Tolerance  Endurance: Decreased tolerance for upright activites  Activity Tolerance Comments: Fatigue    Sensation  Sensation Comment: Patient reports intermittent numbness miguelito LE feet       Coordination  Movements are Fluid and Coordinated: No  Lower Body Coordination: Slower rate of movement miguelito LE (right greater than left)    Static Sitting Balance  Static Sitting-Balance Support: Bilateral upper extremity supported  Static Sitting-Level of Assistance:  Minimum assistance  Static Sitting-Comment/Number of Minutes: Intermittent episodes of assist required to maintain midline while sitting on side of bed due to decreased balance posterior    Static Standing Balance  Static Standing-Balance Support: Bilateral upper extremity supported  Static Standing-Level of Assistance: Moderate assistance  Static Standing-Comment/Number of Minutes: Assist with trunk support and balance during static standing with rolling walker  Functional Assessments:  Bed Mobility  Bed Mobility: Yes  Bed Mobility 1  Bed Mobility 1: Supine to sitting  Level of Assistance 1: Moderate assistance  Bed Mobility Comments 1: Assist with trunk-up during supine to sit;  increased time and effort required to achieve supine to sit.    Transfers  Transfer: Yes  Transfer 1  Transfer From 1: Sit to  Transfer to 1: Stand  Technique 1: Sit to stand  Transfer Device 1: Walker  Transfer Level of Assistance 1: Moderate assistance  Trials/Comments 1: x 2 reps;  assist with trunk support and balance;  verbal cues for hand placement;  increased time and effort required to achieve sit to stand  Transfers 2  Transfer From 2: Stand to  Transfer to 2: Sit  Technique 2: Stand to sit  Transfer Device 2: Walker  Transfer Level of Assistance 2: Moderate assistance  Trials/Comments 2: x 2 reps;  assist with trunk support and balance;  decreased eccentric control    Ambulation/Gait Training  Ambulation/Gait Training Performed: Yes  Ambulation/Gait Training 1  Surface 1: Level tile  Device 1: Rolling walker  Assistance 1: Moderate assistance  Comments/Distance (ft) 1: 10 feet x 2 with rolling walker and assist with trunk support and balance;  verbal cues for posture;  patient ambulates with slow christy, non-reciprocating gait pattern, decreased step length miguelito LE, and forward flexed posture.    Stairs  Stairs: No  Extremity/Trunk Assessments:  RLE   RLE : Exceptions to WFL  Strength RLE  R Hip Flexion: 3-/5  R Knee Extension:  3-/5  R Ankle Dorsiflexion: 3/5  LLE   LLE : Exceptions to WFL  Strength LLE  L Hip Flexion: 3/5  L Knee Extension: 3/5  L Ankle Dorsiflexion: 3/5  Treatments:                 Bed Mobility  Bed Mobility: Yes  Bed Mobility 1  Bed Mobility 1: Supine to sitting  Level of Assistance 1: Moderate assistance  Bed Mobility Comments 1: Assist with trunk-up during supine to sit;  increased time and effort required to achieve supine to sit.    Ambulation/Gait Training  Ambulation/Gait Training Performed: Yes  Ambulation/Gait Training 1  Surface 1: Level tile  Device 1: Rolling walker  Assistance 1: Moderate assistance  Comments/Distance (ft) 1: 10 feet x 2 with rolling walker and assist with trunk support and balance;  verbal cues for posture;  patient ambulates with slow christy, non-reciprocating gait pattern, decreased step length miguelito LE, and forward flexed posture.  Transfers  Transfer: Yes  Transfer 1  Transfer From 1: Sit to  Transfer to 1: Stand  Technique 1: Sit to stand  Transfer Device 1: Walker  Transfer Level of Assistance 1: Moderate assistance  Trials/Comments 1: x 2 reps;  assist with trunk support and balance;  verbal cues for hand placement;  increased time and effort required to achieve sit to stand  Transfers 2  Transfer From 2: Stand to  Transfer to 2: Sit  Technique 2: Stand to sit  Transfer Device 2: Walker  Transfer Level of Assistance 2: Moderate assistance  Trials/Comments 2: x 2 reps;  assist with trunk support and balance;  decreased eccentric control    Stairs  Stairs: No       Outcome Measures:  [facility] Basic Mobility  Turning from your back to your side while in a flat bed without using bedrails: A lot  Moving from lying on your back to sitting on the side of a flat bed without using bedrails: A lot  Moving to and from bed to chair (including a wheelchair): A lot  Standing up from a chair using your arms (e.g. wheelchair or bedside chair): A lot  To walk in hospital room: A lot  Climbing 3-5 steps  with railing: Total  Basic Mobility - Total Score: 11    Encounter Problems       Encounter Problems (Active)       Mobility       Bed mobility including supine to sit and sit to supine with min assist.       Start:  07/14/25    Expected End:  07/28/25            Transfers including sit to stand and stand to sit with min assist. (Progressing)       Start:  07/14/25    Expected End:  07/28/25            Ambulate 50 feet with rolling walker and min assist. (Progressing)       Start:  07/14/25    Expected End:  07/28/25                   Education Documentation  Mobility Training, taught by Kwaku Chandler PT at 7/14/2025 11:20 AM.  Learner: Patient  Readiness: Acceptance  Method: Explanation, Demonstration  Response: Needs Reinforcement  Comment: Educated patient regarding acute skilled physical therapy plan of care.    Education Comments  No comments found.

## 2025-07-14 NOTE — CONSULTS
Wound Care Consult     Visit Date: 7/14/2025      Patient Name: Angel Carroll         MRN: [id number]             Reason for Consult: Moisture related skin impairment buttocks, sacrum , perineum         Wound History: Defer to the patient's chart     Pertinent Labs: Defer to the patient's chart      Assessment:  Wound 07/12/25 Other (Comments) Perineum (Active)   Date First Assessed/Time First Assessed: 07/12/25 1411   Primary Wound Type: (c) Other (Comments)  Location: Perineum      Assessments 7/14/2025  1:00 PM   Present on Admission to Healthcare Facility Yes   Site Assessment Blanchable erythema;Clean;Fragile;Pink;Red   Shape diffuse   Wound Length (cm) 13 cm   Wound Width (cm) 15 cm   Wound Surface Area (cm^2) 153.15 cm^2   Wound Depth (cm) 0 cm   Wound Volume (cm^3) 0 cm^3   State of Healing Closed wound edges       Active Orders   Date Order Priority Status Authorizing Provider   07/12/25 1548 Inpatient Consult to Wound and Ostomy Nurse Routine Active Tasneem Sandoval MD     - Reason for Consult?:    Wound   Phillip at time of wound care consult of 17.  Current and additionally recommended preventative measures as listed:    Foam padding/dressings to all vulnerable bony prominences including but not limited to the sacrum, and bilateral heels.  Turn and reposition the patient every 2 hours    Promote the patient's max mobility   Phillip and skin reassessments every shift  Manage and monitor for: friction, shear, and moisture related skin impairments/injuries  Waffle air pressure off loading over-lay to bed and chair surfcaes                      Wound Plan:   Wound acre recommendations:    Wash area with soap and water and pat dry.    Apply TRIAD Hydrophilic wound and barrier cream to the affected area and cover with foam bordered sacral dressing. Apply TRIAD 2-3 times per day, ( please ensure complete removal of prior TRIAD applications before reapplication).     Follow up plan: Follow up to wound care recommendations  with in 7 to 14 days.   Keagan Sotomayor RN  7/14/2025  2:12 PM

## 2025-07-14 NOTE — PROGRESS NOTES
Spiritual Care Visit  Spiritual Care Request    Reason for Visit:  Routine Visit: Introduction     Request Received From:       Focus of Care:  Visited With: Patient         Refer to :          Spiritual Care Assessment    Spiritual Assessment:                      Care Provided:  Intended Effects: Build relationship of care and support, Convey a calming presence, Demonstrate caring and concern    Sense of Community and or Restorationist Affiliation:  Anabaptism   Values/Beliefs  Spiritual Requests During Hospitalization: Leeasked to be anointed and to have Communion.     Addressed Needs/Concerns and/or [place] Through:     Sacramental Encounters  Communion: Patient wants communion  Communion Given Indicator: Yes  Sacrament of Sick-Anointing: Anointed    Outcome:        Advance Directives:         Spiritual Care Annotation    Annotation:  Angel asked to be anointed and to have Communion today.  Jacinto Grijalva

## 2025-07-14 NOTE — PROGRESS NOTES
07/14/25 1216   Physical Activity   On average, how many days per week do you engage in moderate to strenuous exercise (like a brisk walk)? 0 days   On average, how many minutes do you engage in exercise at this level? 0 min   Financial Resource Strain   How hard is it for you to pay for the very basics like food, housing, medical care, and heating? Not very   Housing Stability   In the last 12 months, was there a time when you were not able to pay the mortgage or rent on time? N   In the past 12 months, how many times have you moved where you were living? 0   At any time in the past 12 months, were you homeless or living in a shelter (including now)? N   Transportation Needs   In the past 12 months, has lack of transportation kept you from medical appointments or from getting medications? no   In the past 12 months, has lack of transportation kept you from meetings, work, or from getting things needed for daily living? No   Food Insecurity   Within the past 12 months, you worried that your food would run out before you got the money to buy more. Never true   Within the past 12 months, the food you bought just didn't last and you didn't have money to get more. Never true   Stress   Do you feel stress - tense, restless, nervous, or anxious, or unable to sleep at night because your mind is troubled all the time - these days? Not at all   Social Connections   In a typical week, how many times do you talk on the phone with family, friends, or neighbors? Once a week   How often do you get together with friends or relatives? Three times   How often do you attend Latter day or Mosque services? 1 to 4   Do you belong to any clubs or organizations such as Latter day groups, unions, fraternal or athletic groups, or school groups? No   How often do you attend meetings of the clubs or organizations you belong to? Never   Are you , , , , never , or living with a partner?    Intimate  Partner Violence   Within the last year, have you been afraid of your partner or ex-partner? Pt Unable   Within the last year, have you been humiliated or emotionally abused in other ways by your partner or ex-partner? Pt Unable   Within the last year, have you been kicked, hit, slapped, or otherwise physically hurt by your partner or ex-partner? Pt Unable   Within the last year, have you been raped or forced to have any kind of sexual activity by your partner or ex-partner? Pt Unable   Alcohol Use   Q1: How often do you have a drink containing alcohol? Never   Q2: How many drinks containing alcohol do you have on a typical day when you are drinking? None   Q3: How often do you have six or more drinks on one occasion? Never   Utilities   In the past 12 months has the electric, gas, oil, or water company threatened to shut off services in your home? No   Health Literacy   How often do you need to have someone help you when you read instructions, pamphlets, or other written material from your doctor or pharmacy? Sometimes   Follow-Ups   We make community resources available to all of our patients to assist with everyday needs. We may be able to connect you with those resources. Would you be interested? N

## 2025-07-14 NOTE — PROGRESS NOTES
"Angel Carroll is a 80 y.o. male on day 1 of admission presenting with JASBIR (acute kidney injury).    Subjective   Patient resting,  CT ordered       Objective     Physical Exam  Alert, oriented  Normal resp effort  Abdomen sofr, nontender      Last Recorded Vitals  Blood pressure 135/53, pulse 60, temperature 36.5 °C (97.7 °F), temperature source Oral, resp. rate 18, height 1.778 m (5' 10\"), weight 98.4 kg (217 lb), SpO2 95%.  Intake/Output last 3 Shifts:  I/O last 3 completed shifts:  In: 3305.8 (33.6 mL/kg) [P.O.:240; I.V.:3015.8 (30.6 mL/kg); IV Piggyback:50]  Out: 400 (4.1 mL/kg) [Urine:400 (0.1 mL/kg/hr)]  Weight: 98.4 kg     Relevant Results  Scheduled medications  Scheduled Medications[1]  Continuous medications  Continuous Medications[2]  PRN medications  PRN Medications[3]    Results for orders placed or performed during the hospital encounter of 07/12/25 (from the past 24 hours)   CBC   Result Value Ref Range    WBC 4.2 (L) 4.4 - 11.3 x10*3/uL    nRBC 0.0 0.0 - 0.0 /100 WBCs    RBC 3.76 (L) 4.50 - 5.90 x10*6/uL    Hemoglobin 11.3 (L) 13.5 - 17.5 g/dL    Hematocrit 34.6 (L) 41.0 - 52.0 %    MCV 92 80 - 100 fL    MCH 30.1 26.0 - 34.0 pg    MCHC 32.7 32.0 - 36.0 g/dL    RDW 13.9 11.5 - 14.5 %    Platelets 134 (L) 150 - 450 x10*3/uL   Basic Metabolic Panel   Result Value Ref Range    Glucose 96 74 - 99 mg/dL    Sodium 141 136 - 145 mmol/L    Potassium 3.0 (L) 3.5 - 5.3 mmol/L    Chloride 108 (H) 98 - 107 mmol/L    Bicarbonate 27 21 - 32 mmol/L    Anion Gap 9 (L) 10 - 20 mmol/L    Urea Nitrogen 21 6 - 23 mg/dL    Creatinine 1.46 (H) 0.50 - 1.30 mg/dL    eGFR 48 (L) >60 mL/min/1.73m*2    Calcium 8.6 8.6 - 10.3 mg/dL     *Note: Due to a large number of results and/or encounters for the requested time period, some results have not been displayed. A complete set of results can be found in Results Review.       Imaging  US renal complete  Result Date: 7/13/2025  New right-sided hydronephrosis appearing mild-to-moderate " in degree. A right renal calculus seen previously is no longer identified in the right kidney.   Nonobstructing calculus interpolar region of the left kidney.   Debris along the dependent portion of the urinary bladder.   MACRO: None.   Signed by: Holly Lomeli 7/13/2025 4:05 PM Dictation workstation:   [id number]    XR chest 2 views  Result Date: 7/12/2025  Diffuse interstitial coarsening is similar to comparison and may reflect pulmonary interstitial edema, scarring or chronic interstitial lung disease.     MACRO: None   Signed by: Antwon Schultz 7/12/2025 4:39 PM Dictation workstation:   [id number]    XR shoulder right 2+ views  Result Date: 7/12/2025  DJD as described. No acute fracture or dislocation based on this exam.   MACRO: None   Signed by: Florencio Huff 7/12/2025 12:26 PM Dictation workstation:   [id number]    CT cervical spine wo IV contrast  Result Date: 7/12/2025  DJD in the cervical spine as described. No CT evidence of cervical spine fracture in this exam.   Stable hypodense right thyroid lobe nodule. Please see above for details.   MACRO: None   Signed by: Florencio Huff 7/12/2025 12:23 PM Dictation workstation:   [id number]    CT head wo IV contrast  Result Date: 7/12/2025  No CT evidence of acute intracranial abnormality. Unchanged relative to comparison CT head 08/08/2024           MACRO: None   Signed by: Antwon Schultz 7/12/2025 12:16 PM Dictation workstation:   [id number]      Cardiology, Vascular, and Other Imaging  ECG 12 lead  Result Date: 7/14/2025  Sinus rhythm with 1st degree AV block Left axis deviation Pulmonary disease pattern Nonspecific ST and T wave abnormality Abnormal ECG When compared with ECG of 23-JUN-2025 10:14, Sinus rhythm has replaced Electronic ventricular pacemaker                               Assessment & Plan  JASBIR (acute kidney injury)    BPH (benign prostatic hyperplasia)    Hyperlipidemia    Hypertension    Stage 3b chronic kidney disease  (Multi)    Hematuria    Ischemic cardiomyopathy    Pacemaker    Recurrent falls    UTI (urinary tract infection)    Paroxysmal atrial fibrillation (Multi)      Expand All Collapse All    Reason For Consult  Hematuria     History Of Present Illness  Angel Carroll is a 80 y.o. male presenting after a fall.He had hematuria.  He has a history of BPH treated with tamsulosin and finasteride.  He has a history of kidney stones.  He was evaluated with cystoscopy in 3/2024 and a large distended bladder and lateral lobe enlargement.  He was being treated for a UTI.       Past Medical History  He has a past medical history of Atherosclerotic heart disease of native coronary artery without angina pectoris (12/14/2022), Benign neoplasm of meninges, unspecified, BPH (benign prostatic hyperplasia), Chronic diastolic heart failure, Chronic kidney disease, Chronic obstructive pulmonary disease (COPD) (Multi), Chronic respiratory failure, Elevated blood-pressure reading, without diagnosis of hypertension, Hyperlipidemia, Meningioma (Multi), Nephrolithiasis, Rectal bleeding, Sick sinus syndrome (Multi), and Vitamin D deficiency.     Surgical History  He has a past surgical history that includes Other surgical history (04/03/2013); Other surgical history (04/03/2013); Eye surgery (04/03/2013); Total knee arthroplasty; Back surgery (04/18/2016); Lithotripsy (07/22/2013); Colonoscopy (07/03/2013); Other surgical history (07/03/2013); Knee arthroscopy w/ debridement (07/03/2013); Knee arthroscopy w/ debridement (07/03/2013); Other surgical history (07/03/2013); Coronary angioplasty with stent (08/18/2020); CT angio head w and wo IV contrast (09/25/2021); CT angio neck (09/25/2021); MR angio head wo IV contrast (03/23/2021); MR angio neck wo IV contrast (03/23/2021); Tonsillectomy; Brain surgery; Cystoscopy w/ ureteral stent placement; and pacemaker placement.     Social History  He reports that he quit smoking about 31 years ago. His smoking  "use included cigarettes. He has never used smokeless tobacco. He reports that he does not currently use alcohol. He reports that he does not use drugs.     Family History  [Family History]    [Family History]         Problem Relation Name Age of Onset    Diabetes Mother        Emphysema Father        Heart disease Father            Allergies  Other, Ranolazine, Nsaids (non-steroidal anti-inflammatory drug), and Cyclobenzaprine     Review of Systems  hematuria     Physical Exam  No distress  Normal resp effort  Abdomen soft, nontender     Last Recorded Vitals  Blood pressure (!) 132/48, pulse 62, temperature 36.2 °C (97.2 °F), temperature source Oral, resp. rate 18, height 1.778 m (5' 10\"), weight 98.4 kg (217 lb), SpO2 99%.     Relevant Results        Creatinine 1.46  UA:  WBC >50, RBC >20, Bacteria 4+  Renal ultrasound viewed, right sided hydronephrosis present.        Assessment/Plan  80 year old had recent hematuria.  He appears to have a UTI, on Rocephin.  Urine culture mixed     Renal ultrasound identified right hydronephrosis.  A CT is ordered for today..  He has a history of stones.     Creatinine improved, elevated at 1.46  Evaluate for obstruction.     He has BPH, on tamsulosin and finasteride.     Addendum:  CT scan identified a 7 mm stone in the distal right ureter.  The stone does not appear obstructing at the time of the CT.  Right hydroureteronephrosis is present.    Continue tamsulosin, strain urine. Right ureteroscopy and laser lithotripsy may be needed.      Cristobal Newsome MD           [1] amiodarone, 100 mg, oral, Daily  [Held by provider] apixaban, 2.5 mg, oral, BID  atorvastatin, 20 mg, oral, Nightly  cefTRIAXone, 1 g, intravenous, q24h  cyanocobalamin, 500 mcg, oral, Daily  docusate sodium, 100 mg, oral, BID  ergocalciferol, 1.25 mg, oral, Every Sunday  fenofibrate, 54 mg, oral, Daily  finasteride, 5 mg, oral, Daily  folic acid, 1 mg, oral, Daily  gabapentin, 100 mg, oral, Nightly  metoprolol " succinate XL, 25 mg, oral, Daily  nystatin, , Topical, q8h  pantoprazole, 40 mg, oral, Daily before breakfast  PARoxetine, 20 mg, oral, Daily  polyethylene glycol, 17 g, oral, Daily  tamsulosin, 0.4 mg, oral, Nightly    [2] oxygen, 2 L/min, Last Rate: 2 L/min (07/12/25 1516)  potassium chloride in 0.9%NaCl, 50 mL/hr, Last Rate: 50 mL/hr (07/13/25 1131)    [3] PRN medications: acetaminophen **OR** acetaminophen **OR** acetaminophen, benzocaine-menthol, dextromethorphan-guaifenesin, guaiFENesin, magnesium hydroxide, melatonin, ondansetron **OR** ondansetron

## 2025-07-14 NOTE — PROGRESS NOTES
Occupational Therapy    Evaluation    Patient Name: Angel Carroll  MRN: [id number]  Department: [facility]  Room: [address] [place] [address]  Today's Date: 7/14/2025  Time Calculation  Start Time: 0921  Stop Time: 0948  Time Calculation (min): 27 min        Assessment:  OT Assessment: Pt presents on eval with generalized weakness,  decreased activity tolerance, and impaired standing balance affecting self-care and functional transfers/mobility. Pt will benefit from continued skilled OT to address these deficits and facilitate returning to functional baseline.  Prognosis: Good  Barriers to Discharge Home: Caregiver assistance, Physical needs  Caregiver Assistance: Caregiver assistance needed per identified barriers - however, level of patient's required assistance exceeds assistance available at home  Physical Needs: Ambulating household distances limited by function/safety, 24hr mobility assistance needed, Intermittent ADL assistance needed, High falls risk due to function or environment  Evaluation/Treatment Tolerance: Patient limited by fatigue  End of Session Communication: Bedside nurse  End of Session Patient Position: Up in chair, Alarm on (Needs in reach and Wound Care Nurse in with pt.)  OT Assessment Results: Decreased ADL status, Decreased upper extremity strength, Decreased safe judgment during ADL, Decreased endurance, Decreased functional mobility, Decreased trunk control for functional activities  Strengths: Premorbid level of function, Support of extended family/friends  Barriers to Participation: Comorbidities    Plan:  Treatment Interventions: ADL retraining, Functional transfer training, UE strengthening/ROM, Endurance training, Patient/family training, Equipment evaluation/education, Neuromuscular reeducation, Compensatory technique education  OT Frequency: 4 times per week (during this acute inpatient hospitalization)  OT Discharge Recommendations: Moderate intensity level of continued care, Other (Comment) (Based on  current functional status and rehab potential, patient is anticipated to tolerate and benefit from 5 or more days per week of skilled rehabilitative therapy after discharge from this acute inpatient hospitalization.)  Equipment Recommended upon Discharge: Wheeled walker, Wheelchair  OT Recommended Transfer Status: Moderate assist  OT - OK to Discharge: Yes      Subjective     OT Visit Info:  OT Received On: 07/14/25    General:  General  Reason for Referral: weakness, impaired ADL's/mobility  Referred By: Tasneem Sandoval MD  Past Medical History Relevant to Rehab: BPH, hyperlipidemia, HTN, CKD3, falls, pacemaker, anemia, CVA, dep, anxiety, COPD, COVID, OA, 2L 02  Family/Caregiver Present: No  Co-Treatment: PT  Co-Treatment Reason: To optimize pt safety and outcomes  Prior to Session Communication: Bedside nurse  Patient Position Received: Bed, 3 rail up, Alarm on  General Comment: Pt is an 81 yo male admitted to the hospital with recurrent falls and generalized weakness.    Precautions:  Hearing/Visual Limitations: glasses, Pala  Medical Precautions: Fall precautions, Oxygen therapy device and L/min (2L 02)    Pain:  Pain Assessment  Pain Assessment: 0-10  0-10 (Numeric) Pain Score: 3  Pain Type: Acute pain  Pain Location: Generalized  Pain Interventions: Ambulation/increased activity    Objective     Cognition:  Overall Cognitive Status: Within Functional Limits  Orientation Level: Oriented X4  Insight: Mild  Processing Speed: Delayed    Home Living:  Type of Home: House  Lives With: Spouse  Home Adaptive Equipment: Walker rolling or standard, Cane, Wheelchair-manual  Home Layout: Two level, Full bath main level, Able to live on main level with bedroom/bathroom  Home Access: Other (Comment) (4 step entry with 2 rails)  Bathroom Shower/Tub: Tub/shower unit  Bathroom Toilet: Standard  Bathroom Equipment: Grab bars in shower, Tub transfer bench    Prior Function:  Level of [place]: Independent with ADLs and  functional transfers, Needs assistance with homemaking  Receives Help From: Family (2 daughters)  ADL Assistance: Independent  Homemaking Assistance: Needs assistance (family provides transportations and assists with house cleaning, shopping, laundry)  Ambulatory Assistance: Independent (RW vs W/C)  Vocational: Retired  Hand Dominance: Right    ADL:  Eating Assistance: Independent  Grooming Assistance: Minimal  Grooming Deficit: Steadying, Supervision/safety, Standing with assistive device  Bathing Assistance: Moderate  UE Dressing Assistance: Minimal  LE Dressing Assistance: Maximal  LE Dressing Deficit: Don/doff R sock, Don/doff L sock  Toileting Assistance with Device: Moderate  Toileting Deficit: Clothing management up, Clothing management down    Activity Tolerance:  Activity Tolerance Comments: Fair    Bed Mobility/Transfers: Bed Mobility  Bed Mobility:  (Pt completed supine to sit in bed with mod A for trunk up and increased time to complete.)    Transfers  Transfer:  (Pt completed sit<>stand with mod A to initiate standing and for balance.)    Functional Mobility:  Functional Mobility  Functional Mobility Performed:  (Pt completed functional mobility to/from the bathroom using a RW with mod A for balance/safety and verbal cues as needed.)    Sitting Balance:  Static Sitting Balance  Static Sitting-Balance Support: Bilateral upper extremity supported, Feet supported  Static Sitting-Level of Assistance: Close supervision    Standing Balance:  Static Standing Balance  Static Standing-Balance Support: Bilateral upper extremity supported  Static Standing-Level of Assistance: Moderate assistance, Minimum assistance     Sensation:  Sensation Comment: PATEL's WFL    Strength:  Strength Comments: KIRTs 4-/5    Coordination:  Coordination Comment: PATEL's MCKENZIE grossly     Hand Function:  Gross Grasp: Functional  Coordination: Functional      Outcome Measures:[facility] Daily Activity  Putting on and taking off regular lower  body clothing: A lot  Bathing (including washing, rinsing, drying): A lot  Putting on and taking off regular upper body clothing: A little  Toileting, which includes using toilet, bedpan or urinal: A lot  Taking care of personal grooming such as brushing teeth: A little  Eating Meals: None  Daily Activity - Total Score: 16    Education Documentation  ADL Training, taught by Kaushal Light Jr., OT at 7/14/2025 11:11 AM.  Learner: Patient  Readiness: Acceptance  Method: Explanation  Response: Verbalizes Understanding, Needs Reinforcement  Comment: Education and verbal cues provided throughout eval.    Education Comments  No comments found.    Goals:  Encounter Problems       Encounter Problems (Active)       OT Goals       Pt will complete all ADL's with mod indep/indep using adaptive equipment as needed. (Progressing)       Start:  07/14/25    Expected End:  08/14/25            Pt will complete all functional transfers and mobility with mod indep using a RW or W/C. (Progressing)       Start:  07/14/25    Expected End:  08/14/25

## 2025-07-15 LAB
ANION GAP SERPL CALCULATED.3IONS-SCNC: 10 MMOL/L (ref 10–20)
BUN SERPL-MCNC: 18 MG/DL (ref 6–23)
CALCIUM SERPL-MCNC: 8.5 MG/DL (ref 8.6–10.3)
CHLORIDE SERPL-SCNC: 108 MMOL/L (ref 98–107)
CO2 SERPL-SCNC: 27 MMOL/L (ref 21–32)
CREAT SERPL-MCNC: 1.27 MG/DL (ref 0.5–1.3)
EGFRCR SERPLBLD CKD-EPI 2021: 56 ML/MIN/1.73M*2
ERYTHROCYTE [DISTWIDTH] IN BLOOD BY AUTOMATED COUNT: 14 % (ref 11.5–14.5)
GLUCOSE SERPL-MCNC: 96 MG/DL (ref 74–99)
HCT VFR BLD AUTO: 34.9 % (ref 41–52)
HGB BLD-MCNC: 10.9 G/DL (ref 13.5–17.5)
MCH RBC QN AUTO: 29.5 PG (ref 26–34)
MCHC RBC AUTO-ENTMCNC: 31.2 G/DL (ref 32–36)
MCV RBC AUTO: 95 FL (ref 80–100)
NRBC BLD-RTO: 0 /100 WBCS (ref 0–0)
PLATELET # BLD AUTO: 148 X10*3/UL (ref 150–450)
POTASSIUM SERPL-SCNC: 3.7 MMOL/L (ref 3.5–5.3)
RBC # BLD AUTO: 3.69 X10*6/UL (ref 4.5–5.9)
SODIUM SERPL-SCNC: 141 MMOL/L (ref 136–145)
WBC # BLD AUTO: 4.5 X10*3/UL (ref 4.4–11.3)

## 2025-07-15 PROCEDURE — 97110 THERAPEUTIC EXERCISES: CPT | Mod: GP

## 2025-07-15 PROCEDURE — 97530 THERAPEUTIC ACTIVITIES: CPT | Mod: GP

## 2025-07-15 PROCEDURE — 85027 COMPLETE CBC AUTOMATED: CPT | Performed by: INTERNAL MEDICINE

## 2025-07-15 PROCEDURE — 99232 SBSQ HOSP IP/OBS MODERATE 35: CPT | Performed by: INTERNAL MEDICINE

## 2025-07-15 PROCEDURE — 2500000002 HC RX 250 W HCPCS SELF ADMINISTERED DRUGS (ALT 637 FOR MEDICARE OP, ALT 636 FOR OP/ED): Performed by: INTERNAL MEDICINE

## 2025-07-15 PROCEDURE — 36415 COLL VENOUS BLD VENIPUNCTURE: CPT | Performed by: INTERNAL MEDICINE

## 2025-07-15 PROCEDURE — 2500000004 HC RX 250 GENERAL PHARMACY W/ HCPCS (ALT 636 FOR OP/ED): Performed by: INTERNAL MEDICINE

## 2025-07-15 PROCEDURE — 97530 THERAPEUTIC ACTIVITIES: CPT | Mod: GO,CO

## 2025-07-15 PROCEDURE — 2500000001 HC RX 250 WO HCPCS SELF ADMINISTERED DRUGS (ALT 637 FOR MEDICARE OP): Performed by: INTERNAL MEDICINE

## 2025-07-15 PROCEDURE — 2500000005 HC RX 250 GENERAL PHARMACY W/O HCPCS: Performed by: INTERNAL MEDICINE

## 2025-07-15 PROCEDURE — 1200000002 HC GENERAL ROOM WITH TELEMETRY DAILY

## 2025-07-15 PROCEDURE — 80048 BASIC METABOLIC PNL TOTAL CA: CPT | Performed by: INTERNAL MEDICINE

## 2025-07-15 PROCEDURE — 97535 SELF CARE MNGMENT TRAINING: CPT | Mod: GO,CO

## 2025-07-15 RX ADMIN — FENOFIBRATE 54 MG: 54 TABLET ORAL at 09:22

## 2025-07-15 RX ADMIN — NYSTATIN: 100000 POWDER TOPICAL at 08:24

## 2025-07-15 RX ADMIN — PANTOPRAZOLE SODIUM 40 MG: 40 TABLET, DELAYED RELEASE ORAL at 06:22

## 2025-07-15 RX ADMIN — NYSTATIN 1 APPLICATION: 100000 POWDER TOPICAL at 16:13

## 2025-07-15 RX ADMIN — METOPROLOL SUCCINATE 25 MG: 25 TABLET, EXTENDED RELEASE ORAL at 08:20

## 2025-07-15 RX ADMIN — FOLIC ACID 1 MG: 1 TABLET ORAL at 08:20

## 2025-07-15 RX ADMIN — GABAPENTIN 100 MG: 100 CAPSULE ORAL at 20:51

## 2025-07-15 RX ADMIN — CYANOCOBALAMIN TAB 500 MCG 500 MCG: 500 TAB at 08:20

## 2025-07-15 RX ADMIN — ACETAMINOPHEN 650 MG: 325 TABLET ORAL at 20:51

## 2025-07-15 RX ADMIN — AMIODARONE HYDROCHLORIDE 100 MG: 100 TABLET ORAL at 08:20

## 2025-07-15 RX ADMIN — FINASTERIDE 5 MG: 5 TABLET, FILM COATED ORAL at 08:20

## 2025-07-15 RX ADMIN — PAROXETINE HYDROCHLORIDE 20 MG: 20 TABLET, FILM COATED ORAL at 08:20

## 2025-07-15 RX ADMIN — Medication 3 MG: at 20:51

## 2025-07-15 RX ADMIN — TAMSULOSIN HYDROCHLORIDE 0.4 MG: 0.4 CAPSULE ORAL at 20:51

## 2025-07-15 RX ADMIN — ATORVASTATIN CALCIUM 20 MG: 20 TABLET, FILM COATED ORAL at 20:52

## 2025-07-15 RX ADMIN — DOCUSATE SODIUM 100 MG: 100 CAPSULE, LIQUID FILLED ORAL at 20:51

## 2025-07-15 RX ADMIN — NYSTATIN 1 APPLICATION: 100000 POWDER TOPICAL at 23:43

## 2025-07-15 RX ADMIN — NYSTATIN: 100000 POWDER TOPICAL at 00:27

## 2025-07-15 RX ADMIN — DOCUSATE SODIUM 100 MG: 100 CAPSULE, LIQUID FILLED ORAL at 08:20

## 2025-07-15 RX ADMIN — CEFTRIAXONE 1 G: 1 INJECTION, SOLUTION INTRAVENOUS at 14:17

## 2025-07-15 ASSESSMENT — ACTIVITIES OF DAILY LIVING (ADL)
HOME_MANAGEMENT_TIME_ENTRY: 30
BATHING_WHERE_ASSESSED: EDGE OF BED

## 2025-07-15 ASSESSMENT — PAIN - FUNCTIONAL ASSESSMENT
PAIN_FUNCTIONAL_ASSESSMENT: 0-10
PAIN_FUNCTIONAL_ASSESSMENT: 0-10

## 2025-07-15 ASSESSMENT — COGNITIVE AND FUNCTIONAL STATUS - GENERAL
MOVING TO AND FROM BED TO CHAIR: A LITTLE
WALKING IN HOSPITAL ROOM: A LITTLE
MOBILITY SCORE: 14
DRESSING REGULAR LOWER BODY CLOTHING: A LOT
DAILY ACTIVITIY SCORE: 15
TURNING FROM BACK TO SIDE WHILE IN FLAT BAD: A LOT
TOILETING: A LOT
MOBILITY SCORE: 19
DRESSING REGULAR UPPER BODY CLOTHING: A LITTLE
PERSONAL GROOMING: A LITTLE
CLIMB 3 TO 5 STEPS WITH RAILING: TOTAL
STANDING UP FROM CHAIR USING ARMS: A LITTLE
PERSONAL GROOMING: A LITTLE
WALKING IN HOSPITAL ROOM: A LITTLE
MOVING FROM LYING ON BACK TO SITTING ON SIDE OF FLAT BED WITH BEDRAILS: A LITTLE
EATING MEALS: A LITTLE
HELP NEEDED FOR BATHING: A LOT
CLIMB 3 TO 5 STEPS WITH RAILING: A LOT
DAILY ACTIVITIY SCORE: 21
MOVING TO AND FROM BED TO CHAIR: A LITTLE
STANDING UP FROM CHAIR USING ARMS: A LOT
EATING MEALS: A LITTLE
TOILETING: A LITTLE

## 2025-07-15 ASSESSMENT — PAIN SCALES - GENERAL
PAINLEVEL_OUTOF10: 0 - NO PAIN
PAINLEVEL_OUTOF10: 0 - NO PAIN
PAINLEVEL_OUTOF10: 3
PAINLEVEL_OUTOF10: 0 - NO PAIN

## 2025-07-15 ASSESSMENT — PAIN DESCRIPTION - DESCRIPTORS: DESCRIPTORS: SORE

## 2025-07-15 NOTE — PROGRESS NOTES
Physical Therapy    Physical Therapy Treatment    Patient Name: Angel Carroll  MRN: [id number]  Department: [address]  Room: [address] [place] [address]  Today's Date: 7/15/2025  Time Calculation  Start Time: 1220  Stop Time: 1248  Time Calculation (min): 28 min         Assessment/Plan   PT Assessment  Barriers to Discharge Home: Caregiver assistance, Physical needs  Caregiver Assistance: Caregiver assistance needed per identified barriers - however, level of patient's required assistance exceeds assistance available at home  Physical Needs: 24hr mobility assistance needed  Evaluation/Treatment Tolerance: Patient tolerated treatment well  Medical Staff Made Aware: Yes  Strengths: Ability to acquire knowledge  Barriers to Participation: Comorbidities  End of Session Communication: Bedside nurse  Assessment Comment: pt demonstrating improved amb tolerance and transfer ease however pt is a high fall risk due to miguelito LE weakness, balance difficulties with maintaining C of gravity over B of S. Difficulty recovering when balance is challenged.  End of Session Patient Position: Up in chair, Alarm on (all needs in reach)  PT Plan  Inpatient/Swing Bed or Outpatient: Inpatient  PT Plan  Treatment/Interventions: Bed mobility, Transfer training, Gait training, Balance training, Strengthening, Endurance training, Therapeutic exercise, Therapeutic activity  PT Plan: Ongoing PT  PT Frequency: 4 times per week  PT Discharge Recommendations: Moderate intensity level of continued care  Equipment Recommended upon Discharge: Wheeled walker, Wheelchair  PT Recommended Transfer Status: Assist x1, Assistive device (FWW)  PT - OK to Discharge: Yes    PT Visit Info:  PT Received On: 07/15/25     General Visit Information:   General  Family/Caregiver Present: No  Prior to Session Communication: Bedside nurse  Patient Position Received: Up in chair, Alarm on  Preferred Learning Style: verbal, visual  General Comment: cleared by nurse for therapy; pt agreeable to  therapy; + telemetry.    Subjective   Precautions:  Precautions  Hearing/Visual Limitations: glasses, [place]  Medical Precautions: Fall precautions, Oxygen therapy device and L/min (2 liters o2 via nc)     Date/Time Vitals Session Patient Position Pulse Resp SpO2 BP MAP (mmHg)    07/15/25 1220 During PT  --  --  --  --  --  --           Vital Signs Comment: O2 sat 95 to 97% with HR 65 to 71 bpm throughout session     Objective   Pain:  Pain Assessment  Pain Assessment: 0-10  0-10 (Numeric) Pain Score: 0 - No pain  Cognition:  Cognition  Overall Cognitive Status: Within Functional Limits  Orientation Level: Oriented X4  Safety/Judgement:  (intermittant decreased safety insight during functional mobility)  Insight: Mild  Processing Speed: Delayed  Coordination:  Movements are Fluid and Coordinated: No  Lower Body Coordination: Slower rate of movement miguelito LE (right greater than left)  Postural Control:  Postural Control  Posture Comment: mild to mod forward head, protracted shldrs  Extremity/Trunk Assessments:    Activity Tolerance:  Activity Tolerance  Endurance: Decreased tolerance for upright activites  Activity Tolerance Comments: fair +  Treatments:  Therapeutic Exercise  Therapeutic Exercise Performed: Yes  Therapeutic Exercise Activity 1: seated miguelito marching x 20 reps  Therapeutic Exercise Activity 2: seated miguelito LAQ's x 20 reps  Therapeutic Exercise Activity 3: seated miguelito hip abd x 20 reps  Therapeutic Exercise Activity 4: seated miguelito AP/heel raises x 20 reps each  Therapeutic Exercise Activity 5: standing unilateral UE elevation x 5 reps each; miguelito UE elevation x 5 reps, min to mod assist of 1 to steady reaching with both UE's    Therapeutic Activity  Therapeutic Activity Performed: Yes (see transfers, amb with FWW)    Bed Mobility  Bed Mobility: No    Ambulation/Gait Training  Ambulation/Gait Training Performed: Yes  Ambulation/Gait Training 1  Surface 1: Level tile  Device 1: Rolling walker  Assistance 1: Minimum  assistance, Minimal verbal cues  Quality of Gait 1: Narrow base of support, Diminished heel strike, Decreased step length, Forward flexed posture, Soft knee(s)  Comments/Distance (ft) 1: pt amb 30 ft x 2, + turns to right/left, verbal cues for erect posture, tightening of miguelito quads, increased B of S. Pt demonstrated 2 episodes of mild loss of balance with min assist of 1 to recover during turns.  Transfers  Transfer: Yes  Transfer 1  Transfer From 1: Chair with arms to  Transfer to 1: Stand  Technique 1: Sit to stand  Transfer Device 1: Walker  Transfer Level of Assistance 1: Minimum assistance, Minimal verbal cues  Trials/Comments 1: min assist of 1 for trunk up, verbal cues for forward weight shifting x 3 trials---mod difficulty achieving C of G over B of S  Transfers 2  Transfer From 2: Stand to  Transfer to 2: Chair with arms  Technique 2: Stand to sit  Transfer Device 2: Walker  Transfer Level of Assistance 2: Minimum assistance, Minimal verbal cues  Trials/Comments 2: min assist of 1 for trunk down, verbal cues for backing fully into chair, reaching back with both hands for arms of chair---X 3 trials    Stairs  Stairs: No    Outcome Measures:  [facility] Basic Mobility  Turning from your back to your side while in a flat bed without using bedrails: A little  Moving from lying on your back to sitting on the side of a flat bed without using bedrails: A lot  Moving to and from bed to chair (including a wheelchair): A little  Standing up from a chair using your arms (e.g. wheelchair or bedside chair): A lot  To walk in hospital room: A little  Climbing 3-5 steps with railing: Total  Basic Mobility - Total Score: 14    Education Documentation  Mobility Training, taught by Georgina Diaz, PT at 7/15/2025  1:11 PM.  Learner: Patient  Readiness: Acceptance  Method: Explanation, Demonstration  Response: Needs Reinforcement  Comment: PT educated pt in safe transfer technique    Education Comments  No comments found.                Encounter Problems       Encounter Problems (Active)       Mobility       Bed mobility including supine to sit and sit to supine with min assist. (Progressing)       Start:  07/14/25    Expected End:  07/28/25            Transfers including sit to stand and stand to sit with min assist. (Progressing)       Start:  07/14/25    Expected End:  07/28/25            Ambulate 50 feet with rolling walker and min assist. (Progressing)       Start:  07/14/25    Expected End:  07/28/25

## 2025-07-15 NOTE — PROGRESS NOTES
"Angel Carroll is a 80 y.o. male on day 2 of admission presenting with JASBIR (acute kidney injury).    Subjective   Hematuria is doing little better.  He feels lot better today        Objective     Physical Exam  Vitals reviewed.   Constitutional:       Appearance: Normal appearance.   HENT:      Head: Normocephalic and atraumatic.      Right Ear: Tympanic membrane, ear canal and external ear normal.      Left Ear: Tympanic membrane, ear canal and external ear normal.      Nose: Nose normal.      Mouth/Throat:      Pharynx: Oropharynx is clear.   Eyes:      Extraocular Movements: Extraocular movements intact.      Conjunctiva/sclera: Conjunctivae normal.      Pupils: Pupils are equal, round, and reactive to light.   Cardiovascular:      Rate and Rhythm: Normal rate and regular rhythm.      Pulses: Normal pulses.      Heart sounds: Normal heart sounds.   Pulmonary:      Effort: Pulmonary effort is normal.      Breath sounds: Normal breath sounds.   Abdominal:      General: Abdomen is flat. Bowel sounds are normal.      Palpations: Abdomen is soft.   Musculoskeletal:      Cervical back: Normal range of motion and neck supple.   Skin:     General: Skin is warm and dry.   Neurological:      General: No focal deficit present.      Mental Status: He is alert and oriented to person, place, and time.   Psychiatric:         Mood and Affect: Mood normal.         Last Recorded Vitals  Blood pressure 139/50, pulse 60, temperature 36.5 °C (97.7 °F), temperature source Oral, resp. rate 18, height 1.778 m (5' 10\"), weight 98.4 kg (217 lb), SpO2 98%.  Intake/Output last 3 Shifts:  I/O last 3 completed shifts:  In: 2272 (23.1 mL/kg) [P.O.:1482; I.V.:740 (7.5 mL/kg); IV Piggyback:50]  Out: - (0 mL/kg)   Weight: 98.4 kg     Relevant Results               Results for orders placed or performed during the hospital encounter of 07/12/25 (from the past 24 hours)   CBC   Result Value Ref Range    WBC 4.5 4.4 - 11.3 x10*3/uL    nRBC 0.0 0.0 - 0.0 " /100 WBCs    RBC 3.69 (L) 4.50 - 5.90 x10*6/uL    Hemoglobin 10.9 (L) 13.5 - 17.5 g/dL    Hematocrit 34.9 (L) 41.0 - 52.0 %    MCV 95 80 - 100 fL    MCH 29.5 26.0 - 34.0 pg    MCHC 31.2 (L) 32.0 - 36.0 g/dL    RDW 14.0 11.5 - 14.5 %    Platelets 148 (L) 150 - 450 x10*3/uL   Basic Metabolic Panel   Result Value Ref Range    Glucose 96 74 - 99 mg/dL    Sodium 141 136 - 145 mmol/L    Potassium 3.7 3.5 - 5.3 mmol/L    Chloride 108 (H) 98 - 107 mmol/L    Bicarbonate 27 21 - 32 mmol/L    Anion Gap 10 10 - 20 mmol/L    Urea Nitrogen 18 6 - 23 mg/dL    Creatinine 1.27 0.50 - 1.30 mg/dL    eGFR 56 (L) >60 mL/min/1.73m*2    Calcium 8.5 (L) 8.6 - 10.3 mg/dL     *Note: Due to a large number of results and/or encounters for the requested time period, some results have not been displayed. A complete set of results can be found in Results Review.     CT abdomen pelvis wo IV contrast  Result Date: 7/14/2025  Interpreted By:  Holly Lomeli, STUDY: CT ABDOMEN PELVIS WO IV CONTRAST;  7/14/2025 11:37 am   INDICATION: Signs/Symptoms:right hydronephrosis.     COMPARISON: 11/15/2023   ACCESSION NUMBER(S): [id number]   ORDERING CLINICIAN: SHUN CARTER   TECHNIQUE: CT of the abdomen and pelvis was performed. Contiguous axial images were obtained at 3 mm slice thickness through the abdomen and pelvis. Coronal and sagittal reconstructions at 3 mm slice thickness were performed.  No intravenous or oral contrast was administered.   FINDINGS: Please note that the evaluation of vessels, lymph nodes and organs is limited without intravenous contrast.   LOWER CHEST: Bipolar pacemaker leads are seen within the right side of the heart. The heart at the upper limits of normal in size. Small bilateral pleural effusions are present. There is mild interstitial edema.   ABDOMEN:   LIVER: The liver is enlarged measuring 20 cm in superior to inferior dimension. No hepatic mass is present. The density of the hepatic parenchyma is normal.   BILE DUCTS: The  intrahepatic and extrahepatic ducts are not dilated.   GALLBLADDER: The gallbladder is contracted with no calcified gallstones observed.   PANCREAS: The pancreas appears unremarkable.   SPLEEN: Splenomegaly is seen with the spleen measuring 17.2 cm in greatest length. No splenic mass is identified.   ADRENAL GLANDS: Bilateral adrenal glands appear normal.   KIDNEYS AND URETERS: There are nonobstructing left renal calculi seen with the largest stone observed in the lower pole of the left kidney measuring 6-7 mm in size. On the right, there is a 9 mm stone in the lower pole with adjacent 6-7 mm calculus seen in the lower pole as well. There is a moderate degree of right-sided hydronephrosis and right hydroureter with a 7 mm stone in the distal right ureter just proximal to the ureterovesical junction identified. There is no left-sided hydronephrosis or hydroureter.   PELVIS:   BLADDER: The wall of the urinary bladder is thickened with induration of the adjacent fat.   REPRODUCTIVE ORGANS: The prostate gland is enlarged measuring 4.7 cm in transverse dimension and 5.3 cm in superior to inferior dimension.   BOWEL: The appendix is normal in appearance. There is no abnormal distention of the intestinal tract or abnormal bowel wall thickening.   VESSELS: There is calcified plaque within the proximal renal arteries bilaterally with atherosclerosis of the abdominal aorta and iliac arteries without aneurysmal dilatation.   PERITONEUM/RETROPERITONEUM/LYMPH NODES: There is a small amount of fluid within the left paracolic gutter with thickened left lateral conal fascia. On the right, there is thickening of the right lateral conal fascia with induration of the fat seen within the right paracolic gutter.   ABDOMINAL WALL: The abdominal wall is intact.   BONES: No acute osseous abnormality is seen. There is postoperative change due to decompressive laminectomy in the lower lumbar region.       1.  Right hydronephrosis and  hydroureter which is moderate in degree with a 7 mm calculus in the distal right ureter just proximal to the ureterovesical junction. 2. Bilateral nonobstructing intrarenal calculi. 3. Thickening of the bladder wall with induration of the adjacent fat may indicate cystitis or could be due to an element of bladder outlet obstruction as the prostate gland is enlarged. 4. There is thickening of the lateral conal fascia bilaterally with small amount of fluid in left paracolic gutter and with induration of the fat within the right paracolic gutter. 5. Hepatosplenomegaly. 6. Small bilateral pleural effusions with mild interstitial edema.     MACRO: None   Signed by: Holly Lomeli 7/14/2025 1:43 PM Dictation workstation:   [id number]                   Assessment & Plan  JASBIR (acute kidney injury)    UTI (urinary tract infection)    Hematuria    Stage 3b chronic kidney disease (Multi)    Paroxysmal atrial fibrillation (Multi)    Pacemaker    Ischemic cardiomyopathy    Hypertension    Hyperlipidemia    BPH (benign prostatic hyperplasia)    Recurrent falls    Hydronephrosis with urinary obstruction due to renal calculus    Ureteral stone    Ultrasound shows hydronephrosis  CT showed obstructive uropathy  Urology following the patient  Further input pending for plan of care  Potassium better  Kidney functions doing little better  Continue antibiotic for UTI   Blood pressure stable  PT OT      I spent  minutes in the professional and overall care of this patient.      Tasneem Sandoval MD

## 2025-07-15 NOTE — PROGRESS NOTES
Occupational Therapy    OT Treatment    Patient Name: Angel Carroll  MRN: [id number]  Department: [facility]  Room: [address] [place] [address]  Today's Date: 7/15/2025  Time Calculation  Start Time: 1122  Stop Time: 1200  Time Calculation (min): 38 min      Assessment:  OT Assessment: pt tolerated treatment well and is making good progress towards OT POC. pt displaying increased tolerance for EOB ADLs and in stance ADLs with Mod A which displays progress towards goals. pt would benefit from continued OT services to increase strentgh, balance, endurance, and activity tolerance for ADLs  Prognosis: Good  Barriers to Discharge Home: Caregiver assistance, Physical needs  Caregiver Assistance: Caregiver assistance needed per identified barriers - however, level of patient's required assistance exceeds assistance available at home  Physical Needs: Ambulating household distances limited by function/safety, 24hr mobility assistance needed, Intermittent ADL assistance needed, High falls risk due to function or environment  Evaluation/Treatment Tolerance: Patient limited by fatigue  Medical Staff Made Aware: Yes  End of Session Communication: Bedside nurse  End of Session Patient Position: Up in chair, Alarm on (call light in reach and all needs met: RN made aware of OT session and pt position after session)  OT Assessment Results: Decreased ADL status, Decreased upper extremity strength, Decreased safe judgment during ADL, Decreased endurance, Decreased functional mobility, Decreased trunk control for functional activities  Prognosis: Good  Barriers to Discharge: Decreased caregiver support, Inaccessible home environment  Evaluation/Treatment Tolerance: Patient limited by fatigue  Medical Staff Made Aware: Yes  Strengths: Ability to acquire knowledge, Attitude of self  Barriers to Participation: Comorbidities  Plan:  Treatment Interventions: ADL retraining, Functional transfer training, UE strengthening/ROM, Endurance training, Patient/family  training, Equipment evaluation/education, Neuromuscular reeducation, Compensatory technique education  OT Frequency: 4 times per week (during this acute inpatient hospitalization)  OT Discharge Recommendations: Moderate intensity level of continued care, Other (Comment) (Based on current functional status and rehab potential, patient is anticipated to tolerate and benefit from 5 or more days per week of skilled rehabilitative therapy after discharge from this acute inpatient hospitalization.)  Equipment Recommended upon Discharge: Wheeled walker, Wheelchair  OT Recommended Transfer Status: Moderate assist  OT - OK to Discharge: Yes  Treatment Interventions: ADL retraining, Functional transfer training, UE strengthening/ROM, Endurance training, Patient/family training, Equipment evaluation/education, Neuromuscular reeducation, Compensatory technique education    Subjective   OT Visit Info:  OT Received On: 07/15/25  General Visit Info:  General  Reason for Referral: weakness, impaired ADL's/mobility  Referred By: Tasneem Sandoval MD  Past Medical History Relevant to Rehab: BPH, hyperlipidemia, HTN, CKD3, falls, pacemaker, anemia, CVA, dep, anxiety, COPD, COVID, OA, 2L 02  Family/Caregiver Present: No  Prior to Session Communication: Bedside nurse  Patient Position Received: Bed, 3 rail up, Alarm on  General Comment: pt cleared by nursing for OT treatment; pt pleasant and agreeable to therapy  Precautions:  Hearing/Visual Limitations: glasses, [place]  Medical Precautions: Fall precautions, Oxygen therapy device and L/min (2L O2 via Nc)  Precautions Comment: +2L O2 via Nc, +tele      Vital Signs Comment: O2 96-98% and HR 61 while seated on EOB    Pain:  Pain Assessment  Pain Assessment: 0-10  0-10 (Numeric) Pain Score: 3  Pain Type: Acute pain  Pain Location: Generalized  Pain Descriptors: Sore  Pain Interventions: Repositioned  Response to Interventions: Resting quietly, Content/relaxed    Objective     Cognition:  Cognition  Overall Cognitive Status: Within Functional Limits  Orientation Level: Oriented X4  Cognition Comments: very pleasant and cooperative    Coordination:  Movements are Fluid and Coordinated: No  Upper Body Coordination: slower rate and accuracy of movements    Activities of Daily Living:   Feeding  Feeding Level of Assistance: Minimum assistance  Feeding Where Assessed: Chair  Feeding Comments: pt set up for lunch after treatment session; Min A to open containers and drinks    Grooming  Grooming Level of Assistance: Setup  Grooming Where Assessed: Edge of bed  Grooming Comments: pt engaged in washing face and neck while seated on EOB with set up of items and significantly increased time    UE Bathing  UE Bathing Level of Assistance: Minimum assistance  UE Bathing Where Assessed: Edge of bed  UE Bathing Comments: UB bathing completed while seated on EOB with Min A for thoroughness; pt easily fatigues and completed task with increased time and effort    LE Bathing  LE Bathing Level of Assistance: Setup, Moderate assistance, Maximum assistance  LE Bathing Where Assessed: Edge of bed  LE Bathing Comments: LB bathing completed while seated on EOB and in stance with FWW: pt able to wash bilateral thighs while seated; Mod A to wash lower legs; Max A to wash posterior and anterior hygiene while in stance with FWW; effortful and completed with increased time    UE Dressing  UE Dressing Level of Assistance: Minimum assistance  UE Dressing Where Assessed: Edge of bed  UE Dressing Comments: Min A to thread BUEs through gown while seated on EOB    LE Dressing  LE Dressing: Yes  Adult Briefs Level of Assistance: Maximum assistance  LE Dressing Where Assessed: Edge of bed  LE Dressing Comments: Max A to thread BLEs through briefs while seated on EOB: in stance for brief management over hips with Max A to complete task; pt in stance with bilateral support on FWW for ~8 minutes during LB bathing and dressing  tasks    Toileting  Toileting Comments: pt incontinent of small BM and urine in brief; Max A for posterior and anterior hygiene while in stance with FWW: Max A for clothing management before and after hygiene    Bed Mobility/Transfers:   Bed Mobility  Bed Mobility: Yes  Bed Mobility 1  Bed Mobility 1: Supine to sitting  Level of Assistance 1: Moderate assistance  Bed Mobility Comments 1: Mod A to fully bring BLEs towards EOB and to elevate trunk into sitting position; effortful and completed with increased time    Transfers  Transfer: Yes  Transfer 1  Transfer From 1: Bed to  Transfer to 1: Stand  Technique 1: Sit to stand  Transfer Device 1: Walker  Transfer Level of Assistance 1: Moderate assistance  Trials/Comments 1: Mod A for trunk elevation and cues for forward weightshifiting; cues for hand placement; effortful  Transfers 2  Transfer From 2: Stand to  Transfer to 2: Chair with arms  Technique 2: Stand to sit  Transfer Device 2: Walker  Transfer Level of Assistance 2: Minimum assistance  Trials/Comments 2: Min A for controlled descent; cues for hand placement; effortful    Functional Mobility:  Functional Mobility  Functional Mobility Performed: Yes  Functional Mobility 1  Surface 1: Level tile  Device 1: Rolling walker  Assistance 1: Moderate assistance  Comments 1: pt able to functionally ambulate from bed to chair with Mod A for balance and safety; cues for walker management and step sequencing; effortful and completed with significantly increased time    Sitting Balance:  Dynamic Sitting Balance  Dynamic Sitting-Balance Support: No upper extremity supported, Feet supported  Dynamic Sitting-Level of Assistance: Close supervision  Dynamic Sitting-Balance: Forward lean, Reaching for objects, Reaching across midline  Dynamic Sitting-Comments: ADL tasks while seated on EOB for ~25 minutes to increase functional balance and tolerance for ADL tasks; pt displaying fair seated balance    Standing Balance:  Static  Standing Balance  Static Standing-Balance Support: Bilateral upper extremity supported  Static Standing-Level of Assistance: Moderate assistance, Minimum assistance  Static Standing-Comment/Number of Minutes: Min/Mod A for balance and safety during LB bathing/dressing and toileting task; fair- standing balance; in stance for ~8-10 minutes to increase functional standing balance and tolerance for ADLs    Therapy/Activity:    Therapeutic Activity  Therapeutic Activity Performed: Yes  Therapeutic Activity 1: see bed mobility, transfers, and functional mobility    Outcome Measures:  [facility] Daily Activity  Putting on and taking off regular lower body clothing: A lot  Bathing (including washing, rinsing, drying): A lot  Putting on and taking off regular upper body clothing: A little  Toileting, which includes using toilet, bedpan or urinal: A lot  Taking care of personal grooming such as brushing teeth: A little  Eating Meals: A little  Daily Activity - Total Score: 15      Education Documentation  ADL Training, taught by JESSICA Humphries at 7/15/2025 12:16 PM.  Learner: Patient  Readiness: Acceptance  Method: Explanation  Response: Verbalizes Understanding  Comment: OT POC, body mechanics, balance/safety technqiues, fall prevention, and precautions during ADL retraining    Goals:  Encounter Problems       Encounter Problems (Active)       OT Goals       Pt will complete all ADL's with mod indep/indep using adaptive equipment as needed. (Progressing)       Start:  07/14/25    Expected End:  08/14/25            Pt will complete all functional transfers and mobility with mod indep using a RW or W/C. (Progressing)       Start:  07/14/25    Expected End:  08/14/25

## 2025-07-15 NOTE — PROGRESS NOTES
"Spiritual Care Visit  Spiritual Care Request    Reason for Visit:  Routine Visit: Introduction  Crisis Visit: Trauma     Request Received From:       Focus of Care:  Visited With: Patient         Refer to :          Spiritual Care Assessment    Spiritual Assessment:                      Care Provided:  Intended Effects: Establish rapport and connectedness, Build relationship of care and support, Demonstrate caring and concern, Lessen someone's feelings of isolation  Methods: Offer emotional support  Interventions: Explain  role    Sense of Community and or Yarsanism Affiliation:  Latter-day         Addressed Needs/Concerns and/or Ivory Through:          Outcome:  Outcome of Spiritual Care Visit: Identifying spiritual/emotional issues, Comfort/healing presence     Advance Directives:         Spiritual Care Annotation          Annotation:  provided patient support while rounding the Unit.  introduced  services of    explained the role of the  in providing emotional and spiritual support for patient's and family while in admitted to the hospital.      greeted the patient who was resting slightly inclined in his hospital bed.  Patient expressed jokingly, \" my wife and share fall and we joke whose turn is it this week to be in rehab, let me tell you I am winning\"  Patient states that he has good support from his family who care for him, his daughter is a LPN, she a tremendous support. Patient is Advent and commented on Father Isiah's excellent care.  reviewed to process to obtain support as desired.      No spiritual or Taoist needs were expressed. Spiritual care will remain available for support as requested.         "

## 2025-07-15 NOTE — PROGRESS NOTES
Spiritual Care Visit  Spiritual Care Request    Reason for Visit:  Routine Visit: Follow-up  Crisis Visit: Trauma     Request Received From:       Focus of Care:  Visited With: Patient         Refer to :          Spiritual Care Assessment    Spiritual Assessment:                      Care Provided:  Intended Effects: Establish rapport and connectedness, Build relationship of care and support, Demonstrate caring and concern, Lessen someone's feelings of isolation  Methods: Offer emotional support  Interventions: Explain  role    Sense of Community and or Religion Affiliation:  Zoroastrian   Values/Beliefs  Spiritual Requests During Hospitalization: Angel asked for a blessing     Addressed Needs/Concerns and/or Ivory Through:          Outcome:  Outcome of Spiritual Care Visit: Identifying spiritual/emotional issues, Comfort/healing presence     Advance Directives:         Spiritual Care Annotation    Annotation:  Angel asked for a blessing today.  Jacinto Grijalva

## 2025-07-15 NOTE — CARE PLAN
The patient's goals for the shift include fall free    The clinical goals for the shift include safety    Problem: Pain - Adult  Goal: Verbalizes/displays adequate comfort level or baseline comfort level  Outcome: Progressing     Problem: Safety - Adult  Goal: Free from fall injury  Outcome: Progressing     Problem: Discharge Planning  Goal: Discharge to home or other facility with appropriate resources  Outcome: Progressing     Problem: Chronic Conditions and Co-morbidities  Goal: Patient's chronic conditions and co-morbidity symptoms are monitored and maintained or improved  Outcome: Progressing     Problem: Nutrition  Goal: Nutrient intake appropriate for maintaining nutritional needs  Outcome: Progressing     Problem: Skin  Goal: Decreased wound size/increased tissue granulation at next dressing change  Outcome: Progressing  Goal: Participates in plan/prevention/treatment measures  Outcome: Progressing  Goal: Prevent/manage excess moisture  Outcome: Progressing  Goal: Prevent/minimize sheer/friction injuries  Outcome: Progressing  Goal: Promote/optimize nutrition  Outcome: Progressing  Goal: Promote skin healing  Outcome: Progressing     Problem: Fall/Injury  Goal: Not fall by end of shift  Outcome: Progressing  Goal: Be free from injury by end of the shift  Outcome: Progressing  Goal: Verbalize understanding of personal risk factors for fall in the hospital  Outcome: Progressing  Goal: Verbalize understanding of risk factor reduction measures to prevent injury from fall in the home  Outcome: Progressing  Goal: Use assistive devices by end of the shift  Outcome: Progressing  Goal: Pace activities to prevent fatigue by end of the shift  Outcome: Progressing

## 2025-07-15 NOTE — CARE PLAN
Problem: Pain - Adult  Goal: Verbalizes/displays adequate comfort level or baseline comfort level  Outcome: Progressing  Flowsheets (Taken 7/14/2025 2216)  Verbalizes/displays adequate comfort level or baseline comfort level:   Notify Licensed Independent Practitioner if interventions unsuccessful or patient reports new pain   Consider cultural and social influences on pain and pain management   Implement non-pharmacological measures as appropriate and evaluate response   Administer analgesics based on type and severity of pain and evaluate response   Assess pain using appropriate pain scale   Encourage patient to monitor pain and request assistance     Problem: Safety - Adult  Goal: Free from fall injury  Outcome: Progressing  Flowsheets (Taken 7/14/2025 2216)  Free from fall injury:   Based on caregiver fall risk screen, instruct family/caregiver to ask for assistance with transferring infant if caregiver noted to have fall risk factors   Instruct family/caregiver on patient safety     Problem: Discharge Planning  Goal: Discharge to home or other facility with appropriate resources  Outcome: Progressing  Flowsheets (Taken 7/14/2025 2216)  Discharge to home or other facility with appropriate resources:   Refer to discharge planning if patient needs post-hospital services based on physician order or complex needs related to functional status, cognitive ability or social support system   Identify discharge learning needs (meds, wound care, etc)   Arrange for needed discharge resources and transportation as appropriate   Identify barriers to discharge with patient and caregiver     Problem: Chronic Conditions and Co-morbidities  Goal: Patient's chronic conditions and co-morbidity symptoms are monitored and maintained or improved  Outcome: Progressing  Flowsheets (Taken 7/14/2025 2216)  Care Plan - Patient's Chronic Conditions and Co-Morbidity Symptoms are Monitored and Maintained or Improved:   Update acute care  plan with appropriate goals if chronic or comorbid symptoms are exacerbated and prevent overall improvement and discharge   Collaborate with multidisciplinary team to address chronic and comorbid conditions and prevent exacerbation or deterioration   Monitor and assess patient's chronic conditions and comorbid symptoms for stability, deterioration, or improvement     Problem: Skin  Goal: Decreased wound size/increased tissue granulation at next dressing change  Outcome: Progressing  Flowsheets (Taken 7/14/2025 2216)  Decreased wound size/increased tissue granulation at next dressing change:   Promote sleep for wound healing   Protective dressings over bony prominences   Utilize specialty bed per algorithm  Goal: Participates in plan/prevention/treatment measures  Outcome: Progressing  Flowsheets (Taken 7/14/2025 2216)  Participates in plan/prevention/treatment measures:   Increase activity/out of bed for meals   Discuss with provider PT/OT consult   Elevate heels  Goal: Prevent/manage excess moisture  Outcome: Progressing  Flowsheets (Taken 7/14/2025 2216)  Prevent/manage excess moisture:   Monitor for/manage infection if present   Follow provider orders for dressing changes   Cleanse incontinence/protect with barrier cream  Goal: Prevent/minimize sheer/friction injuries  Outcome: Progressing  Flowsheets (Taken 7/14/2025 2216)  Prevent/minimize sheer/friction injuries:   Use pull sheet   Turn/reposition every 2 hours/use positioning/transfer devices   HOB 30 degrees or less   The patient's goals for the shift include fall free    The clinical goals for the shift include rest, monitor labs and vitals

## 2025-07-15 NOTE — PROGRESS NOTES
Patient not medically clear. Urology following. TCC met with patient to discuss discharge plans and patient is agreeable to SNF. [facility] chose [facility]. Referral sent and [facility] does not have bed availability this week. New choices are needed. Will continue to follow.      07/15/25 1130   Discharge Planning   Home or Post Acute Services Post acute facilities (Rehab/SNF/etc)   Type of Post Acute Facility Services Rehab   Expected Discharge Disposition SNF  (TBD)   Does the patient need discharge transport arranged? Yes   [facility] coordination needed? Yes

## 2025-07-16 LAB
ATRIAL RATE: 62 BPM
P AXIS: 45 DEGREES
P OFFSET: 129 MS
P ONSET: 107 MS
PR INTERVAL: 210 MS
Q ONSET: 212 MS
QRS COUNT: 10 BEATS
QRS DURATION: 96 MS
QT INTERVAL: 440 MS
QTC CALCULATION(BAZETT): 446 MS
QTC FREDERICIA: 445 MS
R AXIS: -36 DEGREES
T AXIS: 54 DEGREES
T OFFSET: 432 MS
VENTRICULAR RATE: 62 BPM

## 2025-07-16 PROCEDURE — 2500000005 HC RX 250 GENERAL PHARMACY W/O HCPCS: Performed by: INTERNAL MEDICINE

## 2025-07-16 PROCEDURE — 2500000002 HC RX 250 W HCPCS SELF ADMINISTERED DRUGS (ALT 637 FOR MEDICARE OP, ALT 636 FOR OP/ED): Performed by: INTERNAL MEDICINE

## 2025-07-16 PROCEDURE — 97110 THERAPEUTIC EXERCISES: CPT | Mod: GP

## 2025-07-16 PROCEDURE — 97116 GAIT TRAINING THERAPY: CPT | Mod: GP

## 2025-07-16 PROCEDURE — 1210000001 HC SEMI-PRIVATE ROOM DAILY

## 2025-07-16 PROCEDURE — 97530 THERAPEUTIC ACTIVITIES: CPT | Mod: GO,CO

## 2025-07-16 PROCEDURE — 99232 SBSQ HOSP IP/OBS MODERATE 35: CPT | Performed by: INTERNAL MEDICINE

## 2025-07-16 PROCEDURE — 2500000001 HC RX 250 WO HCPCS SELF ADMINISTERED DRUGS (ALT 637 FOR MEDICARE OP): Performed by: INTERNAL MEDICINE

## 2025-07-16 PROCEDURE — 97535 SELF CARE MNGMENT TRAINING: CPT | Mod: GO,CO

## 2025-07-16 PROCEDURE — 2500000004 HC RX 250 GENERAL PHARMACY W/ HCPCS (ALT 636 FOR OP/ED): Performed by: INTERNAL MEDICINE

## 2025-07-16 RX ORDER — TALC
3 POWDER (GRAM) TOPICAL NIGHTLY PRN
Status: ON HOLD
Start: 2025-07-16

## 2025-07-16 RX ORDER — POLYETHYLENE GLYCOL 3350 17 G/17G
17 POWDER, FOR SOLUTION ORAL DAILY
Status: ON HOLD
Start: 2025-07-17

## 2025-07-16 RX ORDER — CEPHALEXIN 500 MG/1
500 CAPSULE ORAL 3 TIMES DAILY
Start: 2025-07-16 | End: 2025-07-21

## 2025-07-16 RX ORDER — NYSTATIN 100000 [USP'U]/G
POWDER TOPICAL EVERY 8 HOURS
Status: ON HOLD
Start: 2025-07-16

## 2025-07-16 RX ADMIN — AMIODARONE HYDROCHLORIDE 100 MG: 100 TABLET ORAL at 09:20

## 2025-07-16 RX ADMIN — DOCUSATE SODIUM 100 MG: 100 CAPSULE, LIQUID FILLED ORAL at 09:20

## 2025-07-16 RX ADMIN — GABAPENTIN 100 MG: 100 CAPSULE ORAL at 21:39

## 2025-07-16 RX ADMIN — NYSTATIN: 100000 POWDER TOPICAL at 23:54

## 2025-07-16 RX ADMIN — FINASTERIDE 5 MG: 5 TABLET, FILM COATED ORAL at 09:20

## 2025-07-16 RX ADMIN — NYSTATIN: 100000 POWDER TOPICAL at 15:13

## 2025-07-16 RX ADMIN — DOCUSATE SODIUM 100 MG: 100 CAPSULE, LIQUID FILLED ORAL at 21:39

## 2025-07-16 RX ADMIN — ATORVASTATIN CALCIUM 20 MG: 20 TABLET, FILM COATED ORAL at 21:39

## 2025-07-16 RX ADMIN — Medication 3 MG: at 21:39

## 2025-07-16 RX ADMIN — FOLIC ACID 1 MG: 1 TABLET ORAL at 09:20

## 2025-07-16 RX ADMIN — PAROXETINE HYDROCHLORIDE 20 MG: 20 TABLET, FILM COATED ORAL at 09:20

## 2025-07-16 RX ADMIN — CYANOCOBALAMIN TAB 500 MCG 500 MCG: 500 TAB at 09:21

## 2025-07-16 RX ADMIN — TAMSULOSIN HYDROCHLORIDE 0.4 MG: 0.4 CAPSULE ORAL at 21:39

## 2025-07-16 RX ADMIN — FENOFIBRATE 54 MG: 54 TABLET ORAL at 09:21

## 2025-07-16 RX ADMIN — METOPROLOL SUCCINATE 25 MG: 25 TABLET, EXTENDED RELEASE ORAL at 09:20

## 2025-07-16 RX ADMIN — ACETAMINOPHEN 650 MG: 325 TABLET ORAL at 21:39

## 2025-07-16 RX ADMIN — NYSTATIN: 100000 POWDER TOPICAL at 09:21

## 2025-07-16 RX ADMIN — PANTOPRAZOLE SODIUM 40 MG: 40 TABLET, DELAYED RELEASE ORAL at 05:30

## 2025-07-16 RX ADMIN — CEFTRIAXONE 1 G: 1 INJECTION, SOLUTION INTRAVENOUS at 11:49

## 2025-07-16 ASSESSMENT — COGNITIVE AND FUNCTIONAL STATUS - GENERAL
DRESSING REGULAR UPPER BODY CLOTHING: A LITTLE
DRESSING REGULAR LOWER BODY CLOTHING: A LITTLE
CLIMB 3 TO 5 STEPS WITH RAILING: A LOT
DRESSING REGULAR UPPER BODY CLOTHING: A LITTLE
MOVING TO AND FROM BED TO CHAIR: A LITTLE
STANDING UP FROM CHAIR USING ARMS: A LOT
MOVING TO AND FROM BED TO CHAIR: A LITTLE
MOBILITY SCORE: 19
TOILETING: A LITTLE
PERSONAL GROOMING: A LITTLE
HELP NEEDED FOR BATHING: A LITTLE
DAILY ACTIVITIY SCORE: 19
CLIMB 3 TO 5 STEPS WITH RAILING: TOTAL
DRESSING REGULAR LOWER BODY CLOTHING: A LITTLE
HELP NEEDED FOR BATHING: A LOT
DRESSING REGULAR LOWER BODY CLOTHING: A LOT
MOVING FROM LYING ON BACK TO SITTING ON SIDE OF FLAT BED WITH BEDRAILS: A LITTLE
CLIMB 3 TO 5 STEPS WITH RAILING: A LOT
MOBILITY SCORE: 14
DAILY ACTIVITIY SCORE: 16
TOILETING: A LITTLE
STANDING UP FROM CHAIR USING ARMS: A LITTLE
MOBILITY SCORE: 19
HELP NEEDED FOR BATHING: A LITTLE
PERSONAL GROOMING: A LITTLE
EATING MEALS: A LITTLE
DAILY ACTIVITIY SCORE: 19
TOILETING: A LITTLE
PERSONAL GROOMING: A LITTLE
WALKING IN HOSPITAL ROOM: A LITTLE
STANDING UP FROM CHAIR USING ARMS: A LITTLE
MOVING TO AND FROM BED TO CHAIR: A LITTLE
WALKING IN HOSPITAL ROOM: A LITTLE
TURNING FROM BACK TO SIDE WHILE IN FLAT BAD: A LOT
DRESSING REGULAR UPPER BODY CLOTHING: A LITTLE
WALKING IN HOSPITAL ROOM: A LITTLE

## 2025-07-16 ASSESSMENT — PAIN SCALES - GENERAL
PAINLEVEL_OUTOF10: 3
PAINLEVEL_OUTOF10: 0 - NO PAIN
PAINLEVEL_OUTOF10: 0 - NO PAIN
PAINLEVEL_OUTOF10: 3

## 2025-07-16 ASSESSMENT — PAIN DESCRIPTION - ORIENTATION: ORIENTATION: LOWER

## 2025-07-16 ASSESSMENT — ACTIVITIES OF DAILY LIVING (ADL)
BATHING_COMMENTS: COMPLETED PRIOR
HOME_MANAGEMENT_TIME_ENTRY: 19

## 2025-07-16 ASSESSMENT — PAIN DESCRIPTION - LOCATION: LOCATION: BACK

## 2025-07-16 ASSESSMENT — PAIN DESCRIPTION - DESCRIPTORS: DESCRIPTORS: ACHING

## 2025-07-16 NOTE — PROGRESS NOTES
Patient not medically clear. Urology following. [facility] has a bed today for patient, waiting to see if urology signs off. If patient does not discharge today will need to make sure when [facility] will have a bed otherwise we will need new choices. Will continue to follow.     UPDATE 3:45PM: [facility] has a bed that may open up secondary to a pending return. If the bed does not open up by tomorrow, patient will be agreeable to go to another facility. Will follow.      07/16/25 1115   Discharge Planning   Home or Post Acute Services Post acute facilities (Rehab/SNF/etc)   Type of Post Acute Facility Services Rehab   Expected Discharge Disposition SNF  (TBD)   Does the patient need discharge transport arranged? Yes   [facility] coordination needed? Yes

## 2025-07-16 NOTE — PROGRESS NOTES
Physical Therapy    Physical Therapy Treatment    Patient Name: Angel Carroll  MRN: [id number]  Department: [address]  Room: [address] [place] [address]  Today's Date: 7/16/2025  Time Calculation  Start Time: 1256  Stop Time: 1322  Time Calculation (min): 26 min         Assessment/Plan   PT Assessment  Rehab Prognosis: Good  Barriers to Discharge Home: Caregiver assistance, Physical needs  Caregiver Assistance: Caregiver assistance needed per identified barriers - however, level of patient's required assistance exceeds assistance available at home  Physical Needs: 24hr mobility assistance needed  Evaluation/Treatment Tolerance: Patient limited by fatigue (SOB)  End of Session Communication: Bedside nurse  Assessment Comment: Progressing slowly with functional mobility;  tolerance to activity improving slowly;  fall risk;  patient would benefit from skilled physical therapy services to maximize functional mobility to modified independent levels.  End of Session Patient Position: Up in chair, Alarm on  PT Plan  Inpatient/Swing Bed or Outpatient: Inpatient  PT Plan  Treatment/Interventions: Bed mobility, Transfer training, Gait training, Balance training, Strengthening, Endurance training, Therapeutic exercise, Therapeutic activity  PT Plan: Ongoing PT  PT Frequency: 4 times per week  PT Discharge Recommendations: Moderate intensity level of continued care  Equipment Recommended upon Discharge: Wheeled walker, Wheelchair  PT Recommended Transfer Status: Assist x1, Assistive device (FWW)  PT - OK to Discharge: Yes    PT Visit Info:  PT Received On: 07/16/25     General Visit Information:   General  Prior to Session Communication: Bedside nurse  Patient Position Received: Up in chair, Alarm on  General Comment: Patient reports agreeable to treatment.    Subjective   Precautions:  Precautions  Medical Precautions: Fall precautions  Precautions Comment: O2 at 2 liters via nasal cannula            Objective   Pain:  Pain Assessment  Pain  Assessment: 0-10  0-10 (Numeric) Pain Score: 3  Pain Type: Chronic pain  Pain Location: Back  Pain Interventions:  (Therapeutic functional mobility)  Response to Interventions: Decrease in pain  Cognition:  Cognition  Overall Cognitive Status: Within Functional Limits  Coordination:  Movements are Fluid and Coordinated: No  Lower Body Coordination: Slower rate of movement miguelito LE  Postural Control:  Static Standing Balance  Static Standing-Balance Support: Bilateral upper extremity supported  Static Standing-Level of Assistance: Minimum assistance  Static Standing-Comment/Number of Minutes: Assist with trunk support and balance during static standing with rolling walker    Activity Tolerance:  Activity Tolerance  Endurance: Decreased tolerance for upright activites  Activity Tolerance Comments: Fatigue;  SOB  Treatments:  Therapeutic Exercise  Therapeutic Exercise Performed: Yes  Therapeutic Exercise Activity 1: Ankle pumps, sitting knee extension, sitting hip flexion 10-20 reps x 1 set                   Ambulation/Gait Training  Ambulation/Gait Training Performed: Yes  Ambulation/Gait Training 1  Surface 1: Level tile  Device 1: Rolling walker  Assistance 1: Moderate assistance  Comments/Distance (ft) 1: 25 feet x 2 with rolling walker and assist with trunk support and balance;  verbal cues for posture;  patient ambulates with slow hcristy, non-reciprocating gait pattern, decreased step length miguelito LE, and forward flexed posture.  Transfers  Transfer: Yes  Transfer 1  Transfer From 1: Sit to  Transfer to 1: Stand  Technique 1: Sit to stand  Transfer Device 1: Walker  Transfer Level of Assistance 1: Moderate assistance  Trials/Comments 1: Assist with trunk support and balance;  verbal cues for hand placement  Transfers 2  Transfer From 2: Stand to  Transfer to 2: Sit  Technique 2: Stand to sit  Transfer Device 2: Walker  Transfer Level of Assistance 2: Moderate assistance  Trials/Comments 2: Assist with trunk support  and balance;  verbal cues for hand placement;  decreased eccentric control.    Stairs  Stairs: No         Outcome Measures:  [facility] Basic Mobility  Turning from your back to your side while in a flat bed without using bedrails: A little  Moving from lying on your back to sitting on the side of a flat bed without using bedrails: A lot  Moving to and from bed to chair (including a wheelchair): A little  Standing up from a chair using your arms (e.g. wheelchair or bedside chair): A lot  To walk in hospital room: A little  Climbing 3-5 steps with railing: Total  Basic Mobility - Total Score: 14    Education Documentation  No documentation found.  Education Comments  No comments found.        OP EDUCATION:       Encounter Problems       Encounter Problems (Active)       Mobility       Bed mobility including supine to sit and sit to supine with min assist. (Progressing)       Start:  07/14/25    Expected End:  07/28/25            Transfers including sit to stand and stand to sit with min assist. (Progressing)       Start:  07/14/25    Expected End:  07/28/25            Ambulate 50 feet with rolling walker and min assist. (Progressing)       Start:  07/14/25    Expected End:  07/28/25

## 2025-07-16 NOTE — CARE PLAN
Problem: Pain - Adult  Goal: Verbalizes/displays adequate comfort level or baseline comfort level  Outcome: Progressing     Problem: Safety - Adult  Goal: Free from fall injury  Outcome: Progressing     Problem: Discharge Planning  Goal: Discharge to home or other facility with appropriate resources  Outcome: Progressing     Problem: Chronic Conditions and Co-morbidities  Goal: Patient's chronic conditions and co-morbidity symptoms are monitored and maintained or improved  Outcome: Progressing     Problem: Nutrition  Goal: Nutrient intake appropriate for maintaining nutritional needs  Outcome: Progressing     Problem: Skin  Goal: Decreased wound size/increased tissue granulation at next dressing change  Outcome: Progressing  Goal: Participates in plan/prevention/treatment measures  Outcome: Progressing  Goal: Prevent/manage excess moisture  Outcome: Progressing  Goal: Prevent/minimize sheer/friction injuries  Outcome: Progressing  Goal: Promote/optimize nutrition  Outcome: Progressing  Goal: Promote skin healing  Outcome: Progressing     Problem: Fall/Injury  Goal: Not fall by end of shift  Outcome: Progressing  Goal: Be free from injury by end of the shift  Outcome: Progressing  Goal: Verbalize understanding of personal risk factors for fall in the hospital  Outcome: Progressing  Goal: Verbalize understanding of risk factor reduction measures to prevent injury from fall in the home  Outcome: Progressing  Goal: Use assistive devices by end of the shift  Outcome: Progressing  Goal: Pace activities to prevent fatigue by end of the shift  Outcome: Progressing   The patient's goals for the shift include fall free    The clinical goals for the shift include safety    Over the shift, the patient did not make progress toward the following goals. Barriers to progression include . Recommendations to address these barriers include .

## 2025-07-16 NOTE — PROGRESS NOTES
Occupational Therapy    OT Treatment    Patient Name: Angel Carroll  MRN: [id number]  Department: [facility]  Room: [address] [place], [place] [address]  Today's Date: 7/16/2025  Time Calculation  Start Time: 1002  Stop Time: 1031  Time Calculation (min): 29 min        Assessment:  OT Assessment: Pt actively participating and progressing with dressing, bathing, transfers to POC.  Pt very social requiring redirectinon. Will continue to address remaining deficits with skilled OT intervention  Prognosis: Good  Barriers to Discharge Home: Caregiver assistance, Physical needs  Caregiver Assistance: Caregiver assistance needed per identified barriers - however, level of patient's required assistance exceeds assistance available at home  Physical Needs: Ambulating household distances limited by function/safety, 24hr mobility assistance needed, Intermittent ADL assistance needed, High falls risk due to function or environment  Evaluation/Treatment Tolerance: Patient tolerated treatment well  Medical Staff Made Aware: Yes  End of Session Communication: Bedside nurse  End of Session Patient Position: Up in chair, Alarm on (call light in reach all needs met)  OT Assessment Results: Decreased ADL status, Decreased upper extremity strength, Decreased safe judgment during ADL, Decreased endurance, Decreased functional mobility, Decreased trunk control for functional activities  Prognosis: Good  Barriers to Discharge: Decreased caregiver support, Inaccessible home environment  Evaluation/Treatment Tolerance: Patient tolerated treatment well  Medical Staff Made Aware: Yes  Strengths: Ability to acquire knowledge  Barriers to Participation: Comorbidities  Plan:  Treatment Interventions: ADL retraining, Functional transfer training, Endurance training, Patient/family training, Equipment evaluation/education, Compensatory technique education  OT Frequency: 4 times per week (during this acute inpatient hospitalization)  OT Discharge Recommendations: Moderate intensity  level of continued care, Other (Comment) (Based on current functional status and rehab potential, patient is anticipated to tolerate and benefit from 5 or more days per week of skilled rehabilitative therapy after discharge from this acute inpatient hospitalization.)  Equipment Recommended upon Discharge: Wheeled walker  OT Recommended Transfer Status: Minimal assist  OT - OK to Discharge: Yes  Treatment Interventions: ADL retraining, Functional transfer training, Endurance training, Patient/family training, Equipment evaluation/education, Compensatory technique education    Subjective   OT Visit Info:  OT Received On: 07/16/25  General Visit Info:  General  Reason for Referral: weakness, impaired ADL's/mobility  Referred By: Tasneem Sandoval MD  Past Medical History Relevant to Rehab: BPH, hyperlipidemia, HTN, CKD3, falls, pacemaker, anemia, CVA, dep, anxiety, COPD, COVID, OA, 2L 02  Prior to Session Communication: Bedside nurse  Patient Position Received: Bed, 3 rail up, Alarm on  Preferred Learning Style: verbal, visual  General Comment: Pt cleared by NSG and is agreeable to skilled OT intervention .  Precautions:  Hearing/Visual Limitations: glasses, [place]  Medical Precautions: Fall precautions, Oxygen therapy device and L/min (2LO2 NC)  Precautions Comment: +2L O2 via Nc, +tele      Vital Signs Comment: O2 96-98% and HR 61 while seated on EOb     Pain:  Pain Assessment  Pain Assessment: 0-10  0-10 (Numeric) Pain Score: 0 - No pain    Objective    Cognition:  Cognition  Overall Cognitive Status: Within Functional Limits  Orientation Level: Oriented X4  Cognition Comments: very pleasant and cooperative  Insight: Mild  Processing Speed: Delayed  Coordination:  Movements are Fluid and Coordinated: No  Upper Body Coordination: slower rate and accuracy of movements  Lower Body Coordination: Slower rate of movement miguelito LE (right greater than left)  Activities of Daily Living:      Grooming  Grooming Level of Assistance:  Setup  Grooming Where Assessed: Edge of bed  Grooming Comments: brushing teeth    UE Bathing  UE Bathing Comments: completed prior    LE Bathing  LE Bathing Comments: completed prior    UE Dressing  UE Dressing Level of Assistance: Setup  UE Dressing Where Assessed: Edge of bed  UE Dressing Comments: doff/don hospital gown fasten prior    LE Dressing  LE Dressing: Yes  Sock Level of Assistance: Close supervision (doff/don  socks)  Adult Briefs Level of Assistance: Minimum assistance (doff/don seated to thread  partial assist hip)  LE Dressing Where Assessed: Toilet  LE Dressing Comments: increased time seated to thread stance at RW hip [id number] cues posture    Toileting  Toileting Level of Assistance: Minimum assistance  Where Assessed: Toilet  Toileting Comments: Pt adjusting clothing prior/after/hygiene partial assist after  to manage brief Pt incintinent urin  Functional Standing Tolerance:  Time: 2 minutes  Activity: self catre/transfer  Functional Standing Tolerance Comments: RW cues posture  Bed Mobility/Transfers: Bed Mobility  Bed Mobility: Yes  Bed Mobility 1  Bed Mobility 1: Supine to sitting  Level of Assistance 1: Close supervision  Bed Mobility Comments 1: bed flat effortful cues body mechanics /log roll    Transfers  Transfer: Yes  Transfer 1  Transfer From 1: Bed to  Transfer to 1: Stand  Technique 1: Sit to stand  Transfer Device 1: Walker  Transfer Level of Assistance 1: Contact guard, Minimal verbal cues  Trials/Comments 1: cues hand placement  Transfers 2  Transfer From 2: Stand to  Transfer to 2: Chair with arms  Technique 2: Stand to sit  Transfer Device 2: Walker  Transfer Level of Assistance 2: Minimum assistance, Minimal verbal cues  Trials/Comments 2: cues hand palcement and controlled descent    Toilet Transfers  Toilet Transfer From: Rolling walker  Toilet Transfer Type: To and from  Toilet Transfer to: Standard toilet  Toilet Transfer Technique: Ambulating  Toilet Transfers: Minimal  assistance  Toilet Transfers Comments: grab bar use  Functional Mobility:  Functional Mobility  Functional Mobility Performed: Yes  Functional Mobility 1  Surface 1: Level tile  Device 1: Rolling walker  Assistance 1: Minimum assistance  Quality of Functional Mobility 1: Narrow base of support  Comments 1: 15' to include doorway,turns and backing slight unsteady with turning  Sitting Balance:  Dynamic Sitting Balance  Dynamic Sitting-Balance Support: Feet supported, No upper extremity supported  Dynamic Sitting-Level of Assistance: Close supervision  Dynamic Sitting-Balance: Forward lean, Reaching for objects, Reaching across midline  Dynamic Sitting-Comments: during self care  Outcome Measures:[facility] Daily Activity  Putting on and taking off regular lower body clothing: A lot  Bathing (including washing, rinsing, drying): A lot  Putting on and taking off regular upper body clothing: A little  Toileting, which includes using toilet, bedpan or urinal: A little  Taking care of personal grooming such as brushing teeth: A little  Eating Meals: A little  Daily Activity - Total Score: 16  Education Documentation  ADL Training, taught by GODFREY Huertas at 7/16/2025 10:46 AM.  Learner: Patient  Readiness: Acceptance  Method: Explanation, Demonstration, Teach-back  Response: Verbalizes Understanding, Demonstrated Understanding, Needs Reinforcement  Comment: Instructed Pt with safety in transfers,balance strategies, adaptive ADLskills, body mechanics    Education Comments  No comments found.        OP EDUCATION:       Goals:  Encounter Problems       Encounter Problems (Active)       OT Goals       Pt will complete all ADL's with mod indep/indep using adaptive equipment as needed. (Progressing)       Start:  07/14/25    Expected End:  08/14/25            Pt will complete all functional transfers and mobility with mod indep using a RW or W/C. (Progressing)       Start:  07/14/25    Expected End:  08/14/25

## 2025-07-16 NOTE — PROGRESS NOTES
Spiritual Care Visit  Spiritual Care Request    Reason for Visit:  Routine Visit: Follow-up     Request Received From:       Focus of Care:  Visited With: Patient         Refer to :          Spiritual Care Assessment    Spiritual Assessment:                      Care Provided:       Sense of Community and or Synagogue Affiliation:  Sikhism   Values/Beliefs  Spiritual Requests During Hospitalization: Angel asked for Communion.     Addressed Needs/Concerns and/or Ivory Through:     Sacramental Encounters  Communion: Patient wants communion  Communion Given Indicator: Yes    Outcome:        Advance Directives:         Spiritual Care Annotation    Annotation:  Javon asked for Commuinon.  Jacinto Grijalva

## 2025-07-16 NOTE — PROGRESS NOTES
Physical Therapy    Physical Therapy Treatment    Patient Name: Angel Carroll  MRN: [id number]  Department: [address]  Room: [address] [place] [address]  Today's Date: 7/16/2025  Time Calculation  Start Time: 1256  Stop Time: 1322  Time Calculation (min): 26 min         Assessment/Plan   PT Assessment  Rehab Prognosis: Good  Barriers to Discharge Home: Caregiver assistance, Physical needs  Caregiver Assistance: Caregiver assistance needed per identified barriers - however, level of patient's required assistance exceeds assistance available at home  Physical Needs: 24hr mobility assistance needed  Evaluation/Treatment Tolerance: Patient limited by fatigue (SOB)  End of Session Communication: Bedside nurse  Assessment Comment: Progressing slowly with functional mobility;  tolerance to activity improving slowly;  fall risk;  patient would benefit from skilled physical therapy services to maximize functional mobility to modified independent levels.  End of Session Patient Position: Up in chair, Alarm on  PT Plan  Inpatient/Swing Bed or Outpatient: Inpatient  PT Plan  Treatment/Interventions: Bed mobility, Transfer training, Gait training, Balance training, Strengthening, Endurance training, Therapeutic exercise, Therapeutic activity  PT Plan: Ongoing PT  PT Frequency: 4 times per week (during this acute inpatient hospitalization)  PT Discharge Recommendations: Moderate intensity level of continued care (Based on current functional status and rehab potential, patient is anticipated to tolerance and benefit from 5 or more days per week of skilled rehabilitative therapy after discharge from this acute inpatient hospitalization.)  Equipment Recommended upon Discharge: Wheeled walker, Wheelchair  PT Recommended Transfer Status: Assist x1  PT - OK to Discharge: Yes (with skilled physical therapy services at next level of care)    PT Visit Info:  PT Received On: 07/16/25     General Visit Information:   General  Prior to Session Communication: Bedside  nurse  Patient Position Received: Up in chair, Alarm on  General Comment: Patient reports agreeable to treatment.    Subjective   Precautions:  Precautions  Medical Precautions: Fall precautions  Precautions Comment: O2 at 2 liters via nasal cannula            Objective   Pain:  Pain Assessment  Pain Assessment: 0-10  0-10 (Numeric) Pain Score: 3  Pain Type: Chronic pain  Pain Location: Back  Pain Interventions:  (Therapeutic functional mobility)  Response to Interventions: Decrease in pain  Cognition:  Cognition  Overall Cognitive Status: Within Functional Limits  Coordination:  Movements are Fluid and Coordinated: No  Lower Body Coordination: Slower rate of movement miguelito LE  Postural Control:  Static Standing Balance  Static Standing-Balance Support: Bilateral upper extremity supported  Static Standing-Level of Assistance: Minimum assistance  Static Standing-Comment/Number of Minutes: Assist with trunk support and balance during static standing with rolling walker    Activity Tolerance:  Activity Tolerance  Endurance: Decreased tolerance for upright activites  Activity Tolerance Comments: Fatigue;  SOB  Treatments:  Therapeutic Exercise  Therapeutic Exercise Performed: Yes  Therapeutic Exercise Activity 1: Ankle pumps, sitting knee extension, sitting hip flexion 10-20 reps x 1 set                   Ambulation/Gait Training  Ambulation/Gait Training Performed: Yes  Ambulation/Gait Training 1  Surface 1: Level tile  Device 1: Rolling walker  Assistance 1: Moderate assistance  Comments/Distance (ft) 1: 25 feet x 2 with rolling walker and assist with trunk support and balance;  verbal cues for posture;  patient ambulates with slow christy, non-reciprocating gait pattern, decreased step length miguelito LE, and forward flexed posture.  Transfers  Transfer: Yes  Transfer 1  Transfer From 1: Sit to  Transfer to 1: Stand  Technique 1: Sit to stand  Transfer Device 1: Walker  Transfer Level of Assistance 1: Moderate  assistance  Trials/Comments 1: Assist with trunk support and balance;  verbal cues for hand placement  Transfers 2  Transfer From 2: Stand to  Transfer to 2: Sit  Technique 2: Stand to sit  Transfer Device 2: Walker  Transfer Level of Assistance 2: Moderate assistance  Trials/Comments 2: Assist with trunk support and balance;  verbal cues for hand placement;  decreased eccentric control.    Stairs  Stairs: No         Outcome Measures:  [facility] Basic Mobility  Turning from your back to your side while in a flat bed without using bedrails: A little  Moving from lying on your back to sitting on the side of a flat bed without using bedrails: A lot  Moving to and from bed to chair (including a wheelchair): A little  Standing up from a chair using your arms (e.g. wheelchair or bedside chair): A lot  To walk in hospital room: A little  Climbing 3-5 steps with railing: Total  Basic Mobility - Total Score: 14    Education Documentation  No documentation found.  Education Comments  No comments found.        OP EDUCATION:       Encounter Problems       Encounter Problems (Active)       Mobility       Bed mobility including supine to sit and sit to supine with min assist. (Progressing)       Start:  07/14/25    Expected End:  07/28/25            Transfers including sit to stand and stand to sit with min assist. (Progressing)       Start:  07/14/25    Expected End:  07/28/25            Ambulate 50 feet with rolling walker and min assist. (Progressing)       Start:  07/14/25    Expected End:  07/28/25

## 2025-07-16 NOTE — CARE PLAN
The patient's goals for the shift include MAXIMIZE COMFORT, MAINTAIN SAFETY    The clinical goals for the shift include MAINTAIN SAFETY, INCREASE MOBILITY      Problem: Pain - Adult  Goal: Verbalizes/displays adequate comfort level or baseline comfort level  Outcome: Progressing     Problem: Safety - Adult  Goal: Free from fall injury  Outcome: Progressing     Problem: Discharge Planning  Goal: Discharge to home or other facility with appropriate resources  Outcome: Progressing     Problem: Chronic Conditions and Co-morbidities  Goal: Patient's chronic conditions and co-morbidity symptoms are monitored and maintained or improved  Outcome: Progressing     Problem: Skin  Goal: Prevent/minimize sheer/friction injuries  Outcome: Progressing     Problem: Fall/Injury  Goal: Not fall by end of shift  Outcome: Progressing

## 2025-07-17 VITALS
TEMPERATURE: 98.4 F | BODY MASS INDEX: 31.07 KG/M2 | SYSTOLIC BLOOD PRESSURE: 153 MMHG | DIASTOLIC BLOOD PRESSURE: 59 MMHG | OXYGEN SATURATION: 100 % | WEIGHT: 217 LBS | RESPIRATION RATE: 18 BRPM | HEIGHT: 70 IN | HEART RATE: 62 BPM

## 2025-07-17 LAB
ANION GAP SERPL CALCULATED.3IONS-SCNC: 10 MMOL/L (ref 10–20)
BUN SERPL-MCNC: 16 MG/DL (ref 6–23)
CALCIUM SERPL-MCNC: 8.8 MG/DL (ref 8.6–10.3)
CHLORIDE SERPL-SCNC: 108 MMOL/L (ref 98–107)
CO2 SERPL-SCNC: 27 MMOL/L (ref 21–32)
CREAT SERPL-MCNC: 1.11 MG/DL (ref 0.5–1.3)
EGFRCR SERPLBLD CKD-EPI 2021: 66 ML/MIN/1.73M*2
ERYTHROCYTE [DISTWIDTH] IN BLOOD BY AUTOMATED COUNT: 13.8 % (ref 11.5–14.5)
GLUCOSE SERPL-MCNC: 86 MG/DL (ref 74–99)
HCT VFR BLD AUTO: 34.2 % (ref 41–52)
HGB BLD-MCNC: 10.9 G/DL (ref 13.5–17.5)
MCH RBC QN AUTO: 29.6 PG (ref 26–34)
MCHC RBC AUTO-ENTMCNC: 31.9 G/DL (ref 32–36)
MCV RBC AUTO: 93 FL (ref 80–100)
NRBC BLD-RTO: 0 /100 WBCS (ref 0–0)
PLATELET # BLD AUTO: 182 X10*3/UL (ref 150–450)
POTASSIUM SERPL-SCNC: 3.8 MMOL/L (ref 3.5–5.3)
RBC # BLD AUTO: 3.68 X10*6/UL (ref 4.5–5.9)
SODIUM SERPL-SCNC: 141 MMOL/L (ref 136–145)
WBC # BLD AUTO: 4.1 X10*3/UL (ref 4.4–11.3)

## 2025-07-17 PROCEDURE — 97110 THERAPEUTIC EXERCISES: CPT | Mod: GP

## 2025-07-17 PROCEDURE — 85027 COMPLETE CBC AUTOMATED: CPT | Performed by: INTERNAL MEDICINE

## 2025-07-17 PROCEDURE — 36415 COLL VENOUS BLD VENIPUNCTURE: CPT | Performed by: INTERNAL MEDICINE

## 2025-07-17 PROCEDURE — 99239 HOSP IP/OBS DSCHRG MGMT >30: CPT | Performed by: INTERNAL MEDICINE

## 2025-07-17 PROCEDURE — 80048 BASIC METABOLIC PNL TOTAL CA: CPT | Performed by: INTERNAL MEDICINE

## 2025-07-17 PROCEDURE — 2500000002 HC RX 250 W HCPCS SELF ADMINISTERED DRUGS (ALT 637 FOR MEDICARE OP, ALT 636 FOR OP/ED): Performed by: INTERNAL MEDICINE

## 2025-07-17 PROCEDURE — 2500000001 HC RX 250 WO HCPCS SELF ADMINISTERED DRUGS (ALT 637 FOR MEDICARE OP): Performed by: INTERNAL MEDICINE

## 2025-07-17 PROCEDURE — 97116 GAIT TRAINING THERAPY: CPT | Mod: GP

## 2025-07-17 PROCEDURE — 2500000004 HC RX 250 GENERAL PHARMACY W/ HCPCS (ALT 636 FOR OP/ED): Performed by: INTERNAL MEDICINE

## 2025-07-17 RX ADMIN — FOLIC ACID 1 MG: 1 TABLET ORAL at 09:05

## 2025-07-17 RX ADMIN — AMIODARONE HYDROCHLORIDE 100 MG: 100 TABLET ORAL at 09:06

## 2025-07-17 RX ADMIN — FENOFIBRATE 54 MG: 54 TABLET ORAL at 09:06

## 2025-07-17 RX ADMIN — METOPROLOL SUCCINATE 25 MG: 25 TABLET, EXTENDED RELEASE ORAL at 09:06

## 2025-07-17 RX ADMIN — DOCUSATE SODIUM 100 MG: 100 CAPSULE, LIQUID FILLED ORAL at 09:06

## 2025-07-17 RX ADMIN — NYSTATIN: 100000 POWDER TOPICAL at 09:06

## 2025-07-17 RX ADMIN — PANTOPRAZOLE SODIUM 40 MG: 40 TABLET, DELAYED RELEASE ORAL at 06:14

## 2025-07-17 RX ADMIN — CEFTRIAXONE 1 G: 1 INJECTION, SOLUTION INTRAVENOUS at 11:25

## 2025-07-17 RX ADMIN — FINASTERIDE 5 MG: 5 TABLET, FILM COATED ORAL at 09:06

## 2025-07-17 RX ADMIN — CYANOCOBALAMIN TAB 500 MCG 500 MCG: 500 TAB at 09:06

## 2025-07-17 RX ADMIN — PAROXETINE HYDROCHLORIDE 20 MG: 20 TABLET, FILM COATED ORAL at 09:06

## 2025-07-17 ASSESSMENT — COGNITIVE AND FUNCTIONAL STATUS - GENERAL
TURNING FROM BACK TO SIDE WHILE IN FLAT BAD: A LITTLE
DAILY ACTIVITIY SCORE: 21
MOBILITY SCORE: 16
MOBILITY SCORE: 20
WALKING IN HOSPITAL ROOM: A LITTLE
MOVING TO AND FROM BED TO CHAIR: A LITTLE
MOVING FROM LYING ON BACK TO SITTING ON SIDE OF FLAT BED WITH BEDRAILS: A LITTLE
TOILETING: A LITTLE
STANDING UP FROM CHAIR USING ARMS: A LITTLE
WALKING IN HOSPITAL ROOM: A LITTLE
CLIMB 3 TO 5 STEPS WITH RAILING: A LITTLE
MOVING TO AND FROM BED TO CHAIR: A LITTLE
HELP NEEDED FOR BATHING: A LITTLE
CLIMB 3 TO 5 STEPS WITH RAILING: TOTAL
DRESSING REGULAR LOWER BODY CLOTHING: A LITTLE
STANDING UP FROM CHAIR USING ARMS: A LITTLE

## 2025-07-17 ASSESSMENT — PAIN - FUNCTIONAL ASSESSMENT
PAIN_FUNCTIONAL_ASSESSMENT: 0-10
PAIN_FUNCTIONAL_ASSESSMENT: 0-10

## 2025-07-17 ASSESSMENT — PAIN SCALES - GENERAL
PAINLEVEL_OUTOF10: 0 - NO PAIN
PAINLEVEL_OUTOF10: 0 - NO PAIN

## 2025-07-17 NOTE — NURSING NOTE
Obtained bedside shift report from nightshift RN with patient laying in bed sleeping on arrival. Patient had an uneventful night and is a possible discharge today to [facility] depending on bed availability. Will touch base with care coordination later this morning to see if there are any updates. Patient still sleeping at end of report. Call light is in reach and bed alarm engaged for safety.

## 2025-07-17 NOTE — NURSING NOTE
Called report to receiving RN, went over discharge plan and medications. Transport is set up for 3 pm, patient was updated as well. He has already eaten lunch. Will assist in getting him dressed and ready for discharge.

## 2025-07-17 NOTE — PROGRESS NOTES
Spiritual Care Visit  Spiritual Care Request    Reason for Visit:  Routine Visit: Follow-up     Request Received From:       Focus of Care:  Visited With: Patient         Refer to :          Spiritual Care Assessment    Spiritual Assessment:                      Care Provided:       Sense of Community and or Jew Affiliation:  Hinduism   Values/Beliefs  Spiritual Requests During Hospitalization: Angel was having patient care.     Addressed Needs/Concerns and/or Ivory Through:          Outcome:        Advance Directives:         Spiritual Care Annotation    Annotation:  Angel was having patient care today.  Jacinto Grijalva

## 2025-07-17 NOTE — PROGRESS NOTES
"Angel Carroll is a 80 y.o. male on day 4 of admission presenting with JASBIR (acute kidney injury).    Subjective   Patient is doing fine did not get a bed at rehab yesterday       Objective     Physical Exam  Vitals reviewed.   Constitutional:       Appearance: Normal appearance.   HENT:      Head: Normocephalic and atraumatic.      Right Ear: Tympanic membrane, ear canal and external ear normal.      Left Ear: Tympanic membrane, ear canal and external ear normal.      Nose: Nose normal.      Mouth/Throat:      Pharynx: Oropharynx is clear.     Eyes:      Extraocular Movements: Extraocular movements intact.      Conjunctiva/sclera: Conjunctivae normal.      Pupils: Pupils are equal, round, and reactive to light.       Cardiovascular:      Rate and Rhythm: Normal rate and regular rhythm.      Pulses: Normal pulses.      Heart sounds: Normal heart sounds.   Pulmonary:      Effort: Pulmonary effort is normal.      Breath sounds: Normal breath sounds.   Abdominal:      General: Abdomen is flat. Bowel sounds are normal.      Palpations: Abdomen is soft.     Musculoskeletal:      Cervical back: Normal range of motion and neck supple.     Skin:     General: Skin is warm and dry.     Neurological:      General: No focal deficit present.      Mental Status: He is alert and oriented to person, place, and time.     Psychiatric:         Mood and Affect: Mood normal.         Last Recorded Vitals  Blood pressure 153/59, pulse 62, temperature 36.9 °C (98.4 °F), temperature source Oral, resp. rate 18, height 1.778 m (5' 10\"), weight 98.4 kg (217 lb), SpO2 100%.  Intake/Output last 3 Shifts:  I/O last 3 completed shifts:  In: 490 (5 mL/kg) [P.O.:440; IV Piggyback:50]  Out: 400 (4.1 mL/kg) [Urine:400 (0.1 mL/kg/hr)]  Weight: 98.4 kg     Relevant Results               Results for orders placed or performed during the hospital encounter of 07/12/25 (from the past 24 hours)   CBC   Result Value Ref Range    WBC 4.1 (L) 4.4 - 11.3 x10*3/uL    " nRBC 0.0 0.0 - 0.0 /100 WBCs    RBC 3.68 (L) 4.50 - 5.90 x10*6/uL    Hemoglobin 10.9 (L) 13.5 - 17.5 g/dL    Hematocrit 34.2 (L) 41.0 - 52.0 %    MCV 93 80 - 100 fL    MCH 29.6 26.0 - 34.0 pg    MCHC 31.9 (L) 32.0 - 36.0 g/dL    RDW 13.8 11.5 - 14.5 %    Platelets 182 150 - 450 x10*3/uL   Basic Metabolic Panel   Result Value Ref Range    Glucose 86 74 - 99 mg/dL    Sodium 141 136 - 145 mmol/L    Potassium 3.8 3.5 - 5.3 mmol/L    Chloride 108 (H) 98 - 107 mmol/L    Bicarbonate 27 21 - 32 mmol/L    Anion Gap 10 10 - 20 mmol/L    Urea Nitrogen 16 6 - 23 mg/dL    Creatinine 1.11 0.50 - 1.30 mg/dL    eGFR 66 >60 mL/min/1.73m*2    Calcium 8.8 8.6 - 10.3 mg/dL     *Note: Due to a large number of results and/or encounters for the requested time period, some results have not been displayed. A complete set of results can be found in Results Review.       No results found.                   Assessment & Plan  JASBIR (acute kidney injury)    UTI (urinary tract infection)    Hematuria    Stage 3b chronic kidney disease (Multi)    Paroxysmal atrial fibrillation (Multi)    Pacemaker    Ischemic cardiomyopathy    Hypertension    Hyperlipidemia    BPH (benign prostatic hyperplasia)    Recurrent falls    Hydronephrosis with urinary obstruction due to renal calculus    Ureteral stone    Ultrasound shows hydronephrosis  CT showed obstructive uropathy  Urology following the patient  Further input pending for plan of care  Potassium better  Kidney functions doing little better  Continue antibiotic for UTI   Blood pressure stable  PT OT  Discharge to rehab once arrangements made  [facility] filled      I spent  minutes in the professional and overall care of this patient.      Tasneem Sandoval MD

## 2025-07-17 NOTE — NURSING NOTE
Patient is dressed and ready to go, all personal belongings packed up and ready to go including hearing aid  with cord, electric shaver, and 3 bags of clothing, extra briefs and pads. Just waiting on transport to come and pick him up.

## 2025-07-17 NOTE — PROGRESS NOTES
Patient with an active discharge. Patient has a bed today at [facility]. Transport set up for 3PM. Nurse to call report. Paperwork sent to facility. 7000 completed. Will follow as needed.      07/17/25 1229   Discharge Planning   Home or Post Acute Services Post acute facilities (Rehab/SNF/etc)   Type of Post Acute Facility Services Rehab   Expected Discharge Disposition SNF  ([facility])   Does the patient need discharge transport arranged? Yes   [facility] coordination needed? Yes

## 2025-07-17 NOTE — CARE PLAN
Problem: Pain - Adult  Goal: Verbalizes/displays adequate comfort level or baseline comfort level  Outcome: Progressing     Problem: Safety - Adult  Goal: Free from fall injury  Outcome: Progressing     Problem: Discharge Planning  Goal: Discharge to home or other facility with appropriate resources  Outcome: Progressing     Problem: Chronic Conditions and Co-morbidities  Goal: Patient's chronic conditions and co-morbidity symptoms are monitored and maintained or improved  Outcome: Progressing     Problem: Nutrition  Goal: Nutrient intake appropriate for maintaining nutritional needs  Outcome: Progressing     Problem: Skin  Goal: Decreased wound size/increased tissue granulation at next dressing change  Outcome: Progressing  Flowsheets (Taken 7/16/2025 2354)  Decreased wound size/increased tissue granulation at next dressing change:   Promote sleep for wound healing   Protective dressings over bony prominences   Utilize specialty bed per algorithm  Goal: Participates in plan/prevention/treatment measures  Outcome: Progressing  Flowsheets (Taken 7/16/2025 2354)  Participates in plan/prevention/treatment measures:   Discuss with provider PT/OT consult   Elevate heels   Increase activity/out of bed for meals  Goal: Prevent/manage excess moisture  Outcome: Progressing  Flowsheets (Taken 7/16/2025 2354)  Prevent/manage excess moisture:   Cleanse incontinence/protect with barrier cream   Follow provider orders for dressing changes   Moisturize dry skin   Monitor for/manage infection if present   Use wicking fabric (obtain order)  Goal: Prevent/minimize sheer/friction injuries  Outcome: Progressing  Flowsheets (Taken 7/16/2025 2354)  Prevent/minimize sheer/friction injuries:   Complete micro-shifts as needed if patient unable. Adjust patient position to relieve pressure points, not a full turn   HOB 30 degrees or less   Increase activity/out of bed for meals   Turn/reposition every 2 hours/use positioning/transfer  devices   Use pull sheet   Utilize specialty bed per algorithm  Goal: Promote/optimize nutrition  Outcome: Progressing  Flowsheets (Taken 7/16/2025 9857)  Promote/optimize nutrition:   Assist with feeding   Consume > 50% meals/supplements   Discuss with provider if NPO > 2 days   Monitor/record intake including meals   Offer water/supplements/favorite foods   Reassess MST if dietician not consulted  Goal: Promote skin healing  Outcome: Progressing  Flowsheets (Taken 7/16/2025 8812)  Promote skin healing:   Assess skin/pad under line(s)/device(s)   Ensure correct size (line/device) and apply per  instructions   Protective dressings over bony prominences   Rotate device position/do not position patient on device   Turn/reposition every 2 hours/use positioning/transfer devices     Problem: Fall/Injury  Goal: Not fall by end of shift  Outcome: Progressing  Goal: Be free from injury by end of the shift  Outcome: Progressing  Goal: Verbalize understanding of personal risk factors for fall in the hospital  Outcome: Progressing  Goal: Verbalize understanding of risk factor reduction measures to prevent injury from fall in the home  Outcome: Progressing  Goal: Use assistive devices by end of the shift  Outcome: Progressing  Goal: Pace activities to prevent fatigue by end of the shift  Outcome: Progressing     Problem: Pain  Goal: Takes deep breaths with improved pain control throughout the shift  Outcome: Progressing  Goal: Turns in bed with improved pain control throughout the shift  Outcome: Progressing  Goal: Walks with improved pain control throughout the shift  Outcome: Progressing  Goal: Performs ADL's with improved pain control throughout shift  Outcome: Progressing  Goal: Participates in PT with improved pain control throughout the shift  Outcome: Progressing  Goal: Free from opioid side effects throughout the shift  Outcome: Progressing  Goal: Free from acute confusion related to pain meds throughout the  shift  Outcome: Progressing   The patient's goals for the shift include fall free    The clinical goals for the shift include MAINTAIN SAFETY, INCREASE MOBILITY    Over the shift, the patient did not make progress toward the following goals. Barriers to progression include . Recommendations to address these barriers include .

## 2025-07-17 NOTE — PROGRESS NOTES
Physical Therapy    Physical Therapy Treatment    Patient Name: Angel Carroll  MRN: [id number]  Department: [address]  Room: [address] [place] [address]  Today's Date: 7/17/2025  Time Calculation  Start Time: 0932  Stop Time: 0957  Time Calculation (min): 25 min    Assessment/Plan   PT Assessment  PT Assessment Results: Decreased strength, Decreased endurance, Impaired balance, Decreased mobility, Decreased safety awareness  Rehab Prognosis: Good  Barriers to Discharge Home: Caregiver assistance, Physical needs  Caregiver Assistance: Caregiver assistance needed per identified barriers - however, level of patient's required assistance exceeds assistance available at home  Physical Needs: 24hr mobility assistance needed  Evaluation/Treatment Tolerance: Patient tolerated treatment well  Medical Staff Made Aware: Yes  Strengths: Ability to acquire knowledge, Attitude of self, Coping skills, Support of extended family/friends  Barriers to Participation: Comorbidities  End of Session Communication: Bedside nurse  Assessment Comment: Good progress towards therapy goals; min assist x 1 for transfer/ambulation with use of RW; remains deconditioned with BLE weakness and impaired balance; will continue to treat as tolerated.  End of Session Patient Position: Up in chair, Alarm on  PT Plan  Inpatient/Swing Bed or Outpatient: Inpatient  PT Plan  Treatment/Interventions: Bed mobility, Transfer training, Gait training, Balance training, Neuromuscular re-education, Strengthening, Endurance training, Range of motion, Therapeutic exercise, Therapeutic activity  PT Plan: Ongoing PT  PT Frequency: 4 times per week (during this acute inpatient hospitalization)  PT Discharge Recommendations: Moderate intensity level of continued care (Based on current functional status and rehab potential, patient is anticipated to tolerance and benefit from 5 or more days per week of skilled rehabilitative therapy after discharge from this acute inpatient  hospitalization.)  Equipment Recommended upon Discharge: Wheeled walker  PT Recommended Transfer Status: Assist x1, Assistive device  PT - OK to Discharge: Yes    PT Visit Info:  PT Received On: 07/17/25  Response to Previous Treatment: Patient with no complaints from previous session.     General Visit Information:   General  Reason for Referral: weakness, impaired ADL's/mobility  Referred By: Tasneem Sandoval MD  Past Medical History Relevant to Rehab: BPH, hyperlipidemia, HTN, CKD3, falls, pacemaker, anemia, CVA, dep, anxiety, COPD, COVID, OA, 2L 02  Family/Caregiver Present: No  Co-Treatment:  (no)  Co-Treatment Reason: n/a  Prior to Session Communication: Bedside nurse  Patient Position Received: Bed, 3 rail up, Alarm on  Preferred Learning Style: verbal  General Comment: Pt cleared for therapy via RN, received in supine, NAD, agreeable to participate.    Subjective   Precautions:  Precautions  Hearing/Visual Limitations: glasses, [place]  Medical Precautions: Fall precautions, Oxygen therapy device and L/min (2L O2 via NC)    Vital Signs Comment: SpO2 93-98% on 2L O2 via NC throughout session     Objective   Pain:  Pain Assessment  Pain Assessment: 0-10  0-10 (Numeric) Pain Score: 0 - No pain  Cognition:  Cognition  Overall Cognitive Status: Within Functional Limits  Orientation Level: Oriented X4  Coordination:  Movements are Fluid and Coordinated: No  Coordination Comment: decreased rate of movements  Postural Control:  Postural Control  Postural Control: Impaired  Posture Comment: forward head, rounded shoulders  Static Sitting Balance  Static Sitting-Balance Support: Feet supported, Bilateral upper extremity supported  Static Sitting-Level of Assistance: Close supervision  Static Standing Balance  Static Standing-Balance Support: Bilateral upper extremity supported  Static Standing-Level of Assistance: Minimum assistance  Static Standing-Comment/Number of Minutes: use of BUE on RW  Extremity/Trunk  Assessments:  RLE   RLE :  (AROM limited at end ranges)  LLE   LLE :  (AROM limited at end ranges)  Activity Tolerance:  Activity Tolerance  Endurance: Tolerates 10 - 20 min exercise with multiple rests  Treatments:  Therapeutic Exercise  Therapeutic Exercise Performed: Yes  Therapeutic Exercise Activity 1: B ankle pumps x 20  Therapeutic Exercise Activity 2: B seated hip flex x 20  Therapeutic Exercise Activity 3: B seated knee ext x 20  Therapeutic Exercise Activity 4: B seated isometric hip add x 20  Therapeutic Exercise Activity 5: increased time to complete all therex     Bed Mobility  Bed Mobility: Yes  Bed Mobility 1  Bed Mobility 1: Supine to sitting  Level of Assistance 1: Close supervision  Bed Mobility Comments 1: increased time/effort to complete; use of bedrails, supervision for safety    Ambulation/Gait Training  Ambulation/Gait Training Performed: Yes  Ambulation/Gait Training 1  Surface 1: Level tile  Device 1: Rolling walker  Assistance 1: Minimum assistance  Quality of Gait 1: Narrow base of support, Diminished heel strike, Decreased step length, Forward flexed posture (reciprocating pattern, decreased christy, no LOB)  Comments/Distance (ft) 1: 10' x 2; distance limited d/t pt c/o fatigue  Transfers  Transfer: Yes  Transfer 1  Transfer From 1: Sit to, Stand to  Transfer to 1: Sit, Stand  Technique 1: Sit to stand, Stand to sit  Transfer Device 1: Walker  Transfer Level of Assistance 1: Minimum assistance  Trials/Comments 1: assist for forward weight shift to stand; assist for controlled lowering of hips into chair to sit; good eccentric lowering noted with safe hand placement    Stairs  Stairs: No    Outcome Measures:  [facility] Basic Mobility  Turning from your back to your side while in a flat bed without using bedrails: A little  Moving from lying on your back to sitting on the side of a flat bed without using bedrails: A little  Moving to and from bed to chair (including a wheelchair): A  little  Standing up from a chair using your arms (e.g. wheelchair or bedside chair): A little  To walk in hospital room: A little  Climbing 3-5 steps with railing: Total  Basic Mobility - Total Score: 16    Education Documentation  Mobility Training, taught by Keri Hathaway PT at 7/17/2025 10:20 AM.  Learner: Patient  Readiness: Acceptance  Method: Explanation, Demonstration  Response: Needs Reinforcement, Demonstrated Understanding  Comment: educated on PT POC and safety/sequencing during functional mobility training and therex completion    Education Comments  No comments found.      Encounter Problems       Encounter Problems (Active)       Mobility       Bed mobility including supine to sit and sit to supine with min assist. (Progressing)       Start:  07/14/25    Expected End:  07/28/25            Transfers including sit to stand and stand to sit with min assist. (Progressing)       Start:  07/14/25    Expected End:  07/28/25            Ambulate 50 feet with rolling walker and min assist. (Progressing)       Start:  07/14/25    Expected End:  07/28/25

## 2025-07-17 NOTE — PROGRESS NOTES
"Angel Carroll is a 80 y.o. male on day 3 of admission presenting with JASBIR (acute kidney injury).    Subjective   Patient is doing fine waiting for urology input       Objective     Physical Exam  Vitals reviewed.   Constitutional:       Appearance: Normal appearance.   HENT:      Head: Normocephalic and atraumatic.      Right Ear: Tympanic membrane, ear canal and external ear normal.      Left Ear: Tympanic membrane, ear canal and external ear normal.      Nose: Nose normal.      Mouth/Throat:      Pharynx: Oropharynx is clear.     Eyes:      Extraocular Movements: Extraocular movements intact.      Conjunctiva/sclera: Conjunctivae normal.      Pupils: Pupils are equal, round, and reactive to light.       Cardiovascular:      Rate and Rhythm: Normal rate and regular rhythm.      Pulses: Normal pulses.      Heart sounds: Normal heart sounds.   Pulmonary:      Effort: Pulmonary effort is normal.      Breath sounds: Normal breath sounds.   Abdominal:      General: Abdomen is flat. Bowel sounds are normal.      Palpations: Abdomen is soft.     Musculoskeletal:      Cervical back: Normal range of motion and neck supple.     Skin:     General: Skin is warm and dry.     Neurological:      General: No focal deficit present.      Mental Status: He is alert and oriented to person, place, and time.     Psychiatric:         Mood and Affect: Mood normal.         Last Recorded Vitals  Blood pressure 130/59, pulse 60, temperature 36.6 °C (97.9 °F), temperature source Oral, resp. rate 18, height 1.778 m (5' 10\"), weight 98.4 kg (217 lb), SpO2 100%.  Intake/Output last 3 Shifts:  I/O last 3 completed shifts:  In: 770 (7.8 mL/kg) [P.O.:720; IV Piggyback:50]  Out: - (0 mL/kg)   Weight: 98.4 kg     Relevant Results               No results found. However, due to the size of the patient record, not all encounters were searched. Please check Results Review for a complete set of results.    No results found.                   Assessment & " Plan  JASBIR (acute kidney injury)    UTI (urinary tract infection)    Hematuria    Stage 3b chronic kidney disease (Multi)    Paroxysmal atrial fibrillation (Multi)    Pacemaker    Ischemic cardiomyopathy    Hypertension    Hyperlipidemia    BPH (benign prostatic hyperplasia)    Recurrent falls    Hydronephrosis with urinary obstruction due to renal calculus    Ureteral stone    Ultrasound shows hydronephrosis  CT showed obstructive uropathy  Urology following the patient  Further input pending for plan of care  Potassium better  Kidney functions doing little better  Continue antibiotic for UTI   Blood pressure stable  PT OT  Discharge to rehab once cleared by urology  [place] filled      I spent  minutes in the professional and overall care of this patient.      Tasneem Sandoval MD

## 2025-07-17 NOTE — DISCHARGE SUMMARY
Discharge Diagnosis  JASBIR (acute kidney injury)    Issues Requiring Follow-Up  Hematuria  Obstructive uropathy due to ureteral stone    Test Results Pending At Discharge  Pending Labs       Order Current Status    Extra Urine Gray Tube In process    Urinalysis with Reflex Culture and Microscopic In process            Hospital Course   Angel Carroll is a 80 y.o. male presenting with recurrent fall.  He had a fall yesterday and 2.  He just felt weak in the  Lost his balance and fell.  He is on antibiotic for UTI.  Complaining of generalized weakness.  Some chest congestion he wears 2 L of oxygen.  Patient has history of coronary artery disease, hyperlipidemia, chronic diastolic heart failure, pacemaker present, history of nephrolithiasis, BPH, chronic kidney disease, COPD chronic respiratory failure, meningioma, CVA in the setting of subtherapeutic INR with near subtotal occlusion of right ICA s/p carotid endarterectomy in 2021.  He was recently tested positive for COVID  Patient found to have JASBIR and UTI.  Treated with antibiotic and IV fluids.  Hematuria CAT scan done showed right hydronephrosis.  Urology consulted and plans to do right ureteroscopy and laser lithotripsy if needed but plan to continue Flomax and strain urine    Visit Vitals  /59 (BP Location: Right arm, Patient Position: Sitting)   Pulse 62   Temp 36.9 °C (98.4 °F) (Oral)   Resp 18     Vitals:    07/12/25 1123   Weight: 98.4 kg (217 lb)       Immunization History   Administered Date(s) Administered    COVID-19, mRNA, LNP-S, PF, 30 mcg/0.3 mL dose 10/11/2021    Flu vaccine (IIV4), preservative free *Check age/dose* 11/19/2014    Flu vaccine, quadrivalent, high-dose, preservative free, age 65y+ (FLUZONE) 09/08/2021    Flu vaccine, trivalent, preservative free, HIGH-DOSE, age 65y+ (Fluzone) 10/27/2015, 11/15/2016, 10/25/2017, 10/15/2019, 10/08/2020, 09/15/2022, 09/18/2023, 10/04/2024    Influenza, Unspecified 11/17/2009, 10/25/2010, 11/07/2011,  10/09/2012, 10/03/2013    Influenza, seasonal, injectable 11/17/2009, 10/25/2010, 11/07/2011, 10/09/2012, 10/03/2013, 10/27/2015    Novel influenza-H1N1-09, preservative-free 12/22/2009    Pfizer COVID-19 vaccine, 12 years and older, (30mcg/0.3mL) (Comirnaty) 01/05/2024    Pfizer Purple Cap SARS-CoV-2 02/27/2021, 04/07/2021    Pneumococcal conjugate vaccine, 13-valent (PREVNAR 13) 04/20/2016    Pneumococcal polysaccharide vaccine, 23-valent, age 2 years and older (PNEUMOVAX 23) 03/06/2012    RSV, 60 Years And Older (AREXVY) 01/05/2024    Tdap vaccine, age 7 year and older (BOOSTRIX, ADACEL) 01/12/2022    Zoster, live 12/24/2015       Results        Pertinent Physical Exam At Time of Discharge  Physical Exam  Vitals reviewed.   Constitutional:       Appearance: Normal appearance.   HENT:      Head: Normocephalic and atraumatic.      Right Ear: Tympanic membrane, ear canal and external ear normal.      Left Ear: Tympanic membrane, ear canal and external ear normal.      Nose: Nose normal.      Mouth/Throat:      Pharynx: Oropharynx is clear.     Eyes:      Extraocular Movements: Extraocular movements intact.      Conjunctiva/sclera: Conjunctivae normal.      Pupils: Pupils are equal, round, and reactive to light.       Cardiovascular:      Rate and Rhythm: Normal rate and regular rhythm.      Pulses: Normal pulses.      Heart sounds: Normal heart sounds.   Pulmonary:      Effort: Pulmonary effort is normal.      Breath sounds: Normal breath sounds.   Abdominal:      General: Abdomen is flat. Bowel sounds are normal.      Palpations: Abdomen is soft.     Musculoskeletal:      Cervical back: Normal range of motion and neck supple.     Skin:     General: Skin is warm and dry.     Neurological:      General: No focal deficit present.      Mental Status: He is alert and oriented to person, place, and time.     Psychiatric:         Mood and Affect: Mood normal.         Home Medications     Medication List      START taking  these medications     cephalexin 500 mg capsule; Commonly known as: Keflex; Take 1 capsule   (500 mg) by mouth 3 times a day for 5 days.   melatonin 3 mg tablet; Take 1 tablet (3 mg) by mouth as needed at   bedtime for sleep.   nystatin 100,000 unit/gram powder; Commonly known as: Mycostatin; Apply   topically every 8 hours.   polyethylene glycol 17 gram packet; Commonly known as: Glycolax,   Miralax; Take 17 g by mouth once daily.     CONTINUE taking these medications     acetaminophen 325 mg tablet; Commonly known as: Tylenol   amiodarone 200 mg tablet; Commonly known as: Pacerone; Take 0.5 tablets   (100 mg) by mouth once daily.   atorvastatin 20 mg tablet; Commonly known as: Lipitor; TAKE 1 TABLET   EVERY DAY   cyanocobalamin 500 mcg tablet; Commonly known as: Vitamin B-12   Eliquis 2.5 mg tablet; Generic drug: apixaban; Take 1 tablet (2.5 mg) by   mouth 2 times a day.   Entresto 24-26 mg tablet; Generic drug: sacubitriL-valsartan; Take 1   tablet by mouth 2 times a day.   ergocalciferol 1250 mcg (50,000 units) capsule; Commonly known as:   Vitamin D-2; TAKE 1 CAPSULE EVERY WEEK   fenofibrate 48 mg tablet; Commonly known as: Tricor; TAKE 1 AND 1/2   TABLETS ONE TIME DAILY   ferrous sulfate 250 mg (50 mg iron) tablet extended release; Take 1   tablet (250 mg) by mouth once daily.   finasteride 5 mg tablet; Commonly known as: Proscar; Take 1 tablet (5   mg) by mouth once daily.   folic acid 1 mg tablet; Commonly known as: Folvite   furosemide 20 mg tablet; Commonly known as: Lasix   gabapentin 100 mg capsule; Commonly known as: Neurontin; Take 1 capsule   (100 mg) by mouth once daily at bedtime.   metoprolol succinate XL 25 mg 24 hr tablet; Commonly known as:   Toprol-XL; TAKE 1 TABLET ONE TIME DAILY (DO NOT CRUSH OR CHEW)   Milk of Magnesia 400 mg/5 mL suspension; Generic drug: magnesium   hydroxide   NATURAL TEARS (PF) OPHT   oxygen gas therapy; Commonly known as: O2   PARoxetine 20 mg tablet; Commonly known as:  Paxil; TAKE 1 TABLET EVERY   DAY   Protonix 40 mg EC tablet; Generic drug: pantoprazole   psyllium 0.4 gram capsule; Commonly known as: Metamucil   tamsulosin 0.4 mg 24 hr capsule; Commonly known as: Flomax; Take 1   capsule (0.4 mg) by mouth once daily at bedtime.     STOP taking these medications     nitrofurantoin (macrocrystal-monohydrate) 100 mg capsule; Commonly known   as: Macrobid       Outpatient Follow-Up  Future Appointments   Date Time Provider Department [place]   8/22/2025  3:40 PM Jung Tao MD [id number] [facility]   8/29/2025  2:40 PM Annamaria Burgess PharmD [id number] [facility]   9/16/2025  1:00 PM Royce Louis MD PhD [id number] [facility]   9/23/2025 11:20 AM RON Wilkes-CNP [id number] [facility]   9/29/2025  2:30 PM RON [facility] [id number] [facility]   10/1/2025  1:00 PM Jose Mccormick MD [id number] [facility]   11/18/2025  1:40 PM Tiana Juarez MD [id number] [facility]       Tasneem Sandoval MD

## 2025-07-18 DIAGNOSIS — N20.1 RIGHT URETERAL STONE: Primary | ICD-10-CM

## 2025-07-18 DIAGNOSIS — N20.0 RIGHT RENAL STONE: ICD-10-CM

## 2025-07-18 NOTE — DOCUMENTATION CLARIFICATION NOTE
PATIENT:               [facility]HEL COLEY  [id number]  MRN:                       [id number]  :                       1945  ADMIT DATE:       2025 11:12 AM  DISCH DATE:        2025 3:15 PM  RESPONDING PROVIDER #:        [id number]          PROVIDER RESPONSE TEXT:    Thrombocytopenia not requiring treatment or evaluation    CDI QUERY TEXT:    Clarification    Instruction:    Based on your assessment of the patient and the clinical information, please provide the requested documentation by clicking on the appropriate radio button and enter any additional information if prompted.    Question: Is there a diagnosis indicative of the lab values    When answering this query, please exercise your independent professional judgment. The fact that a question is being asked, does not imply that any particular answer is desired or expected.    The patient's clinical indicators include:  Clinical Information: 80y.o. M admitted s/p recurrent falls with weakness, JASBIR on CKD, UTI with hematuria, A fib, HTN & BPH.     ED Provider: JASBIR, closed head injury. Patient states that he had a fall yesterday and 2 falls today. He states today he just felt very weak, stumbled, lost his balance and fell.     H&P: JASBIR, UTI, Hematuria, CKD stage 3b, Paroxysmal atrial fibrillation, Pacemaker, Ischemic cardiomyopathy, Hypertension, BPH, recurrent falls     Urology: 80-year-old had recent hematuria. He appears to have a UTI, on Rocephin. Renal ultrasound identified right hydronephrosis. He has BPH, on tamsulosin and finasteride.     IM PN: JASBIR, UTI, Hematuria. Hydronephrosis with urinary obstruction due to renal calculus, Ureteral stone. Ultrasound shows hydronephrosis, CT showed obstructive uropathy, continue antibiotic for UTI    Clinical Indicators:   UCx: Growth indicates contamination   RBC 3.92, WBC 5.1, Plts 138   RBC 3.70, WBC 4.7, Plts 136   RBC 3.76, WBC 4.2, Plts  134    Treatment:  7/12-7/13 NS x3L  7/13-7/14 Folic acid 1mg po x2 doses  7/14 Klor-Con 40 mEq po x1 dose    7/12-7/14 Eliquis 2.5mg po - on hold    Risk Factors: CAD, HTN, COPD, Paroxysmal A fib, Sick sinus syndrome s/p pacemaker  Options provided:  -- Thrombocytopenia is clinically significant and required treatment/monitoring  -- Thrombocytopenia not requiring treatment or evaluation  -- Other - I will add my own diagnosis  -- Refer to Clinical Documentation Reviewer    Query created by: Kelin Ricketts on 7/14/2025 4:31 PM      Electronically signed by:  KERRY CONTI MD 7/18/2025 7:17 PM

## 2025-07-22 ENCOUNTER — APPOINTMENT (OUTPATIENT)
Dept: SURGERY | Facility: CLINIC | Age: 80
End: 2025-07-22
Payer: MEDICARE

## 2025-07-25 NOTE — DOCUMENTATION CLARIFICATION NOTE
"    PATIENT:               MICHEL COLEY  ACCT #:                  [id number]  MRN:                       [id number]  :                       1945  ADMIT DATE:       2025 11:12 AM  DISCH DATE:        2025 3:15 PM  RESPONDING PROVIDER #:        [id number]          PROVIDER RESPONSE TEXT:    Hematuria  due to or enhanced by Eliquis    CDI QUERY TEXT:    Clarification    :    Instruction:    Based on your assessment of the patient and the clinical information, please provide the requested documentation by clicking on the appropriate radio button and enter any additional information if prompted.    Question: Please further clarify if a relationship exists between Hematuria and Eliquis    When answering this query, please exercise your independent professional judgment. The fact that a question is being asked, does not imply that any particular answer is desired or expected.    The patient's clinical indicators include:  Clinical Information: 80 year old male with JASBIR and UTI with hematuria    Clinical Indicators:    ED note: \" 80-year-old male presents emergency department for evaluation of a fall on Eliquis\"    H&P: \"80 y.o. male presenting with recurrent fall.  He had a fall yesterday and 2.  He just felt weak in the  Lost his balance and fell.  He is on antibiotic for UTI. \"   Assessment and Plan: UTI (urinary tract infection), Hematuria\"  \"Hold anticoagulants\"     urology consult:  \"Reason For Consult  Hematuria\"    Treatment: Anticoagulants on hold,  urology consult    Risk Factors: age, on Eliquis, UTI  Options provided:  -- Hematuria  due to or enhanced by Eliquis  -- Hematuria not related to Eliquis  -- Other - I will add my own diagnosis  -- Refer to Clinical Documentation Reviewer    Query created by: Skylar Goddard on 2025 10:10 AM      Electronically signed by:  KERRY CONTI MD 2025 5:39 PM          "

## 2025-07-27 ENCOUNTER — APPOINTMENT (OUTPATIENT)
Dept: RADIOLOGY | Facility: HOSPITAL | Age: 80
End: 2025-07-27
Payer: MEDICARE

## 2025-07-27 ENCOUNTER — HOSPITAL ENCOUNTER (EMERGENCY)
Facility: HOSPITAL | Age: 80
Discharge: HOME | End: 2025-07-27
Attending: EMERGENCY MEDICINE
Payer: MEDICARE

## 2025-07-27 ENCOUNTER — APPOINTMENT (OUTPATIENT)
Dept: CARDIOLOGY | Facility: HOSPITAL | Age: 80
End: 2025-07-27
Payer: MEDICARE

## 2025-07-27 VITALS
BODY MASS INDEX: 27.52 KG/M2 | SYSTOLIC BLOOD PRESSURE: 130 MMHG | DIASTOLIC BLOOD PRESSURE: 63 MMHG | OXYGEN SATURATION: 99 % | WEIGHT: 192.24 LBS | TEMPERATURE: 97.5 F | HEIGHT: 70 IN | HEART RATE: 62 BPM | RESPIRATION RATE: 14 BRPM

## 2025-07-27 DIAGNOSIS — E86.0 DEHYDRATION: ICD-10-CM

## 2025-07-27 DIAGNOSIS — N18.9 ACUTE RENAL FAILURE SUPERIMPOSED ON CHRONIC KIDNEY DISEASE, UNSPECIFIED ACUTE RENAL FAILURE TYPE, UNSPECIFIED CKD STAGE: ICD-10-CM

## 2025-07-27 DIAGNOSIS — R55 SYNCOPE AND COLLAPSE: Primary | ICD-10-CM

## 2025-07-27 DIAGNOSIS — N17.9 ACUTE RENAL FAILURE SUPERIMPOSED ON CHRONIC KIDNEY DISEASE, UNSPECIFIED ACUTE RENAL FAILURE TYPE, UNSPECIFIED CKD STAGE: ICD-10-CM

## 2025-07-27 LAB
ALBUMIN SERPL BCP-MCNC: 3.2 G/DL (ref 3.4–5)
ALP SERPL-CCNC: 58 U/L (ref 33–136)
ALT SERPL W P-5'-P-CCNC: 4 U/L (ref 10–52)
ANION GAP SERPL CALCULATED.3IONS-SCNC: 11 MMOL/L (ref 10–20)
AST SERPL W P-5'-P-CCNC: 12 U/L (ref 9–39)
BASOPHILS # BLD MANUAL: 0 X10*3/UL (ref 0–0.1)
BASOPHILS NFR BLD MANUAL: 0 %
BILIRUB SERPL-MCNC: 0.8 MG/DL (ref 0–1.2)
BNP SERPL-MCNC: 283 PG/ML (ref 0–99)
BUN SERPL-MCNC: 18 MG/DL (ref 6–23)
CALCIUM SERPL-MCNC: 8.3 MG/DL (ref 8.6–10.3)
CARDIAC TROPONIN I PNL SERPL HS: 15 NG/L (ref 0–20)
CARDIAC TROPONIN I PNL SERPL HS: 16 NG/L (ref 0–20)
CHLORIDE SERPL-SCNC: 95 MMOL/L (ref 98–107)
CO2 SERPL-SCNC: 28 MMOL/L (ref 21–32)
CREAT SERPL-MCNC: 1.63 MG/DL (ref 0.5–1.3)
EGFRCR SERPLBLD CKD-EPI 2021: 42 ML/MIN/1.73M*2
EOSINOPHIL # BLD MANUAL: 0.08 X10*3/UL (ref 0–0.4)
EOSINOPHIL NFR BLD MANUAL: 1 %
ERYTHROCYTE [DISTWIDTH] IN BLOOD BY AUTOMATED COUNT: 14.4 % (ref 11.5–14.5)
GLUCOSE SERPL-MCNC: 96 MG/DL (ref 74–99)
HCT VFR BLD AUTO: 36.8 % (ref 41–52)
HGB BLD-MCNC: 11.9 G/DL (ref 13.5–17.5)
IMM GRANULOCYTES # BLD AUTO: 0.05 X10*3/UL (ref 0–0.5)
IMM GRANULOCYTES NFR BLD AUTO: 0.6 % (ref 0–0.9)
LYMPHOCYTES # BLD MANUAL: 0.97 X10*3/UL (ref 0.8–3)
LYMPHOCYTES NFR BLD MANUAL: 12 %
MCH RBC QN AUTO: 29.2 PG (ref 26–34)
MCHC RBC AUTO-ENTMCNC: 32.3 G/DL (ref 32–36)
MCV RBC AUTO: 90 FL (ref 80–100)
MONOCYTES # BLD MANUAL: 0.89 X10*3/UL (ref 0.05–0.8)
MONOCYTES NFR BLD MANUAL: 11 %
NEUTS SEG # BLD MANUAL: 6.08 X10*3/UL (ref 1.6–5)
NEUTS SEG NFR BLD MANUAL: 75 %
NRBC BLD-RTO: 0 /100 WBCS (ref 0–0)
OVALOCYTES BLD QL SMEAR: ABNORMAL
PLATELET # BLD AUTO: 167 X10*3/UL (ref 150–450)
POTASSIUM SERPL-SCNC: 4.2 MMOL/L (ref 3.5–5.3)
PROT SERPL-MCNC: 6.2 G/DL (ref 6.4–8.2)
RBC # BLD AUTO: 4.08 X10*6/UL (ref 4.5–5.9)
RBC MORPH BLD: ABNORMAL
SODIUM SERPL-SCNC: 130 MMOL/L (ref 136–145)
TOTAL CELLS COUNTED BLD: 100
VARIANT LYMPHS # BLD MANUAL: 0.08 X10*3/UL (ref 0–0.3)
VARIANT LYMPHS NFR BLD: 1 %
WBC # BLD AUTO: 8.1 X10*3/UL (ref 4.4–11.3)

## 2025-07-27 PROCEDURE — 2500000004 HC RX 250 GENERAL PHARMACY W/ HCPCS (ALT 636 FOR OP/ED): Performed by: EMERGENCY MEDICINE

## 2025-07-27 PROCEDURE — 84484 ASSAY OF TROPONIN QUANT: CPT | Performed by: EMERGENCY MEDICINE

## 2025-07-27 PROCEDURE — 80053 COMPREHEN METABOLIC PANEL: CPT | Performed by: EMERGENCY MEDICINE

## 2025-07-27 PROCEDURE — 96361 HYDRATE IV INFUSION ADD-ON: CPT

## 2025-07-27 PROCEDURE — 71045 X-RAY EXAM CHEST 1 VIEW: CPT

## 2025-07-27 PROCEDURE — 93010 ELECTROCARDIOGRAM REPORT: CPT | Performed by: INTERNAL MEDICINE

## 2025-07-27 PROCEDURE — 85027 COMPLETE CBC AUTOMATED: CPT | Performed by: EMERGENCY MEDICINE

## 2025-07-27 PROCEDURE — 36415 COLL VENOUS BLD VENIPUNCTURE: CPT | Performed by: EMERGENCY MEDICINE

## 2025-07-27 PROCEDURE — 93005 ELECTROCARDIOGRAM TRACING: CPT

## 2025-07-27 PROCEDURE — 85007 BL SMEAR W/DIFF WBC COUNT: CPT | Performed by: EMERGENCY MEDICINE

## 2025-07-27 PROCEDURE — 71045 X-RAY EXAM CHEST 1 VIEW: CPT | Performed by: RADIOLOGY

## 2025-07-27 PROCEDURE — 83880 ASSAY OF NATRIURETIC PEPTIDE: CPT | Performed by: EMERGENCY MEDICINE

## 2025-07-27 PROCEDURE — 96360 HYDRATION IV INFUSION INIT: CPT

## 2025-07-27 PROCEDURE — 99285 EMERGENCY DEPT VISIT HI MDM: CPT | Mod: 25 | Performed by: EMERGENCY MEDICINE

## 2025-07-27 RX ADMIN — SODIUM CHLORIDE 500 ML: 900 INJECTION, SOLUTION INTRAVENOUS at 11:18

## 2025-07-27 RX ADMIN — SODIUM CHLORIDE 500 ML: 9 INJECTION, SOLUTION INTRAVENOUS at 10:07

## 2025-07-27 ASSESSMENT — LIFESTYLE VARIABLES
EVER FELT BAD OR GUILTY ABOUT YOUR DRINKING: NO
EVER HAD A DRINK FIRST THING IN THE MORNING TO STEADY YOUR NERVES TO GET RID OF A HANGOVER: NO
HAVE PEOPLE ANNOYED YOU BY CRITICIZING YOUR DRINKING: NO
HAVE YOU EVER FELT YOU SHOULD CUT DOWN ON YOUR DRINKING: NO
TOTAL SCORE: 0

## 2025-07-27 ASSESSMENT — PAIN DESCRIPTION - LOCATION: LOCATION: BACK

## 2025-07-27 ASSESSMENT — PAIN - FUNCTIONAL ASSESSMENT: PAIN_FUNCTIONAL_ASSESSMENT: 0-10

## 2025-07-27 ASSESSMENT — PAIN SCALES - GENERAL: PAINLEVEL_OUTOF10: 2

## 2025-07-27 ASSESSMENT — PAIN DESCRIPTION - PROGRESSION: CLINICAL_PROGRESSION: NOT CHANGED

## 2025-07-27 ASSESSMENT — PAIN DESCRIPTION - PAIN TYPE: TYPE: CHRONIC PAIN

## 2025-07-27 NOTE — ED PROVIDER NOTES
HPI   Chief Complaint   Patient presents with    Syncope       Patient is an 80-year-old male with past medical history of CKD, COPD, heart failure presenting via EMS from nursing facility with concerns for syncope.  Reportedly he was being transitioned out of a wheelchair when he passed out for roughly 1 minute.  He did not hit his head.  He the patient states he does not recall having a syncopal episode.  States he is having some chronic back pain but other than that he is having no complaints at this time.  Patient denies fevers, chills, cough, sore throat, runny nose, chest pain, shortness of breath, abdominal pain, nausea, vomiting, diarrhea or urinary complaints.              Patient History   Medical History[1]  Surgical History[2]  Family History[3]  Social History[4]    Physical Exam   ED Triage Vitals [07/27/25 0954]   Temperature Heart Rate Respirations BP   36.4 °C (97.5 °F) 60 17 118/71      Pulse Ox Temp Source Heart Rate Source Patient Position   97 % Oral Radial Lying      BP Location FiO2 (%)     Left arm --       Physical Exam  Vitals and nursing note reviewed.   Constitutional:       Appearance: He is well-developed.      Comments: Awake, lying in examination bed   HENT:      Head: Normocephalic and atraumatic.      Nose: Nose normal.      Mouth/Throat:      Mouth: Mucous membranes are moist.      Pharynx: Oropharynx is clear.     Eyes:      Extraocular Movements: Extraocular movements intact.      Conjunctiva/sclera: Conjunctivae normal.      Pupils: Pupils are equal, round, and reactive to light.     Neck:      Comments: On chronic 2 L nasal cannula  Cardiovascular:      Rate and Rhythm: Normal rate and regular rhythm.      Pulses: Normal pulses.      Heart sounds: Normal heart sounds. No murmur heard.  Pulmonary:      Effort: Pulmonary effort is normal. No respiratory distress.      Breath sounds: Normal breath sounds.   Abdominal:      General: Abdomen is flat.      Palpations: Abdomen is  soft.      Tenderness: There is no abdominal tenderness.     Musculoskeletal:         General: No swelling. Normal range of motion.      Cervical back: Normal range of motion and neck supple.     Skin:     General: Skin is warm and dry.      Capillary Refill: Capillary refill takes less than 2 seconds.     Neurological:      General: No focal deficit present.      Mental Status: He is alert and oriented to person, place, and time.     Psychiatric:         Mood and Affect: Mood normal.         Behavior: Behavior normal.           ED Course & MDM   Diagnoses as of 07/27/25 1644   Syncope and collapse   Acute renal failure superimposed on chronic kidney disease, unspecified acute renal failure type, unspecified CKD stage   Dehydration                 No data recorded     [place] Coma Scale Score: 14 (07/27/25 1017 : Shannon Perez RN)                           Medical Decision Making  Patient is an 80-year-old male with past medical history of CKD, COPD, heart failure presenting via EMS from nursing facility with concerns for syncope.  Lab work, urine and imaging ordered for conditions considered include but are not limited to: Vasovagal syncope, orthostatic syncope, electrolyte abnormality, dehydration.    I saw this patient in conjunction with Dr. Burch.  CBC is without leukocytosis but does show anemia with hemoglobin of 11.1.  CMP without significant electrolyte abnormality does show CKD with creatinine 1.6.  Patient was given IV fluids.  BNP of 283.  Troponins within normal limits.  Chest x-ray without acute cardiopulmonary process.    I believe this patient is at low risk for complication, and a disposition of discharge is acceptable.  Return to the Emergency Department if new or worsening symptoms including headache, fever, chills, chest pain, shortness of breath, syncope, near syncope, abdominal pain, nausea, vomiting,  diarrhea, or worsening pain.  Patient is agreeable to disposition of discharge and to follow  with respective fields in the next several days.  Patient discharged back to facility.    Portions of this note made with Dragon software, please be mindful of potential grammatical errors.      Medications   sodium chloride 0.9 % bolus 500 mL (0 mL intravenous Stopped 7/27/25 1109)   sodium chloride 0.9 % bolus 500 mL (0 mL intravenous Stopped 7/27/25 1200)     Labs Reviewed   CBC WITH AUTO DIFFERENTIAL - Abnormal       Result Value    WBC 8.1      nRBC 0.0      RBC 4.08 (*)     Hemoglobin 11.9 (*)     Hematocrit 36.8 (*)     MCV 90      MCH 29.2      MCHC 32.3      RDW 14.4      Platelets 167      Immature Granulocytes %, Automated 0.6      Immature Granulocytes Absolute, Automated 0.05      Narrative:     The previously reported component Neutrophils % is no longer being reported.  The previously reported component Lymphocytes % is no longer being reported.  The previously reported component Monocytes % is no longer being reported.  The previously   reported component Eosinophils % is no longer being reported.  The previously reported component Basophils % is no longer being reported.  The previously reported component Absolute Neutrophils is no longer being reported.  The previously reported   component Absolute Lymphocytes is no longer being reported.  The previously reported component Absolute Monocytes is no longer being reported.  The previously reported component Absolute Eosinophils is no longer being reported.  The previously reported   component Absolute Basophils is no longer being reported.   COMPREHENSIVE METABOLIC PANEL - Abnormal    Glucose 96      Sodium 130 (*)     Potassium 4.2      Chloride 95 (*)     Bicarbonate 28      Anion Gap 11      Urea Nitrogen 18      Creatinine 1.63 (*)     eGFR 42 (*)     Calcium 8.3 (*)     Albumin 3.2 (*)     Alkaline Phosphatase 58      Total Protein 6.2 (*)     AST 12      Bilirubin, Total 0.8      ALT 4 (*)    B-TYPE NATRIURETIC PEPTIDE - Abnormal     (*)      Narrative:        <100 pg/mL - Heart failure unlikely  100-299 pg/mL - Intermediate probability of acute heart                  failure exacerbation. Correlate with clinical                  context and patient history.    >=300 pg/mL - Heart Failure likely. Correlate with clinical                  context and patient history.    BNP testing is performed using different testing methodology at [facility] than at other [facility]. Direct result comparisons should only be made within the same method.      MANUAL DIFFERENTIAL - Abnormal    Neutrophils %, Manual 75.0      Lymphocytes %, Manual 12.0      Monocytes %, Manual 11.0      Eosinophils %, Manual 1.0      Basophils %, Manual 0.0      Atypical Lymphocytes %, Manual 1.0      Seg Neutrophils Absolute, Manual 6.08 (*)     Lymphocytes Absolute, Manual 0.97      Monocytes Absolute, Manual 0.89 (*)     Eosinophils Absolute, Manual 0.08      Basophils Absolute, Manual 0.00      Atypical Lymphs Absolute, Manual 0.08      Total Cells Counted 100      RBC Morphology See Below      Ovalocytes Few     SERIAL TROPONIN-INITIAL - Normal    Troponin I, High Sensitivity 16      Narrative:     Less than 99th percentile of normal range cutoff-  Female and children under 18 years old <14 ng/L; Male <21 ng/L: Negative  Repeat testing should be performed if clinically indicated.     Female and children under 18 years old 14-50 ng/L; Male 21-50 ng/L:  Consistent with possible cardiac damage and possible increased clinical   risk. Serial measurements may help to assess extent of myocardial damage.     >50 ng/L: Consistent with cardiac damage, increased clinical risk and  myocardial infarction. Serial measurements may help assess extent of   myocardial damage.      NOTE: Children less than 1 year old may have higher baseline troponin   levels and results should be interpreted in conjunction with the overall   clinical context.     NOTE: Troponin I testing is  performed using a different   testing methodology at [facility] than at other   [facility]. Direct result comparisons should only   be made within the same method.   SERIAL TROPONIN, 1 HOUR - Normal    Troponin I, High Sensitivity 15      Narrative:     Less than 99th percentile of normal range cutoff-  Female and children under 18 years old <14 ng/L; Male <21 ng/L: Negative  Repeat testing should be performed if clinically indicated.     Female and children under 18 years old 14-50 ng/L; Male 21-50 ng/L:  Consistent with possible cardiac damage and possible increased clinical   risk. Serial measurements may help to assess extent of myocardial damage.     >50 ng/L: Consistent with cardiac damage, increased clinical risk and  myocardial infarction. Serial measurements may help assess extent of   myocardial damage.      NOTE: Children less than 1 year old may have higher baseline troponin   levels and results should be interpreted in conjunction with the overall   clinical context.     NOTE: Troponin I testing is performed using a different   testing methodology at [facility] than at other   [facility]. Direct result comparisons should only   be made within the same method.   TROPONIN SERIES- (INITIAL, 1 HR)    Narrative:     The following orders were created for panel order Troponin I Series, High Sensitivity (0, 1 HR).  Procedure                               Abnormality         Status                     ---------                               -----------         ------                     Troponin I, High Sensiti...[[id number]]  Normal              Final result               Troponin, High Sensitivi...[[id number]]  Normal              Final result                 Please view results for these tests on the individual orders.     XR chest 1 view   Final Result   No evidence of acute intrathoracic abnormality.        Signed by: Param Burnett 7/27/2025 10:28 AM   Dictation  workstation:   [id number]            Procedure  Procedures       [1]   Past Medical History:  Diagnosis Date    Atherosclerotic heart disease of native coronary artery without angina pectoris 12/14/2022    Arteriosclerotic cardiovascular disease (ASCVD)    Benign neoplasm of meninges, unspecified     Meningioma    BPH (benign prostatic hyperplasia)     Chronic diastolic heart failure     Chronic kidney disease     Chronic obstructive pulmonary disease (COPD) (Multi)     Chronic respiratory failure     Elevated blood-pressure reading, without diagnosis of hypertension     Prehypertension    Hyperlipidemia     Meningioma (Multi)     Nephrolithiasis     Rectal bleeding     Sick sinus syndrome (Multi)     s/p pacemaker placement    Vitamin D deficiency    [2]   Past Surgical History:  Procedure Laterality Date    BACK SURGERY  04/18/2016    Back Surgery    BRAIN SURGERY      COLONOSCOPY  07/03/2013    Complete Colonoscopy    CORONARY ANGIOPLASTY WITH STENT PLACEMENT  08/18/2020    Cath Stent Placement    CT ANGIO NECK  09/25/2021    CT NECK ANGIO W AND WO IV CONTRAST 9/25/2021 [facility]    CT HEAD ANGIO W AND WO IV CONTRAST  09/25/2021    CT HEAD ANGIO W AND WO IV CONTRAST 9/25/2021 [facility] CLINICAL LEGAC[facility]    CYSTOSCOPY W/ URETERAL STENT PLACEMENT      EYE SURGERY  04/03/2013    Eye Surgery    KNEE ARTHROSCOPY W/ DEBRIDEMENT  07/03/2013    Arthroscopy Knee Right    KNEE ARTHROSCOPY W/ DEBRIDEMENT  07/03/2013    Arthroscopy Knee Left    LITHOTRIPSY  07/22/2013    Renal Lithotripsy    MR HEAD ANGIO WO IV CONTRAST  03/23/2021    MR HEAD ANGIO WO IV CONTRAST [facility]    MR NECK ANGIO WO IV CONTRAST  03/23/2021    MR NECK ANGIO WO IV CONTRAST [facility]    OTHER SURGICAL HISTORY  04/03/2013    Cardiac Cath Procedure Outcome: Successful    OTHER SURGICAL HISTORY  04/03/2013    Neuroplasty With Transposition Of Ulnar Nerve - At Elbow    OTHER SURGICAL HISTORY  07/03/2013    Wrist Carpectomy     OTHER SURGICAL HISTORY  2013    Ulnar Osteotomy    PACEMAKER PLACEMENT      TONSILLECTOMY      TOTAL KNEE ARTHROPLASTY     [3]   Family History  Problem Relation Name Age of Onset    Diabetes Mother      Emphysema Father      Heart disease Father     [4]   Social History  Tobacco Use    Smoking status: Former     Current packs/day: 0.00     Types: Cigarettes     Quit date:      Years since quittin.5    Smokeless tobacco: Never   Vaping Use    Vaping status: Never Used   Substance Use Topics    Alcohol use: Not Currently    Drug use: Never        Getachew Márquez PA-C  [phone]

## 2025-07-27 NOTE — ED TRIAGE NOTES
"Arrived to ER via EMS from [facility] for syncope. \"  He was being transferred from the bed to the w/c, when we stood him up he passed out. He was out for about a minute then was awake. He did not hit his head\" per [facility] report when called in. Pt at rehab for JASBIR/kidney stones. Recent admit 7/12/7-17 dc to rehab.   "

## 2025-07-27 NOTE — DISCHARGE INSTRUCTIONS
Patient to have the patient increase oral hydration.  Flavor packets and supplements can help with incentivize and increase oral hydration.    Be sure to take all medications, over the counter medications or prescription medications only as directed.    Be sure to follow up as directed in 1-2 days. All of the details of your follow up instructions are detailed in the follow up section of this packet.    If you are being discharged with any pains medications or muscle relaxers (norco, Vicodin, hydrocodone products, Percocet, oxycodone products, flexeril, cyclobenzaprine, robaxin, norflex, brand or generic, or any other pain controlling medications with the exception of Ibuprofen and regular Tylenol, do not drive or operate machinery, climb ladders or participate in any activity that could potentially put yourself or others at risk should you get dizzy, or be/feel impaired at all.    Return to emergency room without delay for ANY new or worsening pains or for any other symptoms or concerns. Return with worsening pains, nausea, vomiting, trouble breathing, palpitations, shortness of breath, inability to pass stool or urine, loss of control of stool or urine, any numbness or tingling (that is not normal for you), uncontrolled fevers, the passing of blood or other material in stool or urine, rashes, pains or for any other symptoms or concerns you may have. You are always welcome to return to the ER at any time for any reason or for any other concerns you may have.

## 2025-07-27 NOTE — PROGRESS NOTES
Attestation/Supervisory note for SANJANA Márquez      The patient is an 80-year-old male presenting to the emergency department from the long-term [facility] facility where he resides for evaluation of a syncopal event.  The patient was reportedly laying in bed and they were trying to help him stand.  He reportedly passed out as they were trying to get him to stand.  They did lower him to the ground.  He did not injure himself when he fell.  He reportedly only had a transient loss of consciousness.  The patient denies having any current symptoms at this time.  He denies any headache or visual changes.  No chest pain or shortness of breath.  No abdominal pain.  No nausea or vomiting.  No diarrhea or constipation.  no urinary complaints.  No fever or chills.  No cough or congestion.  He states that he did not have any preceding symptoms before he passed out.  He states he does have some chronic low back pain but he does not have any recent changes in that.  All pertinent positives and negatives are recorded above.  All other systems reviewed and otherwise negative.  Vital signs within normal limits.  Physical exam with a well-nourished well-developed male in no acute distress.  HEENT exam with dry mucous membranes but otherwise unremarkable.  He has no evidence of airway compromise or respiratory distress.  Abdominal exam is benign.  He does not have any visible or palpable evidence of trauma.  Pulses are equal bilaterally.  NIH stroke scale score of 0.      EKG with atrial paced rhythm at 64 bpm, leftward axis, normal voltage, normal ST segment, and a diffuse flattening of the T waves      IV fluids ordered.      Diagnostic labs with a mild electrolyte imbalance, acute on chronic renal insufficiency, mild anemia and a mildly elevated BNP but otherwise unremarkable.      Initial troponin 16.  Repeat troponin 15      XR chest 1 view   Final Result   No evidence of acute intrathoracic abnormality.        Signed by: Param Burnett  7/27/2025 10:28 AM   Dictation workstation:   [id number]           The patient does not have any evidence of airway compromise or respiratory distress on exam.  Is well-perfused on exam.  No evidence of sepsis.  No evidence of hemodynamic instability.  He does have dry mucous membranes on exam.  He did receive IV fluids.  He does have some mild renal insufficiency and a mild electrolyte imbalance on diagnostic labs.  Chest x-ray without acute process.  No evidence of pneumonia or pneumothorax.  No evidence of CHF.  No widening of the mediastinum.  His pulses are equal bilaterally.  He does not have any gross motor, neurologic or vascular deficits on exam.      The patient was released in good condition.  He will follow-up with his primary care physician within 1 to 2 days for further management of his current symptoms.  He will return to the emergency department sooner with worsening of symptoms or onset of new symptoms.      Impression/diagnosis:  Syncope and collapse  Acute on chronic renal insufficiency  Mild electrolyte imbalance      I personally saw the patient and made/approve the management plan and take responsibility for the patient management.      I independently interpreted the following study (S) EKG and diagnostic labs      I personally discussed the patient's management with the patient      I reviewed the results of the diagnostic labs and diagnostic imaging.  Formal radiology read was completed by the radiologist.      Aleyda Burch MD

## 2025-07-28 LAB
ATRIAL RATE: 64 BPM
PR INTERVAL: 316 MS
Q ONSET: 212 MS
QRS COUNT: 11 BEATS
QRS DURATION: 94 MS
QT INTERVAL: 456 MS
QTC CALCULATION(BAZETT): 470 MS
QTC FREDERICIA: 465 MS
R AXIS: -32 DEGREES
T AXIS: 131 DEGREES
T OFFSET: 440 MS
VENTRICULAR RATE: 64 BPM

## 2025-07-30 ENCOUNTER — HOSPITAL ENCOUNTER (OUTPATIENT)
Dept: CARDIOLOGY | Facility: CLINIC | Age: 80
Discharge: HOME | End: 2025-07-30
Payer: MEDICARE

## 2025-07-30 DIAGNOSIS — Z95.0 PRESENCE OF CARDIAC PACEMAKER: ICD-10-CM

## 2025-07-30 DIAGNOSIS — I49.5 SICK SINUS SYNDROME (MULTI): ICD-10-CM

## 2025-07-30 PROCEDURE — 93294 REM INTERROG EVL PM/LDLS PM: CPT | Performed by: INTERNAL MEDICINE

## 2025-07-30 PROCEDURE — 93296 REM INTERROG EVL PM/IDS: CPT

## 2025-08-01 ENCOUNTER — HOSPITAL ENCOUNTER (INPATIENT)
Facility: HOSPITAL | Age: 80
End: 2025-08-01
Attending: INTERNAL MEDICINE | Admitting: INTERNAL MEDICINE
Payer: MEDICARE

## 2025-08-01 ENCOUNTER — APPOINTMENT (OUTPATIENT)
Dept: PREADMISSION TESTING | Facility: HOSPITAL | Age: 80
End: 2025-08-01
Payer: MEDICARE

## 2025-08-01 DIAGNOSIS — N39.0 RECURRENT UTI: ICD-10-CM

## 2025-08-01 DIAGNOSIS — N20.0 RENAL CALCULI: Primary | ICD-10-CM

## 2025-08-01 PROCEDURE — 1200000002 HC GENERAL ROOM WITH TELEMETRY DAILY

## 2025-08-01 PROCEDURE — 2500000001 HC RX 250 WO HCPCS SELF ADMINISTERED DRUGS (ALT 637 FOR MEDICARE OP): Performed by: INTERNAL MEDICINE

## 2025-08-01 PROCEDURE — 2500000005 HC RX 250 GENERAL PHARMACY W/O HCPCS: Performed by: INTERNAL MEDICINE

## 2025-08-01 PROCEDURE — 2500000002 HC RX 250 W HCPCS SELF ADMINISTERED DRUGS (ALT 637 FOR MEDICARE OP, ALT 636 FOR OP/ED): Performed by: INTERNAL MEDICINE

## 2025-08-01 RX ORDER — FENOFIBRATE 48 MG/1
48 TABLET, FILM COATED ORAL DAILY
Status: DISCONTINUED | OUTPATIENT
Start: 2025-08-01 | End: 2025-08-01

## 2025-08-01 RX ORDER — CEFDINIR 300 MG/1
300 CAPSULE ORAL 2 TIMES DAILY
COMMUNITY

## 2025-08-01 RX ORDER — GABAPENTIN 100 MG/1
100 CAPSULE ORAL NIGHTLY
Status: DISCONTINUED | OUTPATIENT
Start: 2025-08-01 | End: 2025-08-06 | Stop reason: HOSPADM

## 2025-08-01 RX ORDER — CEFDINIR 300 MG/1
300 CAPSULE ORAL 2 TIMES DAILY
Status: DISCONTINUED | OUTPATIENT
Start: 2025-08-01 | End: 2025-08-06 | Stop reason: HOSPADM

## 2025-08-01 RX ORDER — ADHESIVE BANDAGE
30 BANDAGE TOPICAL DAILY PRN
Status: DISCONTINUED | OUTPATIENT
Start: 2025-08-01 | End: 2025-08-06 | Stop reason: HOSPADM

## 2025-08-01 RX ORDER — ACETAMINOPHEN 325 MG/1
650 TABLET ORAL EVERY 4 HOURS PRN
Status: DISCONTINUED | OUTPATIENT
Start: 2025-08-01 | End: 2025-08-06 | Stop reason: HOSPADM

## 2025-08-01 RX ORDER — FUROSEMIDE 20 MG/1
20 TABLET ORAL DAILY
Status: DISCONTINUED | OUTPATIENT
Start: 2025-08-01 | End: 2025-08-06 | Stop reason: HOSPADM

## 2025-08-01 RX ORDER — HEPARIN SODIUM 5000 [USP'U]/ML
5000 INJECTION, SOLUTION INTRAVENOUS; SUBCUTANEOUS EVERY 8 HOURS
Status: DISCONTINUED | OUTPATIENT
Start: 2025-08-01 | End: 2025-08-01

## 2025-08-01 RX ORDER — AMIODARONE HYDROCHLORIDE 200 MG/1
100 TABLET ORAL DAILY
Status: DISCONTINUED | OUTPATIENT
Start: 2025-08-01 | End: 2025-08-06 | Stop reason: HOSPADM

## 2025-08-01 RX ORDER — POLYETHYLENE GLYCOL 3350 17 G/17G
17 POWDER, FOR SOLUTION ORAL DAILY
Status: DISCONTINUED | OUTPATIENT
Start: 2025-08-01 | End: 2025-08-06 | Stop reason: HOSPADM

## 2025-08-01 RX ORDER — FENOFIBRATE 54 MG/1
54 TABLET ORAL DAILY
Status: DISCONTINUED | OUTPATIENT
Start: 2025-08-01 | End: 2025-08-06 | Stop reason: HOSPADM

## 2025-08-01 RX ORDER — ACETAMINOPHEN 650 MG/1
650 SUPPOSITORY RECTAL EVERY 4 HOURS PRN
Status: DISCONTINUED | OUTPATIENT
Start: 2025-08-01 | End: 2025-08-06 | Stop reason: HOSPADM

## 2025-08-01 RX ORDER — PANTOPRAZOLE SODIUM 40 MG/1
40 TABLET, DELAYED RELEASE ORAL
Status: DISCONTINUED | OUTPATIENT
Start: 2025-08-02 | End: 2025-08-06 | Stop reason: HOSPADM

## 2025-08-01 RX ORDER — TAMSULOSIN HYDROCHLORIDE 0.4 MG/1
0.4 CAPSULE ORAL NIGHTLY
Status: DISCONTINUED | OUTPATIENT
Start: 2025-08-01 | End: 2025-08-06 | Stop reason: HOSPADM

## 2025-08-01 RX ORDER — VANCOMYCIN HYDROCHLORIDE 125 MG/1
125 CAPSULE ORAL 4 TIMES DAILY
Status: DISCONTINUED | OUTPATIENT
Start: 2025-08-01 | End: 2025-08-06 | Stop reason: HOSPADM

## 2025-08-01 RX ORDER — PAROXETINE 20 MG/1
20 TABLET, FILM COATED ORAL DAILY
Status: DISCONTINUED | OUTPATIENT
Start: 2025-08-01 | End: 2025-08-06 | Stop reason: HOSPADM

## 2025-08-01 RX ORDER — TALC
3 POWDER (GRAM) TOPICAL NIGHTLY PRN
Status: DISCONTINUED | OUTPATIENT
Start: 2025-08-01 | End: 2025-08-06 | Stop reason: HOSPADM

## 2025-08-01 RX ORDER — ACETAMINOPHEN 325 MG/1
650 TABLET ORAL EVERY 6 HOURS PRN
Status: DISCONTINUED | OUTPATIENT
Start: 2025-08-01 | End: 2025-08-06 | Stop reason: HOSPADM

## 2025-08-01 RX ORDER — FINASTERIDE 5 MG/1
5 TABLET, FILM COATED ORAL DAILY
Status: DISCONTINUED | OUTPATIENT
Start: 2025-08-01 | End: 2025-08-06 | Stop reason: HOSPADM

## 2025-08-01 RX ORDER — VANCOMYCIN HYDROCHLORIDE 125 MG/1
125 CAPSULE ORAL 4 TIMES DAILY
COMMUNITY

## 2025-08-01 RX ORDER — ERGOCALCIFEROL 1.25 MG/1
1.25 CAPSULE ORAL
Status: DISCONTINUED | OUTPATIENT
Start: 2025-08-03 | End: 2025-08-06 | Stop reason: HOSPADM

## 2025-08-01 RX ORDER — METOPROLOL SUCCINATE 25 MG/1
25 TABLET, EXTENDED RELEASE ORAL DAILY
Status: DISCONTINUED | OUTPATIENT
Start: 2025-08-01 | End: 2025-08-06 | Stop reason: HOSPADM

## 2025-08-01 RX ORDER — ACETAMINOPHEN 160 MG/5ML
650 SOLUTION ORAL EVERY 4 HOURS PRN
Status: DISCONTINUED | OUTPATIENT
Start: 2025-08-01 | End: 2025-08-06 | Stop reason: HOSPADM

## 2025-08-01 RX ORDER — VIT C/E/ZN/COPPR/LUTEIN/ZEAXAN 250MG-90MG
500 CAPSULE ORAL DAILY
Status: DISCONTINUED | OUTPATIENT
Start: 2025-08-01 | End: 2025-08-06 | Stop reason: HOSPADM

## 2025-08-01 RX ORDER — FOLIC ACID 1 MG/1
1 TABLET ORAL DAILY
Status: DISCONTINUED | OUTPATIENT
Start: 2025-08-01 | End: 2025-08-06 | Stop reason: HOSPADM

## 2025-08-01 RX ORDER — PSYLLIUM HUSK 0.4 G
2 CAPSULE ORAL DAILY
Status: DISCONTINUED | OUTPATIENT
Start: 2025-08-01 | End: 2025-08-01

## 2025-08-01 RX ORDER — FERROUS SULFATE 325(65) MG
1 TABLET ORAL
Status: DISCONTINUED | OUTPATIENT
Start: 2025-08-02 | End: 2025-08-06 | Stop reason: HOSPADM

## 2025-08-01 RX ORDER — ATORVASTATIN CALCIUM 20 MG/1
20 TABLET, FILM COATED ORAL DAILY
Status: DISCONTINUED | OUTPATIENT
Start: 2025-08-01 | End: 2025-08-06 | Stop reason: HOSPADM

## 2025-08-01 RX ORDER — FERROUS SULFATE 325(65) MG
325 TABLET ORAL
COMMUNITY
End: 2025-08-06 | Stop reason: HOSPADM

## 2025-08-01 RX ADMIN — CYANOCOBALAMIN TAB 500 MCG 500 MCG: 500 TAB at 19:38

## 2025-08-01 RX ADMIN — FOLIC ACID 1 MG: 1 TABLET ORAL at 19:38

## 2025-08-01 RX ADMIN — GABAPENTIN 100 MG: 100 CAPSULE ORAL at 21:44

## 2025-08-01 RX ADMIN — FUROSEMIDE 20 MG: 20 TABLET ORAL at 19:38

## 2025-08-01 RX ADMIN — TAMSULOSIN HYDROCHLORIDE 0.4 MG: 0.4 CAPSULE ORAL at 21:43

## 2025-08-01 RX ADMIN — ATORVASTATIN CALCIUM 20 MG: 20 TABLET, FILM COATED ORAL at 19:38

## 2025-08-01 RX ADMIN — Medication 3 MG: at 21:44

## 2025-08-01 RX ADMIN — SACUBITRIL AND VALSARTAN 1 TABLET: 24; 26 TABLET, FILM COATED ORAL at 21:43

## 2025-08-01 RX ADMIN — ACETAMINOPHEN 650 MG: 325 TABLET ORAL at 21:43

## 2025-08-01 RX ADMIN — APIXABAN 2.5 MG: 2.5 TABLET, FILM COATED ORAL at 21:43

## 2025-08-01 RX ADMIN — PAROXETINE HYDROCHLORIDE 20 MG: 20 TABLET, FILM COATED ORAL at 19:38

## 2025-08-01 RX ADMIN — CEFDINIR 300 MG: 300 CAPSULE ORAL at 21:43

## 2025-08-01 RX ADMIN — FINASTERIDE 5 MG: 5 TABLET, FILM COATED ORAL at 19:38

## 2025-08-01 RX ADMIN — VANCOMYCIN HYDROCHLORIDE 125 MG: 125 CAPSULE ORAL at 19:38

## 2025-08-01 RX ADMIN — AMIODARONE HYDROCHLORIDE 100 MG: 200 TABLET ORAL at 19:38

## 2025-08-01 RX ADMIN — METOPROLOL SUCCINATE 25 MG: 25 TABLET, EXTENDED RELEASE ORAL at 19:38

## 2025-08-01 RX ADMIN — FENOFIBRATE 54 MG: 54 TABLET ORAL at 19:37

## 2025-08-01 SDOH — ECONOMIC STABILITY: HOUSING INSECURITY: IN THE PAST 12 MONTHS, HOW MANY TIMES HAVE YOU MOVED WHERE YOU WERE LIVING?: 0

## 2025-08-01 SDOH — SOCIAL STABILITY: SOCIAL INSECURITY: DO YOU FEEL ANYONE HAS EXPLOITED OR TAKEN ADVANTAGE OF YOU FINANCIALLY OR OF YOUR PERSONAL PROPERTY?: UNABLE TO ASSESS

## 2025-08-01 SDOH — ECONOMIC STABILITY: HOUSING INSECURITY
IN THE LAST 12 MONTHS, WAS THERE A TIME WHEN YOU WERE NOT ABLE TO PAY THE MORTGAGE OR RENT ON TIME?: PATIENT UNABLE TO ANSWER

## 2025-08-01 SDOH — SOCIAL STABILITY: SOCIAL INSECURITY: ARE YOU MARRIED, WIDOWED, DIVORCED, SEPARATED, NEVER MARRIED, OR LIVING WITH A PARTNER?: MARRIED

## 2025-08-01 SDOH — HEALTH STABILITY: MENTAL HEALTH: HOW MANY DRINKS CONTAINING ALCOHOL DO YOU HAVE ON A TYPICAL DAY WHEN YOU ARE DRINKING?: PATIENT UNABLE TO ANSWER

## 2025-08-01 SDOH — HEALTH STABILITY: MENTAL HEALTH: HOW MANY DRINKS CONTAINING ALCOHOL DO YOU HAVE ON A TYPICAL DAY WHEN YOU ARE DRINKING?: PATIENT DOES NOT DRINK

## 2025-08-01 SDOH — ECONOMIC STABILITY: INCOME INSECURITY: IN THE PAST 12 MONTHS HAS THE ELECTRIC, GAS, OIL, OR WATER COMPANY THREATENED TO SHUT OFF SERVICES IN YOUR HOME?: NO

## 2025-08-01 SDOH — HEALTH STABILITY: MENTAL HEALTH: HOW OFTEN DO YOU HAVE A DRINK CONTAINING ALCOHOL?: PATIENT UNABLE TO ANSWER

## 2025-08-01 SDOH — HEALTH STABILITY: MENTAL HEALTH: HOW OFTEN DO YOU HAVE A DRINK CONTAINING ALCOHOL?: NEVER

## 2025-08-01 SDOH — ECONOMIC STABILITY: FOOD INSECURITY: HOW HARD IS IT FOR YOU TO PAY FOR THE VERY BASICS LIKE FOOD, HOUSING, MEDICAL CARE, AND HEATING?: NOT HARD AT ALL

## 2025-08-01 SDOH — SOCIAL STABILITY: SOCIAL INSECURITY: DO YOU FEEL UNSAFE GOING BACK TO THE PLACE WHERE YOU ARE LIVING?: UNABLE TO ASSESS

## 2025-08-01 SDOH — ECONOMIC STABILITY: INCOME INSECURITY
IN THE PAST 12 MONTHS HAS THE ELECTRIC, GAS, OIL, OR WATER COMPANY THREATENED TO SHUT OFF SERVICES IN YOUR HOME?: PATIENT UNABLE TO ANSWER

## 2025-08-01 SDOH — ECONOMIC STABILITY: HOUSING INSECURITY: IN THE LAST 12 MONTHS, WAS THERE A TIME WHEN YOU WERE NOT ABLE TO PAY THE MORTGAGE OR RENT ON TIME?: NO

## 2025-08-01 SDOH — HEALTH STABILITY: MENTAL HEALTH: HOW OFTEN DO YOU HAVE SIX OR MORE DRINKS ON ONE OCCASION?: NEVER

## 2025-08-01 SDOH — SOCIAL STABILITY: SOCIAL INSECURITY: ARE YOU OR HAVE YOU BEEN THREATENED OR ABUSED PHYSICALLY, EMOTIONALLY, OR SEXUALLY BY ANYONE?: UNABLE TO ASSESS

## 2025-08-01 SDOH — ECONOMIC STABILITY: FOOD INSECURITY: WITHIN THE PAST 12 MONTHS, THE FOOD YOU BOUGHT JUST DIDN'T LAST AND YOU DIDN'T HAVE MONEY TO GET MORE.: NEVER TRUE

## 2025-08-01 SDOH — HEALTH STABILITY: MENTAL HEALTH: HOW OFTEN DO YOU HAVE SIX OR MORE DRINKS ON ONE OCCASION?: PATIENT UNABLE TO ANSWER

## 2025-08-01 SDOH — ECONOMIC STABILITY: HOUSING INSECURITY: AT ANY TIME IN THE PAST 12 MONTHS, WERE YOU HOMELESS OR LIVING IN A SHELTER (INCLUDING NOW)?: PATIENT UNABLE TO ANSWER

## 2025-08-01 SDOH — ECONOMIC STABILITY: TRANSPORTATION INSECURITY
IN THE PAST 12 MONTHS, HAS LACK OF TRANSPORTATION KEPT YOU FROM MEDICAL APPOINTMENTS OR FROM GETTING MEDICATIONS?: PATIENT UNABLE TO ANSWER

## 2025-08-01 SDOH — ECONOMIC STABILITY: HOUSING INSECURITY: AT ANY TIME IN THE PAST 12 MONTHS, WERE YOU HOMELESS OR LIVING IN A SHELTER (INCLUDING NOW)?: NO

## 2025-08-01 SDOH — SOCIAL STABILITY: SOCIAL INSECURITY: HAVE YOU HAD ANY THOUGHTS OF HARMING ANYONE ELSE?: UNABLE TO ASSESS

## 2025-08-01 SDOH — SOCIAL STABILITY: SOCIAL INSECURITY: WERE YOU ABLE TO COMPLETE ALL THE BEHAVIORAL HEALTH SCREENINGS?: NO

## 2025-08-01 SDOH — ECONOMIC STABILITY: FOOD INSECURITY
HOW HARD IS IT FOR YOU TO PAY FOR THE VERY BASICS LIKE FOOD, HOUSING, MEDICAL CARE, AND HEATING?: PATIENT UNABLE TO ANSWER

## 2025-08-01 SDOH — ECONOMIC STABILITY: FOOD INSECURITY
WITHIN THE PAST 12 MONTHS, THE FOOD YOU BOUGHT JUST DIDN'T LAST AND YOU DIDN'T HAVE MONEY TO GET MORE.: PATIENT UNABLE TO ANSWER

## 2025-08-01 SDOH — ECONOMIC STABILITY: FOOD INSECURITY: WITHIN THE PAST 12 MONTHS, YOU WORRIED THAT YOUR FOOD WOULD RUN OUT BEFORE YOU GOT THE MONEY TO BUY MORE.: NEVER TRUE

## 2025-08-01 SDOH — ECONOMIC STABILITY: FOOD INSECURITY
WITHIN THE PAST 12 MONTHS, YOU WORRIED THAT YOUR FOOD WOULD RUN OUT BEFORE YOU GOT THE MONEY TO BUY MORE.: PATIENT UNABLE TO ANSWER

## 2025-08-01 SDOH — SOCIAL STABILITY: SOCIAL INSECURITY: WITHIN THE LAST YEAR, HAVE YOU BEEN AFRAID OF YOUR PARTNER OR EX-PARTNER?: PATIENT UNABLE TO ANSWER

## 2025-08-01 SDOH — SOCIAL STABILITY: SOCIAL INSECURITY: ARE THERE ANY APPARENT SIGNS OF INJURIES/BEHAVIORS THAT COULD BE RELATED TO ABUSE/NEGLECT?: UNABLE TO ASSESS

## 2025-08-01 SDOH — SOCIAL STABILITY: SOCIAL INSECURITY: DOES ANYONE TRY TO KEEP YOU FROM HAVING/CONTACTING OTHER FRIENDS OR DOING THINGS OUTSIDE YOUR HOME?: UNABLE TO ASSESS

## 2025-08-01 SDOH — SOCIAL STABILITY: SOCIAL INSECURITY: HAVE YOU HAD THOUGHTS OF HARMING ANYONE ELSE?: NO

## 2025-08-01 SDOH — ECONOMIC STABILITY: TRANSPORTATION INSECURITY: IN THE PAST 12 MONTHS, HAS LACK OF TRANSPORTATION KEPT YOU FROM MEDICAL APPOINTMENTS OR FROM GETTING MEDICATIONS?: NO

## 2025-08-01 SDOH — SOCIAL STABILITY: SOCIAL INSECURITY: HAS ANYONE EVER THREATENED TO HURT YOUR FAMILY OR YOUR PETS?: UNABLE TO ASSESS

## 2025-08-01 SDOH — SOCIAL STABILITY: SOCIAL INSECURITY: ABUSE: ADULT

## 2025-08-01 ASSESSMENT — PAIN - FUNCTIONAL ASSESSMENT: PAIN_FUNCTIONAL_ASSESSMENT: 0-10

## 2025-08-01 ASSESSMENT — COGNITIVE AND FUNCTIONAL STATUS - GENERAL
WALKING IN HOSPITAL ROOM: A LOT
PERSONAL GROOMING: A LOT
STANDING UP FROM CHAIR USING ARMS: A LOT
TURNING FROM BACK TO SIDE WHILE IN FLAT BAD: A LOT
DRESSING REGULAR UPPER BODY CLOTHING: A LOT
CLIMB 3 TO 5 STEPS WITH RAILING: TOTAL
DAILY ACTIVITIY SCORE: 12
TOILETING: TOTAL
DRESSING REGULAR LOWER BODY CLOTHING: A LOT
MOVING FROM LYING ON BACK TO SITTING ON SIDE OF FLAT BED WITH BEDRAILS: A LOT
MOBILITY SCORE: 11
PATIENT BASELINE BEDBOUND: UNABLE TO ASSESS AT THIS TIME
EATING MEALS: A LITTLE
MOVING TO AND FROM BED TO CHAIR: A LOT
HELP NEEDED FOR BATHING: A LOT

## 2025-08-01 ASSESSMENT — LIFESTYLE VARIABLES
SKIP TO QUESTIONS 9-10: 0
SKIP TO QUESTIONS 9-10: 1
AUDIT-C TOTAL SCORE: -1
AUDIT-C TOTAL SCORE: -1
HOW OFTEN DO YOU HAVE 6 OR MORE DRINKS ON ONE OCCASION: PATIENT UNABLE TO ANSWER
AUDIT-C TOTAL SCORE: 0
HOW OFTEN DO YOU HAVE A DRINK CONTAINING ALCOHOL: PATIENT UNABLE TO ANSWER
SKIP TO QUESTIONS 9-10: 0
AUDIT-C TOTAL SCORE: -1
HOW MANY STANDARD DRINKS CONTAINING ALCOHOL DO YOU HAVE ON A TYPICAL DAY: PATIENT UNABLE TO ANSWER
SKIP TO QUESTIONS 9-10: 0
AUDIT-C TOTAL SCORE: -1

## 2025-08-01 ASSESSMENT — PAIN SCALES - GENERAL: PAINLEVEL_OUTOF10: 0 - NO PAIN

## 2025-08-01 ASSESSMENT — ACTIVITIES OF DAILY LIVING (ADL)
FEEDING YOURSELF: NEEDS ASSISTANCE
ADEQUATE_TO_COMPLETE_ADL: YES
LACK_OF_TRANSPORTATION: NO
HEARING - LEFT EAR: DIFFICULTY WITH NOISE
WALKS IN HOME: NEEDS ASSISTANCE
DRESSING YOURSELF: NEEDS ASSISTANCE
TOILETING: NEEDS ASSISTANCE
BATHING: NEEDS ASSISTANCE
JUDGMENT_ADEQUATE_SAFELY_COMPLETE_DAILY_ACTIVITIES: YES
HEARING - RIGHT EAR: DIFFICULTY WITH NOISE
PATIENT'S MEMORY ADEQUATE TO SAFELY COMPLETE DAILY ACTIVITIES?: UNABLE TO ASSESS
LACK_OF_TRANSPORTATION: PATIENT UNABLE TO ANSWER
GROOMING: NEEDS ASSISTANCE
ASSISTIVE_DEVICE: WALKER

## 2025-08-01 NOTE — PROGRESS NOTES
08/01/25 1358   Discharge Planning   Living Arrangements Spouse/significant other   Support Systems Spouse/significant other;Children   Assistance Needed transportation, IADLs   Type of Residence Private residence   Number of Stairs to Enter Residence 4   Number of Stairs Within Residence 24   Do you have animals or pets at home? Yes   Type of Animals or Pets 3 cats   Who is requesting discharge planning? Provider   Home or Post Acute Services Post acute facilities (Rehab/SNF/etc)   Type of Post Acute Facility Services Skilled nursing   Expected Discharge Disposition SNF   Does the patient need discharge transport arranged? Yes   [facility] [employer] coordination needed? Yes   Has discharge transport been arranged? No   Financial Resource Strain   How hard is it for you to pay for the very basics like food, housing, medical care, and heating? Not hard   Housing Stability   In the last 12 months, was there a time when you were not able to pay the mortgage or rent on time? N   In the past 12 months, how many times have you moved where you were living? 0   At any time in the past 12 months, were you homeless or living in a shelter (including now)? N   Transportation Needs   In the past 12 months, has lack of transportation kept you from medical appointments or from getting medications? no   In the past 12 months, has lack of transportation kept you from meetings, work, or from getting things needed for daily living? No   Patient Choice   Provider Choice list and CMS website (https://medicare.gov/care-compare#search) for post-acute Quality and Resource Measure Data were provided and reviewed with: Patient;Family   Patient / Family choosing to utilize agency / facility established prior to hospitalization No   Stroke Family Assessment   Stroke Family Assessment Needed No   Intensity of Service   Intensity of Service 0-30 min     8/1/25 0659  Met with patient, wife, and daughter at bedside to discuss discharge planning.   Patient was just transferred from [facility] to [facility] per family request.  Daughter states patient has been a multiple hospitals as well as [facility] SNF recently.  He lives at home with his wife in a house.  His two daughters live close and assist with things like transportation and grocery shopping.  He is independent with ADLs at baseline.  He uses a walker or a wheelchair for mobility.  He is on O2@2L at baseline and it is provided by [employer].  Anticipate need for SNF at discharge.  Will need PT/OT evals.  No auth needed.  [facility] SNF list provided to wife and daughter.  Their would prefer for patient to go to [facility].  Referral sent.  Jackelyn Pride RN TCC

## 2025-08-01 NOTE — NURSING NOTE
Patient arrived to unit at 11:45am. This nurse is just now receiving report for this patient from [facility] Ashlie khan, on 3E.

## 2025-08-02 LAB
ANION GAP SERPL CALC-SCNC: 15 MMOL/L (ref 10–20)
BUN SERPL-MCNC: 39 MG/DL (ref 6–23)
CALCIUM SERPL-MCNC: 8.6 MG/DL (ref 8.6–10.3)
CHLORIDE SERPL-SCNC: 101 MMOL/L (ref 98–107)
CO2 SERPL-SCNC: 24 MMOL/L (ref 21–32)
CREAT SERPL-MCNC: 1.31 MG/DL (ref 0.5–1.3)
EGFRCR SERPLBLD CKD-EPI 2021: 54 ML/MIN/1.73M*2
ERYTHROCYTE [DISTWIDTH] IN BLOOD BY AUTOMATED COUNT: 14.2 % (ref 11.5–14.5)
GLUCOSE BLD MANUAL STRIP-MCNC: 109 MG/DL (ref 74–99)
GLUCOSE SERPL-MCNC: 98 MG/DL (ref 74–99)
HCT VFR BLD AUTO: 36.7 % (ref 41–52)
HGB BLD-MCNC: 11.6 G/DL (ref 13.5–17.5)
MCH RBC QN AUTO: 29.4 PG (ref 26–34)
MCHC RBC AUTO-ENTMCNC: 31.6 G/DL (ref 32–36)
MCV RBC AUTO: 93 FL (ref 80–100)
NRBC BLD-RTO: 0 /100 WBCS (ref 0–0)
PLATELET # BLD AUTO: 252 X10*3/UL (ref 150–450)
POTASSIUM SERPL-SCNC: 4 MMOL/L (ref 3.5–5.3)
RBC # BLD AUTO: 3.95 X10*6/UL (ref 4.5–5.9)
SODIUM SERPL-SCNC: 136 MMOL/L (ref 136–145)
WBC # BLD AUTO: 6.8 X10*3/UL (ref 4.4–11.3)

## 2025-08-02 PROCEDURE — 2500000004 HC RX 250 GENERAL PHARMACY W/ HCPCS (ALT 636 FOR OP/ED): Performed by: INTERNAL MEDICINE

## 2025-08-02 PROCEDURE — 97162 PT EVAL MOD COMPLEX 30 MIN: CPT | Mod: GP

## 2025-08-02 PROCEDURE — 1200000002 HC GENERAL ROOM WITH TELEMETRY DAILY

## 2025-08-02 PROCEDURE — 97165 OT EVAL LOW COMPLEX 30 MIN: CPT | Mod: GO

## 2025-08-02 PROCEDURE — 2500000002 HC RX 250 W HCPCS SELF ADMINISTERED DRUGS (ALT 637 FOR MEDICARE OP, ALT 636 FOR OP/ED): Performed by: INTERNAL MEDICINE

## 2025-08-02 PROCEDURE — 36415 COLL VENOUS BLD VENIPUNCTURE: CPT | Performed by: INTERNAL MEDICINE

## 2025-08-02 PROCEDURE — 85027 COMPLETE CBC AUTOMATED: CPT | Performed by: INTERNAL MEDICINE

## 2025-08-02 PROCEDURE — 2500000001 HC RX 250 WO HCPCS SELF ADMINISTERED DRUGS (ALT 637 FOR MEDICARE OP): Performed by: INTERNAL MEDICINE

## 2025-08-02 PROCEDURE — 80048 BASIC METABOLIC PNL TOTAL CA: CPT | Performed by: INTERNAL MEDICINE

## 2025-08-02 PROCEDURE — 82947 ASSAY GLUCOSE BLOOD QUANT: CPT

## 2025-08-02 RX ADMIN — POLYETHYLENE GLYCOL 3350 17 G: 17 POWDER, FOR SOLUTION ORAL at 09:48

## 2025-08-02 RX ADMIN — PAROXETINE HYDROCHLORIDE 20 MG: 20 TABLET, FILM COATED ORAL at 09:48

## 2025-08-02 RX ADMIN — GABAPENTIN 100 MG: 100 CAPSULE ORAL at 20:28

## 2025-08-02 RX ADMIN — ATORVASTATIN CALCIUM 20 MG: 20 TABLET, FILM COATED ORAL at 09:48

## 2025-08-02 RX ADMIN — APIXABAN 2.5 MG: 2.5 TABLET, FILM COATED ORAL at 20:28

## 2025-08-02 RX ADMIN — CEFDINIR 300 MG: 300 CAPSULE ORAL at 09:48

## 2025-08-02 RX ADMIN — VANCOMYCIN HYDROCHLORIDE 125 MG: 125 CAPSULE ORAL at 17:28

## 2025-08-02 RX ADMIN — FOLIC ACID 1 MG: 1 TABLET ORAL at 09:48

## 2025-08-02 RX ADMIN — FINASTERIDE 5 MG: 5 TABLET, FILM COATED ORAL at 09:48

## 2025-08-02 RX ADMIN — CYANOCOBALAMIN TAB 500 MCG 500 MCG: 500 TAB at 09:48

## 2025-08-02 RX ADMIN — VANCOMYCIN HYDROCHLORIDE 125 MG: 125 CAPSULE ORAL at 07:30

## 2025-08-02 RX ADMIN — FERROUS SULFATE TAB 325 MG (65 MG ELEMENTAL FE) 1 TABLET: 325 (65 FE) TAB at 09:52

## 2025-08-02 RX ADMIN — FENOFIBRATE 54 MG: 54 TABLET ORAL at 09:48

## 2025-08-02 RX ADMIN — AMIODARONE HYDROCHLORIDE 100 MG: 200 TABLET ORAL at 09:48

## 2025-08-02 RX ADMIN — VANCOMYCIN HYDROCHLORIDE 125 MG: 125 CAPSULE ORAL at 20:28

## 2025-08-02 RX ADMIN — APIXABAN 2.5 MG: 2.5 TABLET, FILM COATED ORAL at 09:48

## 2025-08-02 RX ADMIN — VANCOMYCIN HYDROCHLORIDE 125 MG: 125 CAPSULE ORAL at 13:21

## 2025-08-02 RX ADMIN — TAMSULOSIN HYDROCHLORIDE 0.4 MG: 0.4 CAPSULE ORAL at 20:28

## 2025-08-02 RX ADMIN — FUROSEMIDE 20 MG: 20 TABLET ORAL at 09:48

## 2025-08-02 RX ADMIN — CEFDINIR 300 MG: 300 CAPSULE ORAL at 20:28

## 2025-08-02 RX ADMIN — SACUBITRIL AND VALSARTAN 1 TABLET: 24; 26 TABLET, FILM COATED ORAL at 09:48

## 2025-08-02 RX ADMIN — PANTOPRAZOLE SODIUM 40 MG: 40 TABLET, DELAYED RELEASE ORAL at 07:30

## 2025-08-02 RX ADMIN — METOPROLOL SUCCINATE 25 MG: 25 TABLET, EXTENDED RELEASE ORAL at 09:48

## 2025-08-02 RX ADMIN — SACUBITRIL AND VALSARTAN 1 TABLET: 24; 26 TABLET, FILM COATED ORAL at 20:28

## 2025-08-02 ASSESSMENT — COGNITIVE AND FUNCTIONAL STATUS - GENERAL
TOILETING: TOTAL
DAILY ACTIVITIY SCORE: 7
MOVING TO AND FROM BED TO CHAIR: TOTAL
MOBILITY SCORE: 8
HELP NEEDED FOR BATHING: TOTAL
HELP NEEDED FOR BATHING: TOTAL
CLIMB 3 TO 5 STEPS WITH RAILING: TOTAL
STANDING UP FROM CHAIR USING ARMS: TOTAL
EATING MEALS: A LOT
HELP NEEDED FOR BATHING: TOTAL
MOVING TO AND FROM BED TO CHAIR: TOTAL
EATING MEALS: A LOT
MOVING FROM LYING ON BACK TO SITTING ON SIDE OF FLAT BED WITH BEDRAILS: A LOT
DAILY ACTIVITIY SCORE: 7
DRESSING REGULAR UPPER BODY CLOTHING: TOTAL
STANDING UP FROM CHAIR USING ARMS: TOTAL
TURNING FROM BACK TO SIDE WHILE IN FLAT BAD: A LOT
DRESSING REGULAR UPPER BODY CLOTHING: TOTAL
MOVING TO AND FROM BED TO CHAIR: TOTAL
DRESSING REGULAR LOWER BODY CLOTHING: TOTAL
EATING MEALS: A LOT
WALKING IN HOSPITAL ROOM: TOTAL
PERSONAL GROOMING: TOTAL
WALKING IN HOSPITAL ROOM: TOTAL
WALKING IN HOSPITAL ROOM: TOTAL
MOVING FROM LYING ON BACK TO SITTING ON SIDE OF FLAT BED WITH BEDRAILS: A LOT
TURNING FROM BACK TO SIDE WHILE IN FLAT BAD: A LOT
PERSONAL GROOMING: TOTAL
EATING MEALS: A LOT
DRESSING REGULAR LOWER BODY CLOTHING: TOTAL
STANDING UP FROM CHAIR USING ARMS: TOTAL
TOILETING: TOTAL
STANDING UP FROM CHAIR USING ARMS: TOTAL
TURNING FROM BACK TO SIDE WHILE IN FLAT BAD: A LOT
MOVING TO AND FROM BED TO CHAIR: TOTAL
MOVING FROM LYING ON BACK TO SITTING ON SIDE OF FLAT BED WITH BEDRAILS: A LOT
TOILETING: TOTAL
DRESSING REGULAR UPPER BODY CLOTHING: TOTAL
TURNING FROM BACK TO SIDE WHILE IN FLAT BAD: TOTAL
CLIMB 3 TO 5 STEPS WITH RAILING: TOTAL
DRESSING REGULAR LOWER BODY CLOTHING: TOTAL
MOVING FROM LYING ON BACK TO SITTING ON SIDE OF FLAT BED WITH BEDRAILS: A LOT
MOBILITY SCORE: 7
DRESSING REGULAR UPPER BODY CLOTHING: TOTAL
TOILETING: TOTAL
HELP NEEDED FOR BATHING: TOTAL
DRESSING REGULAR LOWER BODY CLOTHING: TOTAL
CLIMB 3 TO 5 STEPS WITH RAILING: TOTAL
PERSONAL GROOMING: TOTAL
PERSONAL GROOMING: TOTAL
WALKING IN HOSPITAL ROOM: TOTAL
CLIMB 3 TO 5 STEPS WITH RAILING: TOTAL

## 2025-08-02 ASSESSMENT — ACTIVITIES OF DAILY LIVING (ADL)
BATHING_ASSISTANCE: TOTAL
ADL_ASSISTANCE: INDEPENDENT
ADL_ASSISTANCE: INDEPENDENT

## 2025-08-02 ASSESSMENT — PAIN SCALES - GENERAL
PAINLEVEL_OUTOF10: 0 - NO PAIN

## 2025-08-02 ASSESSMENT — ENCOUNTER SYMPTOMS
SHORTNESS OF BREATH: 1
ACTIVITY CHANGE: 1
GASTROINTESTINAL NEGATIVE: 1
WEAKNESS: 1
EYES NEGATIVE: 1
CARDIOVASCULAR NEGATIVE: 1
MUSCULOSKELETAL NEGATIVE: 1

## 2025-08-02 ASSESSMENT — PAIN - FUNCTIONAL ASSESSMENT
PAIN_FUNCTIONAL_ASSESSMENT: 0-10
PAIN_FUNCTIONAL_ASSESSMENT: 0-10

## 2025-08-02 NOTE — PROGRESS NOTES
Occupational Therapy    Evaluation    Patient Name: Angel Carroll  MRN: [id number]  Department: [facility]  Room: [address] [place] [address]  Today's Date: 8/2/2025  Time Calculation  Start Time: 1120  Stop Time: 1140  Time Calculation (min): 20 min    Assessment  IP OT Assessment  OT Assessment:  (OT Eval complete, patient is weak and deconditioned. Patient with decreased sitting/standing balance, ADLs, transfers and activity tolerance. Patient would benefit from Moderate intensity therapy to maximize functional independence.)  Prognosis: Fair  Barriers to Discharge Home: Caregiver assistance, Physical needs  Caregiver Assistance: Caregiver assistance needed per identified barriers - however, level of patient's required assistance exceeds assistance available at home  Physical Needs: 24hr ADL assistance needed, 24hr mobility assistance needed, Ambulating household distances limited by function/safety  Evaluation/Treatment Tolerance: Patient limited by fatigue  Medical Staff Made Aware: Yes  End of Session Communication: PCT/NA/CTA  End of Session Patient Position: Bed, 3 rail up, Alarm on  Plan:  Treatment Interventions: ADL retraining, Functional transfer training, Endurance training, Patient/family training, Neuromuscular reeducation  OT Frequency: 3 times per week (During this acute inpatient hospitalization)  OT Discharge Recommendations: Moderate intensity level of continued care  Based on current functional status and rehab potential, patient is anticipated to tolerate and benefit from 5 or more days per week of skilled rehabilitative therapy after discharge from this acute inpatient hospitalization.    Equipment Recommended upon Discharge: Wheeled walker  OT Recommended Transfer Status: Dependent, Assist of 2  OT - OK to Discharge: Yes (Per POC)    Subjective   Current Problem:  1. Renal calculi          OT Visit Info:  OT Received On: 08/02/25  General Visit Info:  General  Reason for Referral:  (79 y/o male admitted with with  Generalized weakness, SOB, Fever, Transfer from [facility] to [facility], 7 mm Ureteral stones found.)  Referred By:  (Dr. Little)  Past Medical History Relevant to Rehab: Medical History[1]    Co-Treatment: PT  Co-Treatment Reason:  (To maximize patient participation)  Prior to Session Communication: Bedside nurse  Patient Position Received: Bed, 3 rail up, Alarm on  General Comment:  (Patient weak and deconditioned, difficulty for patient to extend knees actively, unable to stand with 2 person assist.)  Precautions:  Medical Precautions: Fall precautions (Contact Plus Precautions)     Date/Time Vitals Session Patient Position Pulse Resp SpO2 BP MAP (mmHg)    08/02/25 1200 --  --  60  18  95 %  132/61  85       Pain:  Pain Assessment  Pain Assessment: 0-10  0-10 (Numeric) Pain Score: 0 - No pain    Objective   Cognition:  Overall Cognitive Status: Impaired (Patient is pleasantly confused.)  Orientation Level: Disoriented to time  Processing Speed: Delayed     Home Living:  Type of Home:  (2 story home with 2 steps to enter B Handrail, Bed/bath on 1st floor, tub/shower combo, Raised toilet seat.)  Lives With: Spouse  Home Adaptive Equipment:  (Raised toilet seat, tub bench, wheelchair,  wheeled walker)   Prior Function:  Level of [place]: Independent with ADLs and functional transfers (Patient at times would use walker in home for mobility and other times uses wheelchair for mobility.)  Receives Help From: Family  ADL Assistance: Independent  Homemaking Assistance: Needs assistance (Dtr does laundry and spouse light home management tasks.)  Ambulatory Assistance: Independent (with wheeled walker or wheelchair, depending on how he was feeling.)  Hand Dominance: Right    ADL:  Eating Assistance: Minimal (due to weakness, assist with managing containers.)  Grooming Assistance: Maximal (Anticipatied)  Bathing Assistance: Total (due to decreased sitting and standing balance)  UE Dressing Assistance: Total (Due to  decreased static sitting balance)  LE Dressing Assistance: Total (Unable to don socks due to several LOB posterior)  Toileting Assistance with Device: Total (Patient incontinent of stool, CTA assist with cleainup)  Functional Assistance: Total  Activity Tolerance:  Endurance: Tolerates 10 - 20 min exercise with multiple rests  Activity Tolerance Comments:  (Fair activity tolerance)  Bed Mobility/Transfers: Bed Mobility  Bed Mobility: Yes  Bed Mobility 1  Bed Mobility 1: Supine to sitting  Level of Assistance 1: Maximum assistance  Bed Mobility Comments 1:  (Assist with upper and lower trunk)  Bed Mobility 2  Bed Mobility  2: Sitting to supine  Level of Assistance 2: Maximum verbal cues  Bed Mobility Comments 2:  (Assist with upper and lower trunk, minimal assist from patient due to fatigue)    Transfers  Transfer: Yes  Transfer 1  Transfer From 1: Sit to  Transfer to 1: Stand  Technique 1: Sit to stand  Transfer Device 1: Walker  Transfer Level of Assistance 1: Dependent (x 2, patient unable to clear bed  and had to sit down.)  Trials/Comments 1:  (Attempted to have patient scoot towards Head of Bed, unable to scoot with assist of 2 (MAX))    Sitting Balance:  Static Sitting Balance  Static Sitting-Balance Support: Feet supported  Static Sitting-Level of Assistance: Maximum assistance  Static Sitting-Comment/Number of Minutes:  (Patient with LOB posteriorly and laterally to R, assist of therapist several attempts to sit up.)  Dynamic Sitting Balance  Dynamic Sitting-Balance Support: Feet supported  Dynamic Sitting-Level of Assistance: Maximum assistance  Dynamic Sitting-Balance: Reaching for objects  Dynamic Sitting-Comments:  (Several LOB posterior and laterally to R)  Standing Balance:  Static Standing Balance  Static Standing-Balance Support: Bilateral upper extremity supported  Static Standing-Level of Assistance: Dependent (x 2)  Static Standing-Comment/Number of Minutes:  (Patient unable to clear bed, knees  flexed to 90 degrees and difficult for patient to extend actively.)    Vision: Vision - Basic Assessment  Current Vision: Wears glasses all the time  Sensation:  Light Touch: No apparent deficits  Strength:  Strength Comments:  (B UEs- 4-/5 Throughout)    Hand Function:  Hand Function  Gross Grasp: Functional  Coordination: Impaired (To manipulate objects)    Outcome Measures: [facility] Daily Activity  Putting on and taking off regular lower body clothing: Total  Bathing (including washing, rinsing, drying): Total  Putting on and taking off regular upper body clothing: Total  Toileting, which includes using toilet, bedpan or urinal: Total  Taking care of personal grooming such as brushing teeth: Total  Eating Meals: A lot  Daily Activity - Total Score: 7    Education Documentation  ADL Training, taught by Osmin Gordon OT at 8/2/2025 12:20 PM.  Learner: Patient  Readiness: Acceptance  Method: Explanation  Response: Needs Reinforcement    Goals:   Encounter Problems       Encounter Problems (Active)       ADLs       Patient will perform UB and LB bathing with moderate assist level of assistance.       Start:  08/02/25    Expected End:  08/16/25            Patient with complete upper body dressing with moderate assist level of assistance donning and doffing all UE clothes with no adaptive equipment while edge of bed        Start:  08/02/25    Expected End:  08/16/25            Patient with complete lower body dressing with moderate assist level of assistance donning and doffing all LE clothes  with reacher, sock-aid, and dressing stick  while edge of bed        Start:  08/02/25    Expected End:  08/16/25            Patient will feed self with independent level of assistance using PRN adaptive equipment.       Start:  08/02/25    Expected End:  08/16/25            Patient will complete daily grooming tasks brushing teeth and washing face/hair with independent level of assistance and PRN adaptive equipment while edge of  bed .       Start:  08/02/25    Expected End:  08/16/25            Patient will complete toileting including hygiene clothing management/hygiene with maximal assist level of assistance and raised toilet seat.       Start:  08/02/25    Expected End:  08/16/25               BALANCE       Pt will maintain static/dynamic sitting balance during ADL task with contact guard assist level of assistance in order to demonstrate decreased risk of falling and improved postural control.       Start:  08/02/25    Expected End:  08/16/25               COGNITION/SAFETY       Patient to demo good problem solving and sequencing with min verbal cues for safety required during ADLs and transfers.        Start:  08/02/25    Expected End:  08/16/25               TRANSFERS       Patient will perform bed mobility moderate assist level of assistance and bed rails in order to improve safety and independence with mobility       Start:  08/02/25    Expected End:  08/16/25                     Patient will complete functional transfer to all surfaces with front wheeled walker with maximal assist level of assistance.       Start:  08/02/25    Expected End:  08/16/25                      [1]   Past Medical History:  Diagnosis Date    Atherosclerotic heart disease of native coronary artery without angina pectoris 12/14/2022    Arteriosclerotic cardiovascular disease (ASCVD)    Benign neoplasm of meninges, unspecified     Meningioma    BPH (benign prostatic hyperplasia)     Chronic diastolic heart failure     Chronic kidney disease     Chronic obstructive pulmonary disease (COPD) (Multi)     Chronic respiratory failure     Elevated blood-pressure reading, without diagnosis of hypertension     Prehypertension    Hyperlipidemia     Meningioma (Multi)     Nephrolithiasis     Rectal bleeding     Sick sinus syndrome (Multi)     s/p pacemaker placement    Vitamin D deficiency

## 2025-08-02 NOTE — CONSULTS
Wound Care Consult     Visit Date: 8/1/2025      Patient Name: Angel Carroll         MRN: [id number]             Reason for Consult: buttocks & groin             Assessment:  Wound 07/12/25 Pressure Injury Buttock (Active)   Date First Assessed/Time First Assessed: 07/12/25 1411   Present on Original Admission: Yes  Hand Hygiene Completed: Yes  Primary Wound Type: (c) Pressure Injury  Secondary Wound Type - Pressure Injury: (c)   Location: Buttock      Assessments 8/1/2025 12:16 PM   [place]-Wound Assessment Non-blanchable erythema;Red;Pink;Erythematous;[place];Blanchable erythema   Non-staged Wound Description Full thickness   Pressure Injury Stage Other (Comment)   Shape scattered       Active Orders   Date Order Priority Status Authorizing Provider   08/01/25 1323 Inpatient Consult to Wound and Ostomy Nurse Routine Active Florencio Little MD     - Reason for Consult?:    Wound     - Reason for Consult?:    wound to buttocks, ulceration on penis and scrotum       Inactive Orders   Date Order Priority Status Authorizing Provider   07/14/25 1422 Wound Care Other (Comments) (moisture related skin impairment) Perineum Routine Discontinued Tasneem Sandoval MD     - Wound Complexity:    Simple     - Cleanse with:    Soap and Water     - Apply::    Triad Hydrophilic Wound Dressing     - Cover with::    Foam (Mepilex Border)   07/12/25 1548 Inpatient Consult to Wound and Ostomy Nurse Routine Completed Tasneem Sandoval MD     - Reason for Consult?:    Wound                         Wound Plan:     Gluteal cleft & buttocks-MASD/Irritant contact dermatitis due to fecal, urinary or dual incontinent with scattered full thickness stage 3 PI   Gently cleanse with mild soap and water or bath wipes, pat dry. (No scrubbing/ not necessary to remove all of the ointment from skin/gently cleanse top soiled layer leaving base layer of ointment in place)   Apply thin layer of Triad Hydrophilic wound dressing ointment to buttocks, perineum and groin    Reapply 4 times a day and as needed after incontinence     While in bed patient should only be on one fitted sheet, and one chux. Please, do not use brief while patient is resting in bed. Elevate heels off the bed surface at all times. Turn and reposition at least every 2 hours.      Thank you for this consult. Assessment has been completed and orders have been placed. Wound care to be completed by nursing per orders. Please place new consult if there is a change in wound status.           Richelle Bryan RN BSN,[facility],CWN  [phone]   8/1/2025  8:27 PM

## 2025-08-02 NOTE — CARE PLAN
The patient's goals for the shift include      The clinical goals for the shift include Remain safe, HDS, free of falls, maintain skin integrity, and monitor I&Os throughout shift.      Problem: Pain - Adult  Goal: Verbalizes/displays adequate comfort level or baseline comfort level  Outcome: Progressing     Problem: Safety - Adult  Goal: Free from fall injury  Outcome: Progressing     Problem: Discharge Planning  Goal: Discharge to home or other facility with appropriate resources  Outcome: Progressing     Problem: Chronic Conditions and Co-morbidities  Goal: Patient's chronic conditions and co-morbidity symptoms are monitored and maintained or improved  Outcome: Progressing     Problem: Nutrition  Goal: Nutrient intake appropriate for maintaining nutritional needs  Outcome: Progressing     Problem: Skin  Goal: Decreased wound size/increased tissue granulation at next dressing change  Outcome: Progressing  Goal: Participates in plan/prevention/treatment measures  Outcome: Progressing  Goal: Prevent/manage excess moisture  Outcome: Progressing  Goal: Prevent/minimize sheer/friction injuries  Outcome: Progressing  Goal: Promote/optimize nutrition  Outcome: Progressing  Goal: Promote skin healing  Outcome: Progressing

## 2025-08-02 NOTE — PROGRESS NOTES
Physical Therapy    Physical Therapy Evaluation    Patient Name: Angel Carroll  MRN: [id number]  Department: [place] [address]  Room: [facility]-  Today's Date: 8/2/2025   Time Calculation  Start Time: 1125  Stop Time: 1140  Time Calculation (min): 15 min    Assessment/Plan   PT Assessment  PT Assessment Results: Decreased strength, Decreased range of motion, Decreased endurance, Impaired balance, Decreased mobility, Decreased safety awareness, Decreased skin integrity  Rehab Prognosis: Poor  Evaluation/Treatment Tolerance: Patient tolerated treatment well  Medical Staff Made Aware: Yes  End of Session Communication: PCT/NA/CTA  Assessment Comment: Pt presents with decreased strength, endurance, functional mobility. Pt able to sit at EOB with Max(A) x2, unable to stand with 2 person assistance. Pt will be benefit from on going PT services to return to [facility].  End of Session Patient Position: Bed, 3 rail up, Alarm on  IP OR SWING BED PT PLAN  Inpatient or Swing Bed: Inpatient  PT Plan  Treatment/Interventions: Bed mobility, Transfer training, Gait training, Balance training, Neuromuscular re-education, Strengthening, Endurance training, Therapeutic exercise, Therapeutic activity, Positioning  PT Plan: Ongoing PT  PT Frequency: 3 times per week  PT Discharge Recommendations: Moderate intensity level of continued care  PT Recommended Transfer Status: Total assist  PT - OK to Discharge: Yes    Subjective     PT Visit Info:  PT Received On: 08/02/25  General Visit Information:  General  Reason for Referral: 81 y/o male admitted with with Generalized weakness, SOB, Fever, Transfer from [facility] to [facility], 7 mm Ureteral stones found.  Referred By: Florencio Little MD  Past Medical History Relevant to Rehab: Medical History[1]   Family/Caregiver Present: No  Co-Treatment: OT  Co-Treatment Reason: to maximize patient participation and for safe functional mobility.  Prior to Session Communication: Bedside nurse  Patient Position Received:  Bed, 3 rail up, Alarm on  General Comment: Patient weak and deconditioned, difficulty for patient to extend knees actively, unable to stand with 2 person assist.  Home Living:  Home Living  Type of Home:  (2 story home with 2 steps to enter B Handrail, Bed/bath on 1st floor, tub/shower combo, Raised toilet seat.)  Lives With: Spouse  Home Adaptive Equipment:  (Raised toilet seat, tub bench, wheelchair, wheeled walker)  Prior Level of Function:  Prior Function Per Pt/Caregiver Report  Level of [place]: Independent with ADLs and functional transfers  Receives Help From: Family  ADL Assistance: Independent  Homemaking Assistance: Needs assistance (Dtr does laundry and spouse light home management tasks.)  Ambulatory Assistance: Independent (with wheeled walker or wheelchair, depending on how he was feeling.)  Precautions:  Precautions  Medical Precautions: Fall precautions (contact guard precautions.)     Objective   Pain:  Pain Assessment  Pain Assessment: 0-10  0-10 (Numeric) Pain Score: 0 - No pain  Cognition:  Cognition  Overall Cognitive Status: Impaired  Orientation Level: Disoriented to time    General Assessments:  General Observation  General Observation: supine in bed, hep lock, BL SCD's, hospital gown.     Activity Tolerance  Endurance: Tolerates 10 - 20 min exercise with multiple rests    Strength  Strength Comments: BLE WFL except limited knee ROM .  Postural Control  Postural Control: Impaired (leaning towards right while sitting at EOB.)    Static Sitting Balance  Static Sitting-Balance Support: Bilateral upper extremity supported, Feet supported  Static Sitting-Level of Assistance: Maximum assistance       Functional Assessments:  Bed Mobility  Bed Mobility: Yes  Bed Mobility 1  Bed Mobility 1: Supine to sitting  Level of Assistance 1: Maximum assistance, +2  Bed Mobility 2  Bed Mobility  2: Sitting to supine  Level of Assistance 2: Maximum assistance, +2    Transfers  Transfer: Yes  Transfer  1  Technique 1: Sit to stand  Transfer Device 1: Walker  Transfer Level of Assistance 1: Dependent, +2  Trials/Comments 1:  (attempted sit to stand, pt unable to stand with 2 person assistance.)    Ambulation/Gait Training  Ambulation/Gait Training Performed: No  Extremity/Trunk Assessments:  RLE   RLE : Exceptions to WFL (limited knee extension.)  LLE   LLE : Exceptions to WFL (limited knee extension.)  Outcome Measures:  [facility] Basic Mobility  Turning from your back to your side while in a flat bed without using bedrails: A lot  Moving from lying on your back to sitting on the side of a flat bed without using bedrails: Total  Moving to and from bed to chair (including a wheelchair): Total  Standing up from a chair using your arms (e.g. wheelchair or bedside chair): Total  To walk in hospital room: Total  Climbing 3-5 steps with railing: Total  Basic Mobility - Total Score: 7    Encounter Problems       Encounter Problems (Active)       Mobility       STG - Patient will ambulate 15 feet with Max(A) using RW.       Start:  08/02/25    Expected End:  08/16/25               PT Transfers       LTG - Patient will transfer from one surface to another with Mod(A) using RW.       Start:  08/02/25    Expected End:  08/16/25            STG - Patient will perform bed mobility with Min(A).       Start:  08/02/25    Expected End:  08/16/25            STG - Patient will roll left and right with CGA.       Start:  08/02/25    Expected End:  08/16/25            STG - Patient will transfer sit to and from stand using RW with Mod(A).       Start:  08/02/25    Expected End:  08/16/25               Safety       LTG - Patient will demonstrate safety requirements appropriate to situation/environment       Start:  08/02/25    Expected End:  08/16/25                   Education Documentation  Handouts, taught by Em Hassan PT at 8/2/2025 12:36 PM.  Learner: Patient  Readiness: Acceptance  Method: Explanation  Response: Verbalizes  Understanding, Needs Reinforcement    Precautions, taught by Em Hassan PT at 8/2/2025 12:36 PM.  Learner: Patient  Readiness: Acceptance  Method: Explanation  Response: Verbalizes Understanding, Needs Reinforcement    Body Mechanics, taught by Em Hassan PT at 8/2/2025 12:36 PM.  Learner: Patient  Readiness: Acceptance  Method: Explanation  Response: Verbalizes Understanding, Needs Reinforcement    Home Exercise Program, taught by Em Hassan PT at 8/2/2025 12:36 PM.  Learner: Patient  Readiness: Acceptance  Method: Explanation  Response: Verbalizes Understanding, Needs Reinforcement    Mobility Training, taught by Em Hassan PT at 8/2/2025 12:36 PM.  Learner: Patient  Readiness: Acceptance  Method: Explanation  Response: Verbalizes Understanding, Needs Reinforcement    Education Comments  No comments found.                 [1]   Past Medical History:  Diagnosis Date    Atherosclerotic heart disease of native coronary artery without angina pectoris 12/14/2022    Arteriosclerotic cardiovascular disease (ASCVD)    Benign neoplasm of meninges, unspecified     Meningioma    BPH (benign prostatic hyperplasia)     Chronic diastolic heart failure     Chronic kidney disease     Chronic obstructive pulmonary disease (COPD) (Multi)     Chronic respiratory failure     Elevated blood-pressure reading, without diagnosis of hypertension     Prehypertension    Hyperlipidemia     Meningioma (Multi)     Nephrolithiasis     Rectal bleeding     Sick sinus syndrome (Multi)     s/p pacemaker placement    Vitamin D deficiency

## 2025-08-02 NOTE — H&P
INTERNAL MEDICINE     HISTORY AND PHYSICAL      Chief Complaint:  Weakness, UTI    History Of Present Illness  Angel Carroll is a 80 y.o. male presenting with generalized weakness to [place] 5 days ago.  He was diagnosed with UTI and pneumonia.  He was started on antibiotics.  He was noted to have acute kidney injury and imaging revealed obstructing calculus.  He was seen by urology and had stent placement and was advised to follow-up for definitive stone treatment.  He had some diarrhea and was found to have positive assay for C. difficile.  Vancomycin was started.  He has some shortness of breath and was treated for empiric pneumonia.  He was seen for elevated troponin by cardiology and was managed conservatively.  He was transferred to this hospital for continued care and per family request.  He is a limited historian and denies pain at this time.  He appears weak.       Past Medical History  He has a past medical history of Atherosclerotic heart disease of native coronary artery without angina pectoris (12/14/2022), Benign neoplasm of meninges, unspecified, BPH (benign prostatic hyperplasia), Chronic diastolic heart failure, Chronic kidney disease, Chronic obstructive pulmonary disease (COPD) (Multi), Chronic respiratory failure, Elevated blood-pressure reading, without diagnosis of hypertension, Hyperlipidemia, Meningioma (Multi), Nephrolithiasis, Rectal bleeding, Sick sinus syndrome (Multi), and Vitamin D deficiency.    Surgical History  He has a past surgical history that includes Other surgical history (04/03/2013); Other surgical history (04/03/2013); Eye surgery (04/03/2013); Total knee arthroplasty; Back surgery (04/18/2016); Lithotripsy (07/22/2013); Colonoscopy (07/03/2013); Other surgical history (07/03/2013); Knee arthroscopy w/ debridement (07/03/2013); Knee arthroscopy w/ debridement (07/03/2013); Other surgical history (07/03/2013); Coronary angioplasty with stent (08/18/2020); CT angio head w  and wo IV contrast (09/25/2021); CT angio neck (09/25/2021); MR angio head wo IV contrast (03/23/2021); MR angio neck wo IV contrast (03/23/2021); Tonsillectomy; Brain surgery; Cystoscopy w/ ureteral stent placement; and pacemaker placement.     Social History  He reports that he quit smoking about 31 years ago. His smoking use included cigarettes. He has never used smokeless tobacco. He reports that he does not drink alcohol and does not use drugs.    Family History  Hypertension     Allergies  Other, Ranolazine, Nsaids (non-steroidal anti-inflammatory drug), and Cyclobenzaprine    Home Medications:  Prior to Admission medications   Medication Sig Start Date End Date Taking? Authorizing Provider   acetaminophen (Tylenol) 325 mg tablet Take 2 tablets (650 mg) by mouth every 6 hours if needed for mild pain (1 - 3).    Historical Provider, MD   amiodarone (Pacerone) 200 mg tablet Take 0.5 tablets (100 mg) by mouth once daily. 3/26/25   SAMUEL Daugherty   apixaban (Eliquis) 2.5 mg tablet Take 1 tablet (2.5 mg) by mouth 2 times a day. 9/27/24   SAMUEL Daugherty   atorvastatin (Lipitor) 20 mg tablet TAKE 1 TABLET EVERY DAY 11/18/24   Jung Tao MD   cefdinir (Omnicef) 300 mg capsule Take 1 capsule (300 mg) by mouth 2 times a day. For 4 days    Historical Provider, MD   cyanocobalamin (Vitamin B-12) 500 mcg tablet Take 1 tablet (500 mcg) by mouth once daily. 1/25/22   Historical Provider, MD   dextran 70/hypromellose/PF (NATURAL TEARS, PF, OPHT) Administer into affected eye(s).    Historical Provider, MD   ergocalciferol (Vitamin D-2) 1250 mcg (50,000 units) capsule TAKE 1 CAPSULE EVERY WEEK 6/8/25   Jung Tao MD   fenofibrate (Tricor) 48 mg tablet TAKE 1 AND 1/2 TABLETS ONE TIME DAILY 9/30/24   SAMUEL Daugherty   ferrous sulfate 250 mg (50 mg iron) tablet extended release Take 1 tablet (250 mg) by mouth once daily. 4/23/25   Eugenia Rodriguez MD   ferrous sulfate 325 mg (65 mg elemental)  tablet Take 1 tablet by mouth once daily with breakfast.    Historical Provider, MD   finasteride (Proscar) 5 mg tablet Take 1 tablet (5 mg) by mouth once daily. 1/15/25   SAMUEL Wilkes   folic acid (Folvite) 1 mg tablet Take 1 tablet (1 mg) by mouth once daily.    Historical Provider, MD   furosemide (Lasix) 20 mg tablet Take 1 tablet (20 mg) by mouth once daily.    Historical Provider, MD   gabapentin (Neurontin) 100 mg capsule Take 1 capsule (100 mg) by mouth once daily at bedtime. 4/4/25 4/4/26  Jung Tao MD   magnesium hydroxide (Milk of Magnesia) 400 mg/5 mL suspension Take 30 mL by mouth once daily as needed for constipation.    Historical Provider, MD   melatonin 3 mg tablet Take 1 tablet (3 mg) by mouth as needed at bedtime for sleep. 7/16/25   Tasneem Sandoval MD   metoprolol succinate XL (Toprol-XL) 25 mg 24 hr tablet TAKE 1 TABLET ONE TIME DAILY (DO NOT CRUSH OR CHEW) 5/21/25   SAMUEL Wallace   nystatin (Mycostatin) 100,000 unit/gram powder Apply topically every 8 hours. 7/16/25   Tasneem Sandoval MD   oxygen (O2) gas therapy Inhale 2 L/min continuously.    Historical Provider, MD   pantoprazole (Protonix) 40 mg EC tablet Take 1 tablet (40 mg) by mouth once daily in the morning. Take before meals.    Historical Provider, MD   PARoxetine (Paxil) 20 mg tablet TAKE 1 TABLET EVERY DAY 1/30/25   Jung Tao MD   polyethylene glycol (Glycolax, Miralax) 17 gram packet Take 17 g by mouth once daily. 7/17/25   Tasneem Sandoval MD   psyllium (Metamucil) 0.4 gram capsule Take 2 capsules by mouth once daily.    Historical Provider, MD   sacubitriL-valsartan (Entresto) 24-26 mg tablet Take 1 tablet by mouth 2 times a day. 3/10/25   SAMUEL Daugherty   tamsulosin (Flomax) 0.4 mg 24 hr capsule TAKE 1 CAPSULE AT BEDTIME 7/24/25   SAMUEL Wilkes   vancomycin (Vancocin) 125 mg capsule Take 1 capsule (125 mg) by mouth 4 times a day. For 40 doses    Historical Provider, MD        Review of  "Systems   Constitutional:  Positive for activity change.   HENT: Negative.     Eyes: Negative.    Respiratory:  Positive for shortness of breath.    Cardiovascular: Negative.    Gastrointestinal: Negative.    Musculoskeletal: Negative.    Skin: Negative.    Neurological:  Positive for weakness.        Last Recorded Vitals  Blood pressure 132/61, pulse 60, temperature 35.9 °C (96.7 °F), temperature source Temporal, resp. rate 18, height 1.778 m (5' 10\"), weight 87.2 kg (192 lb 3.9 oz), SpO2 95%.    Physical Exam      Constitutional:       Appearance: Elderly, overweight, in no distress.  HENT:      Head: Normocephalic and atraumatic.   Eyes:      Extraocular Movements: Extraocular movements intact.      Pupils: Pupils are equal, round, and reactive to light.   Cardiovascular:      Rate and Rhythm: Normal rate and regular rhythm.      Pulses: Normal pulses.      Heart sounds: Normal heart sounds.   Pulmonary:      Effort: Pulmonary effort is normal.      Breath sounds: Diminished bases, few Rales, no wheeze.  Abdominal:      General: Abdomen is flat. Bowel sounds are normal.      Palpations: Abdomen is soft.   Musculoskeletal:         General: Normal range of motion.      Cervical back: Normal range of motion and neck supple.   Skin:     General: Skin is warm and dry.   Neurological:      General: No focal deficit present.      Mental Status: Alert and oriented x 3, moves all extremities.  Responds to questions.       Relevant Results    Results for orders placed or performed during the hospital encounter of 08/01/25 (from the past 24 hours)   POCT GLUCOSE   Result Value Ref Range    POCT Glucose 109 (H) 74 - 99 mg/dL   CBC   Result Value Ref Range    WBC 6.8 4.4 - 11.3 x10*3/uL    nRBC 0.0 0.0 - 0.0 /100 WBCs    RBC 3.95 (L) 4.50 - 5.90 x10*6/uL    Hemoglobin 11.6 (L) 13.5 - 17.5 g/dL    Hematocrit 36.7 (L) 41.0 - 52.0 %    MCV 93 80 - 100 fL    MCH 29.4 26.0 - 34.0 pg    MCHC 31.6 (L) 32.0 - 36.0 g/dL    RDW 14.2 " 11.5 - 14.5 %    Platelets 252 150 - 450 x10*3/uL   Basic metabolic panel   Result Value Ref Range    Glucose 98 74 - 99 mg/dL    Sodium 136 136 - 145 mmol/L    Potassium 4.0 3.5 - 5.3 mmol/L    Chloride 101 98 - 107 mmol/L    Bicarbonate 24 21 - 32 mmol/L    Anion Gap 15 10 - 20 mmol/L    Urea Nitrogen 39 (H) 6 - 23 mg/dL    Creatinine 1.31 (H) 0.50 - 1.30 mg/dL    eGFR 54 (L) >60 mL/min/1.73m*2    Calcium 8.6 8.6 - 10.3 mg/dL     *Note: Due to a large number of results and/or encounters for the requested time period, some results have not been displayed. A complete set of results can be found in Results Review.           Assessment & Plan  Renal calculi          Problem list:  Assessment & Plan  Renal calculi        ASSESSMENT AND PLAN:    UTI -likely secondary to renal calculi, continue with antibiotics as ordered.  C. difficile colitis -continue vancomycin per orders.  Weakness -PT and OT consulted, fall precautions.  Pneumonia -community-acquired, monitor saturations, on oral antibiotics.  Obstructing calculus -stent placed, appointment with urology Dr. Newsome for procedure next week.    Will need placement.      Florencio Little MD  08/02/251:00 PM

## 2025-08-03 ENCOUNTER — APPOINTMENT (OUTPATIENT)
Dept: RADIOLOGY | Facility: HOSPITAL | Age: 80
End: 2025-08-03
Payer: MEDICARE

## 2025-08-03 VITALS
HEART RATE: 60 BPM | RESPIRATION RATE: 17 BRPM | SYSTOLIC BLOOD PRESSURE: 121 MMHG | WEIGHT: 192.24 LBS | BODY MASS INDEX: 27.52 KG/M2 | OXYGEN SATURATION: 95 % | HEIGHT: 70 IN | DIASTOLIC BLOOD PRESSURE: 64 MMHG | TEMPERATURE: 96.4 F

## 2025-08-03 DIAGNOSIS — I48.20 CHRONIC ATRIAL FIBRILLATION (MULTI): ICD-10-CM

## 2025-08-03 LAB
ANION GAP SERPL CALC-SCNC: 12 MMOL/L (ref 10–20)
BASOPHILS # BLD AUTO: 0.04 X10*3/UL (ref 0–0.1)
BASOPHILS NFR BLD AUTO: 0.6 %
BUN SERPL-MCNC: 44 MG/DL (ref 6–23)
CALCIUM SERPL-MCNC: 8.9 MG/DL (ref 8.6–10.3)
CHLORIDE SERPL-SCNC: 102 MMOL/L (ref 98–107)
CO2 SERPL-SCNC: 26 MMOL/L (ref 21–32)
CREAT SERPL-MCNC: 1.41 MG/DL (ref 0.5–1.3)
EGFRCR SERPLBLD CKD-EPI 2021: 50 ML/MIN/1.73M*2
EOSINOPHIL # BLD AUTO: 0.16 X10*3/UL (ref 0–0.4)
EOSINOPHIL NFR BLD AUTO: 2.4 %
ERYTHROCYTE [DISTWIDTH] IN BLOOD BY AUTOMATED COUNT: 14.4 % (ref 11.5–14.5)
GLUCOSE SERPL-MCNC: 102 MG/DL (ref 74–99)
HCT VFR BLD AUTO: 37.9 % (ref 41–52)
HGB BLD-MCNC: 12 G/DL (ref 13.5–17.5)
IMM GRANULOCYTES # BLD AUTO: 0.05 X10*3/UL (ref 0–0.5)
IMM GRANULOCYTES NFR BLD AUTO: 0.7 % (ref 0–0.9)
LYMPHOCYTES # BLD AUTO: 0.77 X10*3/UL (ref 0.8–3)
LYMPHOCYTES NFR BLD AUTO: 11.5 %
MCH RBC QN AUTO: 28.8 PG (ref 26–34)
MCHC RBC AUTO-ENTMCNC: 31.7 G/DL (ref 32–36)
MCV RBC AUTO: 91 FL (ref 80–100)
MONOCYTES # BLD AUTO: 0.57 X10*3/UL (ref 0.05–0.8)
MONOCYTES NFR BLD AUTO: 8.5 %
NEUTROPHILS # BLD AUTO: 5.11 X10*3/UL (ref 1.6–5.5)
NEUTROPHILS NFR BLD AUTO: 76.3 %
NRBC BLD-RTO: 0 /100 WBCS (ref 0–0)
PLATELET # BLD AUTO: 291 X10*3/UL (ref 150–450)
POTASSIUM SERPL-SCNC: 4.2 MMOL/L (ref 3.5–5.3)
RBC # BLD AUTO: 4.16 X10*6/UL (ref 4.5–5.9)
SODIUM SERPL-SCNC: 136 MMOL/L (ref 136–145)
WBC # BLD AUTO: 6.7 X10*3/UL (ref 4.4–11.3)

## 2025-08-03 PROCEDURE — 74176 CT ABD & PELVIS W/O CONTRAST: CPT

## 2025-08-03 PROCEDURE — 1200000002 HC GENERAL ROOM WITH TELEMETRY DAILY

## 2025-08-03 PROCEDURE — 85025 COMPLETE CBC W/AUTO DIFF WBC: CPT | Performed by: INTERNAL MEDICINE

## 2025-08-03 PROCEDURE — 36415 COLL VENOUS BLD VENIPUNCTURE: CPT | Performed by: INTERNAL MEDICINE

## 2025-08-03 PROCEDURE — 2500000002 HC RX 250 W HCPCS SELF ADMINISTERED DRUGS (ALT 637 FOR MEDICARE OP, ALT 636 FOR OP/ED): Performed by: INTERNAL MEDICINE

## 2025-08-03 PROCEDURE — 2500000005 HC RX 250 GENERAL PHARMACY W/O HCPCS: Performed by: INTERNAL MEDICINE

## 2025-08-03 PROCEDURE — 99222 1ST HOSP IP/OBS MODERATE 55: CPT

## 2025-08-03 PROCEDURE — 2500000001 HC RX 250 WO HCPCS SELF ADMINISTERED DRUGS (ALT 637 FOR MEDICARE OP): Performed by: INTERNAL MEDICINE

## 2025-08-03 PROCEDURE — 82374 ASSAY BLOOD CARBON DIOXIDE: CPT | Performed by: INTERNAL MEDICINE

## 2025-08-03 PROCEDURE — 2500000004 HC RX 250 GENERAL PHARMACY W/ HCPCS (ALT 636 FOR OP/ED): Performed by: INTERNAL MEDICINE

## 2025-08-03 PROCEDURE — 74176 CT ABD & PELVIS W/O CONTRAST: CPT | Performed by: STUDENT IN AN ORGANIZED HEALTH CARE EDUCATION/TRAINING PROGRAM

## 2025-08-03 RX ADMIN — POLYETHYLENE GLYCOL 3350 17 G: 17 POWDER, FOR SOLUTION ORAL at 08:38

## 2025-08-03 RX ADMIN — GABAPENTIN 100 MG: 100 CAPSULE ORAL at 20:42

## 2025-08-03 RX ADMIN — VANCOMYCIN HYDROCHLORIDE 125 MG: 125 CAPSULE ORAL at 06:19

## 2025-08-03 RX ADMIN — SACUBITRIL AND VALSARTAN 1 TABLET: 24; 26 TABLET, FILM COATED ORAL at 20:42

## 2025-08-03 RX ADMIN — FERROUS SULFATE TAB 325 MG (65 MG ELEMENTAL FE) 1 TABLET: 325 (65 FE) TAB at 08:39

## 2025-08-03 RX ADMIN — FINASTERIDE 5 MG: 5 TABLET, FILM COATED ORAL at 08:39

## 2025-08-03 RX ADMIN — AMIODARONE HYDROCHLORIDE 100 MG: 200 TABLET ORAL at 08:39

## 2025-08-03 RX ADMIN — CEFDINIR 300 MG: 300 CAPSULE ORAL at 20:42

## 2025-08-03 RX ADMIN — VANCOMYCIN HYDROCHLORIDE 125 MG: 125 CAPSULE ORAL at 14:24

## 2025-08-03 RX ADMIN — CYANOCOBALAMIN TAB 500 MCG 500 MCG: 500 TAB at 08:39

## 2025-08-03 RX ADMIN — METOPROLOL SUCCINATE 25 MG: 25 TABLET, EXTENDED RELEASE ORAL at 08:39

## 2025-08-03 RX ADMIN — ERGOCALCIFEROL 1.25 MG: 1.25 CAPSULE ORAL at 08:38

## 2025-08-03 RX ADMIN — Medication 3 MG: at 20:42

## 2025-08-03 RX ADMIN — SACUBITRIL AND VALSARTAN 1 TABLET: 24; 26 TABLET, FILM COATED ORAL at 08:38

## 2025-08-03 RX ADMIN — APIXABAN 2.5 MG: 2.5 TABLET, FILM COATED ORAL at 20:41

## 2025-08-03 RX ADMIN — PAROXETINE HYDROCHLORIDE 20 MG: 20 TABLET, FILM COATED ORAL at 08:39

## 2025-08-03 RX ADMIN — FUROSEMIDE 20 MG: 20 TABLET ORAL at 08:39

## 2025-08-03 RX ADMIN — FENOFIBRATE 54 MG: 54 TABLET ORAL at 08:39

## 2025-08-03 RX ADMIN — TAMSULOSIN HYDROCHLORIDE 0.4 MG: 0.4 CAPSULE ORAL at 20:42

## 2025-08-03 RX ADMIN — APIXABAN 2.5 MG: 2.5 TABLET, FILM COATED ORAL at 08:39

## 2025-08-03 RX ADMIN — PANTOPRAZOLE SODIUM 40 MG: 40 TABLET, DELAYED RELEASE ORAL at 06:19

## 2025-08-03 RX ADMIN — CEFDINIR 300 MG: 300 CAPSULE ORAL at 08:38

## 2025-08-03 RX ADMIN — FOLIC ACID 1 MG: 1 TABLET ORAL at 08:39

## 2025-08-03 RX ADMIN — ACETAMINOPHEN 650 MG: 650 SOLUTION ORAL at 20:41

## 2025-08-03 RX ADMIN — VANCOMYCIN HYDROCHLORIDE 125 MG: 125 CAPSULE ORAL at 20:42

## 2025-08-03 RX ADMIN — ATORVASTATIN CALCIUM 20 MG: 20 TABLET, FILM COATED ORAL at 08:39

## 2025-08-03 RX ADMIN — VANCOMYCIN HYDROCHLORIDE 125 MG: 125 CAPSULE ORAL at 16:09

## 2025-08-03 ASSESSMENT — COGNITIVE AND FUNCTIONAL STATUS - GENERAL
DAILY ACTIVITIY SCORE: 7
MOBILITY SCORE: 8
PERSONAL GROOMING: TOTAL
STANDING UP FROM CHAIR USING ARMS: TOTAL
STANDING UP FROM CHAIR USING ARMS: TOTAL
DRESSING REGULAR LOWER BODY CLOTHING: TOTAL
MOBILITY SCORE: 8
WALKING IN HOSPITAL ROOM: TOTAL
DRESSING REGULAR LOWER BODY CLOTHING: TOTAL
PERSONAL GROOMING: TOTAL
HELP NEEDED FOR BATHING: TOTAL
PERSONAL GROOMING: TOTAL
MOVING FROM LYING ON BACK TO SITTING ON SIDE OF FLAT BED WITH BEDRAILS: A LOT
MOVING FROM LYING ON BACK TO SITTING ON SIDE OF FLAT BED WITH BEDRAILS: A LOT
MOVING TO AND FROM BED TO CHAIR: TOTAL
EATING MEALS: A LOT
TURNING FROM BACK TO SIDE WHILE IN FLAT BAD: A LOT
CLIMB 3 TO 5 STEPS WITH RAILING: TOTAL
STANDING UP FROM CHAIR USING ARMS: TOTAL
EATING MEALS: A LOT
DRESSING REGULAR LOWER BODY CLOTHING: TOTAL
DRESSING REGULAR LOWER BODY CLOTHING: TOTAL
CLIMB 3 TO 5 STEPS WITH RAILING: TOTAL
MOVING TO AND FROM BED TO CHAIR: TOTAL
DAILY ACTIVITIY SCORE: 7
HELP NEEDED FOR BATHING: TOTAL
TURNING FROM BACK TO SIDE WHILE IN FLAT BAD: A LOT
MOVING TO AND FROM BED TO CHAIR: TOTAL
HELP NEEDED FOR BATHING: TOTAL
EATING MEALS: A LOT
CLIMB 3 TO 5 STEPS WITH RAILING: TOTAL
MOBILITY SCORE: 8
WALKING IN HOSPITAL ROOM: TOTAL
TOILETING: TOTAL
DRESSING REGULAR LOWER BODY CLOTHING: TOTAL
TURNING FROM BACK TO SIDE WHILE IN FLAT BAD: TOTAL
PERSONAL GROOMING: TOTAL
MOVING TO AND FROM BED TO CHAIR: TOTAL
DAILY ACTIVITIY SCORE: 9
MOVING FROM LYING ON BACK TO SITTING ON SIDE OF FLAT BED WITH BEDRAILS: A LOT
EATING MEALS: A LOT
MOBILITY SCORE: 8
TOILETING: TOTAL
STANDING UP FROM CHAIR USING ARMS: TOTAL
HELP NEEDED FOR BATHING: TOTAL
DAILY ACTIVITIY SCORE: 7
CLIMB 3 TO 5 STEPS WITH RAILING: TOTAL
MOVING FROM LYING ON BACK TO SITTING ON SIDE OF FLAT BED WITH BEDRAILS: A LOT
MOVING TO AND FROM BED TO CHAIR: TOTAL
CLIMB 3 TO 5 STEPS WITH RAILING: TOTAL
PERSONAL GROOMING: A LOT
TURNING FROM BACK TO SIDE WHILE IN FLAT BAD: A LOT
MOBILITY SCORE: 7
MOBILITY SCORE: 7
CLIMB 3 TO 5 STEPS WITH RAILING: TOTAL
STANDING UP FROM CHAIR USING ARMS: TOTAL
TURNING FROM BACK TO SIDE WHILE IN FLAT BAD: A LOT
DRESSING REGULAR UPPER BODY CLOTHING: TOTAL
STANDING UP FROM CHAIR USING ARMS: TOTAL
DRESSING REGULAR UPPER BODY CLOTHING: TOTAL
TOILETING: TOTAL
TOILETING: TOTAL
DRESSING REGULAR UPPER BODY CLOTHING: TOTAL
DAILY ACTIVITIY SCORE: 7
MOVING FROM LYING ON BACK TO SITTING ON SIDE OF FLAT BED WITH BEDRAILS: A LOT
WALKING IN HOSPITAL ROOM: TOTAL
DRESSING REGULAR UPPER BODY CLOTHING: TOTAL
TOILETING: TOTAL
MOVING TO AND FROM BED TO CHAIR: TOTAL
TURNING FROM BACK TO SIDE WHILE IN FLAT BAD: TOTAL
HELP NEEDED FOR BATHING: TOTAL
WALKING IN HOSPITAL ROOM: TOTAL
DRESSING REGULAR UPPER BODY CLOTHING: TOTAL
EATING MEALS: A LITTLE
MOVING FROM LYING ON BACK TO SITTING ON SIDE OF FLAT BED WITH BEDRAILS: A LOT

## 2025-08-03 ASSESSMENT — PAIN SCALES - GENERAL
PAINLEVEL_OUTOF10: 0 - NO PAIN
PAINLEVEL_OUTOF10: 5 - MODERATE PAIN

## 2025-08-03 ASSESSMENT — PAIN - FUNCTIONAL ASSESSMENT
PAIN_FUNCTIONAL_ASSESSMENT: 0-10
PAIN_FUNCTIONAL_ASSESSMENT: 0-10

## 2025-08-03 NOTE — CARE PLAN
The patient's goals for the shift include      The clinical goals for the shift include Patient will remain hemodynamically stable.    Problem: Pain - Adult  Goal: Verbalizes/displays adequate comfort level or baseline comfort level  Outcome: Progressing     Problem: Safety - Adult  Goal: Free from fall injury  Outcome: Progressing     Problem: Skin  Goal: Prevent/manage excess moisture  Outcome: Progressing  Flowsheets (Taken 8/3/2025 0106)  Prevent/manage excess moisture: Cleanse incontinence/protect with barrier cream

## 2025-08-03 NOTE — PROGRESS NOTES
INTERNAL MEDICINE PROGRESS NOTE      HPI:    Patient appears more alert and interactive.  He responds to questions.  Upon further questioning it appears that stent was not placed at [place].    Vital signs in last 24 hours:  Temp:  [35.9 °C (96.7 °F)-36.7 °C (98.1 °F)] 36.5 °C (97.7 °F)  Heart Rate:  [60-69] 60  Resp:  [18] 18  BP: (101-138)/(45-65) 116/63    Physical Examination:  Physical Exam    Constitutional:       Appearance: Elderly, overweight, in no distress.  HENT:      Head: Normocephalic and atraumatic.   Eyes:      Extraocular Movements: Extraocular movements intact.      Pupils: Pupils are equal, round, and reactive to light.   Cardiovascular:      Rate and Rhythm: Normal rate and regular rhythm.      Pulses: Normal pulses.      Heart sounds: Normal heart sounds.   Pulmonary:      Effort: Pulmonary effort is normal.      Breath sounds: Diminished bases, few Rales, no wheeze.  Abdominal:      General: Abdomen is flat. Bowel sounds are normal.      Palpations: Abdomen is soft.   Musculoskeletal:         General: Normal range of motion.      Cervical back: Normal range of motion and neck supple.   Skin:     General: Skin is warm and dry.   Neurological:      General: No focal deficit present.      Mental Status: Alert and oriented x 3, moves all extremities.  Responds to questions.    Medications:  Per MAR    Laboratory Findings:  Lab Results   Component Value Date    WBC 6.7 08/03/2025    HGB 12.0 (L) 08/03/2025    HCT 37.9 (L) 08/03/2025    MCV 91 08/03/2025     08/03/2025     Lab Results   Component Value Date    INR 1.2 01/30/2025    INR 2.00 10/09/2024    INR 2.30 09/26/2024    PROTIME 12.8 (H) 01/30/2025    PROTIME 22.8 (H) 08/13/2024    PROTIME 21.2 (H) 08/12/2024     Lab Results   Component Value Date    GLUCOSE 102 (H) 08/03/2025    CALCIUM 8.9 08/03/2025     08/03/2025    K 4.2 08/03/2025    CO2 26 08/03/2025     08/03/2025    BUN 44 (H) 08/03/2025    CREATININE  1.41 (H) 08/03/2025       Assessment and Plan:     UTI -likely secondary to renal calculi, continue antibiotics.  C. difficile colitis -reports decreased stooling, continue with oral vancomycin.  Weakness -PT and OT consulted, fall precautions.  Pneumonia -community-acquired, monitor saturations, on oral antibiotics.  ID consulted.  Obstructing calculus -stent not placed, will consult urology.       Florencio Little MD  08/03/25  10:59 AM

## 2025-08-03 NOTE — CARE PLAN
The patient's goals for the shift include      The clinical goals for the shift include Patient will remain hemodynamically stable.  Problem: Pain - Adult  Goal: Verbalizes/displays adequate comfort level or baseline comfort level  Outcome: Progressing     Problem: Safety - Adult  Goal: Free from fall injury  Outcome: Progressing     Problem: Discharge Planning  Goal: Discharge to home or other facility with appropriate resources  Outcome: Progressing     Problem: Chronic Conditions and Co-morbidities  Goal: Patient's chronic conditions and co-morbidity symptoms are monitored and maintained or improved  Outcome: Progressing     Problem: Nutrition  Goal: Nutrient intake appropriate for maintaining nutritional needs  Outcome: Progressing     Problem: Skin  Goal: Decreased wound size/increased tissue granulation at next dressing change  Outcome: Progressing  Goal: Participates in plan/prevention/treatment measures  Outcome: Progressing  Goal: Prevent/manage excess moisture  Outcome: Progressing  Goal: Prevent/minimize sheer/friction injuries  Outcome: Progressing  Goal: Promote/optimize nutrition  Outcome: Progressing  Goal: Promote skin healing  Outcome: Progressing     Problem: Fall/Injury  Goal: Not fall by end of shift  Outcome: Progressing  Goal: Be free from injury by end of the shift  Outcome: Progressing  Goal: Verbalize understanding of personal risk factors for fall in the hospital  Outcome: Progressing  Goal: Verbalize understanding of risk factor reduction measures to prevent injury from fall in the home  Outcome: Progressing  Goal: Use assistive devices by end of the shift  Outcome: Progressing  Goal: Pace activities to prevent fatigue by end of the shift  Outcome: Progressing

## 2025-08-04 LAB
ANION GAP SERPL CALC-SCNC: 11 MMOL/L (ref 10–20)
BASOPHILS # BLD AUTO: 0.05 X10*3/UL (ref 0–0.1)
BASOPHILS NFR BLD AUTO: 0.8 %
BUN SERPL-MCNC: 46 MG/DL (ref 6–23)
CALCIUM SERPL-MCNC: 8.8 MG/DL (ref 8.6–10.3)
CHLORIDE SERPL-SCNC: 102 MMOL/L (ref 98–107)
CO2 SERPL-SCNC: 28 MMOL/L (ref 21–32)
CREAT SERPL-MCNC: 1.44 MG/DL (ref 0.5–1.3)
EGFRCR SERPLBLD CKD-EPI 2021: 49 ML/MIN/1.73M*2
EOSINOPHIL # BLD AUTO: 0.19 X10*3/UL (ref 0–0.4)
EOSINOPHIL NFR BLD AUTO: 3 %
ERYTHROCYTE [DISTWIDTH] IN BLOOD BY AUTOMATED COUNT: 14.3 % (ref 11.5–14.5)
GLUCOSE SERPL-MCNC: 104 MG/DL (ref 74–99)
HCT VFR BLD AUTO: 37.6 % (ref 41–52)
HGB BLD-MCNC: 11.9 G/DL (ref 13.5–17.5)
IMM GRANULOCYTES # BLD AUTO: 0.07 X10*3/UL (ref 0–0.5)
IMM GRANULOCYTES NFR BLD AUTO: 1.1 % (ref 0–0.9)
LYMPHOCYTES # BLD AUTO: 0.83 X10*3/UL (ref 0.8–3)
LYMPHOCYTES NFR BLD AUTO: 13.2 %
MCH RBC QN AUTO: 29.5 PG (ref 26–34)
MCHC RBC AUTO-ENTMCNC: 31.6 G/DL (ref 32–36)
MCV RBC AUTO: 93 FL (ref 80–100)
MONOCYTES # BLD AUTO: 0.55 X10*3/UL (ref 0.05–0.8)
MONOCYTES NFR BLD AUTO: 8.8 %
NEUTROPHILS # BLD AUTO: 4.58 X10*3/UL (ref 1.6–5.5)
NEUTROPHILS NFR BLD AUTO: 73.1 %
NRBC BLD-RTO: 0 /100 WBCS (ref 0–0)
PLATELET # BLD AUTO: 286 X10*3/UL (ref 150–450)
POTASSIUM SERPL-SCNC: 4 MMOL/L (ref 3.5–5.3)
RBC # BLD AUTO: 4.03 X10*6/UL (ref 4.5–5.9)
SODIUM SERPL-SCNC: 137 MMOL/L (ref 136–145)
WBC # BLD AUTO: 6.3 X10*3/UL (ref 4.4–11.3)

## 2025-08-04 PROCEDURE — 85025 COMPLETE CBC W/AUTO DIFF WBC: CPT | Performed by: INTERNAL MEDICINE

## 2025-08-04 PROCEDURE — 99222 1ST HOSP IP/OBS MODERATE 55: CPT | Performed by: INTERNAL MEDICINE

## 2025-08-04 PROCEDURE — 2500000001 HC RX 250 WO HCPCS SELF ADMINISTERED DRUGS (ALT 637 FOR MEDICARE OP): Performed by: INTERNAL MEDICINE

## 2025-08-04 PROCEDURE — 97530 THERAPEUTIC ACTIVITIES: CPT | Mod: GP,CQ | Performed by: PHYSICAL THERAPY ASSISTANT

## 2025-08-04 PROCEDURE — 1200000002 HC GENERAL ROOM WITH TELEMETRY DAILY

## 2025-08-04 PROCEDURE — 2500000002 HC RX 250 W HCPCS SELF ADMINISTERED DRUGS (ALT 637 FOR MEDICARE OP, ALT 636 FOR OP/ED): Performed by: INTERNAL MEDICINE

## 2025-08-04 PROCEDURE — 97129 THER IVNTJ 1ST 15 MIN: CPT | Mod: GO

## 2025-08-04 PROCEDURE — 80048 BASIC METABOLIC PNL TOTAL CA: CPT | Performed by: INTERNAL MEDICINE

## 2025-08-04 PROCEDURE — 97110 THERAPEUTIC EXERCISES: CPT | Mod: GO

## 2025-08-04 PROCEDURE — 36415 COLL VENOUS BLD VENIPUNCTURE: CPT | Performed by: INTERNAL MEDICINE

## 2025-08-04 PROCEDURE — 2500000004 HC RX 250 GENERAL PHARMACY W/ HCPCS (ALT 636 FOR OP/ED): Performed by: INTERNAL MEDICINE

## 2025-08-04 RX ORDER — METOPROLOL SUCCINATE 25 MG/1
TABLET, EXTENDED RELEASE ORAL
Qty: 90 TABLET | Refills: 1 | Status: SHIPPED | OUTPATIENT
Start: 2025-08-04

## 2025-08-04 RX ORDER — FLUCONAZOLE 100 MG/1
400 TABLET ORAL DAILY
Status: DISCONTINUED | OUTPATIENT
Start: 2025-08-04 | End: 2025-08-04

## 2025-08-04 RX ADMIN — FENOFIBRATE 54 MG: 54 TABLET ORAL at 08:30

## 2025-08-04 RX ADMIN — AMIODARONE HYDROCHLORIDE 100 MG: 200 TABLET ORAL at 08:30

## 2025-08-04 RX ADMIN — PANTOPRAZOLE SODIUM 40 MG: 40 TABLET, DELAYED RELEASE ORAL at 06:22

## 2025-08-04 RX ADMIN — FOLIC ACID 1 MG: 1 TABLET ORAL at 08:30

## 2025-08-04 RX ADMIN — GABAPENTIN 100 MG: 100 CAPSULE ORAL at 20:37

## 2025-08-04 RX ADMIN — CYANOCOBALAMIN TAB 500 MCG 500 MCG: 500 TAB at 08:30

## 2025-08-04 RX ADMIN — VANCOMYCIN HYDROCHLORIDE 125 MG: 125 CAPSULE ORAL at 20:37

## 2025-08-04 RX ADMIN — CEFDINIR 300 MG: 300 CAPSULE ORAL at 08:29

## 2025-08-04 RX ADMIN — ATORVASTATIN CALCIUM 20 MG: 20 TABLET, FILM COATED ORAL at 08:29

## 2025-08-04 RX ADMIN — VANCOMYCIN HYDROCHLORIDE 125 MG: 125 CAPSULE ORAL at 17:06

## 2025-08-04 RX ADMIN — VANCOMYCIN HYDROCHLORIDE 125 MG: 125 CAPSULE ORAL at 06:22

## 2025-08-04 RX ADMIN — APIXABAN 2.5 MG: 2.5 TABLET, FILM COATED ORAL at 20:37

## 2025-08-04 RX ADMIN — VANCOMYCIN HYDROCHLORIDE 125 MG: 125 CAPSULE ORAL at 12:51

## 2025-08-04 RX ADMIN — SACUBITRIL AND VALSARTAN 1 TABLET: 24; 26 TABLET, FILM COATED ORAL at 08:29

## 2025-08-04 RX ADMIN — APIXABAN 2.5 MG: 2.5 TABLET, FILM COATED ORAL at 08:29

## 2025-08-04 RX ADMIN — CEFDINIR 300 MG: 300 CAPSULE ORAL at 20:37

## 2025-08-04 RX ADMIN — METOPROLOL SUCCINATE 25 MG: 25 TABLET, EXTENDED RELEASE ORAL at 08:30

## 2025-08-04 RX ADMIN — FUROSEMIDE 20 MG: 20 TABLET ORAL at 08:29

## 2025-08-04 RX ADMIN — FERROUS SULFATE TAB 325 MG (65 MG ELEMENTAL FE) 1 TABLET: 325 (65 FE) TAB at 08:30

## 2025-08-04 RX ADMIN — POLYETHYLENE GLYCOL 3350 17 G: 17 POWDER, FOR SOLUTION ORAL at 08:29

## 2025-08-04 RX ADMIN — PAROXETINE HYDROCHLORIDE 20 MG: 20 TABLET, FILM COATED ORAL at 08:29

## 2025-08-04 RX ADMIN — SACUBITRIL AND VALSARTAN 1 TABLET: 24; 26 TABLET, FILM COATED ORAL at 20:37

## 2025-08-04 RX ADMIN — FINASTERIDE 5 MG: 5 TABLET, FILM COATED ORAL at 08:29

## 2025-08-04 RX ADMIN — TAMSULOSIN HYDROCHLORIDE 0.4 MG: 0.4 CAPSULE ORAL at 20:37

## 2025-08-04 ASSESSMENT — COGNITIVE AND FUNCTIONAL STATUS - GENERAL
DAILY ACTIVITIY SCORE: 12
MOVING TO AND FROM BED TO CHAIR: A LOT
DRESSING REGULAR LOWER BODY CLOTHING: TOTAL
DRESSING REGULAR UPPER BODY CLOTHING: A LOT
WALKING IN HOSPITAL ROOM: TOTAL
MOVING FROM LYING ON BACK TO SITTING ON SIDE OF FLAT BED WITH BEDRAILS: A LOT
EATING MEALS: A LOT
PERSONAL GROOMING: A LOT
TURNING FROM BACK TO SIDE WHILE IN FLAT BAD: A LOT
STANDING UP FROM CHAIR USING ARMS: TOTAL
WALKING IN HOSPITAL ROOM: TOTAL
MOBILITY SCORE: 7
TURNING FROM BACK TO SIDE WHILE IN FLAT BAD: TOTAL
DAILY ACTIVITIY SCORE: 10
CLIMB 3 TO 5 STEPS WITH RAILING: TOTAL
STANDING UP FROM CHAIR USING ARMS: TOTAL
TOILETING: TOTAL
TOILETING: TOTAL
HELP NEEDED FOR BATHING: A LOT
DRESSING REGULAR UPPER BODY CLOTHING: A LOT
MOVING FROM LYING ON BACK TO SITTING ON SIDE OF FLAT BED WITH BEDRAILS: A LOT
HELP NEEDED FOR BATHING: A LOT
EATING MEALS: A LITTLE
MOVING TO AND FROM BED TO CHAIR: TOTAL
CLIMB 3 TO 5 STEPS WITH RAILING: TOTAL
MOBILITY SCORE: 9
PERSONAL GROOMING: A LOT
DRESSING REGULAR LOWER BODY CLOTHING: A LOT

## 2025-08-04 ASSESSMENT — PAIN - FUNCTIONAL ASSESSMENT
PAIN_FUNCTIONAL_ASSESSMENT: 0-10

## 2025-08-04 ASSESSMENT — PAIN SCALES - GENERAL
PAINLEVEL_OUTOF10: 0 - NO PAIN

## 2025-08-04 NOTE — CONSULTS
"CONSULT: NEPHROLOGY SERVICE    REASON FOR CONSULT: CKD  Admit Date: 8/1/2025 11:45 AM       HPI: Patient is a 80 y.o. male admitted with h/o CKD stage 3a, nephrolithiasis, BPH. Coming [facility], because right ureteral stone, had cysto 7/29 but stent unable to be placed. On IV CTX for UTI and was having diarrhea. He states having low back pain for some time, more towards the right.   CT yesterday with Stable position of obstructing right distal ureteral stone  (approximately 1 cm from bladder of ~0.7 mm) with persistent moderate right  hydroureteronephrosis since 07/14/2025.    Scr stable ~baseline, under routine care of Dr Juarez, recent normal vit D and PTH, no h/o hyperCa, he states tries to drink plenty of fluids but not measuring     Medical History[1]  Allergies: Other, Ranolazine, Nsaids (non-steroidal anti-inflammatory drug), and Cyclobenzaprine     Surgical History[2]    Family History[3]    Social History  He reports that he quit smoking about 31 years ago. His smoking use included cigarettes. He has never used smokeless tobacco. He reports that he does not drink alcohol and does not use drugs.    Review of Systems  As above     CURRENT HOSP MEDS:  Current Medications[4]     PHYSICAL EXAM:  /60   Pulse 61   Temp 36.6 °C (97.8 °F) (Temporal)   Resp 18   Ht 1.778 m (5' 10\")   Wt 87.2 kg (192 lb 3.9 oz)   SpO2 94%   BMI 27.58 kg/m²     Intake/Output Summary (Last 24 hours) at 8/4/2025 1324  Last data filed at 8/4/2025 0607  Gross per 24 hour   Intake 220 ml   Output 900 ml   Net -680 ml     Gen: AAO, NAD  Neck: No JVD  Cardiac: RRR  Resp: clear BS  Abd: Soft, non tender, +BS, non distended   Ext: No edema   Neuro: moves 4 ext  Peripheral Pulses: Capillary refill <2secs, strong peripheral pulses.  Skin: Skin color, texture, turgor normal, no suspicious rashes or lesions.    LABS:   Results for orders placed or performed during the hospital encounter of 08/01/25 (from the past 24 " hours)   CBC and Auto Differential   Result Value Ref Range    WBC 6.3 4.4 - 11.3 x10*3/uL    nRBC 0.0 0.0 - 0.0 /100 WBCs    RBC 4.03 (L) 4.50 - 5.90 x10*6/uL    Hemoglobin 11.9 (L) 13.5 - 17.5 g/dL    Hematocrit 37.6 (L) 41.0 - 52.0 %    MCV 93 80 - 100 fL    MCH 29.5 26.0 - 34.0 pg    MCHC 31.6 (L) 32.0 - 36.0 g/dL    RDW 14.3 11.5 - 14.5 %    Platelets 286 150 - 450 x10*3/uL    Neutrophils % 73.1 40.0 - 80.0 %    Immature Granulocytes %, Automated 1.1 (H) 0.0 - 0.9 %    Lymphocytes % 13.2 13.0 - 44.0 %    Monocytes % 8.8 2.0 - 10.0 %    Eosinophils % 3.0 0.0 - 6.0 %    Basophils % 0.8 0.0 - 2.0 %    Neutrophils Absolute 4.58 1.60 - 5.50 x10*3/uL    Immature Granulocytes Absolute, Automated 0.07 0.00 - 0.50 x10*3/uL    Lymphocytes Absolute 0.83 0.80 - 3.00 x10*3/uL    Monocytes Absolute 0.55 0.05 - 0.80 x10*3/uL    Eosinophils Absolute 0.19 0.00 - 0.40 x10*3/uL    Basophils Absolute 0.05 0.00 - 0.10 x10*3/uL   Basic Metabolic Panel   Result Value Ref Range    Glucose 104 (H) 74 - 99 mg/dL    Sodium 137 136 - 145 mmol/L    Potassium 4.0 3.5 - 5.3 mmol/L    Chloride 102 98 - 107 mmol/L    Bicarbonate 28 21 - 32 mmol/L    Anion Gap 11 10 - 20 mmol/L    Urea Nitrogen 46 (H) 6 - 23 mg/dL    Creatinine 1.44 (H) 0.50 - 1.30 mg/dL    eGFR 49 (L) >60 mL/min/1.73m*2    Calcium 8.8 8.6 - 10.3 mg/dL     *Note: Due to a large number of results and/or encounters for the requested time period, some results have not been displayed. A complete set of results can be found in Results Review.       DATA:   Diagnostic tests reviewed for today's visit:    Labs and meds    ASSESSMENT AND PLAN:  - CKD stage 3b (baseline Scr ~1.4-1.6): stable Scr despite R hydronephrosis, will need stone removal  BP: controlled  Lytes and acid base: acceptable    PLAN:  - supportive care, urology to see him to offer a treatment plan  - when stable, will need metabolic work up, 24h urine study, will communicate with his nephrologist DR Juarez     Greatly  appreciate the opportunity to assist in the care of this patient. Will continue to follow.     Signature: Andrew Warner MD  Division of Nephrology and Hypertension        [1]   Past Medical History:  Diagnosis Date    Atherosclerotic heart disease of native coronary artery without angina pectoris 12/14/2022    Arteriosclerotic cardiovascular disease (ASCVD)    Benign neoplasm of meninges, unspecified     Meningioma    BPH (benign prostatic hyperplasia)     Chronic diastolic heart failure     Chronic kidney disease     Chronic obstructive pulmonary disease (COPD) (Multi)     Chronic respiratory failure     Elevated blood-pressure reading, without diagnosis of hypertension     Prehypertension    Hyperlipidemia     Meningioma (Multi)     Nephrolithiasis     Rectal bleeding     Sick sinus syndrome (Multi)     s/p pacemaker placement    Vitamin D deficiency    [2]   Past Surgical History:  Procedure Laterality Date    BACK SURGERY  04/18/2016    Back Surgery    BRAIN SURGERY      COLONOSCOPY  07/03/2013    Complete Colonoscopy    CORONARY ANGIOPLASTY WITH STENT PLACEMENT  08/18/2020    Cath Stent Placement    CT ANGIO NECK  09/25/2021    CT NECK ANGIO W AND WO IV CONTRAST 9/25/2021 [facility]    CT HEAD ANGIO W AND WO IV CONTRAST  09/25/2021    CT HEAD ANGIO W AND WO IV CONTRAST 9/25/2021 [facility]    CYSTOSCOPY W/ URETERAL STENT PLACEMENT      EYE SURGERY  04/03/2013    Eye Surgery    KNEE ARTHROSCOPY W/ DEBRIDEMENT  07/03/2013    Arthroscopy Knee Right    KNEE ARTHROSCOPY W/ DEBRIDEMENT  07/03/2013    Arthroscopy Knee Left    LITHOTRIPSY  07/22/2013    Renal Lithotripsy    MR HEAD ANGIO WO IV CONTRAST  03/23/2021    MR HEAD ANGIO WO IV CONTRAST [facility]    MR NECK ANGIO WO IV CONTRAST  03/23/2021    MR NECK ANGIO WO IV CONTRAST [facility]    OTHER SURGICAL HISTORY  04/03/2013    Cardiac Cath Procedure Outcome: Successful    OTHER SURGICAL HISTORY  04/03/2013    Neuroplasty  With Transposition Of Ulnar Nerve - At Elbow    OTHER SURGICAL HISTORY  07/03/2013    Wrist Carpectomy    OTHER SURGICAL HISTORY  07/03/2013    Ulnar Osteotomy    PACEMAKER PLACEMENT      TONSILLECTOMY      TOTAL KNEE ARTHROPLASTY     [3]   Family History  Problem Relation Name Age of Onset    Diabetes Mother      Emphysema Father      Heart disease Father     [4]   Current Facility-Administered Medications:     acetaminophen (Tylenol) tablet 650 mg, 650 mg, oral, q4h PRN **OR** acetaminophen (Tylenol) oral liquid 650 mg, 650 mg, oral, q4h PRN, 650 mg at 08/03/25 2041 **OR** acetaminophen (Tylenol) suppository 650 mg, 650 mg, rectal, q4h PRN, Florencio Little MD    acetaminophen (Tylenol) tablet 650 mg, 650 mg, oral, q6h PRN, Florencio Little MD, 650 mg at 08/01/25 2143    amiodarone (Pacerone) tablet 100 mg, 100 mg, oral, Daily, Florencio Little MD, 100 mg at 08/04/25 0830    apixaban (Eliquis) tablet 2.5 mg, 2.5 mg, oral, BID, Florencio Little MD, 2.5 mg at 08/04/25 0829    atorvastatin (Lipitor) tablet 20 mg, 20 mg, oral, Daily, Florencoi Little MD, 20 mg at 08/04/25 0829    cefdinir (Omnicef) capsule 300 mg, 300 mg, oral, BID, Florencio Little MD, 300 mg at 08/04/25 0829    cyanocobalamin (Vitamin B-12) tablet 500 mcg, 500 mcg, oral, Daily, Florencio Little MD, 500 mcg at 08/04/25 0830    ergocalciferol (Vitamin D-2) capsule 1.25 mg, 1.25 mg, oral, Every Sunday, Florencio Little MD, 1.25 mg at 08/03/25 0838    fenofibrate (Tricor) tablet 54 mg, 54 mg, oral, Daily, Florencio Little MD, 54 mg at 08/04/25 0830    ferrous sulfate 325 mg (65 mg elemental) tablet 1 tablet, 1 tablet, oral, Daily with breakfast, Florencio Little MD, 1 tablet at 08/04/25 0830    finasteride (Proscar) tablet 5 mg, 5 mg, oral, Daily, Florencio Little MD, 5 mg at 08/04/25 0829    folic acid (Folvite) tablet 1 mg, 1 mg, oral, Daily, Florencio Little MD, 1 mg at 08/04/25 0830    furosemide (Lasix) tablet 20 mg, 20 mg, oral, Daily,  Florencio Little MD, 20 mg at 08/04/25 0829    gabapentin (Neurontin) capsule 100 mg, 100 mg, oral, Nightly, Florencio Little MD, 100 mg at 08/03/25 2042    magnesium hydroxide (Milk of Magnesia) 400 mg/5 mL suspension 30 mL, 30 mL, oral, Daily PRN, Florencio Little MD    melatonin tablet 3 mg, 3 mg, oral, Nightly PRN, Florencio Little MD, 3 mg at 08/03/25 2042    metoprolol succinate XL (Toprol-XL) 24 hr tablet 25 mg, 25 mg, oral, Daily, Florencio Little MD, 25 mg at 08/04/25 0830    oxygen (O2) therapy, , inhalation, Continuous, Florencio Little MD, Stopped at 08/01/25 1756    pantoprazole (ProtoNix) EC tablet 40 mg, 40 mg, oral, Daily before breakfast, Florencio Little MD, 40 mg at 08/04/25 0622    PARoxetine (Paxil) tablet 20 mg, 20 mg, oral, Daily, Florencio Little MD, 20 mg at 08/04/25 0829    polyethylene glycol (Glycolax, Miralax) packet 17 g, 17 g, oral, Daily, Florencio Little MD, 17 g at 08/04/25 0829    [Held by provider] psyllium (Metamucil) packet 1 packet, 1 packet, oral, BID, Florencio Little MD    sacubitriL-valsartan (Entresto) 24-26 mg per tablet 1 tablet, 1 tablet, oral, BID, Florencio Little MD, 1 tablet at 08/04/25 0829    tamsulosin (Flomax) 24 hr capsule 0.4 mg, 0.4 mg, oral, Nightly, Florencio Little MD, 0.4 mg at 08/03/25 2042    vancomycin (Vancocin) capsule 125 mg, 125 mg, oral, 4x daily, Florencio Little MD, 125 mg at 08/04/25 1251

## 2025-08-04 NOTE — PROGRESS NOTES
08/04/25 1358   Discharge Planning   Who is requesting discharge planning? Provider   Home or Post Acute Services Post acute facilities (Rehab/SNF/etc)   Type of Post Acute Facility Services Skilled nursing   Expected Discharge Disposition SNF   Does the patient need discharge transport arranged? Yes   [facility] coordination needed? Yes   Has discharge transport been arranged? No   Intensity of Service   Intensity of Service 0-30 min     8/4/25 1358  Renal consulted for elevated creatinine.  Waiting on urology recs.  Already scheduled for lithotripsy with Dr Newsome on 8/8/25.  Patient can return to [facility] when medically ready.  No auth needed.  Jackelyn Pride RN [facility]

## 2025-08-04 NOTE — DOCUMENTATION CLARIFICATION NOTE
"    PATIENT:               MICHEL COLEY  ACCT #:                  [id number]  MRN:                       [id number]  :                       1945  ADMIT DATE:       2025 11:45 AM  DISCH DATE:  RESPONDING PROVIDER #:        [id number]          PROVIDER RESPONSE TEXT:    Agree with Wound Care consult, stage 3 pressure injuries to buttocks.    CDI QUERY TEXT:    Clarification        Instruction:    Based on your assessment of the patient and the clinical information, please provide the requested documentation by clicking on the appropriate radio button and enter any additional information if prompted.    Question: Is there a diagnosis for the patient's skin integrity    When answering this query, please exercise your independent professional judgment. The fact that a question is being asked, does not imply that any particular answer is desired or expected.    The patient's clinical indicators include:  Clinical Information: 80 yr old man transferred from [place]. Diagnoses include ureteral calculus, UTI, pneumonia, and cdiff colitis. PMH includes CAD, CHF, and COPD.    Clinical Indicators:   Wound Care Consult: ELMER Bryan  \"Gluteal cleft & buttocks-MASD/Irritant contact dermatitis due to fecal, urinary or dual incontinent with scattered full thickness stage 3 PI\"    Treatment:  -Wound care: Cleanse with soap/water, apply Triad Hydrophilic ointment, cover with Mepilex  -Reposition every 2 hours  -Constant air pressure/foam mattress    Risk Factors: age, CAD, COPD, cdiff  Options provided:  -- Agree with Wound Care consult, stage 3 pressure injuries to buttocks.  -- Other - I will add my own diagnosis  -- Refer to Clinical Documentation Reviewer    Query created by: Phoebe Roach on 2025 12:31 PM      Electronically signed by:  ELDA GUSMAN MD 2025 2:04 PM          "

## 2025-08-04 NOTE — PROGRESS NOTES
INTERNAL MEDICINE PROGRESS NOTE      HPI:    Patient appears comfortable.  He is tolerating a diet and antibiotics.  He denies abdominal pain.    Vital signs in last 24 hours:  Temp:  [35.8 °C (96.4 °F)-36.7 °C (98.1 °F)] 36.7 °C (98.1 °F)  Heart Rate:  [58-60] 58  Resp:  [16-24] 19  BP: (121-143)/(62-67) 131/67    Physical Examination:  Physical Exam    Constitutional:       Appearance: Elderly, overweight, in no distress.  HENT:      Head: Normocephalic and atraumatic.   Eyes:      Extraocular Movements: Extraocular movements intact.      Pupils: Pupils are equal, round, and reactive to light.   Cardiovascular:      Rate and Rhythm: Normal rate and regular rhythm.      Pulses: Normal pulses.      Heart sounds: Normal heart sounds.   Pulmonary:      Effort: Pulmonary effort is normal.      Breath sounds: Diminished bases, few Rales, no wheeze.  Abdominal:      General: Abdomen is flat. Bowel sounds are normal.      Palpations: Abdomen is soft.   Musculoskeletal:         General: Normal range of motion.      Cervical back: Normal range of motion and neck supple.   Skin:     General: Skin is warm and dry.   Neurological:      General: No focal deficit present.      Mental Status: Alert and oriented x 3, moves all extremities.  Responds to questions.    Medications:  Per MAR    Laboratory Findings:  Lab Results   Component Value Date    WBC 6.3 08/04/2025    HGB 11.9 (L) 08/04/2025    HCT 37.6 (L) 08/04/2025    MCV 93 08/04/2025     08/04/2025     Lab Results   Component Value Date    INR 1.2 01/30/2025    INR 2.00 10/09/2024    INR 2.30 09/26/2024    PROTIME 12.8 (H) 01/30/2025    PROTIME 22.8 (H) 08/13/2024    PROTIME 21.2 (H) 08/12/2024     Lab Results   Component Value Date    GLUCOSE 104 (H) 08/04/2025    CALCIUM 8.8 08/04/2025     08/04/2025    K 4.0 08/04/2025    CO2 28 08/04/2025     08/04/2025    BUN 46 (H) 08/04/2025    CREATININE 1.44 (H) 08/04/2025       Assessment and Plan:      UTI -continue with antibiotics as ordered  C. difficile colitis -good response to vancomycin  Weakness -continue with therapy  Pneumonia -on room air, antibiotics as ordered.  Obstructing calculus -CT scan noted.  Urology following.       Florencio Little MD  08/04/25  11:37 AM

## 2025-08-04 NOTE — CARE PLAN
The patient's goals for the shift include  safety    The clinical goals for the shift include safety, HDS    Over the shift, the patient did make progress toward the following goals.     Problem: Pain - Adult  Goal: Verbalizes/displays adequate comfort level or baseline comfort level  Outcome: Progressing     Problem: Safety - Adult  Goal: Free from fall injury  Outcome: Progressing     Problem: Chronic Conditions and Co-morbidities  Goal: Patient's chronic conditions and co-morbidity symptoms are monitored and maintained or improved  Outcome: Progressing     Problem: Nutrition  Goal: Nutrient intake appropriate for maintaining nutritional needs  Outcome: Progressing     Problem: Skin  Goal: Decreased wound size/increased tissue granulation at next dressing change  Outcome: Progressing  Flowsheets (Taken 8/4/2025 1017)  Decreased wound size/increased tissue granulation at next dressing change: Protective dressings over bony prominences     Problem: Skin  Goal: Participates in plan/prevention/treatment measures  Outcome: Progressing  Flowsheets (Taken 8/4/2025 1017)  Participates in plan/prevention/treatment measures: Elevate heels     Problem: Skin  Goal: Prevent/manage excess moisture  Outcome: Progressing  Flowsheets (Taken 8/4/2025 1017)  Prevent/manage excess moisture:   Cleanse incontinence/protect with barrier cream   Moisturize dry skin     Problem: Skin  Goal: Prevent/minimize sheer/friction injuries  Outcome: Progressing  Flowsheets (Taken 8/3/2025 1242 by Isamar Lama RN)  Prevent/minimize sheer/friction injuries:   Turn/reposition every 2 hours/use positioning/transfer devices   HOB 30 degrees or less     Problem: Skin  Goal: Promote skin healing  Outcome: Progressing  Flowsheets (Taken 8/4/2025 1017)  Promote skin healing:   Assess skin/pad under line(s)/device(s)   Protective dressings over bony prominences   Turn/reposition every 2 hours/use positioning/transfer devices     Problem:  Fall/Injury  Goal: Not fall by end of shift  Outcome: Progressing     Problem: Fall/Injury  Goal: Be free from injury by end of the shift  Outcome: Progressing     Problem: Fall/Injury  Goal: Verbalize understanding of personal risk factors for fall in the hospital  Outcome: Progressing     Problem: Fall/Injury  Goal: Verbalize understanding of risk factor reduction measures to prevent injury from fall in the home  Outcome: Progressing     Problem: Pain  Goal: Takes deep breaths with improved pain control throughout the shift  Outcome: Progressing     Problem: Pain  Goal: Turns in bed with improved pain control throughout the shift  Outcome: Progressing     Problem: Pain  Goal: Free from acute confusion related to pain meds throughout the shift  Outcome: Progressing

## 2025-08-04 NOTE — CARE PLAN
The patient's goals for the shift include      The clinical goals for the shift include remain HDS      Problem: Pain - Adult  Goal: Verbalizes/displays adequate comfort level or baseline comfort level  Outcome: Progressing     Problem: Safety - Adult  Goal: Free from fall injury  Outcome: Progressing     Problem: Discharge Planning  Goal: Discharge to home or other facility with appropriate resources  Outcome: Progressing     Problem: Chronic Conditions and Co-morbidities  Goal: Patient's chronic conditions and co-morbidity symptoms are monitored and maintained or improved  Outcome: Progressing     Problem: Nutrition  Goal: Nutrient intake appropriate for maintaining nutritional needs  Outcome: Progressing     Problem: Skin  Goal: Decreased wound size/increased tissue granulation at next dressing change  Outcome: Progressing  Goal: Participates in plan/prevention/treatment measures  Outcome: Progressing  Goal: Prevent/manage excess moisture  Outcome: Progressing  Goal: Prevent/minimize sheer/friction injuries  Outcome: Progressing  Goal: Promote/optimize nutrition  Outcome: Progressing  Goal: Promote skin healing  Outcome: Progressing     Problem: Fall/Injury  Goal: Not fall by end of shift  Outcome: Progressing  Goal: Be free from injury by end of the shift  Outcome: Progressing  Goal: Verbalize understanding of personal risk factors for fall in the hospital  Outcome: Progressing  Goal: Verbalize understanding of risk factor reduction measures to prevent injury from fall in the home  Outcome: Progressing  Goal: Use assistive devices by end of the shift  Outcome: Progressing  Goal: Pace activities to prevent fatigue by end of the shift  Outcome: Progressing

## 2025-08-04 NOTE — PROGRESS NOTES
Physical Therapy    Physical Therapy Treatment    Patient Name: Angel Carroll  MRN: [id number]  Department: [place] [address]  Room: [address] [place] [address]  Today's Date: 8/4/2025  Time Calculation  Start Time: 1359  Stop Time: 1424  Time Calculation (min): 25 min         Assessment/Plan   PT Assessment  Rehab Prognosis: Fair  Barriers to Discharge Home: Caregiver assistance, Physical needs  Caregiver Assistance: Caregiver assistance needed per identified barriers - however, no caregiver assistance available at home  End of Session Communication: Bedside nurse  Assessment Comment:  (pt demo fair eric to tx.)  End of Session Patient Position: Bed, 3 rail up, Alarm on  PT Plan  Inpatient/Swing Bed or Outpatient: Inpatient  PT Plan  Treatment/Interventions: Bed mobility, Transfer training, Therapeutic activity  PT Plan: Ongoing PT  PT Frequency: 3 times per week (this acute inpatient hospitalization.)  PT Discharge Recommendations: Moderate intensity level of continued care (.Based on current functional status and rehab potential, patient is anticipated to tolerate and benefit from 5 or more days per week of skilled rehabilitative therapy after discharge from this acute inpatient hospitalization.)  PT Recommended Transfer Status: Total assist  PT - OK to Discharge:  (per PT POC)    PT Visit Info:  PT Received On: 08/04/25     General Visit Information:   General  Reason for Referral: 79 y/o male admitted with with Generalized weakness, SOB, Fever, [facility] to [facility], 7 mm Ureteral stones found.  Referred By: Florencio Little MD  Prior to Session Communication: Bedside nurse  Patient Position Received: Bed, 3 rail up, Alarm on  Preferred Learning Style: auditory, kinesthetic, verbal  General Comment:  (pt agreeable to tx.)    Subjective   Precautions:  Precautions  Medical Precautions: Fall precautions (contact precautions)     Date/Time Vitals Session Patient Position Pulse Resp SpO2 BP MAP (mmHg)    08/04/25 1619 --  --  59  17   100 %  112/68  --            Objective   Pain:  Pain Assessment  Pain Assessment: 0-10  0-10 (Numeric) Pain Score: 0 - No pain  Cognition:  Cognition  Orientation Level: Oriented X4  Coordination:     Postural Control:  Static Sitting Balance  Static Sitting-Balance Support: Bilateral upper extremity supported, Feet supported  Static Sitting-Level of Assistance: Close supervision  Static Sitting-Comment/Number of Minutes:  (pt req min cues for proper hand placement and sequencing.)  Dynamic Sitting Balance  Dynamic Sitting-Balance Support: Bilateral upper extremity supported  Dynamic Sitting-Level of Assistance: Moderate assistance  Dynamic Sitting-Balance: Lateral lean, Reaching for objects, Trunk control activities  Dynamic Sitting-Comments:  (pt req increased cues and assistance to complete task)  Extremity/Trunk Assessments:    Activity Tolerance:     Treatments:       Bed Mobility  Bed Mobility: Yes  Bed Mobility 1  Bed Mobility 1: Supine to sitting, Side lying right to sit, Scooting  Level of Assistance 1: Maximum assistance, Moderate verbal cues, Moderate tactile cues  Bed Mobility Comments 1:  (pt req increased cues and assistance to complete task, able to sit at EOB approx 12 min with increased focus on balance and hand placement.)            Outcome Measures:  [facility] Basic Mobility  Turning from your back to your side while in a flat bed without using bedrails: A lot  Moving from lying on your back to sitting on the side of a flat bed without using bedrails: A lot  Moving to and from bed to chair (including a wheelchair): A lot  Standing up from a chair using your arms (e.g. wheelchair or bedside chair): Total  To walk in hospital room: Total  Climbing 3-5 steps with railing: Total  Basic Mobility - Total Score: 9    Education Documentation  Handouts, taught by Alli Carolina PTA at 8/4/2025  4:19 PM.  Learner: Patient  Readiness: Acceptance  Method: Explanation  Response: Needs  Reinforcement    Precautions, taught by Alli Carolina PTA at 8/4/2025  4:19 PM.  Learner: Patient  Readiness: Acceptance  Method: Explanation  Response: Needs Reinforcement    Body Mechanics, taught by Alli Carolina PTA at 8/4/2025  4:19 PM.  Learner: Patient  Readiness: Acceptance  Method: Explanation  Response: Needs Reinforcement    Home Exercise Program, taught by Alli Carolina PTA at 8/4/2025  4:19 PM.  Learner: Patient  Readiness: Acceptance  Method: Explanation  Response: Needs Reinforcement    Mobility Training, taught by Alli Carolina PTA at 8/4/2025  4:19 PM.  Learner: Patient  Readiness: Acceptance  Method: Explanation  Response: Needs Reinforcement    Education Comments  No comments found.        OP EDUCATION:       Encounter Problems       Encounter Problems (Active)       Mobility       STG - Patient will ambulate 15 feet with Max(A) using RW.       Start:  08/02/25    Expected End:  08/16/25               PT Transfers       LTG - Patient will transfer from one surface to another with Mod(A) using RW. (Progressing)       Start:  08/02/25    Expected End:  08/16/25            STG - Patient will perform bed mobility with Min(A). (Progressing)       Start:  08/02/25    Expected End:  08/16/25            STG - Patient will roll left and right with CGA. (Progressing)       Start:  08/02/25    Expected End:  08/16/25            STG - Patient will transfer sit to and from stand using RW with Mod(A). (Progressing)       Start:  08/02/25    Expected End:  08/16/25               Safety       LTG - Patient will demonstrate safety requirements appropriate to situation/environment       Start:  08/02/25    Expected End:  08/16/25

## 2025-08-04 NOTE — PROGRESS NOTES
Occupational Therapy    Occupational Therapy Treatment    Name: Angel Carroll  MRN: [id number]  Department: [place] [address]  Room: [facility]713-  Date: 08/04/25  Time Calculation  Start Time: 1437  Stop Time: 1500  Time Calculation (min): 23 min    Assessment:  OT Assessment: Pt seen for OT tx/ Pt would benefit from mod intnesity therapy at d/c to increase functional mobility and independence  Prognosis: Fair  Barriers to Discharge Home: Caregiver assistance, Physical needs, Cognition needs  Caregiver Assistance: Caregiver assistance needed per identified barriers - however, level of patient's required assistance exceeds assistance available at home  Cognition Needs: Cognition-related high falls risk  Physical Needs: 24hr ADL assistance needed, 24hr mobility assistance needed, Ambulating household distances limited by function/safety  Evaluation/Treatment Tolerance: Patient limited by fatigue  Medical Staff Made Aware: Yes  End of Session Communication: Bedside nurse  End of Session Patient Position: Bed, 3 rail up, Alarm on  Plan:  Treatment Interventions: ADL retraining  OT Frequency: 3 times per week (during this acute inpatient hospitalization)  OT Discharge Recommendations: Moderate intensity level of continued care (Based on current functional status and rehab potential, patient is anticipated to tolerate and benefit from 5 or more days per week of skilled rehabilitative therapy after discharge from this acute inpatient hospitalization.)  Equipment Recommended upon Discharge: Wheeled walker  OT Recommended Transfer Status: Dependent  OT - OK to Discharge: Yes (Per POC)    Subjective     OT Visit Info:  OT Received On: 08/04/25  General:  General  Reason for Referral: 79 y/o male admitted with with Generalized weakness, SOB, Fever, Transfer from [facility] to [facility], 7 mm Ureteral stones found.  Prior to Session Communication: Bedside nurse  Patient Position Received: Bed, 3 rail up, Alarm on  General Comment: Pt reports  fatigue pst PT but willing to work with OT as able  Precautions:  Medical Precautions: Fall precautions, Infection precautions           Pain Assessment:  Pain Assessment  Pain Assessment: 0-10  0-10 (Numeric) Pain Score: 0 - No pain    Objective   Cognition:  Overall Cognitive Status: Impaired  Orientation Level: Oriented X4  Following Commands: Follows one step commands without difficulty  Memory: Exceptions to WFL  Short-Term Memory: Impaired  Insight: Mild  Impulsive: Within functional limits  Processing Speed: Delayed     Therapy/Activity: Therapeutic Exercise  Therapeutic Exercise Performed: Yes  Therapeutic Exercise Activity 1: Pt completed B UE exercises all jts 10x each 3 sets     Cognitive Skill Development:  Cognitive Skill Development  Cognitive Skill Development Activity 1: Pt oriented x 4  Cognitive Skill Development Activity 2: pt able to follow simple commands. repetition with multi step commands  Cognitive Skill Development Activity 3: mild decreased STM completed memeory exercises withincreased time and cues     Outcome Measures:  [facility] Daily Activity  Putting on and taking off regular lower body clothing: Total  Bathing (including washing, rinsing, drying): A lot  Putting on and taking off regular upper body clothing: A lot  Toileting, which includes using toilet, bedpan or urinal: Total  Taking care of personal grooming such as brushing teeth: A lot  Eating Meals: A lot  Daily Activity - Total Score: 10        Education Documentation  Handouts, taught by Eugenia Betts OT at 8/4/2025  3:21 PM.  Learner: Patient  Readiness: Acceptance  Method: Explanation  Response: Verbalizes Understanding, Needs Reinforcement    Precautions, taught by Eugenia Betts OT at 8/4/2025  3:21 PM.  Learner: Patient  Readiness: Acceptance  Method: Explanation  Response: Verbalizes Understanding, Needs Reinforcement    Home Exercise Program, taught by uEgenia Betts OT at 8/4/2025  3:21 PM.  Learner:  Patient  Readiness: Acceptance  Method: Explanation  Response: Verbalizes Understanding, Needs Reinforcement    ADL Training, taught by Eugenia Betts OT at 8/4/2025  3:21 PM.  Learner: Patient  Readiness: Acceptance  Method: Explanation  Response: Verbalizes Understanding, Needs Reinforcement    Education Comments  No comments found.      Goals:  Encounter Problems       Encounter Problems (Active)       ADLs       Patient will perform UB and LB bathing with moderate assist level of assistance. (Progressing)       Start:  08/02/25    Expected End:  08/16/25            Patient with complete upper body dressing with moderate assist level of assistance donning and doffing all UE clothes with no adaptive equipment while edge of bed  (Progressing)       Start:  08/02/25    Expected End:  08/16/25            Patient with complete lower body dressing with moderate assist level of assistance donning and doffing all LE clothes  with reacher, sock-aid, and dressing stick  while edge of bed  (Progressing)       Start:  08/02/25    Expected End:  08/16/25            Patient will feed self with independent level of assistance using PRN adaptive equipment. (Progressing)       Start:  08/02/25    Expected End:  08/16/25            Patient will complete daily grooming tasks brushing teeth and washing face/hair with independent level of assistance and PRN adaptive equipment while edge of bed . (Progressing)       Start:  08/02/25    Expected End:  08/16/25            Patient will complete toileting including hygiene clothing management/hygiene with maximal assist level of assistance and raised toilet seat. (Progressing)       Start:  08/02/25    Expected End:  08/16/25               BALANCE       Pt will maintain static/dynamic sitting balance during ADL task with contact guard assist level of assistance in order to demonstrate decreased risk of falling and improved postural control. (Progressing)       Start:  08/02/25     Expected End:  08/16/25               COGNITION/SAFETY       Patient to demo good problem solving and sequencing with min verbal cues for safety required during ADLs and transfers.  (Progressing)       Start:  08/02/25    Expected End:  08/16/25               TRANSFERS       Patient will perform bed mobility moderate assist level of assistance and bed rails in order to improve safety and independence with mobility (Progressing)       Start:  08/02/25    Expected End:  08/16/25               TRANSFERS       Patient will complete functional transfer to all surfaces with front wheeled walker with maximal assist level of assistance. (Progressing)       Start:  08/02/25    Expected End:  08/16/25

## 2025-08-04 NOTE — CONSULTS
INFECTIOUS DISEASE DAILY PROGRESS NOTE    SUBJECTIVE:    Patient transferred from [place] per family request. He was being seen by my partner Dr. Jain and so I will write this as a follow-up rather than new consultation.     He has obstructive nephrolithiasis with right ureteral stone, has cysto 7/29 but stent unable to be placed. On IV CTX for UTI and was having diarrhea. C. Diff PCR and EIA positive.     Says diarrhea is better. Abd a bit sore. No fevers. Otherwise seems to be feeling better.    ASSESSMENT/PLAN:    Complicated UTI with Obstructing Right Ureteral Stone - s/p cystoscopy at [place] on 7/29 but stent unable to be placed. Due to lack of fever, leukocytosis urology there did not recommend perc neph tube. His urine cx grew <10K CFU C. Glabrata. Planned for stone surgery on 8/8/25.  C. Diff Diarrhea - PCR/EIA positive  CKD - CrCl about 40, affects abx dosing/decisions  Prolonged QTC - QTC on ECG from 7/2025 >460. Is on Amiodarone.    I don't think we need to treat C. Glabrata in the urine. Has been off treatment for this and improving overall. He is on Amiodarone and has prolonged QTC and adding Fluconazole for a prolonged course may worsen this and would increase risk for Torsades.     I would recommend 1 dose of PO Fluconazole 200mg half-hour to one hour prior to his planned urologic procedure.    PO Cefdinir 300mg BID for 1 more week.    PO Vancomycin 125mg Q6H for 2 weeks.    Monitoring for adverse effects of abx such as rash/itching - none.    Will sign off. Please call back with questions. Thanks!    Enmanuel Hinds MD  [facility]  Office #[phone]    PHYSICAL EXAM:  Gen - NAD  Heart - RRR  Abd - soft, no ttp  Skin - no rash    ABX: IV CTX    IMAGING:  CT A/P 7/29  IMPRESSION:   Obstructing distal right ureteral stone   Bilateral renal calculi   Enlarged prostate     VITALS (Last 24 Hours)  /62 (BP Location: Left arm, Patient Position: Lying)   Pulse 60   Temp  "36.4 °C (97.5 °F) (Oral)   Resp 16   Ht 1.778 m (5' 10\")   Wt 87.2 kg (192 lb 3.9 oz)   SpO2 94%   BMI 27.58 kg/m²     LABS:  Lab Results   Component Value Date    WBC 6.3 08/04/2025    HGB 11.9 (L) 08/04/2025    HCT 37.6 (L) 08/04/2025    MCV 93 08/04/2025     08/04/2025     Lab Results   Component Value Date    GLUCOSE 104 (H) 08/04/2025    CALCIUM 8.8 08/04/2025     08/04/2025    K 4.0 08/04/2025    CO2 28 08/04/2025     08/04/2025    BUN 46 (H) 08/04/2025    CREATININE 1.44 (H) 08/04/2025         Estimated Creatinine Clearance: 40.8 mL/min (A) (by C-G formula based on SCr of 1.44 mg/dL (H)).    "

## 2025-08-05 LAB
ANION GAP SERPL CALC-SCNC: 15 MMOL/L (ref 10–20)
BASOPHILS # BLD AUTO: 0.05 X10*3/UL (ref 0–0.1)
BASOPHILS NFR BLD AUTO: 0.7 %
BUN SERPL-MCNC: 40 MG/DL (ref 6–23)
CALCIUM SERPL-MCNC: 8.9 MG/DL (ref 8.6–10.3)
CHLORIDE SERPL-SCNC: 103 MMOL/L (ref 98–107)
CO2 SERPL-SCNC: 25 MMOL/L (ref 21–32)
CREAT SERPL-MCNC: 1.48 MG/DL (ref 0.5–1.3)
EGFRCR SERPLBLD CKD-EPI 2021: 47 ML/MIN/1.73M*2
EOSINOPHIL # BLD AUTO: 0.19 X10*3/UL (ref 0–0.4)
EOSINOPHIL NFR BLD AUTO: 2.6 %
ERYTHROCYTE [DISTWIDTH] IN BLOOD BY AUTOMATED COUNT: 14.3 % (ref 11.5–14.5)
GLUCOSE SERPL-MCNC: 95 MG/DL (ref 74–99)
HCT VFR BLD AUTO: 39 % (ref 41–52)
HGB BLD-MCNC: 12.6 G/DL (ref 13.5–17.5)
IMM GRANULOCYTES # BLD AUTO: 0.1 X10*3/UL (ref 0–0.5)
IMM GRANULOCYTES NFR BLD AUTO: 1.4 % (ref 0–0.9)
LYMPHOCYTES # BLD AUTO: 0.88 X10*3/UL (ref 0.8–3)
LYMPHOCYTES NFR BLD AUTO: 12 %
MCH RBC QN AUTO: 29.8 PG (ref 26–34)
MCHC RBC AUTO-ENTMCNC: 32.3 G/DL (ref 32–36)
MCV RBC AUTO: 92 FL (ref 80–100)
MONOCYTES # BLD AUTO: 0.58 X10*3/UL (ref 0.05–0.8)
MONOCYTES NFR BLD AUTO: 7.9 %
NEUTROPHILS # BLD AUTO: 5.53 X10*3/UL (ref 1.6–5.5)
NEUTROPHILS NFR BLD AUTO: 75.4 %
NRBC BLD-RTO: 0 /100 WBCS (ref 0–0)
PLATELET # BLD AUTO: 319 X10*3/UL (ref 150–450)
POTASSIUM SERPL-SCNC: 4 MMOL/L (ref 3.5–5.3)
RBC # BLD AUTO: 4.23 X10*6/UL (ref 4.5–5.9)
SODIUM SERPL-SCNC: 139 MMOL/L (ref 136–145)
WBC # BLD AUTO: 7.3 X10*3/UL (ref 4.4–11.3)

## 2025-08-05 PROCEDURE — 1100000001 HC PRIVATE ROOM DAILY

## 2025-08-05 PROCEDURE — 80048 BASIC METABOLIC PNL TOTAL CA: CPT | Performed by: INTERNAL MEDICINE

## 2025-08-05 PROCEDURE — 36415 COLL VENOUS BLD VENIPUNCTURE: CPT | Performed by: INTERNAL MEDICINE

## 2025-08-05 PROCEDURE — 2500000001 HC RX 250 WO HCPCS SELF ADMINISTERED DRUGS (ALT 637 FOR MEDICARE OP): Performed by: INTERNAL MEDICINE

## 2025-08-05 PROCEDURE — 2500000002 HC RX 250 W HCPCS SELF ADMINISTERED DRUGS (ALT 637 FOR MEDICARE OP, ALT 636 FOR OP/ED): Performed by: INTERNAL MEDICINE

## 2025-08-05 PROCEDURE — 99233 SBSQ HOSP IP/OBS HIGH 50: CPT | Performed by: INTERNAL MEDICINE

## 2025-08-05 PROCEDURE — 2500000005 HC RX 250 GENERAL PHARMACY W/O HCPCS: Performed by: INTERNAL MEDICINE

## 2025-08-05 PROCEDURE — 99233 SBSQ HOSP IP/OBS HIGH 50: CPT

## 2025-08-05 PROCEDURE — 85025 COMPLETE CBC W/AUTO DIFF WBC: CPT | Performed by: INTERNAL MEDICINE

## 2025-08-05 RX ADMIN — APIXABAN 2.5 MG: 2.5 TABLET, FILM COATED ORAL at 08:52

## 2025-08-05 RX ADMIN — PAROXETINE HYDROCHLORIDE 20 MG: 20 TABLET, FILM COATED ORAL at 08:52

## 2025-08-05 RX ADMIN — FUROSEMIDE 20 MG: 20 TABLET ORAL at 08:53

## 2025-08-05 RX ADMIN — VANCOMYCIN HYDROCHLORIDE 125 MG: 125 CAPSULE ORAL at 21:58

## 2025-08-05 RX ADMIN — TAMSULOSIN HYDROCHLORIDE 0.4 MG: 0.4 CAPSULE ORAL at 21:58

## 2025-08-05 RX ADMIN — PANTOPRAZOLE SODIUM 40 MG: 40 TABLET, DELAYED RELEASE ORAL at 06:38

## 2025-08-05 RX ADMIN — VANCOMYCIN HYDROCHLORIDE 125 MG: 125 CAPSULE ORAL at 18:04

## 2025-08-05 RX ADMIN — FENOFIBRATE 54 MG: 54 TABLET ORAL at 10:12

## 2025-08-05 RX ADMIN — ACETAMINOPHEN 650 MG: 325 TABLET ORAL at 21:58

## 2025-08-05 RX ADMIN — FERROUS SULFATE TAB 325 MG (65 MG ELEMENTAL FE) 1 TABLET: 325 (65 FE) TAB at 08:53

## 2025-08-05 RX ADMIN — ACETAMINOPHEN 650 MG: 325 TABLET ORAL at 01:05

## 2025-08-05 RX ADMIN — CYANOCOBALAMIN TAB 500 MCG 500 MCG: 500 TAB at 08:52

## 2025-08-05 RX ADMIN — Medication 3 MG: at 01:05

## 2025-08-05 RX ADMIN — VANCOMYCIN HYDROCHLORIDE 125 MG: 125 CAPSULE ORAL at 13:50

## 2025-08-05 RX ADMIN — VANCOMYCIN HYDROCHLORIDE 125 MG: 125 CAPSULE ORAL at 06:38

## 2025-08-05 RX ADMIN — ERGOCALCIFEROL 1.25 MG: 1.25 CAPSULE ORAL at 08:52

## 2025-08-05 RX ADMIN — CEFDINIR 300 MG: 300 CAPSULE ORAL at 21:57

## 2025-08-05 RX ADMIN — FOLIC ACID 1 MG: 1 TABLET ORAL at 08:52

## 2025-08-05 RX ADMIN — SACUBITRIL AND VALSARTAN 1 TABLET: 24; 26 TABLET, FILM COATED ORAL at 21:58

## 2025-08-05 RX ADMIN — ATORVASTATIN CALCIUM 20 MG: 20 TABLET, FILM COATED ORAL at 08:53

## 2025-08-05 RX ADMIN — GABAPENTIN 100 MG: 100 CAPSULE ORAL at 21:58

## 2025-08-05 RX ADMIN — CEFDINIR 300 MG: 300 CAPSULE ORAL at 08:53

## 2025-08-05 RX ADMIN — FINASTERIDE 5 MG: 5 TABLET, FILM COATED ORAL at 08:52

## 2025-08-05 RX ADMIN — Medication 3 MG: at 21:58

## 2025-08-05 ASSESSMENT — PAIN SCALES - WONG BAKER
WONGBAKER_NUMERICALRESPONSE: HURTS LITTLE BIT
WONGBAKER_NUMERICALRESPONSE: NO HURT

## 2025-08-05 ASSESSMENT — COGNITIVE AND FUNCTIONAL STATUS - GENERAL
DAILY ACTIVITIY SCORE: 10
STANDING UP FROM CHAIR USING ARMS: A LOT
TOILETING: TOTAL
TURNING FROM BACK TO SIDE WHILE IN FLAT BAD: A LOT
WALKING IN HOSPITAL ROOM: A LOT
EATING MEALS: A LOT
DRESSING REGULAR LOWER BODY CLOTHING: TOTAL
DRESSING REGULAR UPPER BODY CLOTHING: A LOT
MOBILITY SCORE: 12
MOVING FROM LYING ON BACK TO SITTING ON SIDE OF FLAT BED WITH BEDRAILS: A LITTLE
HELP NEEDED FOR BATHING: A LOT
PERSONAL GROOMING: A LOT
MOVING TO AND FROM BED TO CHAIR: A LOT
CLIMB 3 TO 5 STEPS WITH RAILING: TOTAL

## 2025-08-05 ASSESSMENT — PAIN - FUNCTIONAL ASSESSMENT
PAIN_FUNCTIONAL_ASSESSMENT: WONG-BAKER FACES
PAIN_FUNCTIONAL_ASSESSMENT: WONG-BAKER FACES

## 2025-08-05 ASSESSMENT — PAIN SCALES - GENERAL: PAINLEVEL_OUTOF10: 0 - NO PAIN

## 2025-08-05 NOTE — PROGRESS NOTES
08/05/25 1217   Discharge Planning   Who is requesting discharge planning? Provider   Home or Post Acute Services Post acute facilities (Rehab/SNF/etc)   Type of Post Acute Facility Services Skilled nursing   Expected Discharge Disposition SNF   Does the patient need discharge transport arranged? Yes   [facility] coordination needed? Yes   Has discharge transport been arranged? Yes   What day is the transport expected? 08/06/25   What time is the transport expected? 1330   Intensity of Service   Intensity of Service 0-30 min     8/5/25 1217  Patient medically cleared for discharge to [facility].  They will not have a bed available until early afternoon on 8/6/25.   1330 on 8/6/25. Report number [phone].  Bedside nurse, charge nurse, and  notified.  DSC completed HENS.  AVS and gold form sent.  SW to notify family.  Jackelyn Pride RN TCC

## 2025-08-05 NOTE — PROGRESS NOTES
INTERNAL MEDICINE PROGRESS NOTE      HPI:    Doing okay, in no apparent distress.     Vital signs in last 24 hours:  Temp:  [36.1 °C (97 °F)-36.7 °C (98 °F)] 36.1 °C (97 °F)  Heart Rate:  [59-66] 62  Resp:  [16-18] 16  BP: (107-124)/(47-64) 124/51    Physical Examination:  Physical Exam    Constitutional:       Appearance: Elderly, overweight, in no distress.  HENT:      Head: Normocephalic and atraumatic.   Eyes:      Extraocular Movements: Extraocular movements intact.      Pupils: Pupils are equal, round, and reactive to light.   Cardiovascular:      Rate and Rhythm: Normal rate and regular rhythm.      Pulses: Normal pulses.      Heart sounds: Normal heart sounds.   Pulmonary:      Effort: Pulmonary effort is normal.      Breath sounds: Diminished bases, few Rales, no wheeze.  Abdominal:      General: Abdomen is flat. Bowel sounds are normal.      Palpations: Abdomen is soft.   Musculoskeletal:         General: Normal range of motion.      Cervical back: Normal range of motion and neck supple.   Skin:     General: Skin is warm and dry.   Neurological:      General: No focal deficit present.      Mental Status: Alert and oriented x 3, moves all extremities.  Responds to questions.    Medications:  Per MAR    Laboratory Findings:  Lab Results   Component Value Date    WBC 7.3 08/05/2025    HGB 12.6 (L) 08/05/2025    HCT 39.0 (L) 08/05/2025    MCV 92 08/05/2025     08/05/2025     Lab Results   Component Value Date    INR 1.2 01/30/2025    INR 2.00 10/09/2024    INR 2.30 09/26/2024    PROTIME 12.8 (H) 01/30/2025    PROTIME 22.8 (H) 08/13/2024    PROTIME 21.2 (H) 08/12/2024     Lab Results   Component Value Date    GLUCOSE 95 08/05/2025    CALCIUM 8.9 08/05/2025     08/05/2025    K 4.0 08/05/2025    CO2 25 08/05/2025     08/05/2025    BUN 40 (H) 08/05/2025    CREATININE 1.48 (H) 08/05/2025       Assessment and Plan:     UTI -continue with antibiotics as ordered  C. difficile colitis -good  response to vancomycin, monitor stools.   Weakness -continue with therapy, encourage ambulation.   Pneumonia -remains on room air, antibiotics as ordered. Monitor respiratory status.  Obstructing calculus -CT scan noted.  Urology following.  Weakness-PT/OT following, will need SNF on dc.     DC planning for SNF when arrangements completed.        Florencio Little MD  08/05/25  5:44 PM

## 2025-08-05 NOTE — PROGRESS NOTES
"Angel Carroll is a 80 y.o. male on day 4 of admission presenting with Renal calculi.    Subjective   Patient awake in bed this morning. He is doing well. No new complaints. Still voiding well.       Objective     Physical Exam  Constitutional:       Appearance: Normal appearance.     Cardiovascular:      Rate and Rhythm: Normal rate and regular rhythm.   Pulmonary:      Effort: Pulmonary effort is normal.      Comments: Non labored breathing on RA  Abdominal:      General: There is no distension.      Palpations: Abdomen is soft.      Tenderness: There is no abdominal tenderness.     Skin:     General: Skin is warm and dry.     Neurological:      General: No focal deficit present.      Mental Status: He is alert and oriented to person, place, and time. Mental status is at baseline.     Psychiatric:         Attention and Perception: Attention normal.         Mood and Affect: Mood normal.         Speech: Speech normal.         Behavior: Behavior normal.         Cognition and Memory: Cognition normal.         Last Recorded Vitals  Blood pressure (!) 107/47, pulse 61, temperature 36.7 °C (98 °F), temperature source Temporal, resp. rate 16, height 1.778 m (5' 10\"), weight 87.2 kg (192 lb 3.9 oz), SpO2 96%.  Intake/Output last 3 Shifts:  I/O last 3 completed shifts:  In: 100 (1.1 mL/kg) [P.O.:100]  Out: 2950 (33.8 mL/kg) [Urine:2950 (0.9 mL/kg/hr)]  Weight: 87.2 kg     Relevant Results  Scheduled medications  Scheduled Medications[1]  Continuous medications  Continuous Medications[2]  PRN medications  PRN Medications[3]   Results for orders placed or performed during the hospital encounter of 08/01/25 (from the past 24 hours)   CBC and Auto Differential   Result Value Ref Range    WBC 7.3 4.4 - 11.3 x10*3/uL    nRBC 0.0 0.0 - 0.0 /100 WBCs    RBC 4.23 (L) 4.50 - 5.90 x10*6/uL    Hemoglobin 12.6 (L) 13.5 - 17.5 g/dL    Hematocrit 39.0 (L) 41.0 - 52.0 %    MCV 92 80 - 100 fL    MCH 29.8 26.0 - 34.0 pg    MCHC 32.3 32.0 - 36.0 " g/dL    RDW 14.3 11.5 - 14.5 %    Platelets 319 150 - 450 x10*3/uL    Neutrophils % 75.4 40.0 - 80.0 %    Immature Granulocytes %, Automated 1.4 (H) 0.0 - 0.9 %    Lymphocytes % 12.0 13.0 - 44.0 %    Monocytes % 7.9 2.0 - 10.0 %    Eosinophils % 2.6 0.0 - 6.0 %    Basophils % 0.7 0.0 - 2.0 %    Neutrophils Absolute 5.53 (H) 1.60 - 5.50 x10*3/uL    Immature Granulocytes Absolute, Automated 0.10 0.00 - 0.50 x10*3/uL    Lymphocytes Absolute 0.88 0.80 - 3.00 x10*3/uL    Monocytes Absolute 0.58 0.05 - 0.80 x10*3/uL    Eosinophils Absolute 0.19 0.00 - 0.40 x10*3/uL    Basophils Absolute 0.05 0.00 - 0.10 x10*3/uL   Basic Metabolic Panel   Result Value Ref Range    Glucose 95 74 - 99 mg/dL    Sodium 139 136 - 145 mmol/L    Potassium 4.0 3.5 - 5.3 mmol/L    Chloride 103 98 - 107 mmol/L    Bicarbonate 25 21 - 32 mmol/L    Anion Gap 15 10 - 20 mmol/L    Urea Nitrogen 40 (H) 6 - 23 mg/dL    Creatinine 1.48 (H) 0.50 - 1.30 mg/dL    eGFR 47 (L) >60 mL/min/1.73m*2    Calcium 8.9 8.6 - 10.3 mg/dL     *Note: Due to a large number of results and/or encounters for the requested time period, some results have not been displayed. A complete set of results can be found in Results Review.      CT abdomen pelvis wo IV contrast   Final Result        Stable position of obstructing right distal ureteral stone   (approximately 1 cm from bladder) with persistent moderate right   hydroureteronephrosis since 07/14/2025.        New mild left hydroureteronephrosis likely related to chronic urinary   obstruction. No obstructing left ureter stone.        New 0.9 cm bladder stone.        MACRO:   None        Signed by: Don Quigley 8/4/2025 8:23 AM   Dictation workstation:   [id number]                         Malnutrition Diagnosis Status: New  Malnutrition Diagnosis: Severe malnutrition related to chronic disease or condition  Related to: multiple comorbidities  As Evidenced by: > 20% weight loss in 1 year and prolonged poor intake prior to  hospital admit of < 75% of estimated energy needs in > 1 month  I agree with the dietitian's malnutrition diagnosis.      Assessment & Plan  Renal calculi    Plan: Obstructing Kidney stone    Discussed with Dr. Haddad, CT on 8/3 was stable in comparison to prior imaging. CR has been stable around 1.4 for the past 3 days. Patient has a scheduled procedure with Dr. Newsome on 8/8/25 for cystoscopy and lithotripsy for this stone. Would recommend maintaining that appointment.       I spent 60 minutes in the professional and overall care of this patient.      Andrew Mcnamara PA-C      Addendum  After further discussion with Dr. Haddad and Dr. Newsome will ask IR to attempt to place percutaneous nephroureteral catheter past the stone. Concern Dr. Newsome will also not be able to find the UO same as on 7/30 during cystoscopy.     Addendum  After further discussion and patient having been on eliquis the soonest IR would be able to attempt would be Friday to allow adequate washout time. Dr. Newsome decided might as well just try regularly schedule cystoscopy on Friday. IR able to help out as outpatient if again unsuccessful with visualization of the UO. Discussed with all teams. Dr. Newsome called patient and family to tell them the plan.  All in agreement.     Urology will sign off         [1] amiodarone, 100 mg, oral, Daily  apixaban, 2.5 mg, oral, BID  atorvastatin, 20 mg, oral, Daily  cefdinir, 300 mg, oral, BID  cyanocobalamin, 500 mcg, oral, Daily  ergocalciferol, 1.25 mg, oral, Every Sunday  fenofibrate, 54 mg, oral, Daily  ferrous sulfate, 1 tablet, oral, Daily with breakfast  finasteride, 5 mg, oral, Daily  folic acid, 1 mg, oral, Daily  furosemide, 20 mg, oral, Daily  gabapentin, 100 mg, oral, Nightly  metoprolol succinate XL, 25 mg, oral, Daily  pantoprazole, 40 mg, oral, Daily before breakfast  PARoxetine, 20 mg, oral, Daily  polyethylene glycol, 17 g, oral, Daily  [Held by provider] psyllium, 1 packet,  oral, BID  sacubitriL-valsartan, 1 tablet, oral, BID  tamsulosin, 0.4 mg, oral, Nightly  vancomycin, 125 mg, oral, 4x daily  [2] oxygen, , Last Rate: Stopped (08/01/25 1756)  [3] PRN medications: acetaminophen **OR** acetaminophen **OR** acetaminophen, acetaminophen, magnesium hydroxide, melatonin

## 2025-08-05 NOTE — CARE PLAN
The patient's goals for the shift include      The clinical goals for the shift include maintain pt safety    Problem: Pain - Adult  Goal: Verbalizes/displays adequate comfort level or baseline comfort level  Outcome: Progressing     Problem: Safety - Adult  Goal: Free from fall injury  Outcome: Progressing     Problem: Discharge Planning  Goal: Discharge to home or other facility with appropriate resources  Outcome: Progressing     Problem: Chronic Conditions and Co-morbidities  Goal: Patient's chronic conditions and co-morbidity symptoms are monitored and maintained or improved  Outcome: Progressing     Problem: Nutrition  Goal: Nutrient intake appropriate for maintaining nutritional needs  Outcome: Progressing     Problem: Skin  Goal: Decreased wound size/increased tissue granulation at next dressing change  Outcome: Progressing  Flowsheets (Taken 8/5/2025 0054)  Decreased wound size/increased tissue granulation at next dressing change: Promote sleep for wound healing  Goal: Participates in plan/prevention/treatment measures  Outcome: Progressing  Flowsheets (Taken 8/5/2025 0054)  Participates in plan/prevention/treatment measures: Elevate heels  Goal: Prevent/manage excess moisture  Outcome: Progressing  Flowsheets (Taken 8/5/2025 0054)  Prevent/manage excess moisture:   Cleanse incontinence/protect with barrier cream   Moisturize dry skin  Goal: Prevent/minimize sheer/friction injuries  Outcome: Progressing  Flowsheets (Taken 8/5/2025 0054)  Prevent/minimize sheer/friction injuries:   HOB 30 degrees or less   Turn/reposition every 2 hours/use positioning/transfer devices   Use pull sheet  Goal: Promote/optimize nutrition  Outcome: Progressing  Flowsheets (Taken 8/5/2025 0054)  Promote/optimize nutrition: Monitor/record intake including meals  Goal: Promote skin healing  Outcome: Progressing  Flowsheets (Taken 8/5/2025 0054)  Promote skin healing:   Turn/reposition every 2 hours/use positioning/transfer  devices   Protective dressings over bony prominences   Rotate device position/do not position patient on device   Assess skin/pad under line(s)/device(s)     Problem: Fall/Injury  Goal: Not fall by end of shift  Outcome: Progressing  Goal: Be free from injury by end of the shift  Outcome: Progressing  Goal: Verbalize understanding of personal risk factors for fall in the hospital  Outcome: Progressing  Goal: Verbalize understanding of risk factor reduction measures to prevent injury from fall in the home  Outcome: Progressing  Goal: Use assistive devices by end of the shift  Outcome: Progressing  Goal: Pace activities to prevent fatigue by end of the shift  Outcome: Progressing

## 2025-08-05 NOTE — NURSING NOTE
Pt removing telemetry.  Pt A&Ox 1.  Educated pt, Pt continues to remove telemetry.  Provider notified.     Pt removing SCD's.  Pt A&Ox1.  Educated pt, pt continues to remove SCD's.  Provider notified.

## 2025-08-05 NOTE — PROGRESS NOTES
08/05/25 1236   Discharge Planning   Expected Discharge Disposition SNF  ([facility])   Does the patient need discharge transport arranged? No   [facility] coordination needed? No   Has discharge transport been arranged? Yes   What day is the transport expected? 08/06/25   What time is the transport expected? 1330       12:28PM  ROBERTO contacted patient's wife (daughter Rhea was also present) via phone to inform of discharge plan to [facility] tomorrow at 1:30pm. Family expressed concern that patient will be returning to SNF without the stone removed and will need to have an outpatient procedure done on 8/8/25. Patient's daughter is concerned with how they will get patient to the outpatient procedure as she stated they cannot just put him in the car and take him. Family requested to speak with someone from the medical team for an update on patient's current status and care. ROBERTO sent a message informing medical team of this request.

## 2025-08-05 NOTE — CARE PLAN
The patient's goals for the shift include maintain comfort.    The clinical goals for the shift include maintain pt safety.     Problem: Pain - Adult  Goal: Verbalizes/displays adequate comfort level or baseline comfort level  Outcome: Progressing     Problem: Safety - Adult  Goal: Free from fall injury  Outcome: Progressing     Problem: Nutrition  Goal: Nutrient intake appropriate for maintaining nutritional needs  Outcome: Progressing     Problem: Skin  Goal: Decreased wound size/increased tissue granulation at next dressing change  Outcome: Progressing  Flowsheets (Taken 8/5/2025 1445)  Decreased wound size/increased tissue granulation at next dressing change: Protective dressings over bony prominences  Goal: Participates in plan/prevention/treatment measures  Outcome: Progressing  Flowsheets (Taken 8/5/2025 1445)  Participates in plan/prevention/treatment measures: Elevate heels  Goal: Prevent/manage excess moisture  Outcome: Progressing  Flowsheets (Taken 8/5/2025 1445)  Prevent/manage excess moisture:   Cleanse incontinence/protect with barrier cream   Moisturize dry skin  Goal: Prevent/minimize sheer/friction injuries  Outcome: Progressing  Flowsheets (Taken 8/5/2025 1445)  Prevent/minimize sheer/friction injuries:   HOB 30 degrees or less   Turn/reposition every 2 hours/use positioning/transfer devices   Use pull sheet  Goal: Promote/optimize nutrition  Outcome: Progressing  Flowsheets (Taken 8/5/2025 1445)  Promote/optimize nutrition: Monitor/record intake including meals  Goal: Promote skin healing  Outcome: Progressing  Flowsheets (Taken 8/5/2025 1445)  Promote skin healing:   Assess skin/pad under line(s)/device(s)   Protective dressings over bony prominences   Rotate device position/do not position patient on device   Turn/reposition every 2 hours/use positioning/transfer devices     Problem: Fall/Injury  Goal: Not fall by end of shift  Outcome: Progressing  Goal: Be free from injury by end of the  shift  Outcome: Progressing  Goal: Verbalize understanding of personal risk factors for fall in the hospital  Outcome: Progressing  Goal: Verbalize understanding of risk factor reduction measures to prevent injury from fall in the home  Outcome: Progressing  Goal: Use assistive devices by end of the shift  Outcome: Progressing  Goal: Pace activities to prevent fatigue by end of the shift  Outcome: Progressing

## 2025-08-05 NOTE — CONSULTS
Nutrition Initial Assessment Note    Reason for Assessment: Admission nursing screening    Pt admitted for:  Renal calculi [N20.0]    MST triggered for unsure for weight loss and wounds.  Pt known to nutrition services.  Chart reviewed and pt contacted.  Per pt ok appetite however recently decreased.  Usually eats 2 meals a day.  #.    Pt declined supplement at this time.    Medical History[1]    Results for orders placed or performed during the hospital encounter of 08/01/25 (from the past 24 hours)   CBC and Auto Differential   Result Value Ref Range    WBC 7.3 4.4 - 11.3 x10*3/uL    nRBC 0.0 0.0 - 0.0 /100 WBCs    RBC 4.23 (L) 4.50 - 5.90 x10*6/uL    Hemoglobin 12.6 (L) 13.5 - 17.5 g/dL    Hematocrit 39.0 (L) 41.0 - 52.0 %    MCV 92 80 - 100 fL    MCH 29.8 26.0 - 34.0 pg    MCHC 32.3 32.0 - 36.0 g/dL    RDW 14.3 11.5 - 14.5 %    Platelets 319 150 - 450 x10*3/uL    Neutrophils % 75.4 40.0 - 80.0 %    Immature Granulocytes %, Automated 1.4 (H) 0.0 - 0.9 %    Lymphocytes % 12.0 13.0 - 44.0 %    Monocytes % 7.9 2.0 - 10.0 %    Eosinophils % 2.6 0.0 - 6.0 %    Basophils % 0.7 0.0 - 2.0 %    Neutrophils Absolute 5.53 (H) 1.60 - 5.50 x10*3/uL    Immature Granulocytes Absolute, Automated 0.10 0.00 - 0.50 x10*3/uL    Lymphocytes Absolute 0.88 0.80 - 3.00 x10*3/uL    Monocytes Absolute 0.58 0.05 - 0.80 x10*3/uL    Eosinophils Absolute 0.19 0.00 - 0.40 x10*3/uL    Basophils Absolute 0.05 0.00 - 0.10 x10*3/uL   Basic Metabolic Panel   Result Value Ref Range    Glucose 95 74 - 99 mg/dL    Sodium 139 136 - 145 mmol/L    Potassium 4.0 3.5 - 5.3 mmol/L    Chloride 103 98 - 107 mmol/L    Bicarbonate 25 21 - 32 mmol/L    Anion Gap 15 10 - 20 mmol/L    Urea Nitrogen 40 (H) 6 - 23 mg/dL    Creatinine 1.48 (H) 0.50 - 1.30 mg/dL    eGFR 47 (L) >60 mL/min/1.73m*2    Calcium 8.9 8.6 - 10.3 mg/dL     *Note: Due to a large number of results and/or encounters for the requested time period, some results have not been displayed. A  "complete set of results can be found in Results Review.     Scheduled medications  Scheduled Medications[2]  Continuous medications  Continuous Medications[3]  PRN medications  PRN Medications[4]  Dietary Orders (From admission, onward)       Start     Ordered    08/01/25 1744  Adult diet Regular  Diet effective now        Question:  Diet type  Answer:  Regular    08/01/25 1744 08/01/25 1252  May Participate in Room Service With Assistance  ( ROOM SERVICE MAY PARTICIPATE WITH ASSISTANCE)  Once        Question:  .  Answer:  Yes    08/01/25 1251                    History:  Energy Intake: Fair 50-75 %, Poor < 50 %  Food and Nutrient History: per flowsheets    Anthropometrics:  Height: 177.8 cm (5' 10\")  Weight: 87.2 kg (192 lb 3.9 oz)  BMI (Calculated): 27.58    Wt Readings from Last 30 Encounters:   08/01/25 87.2 kg (192 lb 3.9 oz)   07/27/25 87.2 kg (192 lb 3.9 oz)   07/12/25 98.4 kg (217 lb)   06/23/25 98.4 kg (217 lb)   05/27/25 101 kg (222 lb)   05/18/25 103 kg (226 lb)   05/15/25 103 kg (226 lb 6.4 oz)   05/12/25 102 kg (224 lb 6.4 oz)   05/09/25 102 kg (224 lb 6.4 oz)   05/05/25 106 kg (233 lb 6.4 oz)   05/02/25 106 kg (233 lb 6.4 oz)   04/28/25 104 kg (229 lb 9.6 oz)   04/24/25 104 kg (229 lb 9.6 oz)   04/20/25 99.8 kg (220 lb)   04/16/25 99.8 kg (220 lb)   04/15/25 100 kg (221 lb)   04/04/25 96.6 kg (213 lb)   03/24/25 96.1 kg (211 lb 14.4 oz)   02/24/25 96.2 kg (212 lb)   02/03/25 103 kg (226 lb 3.1 oz)   12/06/24 98 kg (216 lb 1.6 oz)   11/15/24 102 kg (225 lb)   10/28/24 104 kg (229 lb 4.8 oz)   10/04/24 104 kg (229 lb)   10/01/24 103 kg (226 lb)   08/26/24 102 kg (225 lb)   08/20/24 110 kg (242 lb)   08/08/24 110 kg (242 lb 11.6 oz)   07/29/24 104 kg (228 lb 3.2 oz)   07/16/24 98.4 kg (217 lb)         Significant Weight Loss: Yes  Interpretation of Weight Loss: >20% in 1 year    Total Energy Estimated Needs in 24 hours (kCal): 2180 kCal  Energy Estimated Needs per kg Body Weight in 24 hours " (kCal/kg): 2615 kCal/kg  Method for Estimating Needs: 25-30    Total Protein Estimated Needs in 24 Hours (g): 70 g  Protein Estimated Needs per kg Body Weight in 24 Hours (g/kg): 90 g/kg  Method for Estimating 24 Hour Protein Needs: 0.8-1.0    Method for Estimating 24 Hour Fluid Needs: 1ml/kcal or per MD    Nutrition Focused Physical Findings:  Defer Subcutaneous Fat Loss Assessment: Defer all  Defer All Reason: Isolation    Defer Muscle Wasting Assessment: Defer all  Defer All Reason: Isolation    Edema: none    Skin: Positive  Positive Skin Findings: Impaired wound healing (MASD)     Nutrition Diagnosis   Malnutrition Diagnosis  Patient has Malnutrition Diagnosis: Yes  Diagnosis Status: New  Malnutrition Diagnosis: Severe malnutrition related to chronic disease or condition  Related to: multiple comorbidities  As Evidenced by: > 20% weight loss in 1 year and prolonged poor intake prior to hospital admit of < 75% of estimated energy needs in > 1 month       Nutrition Interventions/Recommendations   Nutrition Prescription: Nutrition prescription for oral nutrition  Individualized Nutrition Prescription Provided for : Continue oral diet as ordered.    Food and/or Nutrient Delivery Interventions  Meals and Snacks: General healthful diet  Goal: > 75% of meals consumed     Education Documentation  N/A      Nutrition Monitoring and Evaluation   Food and Nutrient Related History  Estimated Energy Intake: Energy intake greater or equal to 75% of estimated energy needs    Fluid Intake: Estimated fluid intake    Anthropometrics: Body Composition/Growth/Weight History  Body Weight: Body weight - Maintain stable weight    Biochemical Data, Medical Tests and Procedures  Electrolyte and Renal Panel: Other (Comment)  Criteria: as clinically indicated    Gastrointestinal Profile: Other (Comment)  Criteria: as clinically indicated    Glucose/Endocrine Profile: Other (Comment)  Criteria: as clinically indicated    Nutritional Anemia  Profile: Other (Comment)  Criteria: as clinically indicated    Vitamin Profile: Other (Comment)  Criteria: as clinically indicated    Nutrition Focused Physical Findings  Digestive System Finding: Other (Comment)  Criteria: Stool output, Urine volume, Overall appearance    Skin Finding: Impaired wound healing - Improved wound healing    Time Spent (min): 60 minutes  Last Date of Nutrition Visit: 08/05/25  Nutrition Follow-Up Needed?: Dietitian to reassess per policy  Follow up Comment: [facility]       [1]   Past Medical History:  Diagnosis Date    Atherosclerotic heart disease of native coronary artery without angina pectoris 12/14/2022    Arteriosclerotic cardiovascular disease (ASCVD)    Benign neoplasm of meninges, unspecified     Meningioma    BPH (benign prostatic hyperplasia)     Chronic diastolic heart failure     Chronic kidney disease     Chronic obstructive pulmonary disease (COPD) (Multi)     Chronic respiratory failure     Elevated blood-pressure reading, without diagnosis of hypertension     Prehypertension    Hyperlipidemia     Meningioma (Multi)     Nephrolithiasis     Rectal bleeding     Sick sinus syndrome (Multi)     s/p pacemaker placement    Vitamin D deficiency    [2] amiodarone, 100 mg, oral, Daily  apixaban, 2.5 mg, oral, BID  atorvastatin, 20 mg, oral, Daily  cefdinir, 300 mg, oral, BID  cyanocobalamin, 500 mcg, oral, Daily  ergocalciferol, 1.25 mg, oral, Every Sunday  fenofibrate, 54 mg, oral, Daily  ferrous sulfate, 1 tablet, oral, Daily with breakfast  finasteride, 5 mg, oral, Daily  folic acid, 1 mg, oral, Daily  furosemide, 20 mg, oral, Daily  gabapentin, 100 mg, oral, Nightly  metoprolol succinate XL, 25 mg, oral, Daily  pantoprazole, 40 mg, oral, Daily before breakfast  PARoxetine, 20 mg, oral, Daily  polyethylene glycol, 17 g, oral, Daily  [Held by provider] psyllium, 1 packet, oral, BID  sacubitriL-valsartan, 1 tablet, oral, BID  tamsulosin, 0.4 mg, oral, Nightly  vancomycin, 125  mg, oral, 4x daily     [3] oxygen, , Last Rate: Stopped (08/01/25 4296)     [4] PRN medications: acetaminophen **OR** acetaminophen **OR** acetaminophen, acetaminophen, magnesium hydroxide, melatonin

## 2025-08-05 NOTE — PROGRESS NOTES
"Nephrology Consult Progress Note    Admit Date: 8/1/2025    Interval history:  [place]    CURRENT MEDICATIONS:  Current Medications[1]       Intake/Output Summary (Last 24 hours) at 8/5/2025 1424  Last data filed at 8/5/2025 1003  Gross per 24 hour   Intake 240 ml   Output 2050 ml   Net -1810 ml       PHYSICAL EXAM:  BP (!) 107/47   Pulse 61   Temp 36.7 °C (98 °F) (Temporal)   Resp 16   Ht 1.778 m (5' 10\")   Wt 87.2 kg (192 lb 3.9 oz)   SpO2 96%   BMI 27.58 kg/m²     Intake/Output Summary (Last 24 hours) at 8/5/2025 1424  Last data filed at 8/5/2025 1003  Gross per 24 hour   Intake 240 ml   Output 2050 ml   Net -1810 ml     Gen: AAO, NAD  Neck: No JVD  Cardiac: RRR  Resp: clear BS  Abd: Soft, non tender, +BS, non distended   Ext: No edema   Neuro: moves 4 ext  Peripheral Pulses: Capillary refill <2secs, strong peripheral pulses.  Skin: Skin color, texture, turgor normal, no suspicious rashes or lesions.  Labs:  Results for orders placed or performed during the hospital encounter of 08/01/25 (from the past 24 hours)   CBC and Auto Differential   Result Value Ref Range    WBC 7.3 4.4 - 11.3 x10*3/uL    nRBC 0.0 0.0 - 0.0 /100 WBCs    RBC 4.23 (L) 4.50 - 5.90 x10*6/uL    Hemoglobin 12.6 (L) 13.5 - 17.5 g/dL    Hematocrit 39.0 (L) 41.0 - 52.0 %    MCV 92 80 - 100 fL    MCH 29.8 26.0 - 34.0 pg    MCHC 32.3 32.0 - 36.0 g/dL    RDW 14.3 11.5 - 14.5 %    Platelets 319 150 - 450 x10*3/uL    Neutrophils % 75.4 40.0 - 80.0 %    Immature Granulocytes %, Automated 1.4 (H) 0.0 - 0.9 %    Lymphocytes % 12.0 13.0 - 44.0 %    Monocytes % 7.9 2.0 - 10.0 %    Eosinophils % 2.6 0.0 - 6.0 %    Basophils % 0.7 0.0 - 2.0 %    Neutrophils Absolute 5.53 (H) 1.60 - 5.50 x10*3/uL    Immature Granulocytes Absolute, Automated 0.10 0.00 - 0.50 x10*3/uL    Lymphocytes Absolute 0.88 0.80 - 3.00 x10*3/uL    Monocytes Absolute 0.58 0.05 - 0.80 x10*3/uL    Eosinophils Absolute 0.19 0.00 - 0.40 x10*3/uL    Basophils Absolute 0.05 0.00 - 0.10 " x10*3/uL   Basic Metabolic Panel   Result Value Ref Range    Glucose 95 74 - 99 mg/dL    Sodium 139 136 - 145 mmol/L    Potassium 4.0 3.5 - 5.3 mmol/L    Chloride 103 98 - 107 mmol/L    Bicarbonate 25 21 - 32 mmol/L    Anion Gap 15 10 - 20 mmol/L    Urea Nitrogen 40 (H) 6 - 23 mg/dL    Creatinine 1.48 (H) 0.50 - 1.30 mg/dL    eGFR 47 (L) >60 mL/min/1.73m*2    Calcium 8.9 8.6 - 10.3 mg/dL     *Note: Due to a large number of results and/or encounters for the requested time period, some results have not been displayed. A complete set of results can be found in Results Review.        DATA:   Diagnostic tests reviewed for today's visit:    New labs and imaging   RFP, CBC and urine studies seen     Assessment and Plan:  H/o CKD stage 3a, nephrolithiasis, BPH. Coming from [facility], because right ureteral stone, had cysto 7/29 but stent unable to be placed. On IV CTX for UTI and was having diarrhea. CT with Stable position of obstructing right distal ureteral stone (approximately 1 cm from bladder of ~0.7 mm) with persistent moderate right hydroureteronephrosis since 07/14/2025.  - CKD stage 3b (baseline Scr ~1.4-1.6): stable Scr despite R hydronephrosis, will need stone removal planned to be done on 8/8  BP: controlled  Lytes and acid base: acceptable     PLAN:  - supportive care, urology to see him   - will see prn, will need metabolic work up, 24h urine study, planning to follow up with her usual nephrologist Dr Juarez     Will continue to follow.   I spent 45 minutes in the professional and overall care of this patient.     Signature: Andrew Warner MD        [1]   Current Facility-Administered Medications:     acetaminophen (Tylenol) tablet 650 mg, 650 mg, oral, q4h PRN **OR** acetaminophen (Tylenol) oral liquid 650 mg, 650 mg, oral, q4h PRN, 650 mg at 08/03/25 2041 **OR** acetaminophen (Tylenol) suppository 650 mg, 650 mg, rectal, q4h PRN, Florencio Little MD    acetaminophen (Tylenol) tablet 650 mg, 650  mg, oral, q6h PRN, Florencio Little MD, 650 mg at 08/05/25 0105    amiodarone (Pacerone) tablet 100 mg, 100 mg, oral, Daily, Florencio Little MD, 100 mg at 08/04/25 0830    apixaban (Eliquis) tablet 2.5 mg, 2.5 mg, oral, BID, Florencio Little MD, 2.5 mg at 08/05/25 0852    atorvastatin (Lipitor) tablet 20 mg, 20 mg, oral, Daily, Florencio Little MD, 20 mg at 08/05/25 0853    cefdinir (Omnicef) capsule 300 mg, 300 mg, oral, BID, Florencio Little MD, 300 mg at 08/05/25 0853    cyanocobalamin (Vitamin B-12) tablet 500 mcg, 500 mcg, oral, Daily, Florencio Little MD, 500 mcg at 08/05/25 0852    ergocalciferol (Vitamin D-2) capsule 1.25 mg, 1.25 mg, oral, Every Sunday, Florencio Little MD, 1.25 mg at 08/05/25 0852    fenofibrate (Tricor) tablet 54 mg, 54 mg, oral, Daily, Florencio Little MD, 54 mg at 08/05/25 1012    ferrous sulfate 325 mg (65 mg elemental) tablet 1 tablet, 1 tablet, oral, Daily with breakfast, Florencio Little MD, 1 tablet at 08/05/25 0853    finasteride (Proscar) tablet 5 mg, 5 mg, oral, Daily, Florencio Little MD, 5 mg at 08/05/25 0852    folic acid (Folvite) tablet 1 mg, 1 mg, oral, Daily, Florencio Little MD, 1 mg at 08/05/25 0852    furosemide (Lasix) tablet 20 mg, 20 mg, oral, Daily, Florencio Little MD, 20 mg at 08/05/25 0853    gabapentin (Neurontin) capsule 100 mg, 100 mg, oral, Nightly, Florencio Little MD, 100 mg at 08/04/25 2037    magnesium hydroxide (Milk of Magnesia) 400 mg/5 mL suspension 30 mL, 30 mL, oral, Daily PRN, Florencio Little MD    melatonin tablet 3 mg, 3 mg, oral, Nightly PRN, Florencio Little MD, 3 mg at 08/05/25 0105    metoprolol succinate XL (Toprol-XL) 24 hr tablet 25 mg, 25 mg, oral, Daily, Florencio Little MD, 25 mg at 08/04/25 0830    oxygen (O2) therapy, , inhalation, Continuous, Florencio Little MD, Stopped at 08/01/25 1756    pantoprazole (ProtoNix) EC tablet 40 mg, 40 mg, oral, Daily before breakfast, Florencio Little MD, 40 mg at 08/05/25 0638     PARoxetine (Paxil) tablet 20 mg, 20 mg, oral, Daily, Florencio Little MD, 20 mg at 08/05/25 0852    polyethylene glycol (Glycolax, Miralax) packet 17 g, 17 g, oral, Daily, Florencio Little MD, 17 g at 08/04/25 0829    [Held by provider] psyllium (Metamucil) packet 1 packet, 1 packet, oral, BID, Florencio Little MD    sacubitriL-valsartan (Entresto) 24-26 mg per tablet 1 tablet, 1 tablet, oral, BID, Florencio Little MD, 1 tablet at 08/04/25 2037    tamsulosin (Flomax) 24 hr capsule 0.4 mg, 0.4 mg, oral, Nightly, Florencio Little MD, 0.4 mg at 08/04/25 2037    vancomycin (Vancocin) capsule 125 mg, 125 mg, oral, 4x daily, Florencio Little MD, 125 mg at 08/05/25 1350

## 2025-08-06 ENCOUNTER — APPOINTMENT (OUTPATIENT)
Dept: PRIMARY CARE | Facility: CLINIC | Age: 80
End: 2025-08-06
Payer: MEDICARE

## 2025-08-06 VITALS
SYSTOLIC BLOOD PRESSURE: 134 MMHG | WEIGHT: 192.24 LBS | RESPIRATION RATE: 18 BRPM | BODY MASS INDEX: 27.52 KG/M2 | TEMPERATURE: 98 F | HEART RATE: 75 BPM | DIASTOLIC BLOOD PRESSURE: 91 MMHG | OXYGEN SATURATION: 94 % | HEIGHT: 70 IN

## 2025-08-06 LAB
ANION GAP SERPL CALC-SCNC: 10 MMOL/L (ref 10–20)
BASOPHILS # BLD AUTO: 0.05 X10*3/UL (ref 0–0.1)
BASOPHILS NFR BLD AUTO: 0.7 %
BUN SERPL-MCNC: 41 MG/DL (ref 6–23)
CALCIUM SERPL-MCNC: 9.2 MG/DL (ref 8.6–10.3)
CHLORIDE SERPL-SCNC: 105 MMOL/L (ref 98–107)
CO2 SERPL-SCNC: 28 MMOL/L (ref 21–32)
CREAT SERPL-MCNC: 1.41 MG/DL (ref 0.5–1.3)
EGFRCR SERPLBLD CKD-EPI 2021: 50 ML/MIN/1.73M*2
EOSINOPHIL # BLD AUTO: 0.18 X10*3/UL (ref 0–0.4)
EOSINOPHIL NFR BLD AUTO: 2.6 %
ERYTHROCYTE [DISTWIDTH] IN BLOOD BY AUTOMATED COUNT: 14.5 % (ref 11.5–14.5)
GLUCOSE SERPL-MCNC: 97 MG/DL (ref 74–99)
HCT VFR BLD AUTO: 37 % (ref 41–52)
HGB BLD-MCNC: 11.7 G/DL (ref 13.5–17.5)
IMM GRANULOCYTES # BLD AUTO: 0.11 X10*3/UL (ref 0–0.5)
IMM GRANULOCYTES NFR BLD AUTO: 1.6 % (ref 0–0.9)
LYMPHOCYTES # BLD AUTO: 0.86 X10*3/UL (ref 0.8–3)
LYMPHOCYTES NFR BLD AUTO: 12.4 %
MCH RBC QN AUTO: 28.9 PG (ref 26–34)
MCHC RBC AUTO-ENTMCNC: 31.6 G/DL (ref 32–36)
MCV RBC AUTO: 91 FL (ref 80–100)
MONOCYTES # BLD AUTO: 0.52 X10*3/UL (ref 0.05–0.8)
MONOCYTES NFR BLD AUTO: 7.5 %
NEUTROPHILS # BLD AUTO: 5.2 X10*3/UL (ref 1.6–5.5)
NEUTROPHILS NFR BLD AUTO: 75.2 %
NRBC BLD-RTO: 0 /100 WBCS (ref 0–0)
PLATELET # BLD AUTO: 329 X10*3/UL (ref 150–450)
POTASSIUM SERPL-SCNC: 4.3 MMOL/L (ref 3.5–5.3)
RBC # BLD AUTO: 4.05 X10*6/UL (ref 4.5–5.9)
SODIUM SERPL-SCNC: 139 MMOL/L (ref 136–145)
WBC # BLD AUTO: 6.9 X10*3/UL (ref 4.4–11.3)

## 2025-08-06 PROCEDURE — 2500000004 HC RX 250 GENERAL PHARMACY W/ HCPCS (ALT 636 FOR OP/ED): Performed by: INTERNAL MEDICINE

## 2025-08-06 PROCEDURE — 36415 COLL VENOUS BLD VENIPUNCTURE: CPT | Performed by: INTERNAL MEDICINE

## 2025-08-06 PROCEDURE — 85025 COMPLETE CBC W/AUTO DIFF WBC: CPT | Performed by: INTERNAL MEDICINE

## 2025-08-06 PROCEDURE — 2500000001 HC RX 250 WO HCPCS SELF ADMINISTERED DRUGS (ALT 637 FOR MEDICARE OP): Performed by: INTERNAL MEDICINE

## 2025-08-06 PROCEDURE — 80048 BASIC METABOLIC PNL TOTAL CA: CPT | Performed by: INTERNAL MEDICINE

## 2025-08-06 PROCEDURE — 2500000002 HC RX 250 W HCPCS SELF ADMINISTERED DRUGS (ALT 637 FOR MEDICARE OP, ALT 636 FOR OP/ED): Performed by: INTERNAL MEDICINE

## 2025-08-06 RX ADMIN — CYANOCOBALAMIN TAB 500 MCG 500 MCG: 500 TAB at 09:18

## 2025-08-06 RX ADMIN — FOLIC ACID 1 MG: 1 TABLET ORAL at 09:18

## 2025-08-06 RX ADMIN — PAROXETINE HYDROCHLORIDE 20 MG: 20 TABLET, FILM COATED ORAL at 09:17

## 2025-08-06 RX ADMIN — ATORVASTATIN CALCIUM 20 MG: 20 TABLET, FILM COATED ORAL at 09:18

## 2025-08-06 RX ADMIN — SACUBITRIL AND VALSARTAN 1 TABLET: 24; 26 TABLET, FILM COATED ORAL at 09:17

## 2025-08-06 RX ADMIN — VANCOMYCIN HYDROCHLORIDE 125 MG: 125 CAPSULE ORAL at 13:29

## 2025-08-06 RX ADMIN — VANCOMYCIN HYDROCHLORIDE 125 MG: 125 CAPSULE ORAL at 06:39

## 2025-08-06 RX ADMIN — CEFDINIR 300 MG: 300 CAPSULE ORAL at 09:17

## 2025-08-06 RX ADMIN — FINASTERIDE 5 MG: 5 TABLET, FILM COATED ORAL at 09:17

## 2025-08-06 RX ADMIN — FENOFIBRATE 54 MG: 54 TABLET ORAL at 09:22

## 2025-08-06 RX ADMIN — FERROUS SULFATE TAB 325 MG (65 MG ELEMENTAL FE) 1 TABLET: 325 (65 FE) TAB at 08:55

## 2025-08-06 RX ADMIN — METOPROLOL SUCCINATE 25 MG: 25 TABLET, EXTENDED RELEASE ORAL at 09:18

## 2025-08-06 RX ADMIN — PANTOPRAZOLE SODIUM 40 MG: 40 TABLET, DELAYED RELEASE ORAL at 06:39

## 2025-08-06 RX ADMIN — FUROSEMIDE 20 MG: 20 TABLET ORAL at 09:18

## 2025-08-06 RX ADMIN — POLYETHYLENE GLYCOL 3350 17 G: 17 POWDER, FOR SOLUTION ORAL at 09:17

## 2025-08-06 RX ADMIN — AMIODARONE HYDROCHLORIDE 100 MG: 200 TABLET ORAL at 09:18

## 2025-08-06 ASSESSMENT — COGNITIVE AND FUNCTIONAL STATUS - GENERAL
MOVING FROM LYING ON BACK TO SITTING ON SIDE OF FLAT BED WITH BEDRAILS: A LITTLE
MOVING FROM LYING ON BACK TO SITTING ON SIDE OF FLAT BED WITH BEDRAILS: A LITTLE
MOVING TO AND FROM BED TO CHAIR: A LOT
CLIMB 3 TO 5 STEPS WITH RAILING: TOTAL
DRESSING REGULAR LOWER BODY CLOTHING: TOTAL
WALKING IN HOSPITAL ROOM: A LOT
TURNING FROM BACK TO SIDE WHILE IN FLAT BAD: A LOT
EATING MEALS: A LOT
TURNING FROM BACK TO SIDE WHILE IN FLAT BAD: A LOT
PERSONAL GROOMING: A LOT
STANDING UP FROM CHAIR USING ARMS: A LOT
MOBILITY SCORE: 12
DRESSING REGULAR UPPER BODY CLOTHING: A LOT
TOILETING: TOTAL
CLIMB 3 TO 5 STEPS WITH RAILING: TOTAL
MOVING FROM LYING ON BACK TO SITTING ON SIDE OF FLAT BED WITH BEDRAILS: A LITTLE
DAILY ACTIVITIY SCORE: 10
DRESSING REGULAR LOWER BODY CLOTHING: TOTAL
MOVING TO AND FROM BED TO CHAIR: A LOT
WALKING IN HOSPITAL ROOM: A LOT
DRESSING REGULAR UPPER BODY CLOTHING: A LOT
DRESSING REGULAR LOWER BODY CLOTHING: TOTAL
DRESSING REGULAR LOWER BODY CLOTHING: TOTAL
PERSONAL GROOMING: A LOT
EATING MEALS: A LOT
TURNING FROM BACK TO SIDE WHILE IN FLAT BAD: A LOT
DAILY ACTIVITIY SCORE: 10
MOVING TO AND FROM BED TO CHAIR: A LOT
EATING MEALS: A LOT
CLIMB 3 TO 5 STEPS WITH RAILING: TOTAL
MOBILITY SCORE: 12
DAILY ACTIVITIY SCORE: 10
TOILETING: TOTAL
TOILETING: TOTAL
WALKING IN HOSPITAL ROOM: A LOT
MOBILITY SCORE: 12
DRESSING REGULAR UPPER BODY CLOTHING: A LOT
WALKING IN HOSPITAL ROOM: A LOT
PERSONAL GROOMING: A LOT
DRESSING REGULAR UPPER BODY CLOTHING: A LOT
MOVING FROM LYING ON BACK TO SITTING ON SIDE OF FLAT BED WITH BEDRAILS: A LITTLE
HELP NEEDED FOR BATHING: A LOT
STANDING UP FROM CHAIR USING ARMS: A LOT
CLIMB 3 TO 5 STEPS WITH RAILING: TOTAL
TOILETING: TOTAL
MOBILITY SCORE: 12
TURNING FROM BACK TO SIDE WHILE IN FLAT BAD: A LOT
MOVING TO AND FROM BED TO CHAIR: A LOT
STANDING UP FROM CHAIR USING ARMS: A LOT
HELP NEEDED FOR BATHING: A LOT
EATING MEALS: A LOT
HELP NEEDED FOR BATHING: A LOT
HELP NEEDED FOR BATHING: A LOT
DAILY ACTIVITIY SCORE: 10
STANDING UP FROM CHAIR USING ARMS: A LOT
PERSONAL GROOMING: A LOT

## 2025-08-06 NOTE — PROGRESS NOTES
INTERNAL MEDICINE PROGRESS NOTE      HPI:    Patient reports mostly formed stools.  Appears comfortable.    Vital signs in last 24 hours:  Temp:  [36.1 °C (97 °F)-36.7 °C (98 °F)] 36.7 °C (98 °F)  Heart Rate:  [60-75] 75  Resp:  [16-18] 18  BP: (112-134)/(51-91) 134/91    Physical Examination:  Physical Exam    Constitutional:       Appearance: Elderly, overweight, in no distress.  HENT:      Head: Normocephalic and atraumatic.   Eyes:      Extraocular Movements: Extraocular movements intact.      Pupils: Pupils are equal, round, and reactive to light.   Cardiovascular:      Rate and Rhythm: Normal rate and regular rhythm.      Pulses: Normal pulses.      Heart sounds: Normal heart sounds.   Pulmonary:      Effort: Pulmonary effort is normal.      Breath sounds: Diminished bases, few Rales, no wheeze.  Abdominal:      General: Abdomen is flat. Bowel sounds are normal.      Palpations: Abdomen is soft.   Musculoskeletal:         General: Normal range of motion.      Cervical back: Normal range of motion and neck supple.   Skin:     General: Skin is warm and dry.   Neurological:      General: No focal deficit present.      Mental Status: Alert and oriented x 3, moves all extremities.  Responds to questions.    Medications:  Per MAR    Laboratory Findings:  Lab Results   Component Value Date    WBC 6.9 08/06/2025    HGB 11.7 (L) 08/06/2025    HCT 37.0 (L) 08/06/2025    MCV 91 08/06/2025     08/06/2025     Lab Results   Component Value Date    INR 1.2 01/30/2025    INR 2.00 10/09/2024    INR 2.30 09/26/2024    PROTIME 12.8 (H) 01/30/2025    PROTIME 22.8 (H) 08/13/2024    PROTIME 21.2 (H) 08/12/2024     Lab Results   Component Value Date    GLUCOSE 97 08/06/2025    CALCIUM 9.2 08/06/2025     08/06/2025    K 4.3 08/06/2025    CO2 28 08/06/2025     08/06/2025    BUN 41 (H) 08/06/2025    CREATININE 1.41 (H) 08/06/2025       Assessment and Plan:     UTI -doing well with antibiotics, complicated  by nephrolithiasis.  C. difficile colitis -continue with vancomycin, improving  Weakness -continue with therapy at facility  Pneumonia -good response to antibiotics  Obstructing calculus -scheduled for procedure in 2 days with Dr. Newsome    Discharge to facility today.       Florencio Little MD  08/06/25  11:16 AM

## 2025-08-06 NOTE — CARE PLAN
The patient's goals for the shift include      The clinical goals for the shift include maintain pt safety    Over the shift, the patient did not make progress toward the following goals. Barriers to progression include . Recommendations to address these barriers include .

## 2025-08-06 NOTE — NURSING NOTE
Called report at 1326 and no answer after 3 attempts.  Person answering the phone stated that the nurse will call back.  Number left with .  Awaiting return call.

## 2025-08-08 ENCOUNTER — ANESTHESIA (OUTPATIENT)
Dept: OPERATING ROOM | Facility: HOSPITAL | Age: 80
End: 2025-08-08
Payer: MEDICARE

## 2025-08-08 ENCOUNTER — HOSPITAL ENCOUNTER (OUTPATIENT)
Facility: HOSPITAL | Age: 80
Setting detail: OUTPATIENT SURGERY
Discharge: HOME | End: 2025-08-08
Attending: UROLOGY | Admitting: UROLOGY
Payer: MEDICARE

## 2025-08-08 ENCOUNTER — APPOINTMENT (OUTPATIENT)
Dept: RADIOLOGY | Facility: HOSPITAL | Age: 80
End: 2025-08-08
Payer: MEDICARE

## 2025-08-08 ENCOUNTER — ANESTHESIA EVENT (OUTPATIENT)
Dept: OPERATING ROOM | Facility: HOSPITAL | Age: 80
End: 2025-08-08
Payer: MEDICARE

## 2025-08-08 VITALS
TEMPERATURE: 97 F | HEART RATE: 61 BPM | DIASTOLIC BLOOD PRESSURE: 55 MMHG | RESPIRATION RATE: 16 BRPM | SYSTOLIC BLOOD PRESSURE: 121 MMHG | OXYGEN SATURATION: 99 %

## 2025-08-08 DIAGNOSIS — N20.0 RIGHT RENAL STONE: ICD-10-CM

## 2025-08-08 DIAGNOSIS — N20.1 RIGHT URETERAL STONE: ICD-10-CM

## 2025-08-08 DIAGNOSIS — N21.0 BLADDER STONE: Primary | ICD-10-CM

## 2025-08-08 PROBLEM — I50.30 DIASTOLIC CONGESTIVE HEART FAILURE: Status: ACTIVE | Noted: 2023-09-12

## 2025-08-08 PROBLEM — I49.5 SSS (SICK SINUS SYNDROME) (MULTI): Status: ACTIVE | Noted: 2025-08-08

## 2025-08-08 PROBLEM — D32.9 MENINGIOMA (MULTI): Status: ACTIVE | Noted: 2025-08-08

## 2025-08-08 PROBLEM — J96.10 CHRONIC RESPIRATORY FAILURE: Status: ACTIVE | Noted: 2023-09-12

## 2025-08-08 PROBLEM — N18.30 CHRONIC RENAL IMPAIRMENT, STAGE 3 (MODERATE) (MULTI): Status: ACTIVE | Noted: 2025-04-20

## 2025-08-08 PROCEDURE — 2780000003 HC OR 278 NO HCPCS: Performed by: UROLOGY

## 2025-08-08 PROCEDURE — 52356 CYSTO/URETERO W/LITHOTRIPSY: CPT | Performed by: UROLOGY

## 2025-08-08 PROCEDURE — 2550000001 HC RX 255 CONTRASTS: Performed by: UROLOGY

## 2025-08-08 PROCEDURE — 7100000009 HC PHASE TWO TIME - INITIAL BASE CHARGE: Performed by: UROLOGY

## 2025-08-08 PROCEDURE — 7100000010 HC PHASE TWO TIME - EACH INCREMENTAL 1 MINUTE: Performed by: UROLOGY

## 2025-08-08 PROCEDURE — 3600000003 HC OR TIME - INITIAL BASE CHARGE - PROCEDURE LEVEL THREE: Performed by: UROLOGY

## 2025-08-08 PROCEDURE — 3600000008 HC OR TIME - EACH INCREMENTAL 1 MINUTE - PROCEDURE LEVEL THREE: Performed by: UROLOGY

## 2025-08-08 PROCEDURE — C1769 GUIDE WIRE: HCPCS | Performed by: UROLOGY

## 2025-08-08 PROCEDURE — 76000 FLUOROSCOPY <1 HR PHYS/QHP: CPT

## 2025-08-08 PROCEDURE — C1747 HC OR 272 NO HCPCS: HCPCS | Performed by: UROLOGY

## 2025-08-08 PROCEDURE — 7100000001 HC RECOVERY ROOM TIME - INITIAL BASE CHARGE: Performed by: UROLOGY

## 2025-08-08 PROCEDURE — 3700000001 HC GENERAL ANESTHESIA TIME - INITIAL BASE CHARGE: Performed by: UROLOGY

## 2025-08-08 PROCEDURE — 2500000001 HC RX 250 WO HCPCS SELF ADMINISTERED DRUGS (ALT 637 FOR MEDICARE OP): Performed by: UROLOGY

## 2025-08-08 PROCEDURE — 82365 CALCULUS SPECTROSCOPY: CPT | Performed by: UROLOGY

## 2025-08-08 PROCEDURE — 2720000007 HC OR 272 NO HCPCS: Performed by: UROLOGY

## 2025-08-08 PROCEDURE — 2500000004 HC RX 250 GENERAL PHARMACY W/ HCPCS (ALT 636 FOR OP/ED)

## 2025-08-08 PROCEDURE — 74420 UROGRAPHY RTRGR +-KUB: CPT | Performed by: UROLOGY

## 2025-08-08 PROCEDURE — 2500000004 HC RX 250 GENERAL PHARMACY W/ HCPCS (ALT 636 FOR OP/ED): Performed by: ANESTHESIOLOGY

## 2025-08-08 PROCEDURE — C2617 STENT, NON-COR, TEM W/O DEL: HCPCS | Performed by: UROLOGY

## 2025-08-08 PROCEDURE — 3700000002 HC GENERAL ANESTHESIA TIME - EACH INCREMENTAL 1 MINUTE: Performed by: UROLOGY

## 2025-08-08 PROCEDURE — 7100000002 HC RECOVERY ROOM TIME - EACH INCREMENTAL 1 MINUTE: Performed by: UROLOGY

## 2025-08-08 PROCEDURE — 2500000004 HC RX 250 GENERAL PHARMACY W/ HCPCS (ALT 636 FOR OP/ED): Performed by: UROLOGY

## 2025-08-08 PROCEDURE — C1894 INTRO/SHEATH, NON-LASER: HCPCS | Performed by: UROLOGY

## 2025-08-08 PROCEDURE — C1758 CATHETER, URETERAL: HCPCS | Performed by: UROLOGY

## 2025-08-08 DEVICE — IMPLANTABLE DEVICE: Type: IMPLANTABLE DEVICE | Site: URETER | Status: FUNCTIONAL

## 2025-08-08 RX ORDER — FLUCONAZOLE 100 MG/1
200 TABLET ORAL ONCE
Status: COMPLETED | OUTPATIENT
Start: 2025-08-08 | End: 2025-08-08

## 2025-08-08 RX ORDER — PHENYLEPHRINE HCL IN 0.9% NACL 1 MG/10 ML
SYRINGE (ML) INTRAVENOUS AS NEEDED
Status: DISCONTINUED | OUTPATIENT
Start: 2025-08-08 | End: 2025-08-08

## 2025-08-08 RX ORDER — HYDROMORPHONE HYDROCHLORIDE 0.2 MG/ML
0.1 INJECTION INTRAMUSCULAR; INTRAVENOUS; SUBCUTANEOUS EVERY 5 MIN PRN
Status: DISCONTINUED | OUTPATIENT
Start: 2025-08-08 | End: 2025-08-08 | Stop reason: HOSPADM

## 2025-08-08 RX ORDER — ALBUTEROL SULFATE 0.83 MG/ML
2.5 SOLUTION RESPIRATORY (INHALATION) ONCE AS NEEDED
Status: DISCONTINUED | OUTPATIENT
Start: 2025-08-08 | End: 2025-08-08 | Stop reason: HOSPADM

## 2025-08-08 RX ORDER — ONDANSETRON HYDROCHLORIDE 2 MG/ML
4 INJECTION, SOLUTION INTRAVENOUS ONCE AS NEEDED
Status: DISCONTINUED | OUTPATIENT
Start: 2025-08-08 | End: 2025-08-08 | Stop reason: HOSPADM

## 2025-08-08 RX ORDER — CEFTRIAXONE 2 G/50ML
2 INJECTION, SOLUTION INTRAVENOUS EVERY 24 HOURS
Status: DISCONTINUED | OUTPATIENT
Start: 2025-08-08 | End: 2025-08-08 | Stop reason: HOSPADM

## 2025-08-08 RX ORDER — LIDOCAINE HYDROCHLORIDE 10 MG/ML
0.1 INJECTION, SOLUTION INFILTRATION; PERINEURAL ONCE
Status: DISCONTINUED | OUTPATIENT
Start: 2025-08-08 | End: 2025-08-08 | Stop reason: HOSPADM

## 2025-08-08 RX ORDER — HYDRALAZINE HYDROCHLORIDE 20 MG/ML
5 INJECTION INTRAMUSCULAR; INTRAVENOUS EVERY 30 MIN PRN
Status: DISCONTINUED | OUTPATIENT
Start: 2025-08-08 | End: 2025-08-08 | Stop reason: HOSPADM

## 2025-08-08 RX ORDER — SODIUM CHLORIDE, SODIUM LACTATE, POTASSIUM CHLORIDE, CALCIUM CHLORIDE 600; 310; 30; 20 MG/100ML; MG/100ML; MG/100ML; MG/100ML
100 INJECTION, SOLUTION INTRAVENOUS CONTINUOUS
Status: DISCONTINUED | OUTPATIENT
Start: 2025-08-08 | End: 2025-08-08 | Stop reason: HOSPADM

## 2025-08-08 RX ORDER — FENTANYL CITRATE 50 UG/ML
INJECTION, SOLUTION INTRAMUSCULAR; INTRAVENOUS AS NEEDED
Status: DISCONTINUED | OUTPATIENT
Start: 2025-08-08 | End: 2025-08-08

## 2025-08-08 RX ORDER — ONDANSETRON HYDROCHLORIDE 2 MG/ML
INJECTION, SOLUTION INTRAVENOUS AS NEEDED
Status: DISCONTINUED | OUTPATIENT
Start: 2025-08-08 | End: 2025-08-08

## 2025-08-08 RX ORDER — PROPOFOL 10 MG/ML
INJECTION, EMULSION INTRAVENOUS AS NEEDED
Status: DISCONTINUED | OUTPATIENT
Start: 2025-08-08 | End: 2025-08-08

## 2025-08-08 RX ORDER — LIDOCAINE HYDROCHLORIDE 20 MG/ML
INJECTION, SOLUTION EPIDURAL; INFILTRATION; INTRACAUDAL; PERINEURAL AS NEEDED
Status: DISCONTINUED | OUTPATIENT
Start: 2025-08-08 | End: 2025-08-08

## 2025-08-08 RX ORDER — HYDROMORPHONE HYDROCHLORIDE 0.2 MG/ML
0.2 INJECTION INTRAMUSCULAR; INTRAVENOUS; SUBCUTANEOUS EVERY 5 MIN PRN
Status: DISCONTINUED | OUTPATIENT
Start: 2025-08-08 | End: 2025-08-08 | Stop reason: HOSPADM

## 2025-08-08 RX ADMIN — FENTANYL CITRATE 25 MCG: 50 INJECTION, SOLUTION INTRAMUSCULAR; INTRAVENOUS at 15:25

## 2025-08-08 RX ADMIN — Medication 100 MCG: at 14:52

## 2025-08-08 RX ADMIN — FENTANYL CITRATE 25 MCG: 50 INJECTION, SOLUTION INTRAMUSCULAR; INTRAVENOUS at 15:03

## 2025-08-08 RX ADMIN — CEFTRIAXONE 2 G: 2 INJECTION, SOLUTION INTRAVENOUS at 14:41

## 2025-08-08 RX ADMIN — Medication 100 MCG: at 14:58

## 2025-08-08 RX ADMIN — Medication 200 MCG: at 15:05

## 2025-08-08 RX ADMIN — PROPOFOL 120 MG: 10 INJECTION, EMULSION INTRAVENOUS at 14:38

## 2025-08-08 RX ADMIN — SODIUM CHLORIDE, POTASSIUM CHLORIDE, SODIUM LACTATE AND CALCIUM CHLORIDE: 600; 310; 30; 20 INJECTION, SOLUTION INTRAVENOUS at 14:30

## 2025-08-08 RX ADMIN — LIDOCAINE HYDROCHLORIDE 60 MG: 20 INJECTION, SOLUTION EPIDURAL; INFILTRATION; INTRACAUDAL at 14:38

## 2025-08-08 RX ADMIN — ONDANSETRON 4 MG: 2 INJECTION, SOLUTION INTRAMUSCULAR; INTRAVENOUS at 14:52

## 2025-08-08 RX ADMIN — FLUCONAZOLE 200 MG: 100 TABLET ORAL at 14:14

## 2025-08-08 RX ADMIN — DEXAMETHASONE SODIUM PHOSPHATE 4 MG: 4 INJECTION, SOLUTION INTRAMUSCULAR; INTRAVENOUS at 14:45

## 2025-08-08 RX ADMIN — FENTANYL CITRATE 25 MCG: 50 INJECTION, SOLUTION INTRAMUSCULAR; INTRAVENOUS at 14:52

## 2025-08-08 RX ADMIN — Medication 100 MCG: at 14:38

## 2025-08-08 RX ADMIN — Medication 200 MCG: at 15:13

## 2025-08-08 RX ADMIN — Medication 200 MCG: at 15:25

## 2025-08-08 SDOH — HEALTH STABILITY: MENTAL HEALTH: CURRENT SMOKER: 0

## 2025-08-08 ASSESSMENT — PAIN SCALES - GENERAL
PAINLEVEL_OUTOF10: 0 - NO PAIN
PAINLEVEL_OUTOF10: 3
PAINLEVEL_OUTOF10: 5 - MODERATE PAIN
PAINLEVEL_OUTOF10: 0 - NO PAIN

## 2025-08-08 ASSESSMENT — PAIN DESCRIPTION - DESCRIPTORS: DESCRIPTORS: ACHING

## 2025-08-08 ASSESSMENT — PAIN - FUNCTIONAL ASSESSMENT
PAIN_FUNCTIONAL_ASSESSMENT: 0-10
PAIN_FUNCTIONAL_ASSESSMENT: 0-10
PAIN_FUNCTIONAL_ASSESSMENT: CPOT (CRITICAL CARE PAIN OBSERVATION TOOL)
PAIN_FUNCTIONAL_ASSESSMENT: CPOT (CRITICAL CARE PAIN OBSERVATION TOOL)
PAIN_FUNCTIONAL_ASSESSMENT: 0-10
PAIN_FUNCTIONAL_ASSESSMENT: CPOT (CRITICAL CARE PAIN OBSERVATION TOOL)
PAIN_FUNCTIONAL_ASSESSMENT: 0-10

## 2025-08-08 NOTE — ANESTHESIA PROCEDURE NOTES
Airway  Date/Time: 8/8/2025 2:40 PM  Reason: elective      Staffing  Performed: CRNA   Authorized by: Guido Sofia MD    Performed by: RON Allan-LENIN  Patient location during procedure: OR    Patient Condition  Indications for airway management: anesthesia  Sedation level: deep     Final Airway Details   Preoxygenated: yes  Final airway type: supraglottic airway  Successful airway: classic  Size: 4  Number of attempts at approach: 1

## 2025-08-08 NOTE — OP NOTE
CYSTOSCOPY, WITH LASER LITHOTRIPSY (R) Operative Note     Date: 2025  OR Location: [facility]    Name: Angel Carroll, : 1945, Age: 80 y.o., MRN: [id number], Sex: male    Diagnosis  Pre-op Diagnosis      * Right ureteral stone [N20.1]     * Right renal stone [N20.0]  Bladder stone Post-op Diagnosis     * Right ureteral stone [N20.1]     * Right renal stone [N20.0]  Bladder stone     Procedures  Cystoscopy, extraction of bladder stones  Bilateral retrograde pyelograms  Right ureteroscopy with laser lithotripsy and stone basketing of ureteral stone  Right ureteroscopy with laser lithotripsy and stone basketing of renal stones, stent placement      Surgeons      * Cristobal Newsome - Primary    Resident/Fellow/Other Assistant:  Surgeons and Role:  * No surgeons found with a matching role *    Staff:   Circulator: Mary Alice Bradshaw Person: Lyly  Circulator: Mckenna    Anesthesia Staff: Anesthesiologist: Guido Sofia MD  CRNA: RON Allan-CRNA    Procedure Summary  Anesthesia: General  ASA: IV  Estimated Blood Loss: 5 mL  Intra-op Medications:   Administrations occurring from 1440 to 1615 on 25:   Medication Name Total Dose   iohexol (OMNIPaque) 350 mg iodine/mL solution 50 mL   cefTRIAXone (Rocephin) 2 g in dextrose (iso) IV 50 mL 2 g   dexAMETHasone (Decadron) 4 mg/mL IV Syringe 2 mL 4 mg   fentaNYL (Sublimaze) injection 50 mcg/mL 75 mcg   ondansetron (Zofran) 2 mg/mL injection 4 mg   phenylephrine 100 mcg/mL syringe 10 mL (prefilled) 800 mcg              Anesthesia Record               Intraprocedure I/O Totals       None           Specimen:   ID Type Source Tests Collected by Time   A : right ureteral stone Calculus Urine, Clean Catch CALCULI (STONE) ANALYSIS Cristobal Newsome MD 2025 1519                   Drains and/or Catheters: 6 Greek by 28 cm right double-J ureteral stent without attached suture    Findings: Stones fragmented and removed from right ureter and kidney, stones extracted  from bladder    Indications: Angel Carroll is an 80 y.o. male who is having surgery for right ureteral, right renal enal, and bladder stones.  He is scheduled for ureteroscopy and laser lithotripsy.  Prior attempt at ureteroscopy at [facility] was unsuccessful.  Risks including infection, bleeding, injury to the urinary tract, inability to access the right collecting system were reviewed.  Written consent was obtained.    Procedure Details: He was taken to the operating room and given general anesthesia.  He was sterilely prepped and draped in the modified lithotomy position.  He received preoperative Rocephin.  Additionally oral Diflucan was recommended per infectious disease and this was given preoperatively.  A cystoscope was advanced through the urethra into the bladder.  Old blood was evacuated from the bladder.  He had bladder stones which were also able to be evacuated through the cystoscope.  The ureteral orifice ease were not immediately identifiable.  Ultimately the left ureteral orifice was identified.  Retrograde pyelogram was performed to help identify anatomy.  Eventually the right ureteral orifice was identified and comparable placed on the right side and retrograde pyelogram was performed.  The stone was visible in the distal right ureter.  A wire was advanced up the catheter and the ureteral catheter was withdrawn.    A second wire was placed with a dual-lumen ureteral catheter.  A 12/14 by 25 cm ureteral access sheath was advanced into the distal ureter on the right.  The wire and inner trocar was withdrawn.  A flexible ureteroscope was inserted.  The stone was visualized and a 200 µm holmium laser fiber was used to fragment the stone into multiple fragments.    The flexible ureteroscope was advanced up to the kidney.  Within the mid to lower calyx, there were multiple stones.  At the 200 µm holmium laser fiber was used to fragment the stones.  Stone basketing was subsequently performed.  No  stones of significant size were identified remaining within the right kidney.  The ureteroscope was withdrawn.  Remaining fragments within the distal right ureter were basketed and no stones were identified remaining within the right ureter.  The access sheath and ureteroscope were withdrawn.    A 6 Wolof by 28 cm double-J ureteral stent was placed on the right without an attached suture.  Correct positioning was confirmed with fluoroscopy and visualization of the distal end within the bladder.  The cystoscope was withdrawn.  He tolerated the procedure and was transferred to the recovery room in stable condition.    He will follow-up in the office in 4 weeks for removal of the right ureteral stent.    Complications:  None; patient tolerated the procedure well.              Cristobal Newsome  Phone Number: [phone]

## 2025-08-08 NOTE — INTERVAL H&P NOTE
H&P reviewed. The patient was examined and there are no changes to the H&P.  Right ureteroscopy today.

## 2025-08-08 NOTE — DISCHARGE INSTRUCTIONS
Follow up in 4 weeks for stent removal.    Go to emergency department with a fever.  You may eat a regular diet  You may shower.  Avoid strenuous activity for 24 hours    Contact information:  Urology main number: [phone]  Urology : [phone]  Urology nurse: [phone]  Dr. Newsome [place] for emergency: [phone]

## 2025-08-08 NOTE — ANESTHESIA POSTPROCEDURE EVALUATION
Patient: Angel Carroll    Procedure Summary       Date: 08/08/25 Room / Location: [facility] OR 07 / Virtual [facility]    Anesthesia Start: 1430 Anesthesia Stop: 1544    Procedure: CYSTOSCOPY, WITH LASER LITHOTRIPSY (Right) Diagnosis:       Right ureteral stone      Right renal stone      Bladder stone      (Right ureteral stone [N20.1])      (Right renal stone [N20.0])    Surgeons: Cristobal Newsome MD Responsible Provider: Guido Sofia MD    Anesthesia Type: general ASA Status: 4            Anesthesia Type: general    Vitals Value Taken Time   /59 08/08/25 16:15   Temp 36.1 °C (97 °F) 08/08/25 15:44   Pulse 61 08/08/25 16:15   Resp 11 08/08/25 16:15   SpO2 99 % 08/08/25 16:15       Anesthesia Post Evaluation    Patient location during evaluation: PACU  Patient participation: complete - patient participated  Level of consciousness: awake and alert  Pain management: adequate  Airway patency: patent  Cardiovascular status: acceptable  Respiratory status: acceptable  Hydration status: acceptable  Postoperative Nausea and Vomiting: none        There were no known notable events for this encounter.

## 2025-08-08 NOTE — ANESTHESIA PREPROCEDURE EVALUATION
Patient: Angel Carroll    Procedure Information       Date/Time: 08/08/25 1440    Procedure: CYSTOSCOPY, WITH LASER LITHOTRIPSY (Right)    Location: [facility] OR 07 / [facility]    Surgeons: Cristobal Newsome MD        Medical History[1]     Relevant Problems   Cardiac   (+) Atherosclerosis of coronary artery   (+) Atrial fibrillation (Multi)   (+) Atrial flutter (Multi)   (+) Benign essential hypertension   (+) Chest pain   (+) Chronic atrial fibrillation (Multi)   (+) Diastolic congestive heart failure   (+) Hyperlipidemia   (+) Hypertension   (+) Pacemaker   (+) Paroxysmal atrial fibrillation (Multi)   (+) SSS (sick sinus syndrome) (Multi)   (+) Sinus bradycardia      Pulmonary   (+) COPD (chronic obstructive pulmonary disease) (Multi)   (+) Dyspnea on exertion      Neuro   (+) Anxiety   (+) Carpal tunnel syndrome   (+) 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 (Multi)   (+) Depression   (+) Disorder of carotid artery   (+) Idiopathic peripheral neuropathy   (+) Lumbar radiculitis   (+) Stenosis of right internal carotid artery with cerebral infarction (Multi)      GI   (+) GI bleed   (+) Oral phase dysphagia      /Renal   (+) BPH (benign prostatic hyperplasia)   (+) Calculus of kidney   (+) Chronic renal impairment, stage 3 (moderate) (Multi)   (+) Hydronephrosis with urinary obstruction due to renal calculus   (+) Renal calculi   (+) Right renal stone   (+) UTI (urinary tract infection)      Endocrine   (+) Goiter, nontoxic, multinodular      Hematology   (+) Anemia   (+) Iron deficiency anemia   (+) Long term (current) use of anticoagulants      Musculoskeletal   (+) Carpal tunnel syndrome   (+) Lumbar stenosis with neurogenic claudication   (+) Osteoarthritis of right knee   (+) Primary localized osteoarthrosis, hand   (+) Primary osteoarthritis of left knee   (+) Primary osteoarthritis of right elbow   (+) Primary osteoarthritis of right hip      ID   (+) Acute COVID-19    (+) Herpes zoster   (+) Local infection of wound   (+) Onychomycosis of toenail   (+) UTI (urinary tract infection)      Skin   (+) Exanthem      Nervous   (+) Meningioma (Multi)     Surgical History[2]   Clinical information reviewed:   Tobacco  Allergies  Meds   Med Hx  Surg Hx   Fam Hx          NPO Detail:  NPO/Void Status  Carbohydrate Drink Given Prior to Surgery? : N  Date of Last Liquid: 08/08/25  Time of Last Liquid: 0930 (Sip of water with AM meds)  Date of Last Solid: 08/07/25  Time of Last Solid: 1800  Last Intake Type: Clear fluids  Time of Last Void: 0900         Physical Exam    Airway  Mallampati: II  TM distance: >3 FB  Neck ROM: full     Cardiovascular    Dental    Pulmonary    Abdominal            Anesthesia Plan    History of general anesthesia?: yes  History of complications of general anesthesia?: no    ASA 4     general     The patient is not a current smoker.  Patient was not previously instructed to abstain from smoking on day of procedure.  Patient did not smoke on day of procedure.    intravenous induction   Postoperative pain plan includes opioids.  Anesthetic plan and risks discussed with patient.    Plan discussed with attending.           [1]   Past Medical History:  Diagnosis Date    Atherosclerotic heart disease of native coronary artery without angina pectoris 12/14/2022    Arteriosclerotic cardiovascular disease (ASCVD)    Benign neoplasm of meninges, unspecified     Meningioma    BPH (benign prostatic hyperplasia)     Chronic diastolic heart failure     Chronic kidney disease     Chronic obstructive pulmonary disease (COPD) (Multi)     Chronic respiratory failure     Elevated blood-pressure reading, without diagnosis of hypertension     Prehypertension    Hyperlipidemia     Meningioma (Multi)     Nephrolithiasis     Rectal bleeding     Sick sinus syndrome (Multi)     s/p pacemaker placement    Vitamin D deficiency    [2]   Past Surgical History:  Procedure Laterality Date     BACK SURGERY  04/18/2016    Back Surgery    BRAIN SURGERY      COLONOSCOPY  07/03/2013    Complete Colonoscopy    CORONARY ANGIOPLASTY WITH STENT PLACEMENT  08/18/2020    Cath Stent Placement    CT ANGIO NECK  09/25/2021    CT NECK ANGIO W AND WO IV CONTRAST 9/25/2021 [facility]    CT HEAD ANGIO W AND WO IV CONTRAST  09/25/2021    CT HEAD ANGIO W AND WO IV CONTRAST 9/25/2021 [facility]    CYSTOSCOPY W/ URETERAL STENT PLACEMENT      EYE SURGERY  04/03/2013    Eye Surgery    KNEE ARTHROSCOPY W/ DEBRIDEMENT  07/03/2013    Arthroscopy Knee Right    KNEE ARTHROSCOPY W/ DEBRIDEMENT  07/03/2013    Arthroscopy Knee Left    LITHOTRIPSY  07/22/2013    Renal Lithotripsy    MR HEAD ANGIO WO IV CONTRAST  03/23/2021    MR HEAD ANGIO WO IV CONTRAST [facility]    MR NECK ANGIO WO IV CONTRAST  03/23/2021    MR NECK ANGIO WO IV CONTRAST [facility]    OTHER SURGICAL HISTORY  04/03/2013    Cardiac Cath Procedure Outcome: Successful    OTHER SURGICAL HISTORY  04/03/2013    Neuroplasty With Transposition Of Ulnar Nerve - At Elbow    OTHER SURGICAL HISTORY  07/03/2013    Wrist Carpectomy    OTHER SURGICAL HISTORY  07/03/2013    Ulnar Osteotomy    PACEMAKER PLACEMENT      TONSILLECTOMY      TOTAL KNEE ARTHROPLASTY

## 2025-08-12 NOTE — DISCHARGE SUMMARY
Discharge Diagnosis  Renal calculi           Issues Requiring Follow-Up      Discharge Meds     Medication List      CHANGE how you take these medications     ferrous sulfate 250 mg (50 mg iron) tablet extended release; Take 1   tablet (250 mg) by mouth once daily.; What changed: Another medication   with the same name was removed. Continue taking this medication, and   follow the directions you see here.     CONTINUE taking these medications     acetaminophen 325 mg tablet; Commonly known as: Tylenol   amiodarone 200 mg tablet; Commonly known as: Pacerone; Take 0.5 tablets   (100 mg) by mouth once daily.   atorvastatin 20 mg tablet; Commonly known as: Lipitor; TAKE 1 TABLET   EVERY DAY   cefdinir 300 mg capsule; Commonly known as: Omnicef   cyanocobalamin 500 mcg tablet; Commonly known as: Vitamin B-12   Eliquis 2.5 mg tablet; Generic drug: apixaban; Take 1 tablet (2.5 mg) by   mouth 2 times a day.   Entresto 24-26 mg tablet; Generic drug: sacubitriL-valsartan; Take 1   tablet by mouth 2 times a day.   ergocalciferol 1250 mcg (50,000 units) capsule; Commonly known as:   Vitamin D-2; TAKE 1 CAPSULE EVERY WEEK   fenofibrate 48 mg tablet; Commonly known as: Tricor; TAKE 1 AND 1/2   TABLETS ONE TIME DAILY   finasteride 5 mg tablet; Commonly known as: Proscar; Take 1 tablet (5   mg) by mouth once daily.   folic acid 1 mg tablet; Commonly known as: Folvite   furosemide 20 mg tablet; Commonly known as: Lasix   gabapentin 100 mg capsule; Commonly known as: Neurontin; Take 1 capsule   (100 mg) by mouth once daily at bedtime.   melatonin 3 mg tablet; Take 1 tablet (3 mg) by mouth as needed at   bedtime for sleep.   metoprolol succinate XL 25 mg 24 hr tablet; Commonly known as:   Toprol-XL; TAKE 1 TABLET ONE TIME DAILY (DO NOT CRUSH OR CHEW)   Milk of Magnesia 400 mg/5 mL suspension; Generic drug: magnesium   hydroxide   NATURAL TEARS (PF) OPHT   nystatin 100,000 unit/gram powder; Commonly known as: Mycostatin; Apply    topically every 8 hours.   PARoxetine 20 mg tablet; Commonly known as: Paxil; TAKE 1 TABLET EVERY   DAY   polyethylene glycol 17 gram packet; Commonly known as: Glycolax,   Miralax; Take 17 g by mouth once daily.   Protonix 40 mg EC tablet; Generic drug: pantoprazole   psyllium 0.4 gram capsule; Commonly known as: Metamucil   tamsulosin 0.4 mg 24 hr capsule; Commonly known as: Flomax; TAKE 1   CAPSULE AT BEDTIME   vancomycin 125 mg capsule; Commonly known as: Vancocin     STOP taking these medications     oxygen gas therapy; Commonly known as: O2       Test Results Pending At Discharge  Pending Labs       No current pending labs.            Hospital Course     Angel Carroll is a 80 y.o. male presenting with generalized weakness to [place] 5 days ago.  He was diagnosed with UTI and pneumonia.  He was started on antibiotics.  He was noted to have acute kidney injury and imaging revealed obstructing calculus.  He was seen by urology and had stent placement and was advised to follow-up for definitive stone treatment.  He had some diarrhea and was found to have positive assay for C. difficile.  Vancomycin was started.  He has some shortness of breath and was treated for empiric pneumonia.  He was seen for elevated troponin by cardiology and was managed conservatively.  He was transferred to this hospital for continued care and per family request.  He is a limited historian and denies pain at this time.  He appears weak.     The patient was given atbs, treated for c diff and a uti. She was also noted with pneumonia, she was recommended a procedure in 2 days post dc for an obstructing calculus. She was stable for dc back to the facility with the above plans in place.     Pertinent Physical Exam At Time of Discharge    Outpatient Follow-Up  Future Appointments   Date Time Provider Department Center   8/29/2025  2:40 PM Annamaria Burgess, PharmD [id number] [facility]   9/8/2025  2:50 PM Cristobal Newsome MD [address]    9/12/2025  4:20 PM Jung Tao MD [id number] [place]   9/16/2025  1:00 PM Royce Louis MD PhD [id number] [place]   9/23/2025 11:20 AM Lety Avalos [facility] [id number] [facility]   9/29/2025  2:30 PM Alysia Sheets [facility] [id number] [facility]   10/1/2025  1:00 PM Jose Mccormick MD [id number] [facility]   11/18/2025  1:40 PM Tiana Juarez MD [id number] [facility]       Time and care for discharge management > 30 minutes.     Florencio Little MD

## 2025-08-13 DIAGNOSIS — N18.32 STAGE 3B CHRONIC KIDNEY DISEASE (MULTI): ICD-10-CM

## 2025-08-13 DIAGNOSIS — I48.11 LONGSTANDING PERSISTENT ATRIAL FIBRILLATION (MULTI): ICD-10-CM

## 2025-08-13 LAB
APPEARANCE STONE: NORMAL
COMPN STONE: NORMAL
SPECIMEN WT: 18 MG

## 2025-08-13 RX ORDER — AMIODARONE HYDROCHLORIDE 200 MG/1
100 TABLET ORAL DAILY
Qty: 45 TABLET | Refills: 1 | Status: SHIPPED | OUTPATIENT
Start: 2025-08-13

## 2025-08-14 RX ORDER — ERGOCALCIFEROL 1.25 MG/1
1.25 CAPSULE ORAL
Qty: 12 CAPSULE | Refills: 1 | Status: SHIPPED | OUTPATIENT
Start: 2025-08-17

## 2025-08-22 ENCOUNTER — APPOINTMENT (OUTPATIENT)
Dept: PRIMARY CARE | Facility: CLINIC | Age: 80
End: 2025-08-22
Payer: MEDICARE

## 2025-08-27 DIAGNOSIS — I12.9 HYPERTENSIVE KIDNEY DISEASE WITH STAGE 3B CHRONIC KIDNEY DISEASE (MULTI): ICD-10-CM

## 2025-08-27 DIAGNOSIS — N18.32 HYPERTENSIVE KIDNEY DISEASE WITH STAGE 3B CHRONIC KIDNEY DISEASE (MULTI): ICD-10-CM

## 2025-08-29 ENCOUNTER — APPOINTMENT (OUTPATIENT)
Dept: PHARMACY | Facility: HOSPITAL | Age: 80
End: 2025-08-29
Payer: MEDICARE

## 2025-08-29 DIAGNOSIS — I51.9 LV DYSFUNCTION: ICD-10-CM

## 2025-08-29 DIAGNOSIS — I48.0 PAROXYSMAL ATRIAL FIBRILLATION (MULTI): ICD-10-CM

## 2025-08-29 DIAGNOSIS — N39.0 RECURRENT UTI: ICD-10-CM

## 2025-09-08 ENCOUNTER — APPOINTMENT (OUTPATIENT)
Dept: UROLOGY | Facility: CLINIC | Age: 80
End: 2025-09-08
Payer: MEDICARE

## 2025-09-12 ENCOUNTER — APPOINTMENT (OUTPATIENT)
Dept: PRIMARY CARE | Facility: CLINIC | Age: 80
End: 2025-09-12
Payer: MEDICARE

## 2025-09-16 ENCOUNTER — APPOINTMENT (OUTPATIENT)
Dept: OPHTHALMOLOGY | Facility: CLINIC | Age: 80
End: 2025-09-16
Payer: MEDICARE

## 2025-09-23 ENCOUNTER — APPOINTMENT (OUTPATIENT)
Dept: UROLOGY | Facility: CLINIC | Age: 80
End: 2025-09-23
Payer: MEDICARE

## 2025-09-29 ENCOUNTER — APPOINTMENT (OUTPATIENT)
Dept: CARDIOLOGY | Facility: CLINIC | Age: 80
End: 2025-09-29
Payer: MEDICARE

## 2025-10-31 ENCOUNTER — APPOINTMENT (OUTPATIENT)
Dept: PHARMACY | Facility: HOSPITAL | Age: 80
End: 2025-10-31
Payer: MEDICARE

## 2025-11-18 ENCOUNTER — APPOINTMENT (OUTPATIENT)
Dept: NEPHROLOGY | Facility: CLINIC | Age: 80
End: 2025-11-18
Payer: MEDICARE

## (undated) DEVICE — GUIDEWIRE, STRAIGHT, GLIDEWIRE, 0.035 IN X 150 CM, 3 CM TIP

## (undated) DEVICE — SOLUTION, IRRIGATION, USP, SODIUM CHLORIDE 0.9%, 3000 ML

## (undated) DEVICE — GLOVE, PROTEXIS PI CLASSIC, SZ-7.5, PF, LF

## (undated) DEVICE — Device

## (undated) DEVICE — HOLMIUM 200 FORTEC FIBER

## (undated) DEVICE — URETERSCOPE, APTRA SCOPE, STANDARD DEFLECTION, SNGLE USE

## (undated) DEVICE — CATHETER, URETERAL, POLLACK, OPEN END, 5.5 FR, 70 CM

## (undated) DEVICE — SYSTEM, EASY CATCHER, DISP, NON-STERILE

## (undated) DEVICE — HOLMIUM MASTERPULSE HF

## (undated) DEVICE — GLOVE, SURGICAL, PROTEXIS PI W/NEU-THERA, 7.5, PF, LF

## (undated) DEVICE — SOLUTION, IRRIGATION, STERILE WATER, 1000 ML, POUR BOTTLE

## (undated) DEVICE — BASKET, STONE 1.9 X 120 SKYLITE TIPLESS 12MM BASKET

## (undated) DEVICE — GUIDEWIRE, NITINOL, 0.038 150CM, STRAIGHT TIP

## (undated) DEVICE — CATHETER, URETERAL, DUAL LUMEN, 10FR X 50CM

## (undated) DEVICE — CABLE, ELECTROSURGICAL, MONOPOLAR, FOOTSWITCHING, 10 FT

## (undated) DEVICE — SOLUTION, IRRIGATION, USP, STERILE WATER, UROLOGICAL, 3000 ML, BAG